# Patient Record
Sex: MALE | Race: WHITE | NOT HISPANIC OR LATINO
[De-identification: names, ages, dates, MRNs, and addresses within clinical notes are randomized per-mention and may not be internally consistent; named-entity substitution may affect disease eponyms.]

---

## 2017-04-02 ENCOUNTER — TRANSCRIPTION ENCOUNTER (OUTPATIENT)
Age: 72
End: 2017-04-02

## 2017-10-09 ENCOUNTER — NON-APPOINTMENT (OUTPATIENT)
Age: 72
End: 2017-10-09

## 2017-10-09 ENCOUNTER — APPOINTMENT (OUTPATIENT)
Dept: CARDIOLOGY | Facility: CLINIC | Age: 72
End: 2017-10-09
Payer: MEDICARE

## 2017-10-09 VITALS
BODY MASS INDEX: 33.79 KG/M2 | DIASTOLIC BLOOD PRESSURE: 80 MMHG | HEART RATE: 80 BPM | SYSTOLIC BLOOD PRESSURE: 178 MMHG | HEIGHT: 70 IN | OXYGEN SATURATION: 95 % | WEIGHT: 236 LBS

## 2017-10-09 PROCEDURE — 99214 OFFICE O/P EST MOD 30 MIN: CPT

## 2017-11-08 ENCOUNTER — APPOINTMENT (OUTPATIENT)
Dept: CARDIOLOGY | Facility: CLINIC | Age: 72
End: 2017-11-08
Payer: MEDICARE

## 2017-11-08 PROCEDURE — 93306 TTE W/DOPPLER COMPLETE: CPT

## 2017-11-14 ENCOUNTER — APPOINTMENT (OUTPATIENT)
Dept: CARDIOLOGY | Facility: CLINIC | Age: 72
End: 2017-11-14
Payer: MEDICARE

## 2017-11-14 DIAGNOSIS — R94.39 ABNORMAL RESULT OF OTHER CARDIOVASCULAR FUNCTION STUDY: ICD-10-CM

## 2017-11-14 PROCEDURE — 93015 CV STRESS TEST SUPVJ I&R: CPT

## 2017-11-15 PROBLEM — R94.39 ABNORMAL STRESS ECG: Status: ACTIVE | Noted: 2017-11-15

## 2017-12-04 ENCOUNTER — APPOINTMENT (OUTPATIENT)
Dept: CARDIOLOGY | Facility: CLINIC | Age: 72
End: 2017-12-04
Payer: MEDICARE

## 2017-12-04 PROCEDURE — A9500: CPT

## 2017-12-04 PROCEDURE — 78452 HT MUSCLE IMAGE SPECT MULT: CPT

## 2017-12-04 PROCEDURE — 93015 CV STRESS TEST SUPVJ I&R: CPT

## 2018-07-28 ENCOUNTER — APPOINTMENT (OUTPATIENT)
Dept: ORTHOPEDIC SURGERY | Facility: CLINIC | Age: 73
End: 2018-07-28
Payer: MEDICARE

## 2018-07-28 VITALS
HEIGHT: 70 IN | WEIGHT: 230 LBS | HEART RATE: 80 BPM | BODY MASS INDEX: 32.93 KG/M2 | DIASTOLIC BLOOD PRESSURE: 72 MMHG | SYSTOLIC BLOOD PRESSURE: 166 MMHG

## 2018-07-28 DIAGNOSIS — S83.241A OTHER TEAR OF MEDIAL MENISCUS, CURRENT INJURY, RIGHT KNEE, INITIAL ENCOUNTER: ICD-10-CM

## 2018-07-28 PROCEDURE — 99203 OFFICE O/P NEW LOW 30 MIN: CPT

## 2018-07-28 PROCEDURE — 73562 X-RAY EXAM OF KNEE 3: CPT | Mod: RT

## 2019-11-12 ENCOUNTER — INPATIENT (INPATIENT)
Facility: HOSPITAL | Age: 74
LOS: 1 days | Discharge: HOME | DRG: 309 | End: 2019-11-14
Attending: INTERNAL MEDICINE | Admitting: INTERNAL MEDICINE
Payer: MEDICARE

## 2019-11-12 VITALS
HEIGHT: 70 IN | HEART RATE: 111 BPM | SYSTOLIC BLOOD PRESSURE: 149 MMHG | DIASTOLIC BLOOD PRESSURE: 78 MMHG | RESPIRATION RATE: 20 BRPM | TEMPERATURE: 98 F | WEIGHT: 229.94 LBS | OXYGEN SATURATION: 94 %

## 2019-11-12 DIAGNOSIS — I10 ESSENTIAL (PRIMARY) HYPERTENSION: ICD-10-CM

## 2019-11-12 DIAGNOSIS — E78.2 MIXED HYPERLIPIDEMIA: ICD-10-CM

## 2019-11-12 DIAGNOSIS — R06.00 DYSPNEA, UNSPECIFIED: ICD-10-CM

## 2019-11-12 DIAGNOSIS — I48.91 UNSPECIFIED ATRIAL FIBRILLATION: ICD-10-CM

## 2019-11-12 DIAGNOSIS — E03.9 HYPOTHYROIDISM, UNSPECIFIED: ICD-10-CM

## 2019-11-12 LAB
ALBUMIN SERPL ELPH-MCNC: 3.7 G/DL — SIGNIFICANT CHANGE UP (ref 3.3–5)
ALP SERPL-CCNC: 83 U/L — SIGNIFICANT CHANGE UP (ref 40–120)
ALT FLD-CCNC: 40 U/L — SIGNIFICANT CHANGE UP (ref 12–78)
ANION GAP SERPL CALC-SCNC: 8 MMOL/L — SIGNIFICANT CHANGE UP (ref 5–17)
APTT BLD: 36 SEC — SIGNIFICANT CHANGE UP (ref 28.5–37)
AST SERPL-CCNC: 40 U/L — HIGH (ref 15–37)
BASOPHILS # BLD AUTO: 0.06 K/UL — SIGNIFICANT CHANGE UP (ref 0–0.2)
BASOPHILS NFR BLD AUTO: 0.5 % — SIGNIFICANT CHANGE UP (ref 0–2)
BILIRUB SERPL-MCNC: 0.4 MG/DL — SIGNIFICANT CHANGE UP (ref 0.2–1.2)
BUN SERPL-MCNC: 32 MG/DL — HIGH (ref 7–23)
CALCIUM SERPL-MCNC: 9 MG/DL — SIGNIFICANT CHANGE UP (ref 8.5–10.1)
CHLORIDE SERPL-SCNC: 110 MMOL/L — HIGH (ref 96–108)
CK MB BLD-MCNC: 2.6 % — SIGNIFICANT CHANGE UP (ref 0–3.5)
CK MB CFR SERPL CALC: 2.5 NG/ML — SIGNIFICANT CHANGE UP (ref 0–3.6)
CK SERPL-CCNC: 98 U/L — SIGNIFICANT CHANGE UP (ref 26–308)
CO2 SERPL-SCNC: 23 MMOL/L — SIGNIFICANT CHANGE UP (ref 22–31)
CREAT SERPL-MCNC: 1.5 MG/DL — HIGH (ref 0.5–1.3)
EOSINOPHIL # BLD AUTO: 0.34 K/UL — SIGNIFICANT CHANGE UP (ref 0–0.5)
EOSINOPHIL NFR BLD AUTO: 3.1 % — SIGNIFICANT CHANGE UP (ref 0–6)
GLUCOSE SERPL-MCNC: 125 MG/DL — HIGH (ref 70–99)
HCT VFR BLD CALC: 44.8 % — SIGNIFICANT CHANGE UP (ref 39–50)
HGB BLD-MCNC: 15.1 G/DL — SIGNIFICANT CHANGE UP (ref 13–17)
IMM GRANULOCYTES NFR BLD AUTO: 0.8 % — SIGNIFICANT CHANGE UP (ref 0–1.5)
INR BLD: 1.99 RATIO — HIGH (ref 0.88–1.16)
LACTATE SERPL-SCNC: 1.4 MMOL/L — SIGNIFICANT CHANGE UP (ref 0.7–2)
LYMPHOCYTES # BLD AUTO: 1.75 K/UL — SIGNIFICANT CHANGE UP (ref 1–3.3)
LYMPHOCYTES # BLD AUTO: 16 % — SIGNIFICANT CHANGE UP (ref 13–44)
MCHC RBC-ENTMCNC: 28.5 PG — SIGNIFICANT CHANGE UP (ref 27–34)
MCHC RBC-ENTMCNC: 33.7 GM/DL — SIGNIFICANT CHANGE UP (ref 32–36)
MCV RBC AUTO: 84.5 FL — SIGNIFICANT CHANGE UP (ref 80–100)
MONOCYTES # BLD AUTO: 1.41 K/UL — HIGH (ref 0–0.9)
MONOCYTES NFR BLD AUTO: 12.9 % — SIGNIFICANT CHANGE UP (ref 2–14)
NEUTROPHILS # BLD AUTO: 7.26 K/UL — SIGNIFICANT CHANGE UP (ref 1.8–7.4)
NEUTROPHILS NFR BLD AUTO: 66.7 % — SIGNIFICANT CHANGE UP (ref 43–77)
NRBC # BLD: 0 /100 WBCS — SIGNIFICANT CHANGE UP (ref 0–0)
PLATELET # BLD AUTO: 218 K/UL — SIGNIFICANT CHANGE UP (ref 150–400)
POTASSIUM SERPL-MCNC: 3.9 MMOL/L — SIGNIFICANT CHANGE UP (ref 3.5–5.3)
POTASSIUM SERPL-SCNC: 3.9 MMOL/L — SIGNIFICANT CHANGE UP (ref 3.5–5.3)
PROT SERPL-MCNC: 7.9 G/DL — SIGNIFICANT CHANGE UP (ref 6–8.3)
PROTHROM AB SERPL-ACNC: 23 SEC — HIGH (ref 10–12.9)
RBC # BLD: 5.3 M/UL — SIGNIFICANT CHANGE UP (ref 4.2–5.8)
RBC # FLD: 14.5 % — SIGNIFICANT CHANGE UP (ref 10.3–14.5)
SODIUM SERPL-SCNC: 141 MMOL/L — SIGNIFICANT CHANGE UP (ref 135–145)
TROPONIN I SERPL-MCNC: <.015 NG/ML — SIGNIFICANT CHANGE UP (ref 0.01–0.04)
WBC # BLD: 10.91 K/UL — HIGH (ref 3.8–10.5)
WBC # FLD AUTO: 10.91 K/UL — HIGH (ref 3.8–10.5)

## 2019-11-12 PROCEDURE — 93010 ELECTROCARDIOGRAM REPORT: CPT

## 2019-11-12 PROCEDURE — 93306 TTE W/DOPPLER COMPLETE: CPT | Mod: 26

## 2019-11-12 PROCEDURE — 71250 CT THORAX DX C-: CPT | Mod: 26

## 2019-11-12 PROCEDURE — 99285 EMERGENCY DEPT VISIT HI MDM: CPT

## 2019-11-12 PROCEDURE — 99223 1ST HOSP IP/OBS HIGH 75: CPT

## 2019-11-12 PROCEDURE — 71046 X-RAY EXAM CHEST 2 VIEWS: CPT | Mod: 26

## 2019-11-12 RX ORDER — DILTIAZEM HCL 120 MG
10 CAPSULE, EXT RELEASE 24 HR ORAL ONCE
Refills: 0 | Status: COMPLETED | OUTPATIENT
Start: 2019-11-12 | End: 2019-11-12

## 2019-11-12 RX ORDER — METOPROLOL TARTRATE 50 MG
12.5 TABLET ORAL
Refills: 0 | Status: DISCONTINUED | OUTPATIENT
Start: 2019-11-12 | End: 2019-11-14

## 2019-11-12 RX ORDER — LEVOTHYROXINE SODIUM 125 MCG
137 TABLET ORAL DAILY
Refills: 0 | Status: DISCONTINUED | OUTPATIENT
Start: 2019-11-12 | End: 2019-11-14

## 2019-11-12 RX ORDER — HYDROCHLOROTHIAZIDE 25 MG
12.5 TABLET ORAL DAILY
Refills: 0 | Status: DISCONTINUED | OUTPATIENT
Start: 2019-11-12 | End: 2019-11-14

## 2019-11-12 RX ORDER — DILTIAZEM HCL 120 MG
1 CAPSULE, EXT RELEASE 24 HR ORAL
Qty: 0 | Refills: 0 | DISCHARGE

## 2019-11-12 RX ORDER — DILTIAZEM HCL 120 MG
10 CAPSULE, EXT RELEASE 24 HR ORAL ONCE
Refills: 0 | Status: DISCONTINUED | OUTPATIENT
Start: 2019-11-12 | End: 2019-11-12

## 2019-11-12 RX ORDER — IPRATROPIUM/ALBUTEROL SULFATE 18-103MCG
3 AEROSOL WITH ADAPTER (GRAM) INHALATION ONCE
Refills: 0 | Status: COMPLETED | OUTPATIENT
Start: 2019-11-12 | End: 2019-11-12

## 2019-11-12 RX ORDER — IPRATROPIUM/ALBUTEROL SULFATE 18-103MCG
3 AEROSOL WITH ADAPTER (GRAM) INHALATION THREE TIMES A DAY
Refills: 0 | Status: DISCONTINUED | OUTPATIENT
Start: 2019-11-12 | End: 2019-11-14

## 2019-11-12 RX ORDER — DILTIAZEM HCL 120 MG
180 CAPSULE, EXT RELEASE 24 HR ORAL DAILY
Refills: 0 | Status: DISCONTINUED | OUTPATIENT
Start: 2019-11-12 | End: 2019-11-12

## 2019-11-12 RX ORDER — DILTIAZEM HCL 120 MG
90 CAPSULE, EXT RELEASE 24 HR ORAL EVERY 6 HOURS
Refills: 0 | Status: DISCONTINUED | OUTPATIENT
Start: 2019-11-12 | End: 2019-11-14

## 2019-11-12 RX ORDER — LOSARTAN POTASSIUM 100 MG/1
100 TABLET, FILM COATED ORAL DAILY
Refills: 0 | Status: DISCONTINUED | OUTPATIENT
Start: 2019-11-12 | End: 2019-11-14

## 2019-11-12 RX ORDER — WARFARIN SODIUM 2.5 MG/1
7 TABLET ORAL ONCE
Refills: 0 | Status: COMPLETED | OUTPATIENT
Start: 2019-11-12 | End: 2019-11-12

## 2019-11-12 RX ORDER — SODIUM CHLORIDE 9 MG/ML
1000 INJECTION INTRAMUSCULAR; INTRAVENOUS; SUBCUTANEOUS ONCE
Refills: 0 | Status: COMPLETED | OUTPATIENT
Start: 2019-11-12 | End: 2019-11-12

## 2019-11-12 RX ORDER — ATORVASTATIN CALCIUM 80 MG/1
40 TABLET, FILM COATED ORAL AT BEDTIME
Refills: 0 | Status: DISCONTINUED | OUTPATIENT
Start: 2019-11-12 | End: 2019-11-14

## 2019-11-12 RX ADMIN — SODIUM CHLORIDE 1000 MILLILITER(S): 9 INJECTION INTRAMUSCULAR; INTRAVENOUS; SUBCUTANEOUS at 07:07

## 2019-11-12 RX ADMIN — Medication 3 MILLILITER(S): at 20:12

## 2019-11-12 RX ADMIN — WARFARIN SODIUM 7 MILLIGRAM(S): 2.5 TABLET ORAL at 21:06

## 2019-11-12 RX ADMIN — SODIUM CHLORIDE 1000 MILLILITER(S): 9 INJECTION INTRAMUSCULAR; INTRAVENOUS; SUBCUTANEOUS at 05:39

## 2019-11-12 RX ADMIN — Medication 90 MILLIGRAM(S): at 23:25

## 2019-11-12 RX ADMIN — Medication 3 MILLILITER(S): at 05:38

## 2019-11-12 RX ADMIN — ATORVASTATIN CALCIUM 40 MILLIGRAM(S): 80 TABLET, FILM COATED ORAL at 21:02

## 2019-11-12 RX ADMIN — Medication 200 MILLIGRAM(S): at 21:02

## 2019-11-12 RX ADMIN — Medication 10 MILLIGRAM(S): at 09:42

## 2019-11-12 RX ADMIN — Medication 12.5 MILLIGRAM(S): at 18:09

## 2019-11-12 RX ADMIN — Medication 10 MILLIGRAM(S): at 05:44

## 2019-11-12 RX ADMIN — Medication 90 MILLIGRAM(S): at 18:09

## 2019-11-12 NOTE — ED PROVIDER NOTE - PSYCHIATRIC, MLM
[facility]  Neurosurgery  Progress Note    Subjective:     History of Present Illness: 80 y.o.  male with type 2 diabetes, who presents today for follow up evaluation one week prior to 2 level ACDF scheduled for 4/10/2018. Pt reports    Pt presents today wearing a cervical collar and in a wheelchair. He would like to proceed with surgery. Per pt's family, his PCP would like to see him on 4/9, the day before surgery. He notes continued neck pain and BUE numbness/tingling as well as BUE weakness. Pt is only able to walk a few steps unassisted. He has never received neck surgery.     Post-Op Info:  Procedure(s) (LRB):  WASHOUT-CERVICAL-wound washout and csf leak repair with depuy (N/A)  DISKECTOMY AND FUSION-ANTERIOR CERVICAL (ACDF)- REVISON C5-C7   7 Days Post-Op     Interval History: YAMILKA. PEG placed yesterday by IR    Medications:  Continuous Infusions:  Scheduled Meds:   atorvastatin  20 mg Per NG tube Nightly    cefTRIAXone (ROCEPHIN) IVPB  2 g Intravenous Q24H    cinacalcet  30 mg Oral Daily with breakfast    citalopram  10 mg Per NG tube Daily    diltiaZEM  30 mg Per NG tube Q6H    heparin (porcine)  5,000 Units Subcutaneous Q8H    insulin aspart U-100  2 Units Subcutaneous Q4H    insulin detemir U-100  13 Units Subcutaneous BID    levETIRAcetam  500 mg Per NG tube BID    polyethylene glycol  17 g Per NG tube BID    senna-docusate 8.6-50 mg  1 tablet Per NG tube BID    tamsulosin  0.4 mg Per NG tube Daily    valsartan  120 mg Per NG tube Daily    vancomycin (VANCOCIN) IVPB  1,000 mg Intravenous Q12H     PRN Meds:acetaminophen, albuterol-ipratropium 2.5mg-0.5mg/3mL, aluminum-magnesium hydroxide-simethicone, dextrose 50%, glucagon (human recombinant), insulin aspart U-100, lidocaine HCL 2%, ondansetron, potassium chloride 10%, potassium chloride 10%, potassium chloride 10%, potassium, sodium phosphates, potassium, sodium phosphates, potassium, sodium phosphates     Review of  Systems    Objective:     Weight: 99.5 kg (219 lb 5.7 oz)  Body mass index is 29.75 kg/m².  Vital Signs (Most Recent):  Temp: 97 °F (36.1 °C) (05/05/18 1736)  Pulse: 86 (05/05/18 1736)  Resp: 20 (05/05/18 1311)  BP: (!) 158/86 (05/05/18 1736)  SpO2: (!) 93 % (05/05/18 1736) Vital Signs (24h Range):  Temp:  [97 °F (36.1 °C)-98.2 °F (36.8 °C)] 97 °F (36.1 °C)  Pulse:  [75-88] 86  Resp:  [16-20] 20  SpO2:  [93 %-98 %] 93 %  BP: (142-176)/(82-88) 158/86                           NG/OG Tube 04/21/18 2300 Cortrak Right nostril (Active)   Placement Check placement verified by aspirate characteristics 5/3/2018  8:00 PM   Distal Tube Length (cm) 70 5/3/2018  8:00 PM   Tolerance no signs/symptoms of discomfort 5/3/2018  8:00 PM   Securement anchored to nostril center w/ adhesive device 5/3/2018  8:00 PM   Clamp Status/Tolerance unclamped 5/3/2018  8:00 PM   Insertion Site Appearance no redness, warmth, tenderness, skin breakdown, drainage 5/3/2018  8:00 PM   Drainage None 5/3/2018  8:00 PM   Flush/Irrigation flushed w/;water 5/3/2018  8:00 PM   Feeding Method continuous 5/3/2018  8:00 PM   Current Rate (mL/hr) 55 mL/hr 5/3/2018  8:00 PM   Goal Rate (mL/hr) 55 mL/hr 5/3/2018  8:00 PM   Intake (mL) 60 mL 5/3/2018  8:00 PM   Water Bolus (mL) 250 mL 5/3/2018  4:41 PM   Intake (mL) - Breast Milk Tube Feeding 0 4/29/2018 11:05 PM   Rate Formula Tube Feeding (mL/hr) 55 mL/hr 5/2/2018 10:00 PM   Intake (mL) - Formula Tube Feeding 570 5/3/2018  5:00 AM   Residual Amount (ml) 0 ml 5/2/2018 10:00 PM       Male External Urinary Catheter 04/30/18 1600 (Active)   Collection Container Urimeter 5/3/2018  8:00 PM   Securement Method secured to top of thigh w/ adhesive device 5/3/2018  8:00 PM   Skin no redness;no breakdown 5/3/2018  8:00 PM   Tolerance no signs/symptoms of discomfort 5/2/2018 10:00 PM   Output (mL) 700 mL 5/3/2018  4:41 PM       Neurosurgery Physical Exam    General: well developed, well nourished. no acute distress.  Generalized deconditioning   Neurologic: lethargic. Requires persistant stim to participate. Oriented x 2. Thought content appropriate. Follows commands   Head: normocephalic, atraumatic   GCS: Motor: 6/Verbal: 5/Eyes: 4 GCS Total: 15  Cranial nerves: face symmetric, tongue midline, pupils equal, round, reactive to light with accomodation, extraocular muscles intact. CN II-XII grossly intact.   Language: no aphasia  Speech:  dysarthria   Sensory: decrease response to light touch in BUE  Motor Strength: Moves all extremities spontaneously with good tone.      Strength   Deltoids Triceps Biceps Wrist Extension Wrist Flexion Hand    Upper: R 2/5 3/5 3/5 2/5 2/5 3/5     L 3/5 4-/5 4-/5 3/5 3/5 3/5       Iliopsoas Quadriceps Knee  Flexion Tibialis  anterior Gastro- cnemius EHL   Lower: R 3/5 3/5 3/5 4-/5 4-/5 4-/5     L 3/5 3/5 3/5 4/5 4/5 4/5          [place]: present on right   Clonus: absent     wound is c/d/i, no erythema, ttp, or drainage.  wound edges are well approximated. No surrounding edema     Significant Labs:    Recent Labs  Lab 05/04/18  0429 05/05/18  0444   * 150*    136   K 4.4 4.5    102   CO2 30* 27   BUN 17 14   CREATININE 0.6 0.6   CALCIUM 11.0* 11.0*   MG 2.0 1.8       Recent Labs  Lab 05/04/18  0429 05/05/18  0444   WBC 9.54 8.75   HGB 10.7* 11.4*   HCT 34.6* 36.7*    218       Recent Labs  Lab 05/04/18  0429 05/05/18  0444   INR 1.1 1.2     Microbiology Results (last 7 days)     Procedure Component Value Units Date/Time    Culture, Anaerobe [[id number]] Collected:  04/28/18 1417    Order Status:  Completed Specimen:  Wound from Neck Updated:  05/03/18 1244     Anaerobic Culture Culture in progress    Narrative:       Wound fluid    Culture, Anaerobe [[id number]] Collected:  04/28/18 1413    Order Status:  Completed Specimen:  Wound from Neck Updated:  05/03/18 1244     Anaerobic Culture Culture in progress    Narrative:       Wound fluid    Fungus culture [[id number]]  Collected:  04/28/18 1413    Order Status:  Completed Specimen:  Wound from Neck Updated:  05/03/18 1039     Fungus (Mycology) Culture Culture in progress    Narrative:       Wound fluid    Fungus culture [[id number]] Collected:  04/28/18 1417    Order Status:  Completed Specimen:  Wound from Neck Updated:  05/03/18 1039     Fungus (Mycology) Culture Culture in progress    Narrative:       Wound fluid    Aerobic culture [[id number]] Collected:  04/28/18 1417    Order Status:  Completed Specimen:  Wound from Neck Updated:  05/01/18 0852     Aerobic Bacterial Culture No growth    Narrative:       Wound fluid    Aerobic culture [[id number]] Collected:  04/28/18 1413    Order Status:  Completed Specimen:  Wound from Neck Updated:  05/01/18 0852     Aerobic Bacterial Culture No growth    Narrative:       Wound fluid    AFB Culture & Smear [[id number]] Collected:  04/28/18 1417    Order Status:  Completed Specimen:  Wound from Neck Updated:  04/30/18 1313     AFB Culture & Smear Culture in progress     AFB CULTURE STAIN No acid fast bacilli seen.    Narrative:       Wound fluid    AFB Culture & Smear [[id number]] Collected:  04/28/18 1413    Order Status:  Completed Specimen:  Wound from Neck Updated:  04/30/18 1313     AFB Culture & Smear Culture in progress     AFB CULTURE STAIN No acid fast bacilli seen.    Narrative:       Wound fluid    Urine culture [[id number]] Collected:  04/27/18 1218    Order Status:  Completed Specimen:  Urine from Urine, Catheterized Updated:  04/28/18 2020     Urine Culture, Routine No growth    Gram stain [[id number]] Collected:  04/28/18 1417    Order Status:  Completed Specimen:  Wound from Neck Updated:  04/28/18 2009     Gram Stain Result Rare WBC's      No organisms seen    Narrative:       Wound fluid    Gram stain [[id number]] Collected:  04/28/18 1413    Order Status:  Completed Specimen:  Wound from Neck Updated:  04/28/18 2004     Gram Stain Result Rare WBC's      No organisms seen     Narrative:       Wound fluid        All pertinent labs from the last 24 hours have been reviewed.    Significant Diagnostics:  None new     Assessment/Plan:     * Cervical myelopathy with cervical radiculopathy    80 y.o. male s/p C3-5 ACDF for cervical myelopathy on 4/10 with Dr. Hess, and now s/p evacuation/washout of fluid collection with CSF leak repair      -Repeat CT Head stable   -Cont neuro checks q4h   -Appreciate medicine recommendations  -TF restarted today, will adjust insulin per medicine recs  -ID: continue vanc and ceftriaxone x 7 days from surgery (4/28) Vanc trough before 4th dose, 18. Trough Goal 15-20. Surgical cultures NGTD, will stop antibiotics after today  -CV goal normotension. Continue home meds. PRN meds SBP >160.   -Continue PILI/SCDs/SQH  -Continue bowel regimen daily. Brown bomb 5/3 with +BM  -Continue to hold Eliquis. Per , indications unclear.  does not recommend continuing upon discharge.  -Appreciate  assistance with management  -Hypercalcemia: home dose sensipar restarted per HM, CTM   -Continue aggressive PT/OT/ST  -Cervical collar when OOB or with activity  -Scheduled CPT q4 hours   -Aggressive oral care per nursing staff daily  -SNF pending. CM/SW to pursue                       Devante Holman MD  Neurosurgery  [facility]   Alert and oriented to person, place, time/situation. normal mood and affect. no apparent risk to self or others.

## 2019-11-12 NOTE — H&P ADULT - HISTORY OF PRESENT ILLNESS
Child Abuse Assessment (patients less than 13 yrs):  Chief Complaint: shortness of breath.    · Chief Complaint: The patient is a 74y Male complaining of shortness of breath.	  · HPI Objective Statement: 73yo male who presents with cough and sob on and off for 2 weeks. pt, c/o cough and congestion, was seen by pmd and given claritin, and the cough has gotten worse, and now pt feels sob, no fever, chills, no chest pain no other complaints has hx of a fib , and small tia cva , and leg weakness , but plays gulf , and is active , should be on coumadin for a fib 	  · Presenting Symptoms: COUGH, DIFFICULTY BREATHING	  · Negative Findings: no body aches, no chest pain, no chills, no fever, no hemoptysis	  · Timing: gradual onset, worsening	  · Duration: week(s)  2	  · Quality: non-productive cough	  · Context: coughing	  · Recent Exposure To: none known	  · Aggravated Factors: coughing	  · Relieving Factors: none	    PAST MEDICAL/SURGICAL/FAMILY/SOCIAL HISTORY:    Past Medical History:  Atrial fibrillation    Hypertension    Hypothyroid.     Tobacco Usage:  · Tobacco Usage	Former smoker

## 2019-11-12 NOTE — H&P ADULT - NSHPLABSRESULTS_GEN_ALL_CORE
< from: CT Chest No Cont (11.12.19 @ 06:12) >      EXAM:  CT CHEST                            PROCEDURE DATE:  11/12/2019          INTERPRETATION:  CLINICAL INFORMATION: Cough and shortness of breath    COMPARISON: None    PROCEDURE:   CT of the Chest was performed without intravenous contrast.  Sagittal and coronal reformats were performed.      FINDINGS:    CHEST:     LUNGS AND LARGE AIRWAYS: Patent central airways. No pulmonary nodules.  PLEURA: No pleural effusion.  VESSELS: Within normal limits.  HEART: Heart size is normal.No pericardial effusion.  MEDIASTINUM AND MARY: No lymphadenopathy.  CHEST WALL AND LOWER NECK: Within normal limits.  VISUALIZED UPPER ABDOMEN: Partially imaged right renal cyst.   Cholelithiasis.  BONES: Mild degenerative changes of the spine.    IMPRESSION: No pulmonary consolidation            < end of copied text >

## 2019-11-12 NOTE — H&P ADULT - ASSESSMENT
Child Abuse Assessment (patients less than 13 yrs):  Chief Complaint: shortness of breath.    · Chief Complaint: The patient is a 74y Male complaining of shortness of breath.	  · HPI Objective Statement: 75yo male who presents with cough and sob on and off for 2 weeks. pt, c/o cough and congestion, was seen by pmd and given claritin, and the cough has gotten worse, and now pt feels sob, no fever, chills, no chest pain no other complaints has hx of a fib , and small tia cva , and leg weakness , but plays gulf , and is active , should be on coumadin for a fib 	  · Presenting Symptoms: COUGH, DIFFICULTY BREATHING	  · Negative Findings: no body aches, no chest pain, no chills, no fever, no hemoptysis	  · Timing: gradual onset, worsening	  · Duration: week(s)  2	  · Quality: non-productive cough	  · Context: coughing	  · Recent Exposure To: none known	  · Aggravated Factors: coughing	  · Relieving Factors: none	    PAST MEDICAL/SURGICAL/FAMILY/SOCIAL HISTORY:    Past Medical History:  Atrial fibrillation    Hypertension    Hypothyroid.     Tobacco Usage:  · Tobacco Usage	Former smoker	    admit to telemetry, sob , cardiac consult , meds and labs rev, a fib on coumadin ,

## 2019-11-12 NOTE — CONSULT NOTE ADULT - ASSESSMENT
75 YO M PMHX paroxysmal a fib on warfarin diltiazem cardioverted in past, stage 1 diastolic dysfunction (EF 55-60% nov 2017, mild MR, mild LAE), HTN hypothyroisidm, bladder cancer s/p bladder removal over 10 years ago, presents with SOB x 2 weeks on and off, SMALL, orthopnea. Cardiology consulted for rapid a fib in setting of SOB.    rapid a fib in setting of SOB  s/p Cardizem 10 mg IVP x2, fluids 1 L IV bolus, Duoneb, albuterol  PMHX of paroxysmal a fib on warfarin diltiazem cardioverted in past  ekg:  a fib with RVR, 132 bpm, nonspecific ST abnormality  echo nov 2017: stage 1 diastolic dysfunction (EF 55-60%, mild MR, mild LAE),  CT chest: no pulmonary consolidation, no acute process    recommend:  continue home dose diltiazem 180 PO tomorrow   f/u INR, resume warfarin 7.5 home dose as per INR results  decongestants for chest congestion as per primary care team  f/u CXR  continue home dose rosuvastatin 20 mg, lasartan-HCTZ 100 mg   follow up outpatient cardiologist 73 YO M PMHX paroxysmal a fib on warfarin diltiazem cardioverted in past, stage 1 diastolic dysfunction (EF 55-60% nov 2017, mild MR, mild LAE), HTN hypothyroisidm, bladder cancer s/p bladder removal over 10 years ago, presents with SOB x 2 weeks on and off, SMALL, orthopnea. Cardiology consulted for rapid a fib in setting of SOB.    rapid a fib and SOB, likely 2/2 bronchitis /chest congestion   s/p Cardizem 10 mg IVP x2, fluids 1 L IV bolus, Duoneb, albuterol  PMHX of paroxysmal a fib on warfarin diltiazem cardioverted in past  ekg:  a fib with RVR, 132 bpm, nonspecific ST abnormality  echo nov 2017: stage 1 diastolic dysfunction (EF 55-60%, mild MR, mild LAE),  CT chest: no pulmonary consolidation, no acute process    recommend:  admit to tele  echo   start Cardizem drip, Cardizem 90 q 6 PO, Metoprolol 12.5 BID today    f/u TSH r/o hyperthyroidism etiology    continue home dose diltiazem 180 PO tomorrow   f/u INR, resume warfarin 7.5 home dose as per INR results  decongestants for chest congestion as per primary care team  f/u CXR  continue home dose rosuvastatin 20 mg, lasartan-HCTZ 100 mg   follow up outpatient cardiologist 75 YO M PMHX paroxysmal a fib on warfarin diltiazem cardioverted in past, stage 1 diastolic dysfunction (EF 55-60% nov 2017, mild MR, mild LAE), HTN hypothyroisidm, bladder cancer s/p bladder removal over 10 years ago, presents with SOB x 2 weeks on and off, SMALL, orthopnea. Cardiology consulted for rapid a fib in setting of SOB.    rapid a fib and SOB, likely 2/2 bronchitis /chest congestion   s/p Cardizem 10 mg IVP x2, fluids 1 L IV bolus, Duoneb, albuterol  PMHX of paroxysmal a fib on warfarin diltiazem cardioverted in past  ekg:  a fib with RVR, 132 bpm, nonspecific ST abnormality  echo nov 2017: stage 1 diastolic dysfunction (EF 55-60%, mild MR, mild LAE),  CT chest: no pulmonary consolidation, no acute process    recommend:  admit to tele  echo   start Cardizem drip at 5mg/hr.  Can increase as needed for HR control  Start Cardizem 90 q 6 PO, Metoprolol 12.5 BID PO.  Increase metoprolol as needed for HR control  f/u TSH r/o hyperthyroidism etiology    f/u INR, resume warfarin 7.5 home dose as per INR results  decongestants for chest congestion as per primary care team  f/u CXR  continue home dose rosuvastatin 20 mg, lasartan-HCTZ 100 mg   will follow while admitted.

## 2019-11-12 NOTE — ED ADULT NURSE NOTE - CHPI ED NUR SYMPTOMS NEG
no body aches/no edema/no headache/no chest pain/no hemoptysis/no chills/no diaphoresis/no wheezing/no fever

## 2019-11-12 NOTE — ED PROVIDER NOTE - CARE PLAN
Principal Discharge DX:	Atrial fibrillation with rapid ventricular response  Secondary Diagnosis:	Dyspnea

## 2019-11-12 NOTE — ED PROVIDER NOTE - OBJECTIVE STATEMENT
73yo male who presents with cough and sob on and off for 2 weeks. pt, c/o cough and congestion, was seen by pmd and given claritin, and the cough has gotten worse, and now pt feels sob, no fever, chills, no chest pain no other compalints

## 2019-11-12 NOTE — CONSULT NOTE ADULT - SUBJECTIVE AND OBJECTIVE BOX
Patient is a 74y old  Male who presents with a chief complaint of     HPI:  73 YO M PMHX paroxismal a fib ion warfarin diltiazem cardioverted in past, stage 1 diastolic dysfunction (EF 55-60% nov 2017, mild MR, mild LAE, HTN), hypothyroisidm, bladder cancer s/p bladder removal over 10 years ago, presents with SOB x 2 weeks on and off, SMALL, orthopnea. Pt has associated    Pt has had yellow brown mucus producing cough x 2 months. Pt has been congested x 2 weeks, saw his PCP who prescribed claritin, however cough has worsened. Pt deneis fever, chills, CP, racing heart,     PAST MEDICAL & SURGICAL HISTORY:  Hypertension  Hypothyroid  Atrial fibrillation            ECHO  FINDINGS:      MEDICATIONS  (STANDING):    MEDICATIONS  (PRN):      FAMILY HISTORY:    Denies Family history of CAD or early MI      Constitutional: denies fever, chills  HEENT: denies blurry vision, difficulty hearing  Respiratory: denies SOB, SMALL, cough  Cardiovascular: denies CP, palpitations, orthopnea, PND, LE edema  Gastrointestinal: denies nausea, vomiting, abdominal pain  Genitourinary: denies urinary changes  Skin: Denies rashes, itching  Neurologic: denies headache, weakness, dizziness  Hematology/Oncology: denies bleeding, easy bruising  ROS negative except as noted above      SOCIAL HISTORY:    No tobacco, Alcohol or Ddrug use    Vital Signs Last 24 Hrs  T(C): 36.4 (12 Nov 2019 04:44), Max: 36.4 (12 Nov 2019 04:44)  T(F): 97.6 (12 Nov 2019 04:44), Max: 97.6 (12 Nov 2019 04:44)  HR: 93 (12 Nov 2019 07:26) (93 - 111)  BP: 128/82 (12 Nov 2019 07:26) (128/82 - 149/78)  BP(mean): --  RR: 18 (12 Nov 2019 07:26) (18 - 20)  SpO2: 96% (12 Nov 2019 06:39) (94% - 96%)    Physical Exam:  General: Well developed, well nourished, NAD  HEENT: NCAT, PERRLA, EOMI bl, moist mucous membranes   Neck: Supple, nontender, no mass  Neurology: A&Ox3, nonfocal, sensation intact   Respiratory: CTA B/L, No W/R/R  CV: RRR, +S1/S2, no murmurs, rubs or gallops  Abdominal: Soft, NT, ND +BSx4, no palpable masses  Extremities: No C/C/E, + peripheral pulses  MSK: Normal ROM, no joint erythema or warmth, no joint swelling   Heme: No obvious ecchymosis or petechiae   Skin: warm, dry, normal color      ECG:    I&O's Detail      LABS:                        15.1   10.91 )-----------( 218      ( 12 Nov 2019 05:23 )             44.8     11-12    141  |  110<H>  |  32<H>  ----------------------------<  125<H>  3.9   |  23  |  1.50<H>    Ca    9.0      12 Nov 2019 05:23    TPro  7.9  /  Alb  3.7  /  TBili  0.4  /  DBili  x   /  AST  40<H>  /  ALT  40  /  AlkPhos  83  11-12            I&O's Summary    BNP  RADIOLOGY & ADDITIONAL STUDIES: Patient is a 74y old  Male who presents with a chief complaint of     HPI:  73 YO M PMHX paroxysmal a fib on warfarin diltiazem cardioverted in past, stage 1 diastolic dysfunction (EF 55-60% nov 2017, mild MR, mild LAE), HTN hypothyroisidm, bladder cancer s/p bladder removal over 10 years ago, presents with SOB x 2 weeks on and off, SMALL, orthopnea. Pt has associated    Pt has had yellow brown mucus producing cough x 2 months. Pt has had chest congested x 2 weeks, saw his PCP who prescribed claritin, however cough has worsened. Pt denies fever, chills, CP, racing heart, palpitations, headache, lightheadedness, abdominal pain, n/v. Pt states he has been in a fib in the past, however during those times he could feel his heart racing.     PAST MEDICAL & SURGICAL HISTORY:  Hypertension  Hypothyroid  Atrial fibrillation            ECHO stage 1 diastolic dysfunction (EF 55-60% nov 2017, mild MR, mild LAE)  FINDINGS:      MEDICATIONS  (STANDING):    MEDICATIONS  (PRN):      FAMILY HISTORY:  father mother: CHF  father a fib       Constitutional: denies fever, chills  HEENT: denies blurry vision, difficulty hearing  Respiratory: +chest congestion, +SOB, +SMALL, yellow brown mucus producing cough x 2 months   Cardiovascular: +orthopnea, denies CP, palpitations, PND, LE edema  Gastrointestinal: denies nausea, vomiting, abdominal pain  Genitourinary: denies urinary changes  Skin: Denies rashes, itching  Neurologic: denies headache, weakness, dizziness  Hematology/Oncology: denies bleeding, easy bruising  ROS negative except as noted above      SOCIAL HISTORY:  quit 1985, smoked 1 ppd 25 years, drinks red wine 2 glasses with dinner 3x a week   no drug use    Vital Signs Last 24 Hrs  T(C): 36.4 (12 Nov 2019 04:44), Max: 36.4 (12 Nov 2019 04:44)  T(F): 97.6 (12 Nov 2019 04:44), Max: 97.6 (12 Nov 2019 04:44)  HR: 93 (12 Nov 2019 07:26) (93 - 111)  BP: 128/82 (12 Nov 2019 07:26) (128/82 - 149/78)  BP(mean): --  RR: 18 (12 Nov 2019 07:26) (18 - 20)  SpO2: 96% (12 Nov 2019 06:39) (94% - 96%)    Physical Exam:  General: Well developed, well nourished, NAD  HEENT: NCAT, PERRLA, EOMI bl, moist mucous membranes, no maxiallary sinus TTP   Neck: Supple, nontender, no mass  Neurology: A&Ox3, nonfocal, sensation intact   Respiratory: CTA B/L, No W/R/R  CV: irregular rhythm, irregular rate, no rubs or gallops  Abdominal: Soft, NT, ND +BSx4, no palpable masses  Extremities: trace edma b/l, No C/C, + peripheral pulses  MSK: Normal ROM, no joint erythema or warmth, no joint swelling   Heme: No obvious ecchymosis or petechiae   Skin: warm, dry, normal color      ECG: a fib with RVR, 132 bpm, nonspecific ST abnormality    I&O's Detail      LABS:                        15.1   10.91 )-----------( 218      ( 12 Nov 2019 05:23 )             44.8     11-12    141  |  110<H>  |  32<H>  ----------------------------<  125<H>  3.9   |  23  |  1.50<H>    Ca    9.0      12 Nov 2019 05:23    TPro  7.9  /  Alb  3.7  /  TBili  0.4  /  DBili  x   /  AST  40<H>  /  ALT  40  /  AlkPhos  83  11-12            I&O's Summary    BNP  RADIOLOGY & ADDITIONAL STUDIES:    EXAM:  CT CHEST                            PROCEDURE DATE:  11/12/2019          INTERPRETATION:  CLINICAL INFORMATION: Cough and shortness of breath    COMPARISON: None    PROCEDURE:   CT of the Chest was performed without intravenous contrast.  Sagittal and coronal reformats were performed.      FINDINGS:    CHEST:     LUNGS AND LARGE AIRWAYS: Patent central airways. No pulmonary nodules.  PLEURA: No pleural effusion.  VESSELS: Within normal limits.  HEART: Heart size is normal.No pericardial effusion.  MEDIASTINUM AND MARY: No lymphadenopathy.  CHEST WALL AND LOWER NECK: Within normal limits.  VISUALIZED UPPER ABDOMEN: Partially imaged right renal cyst.   Cholelithiasis.  BONES: Mild degenerative changes of the spine.    IMPRESSION: No pulmonary consolidation                      JANELL ESTRADA M.D., ATTENDING RADIOLOGIST  This document has been electronically signed. Nov 12 2019  6:46AM

## 2019-11-13 LAB
HCV AB S/CO SERPL IA: 0.2 S/CO — SIGNIFICANT CHANGE UP (ref 0–0.99)
HCV AB SERPL-IMP: SIGNIFICANT CHANGE UP
INR BLD: 1.83 RATIO — HIGH (ref 0.88–1.16)
PROTHROM AB SERPL-ACNC: 21.3 SEC — HIGH (ref 10–12.9)

## 2019-11-13 PROCEDURE — 99232 SBSQ HOSP IP/OBS MODERATE 35: CPT

## 2019-11-13 RX ORDER — WARFARIN SODIUM 2.5 MG/1
8 TABLET ORAL ONCE
Refills: 0 | Status: COMPLETED | OUTPATIENT
Start: 2019-11-13 | End: 2019-11-13

## 2019-11-13 RX ADMIN — Medication 12.5 MILLIGRAM(S): at 06:01

## 2019-11-13 RX ADMIN — Medication 3 MILLILITER(S): at 14:02

## 2019-11-13 RX ADMIN — Medication 90 MILLIGRAM(S): at 17:44

## 2019-11-13 RX ADMIN — Medication 90 MILLIGRAM(S): at 23:42

## 2019-11-13 RX ADMIN — Medication 137 MICROGRAM(S): at 06:01

## 2019-11-13 RX ADMIN — Medication 3 MILLILITER(S): at 07:59

## 2019-11-13 RX ADMIN — Medication 90 MILLIGRAM(S): at 12:04

## 2019-11-13 RX ADMIN — Medication 90 MILLIGRAM(S): at 06:01

## 2019-11-13 RX ADMIN — WARFARIN SODIUM 8 MILLIGRAM(S): 2.5 TABLET ORAL at 21:22

## 2019-11-13 RX ADMIN — ATORVASTATIN CALCIUM 40 MILLIGRAM(S): 80 TABLET, FILM COATED ORAL at 21:21

## 2019-11-13 RX ADMIN — Medication 12.5 MILLIGRAM(S): at 17:44

## 2019-11-13 RX ADMIN — LOSARTAN POTASSIUM 100 MILLIGRAM(S): 100 TABLET, FILM COATED ORAL at 06:01

## 2019-11-13 RX ADMIN — Medication 3 MILLILITER(S): at 20:28

## 2019-11-13 NOTE — PROGRESS NOTE ADULT - SUBJECTIVE AND OBJECTIVE BOX
[facility] -- Caleb Ochoa, Neteu, Jessica, Jerad Jarquin Savella  Office # [phone]      Follow Up:    Afib with RVR    Subjective/Observations:   No events overnight resting comfortably in bed.  No complaints of chest pain, dyspnea, or palpitations reported. No signs of orthopnea or PND.  + non productive cough     REVIEW OF SYSTEMS: All other review of systems is negative unless indicated above    PAST MEDICAL & SURGICAL HISTORY:  Hypertension  Hypothyroid  Atrial fibrillation      MEDICATIONS  (STANDING):  albuterol/ipratropium for Nebulization 3 milliLiter(s) Nebulizer three times a day  atorvastatin 40 milliGRAM(s) Oral at bedtime  diltiazem    Tablet 90 milliGRAM(s) Oral every 6 hours  hydrochlorothiazide 12.5 milliGRAM(s) Oral daily  levothyroxine 137 MICROGram(s) Oral daily  losartan 100 milliGRAM(s) Oral daily  metoprolol tartrate 12.5 milliGRAM(s) Oral two times a day    MEDICATIONS  (PRN):  guaiFENesin    Syrup 200 milliGRAM(s) Oral every 6 hours PRN Cough      Allergies    No Known Allergies    Intolerances        Vital Signs Last 24 Hrs  T(C): 36.6 (13 Nov 2019 07:47), Max: 36.6 (13 Nov 2019 07:47)  T(F): 97.9 (13 Nov 2019 07:47), Max: 97.9 (13 Nov 2019 07:47)  HR: 83 (13 Nov 2019 08:01) (83 - 111)  BP: 145/85 (13 Nov 2019 07:47) (122/78 - 150/84)  BP(mean): --  RR: 17 (13 Nov 2019 07:47) (17 - 18)  SpO2: 94% (13 Nov 2019 08:01) (94% - 99%)    I&O's Summary    12 Nov 2019 07:01  -  13 Nov 2019 07:00  --------------------------------------------------------  IN: 360 mL / OUT: 0 mL / NET: 360 mL          PHYSICAL EXAM:  TELE: Afib 101  Constitutional: NAD, awake and alert, obese  HEENT: Moist Mucous Membranes, Anicteric  Pulmonary: Non-labored scattered rhonchi   Cardiovascular: IRRegular, S1 and S2 nl, No murmurs, rubs, gallops or clicks  Gastrointestinal: Bowel Sounds present, soft, nontender.   Lymph: No lymphadenopathy. No peripheral edema.  Skin: No visible rashes or ulcers.  Psych:  Mood & affect appropriate    LABS: All Labs Reviewed:                        15.1   10.91 )-----------( 218      ( 12 Nov 2019 05:23 )             44.8     12 Nov 2019 05:23    141    |  110    |  32     ----------------------------<  125    3.9     |  23     |  1.50     Ca    9.0        12 Nov 2019 05:23    TPro  7.9    /  Alb  3.7    /  TBili  0.4    /  DBili  x      /  AST  40     /  ALT  40     /  AlkPhos  83     12 Nov 2019 05:23    PT/INR - ( 13 Nov 2019 06:35 )   PT: 21.3 sec;   INR: 1.83 ratio         PTT - ( 12 Nov 2019 15:38 )  PTT:36.0 sec  CARDIAC MARKERS ( 12 Nov 2019 11:57 )  <.015 ng/mL / x     / 98 U/L / x     / 2.5 ng/mL         ECG:    < from: 12 Lead ECG (11.12.19 @ 05:00) >  Ventricular Rate 132 BPM    Atrial Rate 141 BPM    QRS Duration 110 ms    Q-T Interval 300 ms    QTC Calculation(Bezet) 444 ms    R Axis 25 degrees    T Axis -45 degrees    Diagnosis Line Atrial fibrillation with rapid ventricular response  Nonspecific ST abnormality      Radiology:    < from: Xray Chest 2 Views PA/Lat (11.12.19 @ 05:20) >  PA lateral. Prior 4/2/2017.    Normal heart size. Symmetrically hyperinflated lungs. Biapical pleural   thickening. Chronic accentuated bibasilar bronchovascular markings. No   consolidation or effusion.    Impression: Chronic lung disease. No acute infiltrates.    < end of copied text >    < from: TTE Echo Doppler w/o Cont (11.12.19 @ 14:45) >  Conclusion:   Technically difficult study, patient tachycardic  Global left ventricular systolic dysfunction. The LVEF is approximately   45%.  Grossly normal RV size and systolic function.     Aortic valve is not well-visualized but appears sclerotic without   significant stenosis  Mild to moderate MR  Trace physiologic TR.    Estimated PA systolic pressure of 33 mmHg.  No significant pericardial effusion.    < end of copied text >

## 2019-11-13 NOTE — PROGRESS NOTE ADULT - ASSESSMENT
73 YO M PMHX paroxysmal a fib on warfarin diltiazem cardioverted in past, stage 1 diastolic dysfunction (EF 55-60% nov 2017, mild MR, mild LAE), HTN hypothyroisidm, bladder cancer s/p bladder removal over 10 years ago, presents with SOB x 2 weeks on and off, SMALL, orthopnea. Cardiology consulted for afib with RVR    Afib  Telemetry revealing afib 101 bpm  Thromboembolism ppx on coumadin for goal INR 2-3  Echo results as above, ef 45%, mild to moderate MR, trace TR  Continue with lopressor BID  Continue cardizem 90mg q 6 will change to CD on discharge  Troponin negative    HLD  Continue statin    Chronic diastolic CHF  Does not appear volume overloaded one exam  2decho as delineated above   Strict intake and output   Continue HCTZ PO daily   Eventual ischemic work up given reduced ef , when underlying infection resolves, can follow up with Dr diez in office   Monitor and replete electrolytes. Keep K>4.0 and Mg>2.0.      Pulm Recurrent bronchitis  Consider pulm eval    Becca Cruz FNP-C  Cardiology NP  [facility] [phone]

## 2019-11-13 NOTE — PROGRESS NOTE ADULT - SUBJECTIVE AND OBJECTIVE BOX
PCP  Subjective:   in chair , awake , alert responsive     Objective:   Vital Signs Last 24 Hrs  T(C): 36.5 (11-13-19 @ 11:02), Max: 36.6 (11-13-19 @ 07:47)  T(F): 97.7 (11-13-19 @ 11:02), Max: 97.9 (11-13-19 @ 07:47)  HR: 58 (11-13-19 @ 14:03) (58 - 111)  BP: 140/80 (11-13-19 @ 11:02) (122/78 - 150/84)  BP(mean): --  RR: 17 (11-13-19 @ 11:02) (17 - 18)  SpO2: 95% (11-13-19 @ 14:03) (94% - 99%)  Daily     Daily          Physical Exam:  · Constitutional	Well-developed, well nourished	  · Eyes	EOMI; PERRL; no drainage or redness	  · ENMT	No oral lesions; no gross abnormalities	  · Neck	No bruits; no thyromegaly or nodules	  · Back	No deformity or limitation of movement	  · Respiratory	Breath Sounds equal & clear to percussion & auscultation, no accessory muscle use	  · Cardiovascular	detailed exam	  · Cardiovascular Details	irregular rate and rhythm; positive S1; positive S2	  · Gastrointestinal	Soft, non-tender, no hepatosplenomegaly, normal bowel sounds	  · Genitourinary	Normal genitalia; no lesions; no discharge	  · Rectal	No mass or lesion	  · Extremities	No cyanosis, clubbing or edema	  · Vascular	Equal and normal pulses (carotid, femoral, dorsalis pedis)	  · Neurological	Alert & oriented; no sensory, motor or coordination deficits, normal reflexes	  · Lymph Nodes	No lymphadedenopathy	  · Musculoskeletal	No joint pain, swelling or deformity; no limitation of movement	       Labs and Results:  Labs, Radiology, Cardiology, and Other Results: < from: CT Chest No Cont (11.12.19 @ 06:12) >    EXAM:  CT CHEST                          PROCEDURE DATE:  11/12/2019       INTERPRETATION:  CLINICAL INFORMATION: Cough and shortness of breath   COMPARISON: None   PROCEDURE:   CT of the Chest was performed without intravenous contrast.  Sagittal and coronal reformats were performed.    FINDINGS:   CHEST:    LUNGS AND LARGE AIRWAYS: Patent central airways. No pulmonary nodules.  PLEURA: No pleural effusion.  VESSELS: Within normal limits.  HEART: Heart size is normal.No pericardial effusion.  MEDIASTINUM AND MARY: No lymphadenopathy.  CHEST WALL AND LOWER NECK: Within normal limits.  VISUALIZED UPPER ABDOMEN: Partially imaged right renal cyst.   Cholelithiasis.  BONES: Mild degenerative changes of the spine.   IMPRESSION: No pulmonary consolidation       < end of copied text >	      Allergies: Allergies    No Known Allergies    Intolerances        Home Medications:  DilTIAZem Hydrochloride  mg/24 hours oral capsule, extended release: 1 cap(s) orally 2 times a day  (12 Nov 2019 17:05)  levothyroxine 137 mcg (0.137 mg) oral tablet: 1 tab(s) orally once a day (12 Nov 2019 17:05)  losartan-hydrochlorothiazide 100mg-12.5mg oral tablet: 1 tab(s) orally once a day (12 Nov 2019 17:05)  rosuvastatin 20 mg oral tablet: 1 tab(s) orally once a day (12 Nov 2019 17:05)  warfarin 7.5 mg oral tablet: 1 tab(s) orally once a day (12 Nov 2019 17:05)    Medications:   albuterol/ipratropium for Nebulization 3 milliLiter(s) Nebulizer three times a day  atorvastatin 40 milliGRAM(s) Oral at bedtime  diltiazem    Tablet 90 milliGRAM(s) Oral every 6 hours  guaiFENesin    Syrup 200 milliGRAM(s) Oral every 6 hours PRN  hydrochlorothiazide 12.5 milliGRAM(s) Oral daily  levothyroxine 137 MICROGram(s) Oral daily  losartan 100 milliGRAM(s) Oral daily  metoprolol tartrate 12.5 milliGRAM(s) Oral two times a day  warfarin 8 milliGRAM(s) Oral once      LABS:                        15.1   10.91 )-----------( 218      ( 12 Nov 2019 05:23 )             44.8     11-12    141  |  110<H>  |  32<H>  ----------------------------<  125<H>  3.9   |  23  |  1.50<H>    Ca    9.0      12 Nov 2019 05:23    TPro  7.9  /  Alb  3.7  /  TBili  0.4  /  DBili  x   /  AST  40<H>  /  ALT  40  /  AlkPhos  83  11-12    PT/INR - ( 13 Nov 2019 06:35 )   PT: 21.3 sec;   INR: 1.83 ratio         PTT - ( 12 Nov 2019 15:38 )  PTT:36.0 sec  11-13 @ 06:35  INR 1.83  11-12 @ 15:38  INR 1.99        CAPILLARY BLOOD GLUCOSE    < from: TTE Echo Doppler w/o Cont (11.12.19 @ 14:45) >     EXAM:  ECHO TTE WO CON COMP W DOPPLR         PROCEDURE DATE:  11/12/2019        INTERPRETATION:  INDICATION: Atrial fibrillation    Blood Pressure 128/82    Height 177.8 cm     Weight 104 kg       BSA 2.2   sq m    Dimensions:    LA 4.0       Normal Values: 2.0 - 4.0 cm    Ao 3.4        Normal Values: 2.0 - 3.8 cm  SEPTUM 1.2       Normal Values: 0.6 - 1.2 cm  PWT 1.2       Normal Values: 0.6 - 1.1 cm  LVIDd 5.3         Normal Values: 3.0 - 5.6 cm  LVIDs 4.1         Normal Values: 1.8 - 4.0 cm      OBSERVATIONS:  Technically difficult study, patient tachycardic  Mitral Valve: normal, mild to moderate MR.  Aortic Valve/Aorta: Aortic valve is not well-visualized but appears   sclerotic without significant stenosis  Tricuspid Valve: normal with trace TR.  Pulmonic Valve: Not well-visualized  Left Atrium: normal  Right Atrium: Not well-visualized  Left Ventricle: Global left ventricular systolic dysfunction. The LVEF is   approximately 45%.  Right Ventricle: Grossly normal size and systolic function.  Pericardium/Pleura: normal, no significant pericardial effusion.  Pulmonary/RV Pressure: estimated PA systolic pressure of 33 mmHg    Conclusion:   Technically difficult study, patient tachycardic  Global left ventricular systolic dysfunction. The LVEF is approximately   45%.  Grossly normal RV size and systolic function.     Aortic valve is not well-visualized but appears sclerotic without   significant stenosis  Mild to moderate MR  Trace physiologic TR.    Estimated PA systolic pressure of 33 mmHg.  No significant pericardial effusion.                < end of copied text >        CARDIAC MARKERS ( 12 Nov 2019 11:57 )  <.015 ng/mL / x     / 98 U/L / x     / 2.5 ng/mL      RECENT CULTURES:  Culture Results:   No growth to date. (11-12 @ 09:18)  Culture Results:   No growth to date. (11-12 @ 09:17)        Culture - Blood (collected 11-12-19 @ 09:18)  Source: .Blood Blood-Peripheral  Preliminary Report (11-13-19 @ 10:01):    No growth to date.    Culture - Blood (collected 11-12-19 @ 09:17)  Source: .Blood Blood-Peripheral  Preliminary Report (11-13-19 @ 10:01):    No growth to date.

## 2019-11-13 NOTE — PROGRESS NOTE ADULT - ASSESSMENT
Child Abuse Assessment (patients less than 13 yrs):  Chief Complaint: shortness of breath.    · Chief Complaint: The patient is a 74y Male complaining of shortness of breath.	  · HPI Objective Statement: 73yo male who presents with cough and sob on and off for 2 weeks. pt, c/o cough and congestion, was seen by pmd and given claritin, and the cough has gotten worse, and now pt feels sob, no fever, chills, no chest pain no other complaints has hx of a fib , and small tia cva , and leg weakness , but plays [employer] , and is active , should be on coumadin for a fib 	  · Presenting Symptoms: COUGH, DIFFICULTY BREATHING	  · Negative Findings: no body aches, no chest pain, no chills, no fever, no hemoptysis	  · Timing: gradual onset, worsening	  · Duration: week(s)  2	  · Quality: non-productive cough	  · Context: coughing	  · Recent Exposure To: none known	  · Aggravated Factors: coughing	  · Relieving Factors: none	    PAST MEDICAL/SURGICAL/FAMILY/SOCIAL HISTORY:    Past Medical History:  Atrial fibrillation    Hypertension    Hypothyroid.     Tobacco Usage:  · Tobacco Usage	Former smoker	    admit to telemetry, sob , cardiac consult , meds and labs rev, a fib on coumadin ,   on 11/13/19 patient stable , a flutter 82 bpm , on cardizem , on coumadin, ambulate , sob better with inhalers , thus most probably copd , as he was a former smoker , valvular disease    echo noted

## 2019-11-14 ENCOUNTER — TRANSCRIPTION ENCOUNTER (OUTPATIENT)
Age: 74
End: 2019-11-14

## 2019-11-14 VITALS — OXYGEN SATURATION: 97 %

## 2019-11-14 PROBLEM — I48.91 UNSPECIFIED ATRIAL FIBRILLATION: Chronic | Status: ACTIVE | Noted: 2019-11-12

## 2019-11-14 PROBLEM — E03.9 HYPOTHYROIDISM, UNSPECIFIED: Chronic | Status: ACTIVE | Noted: 2019-11-12

## 2019-11-14 PROBLEM — I10 ESSENTIAL (PRIMARY) HYPERTENSION: Chronic | Status: ACTIVE | Noted: 2019-11-12

## 2019-11-14 LAB
INR BLD: 2.18 RATIO — HIGH (ref 0.88–1.16)
PROTHROM AB SERPL-ACNC: 25.2 SEC — HIGH (ref 10–12.9)

## 2019-11-14 PROCEDURE — 99232 SBSQ HOSP IP/OBS MODERATE 35: CPT

## 2019-11-14 PROCEDURE — 99285 EMERGENCY DEPT VISIT HI MDM: CPT | Mod: 25

## 2019-11-14 PROCEDURE — 82550 ASSAY OF CK (CPK): CPT

## 2019-11-14 PROCEDURE — 87040 BLOOD CULTURE FOR BACTERIA: CPT

## 2019-11-14 PROCEDURE — 85027 COMPLETE CBC AUTOMATED: CPT

## 2019-11-14 PROCEDURE — 36415 COLL VENOUS BLD VENIPUNCTURE: CPT

## 2019-11-14 PROCEDURE — 84484 ASSAY OF TROPONIN QUANT: CPT

## 2019-11-14 PROCEDURE — 80053 COMPREHEN METABOLIC PANEL: CPT

## 2019-11-14 PROCEDURE — 96361 HYDRATE IV INFUSION ADD-ON: CPT

## 2019-11-14 PROCEDURE — 96376 TX/PRO/DX INJ SAME DRUG ADON: CPT

## 2019-11-14 PROCEDURE — 94760 N-INVAS EAR/PLS OXIMETRY 1: CPT

## 2019-11-14 PROCEDURE — 86803 HEPATITIS C AB TEST: CPT

## 2019-11-14 PROCEDURE — 93306 TTE W/DOPPLER COMPLETE: CPT

## 2019-11-14 PROCEDURE — 85610 PROTHROMBIN TIME: CPT

## 2019-11-14 PROCEDURE — 96374 THER/PROPH/DIAG INJ IV PUSH: CPT

## 2019-11-14 PROCEDURE — 84443 ASSAY THYROID STIM HORMONE: CPT

## 2019-11-14 PROCEDURE — 93005 ELECTROCARDIOGRAM TRACING: CPT

## 2019-11-14 PROCEDURE — 94640 AIRWAY INHALATION TREATMENT: CPT

## 2019-11-14 PROCEDURE — 83605 ASSAY OF LACTIC ACID: CPT

## 2019-11-14 PROCEDURE — 71250 CT THORAX DX C-: CPT

## 2019-11-14 PROCEDURE — 71046 X-RAY EXAM CHEST 2 VIEWS: CPT

## 2019-11-14 PROCEDURE — 85730 THROMBOPLASTIN TIME PARTIAL: CPT

## 2019-11-14 PROCEDURE — 82553 CREATINE MB FRACTION: CPT

## 2019-11-14 PROCEDURE — 97161 PT EVAL LOW COMPLEX 20 MIN: CPT

## 2019-11-14 RX ORDER — ATORVASTATIN CALCIUM 80 MG/1
1 TABLET, FILM COATED ORAL
Qty: 0 | Refills: 0 | DISCHARGE
Start: 2019-11-14

## 2019-11-14 RX ORDER — METOPROLOL TARTRATE 50 MG
1 TABLET ORAL
Qty: 30 | Refills: 0
Start: 2019-11-14 | End: 2019-12-13

## 2019-11-14 RX ORDER — ROSUVASTATIN CALCIUM 5 MG/1
1 TABLET ORAL
Qty: 0 | Refills: 0 | DISCHARGE

## 2019-11-14 RX ORDER — AZITHROMYCIN 500 MG/1
500 TABLET, FILM COATED ORAL DAILY
Refills: 0 | Status: DISCONTINUED | OUTPATIENT
Start: 2019-11-14 | End: 2019-11-14

## 2019-11-14 RX ORDER — AZITHROMYCIN 500 MG/1
1 TABLET, FILM COATED ORAL
Qty: 5 | Refills: 0
Start: 2019-11-14 | End: 2019-11-18

## 2019-11-14 RX ORDER — UMECLIDINIUM BROMIDE AND VILANTEROL TRIFENATATE 62.5; 25 UG/1; UG/1
1 POWDER RESPIRATORY (INHALATION)
Qty: 1 | Refills: 0
Start: 2019-11-14 | End: 2019-12-13

## 2019-11-14 RX ADMIN — Medication 90 MILLIGRAM(S): at 05:47

## 2019-11-14 RX ADMIN — AZITHROMYCIN 500 MILLIGRAM(S): 500 TABLET, FILM COATED ORAL at 11:03

## 2019-11-14 RX ADMIN — Medication 90 MILLIGRAM(S): at 11:03

## 2019-11-14 RX ADMIN — Medication 137 MICROGRAM(S): at 05:46

## 2019-11-14 RX ADMIN — LOSARTAN POTASSIUM 100 MILLIGRAM(S): 100 TABLET, FILM COATED ORAL at 05:46

## 2019-11-14 RX ADMIN — Medication 12.5 MILLIGRAM(S): at 05:46

## 2019-11-14 RX ADMIN — Medication 3 MILLILITER(S): at 08:21

## 2019-11-14 NOTE — DISCHARGE NOTE PROVIDER - HOSPITAL COURSE
69 yo m , admittd with rapid a fib , ct chest negative , treated with cardizem , now better , hx of a fib on coumdine , and feels better , possible bronchitis do zithromax x 5 days , dc home f/u cardio and primary care physician,

## 2019-11-14 NOTE — PROGRESS NOTE ADULT - SUBJECTIVE AND OBJECTIVE BOX
[facility] -- Caleb Ochoa, Neetu, Jessica, Jerad Jarquin Savella  Office # [phone]      Follow Up:      Afib   Subjective/Observations:     No events overnight resting comfortably in bed.  No complaints of chest pain, dyspnea, or palpitations reported. No signs of orthopnea or PND.  + non productive cough     REVIEW OF SYSTEMS: All other review of systems is negative unless indicated above    PAST MEDICAL & SURGICAL HISTORY:  Hypertension  Hypothyroid  Atrial fibrillation      MEDICATIONS  (STANDING):  albuterol/ipratropium for Nebulization 3 milliLiter(s) Nebulizer three times a day  atorvastatin 40 milliGRAM(s) Oral at bedtime  azithromycin   Tablet 500 milliGRAM(s) Oral daily  diltiazem    Tablet 90 milliGRAM(s) Oral every 6 hours  hydrochlorothiazide 12.5 milliGRAM(s) Oral daily  levothyroxine 137 MICROGram(s) Oral daily  losartan 100 milliGRAM(s) Oral daily  metoprolol tartrate 12.5 milliGRAM(s) Oral two times a day    MEDICATIONS  (PRN):  guaiFENesin    Syrup 200 milliGRAM(s) Oral every 6 hours PRN Cough      Allergies    No Known Allergies    Intolerances        Vital Signs Last 24 Hrs  T(C): 36.3 (14 Nov 2019 08:04), Max: 36.5 (13 Nov 2019 11:02)  T(F): 97.4 (14 Nov 2019 08:04), Max: 97.7 (13 Nov 2019 11:02)  HR: 68 (14 Nov 2019 08:21) (58 - 98)  BP: 118/56 (14 Nov 2019 08:04) (113/71 - 144/84)  BP(mean): --  RR: 17 (14 Nov 2019 08:04) (17 - 18)  SpO2: 97% (14 Nov 2019 08:21) (94% - 97%)    I&O's Summary    13 Nov 2019 07:01  -  14 Nov 2019 07:00  --------------------------------------------------------  IN: 120 mL / OUT: 350 mL / NET: -230 mL    14 Nov 2019 07:01  -  14 Nov 2019 09:44  --------------------------------------------------------  IN: 240 mL / OUT: 0 mL / NET: 240 mL          PHYSICAL EXAM:  TELE: afib 88 bpm   Constitutional: NAD, awake and alert, well-developed  HEENT: Moist Mucous Membranes, Anicteric  Pulmonary: Non-labored, breath sounds are clear bilaterally, No wheezing, crackles or rhonchi  Cardiovascular: IRRegular, S1 and S2 nl, No murmurs, rubs, gallops or clicks  Gastrointestinal: Bowel Sounds present, soft, nontender.   Lymph: No lymphadenopathy. No peripheral edema.  Skin: No visible rashes or ulcers.  Psych:  Mood & affect appropriate    LABS: All Labs Reviewed:                        15.1   10.91 )-----------( 218      ( 12 Nov 2019 05:23 )             44.8     12 Nov 2019 05:23    141    |  110    |  32     ----------------------------<  125    3.9     |  23     |  1.50 < from: 12 Lead ECG (11.12.19 @ 05:00) >    Ventricular Rate 132 BPM    Atrial Rate 141 BPM    QRS Duration 110 ms    Q-T Interval 300 ms    QTC Calculation(Bezet) 444 ms    R Axis 25 degrees    T Axis -45 degrees    Diagnosis Line Atrial fibrillation with rapid ventricular response  Nonspecific ST abnormality    < end of copied text >      Ca    9.0        12 Nov 2019 05:23    TPro  7.9    /  Alb  3.7    /  TBili  0.4    /  DBili  x      /  AST  40     /  ALT  40     /  AlkPhos  83     12 Nov 2019 05:23    PT/INR - ( 14 Nov 2019 07:34 )   PT: 25.2 sec;   INR: 2.18 ratio         PTT - ( 12 Nov 2019 15:38 )  PTT:36.0 sec  CARDIAC MARKERS ( 12 Nov 2019 11:57 )  <.015 ng/mL / x     / 98 U/L / x     / 2.5 ng/mL        Echo:      < from: TTE Echo Doppler w/o Cont (11.12.19 @ 14:45) >    Conclusion:   Technically difficult study, patient tachycardic  Global left ventricular systolic dysfunction. The LVEF is approximately   45%.  Grossly normal RV size and systolic function.     Aortic valve is not well-visualized but appears sclerotic without   significant stenosis  Mild to moderate MR  Trace physiologic TR.    Estimated PA systolic pressure of 33 mmHg.  No significant pericardial effusion.        < end of copied text >    Radiology:      < from: Xray Chest 2 Views PA/Lat (11.12.19 @ 05:20) >  INTERPRETATION:  Short of breath.    PA lateral. Prior 4/2/2017.    Normal heart size. Symmetrically hyperinflated lungs. Biapical pleural   thickening. Chronic accentuated bibasilar bronchovascular markings. No   consolidation or effusion.    Impression: Chronic lung disease. No acute infiltrates.    < end of copied text >          Cardiology

## 2019-11-14 NOTE — DISCHARGE NOTE NURSING/CASE MANAGEMENT/SOCIAL WORK - NSDCPEPTCOWAR_GEN_ALL_CORE
Warfarin/Coumadin - Compliance/Warfarin/Coumadin - Dietary Advice/Warfarin/Coumadin - Follow up monitoring/Warfarin/Coumadin - Potential for adverse drug reactions and interactions

## 2019-11-14 NOTE — DISCHARGE NOTE PROVIDER - NSDCMRMEDTOKEN_GEN_ALL_CORE_FT
atorvastatin 40 mg oral tablet: 1 tab(s) orally once a day (at bedtime)  azithromycin 500 mg oral tablet: 1 tab(s) orally once a day  DilTIAZem Hydrochloride  mg/24 hours oral capsule, extended release: 1 cap(s) orally 2 times a day   guaiFENesin 100 mg/5 mL oral liquid: 10 milliliter(s) orally every 6 hours, As needed, Cough  levothyroxine 137 mcg (0.137 mg) oral tablet: 1 tab(s) orally once a day  losartan-hydrochlorothiazide 100mg-12.5mg oral tablet: 1 tab(s) orally once a day  metoprolol succinate 25 mg oral capsule, extended release: 1 cap(s) orally once a day MDD:1   umeclidinium-vilanterol 62.5 mcg-25 mcg/inh inhalation powder: 1 puff(s) inhaled 2 times a day   warfarin 7.5 mg oral tablet: 1 tab(s) orally once a day

## 2019-11-14 NOTE — DISCHARGE NOTE PROVIDER - CARE PROVIDER_API CALL
Deon Valentine)  Internal Medicine  [address], [address]  [place] [address]  Phone: [phone]  Fax: [phone]  Follow Up Time:     Jeramy De Anda)  Cardiovascular Disease  [address] [place] [address]  Phone: [phone]  Fax: [phone]  Follow Up Time:

## 2019-11-14 NOTE — DISCHARGE NOTE PROVIDER - NSDCCPCAREPLAN_GEN_ALL_CORE_FT
PRINCIPAL DISCHARGE DIAGNOSIS  Diagnosis: Atrial fibrillation with rapid ventricular response  Assessment and Plan of Treatment:       SECONDARY DISCHARGE DIAGNOSES  Diagnosis: Dyspnea  Assessment and Plan of Treatment:

## 2019-11-14 NOTE — PROGRESS NOTE ADULT - ASSESSMENT
73 YO M PMHX paroxysmal a fib on warfarin diltiazem cardioverted in past, stage 1 diastolic dysfunction (EF 55-60% nov 2017, mild MR, mild LAE), HTN hypothyroisidm, bladder cancer s/p bladder removal over 10 years ago, presents with SOB x 2 weeks on and off, SMALL, orthopnea. Cardiology consulted for afib with RVR    Afib  Telemetry revealing afib rate controlled   Thromboembolism ppx on coumadin for goal INR 2-3- today 2.18  Echo results as above, ef 45%, mild to moderate MR, trace TR  Continue with lopressor BID  Can change Cardizem to CD on discharge   Troponin negative    HLD  Continue statin    Chronic diastolic CHF  Does not appear volume overloaded one exam  2decho as delineated above   Strict intake and output   Continue HCTZ PO daily   Eventual ischemic work up given reduced ef , when underlying infection resolves, can follow up with Dr diez in office   Monitor and replete electrolytes. Keep K>4.0 and Mg>2.0.      Pulm bronchitis  Antibiotics nebs as per primary     Monitor and replete electrolytes. Keep K>4.0 and Mg>2.0.      Becca Cruz FNP-C  Cardiology NP  [facility] [phone]

## 2019-11-14 NOTE — DISCHARGE NOTE PROVIDER - CARE PROVIDERS DIRECT ADDRESSES
[email or web address]

## 2019-11-17 LAB
CULTURE RESULTS: SIGNIFICANT CHANGE UP
CULTURE RESULTS: SIGNIFICANT CHANGE UP
SPECIMEN SOURCE: SIGNIFICANT CHANGE UP
SPECIMEN SOURCE: SIGNIFICANT CHANGE UP

## 2019-11-20 ENCOUNTER — NON-APPOINTMENT (OUTPATIENT)
Age: 74
End: 2019-11-20

## 2019-11-20 ENCOUNTER — APPOINTMENT (OUTPATIENT)
Dept: CARDIOLOGY | Facility: CLINIC | Age: 74
End: 2019-11-20
Payer: MEDICARE

## 2019-11-20 VITALS
BODY MASS INDEX: 33.93 KG/M2 | OXYGEN SATURATION: 95 % | WEIGHT: 237 LBS | DIASTOLIC BLOOD PRESSURE: 80 MMHG | HEIGHT: 70 IN | HEART RATE: 80 BPM | SYSTOLIC BLOOD PRESSURE: 130 MMHG

## 2019-11-20 PROCEDURE — 93000 ELECTROCARDIOGRAM COMPLETE: CPT

## 2019-11-20 PROCEDURE — 99215 OFFICE O/P EST HI 40 MIN: CPT

## 2019-11-20 NOTE — DISCUSSION/SUMMARY
[FreeTextEntry1] : The patient is feeling much better. He has no shortness of breath and the bronchitis seems to be slowly resolving. I would give him all the more time. Once the bronchitis has resolved he still in atrial fibrillation I would set him up for cardioversion. In the meantime he should keep his activity levels were feels comfortable. He also needs have a pro time every week in case we need to the cardioversion that we know that he has been therapeutic.\par \par I will see him again sometime the week of December 9. We did go over his hospitalization in detail and I answered his questions.We did discuss rate versus rhythm control of atrial fibrillation

## 2019-11-20 NOTE — HISTORY OF PRESENT ILLNESS
[FreeTextEntry1] : Fletcher Mendosa presented to the office today for a cardiovascular follow up. He was last seen in the office  2017. At that time echocardiogram showed ejection fraction of 50-55% with mild MR. There was borderline LV enlargement with mild left atrial enlargement. There was stage I diastolic dysfunction. He had a stress test performed 12/17 that showed no ischemia at workload of 6 minutes.Ejection fraction 45%.\par \par He is now 74 years old, with a history of hypertension, hypercholesterolemia, hypothyroidism, bladder cancer and paroxysmal atrial fibrillation. His atrial fibrillation was diagnosed several years ago, and he has been on warfarin. He did require cardioversion in the past.C. admitted to the hospital earlier this month in atrial fibrillation with rapid ventricular rate. At that time repeat echocardiogram showed ejection fraction of 45% with mild MR. He was given beta blocker and Cardizem with slowing of the ventricular rate. He is now on diltiazem 180 twice a day and Toprol-XL 25 mg once a day.\par \par She has had bronchitis for about a month and actually went to the ER complaining of shortness of breath. Was not aware he was in atrial fibrillation. It is possible the bronchitis had precipitated the event. ECG shows atrial fibrillation now with a controlled ventricular rate. The patient finished his dose of Z-Juancho and is feeling much better\par \par

## 2019-11-20 NOTE — PHYSICAL EXAM
[Well Groomed] : well groomed [Normal Appearance] : normal appearance [General Appearance - Well Developed] : well developed [General Appearance - In No Acute Distress] : no acute distress [No Deformities] : no deformities [General Appearance - Well Nourished] : well nourished [Normal Oral Mucosa] : normal oral mucosa [Eyelids - No Xanthelasma] : the eyelids demonstrated no xanthelasmas [Normal Conjunctiva] : the conjunctiva exhibited no abnormalities [No Oral Pallor] : no oral pallor [Normal Jugular Venous A Waves Present] : normal jugular venous A waves present [No Oral Cyanosis] : no oral cyanosis [No Jugular Venous Ruiz A Waves] : no jugular venous ruiz A waves [Normal Jugular Venous V Waves Present] : normal jugular venous V waves present [Respiration, Rhythm And Depth] : normal respiratory rhythm and effort [Exaggerated Use Of Accessory Muscles For Inspiration] : no accessory muscle use [Auscultation Breath Sounds / Voice Sounds] : lungs were clear to auscultation bilaterally [Heart Sounds] : normal S1 and S2 [Abdomen Soft] : soft [Heart Rate And Rhythm] : heart rate and rhythm were normal [Murmurs] : no murmurs present [Abdomen Tenderness] : non-tender [Abdomen Mass (___ Cm)] : no abdominal mass palpated [Cyanosis, Localized] : no localized cyanosis [Nail Clubbing] : no clubbing of the fingernails [Abnormal Walk] : normal gait [Gait - Sufficient For Exercise Testing] : the gait was sufficient for exercise testing [Petechial Hemorrhages (___cm)] : no petechial hemorrhages [Skin Color & Pigmentation] : normal skin color and pigmentation [No Venous Stasis] : no venous stasis [Skin Lesions] : no skin lesions [] : no rash [No Skin Ulcers] : no skin ulcer [Oriented To Time, Place, And Person] : oriented to person, place, and time [No Xanthoma] : no  xanthoma was observed [Affect] : the affect was normal [Mood] : the mood was normal [No Anxiety] : not feeling anxious

## 2019-11-20 NOTE — REASON FOR VISIT
[Atrial Fibrillation] : atrial fibrillation [Follow-Up - Clinic] : a clinic follow-up of [Hypertension] : hypertension [Hyperlipidemia] : hyperlipidemia

## 2019-12-10 ENCOUNTER — APPOINTMENT (OUTPATIENT)
Dept: CARDIOLOGY | Facility: CLINIC | Age: 74
End: 2019-12-10
Payer: MEDICARE

## 2019-12-10 ENCOUNTER — NON-APPOINTMENT (OUTPATIENT)
Age: 74
End: 2019-12-10

## 2019-12-10 VITALS
BODY MASS INDEX: 34.07 KG/M2 | HEART RATE: 64 BPM | WEIGHT: 238 LBS | HEIGHT: 70 IN | OXYGEN SATURATION: 93 % | SYSTOLIC BLOOD PRESSURE: 142 MMHG | DIASTOLIC BLOOD PRESSURE: 95 MMHG

## 2019-12-10 VITALS — BODY MASS INDEX: 34.07 KG/M2 | HEART RATE: 65 BPM | HEIGHT: 70 IN | WEIGHT: 238 LBS

## 2019-12-10 LAB
INR PPP: 3.8 RATIO
QUALITY CONTROL: YES

## 2019-12-10 PROCEDURE — 85610 PROTHROMBIN TIME: CPT | Mod: QW

## 2019-12-10 PROCEDURE — 93000 ELECTROCARDIOGRAM COMPLETE: CPT

## 2019-12-10 PROCEDURE — 99214 OFFICE O/P EST MOD 30 MIN: CPT

## 2019-12-10 NOTE — DISCUSSION/SUMMARY
[FreeTextEntry1] : The patient is hemodynamically stable without symptoms. He has rate controlled atrial fibrillation. We discussed rate versus rhythm control I think would be a good idea to give a one chance to go back into sinus rhythm. His INR 11/14 was 2.18, 11/26 was 4.0, and today the INR is 3.8\par \par He'll be scheduled for a cardioversion at [place]. In the meantime he'll stay on his present medication.

## 2019-12-10 NOTE — PHYSICAL EXAM
[Normal Appearance] : normal appearance [General Appearance - Well Developed] : well developed [Well Groomed] : well groomed [General Appearance - Well Nourished] : well nourished [No Deformities] : no deformities [General Appearance - In No Acute Distress] : no acute distress [Normal Conjunctiva] : the conjunctiva exhibited no abnormalities [Eyelids - No Xanthelasma] : the eyelids demonstrated no xanthelasmas [Normal Oral Mucosa] : normal oral mucosa [No Oral Pallor] : no oral pallor [No Oral Cyanosis] : no oral cyanosis [Normal Jugular Venous A Waves Present] : normal jugular venous A waves present [Normal Jugular Venous V Waves Present] : normal jugular venous V waves present [No Jugular Venous Ruiz A Waves] : no jugular venous ruiz A waves [Respiration, Rhythm And Depth] : normal respiratory rhythm and effort [Exaggerated Use Of Accessory Muscles For Inspiration] : no accessory muscle use [Auscultation Breath Sounds / Voice Sounds] : lungs were clear to auscultation bilaterally [Heart Rate And Rhythm] : heart rate and rhythm were normal [Heart Sounds] : normal S1 and S2 [Murmurs] : no murmurs present [Abdomen Soft] : soft [Abdomen Mass (___ Cm)] : no abdominal mass palpated [Abdomen Tenderness] : non-tender [Gait - Sufficient For Exercise Testing] : the gait was sufficient for exercise testing [Nail Clubbing] : no clubbing of the fingernails [Abnormal Walk] : normal gait [Petechial Hemorrhages (___cm)] : no petechial hemorrhages [Cyanosis, Localized] : no localized cyanosis [No Venous Stasis] : no venous stasis [Skin Color & Pigmentation] : normal skin color and pigmentation [] : no rash [Skin Lesions] : no skin lesions [No Skin Ulcers] : no skin ulcer [No Xanthoma] : no  xanthoma was observed [Affect] : the affect was normal [Oriented To Time, Place, And Person] : oriented to person, place, and time [Mood] : the mood was normal [No Anxiety] : not feeling anxious

## 2019-12-10 NOTE — HISTORY OF PRESENT ILLNESS
[FreeTextEntry1] : Fletcher Mendosa presented to the office today for a cardiovascular follow up. He had an echocardiogram 2017 thatshowed ejection fraction of 50-55% with mild MR. There was borderline LV enlargement with mild left atrial enlargement. There was stage I diastolic dysfunction. He had a stress test performed 12/17 that showed no ischemia at workload of 6 minutes.Ejection fraction 45%.\par \par He is now 74 years old, with a history of hypertension, hypercholesterolemia, hypothyroidism, bladder cancer and paroxysmal atrial fibrillation. His atrial fibrillation was diagnosed several years ago, and he has been on warfarin. He did require cardioversion in the past. He was admitted to the hospital 11/17 in atrial fibrillation with rapid ventricular rate. At that time repeat echocardiogram showed ejection fraction of 45% with mild MR. He was given beta blocker and Cardizem with slowing of the ventricular rate. He is now on diltiazem 180 twice a day and Toprol-XL 25 mg once a day.\par \par He has had bronchitis for about a month and actually went to the ER complaining of shortness of breath. Was not aware he was in atrial fibrillation. It is possible the bronchitis had precipitated the event. I have given him a month to see if he would spontaneously go back into sinus rhythm. He is feeling well without any symptoms.\par \par ECG shows atrial fibrillation with a controlled ventricular rate.\par \par

## 2019-12-13 ENCOUNTER — OUTPATIENT (OUTPATIENT)
Dept: OUTPATIENT SERVICES | Facility: HOSPITAL | Age: 74
LOS: 1 days | End: 2019-12-13
Payer: MEDICARE

## 2019-12-13 PROCEDURE — 93005 ELECTROCARDIOGRAM TRACING: CPT

## 2019-12-13 PROCEDURE — 85730 THROMBOPLASTIN TIME PARTIAL: CPT

## 2019-12-13 PROCEDURE — 80053 COMPREHEN METABOLIC PANEL: CPT

## 2019-12-13 PROCEDURE — 85027 COMPLETE CBC AUTOMATED: CPT

## 2019-12-13 PROCEDURE — 85610 PROTHROMBIN TIME: CPT

## 2019-12-13 RX ORDER — WARFARIN SODIUM 2.5 MG/1
1 TABLET ORAL
Qty: 0 | Refills: 0 | DISCHARGE

## 2019-12-16 ENCOUNTER — OUTPATIENT (OUTPATIENT)
Dept: OUTPATIENT SERVICES | Facility: HOSPITAL | Age: 74
LOS: 1 days | End: 2019-12-16
Payer: MEDICARE

## 2019-12-16 DIAGNOSIS — Z98.890 OTHER SPECIFIED POSTPROCEDURAL STATES: Chronic | ICD-10-CM

## 2019-12-16 PROBLEM — C67.9 MALIGNANT NEOPLASM OF BLADDER, UNSPECIFIED: Chronic | Status: ACTIVE | Noted: 2019-12-13

## 2019-12-16 PROBLEM — Z87.891 PERSONAL HISTORY OF NICOTINE DEPENDENCE: Chronic | Status: ACTIVE | Noted: 2019-12-13

## 2019-12-16 PROBLEM — J40 BRONCHITIS, NOT SPECIFIED AS ACUTE OR CHRONIC: Chronic | Status: ACTIVE | Noted: 2019-12-13

## 2019-12-16 PROCEDURE — 85610 PROTHROMBIN TIME: CPT

## 2019-12-16 PROCEDURE — 85730 THROMBOPLASTIN TIME PARTIAL: CPT

## 2019-12-16 PROCEDURE — 93005 ELECTROCARDIOGRAM TRACING: CPT

## 2019-12-17 ENCOUNTER — APPOINTMENT (OUTPATIENT)
Dept: CARDIOLOGY | Facility: CLINIC | Age: 74
End: 2019-12-17
Payer: MEDICARE

## 2019-12-17 VITALS — WEIGHT: 238 LBS | BODY MASS INDEX: 34.07 KG/M2 | HEART RATE: 65 BPM | HEIGHT: 70 IN

## 2019-12-17 LAB
INR PPP: 2 RATIO
QUALITY CONTROL: YES

## 2019-12-17 PROCEDURE — 93793 ANTICOAG MGMT PT WARFARIN: CPT

## 2019-12-17 PROCEDURE — 85610 PROTHROMBIN TIME: CPT | Mod: QW

## 2019-12-18 ENCOUNTER — OUTPATIENT (OUTPATIENT)
Dept: OUTPATIENT SERVICES | Facility: HOSPITAL | Age: 74
LOS: 1 days | End: 2019-12-18
Payer: MEDICARE

## 2019-12-18 VITALS
SYSTOLIC BLOOD PRESSURE: 144 MMHG | RESPIRATION RATE: 16 BRPM | WEIGHT: 235.01 LBS | TEMPERATURE: 98 F | OXYGEN SATURATION: 97 % | HEIGHT: 70 IN | HEART RATE: 84 BPM | DIASTOLIC BLOOD PRESSURE: 77 MMHG

## 2019-12-18 DIAGNOSIS — I48.91 UNSPECIFIED ATRIAL FIBRILLATION: ICD-10-CM

## 2019-12-18 DIAGNOSIS — Z98.890 OTHER SPECIFIED POSTPROCEDURAL STATES: Chronic | ICD-10-CM

## 2019-12-18 LAB
ANION GAP SERPL CALC-SCNC: 16 MMOL/L — SIGNIFICANT CHANGE UP (ref 5–17)
APTT BLD: 41 SEC — HIGH (ref 27.5–36.3)
BUN SERPL-MCNC: 35 MG/DL — HIGH (ref 7–23)
CALCIUM SERPL-MCNC: 9.6 MG/DL — SIGNIFICANT CHANGE UP (ref 8.4–10.5)
CHLORIDE SERPL-SCNC: 104 MMOL/L — SIGNIFICANT CHANGE UP (ref 96–108)
CO2 SERPL-SCNC: 20 MMOL/L — LOW (ref 22–31)
CREAT SERPL-MCNC: 1.4 MG/DL — HIGH (ref 0.5–1.3)
GLUCOSE SERPL-MCNC: 112 MG/DL — HIGH (ref 70–99)
HCT VFR BLD CALC: 45.9 % — SIGNIFICANT CHANGE UP (ref 39–50)
HGB BLD-MCNC: 15.3 G/DL — SIGNIFICANT CHANGE UP (ref 13–17)
INR BLD: 2.8 RATIO — HIGH (ref 0.88–1.16)
MCHC RBC-ENTMCNC: 28.2 PG — SIGNIFICANT CHANGE UP (ref 27–34)
MCHC RBC-ENTMCNC: 33.3 GM/DL — SIGNIFICANT CHANGE UP (ref 32–36)
MCV RBC AUTO: 84.7 FL — SIGNIFICANT CHANGE UP (ref 80–100)
NRBC # BLD: 0 /100 WBCS — SIGNIFICANT CHANGE UP (ref 0–0)
PLATELET # BLD AUTO: 248 K/UL — SIGNIFICANT CHANGE UP (ref 150–400)
POTASSIUM SERPL-MCNC: 4.2 MMOL/L — SIGNIFICANT CHANGE UP (ref 3.5–5.3)
POTASSIUM SERPL-SCNC: 4.2 MMOL/L — SIGNIFICANT CHANGE UP (ref 3.5–5.3)
PROTHROM AB SERPL-ACNC: 33 SEC — HIGH (ref 10–12.9)
RBC # BLD: 5.42 M/UL — SIGNIFICANT CHANGE UP (ref 4.2–5.8)
RBC # FLD: 13.9 % — SIGNIFICANT CHANGE UP (ref 10.3–14.5)
SODIUM SERPL-SCNC: 140 MMOL/L — SIGNIFICANT CHANGE UP (ref 135–145)
WBC # BLD: 8.67 K/UL — SIGNIFICANT CHANGE UP (ref 3.8–10.5)
WBC # FLD AUTO: 8.67 K/UL — SIGNIFICANT CHANGE UP (ref 3.8–10.5)

## 2019-12-18 PROCEDURE — 85610 PROTHROMBIN TIME: CPT

## 2019-12-18 PROCEDURE — 93010 ELECTROCARDIOGRAM REPORT: CPT

## 2019-12-18 PROCEDURE — 80048 BASIC METABOLIC PNL TOTAL CA: CPT

## 2019-12-18 PROCEDURE — 93005 ELECTROCARDIOGRAM TRACING: CPT

## 2019-12-18 PROCEDURE — 85027 COMPLETE CBC AUTOMATED: CPT

## 2019-12-18 PROCEDURE — 92960 CARDIOVERSION ELECTRIC EXT: CPT

## 2019-12-18 PROCEDURE — 85730 THROMBOPLASTIN TIME PARTIAL: CPT

## 2019-12-18 NOTE — H&P CARDIOLOGY - HISTORY OF PRESENT ILLNESS
75 yo obese (BMI 33.7) male with PMHx of HTN, HLD, former smoker, bladder cancer now with urostomy, hypothyroidism, paroxysmal a-fib on Coumadin over 16 years (Hx of DCCV 10 years ago) presents for cardioversion.  Pt reports he had recent ER visit with SOB for a month and noted to be in recurrent Afib and had cardioversion scheduled on Dec 13th but had subtherapeutic INR of 1.86, repeat 1.88 and cardioversion was rescheduled for today.  Today's INR is 2.80 (reports taking 7.5mg last night instead of his usual dose of 5mg as instructed).  Pt denies SOB, palpitation or chest pain.  No prior cardiac monitoring implantable device.

## 2019-12-19 ENCOUNTER — APPOINTMENT (OUTPATIENT)
Dept: CARDIOLOGY | Facility: CLINIC | Age: 74
End: 2019-12-19
Payer: MEDICARE

## 2019-12-19 VITALS
BODY MASS INDEX: 34.07 KG/M2 | WEIGHT: 238 LBS | SYSTOLIC BLOOD PRESSURE: 153 MMHG | HEART RATE: 88 BPM | OXYGEN SATURATION: 95 % | DIASTOLIC BLOOD PRESSURE: 92 MMHG | HEIGHT: 70 IN

## 2019-12-19 DIAGNOSIS — I48.91 UNSPECIFIED ATRIAL FIBRILLATION: ICD-10-CM

## 2019-12-19 PROCEDURE — 99215 OFFICE O/P EST HI 40 MIN: CPT

## 2019-12-19 NOTE — PHYSICAL EXAM
[General Appearance - Well Developed] : well developed [Normal Appearance] : normal appearance [Well Groomed] : well groomed [General Appearance - Well Nourished] : well nourished [Normal Conjunctiva] : the conjunctiva exhibited no abnormalities [No Deformities] : no deformities [General Appearance - In No Acute Distress] : no acute distress [Normal Oral Mucosa] : normal oral mucosa [Eyelids - No Xanthelasma] : the eyelids demonstrated no xanthelasmas [Normal Jugular Venous A Waves Present] : normal jugular venous A waves present [No Oral Pallor] : no oral pallor [No Oral Cyanosis] : no oral cyanosis [Normal Jugular Venous V Waves Present] : normal jugular venous V waves present [No Jugular Venous Ruiz A Waves] : no jugular venous ruiz A waves [Exaggerated Use Of Accessory Muscles For Inspiration] : no accessory muscle use [Respiration, Rhythm And Depth] : normal respiratory rhythm and effort [Heart Rate And Rhythm] : heart rate and rhythm were normal [Auscultation Breath Sounds / Voice Sounds] : lungs were clear to auscultation bilaterally [Heart Sounds] : normal S1 and S2 [Abdomen Soft] : soft [Murmurs] : no murmurs present [Abdomen Tenderness] : non-tender [Abdomen Mass (___ Cm)] : no abdominal mass palpated [Abnormal Walk] : normal gait [Gait - Sufficient For Exercise Testing] : the gait was sufficient for exercise testing [Nail Clubbing] : no clubbing of the fingernails [Cyanosis, Localized] : no localized cyanosis [Petechial Hemorrhages (___cm)] : no petechial hemorrhages [Skin Color & Pigmentation] : normal skin color and pigmentation [] : no rash [No Venous Stasis] : no venous stasis [No Skin Ulcers] : no skin ulcer [Skin Lesions] : no skin lesions [Oriented To Time, Place, And Person] : oriented to person, place, and time [No Xanthoma] : no  xanthoma was observed [Mood] : the mood was normal [Affect] : the affect was normal [No Anxiety] : not feeling anxious

## 2019-12-19 NOTE — HISTORY OF PRESENT ILLNESS
[FreeTextEntry1] : Fletcher Mendosa presented to the office today for a cardiovascular follow up. He had an echocardiogram 2017 that showed ejection fraction of 50-55% with mild MR. There was borderline LV enlargement with mild left atrial enlargement. There was stage I diastolic dysfunction. He had a stress test performed 12/17 that showed no ischemia at workload of 6 mets.Ejection fraction 45%.\par \par He is now 74 years old, with a history of hypertension, hypercholesterolemia, hypothyroidism, bladder cancer and paroxysmal atrial fibrillation. His atrial fibrillation was diagnosed several years ago, and he has been on warfarin. He did require cardioversion in the past. He was admitted to the hospital 11/19 in atrial fibrillation with rapid ventricular rate. At that time repeat echocardiogram showed ejection fraction of 45% with mild MR. He was given beta blocker and Cardizem with slowing of the ventricular rate. He is now on diltiazem 180 twice a day and Toprol-XL 25 mg once a day.\par \par He has had bronchitis for about a month and actually went to the ER complaining of shortness of breath. Was not aware he was in atrial fibrillation. It is possible the bronchitis had precipitated the event. I have given him a month to see if he would spontaneously go back into sinus rhythm. He is feeling well without any symptoms.\par \par Yesterday he was at [place] and underwent electrical cardioversion. This was tried twice and both times his rhythm quickly reverted back to atrial fibrillation and the procedure was aborted. I spoke with Dr. Wu and he felt that the best approach would be to leave the patient in chronic atrial fibrillation with rate control. He is willing to try the electrical cardioversion again on antiarrhythmic, or possibly consider an ablation.\par \par

## 2019-12-19 NOTE — DISCUSSION/SUMMARY
[FreeTextEntry1] : I discussed in detail with the patient and his wife the risks and benefits of rate versus rhythm control of atrial fibrillation. He is totally asymptomatic and his age will probably be best to leave him in chronic atrial fibrillation with rate control and anticoagulation. The patient is thinking about doing an ablation. He is leaving for [place] next week and will be back the beginning of April. Does understand that the sooner he does the ablation the bed of the chance of it holding. In the meantime he'll stay on his present medication and arranged up INRs performed in [place].\par \par If he decides to do the ablation let me know and I will discuss with Dr. Wu. He has any problems or symptoms he will call me. He'll make an appointment to see me when he gets back from [place] in the spring.\par \par This was discussed in detail with the patient and his wife and answered all their questions.

## 2019-12-31 ENCOUNTER — APPOINTMENT (OUTPATIENT)
Dept: CARDIOLOGY | Facility: CLINIC | Age: 74
End: 2019-12-31
Payer: MEDICARE

## 2019-12-31 VITALS — BODY MASS INDEX: 34.07 KG/M2 | WEIGHT: 238 LBS | HEIGHT: 70 IN | HEART RATE: 88 BPM

## 2019-12-31 LAB
INR PPP: 4.4 RATIO
QUALITY CONTROL: YES

## 2019-12-31 PROCEDURE — 93793 ANTICOAG MGMT PT WARFARIN: CPT

## 2019-12-31 PROCEDURE — 85610 PROTHROMBIN TIME: CPT | Mod: QW

## 2020-01-28 LAB
INR PPP: 1.3
INR PPP: 2.5

## 2020-02-05 LAB — INR PPP: 3.5

## 2020-02-12 ENCOUNTER — NON-APPOINTMENT (OUTPATIENT)
Age: 75
End: 2020-02-12

## 2020-02-17 LAB — INR PPP: 1.8

## 2020-02-19 LAB — INR PPP: 2.6

## 2020-03-02 LAB — INR PPP: 1.9

## 2020-03-18 LAB — INR PPP: 2.2

## 2020-04-15 LAB — INR PPP: 3

## 2020-04-21 LAB — INR PPP: 2.1

## 2020-04-29 LAB — INR PPP: 2.7

## 2020-05-01 NOTE — H&P ADULT - NSHPLANGLIMITEDENGLISH_GEN_A_CORE
SLP Contact Note    Attempted to see patient for swallow evaluation. Pt currently on BiPAP and therefore will defer for now. On second attempt, pt still on BiPAP. Given pt with tenuous respiratory status, and noted that patient with a prolonged intubation and recent extubation, would not be appropriate for PO initiation before the weekend, particularly without objective imaging. Therefore, will hold evaluation for now. Recommend stringent oral care and water swabs for comfort when patient is off BiPAP.        Thank you,  LACI Castillo.Ed, [id number] [facility]  Speech-Language Pathologist No

## 2020-05-14 LAB — INR PPP: 2.9

## 2020-06-11 ENCOUNTER — RX RENEWAL (OUTPATIENT)
Age: 75
End: 2020-06-11

## 2020-06-16 LAB — INR PPP: 3.1

## 2020-07-01 DIAGNOSIS — Z79.01 ENCOUNTER FOR THERAPEUTIC DRUG LVL MONITORING: ICD-10-CM

## 2020-07-01 DIAGNOSIS — Z79.01 LONG TERM (CURRENT) USE OF ANTICOAGULANTS: ICD-10-CM

## 2020-07-01 DIAGNOSIS — Z51.81 ENCOUNTER FOR THERAPEUTIC DRUG LVL MONITORING: ICD-10-CM

## 2020-07-01 LAB — INR PPP: 4.6

## 2020-07-15 LAB — INR PPP: 2.4

## 2020-07-30 LAB — INR PPP: 3.1

## 2020-08-17 LAB — INR PPP: 2.6

## 2020-09-01 LAB — INR PPP: 1.9

## 2020-09-16 LAB — INR PPP: 3.1

## 2020-10-02 LAB — INR PPP: 2.6

## 2020-10-19 LAB — INR PPP: 4.1

## 2020-11-02 LAB — INR PPP: 2.9

## 2020-11-16 LAB — INR PPP: 2.7

## 2020-11-30 LAB — INR PPP: 3.4

## 2020-12-16 LAB — INR PPP: 2

## 2020-12-19 NOTE — PHYSICAL EXAM
[General Appearance - Well Developed] : well developed [Normal Appearance] : normal appearance [Well Groomed] : well groomed [General Appearance - Well Nourished] : well nourished [No Deformities] : no deformities [General Appearance - In No Acute Distress] : no acute distress [Normal Conjunctiva] : the conjunctiva exhibited no abnormalities [Eyelids - No Xanthelasma] : the eyelids demonstrated no xanthelasmas [Normal Oral Mucosa] : normal oral mucosa [No Oral Pallor] : no oral pallor [No Oral Cyanosis] : no oral cyanosis [Normal Jugular Venous A Waves Present] : normal jugular venous A waves present [Normal Jugular Venous V Waves Present] : normal jugular venous V waves present [No Jugular Venous Ruiz A Waves] : no jugular venous ruiz A waves [Respiration, Rhythm And Depth] : normal respiratory rhythm and effort [Exaggerated Use Of Accessory Muscles For Inspiration] : no accessory muscle use [Auscultation Breath Sounds / Voice Sounds] : lungs were clear to auscultation bilaterally [Heart Rate And Rhythm] : heart rate and rhythm were normal [Heart Sounds] : normal S1 and S2 [Murmurs] : no murmurs present [Abdomen Soft] : soft [Abdomen Tenderness] : non-tender [Abdomen Mass (___ Cm)] : no abdominal mass palpated [Abnormal Walk] : normal gait [Gait - Sufficient For Exercise Testing] : the gait was sufficient for exercise testing [Nail Clubbing] : no clubbing of the fingernails [Cyanosis, Localized] : no localized cyanosis [Petechial Hemorrhages (___cm)] : no petechial hemorrhages [Skin Color & Pigmentation] : normal skin color and pigmentation [] : no rash [No Venous Stasis] : no venous stasis [Skin Lesions] : no skin lesions [No Skin Ulcers] : no skin ulcer [No Xanthoma] : no  xanthoma was observed [Oriented To Time, Place, And Person] : oriented to person, place, and time [Affect] : the affect was normal [Mood] : the mood was normal [No Anxiety] : not feeling anxious

## 2020-12-21 ENCOUNTER — NON-APPOINTMENT (OUTPATIENT)
Age: 75
End: 2020-12-21

## 2020-12-21 ENCOUNTER — APPOINTMENT (OUTPATIENT)
Dept: CARDIOLOGY | Facility: CLINIC | Age: 75
End: 2020-12-21
Payer: MEDICARE

## 2020-12-21 VITALS
DIASTOLIC BLOOD PRESSURE: 85 MMHG | SYSTOLIC BLOOD PRESSURE: 138 MMHG | OXYGEN SATURATION: 96 % | HEIGHT: 70 IN | WEIGHT: 240 LBS | HEART RATE: 83 BPM | BODY MASS INDEX: 34.36 KG/M2

## 2020-12-21 PROCEDURE — 93000 ELECTROCARDIOGRAM COMPLETE: CPT

## 2020-12-21 PROCEDURE — 99214 OFFICE O/P EST MOD 30 MIN: CPT

## 2020-12-21 NOTE — HISTORY OF PRESENT ILLNESS
[FreeTextEntry1] : Fletcher Mendosa presented to the office today for a cardiovascular follow up. He had an echocardiogram 2017 that showed ejection fraction of 50-55% with mild MR. There was borderline LV enlargement with mild left atrial enlargement. There was stage I diastolic dysfunction. He had a stress test performed 12/17 that showed no ischemia at workload of 6 mets.Ejection fraction 45%.\par \par He is now 75 years old, with a history of hypertension, hypercholesterolemia, hypothyroidism, bladder cancer and paroxysmal atrial fibrillation. His atrial fibrillation was diagnosed several years ago, and he has been on warfarin. He did require cardioversion in the past. He was admitted to the hospital 11/19 in atrial fibrillation with rapid ventricular rate. At that time repeat echocardiogram showed ejection fraction of 45% with mild MR. He was given beta blocker and Cardizem with slowing of the ventricular rate. He is now on diltiazem 180 twice a day and Toprol-XL 25 mg once a day.\par \par He has had bronchitis for about a month and actually went to the ER complaining of shortness of breath. Was not aware he was in atrial fibrillation. It is possible the bronchitis had precipitated the event. I have given him a month to see if he would spontaneously go back into sinus rhythm. He is feeling well without any symptoms.\par \par 12/18/20 he was at [place] and underwent electrical cardioversion. This was tried twice and both times his rhythm quickly reverted back to atrial fibrillation and the procedure was aborted. I spoke with Dr. Wu and he felt that the best approach would be to leave the patient in chronic atrial fibrillation with rate control. The patient has been asymptomatic.\par \par Blood work at [facility] 9/20 demonstrated normal CBC and CMP. No recurrence of bladder cancer\par \par ECG shows atrial fibrillation with controlled ventricular rate. There are no acute changes.\par \par

## 2020-12-21 NOTE — DISCUSSION/SUMMARY
[FreeTextEntry1] : The patient is doing well without any signs or symptoms of active heart disease.  On his medication he has rate-controlled atrial fibrillation. Blood pressure is good.  I would appreciate your sending a copy of his most recent blood work for my review.\par \par He will stay on his present medication. We did go over his medication and I answered his questions.  If all is well I will see him in one year. He knows to call if he has any chest pain,  shortness of breath, palpitation. He will schedule a carotid Doppler.

## 2021-01-15 LAB — INR PPP: 2.9

## 2021-02-02 LAB — INR PPP: 2.1

## 2021-02-17 LAB
INR PPP: 2
PROTHROMBIN TIME AND INR, PLASMA: NORMAL

## 2021-03-02 LAB — INR PPP: 1.6

## 2021-03-15 LAB — INR PPP: 2.9

## 2021-03-30 LAB — INR PPP: 2.4

## 2021-04-15 LAB — INR PPP: 2.5

## 2021-04-30 LAB — INR PPP: 2.1

## 2021-06-07 LAB — INR PPP: 2.2

## 2021-06-15 ENCOUNTER — INPATIENT (INPATIENT)
Facility: HOSPITAL | Age: 76
LOS: 13 days | Discharge: HOME HEALTH | DRG: 224 | End: 2021-06-29
Attending: THORACIC SURGERY (CARDIOTHORACIC VASCULAR SURGERY) | Admitting: STUDENT IN AN ORGANIZED HEALTH CARE EDUCATION/TRAINING PROGRAM
Payer: MEDICARE

## 2021-06-15 ENCOUNTER — EMERGENCY (EMERGENCY)
Facility: HOSPITAL | Age: 76
LOS: 1 days | Discharge: ANOTHER HOSPITAL | End: 2021-06-15
Attending: EMERGENCY MEDICINE | Admitting: EMERGENCY MEDICINE
Payer: MEDICARE

## 2021-06-15 ENCOUNTER — NON-APPOINTMENT (OUTPATIENT)
Age: 76
End: 2021-06-15

## 2021-06-15 VITALS
DIASTOLIC BLOOD PRESSURE: 64 MMHG | HEIGHT: 70 IN | RESPIRATION RATE: 16 BRPM | WEIGHT: 235.01 LBS | HEART RATE: 134 BPM | OXYGEN SATURATION: 94 % | SYSTOLIC BLOOD PRESSURE: 127 MMHG | TEMPERATURE: 99 F

## 2021-06-15 VITALS
RESPIRATION RATE: 24 BRPM | HEART RATE: 112 BPM | OXYGEN SATURATION: 98 % | SYSTOLIC BLOOD PRESSURE: 111 MMHG | TEMPERATURE: 98 F | DIASTOLIC BLOOD PRESSURE: 58 MMHG

## 2021-06-15 VITALS
DIASTOLIC BLOOD PRESSURE: 75 MMHG | SYSTOLIC BLOOD PRESSURE: 117 MMHG | WEIGHT: 240.08 LBS | TEMPERATURE: 99 F | HEART RATE: 88 BPM | RESPIRATION RATE: 18 BRPM | OXYGEN SATURATION: 97 % | HEIGHT: 70 IN

## 2021-06-15 DIAGNOSIS — Z98.890 OTHER SPECIFIED POSTPROCEDURAL STATES: Chronic | ICD-10-CM

## 2021-06-15 LAB
ALBUMIN SERPL ELPH-MCNC: 3.6 G/DL — SIGNIFICANT CHANGE UP (ref 3.3–5)
ALP SERPL-CCNC: 64 U/L — SIGNIFICANT CHANGE UP (ref 40–120)
ALT FLD-CCNC: 32 U/L — SIGNIFICANT CHANGE UP (ref 12–78)
ANION GAP SERPL CALC-SCNC: 12 MMOL/L — SIGNIFICANT CHANGE UP (ref 5–17)
AST SERPL-CCNC: 30 U/L — SIGNIFICANT CHANGE UP (ref 15–37)
BASOPHILS # BLD AUTO: 0.04 K/UL — SIGNIFICANT CHANGE UP (ref 0–0.2)
BASOPHILS NFR BLD AUTO: 0.3 % — SIGNIFICANT CHANGE UP (ref 0–2)
BILIRUB SERPL-MCNC: 0.7 MG/DL — SIGNIFICANT CHANGE UP (ref 0.2–1.2)
BUN SERPL-MCNC: 35 MG/DL — HIGH (ref 7–23)
CALCIUM SERPL-MCNC: 8.8 MG/DL — SIGNIFICANT CHANGE UP (ref 8.5–10.1)
CHLORIDE SERPL-SCNC: 107 MMOL/L — SIGNIFICANT CHANGE UP (ref 96–108)
CK MB CFR SERPL CALC: 1.1 NG/ML — SIGNIFICANT CHANGE UP (ref 0–3.6)
CO2 SERPL-SCNC: 21 MMOL/L — LOW (ref 22–31)
CREAT SERPL-MCNC: 1.8 MG/DL — HIGH (ref 0.5–1.3)
D DIMER BLD IA.RAPID-MCNC: <150 NG/ML DDU — SIGNIFICANT CHANGE UP
EOSINOPHIL # BLD AUTO: 0 K/UL — SIGNIFICANT CHANGE UP (ref 0–0.5)
EOSINOPHIL NFR BLD AUTO: 0 % — SIGNIFICANT CHANGE UP (ref 0–6)
GLUCOSE SERPL-MCNC: 118 MG/DL — HIGH (ref 70–99)
HCT VFR BLD CALC: 45.7 % — SIGNIFICANT CHANGE UP (ref 39–50)
HGB BLD-MCNC: 15.5 G/DL — SIGNIFICANT CHANGE UP (ref 13–17)
IMM GRANULOCYTES NFR BLD AUTO: 0.7 % — SIGNIFICANT CHANGE UP (ref 0–1.5)
INR BLD: 2.83 RATIO — HIGH (ref 0.88–1.16)
INR PPP: 2.2
LYMPHOCYTES # BLD AUTO: 0.8 K/UL — LOW (ref 1–3.3)
LYMPHOCYTES # BLD AUTO: 6.1 % — LOW (ref 13–44)
MCHC RBC-ENTMCNC: 28.4 PG — SIGNIFICANT CHANGE UP (ref 27–34)
MCHC RBC-ENTMCNC: 33.9 GM/DL — SIGNIFICANT CHANGE UP (ref 32–36)
MCV RBC AUTO: 83.9 FL — SIGNIFICANT CHANGE UP (ref 80–100)
MONOCYTES # BLD AUTO: 2.02 K/UL — HIGH (ref 0–0.9)
MONOCYTES NFR BLD AUTO: 15.3 % — HIGH (ref 2–14)
NEUTROPHILS # BLD AUTO: 10.21 K/UL — HIGH (ref 1.8–7.4)
NEUTROPHILS NFR BLD AUTO: 77.6 % — HIGH (ref 43–77)
NRBC # BLD: 0 /100 WBCS — SIGNIFICANT CHANGE UP (ref 0–0)
NT-PROBNP SERPL-SCNC: 3374 PG/ML — HIGH (ref 0–450)
PLATELET # BLD AUTO: 195 K/UL — SIGNIFICANT CHANGE UP (ref 150–400)
POTASSIUM SERPL-MCNC: 3.6 MMOL/L — SIGNIFICANT CHANGE UP (ref 3.5–5.3)
POTASSIUM SERPL-SCNC: 3.6 MMOL/L — SIGNIFICANT CHANGE UP (ref 3.5–5.3)
PROT SERPL-MCNC: 8.1 G/DL — SIGNIFICANT CHANGE UP (ref 6–8.3)
PROTHROM AB SERPL-ACNC: 31.5 SEC — HIGH (ref 10.6–13.6)
RAPID RVP RESULT: SIGNIFICANT CHANGE UP
RBC # BLD: 5.45 M/UL — SIGNIFICANT CHANGE UP (ref 4.2–5.8)
RBC # FLD: 14.6 % — HIGH (ref 10.3–14.5)
SARS-COV-2 RNA SPEC QL NAA+PROBE: SIGNIFICANT CHANGE UP
SODIUM SERPL-SCNC: 140 MMOL/L — SIGNIFICANT CHANGE UP (ref 135–145)
TROPONIN I SERPL-MCNC: 1.15 NG/ML — HIGH (ref 0.01–0.04)
TSH SERPL-MCNC: 2.66 UIU/ML — SIGNIFICANT CHANGE UP (ref 0.36–3.74)
WBC # BLD: 13.16 K/UL — HIGH (ref 3.8–10.5)
WBC # FLD AUTO: 13.16 K/UL — HIGH (ref 3.8–10.5)

## 2021-06-15 PROCEDURE — 93005 ELECTROCARDIOGRAM TRACING: CPT

## 2021-06-15 PROCEDURE — 84443 ASSAY THYROID STIM HORMONE: CPT

## 2021-06-15 PROCEDURE — 85379 FIBRIN DEGRADATION QUANT: CPT

## 2021-06-15 PROCEDURE — 96374 THER/PROPH/DIAG INJ IV PUSH: CPT

## 2021-06-15 PROCEDURE — 0225U NFCT DS DNA&RNA 21 SARSCOV2: CPT

## 2021-06-15 PROCEDURE — 96376 TX/PRO/DX INJ SAME DRUG ADON: CPT

## 2021-06-15 PROCEDURE — 99291 CRITICAL CARE FIRST HOUR: CPT | Mod: GC

## 2021-06-15 PROCEDURE — 96375 TX/PRO/DX INJ NEW DRUG ADDON: CPT

## 2021-06-15 PROCEDURE — 71045 X-RAY EXAM CHEST 1 VIEW: CPT

## 2021-06-15 PROCEDURE — 85025 COMPLETE CBC W/AUTO DIFF WBC: CPT

## 2021-06-15 PROCEDURE — 93010 ELECTROCARDIOGRAM REPORT: CPT | Mod: 76

## 2021-06-15 PROCEDURE — 84484 ASSAY OF TROPONIN QUANT: CPT

## 2021-06-15 PROCEDURE — 36415 COLL VENOUS BLD VENIPUNCTURE: CPT

## 2021-06-15 PROCEDURE — 82553 CREATINE MB FRACTION: CPT

## 2021-06-15 PROCEDURE — 99291 CRITICAL CARE FIRST HOUR: CPT

## 2021-06-15 PROCEDURE — 99291 CRITICAL CARE FIRST HOUR: CPT | Mod: 25

## 2021-06-15 PROCEDURE — 83880 ASSAY OF NATRIURETIC PEPTIDE: CPT

## 2021-06-15 PROCEDURE — 71045 X-RAY EXAM CHEST 1 VIEW: CPT | Mod: 26

## 2021-06-15 PROCEDURE — 80053 COMPREHEN METABOLIC PANEL: CPT

## 2021-06-15 PROCEDURE — 85610 PROTHROMBIN TIME: CPT

## 2021-06-15 RX ORDER — AMIODARONE HYDROCHLORIDE 400 MG/1
150 TABLET ORAL ONCE
Refills: 0 | Status: COMPLETED | OUTPATIENT
Start: 2021-06-15 | End: 2021-06-15

## 2021-06-15 RX ORDER — AMIODARONE HYDROCHLORIDE 400 MG/1
1 TABLET ORAL
Qty: 900 | Refills: 0 | Status: DISCONTINUED | OUTPATIENT
Start: 2021-06-15 | End: 2021-06-16

## 2021-06-15 RX ORDER — SODIUM CHLORIDE 9 MG/ML
500 INJECTION INTRAMUSCULAR; INTRAVENOUS; SUBCUTANEOUS ONCE
Refills: 0 | Status: COMPLETED | OUTPATIENT
Start: 2021-06-15 | End: 2021-06-15

## 2021-06-15 RX ORDER — AMIODARONE HYDROCHLORIDE 400 MG/1
1 TABLET ORAL
Qty: 900 | Refills: 0 | Status: DISCONTINUED | OUTPATIENT
Start: 2021-06-15 | End: 2021-06-19

## 2021-06-15 RX ORDER — METOPROLOL TARTRATE 50 MG
1 TABLET ORAL
Qty: 0 | Refills: 0 | DISCHARGE

## 2021-06-15 RX ORDER — ADENOSINE 3 MG/ML
12 INJECTION INTRAVENOUS ONCE
Refills: 0 | Status: COMPLETED | OUTPATIENT
Start: 2021-06-15 | End: 2021-06-15

## 2021-06-15 RX ORDER — MIDAZOLAM HYDROCHLORIDE 1 MG/ML
5 INJECTION, SOLUTION INTRAMUSCULAR; INTRAVENOUS ONCE
Refills: 0 | Status: DISCONTINUED | OUTPATIENT
Start: 2021-06-15 | End: 2021-06-15

## 2021-06-15 RX ORDER — DILTIAZEM HCL 120 MG
10 CAPSULE, EXT RELEASE 24 HR ORAL ONCE
Refills: 0 | Status: COMPLETED | OUTPATIENT
Start: 2021-06-15 | End: 2021-06-15

## 2021-06-15 RX ORDER — ADENOSINE 3 MG/ML
6 INJECTION INTRAVENOUS ONCE
Refills: 0 | Status: COMPLETED | OUTPATIENT
Start: 2021-06-15 | End: 2021-06-15

## 2021-06-15 RX ORDER — DILTIAZEM HCL 120 MG
5 CAPSULE, EXT RELEASE 24 HR ORAL
Qty: 125 | Refills: 0 | Status: DISCONTINUED | OUTPATIENT
Start: 2021-06-15 | End: 2021-06-19

## 2021-06-15 RX ORDER — ACETAMINOPHEN 500 MG
975 TABLET ORAL ONCE
Refills: 0 | Status: COMPLETED | OUTPATIENT
Start: 2021-06-15 | End: 2021-06-15

## 2021-06-15 RX ADMIN — AMIODARONE HYDROCHLORIDE 33.3 MG/MIN: 400 TABLET ORAL at 20:32

## 2021-06-15 RX ADMIN — Medication 10 MILLIGRAM(S): at 17:04

## 2021-06-15 RX ADMIN — ADENOSINE 12 MILLIGRAM(S): 3 INJECTION INTRAVENOUS at 18:10

## 2021-06-15 RX ADMIN — Medication 5 MG/HR: at 18:50

## 2021-06-15 RX ADMIN — AMIODARONE HYDROCHLORIDE 150 MILLIGRAM(S): 400 TABLET ORAL at 19:41

## 2021-06-15 RX ADMIN — AMIODARONE HYDROCHLORIDE 600 MILLIGRAM(S): 400 TABLET ORAL at 19:31

## 2021-06-15 RX ADMIN — AMIODARONE HYDROCHLORIDE 33.3 MG/MIN: 400 TABLET ORAL at 22:28

## 2021-06-15 RX ADMIN — AMIODARONE HYDROCHLORIDE 618 MILLIGRAM(S): 400 TABLET ORAL at 20:30

## 2021-06-15 RX ADMIN — MIDAZOLAM HYDROCHLORIDE 5 MILLIGRAM(S): 1 INJECTION, SOLUTION INTRAMUSCULAR; INTRAVENOUS at 18:32

## 2021-06-15 RX ADMIN — SODIUM CHLORIDE 500 MILLILITER(S): 9 INJECTION INTRAMUSCULAR; INTRAVENOUS; SUBCUTANEOUS at 18:32

## 2021-06-15 RX ADMIN — ADENOSINE 6 MILLIGRAM(S): 3 INJECTION INTRAVENOUS at 18:00

## 2021-06-15 RX ADMIN — Medication 975 MILLIGRAM(S): at 22:27

## 2021-06-15 NOTE — ED PROVIDER NOTE - PHYSICAL EXAMINATION
Gen: AAOx3, non-toxic  Head: NCAT  HEENT: oral mucosa moist, normal conjunctiva  Lung: CTAB, no respiratory distress, speaking in full sentences  CV: irregular rate and rhythm, no murmurs, rubs or gallops  Abd: soft, NTND, no guarding  MSK: no visible deformities  Neuro: No focal sensory or motor deficits  Skin: Warm, well perfused, no rash  Psych: normal affect.   ~Everardo Dieter PGY3 Gen: AAOx3, non-toxic  Head: NCAT  HEENT: oral mucosa moist, normal conjunctiva  Lung: CTAB, no respiratory distress, speaking in full sentences  CV: irregular rate and rhythm, no murmurs, rubs or gallops  Abd: soft, NTND, no guarding  MSK: no visible deformities  Neuro: No focal sensory or motor deficits  Skin: Warm, well perfused, no rash  Psych: normal affect.   ~Everardo Garcias PGY3  **ATTENDING ADDENDUM (Dr. Stephan Pérez): I have reviewed and substantially contributed to the elements of the PE as documented above. I have directly performed an examination of this patient in conjunction with the other members (EM resident/PA/NP) of the patient care team. I have personally reviewed the patient's vital signs at the time of the patient's initial presentation to the ED and repeatedly throughout the ED course.

## 2021-06-15 NOTE — ED PROVIDER NOTE - CARE PLAN
Principal Discharge DX:	Atrial fibrillation with RVR   Principal Discharge DX:	Atrial fibrillation with RVR  Secondary Diagnosis:	Ventricular tachycardia

## 2021-06-15 NOTE — ED ADULT NURSE NOTE - PMH
Atrial fibrillation    Bladder cancer    Bronchitis    Former smoker    Hypertension    Hypothyroid

## 2021-06-15 NOTE — ED PROVIDER NOTE - RESPIRATORY, MLM
Breath sounds clear and equal bilaterally. **ATTENDING ADDENDUM (Dr. Stephan Pérez): NO wheezing, rales, rhonchi, crackles, stridor, drooling, retractions, nasal flaring, or tripoding.

## 2021-06-15 NOTE — ED PROVIDER NOTE - CHIEF COMPLAINT
The patient is a 76y Male complaining of  The patient is a 76-year-old man presenting s/p transfer with concern for tachycardia of uncertain etiology requiring synchronized cardioversion following deterioration after medication administration (diltiazem). Currently on amiodarone continuous infusion. For Cardiology Team evaluation. Noted with fever Here upon arrival to ED.

## 2021-06-15 NOTE — ED PROVIDER NOTE - AGGRAVATING FACTORS
**ATTENDING ADDENDUM (Dr. Stephan Pérez): NO movement-associated, pleuritic, reproducible, positional, or exertional component to the symptoms.

## 2021-06-15 NOTE — ED PROVIDER NOTE - CARE PLAN
Principal Discharge DX:	Tachycardia   Principal Discharge DX:	Tachycardia  Goal:	**ATTENDING ADDENDUM (Dr. Stephan Pérez): Goals of care include resolution of emergent/urgent symptoms and concerns, and restoration to baseline level of homeostasis.  Secondary Diagnosis:	Atrial fibrillation, rapid  Secondary Diagnosis:	Fever

## 2021-06-15 NOTE — ED PROVIDER NOTE - NS ED ROS FT
Gen: No fever, No chills  Eyes: No vision changes   ENT: + congestion, sore throat  Resp: + cough. No SOB  Cardiovascular: No chest pain. + palpitations  GI: No abdominal pain, nausea/vomiting   :  No change in urine output or frequency; no dysuria  MS: No joint. No muscle pain  Skin: No rashes  Neuro: No headache; No numbness or weakness  Remainder negative, except as per the HPI Gen: No fever, No chills  Eyes: No vision changes   ENT: + congestion, sore throat  Resp: + cough. No SOB  Cardiovascular: No chest pain. + palpitations  GI: No abdominal pain, nausea/vomiting   :  No change in urine output or frequency; no dysuria  MS: No joint. No muscle pain  Skin: No rashes  Neuro: No headache; No numbness or weakness  Remainder negative, except as per the HPI  **ATTENDING ADDENDUM (Dr. Stephan Pérez): During my interview with the patient, I have personally obtained and/or have directly verified the elements in the past medical/surgical history and other histories as noted earlier in the EMR, in conjunction with the other members (EM resident/PA/NP) of the patient care team. I have also personally obtained and/or have directly verified/reviewed the review of systems as documented below, in conjunction with the other members (EM resident/PA/NP) of the patient care team. Of note, and in contrast to the above, the patient demonstrated a fever upon arrival to the ED s/p transfer from [facility].

## 2021-06-15 NOTE — ED ADULT NURSE NOTE - INTERVENTIONS DEFINITIONS
Center to call system/Call bell, personal items and telephone within reach/Instruct patient to call for assistance/Physically safe environment: no spills, clutter or unnecessary equipment/Stretcher in lowest position, wheels locked, appropriate side rails in place

## 2021-06-15 NOTE — ED PROVIDER NOTE - PLAN OF CARE
**ATTENDING ADDENDUM (Dr. Stephan Pérez): Goals of care include resolution of emergent/urgent symptoms and concerns, and restoration to baseline level of homeostasis.

## 2021-06-15 NOTE — ED PROVIDER NOTE - MUSCULOSKELETAL, MLM
Spine appears normal, range of motion is not limited, no muscle or joint tenderness **ATTENDING ADDENDUM (Dr. Stephan Pérez): Symmetrically equal strength, 5/5, in the bilateral upper and lower extremities. NO cords, soft-tissue swelling or calf tenderness in the bilateral lower extremities.

## 2021-06-15 NOTE — ED PROVIDER NOTE - OBJECTIVE STATEMENT
76y male with PMH of afib, HTN, bladder CA presenting as a transfer for vtach. Patient for the past 2 days has been having productive cough, palpitations, feeling unwell. Went to outside hospital, had multiple episodes of unstable vtach, had synchronized cardioversion, diltiazem and now on amio drip. Patient reports no symptoms at this time. 76y male with PMH of afib, HTN, bladder CA presenting as a transfer for vtach. Patient for the past 2 days has been having productive cough, palpitations, feeling unwell. Went to outside hospital, had multiple episodes of unstable vtach, had synchronized cardioversion, diltiazem and now on amio drip. Patient reports no symptoms at this time.  **ATTENDING ADDENDUM (Dr. Stephan Pérez): I attest that I have directly and personally interviewed and examined this patient and elicited a comparable history of present illness and review of systems as documented, along with my EM resident. I attest that I have made significant contributions to the documentation where necessary and as noted in the EMR.

## 2021-06-15 NOTE — ED PROVIDER NOTE - EKG ADDITIONAL INFORMATION FREE TEXT
**ATTENDING ADDENDUM (Dr. Stephan Pérez): see notation in EMR. Also, ECGs from prior ED visit ([facility]) reviewed.

## 2021-06-15 NOTE — ED PROVIDER NOTE - CHPI ED SYMPTOMS POS
**ATTENDING ADDENDUM (Dr. Stephan Pérez): tachycardia (atrial fibrillation with rapid ventricular response) requiring medication therapy and synchronized cardioversion (upon deterioration to VT following diltiazem administration). With fever upon arrival. POSITIVE recent history of chest congestion and cough with malaise./COUGH/PALPITATIONS

## 2021-06-15 NOTE — ED PROVIDER NOTE - PROGRESS NOTE DETAILS
**ATTENDING ADDENDUM (Dr. Stephan Pérez): patient serially evaluated throughout ED course by ED team. NO acute deterioration up to this time in the ED. Notified by RN about patient's vital signs (temperature). Will consider sepsis workup (blood and urine cultures, lactate) in context of other symptoms (possible urinary tract infection or pyelonephritis, given patient's history of urostomy). Prior diagnostics from referring facility reviewed; mild leukocytosis noted. NO fevers there. CXR without consolidation (?small effusion). COVID-19 testing NEGATIVE. Will continue to observe and monitor closely. Anticipatory guidance provided. **ATTENDING ADDENDUM (Dr. Stephan Pérez): Agree with goals/plan of ED care as described in EMR, including diagnostics, therapeutics and consultation as clinically warranted. Will continue to observe and monitor closely. Anticipatory guidance provided by ED team at time of initial presentation.

## 2021-06-15 NOTE — ED PROVIDER NOTE - GASTROINTESTINAL, MLM
Abdomen soft, non-tender **ATTENDING ADDENDUM (Dr. Stephan Pérez): POSITIVE urostomy (chronic). NO guarding, rebound, or rigidity. NO pulsatile or non-pulsatile masses. NO hernias. NO obvious hepatosplenomegaly.

## 2021-06-15 NOTE — ED ADULT NURSE NOTE - OBJECTIVE STATEMENT
No
Patient is a 75yo Male presenting to [facility] sent by [facility] office for rapid afib. Patient complaining of shortness of breath patient denies chest pain nausea vomiting diarrhea Patient has history of Afib and takes medication to control. Patient states that he thinks he has bronchitis and was going to the MD for  diagnosis

## 2021-06-15 NOTE — ED PROVIDER NOTE - NEUROLOGICAL, MLM
Alert and oriented, no focal deficits, no motor or sensory deficits. **ATTENDING ADDENDUM (Dr. Stephan Pérez): NO focal or generalized weakness. NO numbness, tingling, weakness, or paresthesias.

## 2021-06-15 NOTE — ED PROVIDER NOTE - EYES, MLM
Clear bilaterally, pupils equal, round and reactive to light. **ATTENDING ADDENDUM (Dr. Stephan Pérez): Extraocular muscle movements intact. Clear corneas bilaterally, pupils equal and round. NO nystagmus. NO photophobia.

## 2021-06-15 NOTE — ED PROVIDER NOTE - EMS NOTE SUMMARY FREE TEXT FOR MDM OBTAINED AND REVIEWED OLD RECORDS QUESTION
**ATTENDING ADDENDUM (Dr. Stephan Pérez): EMS team member(s) present during patient's ED visit; corroborated CC, HPI and review of systems as provided by patient. Patient with episode of VT in ED. NO events enroute during transportation.

## 2021-06-15 NOTE — ED ADULT NURSE REASSESSMENT NOTE - NS ED NURSE REASSESS COMMENT FT1
Patient rapid afib progressed to SVT patient crash cart pads were applied and patient  was given 6 mg adenosine rapidly no effect given 12 mg adenosine rapidly no effect.  patient was move to room 19 1832 given 5mg of versed and placed on NRB in preparation for synchronized cardiovert 1834 synchronized cardiovert at 120j 1835 patient rythmn change to rapid afib Cardizem drip started

## 2021-06-15 NOTE — CONSULT NOTE ADULT - ASSESSMENT
76 years old, with a history of hypertension, hypercholesterolemia, hypothyroidism, bladder cancer and permanent atrial fibrillation on Coumadin s/p unsuccessful cardioversion in the past, HFrEF 45% w mild MR presenting w aFIB w RVR.    WCT ddx includes Afib w aberrancy  vs VT  No strips available for review   s/p synchronized cardioversion, remains in Afib w appropriate rates and BP  Asymptomatic   Would cont Diltiazem 180 mg BID, Cont Toprol 25 mg QD   s/p 150 mg Amiodarone. Would start PO Amiodarone 400 mg TID for not   Recc repeat ECHO   INR therapeutic cont Warfarin dosing   Cont Lipitor 40 mg QD  Send TSH level, Cont Synthroid   Telemetry monitoring   Monitor electrolytes     Cont home dose Losartan -HCTZ     Outpatient Cardiologist Dr. Lopez to assume care 6/16/2021     Heraclio Fu MD  Cardiology Fellow  [phone]    Please check [email or web address]: [email or web address] for cardiology service schedule and contact information.  76 years old, with a history of hypertension, hypercholesterolemia, hypothyroidism, bladder cancer and permanent atrial fibrillation on Coumadin s/p unsuccessful cardioversion in the past, HFrEF 45% w mild MR presenting w aFIB w RVR.    WCT ddx includes Afib w aberrancy  vs VT  No strips available for review   s/p synchronized cardioversion, remains in Afib w appropriate rates and BP  Asymptomatic   Would cont Diltiazem 180 mg BID, Cont Toprol 25 mg QD   s/p 150 mg Amiodarone. Would start PO Amiodarone 400 mg TID for now   Recc repeat ECHO   INR therapeutic cont Warfarin dosing   Cont Lipitor 40 mg QD  Send TSH level, Cont Synthroid   Telemetry monitoring   Monitor electrolytes     Cont home dose Losartan -HCTZ     Outpatient Cardiologist Dr. Lopez to assume care 6/16/2021     Heraclio Fu MD  Cardiology Fellow  [phone]    Please check [email or web address]: [email or web address] for cardiology service schedule and contact information.

## 2021-06-15 NOTE — ED ADULT NURSE NOTE - OBJECTIVE STATEMENT
77 y/o Male presenting to the ED by EMS, A&Ox3, tx from [place]. Pt went to [place] for SOB and cough with brown sputum x 5 days, pt found to be in rapid a-fib and then went into stable v-tach. Pt was cardioverted and started on amiodarone drip. Pt arrived on amiodarone drip at a rate of 1mg/min. Pt denies any complaints upon arrival. Denies CP, SOB, N/V/D, headache, dizziness at this time. Pt hx of a-fib on warfarin. EKG completed upon arrival showing a fib and cardiac monitor in place showing a-fib. Pt appears well and in no current distress. Safety and comfort measures provided, bed locked and in lowest position, side rails up for safety. Call bell within reach. Awaiting MD orders for further RN interventions. 77 y/o Male presenting to the ED by EMS, A&Ox3, tx from [place]. Pt went to [place] for SOB and cough with brown sputum x 5 days, pt found to be in rapid a-fib and then went into stable v-tach. Pt was cardioverted and started on amiodarone drip. Pt arrived on amiodarone drip at a rate of 1mg/min. Pt denies any complaints upon arrival. Denies CP, SOB, N/V/D, headache, dizziness at this time. Pt hx of a-fib on warfarin. Pt has colostomy bag in place, site appears clean and dry. EKG completed upon arrival showing a fib and cardiac monitor in place showing a-fib. Pt appears well and in no current distress. Safety and comfort measures provided, bed locked and in lowest position, side rails up for safety. Call bell within reach. Awaiting MD orders for further RN interventions.

## 2021-06-15 NOTE — ED PROVIDER NOTE - PRIOR HOSPITAL/ED VISITS SUMMARY FREE TEXT FOR MDM OBTAINED AND REVIEWED OLD RECORDS QUESTION
**ATTENDING ADDENDUM (Dr. Stephan Pérez): reviewed portions of [facility] EMR to determine ED course there.

## 2021-06-15 NOTE — ED PROVIDER NOTE - OBJECTIVE STATEMENT
75 y/o M with known afib on coumadin, diltiazem sent in from PCP, Dr Vlaentine, office for rapid afib.  pt c/o productive cough x 5 days associated with palpitations.  pt denies fevers, chills, cp.

## 2021-06-15 NOTE — ED PROVIDER NOTE - ATTENDING CONTRIBUTION TO CARE
**ATTENDING ADDENDUM (Dr. Stephan Pérez): I attest that I have directly examined this patient and reviewed and formulated the diagnostic and therapeutic management plan in collaboration with the EM resident. Please see MDM note and remainder of EMR for findings from CC, HPI, ROS, and PE. (Dieter/Steve)

## 2021-06-15 NOTE — ED PROVIDER NOTE - CLINICAL SUMMARY MEDICAL DECISION MAKING FREE TEXT BOX
76y male with afib, multiple runs of vtach s/p sync cardioversion and amio drip, stable. Febrile here in ED. RVP negative, but still concerning for either viral uri, or pneumonia triggering arryhtmia, acs. Will continue amio, consult cards, admit. 76y male with afib, multiple runs of vtach s/p sync cardioversion and amio drip, stable. Febrile here in ED. RVP negative, but still concerning for either viral uri, or pneumonia triggering arryhtmia, acs. Will continue amio, consult cards, admit.  **ATTENDING MEDICAL DECISION MAKING/SYNTHESIS (Dr. Stephan Pérez): I have reviewed the Chief Concern(s), the HPI, the ROS, and have directly performed and confirmed the findings on the Physical Examination. I have reviewed the medical decision making with all providers, as applicable. The PROBLEM REPRESENTATION at this time is: 76-year-old man with history of bladder cancer s/p urostomy, atrial fibrillation, hypothyroidism, and Hypertension now presenting s/p transfer from [facility] with concern for ventricular tachycardia of uncertain etiology requiring synchronized cardioversion following deterioration after medication administration for atrial fibrillation with rapid ventricular response (diltiazem prior to ventricular tachycardia; currently on amiodarone continuous infusion following synchronized cardioversion). Transferred for Cardiology Team evaluation. Noted with fever upon arrival to ED. The MOST LIKELY DIAGNOSIS, and the LIST OF DIFFERENTIAL DIAGNOSES, includes (but is not limited to) the following: acute coronary syndrome e.g. NSTEMI, unstable angina, or equivalent, other conduction disease e.g. channelopathy or equivalent, pulmonary embolism/deep venous thrombosis, pneumothorax, tamponade, Boerhaave's esophagus, acute surgical cause, serious bacterial infection or sepsis/severe sepsis e.g. pneumonia, empyema, urinary tract infection, pyelonephritis, COVID-19, other viral syndrome, bacteremia, endocarditis, myocarditis, endocarditis, or equivalent, perforation, peritonitis, dehydration, electrolyte-metabolic-endocrine derangements (including thyroid disease), vascular cause e.g. AAA, dissection or equivalent, gastritis, gastroenteritis, ADR/SE. The likelihood of each of these diagnoses has been appropriately considered in the context of this patient's presentation and my evaluation. PLAN: as described in EMR, including diagnostics, therapeutics and consultation as clinically warranted. I will continue to reevaluate the patient, including the results of all testing, and monitor response to therapy throughout the patient's course in the ED.

## 2021-06-15 NOTE — ED ADULT NURSE REASSESSMENT NOTE - NS ED NURSE REASSESS COMMENT FT1
Received report from Sara SOOD. Patient resting comfortably in stretcher. A&Ox4. Patient in no acute distress. Patient has amiodarone infusion set at Denies chest pain, SOB, headache, N/V/D, abdominal pain, fever/chills. Patient pending repeat blood work. Plan of care discussed. Safety and comfort measures maintained.

## 2021-06-15 NOTE — ED PROVIDER NOTE - CRITICAL CARE ATTENDING CONTRIBUTION TO CARE
pt found to be in unstable vtach HR 200s.  Adenosine 6mg, then 12mg with no response administered.  Cardiology, Dr. Lopez consulted.  Recommended synchronized cardioversion.  versed 5mg administered and pt was cardioverted at 120J with improvement of symptoms.  -150s afib.  Cardizem drip started.  Pt awake and alert.  ICU called 18:45 pt found to be in unstable vtach HR 200s.  Adenosine 6mg, then 12mg with no response administered.  Cardiology, Dr. Lopez consulted.  Recommended synchronized cardioversion.  versed 5mg administered and pt was cardioverted at 120J with improvement of symptoms.  -150s afib.  Cardizem drip started.  Pt awake and alert.  ICU called 18:45    20:20:  pt back in vtach 200s after amioderone bolus.  Amiodarone 150mg slow pus with improvement of symptoms and rate.  amio drip started.  ICU aware.  will arrange for possible transfer to [place].  Dr Lopez aware.

## 2021-06-15 NOTE — ED PROVIDER NOTE - SKIN, MLM
Skin normal color for race, warm, dry and intact. No evidence of rash. **ATTENDING ADDENDUM (Dr. Stephan Pérez): NO rashes, lesions, ulcers, vesicles, erythema, streaking, lymphangitic spread, crepitus, cellulitis, petechiae, purpurae, track marks or ecchymoses.

## 2021-06-15 NOTE — CONSULT NOTE ADULT - SUBJECTIVE AND OBJECTIVE BOX
Patient seen and evaluated at bedside    Chief Complaint: aFIB w RVR     HPI:   76 years old, with a history of hypertension, hypercholesterolemia, hypothyroidism, bladder cancer and permanent atrial fibrillation on Coumadin s/p unsuccessful cardioversion in the past, HFrEF 45% w mild MR presenting from PCP w aFIB w RVR. Pt was evaluated in [facility] ED where he was found to have rapid ventricular response c/b  bpm requiring synchronized cardioversion (failed Adenosine) with improvement of -150. Pt given 150 mg Amiodarone bolus after additional event of WCT. Pt transferred to [facility] for further evaluation. Pt found to have rectal temp 102F, WBC 13.    He is now on diltiazem 180 twice a day and Toprol-XL 25 mg once a day.      PMHx:   Atrial fibrillation  Hypothyroid  Hypertension  Bladder cancer  Bronchitis  Former smoker    PSHx:   History of bladder surgery    Allergies:  No Known Allergies      Home Meds:    Current Medications:   aMIOdarone Infusion 1 mG/Min IV Continuous <Continuous>      FAMILY HISTORY:  Family history of heart attack (Father, Mother)        Social History:  Smoking History:  Alcohol Use:  Drug Use:    Review of Systems:  REVIEW OF SYSTEMS:  CONSTITUTIONAL: No weakness, fevers or chills  EYES/ENT: No visual changes;  No dysphagia  NECK: No pain or stiffness  RESPIRATORY: No cough, wheezing, hemoptysis; No shortness of breath  CARDIOVASCULAR: No chest pain or palpitations; No lower extremity edema  GASTROINTESTINAL: No abdominal or epigastric pain. No nausea, vomiting, or hematemesis; No diarrhea or constipation. No melena or hematochezia.  BACK: No back pain  GENITOURINARY: No dysuria, frequency or hematuria  NEUROLOGICAL: No numbness or weakness  SKIN: No itching, burning, rashes, or lesions   All other review of systems is negative unless indicated above.    Physical Exam:  T(F): 102.3 (06-15), Max: 102.3 (06-15)  HR: 107 (06-15) (88 - 211)  BP: 108/75 (06-15) (100/84 - 166/74)  RR: 16 (06-15)  SpO2: 97% (06-15)  GENERAL: No acute distress, well-developed  HEAD:  Atraumatic, Normocephalic  ENT: EOMI, PERRLA, conjunctiva and sclera clear, Neck supple, No JVD, moist mucosa  CHEST/LUNG: Clear to auscultation bilaterally; No wheeze, equal breath sounds bilaterally   BACK: No spinal tenderness  HEART: Regular rate and rhythm; No murmurs, rubs, or gallops  ABDOMEN: Soft, Nontender, Nondistended; Bowel sounds present  EXTREMITIES:  No clubbing, cyanosis, or edema  PSYCH: Nl behavior, nl affect  NEUROLOGY: AAOx3, non-focal, cranial nerves intact  SKIN: Normal color, No rashes or lesions  LINES:    Cardiovascular Diagnostic Testing:    ECG: Personally reviewed:    Echo: Personally reviewed:    Stress Testing:    Cath:    Imaging:    CXR: Personally reviewed    Labs: Personally reviewed                        15.5   13.16 )-----------( 195      ( 15 Truong 2021 17:13 )             45.7     06-15    140  |  107  |  35<H>  ----------------------------<  118<H>  3.6   |  21<L>  |  1.80<H>    Ca    8.8      15 Truong 2021 17:13    TPro  8.1  /  Alb  3.6  /  TBili  0.7  /  DBili  x   /  AST  30  /  ALT  32  /  AlkPhos  64  06-15    PT/INR - ( 15 Truong 2021 17:13 )   PT: 31.5 sec;   INR: 2.83 ratio           CARDIAC MARKERS ( 15 Truong 2021 17:13 )  1.150 ng/mL / x     / x     / x     / 1.1 ng/mL      Serum Pro-Brain Natriuretic Peptide: 3374 pg/mL (06-15 @ 17:13)        Thyroid Stimulating Hormone, Serum: 2.66 uIU/mL (06-15 @ 17:13)   Patient seen and evaluated at bedside    Chief Complaint: aFIB w RVR     HPI:   76 years old, with a history of hypertension, hypercholesterolemia, hypothyroidism, bladder cancer and permanent atrial fibrillation on Coumadin s/p unsuccessful cardioversion in the past, HFrEF 45% w mild MR presenting from PCP w aFIB w RVR. Pt endorses recent SOB, congestion and nasal discharge for past week, thus prompting presentation to PCP where pt found to be Afib w RVR. Pt was evaluated in [facility] ED where he was found to have rapid ventricular response c/b  bpm requiring synchronized cardioversion (failed Adenosine) with improvement of -150. Pt given 150 mg Amiodarone bolus after additional event of WCT. Pt transferred to [facility] for further evaluation. Pt found to have rectal temp 102F, WBC 13.    Home meds diltiazem 180 twice a day and Toprol-XL 25 mg once a day.      PMHx:   Atrial fibrillation  Hypothyroid  Hypertension  Bladder cancer  Bronchitis  Former smoker    PSHx:   History of bladder surgery    Allergies:  No Known Allergies      Home Meds:  Lipitor 40 mg   Warfarin   Diltiazem 180 mg BID   Toprol 25 MG qd   Losartan - HCTZ 100-12.5 mg  Synthroid 137 mcg      Current Medications:   aMIOdarone Infusion 1 mG/Min IV Continuous <Continuous>      FAMILY HISTORY:  Family history of heart attack (Father, Mother)        Social History:  Smoking History:  Alcohol Use:  Drug Use:    Review of Systems:  REVIEW OF SYSTEMS:  CONSTITUTIONAL: No weakness, fevers or chills  EYES/ENT: No visual changes;  No dysphagia  NECK: No pain or stiffness  RESPIRATORY: No cough, wheezing, hemoptysis; + shortness of breath  CARDIOVASCULAR: + palpitations; No lower extremity edema  GASTROINTESTINAL: No abdominal or epigastric pain. No nausea, vomiting, or hematemesis   BACK: No back pain  GENITOURINARY: No dysuria, frequency or hematuria  NEUROLOGICAL: No numbness or weakness  SKIN: No itching, burning, rashes, or lesions   All other review of systems is negative unless indicated above.    Physical Exam:  T(F): 102.3 (06-15), Max: 102.3 (06-15)  HR: 107 (06-15) (88 - 211)  BP: 108/75 (06-15) (100/84 - 166/74)  RR: 16 (06-15)  SpO2: 97% (06-15)  GENERAL: No acute distress, obese  HEAD:  Atraumatic, Normocephalic  ENT: E  Neck supple, No JVD, moist mucosa  CHEST/LUNG: Clear to auscultation bilaterally; No wheeze, equal breath sounds bilaterally   BACK: No spinal tenderness  HEART: irreg irreg rate and rhythm; No murmurs, rubs, or gallops  ABDOMEN: Soft, Nontender, Nondistended   EXTREMITIES:  No clubbing, cyanosis, or edema  PSYCH: Nl behavior, nl affect  NEUROLOGY: AAOx3, non-focal, cranial nerves intact  SKIN: Normal color, No rashes or lesions  LINES:    Cardiovascular Diagnostic Testing:    ECG: Afib 109, septal infarct    Echo:  < from: TTE Echo Doppler w/o Cont (11.12.19 @ 14:45) >  Conclusion:   Technically difficult study, patient tachycardic  Global left ventricular systolic dysfunction. The LVEF is approximately   45%.  Grossly normal RV size and systolic function.     Aortic valve is not well-visualized but appears sclerotic without   significant stenosis  Mild to moderate MR  Trace physiologic TR.    Estimated PA systolic pressure of 33 mmHg.  No significant pericardial effusion.                  MENDEZ TODD   This document has been electronically signed. Nov 13 2019  7:27AM    < end of copied text >      Stress Testing:    Cath:    Imaging:    CXR: Personally reviewed    Labs: Personally reviewed                        15.5   13.16 )-----------( 195      ( 15 Truong 2021 17:13 )             45.7     06-15    140  |  107  |  35<H>  ----------------------------<  118<H>  3.6   |  21<L>  |  1.80<H>    Ca    8.8      15 Truong 2021 17:13    TPro  8.1  /  Alb  3.6  /  TBili  0.7  /  DBili  x   /  AST  30  /  ALT  32  /  AlkPhos  64  06-15    PT/INR - ( 15 Truong 2021 17:13 )   PT: 31.5 sec;   INR: 2.83 ratio           CARDIAC MARKERS ( 15 Truong 2021 17:13 )  1.150 ng/mL / x     / x     / x     / 1.1 ng/mL      Serum Pro-Brain Natriuretic Peptide: 3374 pg/mL (06-15 @ 17:13)        Thyroid Stimulating Hormone, Serum: 2.66 uIU/mL (06-15 @ 17:13)   Patient seen and evaluated at bedside    Chief Complaint: aFIB w RVR     HPI:   76 years old, with a history of hypertension, hypercholesterolemia, hypothyroidism, bladder cancer and permanent atrial fibrillation on Coumadin s/p unsuccessful cardioversion in the past, HFrEF 45% w mild MR presenting from PCP w aFIB w RVR. Pt endorses recent SOB, congestion and nasal discharge for past week, thus prompting presentation to PCP where pt found to be Afib w RVR. Pt was evaluated in [facility] ED where he was found to have rapid ventricular response in AF.  He was treated with dilt gtt.  Although he was initially controlled, he developed the sudden onset of sustained  bpm with hypotensionrequiring synchronized cardioversion (failed Adenosine) with improvement of -150. Pt given 150 mg Amiodarone bolus after additional event of WCT. Drip not initially started.  Developed a recurrent episode of the rapid sustained wct and amio gtt was started.  This self-terminated and he was transferred to [facility] for further evaluation. Pt found to have rectal temp 102F, WBC 13.    Home meds diltiazem 180 twice a day and Toprol-XL 25 mg once a day.      PMHx:   Atrial fibrillation  Hypothyroid  Hypertension  Bladder cancer  Bronchitis  Former smoker    PSHx:   History of bladder surgery    Allergies:  No Known Allergies      Home Meds:  Lipitor 40 mg   Warfarin   Diltiazem 180 mg BID   Toprol 25 MG qd   Losartan - HCTZ 100-12.5 mg  Synthroid 137 mcg      Current Medications:   aMIOdarone Infusion 1 mG/Min IV Continuous <Continuous>      FAMILY HISTORY:  Family history of heart attack (Father, Mother)        Social History:  Smoking History:  Alcohol Use:  Drug Use:    Review of Systems:  REVIEW OF SYSTEMS:  CONSTITUTIONAL: No weakness, fevers or chills  EYES/ENT: No visual changes;  No dysphagia  NECK: No pain or stiffness  RESPIRATORY: No cough, wheezing, hemoptysis; + shortness of breath  CARDIOVASCULAR: + palpitations; No lower extremity edema  GASTROINTESTINAL: No abdominal or epigastric pain. No nausea, vomiting, or hematemesis   BACK: No back pain  GENITOURINARY: No dysuria, frequency or hematuria  NEUROLOGICAL: No numbness or weakness  SKIN: No itching, burning, rashes, or lesions   All other review of systems is negative unless indicated above.    Physical Exam:  T(F): 102.3 (06-15), Max: 102.3 (06-15)  HR: 107 (06-15) (88 - 211)  BP: 108/75 (06-15) (100/84 - 166/74)  RR: 16 (06-15)  SpO2: 97% (06-15)  GENERAL: No acute distress, obese  HEAD:  Atraumatic, Normocephalic  ENT: E  Neck supple, No JVD, moist mucosa  CHEST/LUNG: Clear to auscultation bilaterally; No wheeze, equal breath sounds bilaterally   BACK: No spinal tenderness  HEART: irreg irreg rate and rhythm; No murmurs, rubs, or gallops  ABDOMEN: Soft, Nontender, Nondistended   EXTREMITIES:  No clubbing, cyanosis, or edema  PSYCH: Nl behavior, nl affect  NEUROLOGY: AAOx3, non-focal, cranial nerves intact  SKIN: Normal color, No rashes or lesions  LINES:    Cardiovascular Diagnostic Testing:    ECG: Afib 109, septal infarct    Echo:  < from: TTE Echo Doppler w/o Cont (11.12.19 @ 14:45) >  Conclusion:   Technically difficult study, patient tachycardic  Global left ventricular systolic dysfunction. The LVEF is approximately   45%.  Grossly normal RV size and systolic function.     Aortic valve is not well-visualized but appears sclerotic without   significant stenosis  Mild to moderate MR  Trace physiologic TR.    Estimated PA systolic pressure of 33 mmHg.  No significant pericardial effusion.                  MENDEZ TODD   This document has been electronically signed. Nov 13 2019  7:27AM    < end of copied text >      Stress Testing:    Cath:    Imaging:    CXR: Personally reviewed    Labs: Personally reviewed                        15.5   13.16 )-----------( 195      ( 15 Truong 2021 17:13 )             45.7     06-15    140  |  107  |  35<H>  ----------------------------<  118<H>  3.6   |  21<L>  |  1.80<H>    Ca    8.8      15 Truong 2021 17:13    TPro  8.1  /  Alb  3.6  /  TBili  0.7  /  DBili  x   /  AST  30  /  ALT  32  /  AlkPhos  64  06-15    PT/INR - ( 15 Truong 2021 17:13 )   PT: 31.5 sec;   INR: 2.83 ratio           CARDIAC MARKERS ( 15 Truong 2021 17:13 )  1.150 ng/mL / x     / x     / x     / 1.1 ng/mL      Serum Pro-Brain Natriuretic Peptide: 3374 pg/mL (06-15 @ 17:13)        Thyroid Stimulating Hormone, Serum: 2.66 uIU/mL (06-15 @ 17:13)

## 2021-06-15 NOTE — ED PROVIDER NOTE - SHIFT CHANGE DETAILS
Lipids 04/13/17 WNL   Creatinine and Potassium 04/13/2017 WNL    Medication is being filled for 1 time refill only due to:  Annual Labs due  Lilibeth Paz RN.............................4/23/2018 4:53 PM     **ATTENDING ADDENDUM - HANDOFF (from Dr. Stephan Pérez): (1) Follow up Cardiology Team recommendations (2) serial reevaluation / observation (3) disposition pending, likely admission for diagnostic uncertainty (arrhythmia, fever of uncertain origin) and therapeutic intensity (definitive arrhythmia care as per Cardiology, antibiotics as indicated). Overnight ED team Will continue to observe and monitor closely.

## 2021-06-15 NOTE — ED PROVIDER NOTE - CONTEXT
**ATTENDING ADDENDUM (Dr. Stephan Pérez): DENIES recent travel. NO sick contacts. NO history of trauma. NEGATIVE COVID-19 testing (as seen per results from sending facility)./unknown

## 2021-06-16 DIAGNOSIS — I50.22 CHRONIC SYSTOLIC (CONGESTIVE) HEART FAILURE: ICD-10-CM

## 2021-06-16 DIAGNOSIS — I48.91 UNSPECIFIED ATRIAL FIBRILLATION: ICD-10-CM

## 2021-06-16 DIAGNOSIS — N17.9 ACUTE KIDNEY FAILURE, UNSPECIFIED: ICD-10-CM

## 2021-06-16 DIAGNOSIS — Z29.9 ENCOUNTER FOR PROPHYLACTIC MEASURES, UNSPECIFIED: ICD-10-CM

## 2021-06-16 DIAGNOSIS — J42 UNSPECIFIED CHRONIC BRONCHITIS: ICD-10-CM

## 2021-06-16 DIAGNOSIS — E78.5 HYPERLIPIDEMIA, UNSPECIFIED: ICD-10-CM

## 2021-06-16 DIAGNOSIS — N39.0 URINARY TRACT INFECTION, SITE NOT SPECIFIED: ICD-10-CM

## 2021-06-16 DIAGNOSIS — A41.9 SEPSIS, UNSPECIFIED ORGANISM: ICD-10-CM

## 2021-06-16 DIAGNOSIS — C67.9 MALIGNANT NEOPLASM OF BLADDER, UNSPECIFIED: ICD-10-CM

## 2021-06-16 DIAGNOSIS — I10 ESSENTIAL (PRIMARY) HYPERTENSION: ICD-10-CM

## 2021-06-16 DIAGNOSIS — R00.0 TACHYCARDIA, UNSPECIFIED: ICD-10-CM

## 2021-06-16 LAB
ALBUMIN SERPL ELPH-MCNC: 3.8 G/DL — SIGNIFICANT CHANGE UP (ref 3.3–5)
ALP SERPL-CCNC: 66 U/L — SIGNIFICANT CHANGE UP (ref 40–120)
ALT FLD-CCNC: 54 U/L — HIGH (ref 10–45)
ANION GAP SERPL CALC-SCNC: 17 MMOL/L — SIGNIFICANT CHANGE UP (ref 5–17)
APPEARANCE UR: ABNORMAL
AST SERPL-CCNC: 51 U/L — HIGH (ref 10–40)
BACTERIA # UR AUTO: ABNORMAL
BASE EXCESS BLDV CALC-SCNC: -1.2 MMOL/L — SIGNIFICANT CHANGE UP (ref -2–2)
BASOPHILS # BLD AUTO: 0 K/UL — SIGNIFICANT CHANGE UP (ref 0–0.2)
BASOPHILS NFR BLD AUTO: 0 % — SIGNIFICANT CHANGE UP (ref 0–2)
BILIRUB SERPL-MCNC: 0.5 MG/DL — SIGNIFICANT CHANGE UP (ref 0.2–1.2)
BILIRUB UR-MCNC: NEGATIVE — SIGNIFICANT CHANGE UP
BUN SERPL-MCNC: 37 MG/DL — HIGH (ref 7–23)
CA-I SERPL-SCNC: 1.12 MMOL/L — SIGNIFICANT CHANGE UP (ref 1.12–1.3)
CALCIUM SERPL-MCNC: 8.9 MG/DL — SIGNIFICANT CHANGE UP (ref 8.4–10.5)
CHLORIDE BLDV-SCNC: 107 MMOL/L — SIGNIFICANT CHANGE UP (ref 96–108)
CHLORIDE SERPL-SCNC: 103 MMOL/L — SIGNIFICANT CHANGE UP (ref 96–108)
CO2 BLDV-SCNC: 24 MMOL/L — SIGNIFICANT CHANGE UP (ref 22–30)
CO2 SERPL-SCNC: 20 MMOL/L — LOW (ref 22–31)
COLOR SPEC: YELLOW — SIGNIFICANT CHANGE UP
CREAT SERPL-MCNC: 1.7 MG/DL — HIGH (ref 0.5–1.3)
DIFF PNL FLD: ABNORMAL
EOSINOPHIL # BLD AUTO: 0 K/UL — SIGNIFICANT CHANGE UP (ref 0–0.5)
EOSINOPHIL NFR BLD AUTO: 0 % — SIGNIFICANT CHANGE UP (ref 0–6)
EPI CELLS # UR: 1 /HPF — SIGNIFICANT CHANGE UP
GAS PNL BLDV: 137 MMOL/L — SIGNIFICANT CHANGE UP (ref 135–145)
GAS PNL BLDV: SIGNIFICANT CHANGE UP
GLUCOSE BLDV-MCNC: 130 MG/DL — HIGH (ref 70–99)
GLUCOSE SERPL-MCNC: 119 MG/DL — HIGH (ref 70–99)
GLUCOSE UR QL: NEGATIVE — SIGNIFICANT CHANGE UP
HCO3 BLDV-SCNC: 23 MMOL/L — SIGNIFICANT CHANGE UP (ref 21–29)
HCT VFR BLD CALC: 44.5 % — SIGNIFICANT CHANGE UP (ref 39–50)
HCT VFR BLDA CALC: 44 % — SIGNIFICANT CHANGE UP (ref 39–50)
HGB BLD CALC-MCNC: 14.2 G/DL — SIGNIFICANT CHANGE UP (ref 13–17)
HGB BLD-MCNC: 14.8 G/DL — SIGNIFICANT CHANGE UP (ref 13–17)
HYALINE CASTS # UR AUTO: 1 /LPF — SIGNIFICANT CHANGE UP (ref 0–7)
KETONES UR-MCNC: NEGATIVE — SIGNIFICANT CHANGE UP
LACTATE BLDV-MCNC: 1.2 MMOL/L — SIGNIFICANT CHANGE UP (ref 0.7–2)
LEUKOCYTE ESTERASE UR-ACNC: ABNORMAL
LYMPHOCYTES # BLD AUTO: 0.81 K/UL — LOW (ref 1–3.3)
LYMPHOCYTES # BLD AUTO: 6.9 % — LOW (ref 13–44)
MAGNESIUM SERPL-MCNC: 1.9 MG/DL — SIGNIFICANT CHANGE UP (ref 1.6–2.6)
MANUAL SMEAR VERIFICATION: SIGNIFICANT CHANGE UP
MCHC RBC-ENTMCNC: 28.7 PG — SIGNIFICANT CHANGE UP (ref 27–34)
MCHC RBC-ENTMCNC: 33.3 GM/DL — SIGNIFICANT CHANGE UP (ref 32–36)
MCV RBC AUTO: 86.4 FL — SIGNIFICANT CHANGE UP (ref 80–100)
MONOCYTES # BLD AUTO: 1 K/UL — HIGH (ref 0–0.9)
MONOCYTES NFR BLD AUTO: 8.6 % — SIGNIFICANT CHANGE UP (ref 2–14)
NEUTROPHILS # BLD AUTO: 9.86 K/UL — HIGH (ref 1.8–7.4)
NEUTROPHILS NFR BLD AUTO: 81.9 % — HIGH (ref 43–77)
NEUTS BAND # BLD: 2.6 % — SIGNIFICANT CHANGE UP (ref 0–8)
NITRITE UR-MCNC: NEGATIVE — SIGNIFICANT CHANGE UP
PCO2 BLDV: 39 MMHG — SIGNIFICANT CHANGE UP (ref 35–50)
PH BLDV: 7.39 — SIGNIFICANT CHANGE UP (ref 7.35–7.45)
PH UR: 6 — SIGNIFICANT CHANGE UP (ref 5–8)
PHOSPHATE SERPL-MCNC: 3 MG/DL — SIGNIFICANT CHANGE UP (ref 2.5–4.5)
PLAT MORPH BLD: NORMAL — SIGNIFICANT CHANGE UP
PLATELET # BLD AUTO: 159 K/UL — SIGNIFICANT CHANGE UP (ref 150–400)
PO2 BLDV: 42 MMHG — SIGNIFICANT CHANGE UP (ref 25–45)
POTASSIUM BLDV-SCNC: 3.2 MMOL/L — LOW (ref 3.5–5.3)
POTASSIUM SERPL-MCNC: 3.5 MMOL/L — SIGNIFICANT CHANGE UP (ref 3.5–5.3)
POTASSIUM SERPL-SCNC: 3.5 MMOL/L — SIGNIFICANT CHANGE UP (ref 3.5–5.3)
PROT SERPL-MCNC: 7.1 G/DL — SIGNIFICANT CHANGE UP (ref 6–8.3)
PROT UR-MCNC: 100 — SIGNIFICANT CHANGE UP
RBC # BLD: 5.15 M/UL — SIGNIFICANT CHANGE UP (ref 4.2–5.8)
RBC # FLD: 14.7 % — HIGH (ref 10.3–14.5)
RBC BLD AUTO: SIGNIFICANT CHANGE UP
RBC CASTS # UR COMP ASSIST: 11 /HPF — HIGH (ref 0–4)
SAO2 % BLDV: 75 % — SIGNIFICANT CHANGE UP (ref 67–88)
SODIUM SERPL-SCNC: 140 MMOL/L — SIGNIFICANT CHANGE UP (ref 135–145)
SP GR SPEC: 1.02 — SIGNIFICANT CHANGE UP (ref 1.01–1.02)
T4 AB SER-ACNC: 4.8 UG/DL — SIGNIFICANT CHANGE UP (ref 4.6–12)
TROPONIN T, HIGH SENSITIVITY RESULT: 125 NG/L — HIGH (ref 0–51)
TROPONIN T, HIGH SENSITIVITY RESULT: 158 NG/L — HIGH (ref 0–51)
TSH SERPL-MCNC: 4.86 UIU/ML — HIGH (ref 0.27–4.2)
UROBILINOGEN FLD QL: NEGATIVE — SIGNIFICANT CHANGE UP
WBC # BLD: 11.67 K/UL — HIGH (ref 3.8–10.5)
WBC # FLD AUTO: 11.67 K/UL — HIGH (ref 3.8–10.5)
WBC UR QL: 362 /HPF — HIGH (ref 0–5)

## 2021-06-16 PROCEDURE — 99223 1ST HOSP IP/OBS HIGH 75: CPT

## 2021-06-16 PROCEDURE — 99222 1ST HOSP IP/OBS MODERATE 55: CPT

## 2021-06-16 PROCEDURE — 99223 1ST HOSP IP/OBS HIGH 75: CPT | Mod: GC

## 2021-06-16 PROCEDURE — 71045 X-RAY EXAM CHEST 1 VIEW: CPT | Mod: 26

## 2021-06-16 PROCEDURE — 93306 TTE W/DOPPLER COMPLETE: CPT | Mod: 26

## 2021-06-16 RX ORDER — HYDROCHLOROTHIAZIDE 25 MG
12.5 TABLET ORAL DAILY
Refills: 0 | Status: DISCONTINUED | OUTPATIENT
Start: 2021-06-16 | End: 2021-06-16

## 2021-06-16 RX ORDER — CEFTRIAXONE 500 MG/1
1000 INJECTION, POWDER, FOR SOLUTION INTRAMUSCULAR; INTRAVENOUS ONCE
Refills: 0 | Status: COMPLETED | OUTPATIENT
Start: 2021-06-16 | End: 2021-06-16

## 2021-06-16 RX ORDER — AMIODARONE HYDROCHLORIDE 400 MG/1
TABLET ORAL
Refills: 0 | Status: DISCONTINUED | OUTPATIENT
Start: 2021-06-16 | End: 2021-06-16

## 2021-06-16 RX ORDER — ATORVASTATIN CALCIUM 80 MG/1
40 TABLET, FILM COATED ORAL AT BEDTIME
Refills: 0 | Status: DISCONTINUED | OUTPATIENT
Start: 2021-06-16 | End: 2021-06-23

## 2021-06-16 RX ORDER — AMIODARONE HYDROCHLORIDE 400 MG/1
400 TABLET ORAL EVERY 8 HOURS
Refills: 0 | Status: DISCONTINUED | OUTPATIENT
Start: 2021-06-16 | End: 2021-06-16

## 2021-06-16 RX ORDER — AMIODARONE HYDROCHLORIDE 400 MG/1
400 TABLET ORAL ONCE
Refills: 0 | Status: COMPLETED | OUTPATIENT
Start: 2021-06-16 | End: 2021-06-16

## 2021-06-16 RX ORDER — DILTIAZEM HCL 120 MG
180 CAPSULE, EXT RELEASE 24 HR ORAL
Refills: 0 | Status: DISCONTINUED | OUTPATIENT
Start: 2021-06-16 | End: 2021-06-18

## 2021-06-16 RX ORDER — ALBUTEROL 90 UG/1
2 AEROSOL, METERED ORAL EVERY 6 HOURS
Refills: 0 | Status: DISCONTINUED | OUTPATIENT
Start: 2021-06-16 | End: 2021-06-23

## 2021-06-16 RX ORDER — WARFARIN SODIUM 2.5 MG/1
5 TABLET ORAL ONCE
Refills: 0 | Status: DISCONTINUED | OUTPATIENT
Start: 2021-06-16 | End: 2021-06-16

## 2021-06-16 RX ORDER — CEFTRIAXONE 500 MG/1
1000 INJECTION, POWDER, FOR SOLUTION INTRAMUSCULAR; INTRAVENOUS EVERY 24 HOURS
Refills: 0 | Status: COMPLETED | OUTPATIENT
Start: 2021-06-17 | End: 2021-06-23

## 2021-06-16 RX ORDER — METOPROLOL TARTRATE 50 MG
25 TABLET ORAL DAILY
Refills: 0 | Status: DISCONTINUED | OUTPATIENT
Start: 2021-06-16 | End: 2021-06-16

## 2021-06-16 RX ORDER — LEVOTHYROXINE SODIUM 125 MCG
137 TABLET ORAL DAILY
Refills: 0 | Status: DISCONTINUED | OUTPATIENT
Start: 2021-06-16 | End: 2021-06-23

## 2021-06-16 RX ORDER — LOSARTAN POTASSIUM 100 MG/1
100 TABLET, FILM COATED ORAL DAILY
Refills: 0 | Status: DISCONTINUED | OUTPATIENT
Start: 2021-06-16 | End: 2021-06-16

## 2021-06-16 RX ORDER — METOPROLOL TARTRATE 50 MG
25 TABLET ORAL
Refills: 0 | Status: DISCONTINUED | OUTPATIENT
Start: 2021-06-16 | End: 2021-06-18

## 2021-06-16 RX ADMIN — Medication 180 MILLIGRAM(S): at 09:33

## 2021-06-16 RX ADMIN — Medication 180 MILLIGRAM(S): at 18:16

## 2021-06-16 RX ADMIN — LOSARTAN POTASSIUM 100 MILLIGRAM(S): 100 TABLET, FILM COATED ORAL at 09:33

## 2021-06-16 RX ADMIN — Medication 25 MILLIGRAM(S): at 09:38

## 2021-06-16 RX ADMIN — Medication 12.5 MILLIGRAM(S): at 09:33

## 2021-06-16 RX ADMIN — CEFTRIAXONE 100 MILLIGRAM(S): 500 INJECTION, POWDER, FOR SOLUTION INTRAMUSCULAR; INTRAVENOUS at 06:16

## 2021-06-16 RX ADMIN — Medication 25 MILLIGRAM(S): at 18:16

## 2021-06-16 RX ADMIN — AMIODARONE HYDROCHLORIDE 1 MG/MIN: 400 TABLET ORAL at 05:26

## 2021-06-16 RX ADMIN — ATORVASTATIN CALCIUM 40 MILLIGRAM(S): 80 TABLET, FILM COATED ORAL at 23:34

## 2021-06-16 RX ADMIN — AMIODARONE HYDROCHLORIDE 400 MILLIGRAM(S): 400 TABLET ORAL at 06:16

## 2021-06-16 RX ADMIN — Medication 137 MICROGRAM(S): at 09:33

## 2021-06-16 NOTE — MEDICAL STUDENT ADULT H&P (EDUCATION) - NS MD HP STUD SOCIAL HX FT
- Smoking history - pack/day for 20 years; quit in 1985  - Occasional EtOH  - Sexually active with wife   - Lives with wife of 50 years and adult son

## 2021-06-16 NOTE — MEDICAL STUDENT ADULT H&P (EDUCATION) - NS MD HP STUD RESULTS LAB FT
14.8   11.67 )-----------( 159      ( 16 Jun 2021 08:42 )             44.5     06-16  140  |  103  |  37<H>  ----------------------------<  119<H>  3.5   |  20<L>  |  1.70<H>    Ca    8.9      16 Jun 2021 08:42  Phos  3.0     06-16  Mg     1.9     06-16  TPro  7.1  /  Alb  3.8  /  TBili  0.5  /  DBili  x   /  AST  51<H>  /  ALT  54<H>  /  AlkPhos  66  06-16    Thyroid Stimulating Hormone, Serum: 2.66 uIU/mL (06-15 @ 17:13)    CARDIAC MARKERS ( 15 Truong 2021 17:13 ) 1.150 ng/mL / x     / x     / x     / 1.1 ng/mL  Serum Pro-Brain Natriuretic Peptide: 3374 pg/mL (06-15 @ 17:13) 14.8   11.67 )-----------( 159      ( 16 Jun 2021 08:42 )             44.5       06-16  140  |  103  |  37<H>  ----------------------------<  119<H>  3.5   |  20<L>  |  1.70<H>    Ca    8.9      16 Jun 2021 08:42  Phos  3.0     06-16  Mg     1.9     06-16  TPro  7.1  /  Alb  3.8  /  TBili  0.5  /  DBili  x   /  AST  51<H>  /  ALT  54<H>  /  AlkPhos  66  06-16    Thyroid Stimulating Hormone, Serum: 2.66 uIU/mL (06-15 @ 17:13)    CARDIAC MARKERS ( 15 Truong 2021 17:13 ) 1.150 ng/mL / x     / x     / x     / 1.1 ng/mL  Serum Pro-Brain Natriuretic Peptide: 3374 pg/mL (06-15 @ 17:13)

## 2021-06-16 NOTE — H&P ADULT - NSHPOUTPATIENTPROVIDERS_GEN_ALL_CORE
Dr. Harley (PCP), Dr. Olson (Cards), MSK for Bladder cancer  Pharmacy ([facility])

## 2021-06-16 NOTE — H&P ADULT - NSHPPHYSICALEXAM_GEN_ALL_CORE
Vital Signs Last 24 Hrs  T(C): 37 (16 Jun 2021 06:57), Max: 39.1 (15 Truong 2021 22:23)  T(F): 98.6 (16 Jun 2021 06:57), Max: 102.3 (15 Truong 2021 22:23)  HR: 103 (16 Jun 2021 09:30) (73 - 211)  BP: 116/74 (16 Jun 2021 09:30) (100/84 - 166/74)  BP(mean): --  RR: 20 (16 Jun 2021 06:57) (16 - 24)  SpO2: 96% (16 Jun 2021 06:57) (92% - 99%)    GENERAL: No acute distress, obese  HEAD:  Atraumatic, Normocephalic  ENT: E  Neck supple, No JVD, moist mucosa  CHEST/LUNG: Clear to auscultation bilaterally; No wheeze, equal breath sounds bilaterally   BACK: No spinal tenderness  HEART: irreg irreg rate and rhythm; No murmurs, rubs, or gallops  ABDOMEN: Soft, Nontender, Nondistended   EXTREMITIES:  No clubbing, cyanosis, or edema  PSYCH: Nl behavior, nl affect  NEUROLOGY: AAOx3, non-focal, cranial nerves intact  SKIN: Normal color, No rashes or lesions Vital Signs Last 24 Hrs  T(C): 37 (16 Jun 2021 06:57), Max: 39.1 (15 Truong 2021 22:23)  T(F): 98.6 (16 Jun 2021 06:57), Max: 102.3 (15 Truong 2021 22:23)  HR: 103 (16 Jun 2021 09:30) (73 - 211)  BP: 116/74 (16 Jun 2021 09:30) (100/84 - 166/74)  BP(mean): --  RR: 20 (16 Jun 2021 06:57) (16 - 24)  SpO2: 96% (16 Jun 2021 06:57) (92% - 99%)    GENERAL: No acute distress, obese  HEAD:  Atraumatic, Normocephalic  ENT: E  Neck supple, No JVD, moist mucosa  CHEST/LUNG: L. basilar crackles; No wheeze, equal breath sounds bilaterally   BACK: No spinal tenderness  HEART: irreg irreg rate and rhythm; No murmurs, rubs, or gallops  ABDOMEN: Soft, Nontender, Nondistended   EXTREMITIES:  No clubbing, cyanosis, or edema  PSYCH: Nl behavior, nl affect  NEUROLOGY: AAOx3, non-focal, cranial nerves intact  SKIN: Normal color, No rashes or lesions

## 2021-06-16 NOTE — CONSULT NOTE ADULT - ASSESSMENT
76M with persistent afib on coumadin s/p 2 failed DCCV in the past, bladder cancer s/p urostomy, HTN, HLD, chronic bronchitis, hypothyroidism who presented for SOB and palpitations, and sent into the hospital for afib with RVR. Patient had WCT at [place] ED, and was transferred for further evaluation.     Wide complex tachycardia: Most likely to be VT rather than AF/FL with aberrancy. Patient denies any LOC.   - patient with likely stable VT and then would need LHC for ischemic eval - if so, would likely need to hold coumadin  - continue amiodarone 400 TID  - on Toprol 25 QD at home - would change to lopressor 25 BID with next dose this afternoon if BP/HR tolerate  - continue dilt 180 BID  - losartan/hctz on hold for now, which is reasonable, however would ideally be discharging on losartan for GDMT for HF    Afib on coumadin s/p 2 failed DCCV:  - INR 2.8   - continue rate control as above    Bethany Jones MD  Cardiology Fellow, PGY-4  [phone]  For all other Cardiology service contact information, go to [email or web address] and use "[email or web address]" to login. 76M with persistent afib on coumadin s/p 2 failed DCCV in the past, bladder cancer s/p urostomy, HTN, HLD, chronic bronchitis, hypothyroidism who presented for SOB and palpitations, and sent into the hospital for afib with RVR. Patient had WCT at [place] ED, and was transferred for further evaluation.     Wide complex tachycardia: Most likely to be VT rather than AF/FL with aberrancy. Patient denies any LOC.   - patient with likely stable VT - please obtain echo, likely will need LHC, so please hold coumadin   - please discontinue amiodarone 400 TID  - on Toprol 25 QD at home - would change to lopressor 25 BID with next dose this afternoon if BP/HR tolerate  - continue dilt 180 BID  - losartan/hctz on hold for now, which is reasonable, however would ideally be discharging on losartan for GDMT for HF  - likely plan for EPS / possible VT ablation and secondary prevention ICD after above workup    Afib on coumadin s/p 2 failed DCCV:  - INR 2.8   - continue rate control as above    Bethany Jones MD  Cardiology Fellow, PGY-4  [phone]  For all other Cardiology service contact information, go to [email or web address] and use "[email or web address]" to login.

## 2021-06-16 NOTE — H&P ADULT - PROBLEM SELECTOR PLAN 5
- hx of CHF w/ EF~45%  - c/w toprol, diltiazem, and amiodarone  - hold hctz-losartan for now - hx of CHF w/ EF~45%  - on lopressor and dilt for now  - hold hctz-losartan for now

## 2021-06-16 NOTE — MEDICAL STUDENT ADULT H&P (EDUCATION) - NS MD HP STUD HX OF PRESENT ILLNESS FT
BRIGITTE is a 77 y/o male with a PMH of Afib, bladder CA, hyperlipidemia, HTN, chronic bronchitis, and hypothyroidism presenting with rapid heart rate and shortness of breath after being transferred from an outside hospital due to found Afib with RVR.    The patient was in his normal state of health until about a week ago when he noticed he was coming down with a cough and congestion. The cough is productive of brown sputum. He states that this has happened before and that he has been treated for bronchitis ~2x a year, every year. It usually resolves with inhaler and antibiotics. Throughout the week, the cough did not worsen nor improve. He reports no other symptoms at that time other than feeling more fatigued than usual.     Yesterday, he became more short of breath and felt a rapid heart beat and was sent to the [place] ED when his PCP found him to be in Afib with RVR. The ED confirmed this finding and he was given dilt gtt. His HR was sustained at 200 bpm with hypotension even after initial treatment and failed adenosine and he required synchronized cardioversion with improved HR to 120-150. During this time, he was also found to have a temp of 102F. He reports not feeling any fever prior to this. He was transferred to [facility] for further evaluation.    The patient has a longstanding history of Afib that failed cardioversion 2 years ago and is now being pharmacologically managed with diltiazem and toprol. His other PMH includes HTN and hyperlipidemia, diagnosed 20 years ago, and hypothyroidism, for which he also takes home medications. He reports compliancy with all medications. 10 years ago, he was diagnosed with bladder CA and had a urostomy placed at that time (along with prostate removal). The urostomy is still in place and he typically changes it twice a week. He reports seeing no changes in the urine this week but says that it is normal for it to be cloudy/full of mucus.          RP is a 75 y/o male with a PMH of Afib, bladder CA, hyperlipidemia, HTN, chronic bronchitis, and hypothyroidism presenting with rapid heart rate and worsening shortness of breath after being transferred from an outside hospital due to found Afib with RVR.    The patient was in his normal state of health until about a week ago when he noticed he was coming down with a cough and nasal/chest congestion. The cough is productive of brown sputum. He states that this has happened before and he's been treated for bronchitis ~2x a year, every year. It usually resolves with his home inhaler and/or antibiotics prescribed by his PCP. Throughout the week, the cough did not worsen nor improve. He reports no other symptoms at that time other than feeling more fatigued than usual.     Yesterday, the pt became more short of breath and felt a rapid heart beat. He was sent to the [place] ED when his PCP found him to be in Afib with RVR. The ED confirmed this finding and he was given dilt gtt. His HR was sustained at 200 bpm with hypotension even after initial treatment and failed adenosine and he required synchronized cardioversion with improved HR to 120-150. During this time, he was also found to have a temp of 102F. He reports not feeling any fever prior to this. He was transferred to [facility] for further evaluation. The patient denies any chest pain, abdominal pain, diaphoresis, N/V, leg swelling, headache, changes in vision, or changes in bowel movement. As of today, his only remaining symptoms are a mild cough. He states that both his SOB and feelings of rapid heart beat have resolved.    The patient has a longstanding history of Afib that failed cardioversion 2 years ago and is now being pharmacologically managed with diltiazem and toprol. His other PMH includes HTN and hyperlipidemia, diagnosed 20 years ago, and hypothyroidism, for which he also takes home medications. He reports compliancy with all medications. 10 years ago, he was diagnosed with bladder CA and had a urostomy placed at that time (along with prostate removal). The urostomy is still in place and he typically changes it twice a week. He reports seeing no changes in the urine this week and says that it is normal for it to be cloudy/full of mucus. He is not able to feel any changes in urination (i.e. burning). His family history is significant for MI in both his mother and father in their 70s.     The patient has a 20 year pack history of smoking and quit in 1985. He has not smoked since. He drinks alcohol occasionally but is limited to 1 glass of wine a day at most. He does not take any recreational drugs. He lives with his wife of 50 years and adult son. He is occasionally sexually active with his wife only. He reports exercising 2x a week and tries to stay physically active since retiring 8 years ago. His wife and children are healthy and he recalls no prior sick contacts. He received both doses of the Pzifer COVID-19 vaccination.             RP is a 75 y/o male with a PMH of Afib, bladder CA, hyperlipidemia, HTN, chronic bronchitis, and hypothyroidism presenting with rapid heart rate and worsening shortness of breath after being transferred from an outside hospital due to found Afib with RVR.    The patient was in his normal state of health until about a week ago when he noticed he was coming down with a cough and nasal/chest congestion. The cough is productive of brown sputum. He states that this has happened before and he's been treated for bronchitis ~2x a year, every year. It usually resolves with his home inhaler and/or antibiotics prescribed by his PCP. Throughout the week, the cough did not worsen nor improve. He reports no other symptoms at that time other than feeling more fatigued than usual.     Yesterday, the pt became more short of breath and felt a rapid heart beat. He was sent to the [place] ED when his PCP found him to be in Afib with RVR. The ED confirmed this finding and he was given dilt gtt. His HR was sustained at 200 bpm with hypotension even after initial treatment and failed adenosine and he required synchronized cardioversion with improved HR to 120-150. He reports not feeling any fever prior to this. He was transferred to [facility] for further evaluation where he was found to have a rectal temp of 102F on admission. The patient denies any chest pain, abdominal pain, diaphoresis, N/V, leg swelling, headache, changes in vision, or changes in bowel movement. As of today, his only remaining symptoms are a mild cough. He states that both his SOB and feelings of rapid heart beat have resolved.    The patient has a longstanding history of Afib that failed cardioversion 2 years ago and is now being pharmacologically managed with diltiazem and toprol. His other PMH includes HTN and hyperlipidemia, diagnosed 20 years ago, and hypothyroidism, for which he also takes home medications. He reports compliancy with all medications. 10 years ago, he was diagnosed with bladder CA and had a urostomy placed at that time (along with prostate removal). The urostomy is still in place and he typically changes it twice a week. He reports seeing no changes in the urine this week and says that it is normal for it to be cloudy/full of mucus. He is not able to feel any changes in urination (i.e. burning). His family history is significant for MI in both his mother and father in their 70s.     The patient has a 20 year pack history of smoking and quit in 1985. He has not smoked since. He drinks alcohol occasionally but is limited to 1 glass of wine a day at most. He does not take any recreational drugs. He lives with his wife of 50 years and adult son. He is occasionally sexually active with his wife only. He reports exercising 2x a week and tries to stay physically active since retiring 8 years ago. His wife and children are healthy and he recalls no prior sick contacts. He received both doses of the Pzifer COVID-19 vaccination.             RP is a 75 y/o male with a PMH of Afib, bladder CA, hyperlipidemia, HTN, chronic bronchitis, and hypothyroidism presenting with rapid heart rate and worsening shortness of breath after being transferred from an outside hospital due to found Afib with RVR.    The patient was in his normal state of health until about a week ago when he noticed he was coming down with a cough and nasal/chest congestion. The cough is productive of brown sputum. He states that this has happened before and he's been treated for bronchitis ~2x a year, every year. It usually resolves with his home inhaler and/or antibiotics prescribed by his PCP. Throughout the week, the cough did not worsen nor improve. He reports no other symptoms at that time other than feeling more fatigued than usual.     Yesterday, the pt became more short of breath and felt a rapid heart beat. He was sent to the [place] ED when his PCP found him to be in Afib with RVR. The ED confirmed this finding and he was given dilt gtt. His HR was sustained at 200 bpm with hypotension even after initial treatment and failed adenosine and he required synchronized cardioversion with improved HR to 120-150. He reports not feeling any fever prior to this. He was transferred to [facility] for further evaluation where he was found to have a rectal temp of 102F on admission. The patient denies any chest pain, abdominal pain, diaphoresis, N/V, leg swelling, headache, changes in vision, or changes in bowel movement. As of today, his only remaining symptoms are a mild cough. He states that both his SOB and feelings of rapid heart beat have resolved.    The patient has a longstanding history of Afib that failed cardioversion 2 years ago and is now being pharmacologically managed with diltiazem and toprol. His other PMH includes HTN and hyperlipidemia, diagnosed 20 years ago, and hypothyroidism, for which he also takes home medications. He reports compliancy with all medications. 10 years ago, he was diagnosed with bladder CA and had a urostomy placed at that time (along with prostate removal). The urostomy is still in place and he typically changes it twice a week. He reports seeing no changes in the urine this week and says that it is normal for it to be cloudy/full of mucus. He is not able to feel any changes in urination (i.e. burning). His family history is significant for MI in both his mother and father in their 70s.     The patient has a 20 year pack history of smoking and quit in 1985. He has not smoked since. He drinks alcohol occasionally but is limited to 1 glass of wine a day at most. He does not take any recreational drugs. He lives with his wife of 50 years and adult son. He is occasionally sexually active with his wife only. He reports exercising 2x a week and tries to stay physically active since retiring 8 years ago. His wife and children are healthy and he recalls no prior sick contacts. He received both doses of the Pzifer COVID-19 vaccination.

## 2021-06-16 NOTE — ED ADULT NURSE REASSESSMENT NOTE - NS ED NURSE REASSESS COMMENT FT1
pt indicated for heparin infusion, per Neuro pt on specific dosing order, second RN Christina at bedside for verfication.

## 2021-06-16 NOTE — H&P ADULT - PROBLEM SELECTOR PLAN 10
- DVT PPx: coumadin will be held but currently therapeutic INR  - Diet: DASH/TLC  - Dispo: Will need PT consult eventually - DVT PPx: coumadin will be held in preparation for angiogram but currently therapeutic INR  - Diet: DASH/TLC  - Dispo: Will need PT consult eventually

## 2021-06-16 NOTE — CONSULT NOTE ADULT - ATTENDING COMMENTS
known borderline depressed lvef, with an ef of ~45 by echo 2019 and by nuc stress 2017  nuc in 2017 had some defects inferiorly, laterally, but their significance was unclear, and no further testing was pursued  no cath has ever been done  per af having failed attempts at dccv, has been on bb and ccb for rate control, warfarin for ac  now with rapid af, paroxysmal rapid wct either vt or aberrated af  hypotensive, and cardioverted  electrically improved on amio  febrile  echo, rate control, amio  strips of the event shared with EP Dr. Tirado, who will consult  eventual cardiac cath. hold warfarin inr 2.8  fever workup per med, ?urinary source
Apical sustained MMVT. Longstanding persistent AF. Likely old anteroseptal MI on baseline ECG.  Will need TTE followed by likely ischemic w/u followed by possible VT ablation and ICD implant.

## 2021-06-16 NOTE — H&P ADULT - ATTENDING COMMENTS
Patient is a 76 year old male with PMH atrial fibrillation, bladder CA, HTN, HLD, hypothyroidism and chronic bronchitis who initially presented to an outside hospital for shortness of breath and rapid heart beat. He was found to be in atrial fibrillation with RVR and failed medical treatment requiring cardioversion which improved his heart rate. He was transferred to [facility] for EP and interventional cardiology evaluation. He was evaluated by the cardiology and EP teams and it was determined that the patient more likely had VT than atrial fibrillation. He will need an echocardiogram and likely cardiac catheterization as well. Will follow up further recommendations. He was technically meeting sepsis criteria on admission with tachycardia, fever and leukocytosis with his UA was positive for large leukocyte esterase and 362 WBC so he was started on ceftriaxone, will follow up blood and culture results. Rest of plan as above.    Brian Convissar, DO  Pager [phone]  If no answer [phone]

## 2021-06-16 NOTE — H&P ADULT - PROBLEM SELECTOR PLAN 3
- positive UA w/ WBCs, bacteria, and leucocyte esterase  - hx of bladder cancer w/ urostomy  - pt denies any changes in smell or appearance of urine  - will c/w CTX  - f/u UCx/ BCx

## 2021-06-16 NOTE — H&P ADULT - HISTORY OF PRESENT ILLNESS
RP is a 77 y/o male with a PMH of Afib, bladder CA, hyperlipidemia, HTN, chronic bronchitis, and hypothyroidism presenting with rapid heart rate and worsening shortness of breath after being transferred from an outside hospital due to found Afib with RVR.    The patient was in his normal state of health until about a week ago when he noticed he was coming down with a cough and nasal/chest congestion. The cough is productive of brown sputum. He states that this has happened before and he's been treated for bronchitis ~2x a year, every year. It usually resolves with his home inhaler and/or antibiotics prescribed by his PCP. Throughout the week, the cough did not worsen nor improve. He reports no other symptoms at that time other than feeling more fatigued than usual.     Yesterday, the pt became more short of breath and felt a rapid heart beat. He was sent to the [place] ED when his PCP found him to be in Afib with RVR. The ED confirmed this finding and he was given dilt gtt. His HR was sustained at 200 bpm with hypotension even after initial treatment and failed adenosine and he required synchronized cardioversion with improved HR to 120-150. During this time, he was also found to have a temp of 102F. He reports not feeling any fever prior to this. He was transferred to [facility] for further evaluation. The patient denies any chest pain, abdominal pain, diaphoresis, N/V, leg swelling, headache, changes in vision, or changes in bowel movement. As of today, his only remaining symptoms are a mild cough. He states that both his SOB and feelings of rapid heart beat have resolved.    Of note, the patient has a longstanding history of Afib that failed cardioversion 2 years ago and is now being pharmacologically managed with diltiazem and toprol. His other PMH includes HTN and hyperlipidemia, diagnosed 20 years ago, and hypothyroidism, for which he also takes home medications. He reports compliancy with all medications. 10 years ago, he was diagnosed with bladder CA and had a urostomy placed at that time (along with prostate removal). The urostomy is still in place and he typically changes it twice a week. He reports seeing no changes in the urine this week and says that it is normal for it to be cloudy/full of mucus. He is not able to feel any changes in urination (i.e. burning). His family history is significant for MI in both his mother and father in their 70s.     The patient has a 20 year pack history of smoking and quit in 1985. He has not smoked since. He drinks alcohol occasionally but is limited to 1 glass of wine a day at most. He does not take any recreational drugs. He lives with his wife of 50 years and adult son. He is occasionally sexually active with his wife only. He reports exercising 2x a week and tries to stay physically active since retiring 8 years ago. His wife and children are healthy and he recalls no prior sick contacts. He received both doses of the Pzifer COVID-19 vaccination.     In the ED:   Vitals: T 102.3, , /75, RR 16, SpO2 97% on 2L NC  s/p tylenol 975mg, 1g ceftriaxone, amiodarone 150mg IV, brief amio infusion

## 2021-06-16 NOTE — H&P ADULT - NSHPSOCIALHISTORY_GEN_ALL_CORE
Pt is a 20 pack year former smoker who quit in 1985. Pt endorses social wine drinking. Denies drug use. Lives with wife. Ambulates on his own.

## 2021-06-16 NOTE — H&P ADULT - NSICDXPASTMEDICALHX_GEN_ALL_CORE_FT
PAST MEDICAL HISTORY:  Atrial fibrillation     Bladder cancer     Bronchitis     Former smoker     Hypertension     Hypothyroid

## 2021-06-16 NOTE — MEDICAL STUDENT ADULT H&P (EDUCATION) - NS MD HP STUD PE RESPIRATORY FT
Increased shallow breathing; Clear to auscultation bilaterally, no wheezes, rales, or rhonchi; breath sounds equal bilaterally

## 2021-06-16 NOTE — MEDICAL STUDENT ADULT H&P (EDUCATION) - NS MD HP STUD RESULTS EKG FT
Cardiovascular Diagnostic Testing:    ECG: Afib 109, septal infarct    Echo:  < from: TTE Echo Doppler w/o Cont (11.12.19 @ 14:45) >  Conclusion:   Technically difficult study, patient tachycardic  Global left ventricular systolic dysfunction. The LVEF is approximately   45%.  Grossly normal RV size and systolic function.     Aortic valve is not well-visualized but appears sclerotic without   significant stenosis  Mild to moderate MR  Trace physiologic TR.    Estimated PA systolic pressure of 33 mmHg.  No significant pericardial effusion.

## 2021-06-16 NOTE — CONSULT NOTE ADULT - SUBJECTIVE AND OBJECTIVE BOX
Patient seen and evaluated at bedside    Chief Complaint:    HPI:  BRIGITTE is a 77 y/o male with a PMH of Afib, bladder CA, hyperlipidemia, HTN, chronic bronchitis, and hypothyroidism presenting with rapid heart rate and worsening shortness of breath after being transferred from an outside hospital due to found Afib with RVR.    The patient was in his normal state of health until about a week ago when he noticed he was coming down with a cough and nasal/chest congestion. The cough is productive of brown sputum. He states that this has happened before and he's been treated for bronchitis ~2x a year, every year. It usually resolves with his home inhaler and/or antibiotics prescribed by his PCP. Throughout the week, the cough did not worsen nor improve. He reports no other symptoms at that time other than feeling more fatigued than usual.     Yesterday, the pt became more short of breath and felt a rapid heart beat. He was also feeling SMALL for 2 weeks prior to this. He was sent to the [facility] ED when his PCP found him to be in Afib with RVR. The ED confirmed this finding and he was given dilt gtt. His HR was sustained at 200 bpm with hypotension even after initial treatment and failed adenosine and he required synchronized cardioversion with improved HR to 120-150. The patient then again went into a wide complex tachycardia around 20:20, so was given 150 IV amiodarone and drip. During this time, he was also found to have a temp of 102F. He reports not feeling any fever prior to this. He was transferred to [facility] for further evaluation. The patient denies any chest pain, abdominal pain, diaphoresis, N/V, leg swelling, headache, changes in vision, or changes in bowel movement. As of today, his only remaining symptoms are a mild cough. He states that both his SOB and feelings of rapid heart beat have resolved.    Of note, the patient has a longstanding history of Afib that failed cardioversion 2 years ago and is now being pharmacologically managed with diltiazem and toprol. His other PMH includes HTN and hyperlipidemia, diagnosed 20 years ago, and hypothyroidism, for which he also takes home medications. He reports compliancy with all medications. 10 years ago, he was diagnosed with bladder CA and had a urostomy placed at that time (along with prostate removal). The urostomy is still in place and he typically changes it twice a week. He reports seeing no changes in the urine this week and says that it is normal for it to be cloudy/full of mucus. He is not able to feel any changes in urination (i.e. burning). His family history is significant for MI in both his mother and father in their 70s.     The patient has a 20 year pack history of smoking and quit in 1985. He has not smoked since. He drinks alcohol occasionally but is limited to 1 glass of wine a day at most. He does not take any recreational drugs. He lives with his wife of 50 years and adult son. He is occasionally sexually active with his wife only. He reports exercising 2x a week and tries to stay physically active since retiring 8 years ago. His wife and children are healthy and he recalls no prior sick contacts. He received both doses of the [employer] COVID-19 vaccination.     In the ED:   Vitals: T 102.3, , /75, RR 16, SpO2 97% on 2L NC  s/p tylenol 975mg, 1g ceftriaxone, amiodarone 150mg IV, brief amio infusion  (16 Jun 2021 09:37)    The patient is currently asymptomatic, however is laying in bed. He denies any chest pain, but did have SMALL for several weeks prior to his palpitations. No orthopnea.     Outpatient cardiologist: Dr. Ochoa    PMHx:   Atrial fibrillation    Hypothyroid    Hypertension    Bladder cancer    Bronchitis    Former smoker    PSHx:   History of bladder surgery        Allergies:  No Known Allergies      Home Meds:    Current Medications:   ALBUTerol    90 MICROgram(s) HFA Inhaler 2 Puff(s) Inhalation every 6 hours PRN  aMIOdarone    Tablet 400 milliGRAM(s) Oral every 8 hours  aMIOdarone    Tablet   Oral   atorvastatin 40 milliGRAM(s) Oral at bedtime  diltiazem    milliGRAM(s) Oral <User Schedule>  levothyroxine 137 MICROGram(s) Oral daily  metoprolol succinate ER 25 milliGRAM(s) Oral daily      FAMILY HISTORY:  Family history of heart attack (Father, Mother)    Social History:  Smoking History:  Alcohol Use:  Drug Use:    REVIEW OF SYSTEMS:  CONSTITUTIONAL: No weakness, fevers or chills  EYES/ENT: No visual changes;  No dysphagia  NECK: No pain or stiffness  RESPIRATORY: No cough, wheezing, hemoptysis; No shortness of breath but + SMALL  CARDIOVASCULAR: No chest pain, + intermittent palpitations; No lower extremity edema  GASTROINTESTINAL: No abdominal or epigastric pain. No nausea, vomiting, or hematemesis; No diarrhea or constipation. No melena or hematochezia.  BACK: No back pain  GENITOURINARY: No dysuria, frequency or hematuria  NEUROLOGICAL: No numbness or weakness  SKIN: No itching, burning, rashes, or lesions   All other review of systems is negative unless indicated above.    Physical Exam:  T(F): 99.8 (06-16), Max: 102.3 (06-15)  HR: 104 (06-16) (73 - 211)  BP: 113/74 (06-16) (100/84 - 166/74)  RR: 20 (06-16)  SpO2: 97% (06-16)  GENERAL: No acute distress, well-developed  HEAD:  Atraumatic, Normocephalic  ENT: EOMI, PERRLA, conjunctiva and sclera clear, Neck supple, +JVD 1.5 inches above clavicle, moist mucosa  CHEST/LUNG: Clear to auscultation bilaterally; No wheeze, equal breath sounds bilaterally   BACK: No spinal tenderness  HEART: irregularly irregular rate and rhythm; No murmurs, rubs, or gallops  ABDOMEN: Soft, Nontender, Nondistended; Bowel sounds present  EXTREMITIES:  No clubbing, cyanosis, or edema  PSYCH: Nl behavior, nl affect  NEUROLOGY: AAOx3, non-focal, cranial nerves intact  SKIN: Normal color, No rashes or lesions  LINES:    Cardiovascular Diagnostic Testing:    ECG: Personally reviewed:  6/15 20:20 - afib, HR 99, normal axis, Q waves V1-V2  6/15 10:12 - wide complex tachycardia 198 HR    Echo: Personally reviewed:  < from: TTE Echo Doppler w/o Cont (11.12.19 @ 14:45) >  Technically difficult study, patient tachycardic  Global left ventricular systolic dysfunction. The LVEF is approximately   45%.  Grossly normal RV size and systolic function.     Aortic valve is not well-visualized but appears sclerotic without   significant stenosis  Mild to moderate MR  Trace physiologic TR.    Estimated PA systolic pressure of 33 mmHg.  No significant pericardial effusion.    < end of copied text >      Stress Testing:      Cath:    Imaging:    CXR: Personally reviewed    Labs: Personally reviewed                        14.8   11.67 )-----------( 159      ( 16 Jun 2021 08:42 )             44.5     06-16    140  |  103  |  37<H>  ----------------------------<  119<H>  3.5   |  20<L>  |  1.70<H>    Ca    8.9      16 Jun 2021 08:42  Phos  3.0     06-16  Mg     1.9     06-16    TPro  7.1  /  Alb  3.8  /  TBili  0.5  /  DBili  x   /  AST  51<H>  /  ALT  54<H>  /  AlkPhos  66  06-16    PT/INR - ( 15 Truong 2021 17:13 )   PT: 31.5 sec;   INR: 2.83 ratio      CARDIAC MARKERS ( 16 Jun 2021 08:42 )  125 ng/L / x     / x     / x     / x     / x      CARDIAC MARKERS ( 16 Jun 2021 00:11 )  158 ng/L / x     / x     / x     / x     / x      CARDIAC MARKERS ( 15 Truong 2021 17:13 )  x     / 1.150 ng/mL / x     / x     / x     / 1.1 ng/mL    Serum Pro-Brain Natriuretic Peptide: 3374 pg/mL (06-15 @ 17:13)    Thyroid Stimulating Hormone, Serum: 4.86 uIU/mL (06-16 @ 09:37)  Thyroid Stimulating Hormone, Serum: 2.66 uIU/mL (06-15 @ 17:13)

## 2021-06-16 NOTE — H&P ADULT - PROBLEM SELECTOR PLAN 1
- hx of long-standing afib w/ cardioversion attempt 2 years ago which was unsuccessful, on home diltiazem/toprol/warfarin  - presenting w/ worsening SOB found to be in afib w/ RVR w/ episode of 200 bpm WCT at OSH s/p synchronized cardioversion - hx of long-standing afib w/ cardioversion attempt 2 years ago which was unsuccessful, on home diltiazem/toprol/warfarin  - presenting w/ worsening SOB found to be in afib w/ RVR w/ episode of 200 bpm WCT at OSH s/p synchronized cardioversion  - cardiology is on board  - EP will also see pt  - c/w 180 bid diltiazem, toprol 25mg qd,   - s/p 150mg amio, start amio load followed by PO amio   - will acquire TTE  - possible angiogram this admission, will hold coumadin in preparation - hx of long-standing afib w/ cardioversion attempt 2 years ago which was unsuccessful, on home diltiazem/toprol/warfarin  - presenting w/ worsening SOB found to be in afib w/ RVR w/ episode of 200 bpm WCT at OSH s/p synchronized cardioversion  - cardiology is on board  - EP on board  - c/w 180 bid diltiazem  - toprol 25mg --> switched to lopressor 25mg bid per EP  - s/p 150mg amio, start amio load followed by PO amio  --> d/c amio per EP  - will acquire TTE  - angiogram this admission, will hold coumadin in preparation

## 2021-06-16 NOTE — MEDICAL STUDENT ADULT H&P (EDUCATION) - NS MD HP STUD MED REC FT
Home Meds:  Lipitor 40 mg   Warfarin 5 mg  Diltiazem 180 mg BID   Toprol 25 MG qd   Losartan - HCTZ 100-12.5 mg  Synthroid 137 mcg  Anoro ellipta (umeclidinium 62.5 mg and vilanterol 25 mcg) inhaler as needed      Current Medications:   aMIOdarone Infusion 1 mG/Min IV Continuous <Continuous>  Ceftriaxone

## 2021-06-16 NOTE — MEDICAL STUDENT ADULT H&P (EDUCATION) - NS MD HP STUD ASPLAN PLAN FT
1. AFib w/ RVR  Cardiology recs appreciated; home meds continued with PO of amiodarone     2. Sepsis   Meets SIRS criteria on admission with HR > 90, reported rectal temp of 102F, elevated WBC  Current lactate WNL  Consider giving fluids if BP changes  STAT urine and blood cultures for possible urinary source  Continue empiric tx with ceftriaxone     3. Bronchitis   Home inhaler as needed; no further tx given most recent clear CXR    4. HTN  Hold hydrochlorothiazide and losartan for time being due to 0.3 increase in Cr, possible ARAM; can resume if Cr continues to trend downward or pt scheduled for angio with contrast   All other home meds continued     5. Hyperlipidemia  Continue lipitor 40 mg    6. Hypothyroidism   Continue home synthroid 137 mcg 1. AFib w/ RVR  Cardiology recs appreciated; home meds continued with PO amiodarone     2. Sepsis   Meets SIRS criteria on admission with HR > 90, reported rectal temp of 102F, elevated WBC  Current lactate WNL  Consider giving fluids if BP changes  STAT urine and blood cultures for possible urinary source  Continue empiric tx with ceftriaxone     3. Bronchitis   Home inhaler as needed; no further tx given most recent clear CXR    4. HTN  Hold losartan-HCTZ for time being due to 0.3 increase in Cr, concern for possible ARAM; can resume if Cr continues to trend downward or pt scheduled for angio with contrast   All other home meds continued     5. Hyperlipidemia  Continue lipitor 40 mg    6. Hypothyroidism   Continue home synthroid 137 mcg 1. AFib w/ RVR  Cardiology recs appreciated; home meds continued with PO amiodarone   EP to see patient today; cardiology considering angio for tomorrow, d/c warfarin for time being    2. Sepsis   Meets SIRS criteria on admission with HR > 90, reported rectal temp of 102F, elevated WBC  Current lactate WNL  Consider giving fluids if BP changes  STAT urine and blood cultures for possible urinary source  Continue empiric tx with ceftriaxone     3. Urinary tract infection   Positive UA in setting of urostomy; pending urine culture  Continue ceftriaxone empirically until source determined     4. Bronchitis   Home inhaler as needed; unlikely source of SIRS given most recent clear CXR but will be monitored     5. HTN  Hold losartan-HCTZ for time being due to 0.3 increase in Cr, concern for possible ARAM; can resume if Cr continues to trend downward or pt scheduled for angio with contrast   All other home meds continued     6. Hyperlipidemia  Continue lipitor 40 mg    7. Hypothyroidism   Continue home synthroid 137 mcg

## 2021-06-16 NOTE — MEDICAL STUDENT ADULT H&P (EDUCATION) - NS MD HP STUD PE VITALS FT
15-Truong-2021 21:42  · Temp (F) 98.6  · Temp (C) Temp (C) 37  · Temp site Temp Site oral  · Heart Rate Heart Rate (beats/min) 88  · BP Systolic Systolic 117  · BP Diastolic Diastolic (mm Hg) 75    Per cards consulting:  T(F): 102.3 (06-15), Max: 102.3 (06-15)  HR: 107 (06-15) (88 - 211)  BP: 108/75 (06-15) (100/84 - 166/74)  RR: 16 (06-15)  SpO2: 97% (06-15)    · Respiration Rate (breaths/min) Respiration Rate (breaths/min) 18  SpO2 (%) SpO2 (%) 97  SpO2 (%) SpO2 (%) 97

## 2021-06-16 NOTE — H&P ADULT - NSHPLABSRESULTS_GEN_ALL_CORE
14.8   11.67 )-----------( 159      ( 2021 08:42 )             44.5     -    140  |  103  |  37<H>  ----------------------------<  119<H>  3.5   |  20<L>  |  1.70<H>    Ca    8.9      2021 08:42  Phos  3.0       Mg     1.9         TPro  7.1  /  Alb  3.8  /  TBili  0.5  /  DBili  x   /  AST  51<H>  /  ALT  54<H>  /  AlkPhos  66        Blood Gas Profile - Venous (21 @ 00:11)    pH, Venous: 7.39    pCO2, Venous: 39 mmHg    pO2, Venous: 42 mmHg    HCO3, Venous: 23 mmol/L    Base Excess, Venous: -1.2 mmol/L    Oxygen Saturation, Venous: 75 %    Total CO2, Venous: 24 mmol/L    Trop 158 > 125  CKMB 1.1  BNP 3374    Urinalysis Basic - ( 2021 03:57 )    Color: Yellow / Appearance: Slightly Turbid / S.020 / pH: x  Gluc: x / Ketone: Negative  / Bili: Negative / Urobili: Negative   Blood: x / Protein: 100 / Nitrite: Negative   Leuk Esterase: Large / RBC: 11 /hpf /  /HPF   Sq Epi: x / Non Sq Epi: 1 /hpf / Bacteria: Many    < from: Xray Chest 1 View AP/PA (21 @ 03:34) >    FINDINGS:      The heart is normal in size.  The lungs are clear.  No pleural effusion or pneumothorax.    IMPRESSION:  Clear lungs.    < end of copied text >      < from: TTE Echo Doppler w/o Cont (19 @ 14:45) >    Conclusion:   Technically difficult study, patient tachycardic  Global left ventricular systolic dysfunction. The LVEF is approximately   45%.  Grossly normal RV size and systolic function.     Aortic valve is not well-visualized but appears sclerotic without   significant stenosis  Mild to moderate MR  Trace physiologic TR.    Estimated PA systolic pressure of 33 mmHg.  No significant pericardial effusion.    < end of copied text >

## 2021-06-16 NOTE — H&P ADULT - PROBLEM SELECTOR PLAN 2
- meets SIRS criteria by leucocytosis, tachycardia, fever, and tachypnea w/ concern for a urinary source given + UA  - s/p 1g CTX  - will c/w ctx for treatment  - f/u BCx, UCx  - CXR clear on 6/16

## 2021-06-16 NOTE — H&P ADULT - NSICDXFAMILYHX_GEN_ALL_CORE_FT
FAMILY HISTORY:  Father  Still living? Unknown  Family history of heart attack, Age at diagnosis: Age Unknown    Mother  Still living? No  Family history of heart attack, Age at diagnosis: Age Unknown

## 2021-06-16 NOTE — MEDICAL STUDENT ADULT H&P (EDUCATION) - NS MD HP STUD RESULTS RAD FT
INTERPRETATION:  CLINICAL INFORMATION: Fever    EXAM: Frontal view of the chest.    COMPARISON: Chest radiograph from 6/15/2021.    FINDINGS:      The heart is normal in size.  The lungs are clear.  No pleural effusion or pneumothorax.    IMPRESSION:  Clear lungs.

## 2021-06-16 NOTE — ED ADULT NURSE REASSESSMENT NOTE - NS ED NURSE REASSESS COMMENT FT1
Patient resting comfortable in stretcher. Patient A&Ox4. Currently in atrial fibrillation. Denies chest pain, SOB, headache, N/V/D, abdominal pain, fever/chills currently. Plan of care discussed. Safety and comfort measures maintained.

## 2021-06-16 NOTE — H&P ADULT - PROBLEM SELECTOR PLAN 6
- c/w toprol, diltiazem, and amiodarone  - hold hctz-losartan - c/w dilt  - switch toprol to lopressor  - hold hctz-losartan

## 2021-06-16 NOTE — MEDICAL STUDENT ADULT H&P (EDUCATION) - NS MD HP STUD ASPLAN ASSES FT
76 years old, with a history of hypertension, hypercholesterolemia, hypothyroidism, bladder cancer and permanent atrial fibrillation on Coumadin s/p unsuccessful cardioversion in the past, HFrEF 45% w mild MR presenting w aFIB w RVR. 76 years old male with a PMH of hypertension, hypercholesterolemia, hypothyroidism, bladder cancer, chronic bronchitis, permanent atrial fibrillation and HFrEF 45% w mild MR presenting w aFIB w RVR after failed synchronized cardioversion at OSH, and elevated WBC and temperature with possible infectious urinary source.

## 2021-06-16 NOTE — H&P ADULT - ASSESSMENT
RP is a 75 y/o male with a PMH of Afib, bladder CA, hyperlipidemia, HTN, chronic bronchitis, and hypothyroidism presenting with rapid heart rate and worsening shortness of breath found to be in afib w/ RVR w/ episode of WCT at 200bpm s/p synchronized cardioversion transferred from OSH for further management w/ course c/b sepsis w/ possible urinary source.

## 2021-06-16 NOTE — MEDICAL STUDENT ADULT H&P (EDUCATION) - NS MD HP STUD RESULTS OTHER FT
Urinalysis Basic - ( 2021 03:57 )    Color: Yellow / Appearance: Slightly Turbid / S.020 / pH: x  Gluc: x / Ketone: Negative  / Bili: Negative / Urobili: Negative   Blood: x / Protein: 100 / Nitrite: Negative   Leuk Esterase: Large / RBC: 11 /hpf /  /HPF   Sq Epi: x / Non Sq Epi: 1 /hpf / Bacteria: Many

## 2021-06-16 NOTE — H&P ADULT - NSHPREVIEWOFSYSTEMS_GEN_ALL_CORE
REVIEW OF SYSTEMS:  CONSTITUTIONAL: No weakness, fevers or chills  EYES/ENT: No visual changes;  No vertigo or throat pain   NECK: No pain or stiffness  RESPIRATORY: No cough, wheezing, hemoptysis; No shortness of breath  CARDIOVASCULAR: No chest pain or palpitations  GASTROINTESTINAL: No abdominal or epigastric pain. No nausea, vomiting, or hematemesis; No diarrhea or constipation. No melena or hematochezia.  GENITOURINARY: No dysuria, frequency or hematuria  NEUROLOGICAL: No numbness or weakness  SKIN: No itching, rashes REVIEW OF SYSTEMS:  CONSTITUTIONAL: No weakness, subjective fevers or chills  EYES/ENT: No visual changes;  No vertigo or throat pain   NECK: No pain or stiffness  RESPIRATORY: No cough, wheezing, hemoptysis; +shortness of breath  CARDIOVASCULAR: No chest pain; + palpitations  GASTROINTESTINAL: No abdominal or epigastric pain. No nausea, vomiting, or hematemesis; No diarrhea or constipation. No melena or hematochezia.  GENITOURINARY: No dysuria, frequency or hematuria  NEUROLOGICAL: No numbness or weakness  SKIN: No itching, rashes

## 2021-06-16 NOTE — H&P ADULT - NSICDXPASTSURGICALHX_GEN_ALL_CORE_FT
PAST SURGICAL HISTORY:  History of bladder surgery on urostomy     shoulder swollen/tender diffusely. Markedly decreased ROM/JOINT SWELLING/TENDERNESS/LIMITED ROM

## 2021-06-17 LAB
ALBUMIN SERPL ELPH-MCNC: 3.3 G/DL — SIGNIFICANT CHANGE UP (ref 3.3–5)
ALP SERPL-CCNC: 68 U/L — SIGNIFICANT CHANGE UP (ref 40–120)
ALT FLD-CCNC: 50 U/L — HIGH (ref 10–45)
ANION GAP SERPL CALC-SCNC: 17 MMOL/L — SIGNIFICANT CHANGE UP (ref 5–17)
APTT BLD: 105.8 SEC — HIGH (ref 27.5–35.5)
APTT BLD: 30.2 SEC — SIGNIFICANT CHANGE UP (ref 27.5–35.5)
AST SERPL-CCNC: 47 U/L — HIGH (ref 10–40)
BILIRUB SERPL-MCNC: 0.4 MG/DL — SIGNIFICANT CHANGE UP (ref 0.2–1.2)
BUN SERPL-MCNC: 43 MG/DL — HIGH (ref 7–23)
CALCIUM SERPL-MCNC: 8.6 MG/DL — SIGNIFICANT CHANGE UP (ref 8.4–10.5)
CHLORIDE SERPL-SCNC: 102 MMOL/L — SIGNIFICANT CHANGE UP (ref 96–108)
CO2 SERPL-SCNC: 19 MMOL/L — LOW (ref 22–31)
COVID-19 SPIKE DOMAIN AB INTERP: POSITIVE
COVID-19 SPIKE DOMAIN ANTIBODY RESULT: >250 U/ML — HIGH
CREAT SERPL-MCNC: 1.94 MG/DL — HIGH (ref 0.5–1.3)
GLUCOSE SERPL-MCNC: 107 MG/DL — HIGH (ref 70–99)
HCT VFR BLD CALC: 40 % — SIGNIFICANT CHANGE UP (ref 39–50)
HCT VFR BLD CALC: 41.3 % — SIGNIFICANT CHANGE UP (ref 39–50)
HGB BLD-MCNC: 13.5 G/DL — SIGNIFICANT CHANGE UP (ref 13–17)
HGB BLD-MCNC: 13.6 G/DL — SIGNIFICANT CHANGE UP (ref 13–17)
INR BLD: 1.79 RATIO — HIGH (ref 0.88–1.16)
MAGNESIUM SERPL-MCNC: 1.9 MG/DL — SIGNIFICANT CHANGE UP (ref 1.6–2.6)
MCHC RBC-ENTMCNC: 28.1 PG — SIGNIFICANT CHANGE UP (ref 27–34)
MCHC RBC-ENTMCNC: 28.7 PG — SIGNIFICANT CHANGE UP (ref 27–34)
MCHC RBC-ENTMCNC: 32.7 GM/DL — SIGNIFICANT CHANGE UP (ref 32–36)
MCHC RBC-ENTMCNC: 34 GM/DL — SIGNIFICANT CHANGE UP (ref 32–36)
MCV RBC AUTO: 84.4 FL — SIGNIFICANT CHANGE UP (ref 80–100)
MCV RBC AUTO: 86 FL — SIGNIFICANT CHANGE UP (ref 80–100)
NRBC # BLD: 0 /100 WBCS — SIGNIFICANT CHANGE UP (ref 0–0)
NRBC # BLD: 0 /100 WBCS — SIGNIFICANT CHANGE UP (ref 0–0)
PHOSPHATE SERPL-MCNC: 2.2 MG/DL — LOW (ref 2.5–4.5)
PLATELET # BLD AUTO: 155 K/UL — SIGNIFICANT CHANGE UP (ref 150–400)
PLATELET # BLD AUTO: 160 K/UL — SIGNIFICANT CHANGE UP (ref 150–400)
POTASSIUM SERPL-MCNC: 3.2 MMOL/L — LOW (ref 3.5–5.3)
POTASSIUM SERPL-SCNC: 3.2 MMOL/L — LOW (ref 3.5–5.3)
PROT SERPL-MCNC: 6.9 G/DL — SIGNIFICANT CHANGE UP (ref 6–8.3)
PROTHROM AB SERPL-ACNC: 20.9 SEC — HIGH (ref 10.6–13.6)
RBC # BLD: 4.74 M/UL — SIGNIFICANT CHANGE UP (ref 4.2–5.8)
RBC # BLD: 4.8 M/UL — SIGNIFICANT CHANGE UP (ref 4.2–5.8)
RBC # FLD: 14.4 % — SIGNIFICANT CHANGE UP (ref 10.3–14.5)
RBC # FLD: 14.6 % — HIGH (ref 10.3–14.5)
SARS-COV-2 IGG+IGM SERPL QL IA: >250 U/ML — HIGH
SARS-COV-2 IGG+IGM SERPL QL IA: POSITIVE
SODIUM SERPL-SCNC: 138 MMOL/L — SIGNIFICANT CHANGE UP (ref 135–145)
WBC # BLD: 11.7 K/UL — HIGH (ref 3.8–10.5)
WBC # BLD: 12.5 K/UL — HIGH (ref 3.8–10.5)
WBC # FLD AUTO: 11.7 K/UL — HIGH (ref 3.8–10.5)
WBC # FLD AUTO: 12.5 K/UL — HIGH (ref 3.8–10.5)

## 2021-06-17 PROCEDURE — 99233 SBSQ HOSP IP/OBS HIGH 50: CPT

## 2021-06-17 PROCEDURE — 99232 SBSQ HOSP IP/OBS MODERATE 35: CPT

## 2021-06-17 PROCEDURE — 99233 SBSQ HOSP IP/OBS HIGH 50: CPT | Mod: GC

## 2021-06-17 RX ORDER — MAGNESIUM SULFATE 500 MG/ML
1 VIAL (ML) INJECTION ONCE
Refills: 0 | Status: COMPLETED | OUTPATIENT
Start: 2021-06-17 | End: 2021-06-17

## 2021-06-17 RX ORDER — SODIUM,POTASSIUM PHOSPHATES 278-250MG
2 POWDER IN PACKET (EA) ORAL ONCE
Refills: 0 | Status: COMPLETED | OUTPATIENT
Start: 2021-06-17 | End: 2021-06-17

## 2021-06-17 RX ORDER — HEPARIN SODIUM 5000 [USP'U]/ML
9000 INJECTION INTRAVENOUS; SUBCUTANEOUS EVERY 6 HOURS
Refills: 0 | Status: DISCONTINUED | OUTPATIENT
Start: 2021-06-17 | End: 2021-06-18

## 2021-06-17 RX ORDER — HEPARIN SODIUM 5000 [USP'U]/ML
9000 INJECTION INTRAVENOUS; SUBCUTANEOUS ONCE
Refills: 0 | Status: COMPLETED | OUTPATIENT
Start: 2021-06-17 | End: 2021-06-17

## 2021-06-17 RX ORDER — POTASSIUM CHLORIDE 20 MEQ
40 PACKET (EA) ORAL ONCE
Refills: 0 | Status: COMPLETED | OUTPATIENT
Start: 2021-06-17 | End: 2021-06-17

## 2021-06-17 RX ORDER — HEPARIN SODIUM 5000 [USP'U]/ML
4000 INJECTION INTRAVENOUS; SUBCUTANEOUS EVERY 6 HOURS
Refills: 0 | Status: DISCONTINUED | OUTPATIENT
Start: 2021-06-17 | End: 2021-06-18

## 2021-06-17 RX ORDER — SODIUM CHLORIDE 9 MG/ML
500 INJECTION, SOLUTION INTRAVENOUS
Refills: 0 | Status: DISCONTINUED | OUTPATIENT
Start: 2021-06-17 | End: 2021-06-18

## 2021-06-17 RX ORDER — HEPARIN SODIUM 5000 [USP'U]/ML
INJECTION INTRAVENOUS; SUBCUTANEOUS
Qty: 25000 | Refills: 0 | Status: DISCONTINUED | OUTPATIENT
Start: 2021-06-17 | End: 2021-06-18

## 2021-06-17 RX ADMIN — Medication 40 MILLIEQUIVALENT(S): at 08:52

## 2021-06-17 RX ADMIN — CEFTRIAXONE 100 MILLIGRAM(S): 500 INJECTION, POWDER, FOR SOLUTION INTRAMUSCULAR; INTRAVENOUS at 06:47

## 2021-06-17 RX ADMIN — Medication 100 GRAM(S): at 08:51

## 2021-06-17 RX ADMIN — HEPARIN SODIUM 9000 UNIT(S): 5000 INJECTION INTRAVENOUS; SUBCUTANEOUS at 09:54

## 2021-06-17 RX ADMIN — Medication 137 MICROGRAM(S): at 06:47

## 2021-06-17 RX ADMIN — ATORVASTATIN CALCIUM 40 MILLIGRAM(S): 80 TABLET, FILM COATED ORAL at 21:18

## 2021-06-17 RX ADMIN — Medication 2 PACKET(S): at 08:52

## 2021-06-17 RX ADMIN — HEPARIN SODIUM 1900 UNIT(S)/HR: 5000 INJECTION INTRAVENOUS; SUBCUTANEOUS at 10:34

## 2021-06-17 RX ADMIN — HEPARIN SODIUM 1700 UNIT(S)/HR: 5000 INJECTION INTRAVENOUS; SUBCUTANEOUS at 19:21

## 2021-06-17 RX ADMIN — Medication 180 MILLIGRAM(S): at 17:36

## 2021-06-17 RX ADMIN — SODIUM CHLORIDE 100 MILLILITER(S): 9 INJECTION, SOLUTION INTRAVENOUS at 10:34

## 2021-06-17 RX ADMIN — Medication 25 MILLIGRAM(S): at 06:47

## 2021-06-17 RX ADMIN — Medication 25 MILLIGRAM(S): at 17:36

## 2021-06-17 RX ADMIN — Medication 180 MILLIGRAM(S): at 06:47

## 2021-06-17 NOTE — PROGRESS NOTE ADULT - PROBLEM SELECTOR PLAN 4
- pt with baseline SCr of 1.4-1.5, p/w 1.80 w/ ARAM on CKD stage 3  - will hold losartan and HCTZ for now, but ARAM improving  - likely pre-renal i/s/o decreased PO water intake and infection  - CTM

## 2021-06-17 NOTE — PROGRESS NOTE ADULT - PROBLEM SELECTOR PLAN 10
- DVT PPx: coumadin will be held in preparation for angiogram but currently therapeutic INR  - Diet: DASH/TLC  - Dispo: Will need PT consult eventually - DVT PPx: coumadin will be held in preparation for angiogram but currently therapeutic INR; heparin drip  - Diet: DASH/TLC  - Dispo: Will need PT consult eventually

## 2021-06-17 NOTE — PROGRESS NOTE ADULT - PROBLEM SELECTOR PLAN 2
- meets SIRS criteria by leucocytosis, tachycardia, fever, and tachypnea w/ concern for a urinary source given + UA  - s/p 1g CTX  - will c/w ctx for treatment  - f/u UCx  - BCx ngtd  - CXR clear on 6/16

## 2021-06-17 NOTE — PROGRESS NOTE ADULT - ASSESSMENT
76M with persistent afib on coumadin s/p 2 failed DCCV in the past, bladder cancer s/p urostomy, HTN, HLD, chronic bronchitis, hypothyroidism who presented for SOB and palpitations, and sent into the hospital for afib with RVR. Patient had WCT at [place] ED, and was transferred for further evaluation.     Wide complex tachycardia: Most likely to be VT rather than AF/FL with aberrancy. Patient denies any LOC. s/p DCCV in [place] ED.   - echo with worsening LV dysfunction compared to prior (now severe LV dysfunction as opposed to 45% LVEF prior)  - on lopressor 25 BID and dilt 180 BID - if rate uncontrolled, can give 5 IV lopressor or increase lopressor to 50 BID   - losartan/hctz on hold for now, which is reasonable, however would ideally be discharging on losartan for GDMT for HF  - will plan for tentative LHC with Dr. Kirit Mireles on 6/18 pending improvement in renal function - patient's coumadin for afib is held and is on heparin gtt  - plan for VT ablation and secondary prevention ICD either 6/18 or Monday 6/21    Persistent afib on coumadin s/p 2 failed DCCV: [id number] 3 (HTN, agex2)  - coumadin held for LHC, on heparin gtt  - continue rate control as above    Bethany Jones MD  Cardiology Fellow, PGY-4  [phone]  For all other Cardiology service contact information, go to [email or web address] and use "[email or web address]" to login.

## 2021-06-17 NOTE — PROGRESS NOTE ADULT - ASSESSMENT
RP is a 77 y/o male with a PMH of Afib, bladder CA, hyperlipidemia, HTN, chronic bronchitis, and hypothyroidism presenting with rapid heart rate and worsening shortness of breath found to be in afib w/ RVR w/ episode of WCT at 200bpm s/p synchronized cardioversion transferred from OSH for further management w/ course c/b sepsis w/ possible urinary source.

## 2021-06-17 NOTE — PROGRESS NOTE ADULT - PROBLEM SELECTOR PLAN 1
- hx of long-standing afib w/ cardioversion attempt 2 years ago which was unsuccessful, on home diltiazem/toprol/warfarin  - presenting w/ worsening SOB found to be in afib w/ RVR w/ episode of 200 bpm WCT at OSH s/p synchronized cardioversion  - cardiology is on board  - EP on board  - c/w 180 bid diltiazem  - toprol 25mg --> switched to lopressor 25mg bid per EP  - s/p 150mg amio, start amio load followed by PO amio  --> d/c amio per EP  - will acquire TTE  - angiogram this admission, will hold coumadin in preparation  - per EP, there is concern that the event was VTACH and will need ischemic eval with EPS study w/ possible VT ablation and placement of ICD - hx of long-standing afib w/ cardioversion attempt 2 years ago which was unsuccessful, on home diltiazem/toprol/warfarin  - presenting w/ worsening SOB found to be in afib w/ RVR w/ episode of 200 bpm WCT at OSH s/p synchronized cardioversion  - cardiology is on board  - EP on board  - c/w 180 bid diltiazem  - toprol 25mg --> switched to lopressor 25mg bid per EP  - s/p 150mg amio, start amio load followed by PO amio  --> d/c amio per EP  - will acquire TTE  - angiogram this admission, will hold coumadin in preparation and start heparin drip --> plan for procedure 6/18  - per EP, there is concern that the event was VTACH and will need ischemic eval with EPS study w/ possible VT ablation and placement of ICD - hx of long-standing afib w/ cardioversion attempt 2 years ago which was unsuccessful, on home diltiazem/toprol/warfarin  - presenting w/ worsening SOB found to be in afib w/ RVR w/ episode of 200 bpm WCT at OSH s/p synchronized cardioversion  - cardiology is on board  - EP on board  - c/w 180 bid diltiazem  - toprol 25mg --> switched to lopressor 25mg bid per EP  - s/p 150mg amio, start amio load followed by PO amio  --> d/c amio per EP  - TTE shows reduced EF to 20-25%  - angiogram this admission, will hold coumadin in preparation and start heparin drip --> plan for procedure 6/18  - per EP, there is concern that the event was VTACH and will need ischemic eval with EPS study w/ possible VT ablation and placement of ICD

## 2021-06-17 NOTE — PROGRESS NOTE ADULT - PROBLEM SELECTOR PLAN 5
- hx of CHF w/ EF~45% @2019  - on lopressor and dilt for now  - hold hctz-losartan for now  - TTE 6/17 suggestive of EF of 20-25% w/ concern for ischemic event vs tachycardia mediated cardimyopathy - hx of CHF w/ EF~45% @2019  - on lopressor and dilt for now  - hold hctz-losartan for now  - TTE 6/17 suggestive of EF of 20-25% s/p cardioversion w/ concern for ischemic event vs tachycardia mediated cardimyopathy

## 2021-06-17 NOTE — PROGRESS NOTE ADULT - PROBLEM SELECTOR PLAN 3
- positive UA w/ WBCs, bacteria, and leucocyte esterase  - hx of bladder cancer w/ urostomy  - pt denies any changes in smell or appearance of urine  - will c/w CTX  - f/u UCx  - BCx ngtd

## 2021-06-17 NOTE — PROGRESS NOTE ADULT - ATTENDING COMMENTS
Apical sustained MMVT. Longstanding persistent AF. Likely old anteroseptal MI on baseline ECG.  TTE with new diagnosis severe LV dysfunction. Await ischemic w/u followed by possible VT ablation and ICD implant.

## 2021-06-17 NOTE — PROGRESS NOTE ADULT - SUBJECTIVE AND OBJECTIVE BOX
Patient seen and examined at bedside.    Overnight Events: Patient without any NSVT overnight. He also had no palpitations or issues overnight, and slept well. Tele reviewed, no events, still in afib . Still without any urinary symptoms. No fevers.     Current Meds:  ALBUTerol    90 MICROgram(s) HFA Inhaler 2 Puff(s) Inhalation every 6 hours PRN  atorvastatin 40 milliGRAM(s) Oral at bedtime  cefTRIAXone   IVPB 1000 milliGRAM(s) IV Intermittent every 24 hours  diltiazem    milliGRAM(s) Oral <User Schedule>  heparin   Injectable 9000 Unit(s) IV Push once  heparin   Injectable 9000 Unit(s) IV Push every 6 hours PRN  heparin   Injectable 4000 Unit(s) IV Push every 6 hours PRN  heparin  Infusion.  Unit(s)/Hr IV Continuous <Continuous>  lactated ringers. 500 milliLiter(s) IV Continuous <Continuous>  levothyroxine 137 MICROGram(s) Oral daily  metoprolol tartrate 25 milliGRAM(s) Oral two times a day    Vitals:  T(F): 98.9 (06-17), Max: 99.8 (06-16)  HR: 85 (06-17) (85 - 120)  BP: 103/59 (06-17) (103/59 - 118/65)  RR: 18 (06-17)  SpO2: 92% (06-17)  I&O's Summary    16 Jun 2021 07:01  -  17 Jun 2021 07:00  --------------------------------------------------------  IN: 360 mL / OUT: 900 mL / NET: -540 mL    Physical Exam:  Appearance: No acute distress; well appearing  Eyes:  EOMI, pink conjunctiva  HENT: Normal oral mucosa  Cardiovascular: irregularly irregular, S1, S2, no murmurs, rubs, or gallops; no edema; no JVD  Respiratory: Clear to auscultation bilaterally  Gastrointestinal: soft, non-tender, non-distended with normal bowel sounds  Musculoskeletal: No clubbing; no joint deformity   Neurologic: Non-focal  Lymphatic: No lymphadenopathy  Psychiatry: AAOx3, mood & affect appropriate  Skin: No rashes                          13.5   11.70 )-----------( 160      ( 17 Jun 2021 07:03 )             41.3     06-17    138  |  102  |  43<H>  ----------------------------<  107<H>  3.2<L>   |  19<L>  |  1.94<H>    Ca    8.6      17 Jun 2021 07:02  Phos  2.2     06-17  Mg     1.9     06-17    TPro  6.9  /  Alb  3.3  /  TBili  0.4  /  DBili  x   /  AST  47<H>  /  ALT  50<H>  /  AlkPhos  68  06-17    PT/INR - ( 17 Jun 2021 07:03 )   PT: 20.9 sec;   INR: 1.79 ratio         PTT - ( 17 Jun 2021 07:03 )  PTT:30.2 sec  CARDIAC MARKERS ( 16 Jun 2021 08:42 )  125 ng/L / x     / x     / x     / x     / x      CARDIAC MARKERS ( 16 Jun 2021 00:11 )  158 ng/L / x     / x     / x     / x     / x      CARDIAC MARKERS ( 15 Truong 2021 17:13 )  x     / 1.150 ng/mL / x     / x     / x     / 1.1 ng/mL      Serum Pro-Brain Natriuretic Peptide: 3374 pg/mL (06-15 @ 17:13)    New ECG(s): Personally reviewed    Echo:    Stress Testing:     Cath:    Imaging:    Interpretation of Telemetry: afib , no NSVT or sustained VT overnight.

## 2021-06-17 NOTE — PROGRESS NOTE ADULT - SUBJECTIVE AND OBJECTIVE BOX
Hayden Narayan  [facility]/Medicine/[phone]    MARÍA ELENA BEAR  76y  Male      Patient is a 76y old  Male who presents with a chief complaint of Afib w/ RVR i/s/o fever (2021 11:42)      INTERVAL HPI/OVERNIGHT EVENTS:    REVIEW OF SYSTEMS:  CONSTITUTIONAL: No weakness, subjective fevers or chills  EYES/ENT: No visual changes;  No vertigo or throat pain   NECK: No pain or stiffness  RESPIRATORY: No cough, wheezing, hemoptysis; +shortness of breath  CARDIOVASCULAR: No chest pain; + palpitations  GASTROINTESTINAL: No abdominal or epigastric pain. No nausea, vomiting, or hematemesis; No diarrhea or constipation. No melena or hematochezia.  GENITOURINARY: No dysuria, frequency or hematuria  NEUROLOGICAL: No numbness or weakness  SKIN: No itching, rashes    Vital Signs Last 24 Hrs  T(C): 37.2 (2021 05:05), Max: 37.7 (2021 11:19)  T(F): 98.9 (2021 05:05), Max: 99.8 (2021 11:19)  HR: 85 (2021 05:05) (85 - 120)  BP: 103/59 (2021 05:05) (103/59 - 118/65)  BP(mean): --  RR: 18 (2021 05:05) (18 - 26)  SpO2: 92% (2021 05:05) (92% - 97%)    PHYSICAL EXAM:  GENERAL: No acute distress, obese  HEAD:  Atraumatic, Normocephalic  ENT: E  Neck supple, No JVD, moist mucosa  CHEST/LUNG: L. basilar crackles; No wheeze, equal breath sounds bilaterally   BACK: No spinal tenderness  HEART: irreg irreg rate and rhythm; No murmurs, rubs, or gallops  ABDOMEN: Soft, Nontender, Nondistended   EXTREMITIES:  No clubbing, cyanosis, or edema  PSYCH: Nl behavior, nl affect  NEUROLOGY: AAOx3, non-focal, cranial nerves intact  SKIN: Normal color, No rashes or lesions    Consultant(s) Notes Reviewed:  [x ] YES  [ ] NO  Care Discussed with Consultants/Other Providers [ x] YES  [ ] NO    LABS:                        14.8   11.67 )-----------( 159      ( 2021 08:42 )             44.5     06-16    140  |  103  |  37<H>  ----------------------------<  119<H>  3.5   |  20<L>  |  1.70<H>    Ca    8.9      2021 08:42  Phos  3.0     06-16  Mg     1.9     -16    TPro  7.1  /  Alb  3.8  /  TBili  0.5  /  DBili  x   /  AST  51<H>  /  ALT  54<H>  /  AlkPhos  66  06-16    PT/INR - ( 15 Truong 2021 17:13 )   PT: 31.5 sec;   INR: 2.83 ratio           Urinalysis Basic - ( 2021 03:57 )    Color: Yellow / Appearance: Slightly Turbid / S.020 / pH: x  Gluc: x / Ketone: Negative  / Bili: Negative / Urobili: Negative   Blood: x / Protein: 100 / Nitrite: Negative   Leuk Esterase: Large / RBC: 11 /hpf /  /HPF   Sq Epi: x / Non Sq Epi: 1 /hpf / Bacteria: Many        CAPILLARY BLOOD GLUCOSE          RADIOLOGY & ADDITIONAL TESTS:    Imaging Personally Reviewed:  [ ] YES  [ ] NO    < from: TTE with Doppler (w/Cont) (21 @ 15:21) >  Conclusions:  1. Mitral annular calcification, otherwise normal mitral  valve. Moderate mitral regurgitation.  PISA radius = 0.8  cm. RVOL = 32 ml, EROA = 29 mm2.  2. Eccentric left ventricular hypertrophy (dilated left  ventricle with normal relative wall thickness).  3. Severe global left ventricular systolic dysfunction with  regional variation. Endocardial visualization enhanced with  intravenous injection of Ultrasonic Enhancing Agent  (Definity).  No left ventricular thrombus.  4. Normal right ventricular size and function.  5. Estimated right ventricular systolic pressure equals 33  mm Hg, assuming right atrial pressure equals 8 mm Hg,  consistent with normal pulmonary pressures.  EF 20-25%  < end of copied text >     Hayden Narayan  [facility]/Medicine/[phone]    MARÍA ELENA BEAR  76y  Male      Patient is a 76y old  Male who presents with a chief complaint of Afib w/ RVR i/s/o fever (2021 11:42)      INTERVAL HPI/OVERNIGHT EVENTS: No acute events overnight. 9 beats of WCT on tele.     REVIEW OF SYSTEMS:  CONSTITUTIONAL: No weakness, subjective fevers or chills  EYES/ENT: No visual changes;  No vertigo or throat pain   NECK: No pain or stiffness  RESPIRATORY: No cough, wheezing, hemoptysis; +shortness of breath  CARDIOVASCULAR: No chest pain; no palpitations  GASTROINTESTINAL: No abdominal or epigastric pain. No nausea, vomiting, or hematemesis; No diarrhea or constipation. No melena or hematochezia.  GENITOURINARY: No dysuria, frequency or hematuria  NEUROLOGICAL: No numbness or weakness  SKIN: No itching, rashes    Vital Signs Last 24 Hrs  T(C): 37.2 (2021 05:05), Max: 37.7 (2021 11:19)  T(F): 98.9 (2021 05:05), Max: 99.8 (2021 11:19)  HR: 85 (2021 05:05) (85 - 120)  BP: 103/59 (2021 05:05) (103/59 - 118/65)  BP(mean): --  RR: 18 (2021 05:05) (18 - 26)  SpO2: 92% (2021 05:05) (92% - 97%)    PHYSICAL EXAM:  GENERAL: No acute distress, obese  HEAD:  Atraumatic, Normocephalic  ENT: E  Neck supple, No JVD, moist mucosa  CHEST/LUNG: L. basilar crackles; No wheeze, equal breath sounds bilaterally   BACK: No spinal tenderness  HEART: irreg irreg rate and rhythm; No murmurs, rubs, or gallops  ABDOMEN: Soft, Nontender, Nondistended   EXTREMITIES:  No clubbing, cyanosis, or edema  PSYCH: Nl behavior, nl affect  NEUROLOGY: AAOx3, non-focal, cranial nerves intact  SKIN: Normal color, No rashes or lesions    Consultant(s) Notes Reviewed:  [x ] YES  [ ] NO  Care Discussed with Consultants/Other Providers [ x] YES  [ ] NO    LABS:                        14.8   11.67 )-----------( 159      ( 2021 08:42 )             44.5         140  |  103  |  37<H>  ----------------------------<  119<H>  3.5   |  20<L>  |  1.70<H>    Ca    8.9      2021 08:42  Phos  3.0       Mg     1.9         TPro  7.1  /  Alb  3.8  /  TBili  0.5  /  DBili  x   /  AST  51<H>  /  ALT  54<H>  /  AlkPhos  66      PT/INR - ( 15 Truong 2021 17:13 )   PT: 31.5 sec;   INR: 2.83 ratio           Urinalysis Basic - ( 2021 03:57 )    Color: Yellow / Appearance: Slightly Turbid / S.020 / pH: x  Gluc: x / Ketone: Negative  / Bili: Negative / Urobili: Negative   Blood: x / Protein: 100 / Nitrite: Negative   Leuk Esterase: Large / RBC: 11 /hpf /  /HPF   Sq Epi: x / Non Sq Epi: 1 /hpf / Bacteria: Many        CAPILLARY BLOOD GLUCOSE          RADIOLOGY & ADDITIONAL TESTS:    Imaging Personally Reviewed:  [ ] YES  [ ] NO    < from: TTE with Doppler (w/Cont) (21 @ 15:21) >  Conclusions:  1. Mitral annular calcification, otherwise normal mitral  valve. Moderate mitral regurgitation.  PISA radius = 0.8  cm. RVOL = 32 ml, EROA = 29 mm2.  2. Eccentric left ventricular hypertrophy (dilated left  ventricle with normal relative wall thickness).  3. Severe global left ventricular systolic dysfunction with  regional variation. Endocardial visualization enhanced with  intravenous injection of Ultrasonic Enhancing Agent  (Definity).  No left ventricular thrombus.  4. Normal right ventricular size and function.  5. Estimated right ventricular systolic pressure equals 33  mm Hg, assuming right atrial pressure equals 8 mm Hg,  consistent with normal pulmonary pressures.  EF 20-25%  < end of copied text >

## 2021-06-17 NOTE — PROGRESS NOTE ADULT - SUBJECTIVE AND OBJECTIVE BOX
[facility] - Caleb Ochoa, Neetu, Jessica, Milka, Kimberley Mars  Office Number:  [phone]    Patient resting comfortably in bed in NAD.  Laying flat with no respiratory distress.  No complaints of chest pain, dyspnea, palpitations, PND, or orthopnea.  remains on oxygen via NC    ROS: negative unless otherwise mentioned.    Telemetry:  af    MEDICATIONS  (STANDING):  atorvastatin 40 milliGRAM(s) Oral at bedtime  cefTRIAXone   IVPB 1000 milliGRAM(s) IV Intermittent every 24 hours  diltiazem    milliGRAM(s) Oral <User Schedule>  levothyroxine 137 MICROGram(s) Oral daily  metoprolol tartrate 25 milliGRAM(s) Oral two times a day    MEDICATIONS  (PRN):  ALBUTerol    90 MICROgram(s) HFA Inhaler 2 Puff(s) Inhalation every 6 hours PRN Shortness of Breath and/or Wheezing      Allergies    No Known Allergies    Intolerances        Vital Signs Last 24 Hrs  T(C): 37.2 (17 Jun 2021 05:05), Max: 37.7 (16 Jun 2021 11:19)  T(F): 98.9 (17 Jun 2021 05:05), Max: 99.8 (16 Jun 2021 11:19)  HR: 85 (17 Jun 2021 05:05) (85 - 120)  BP: 103/59 (17 Jun 2021 05:05) (103/59 - 118/65)  BP(mean): --  RR: 18 (17 Jun 2021 05:05) (18 - 26)  SpO2: 92% (17 Jun 2021 05:05) (92% - 97%)    I&O's Summary    16 Jun 2021 07:01  -  17 Jun 2021 07:00  --------------------------------------------------------  IN: 360 mL / OUT: 900 mL / NET: -540 mL        ON EXAM:    General: NAD, awake and alert, oriented x 3  HEENT: Mucous membranes are moist, anicteric  Lungs: Non-labored, breath sounds are coarse bilaterally, No wheezing, rales or rhonchi  Cardiovascular: irregular, S1 and S2, no murmurs, rubs, or gallops  Gastrointestinal: Bowel Sounds present, soft, nontender.   Lymph: No peripheral edema. No lymphadenopathy.  Skin: No rashes or ulcers  Psych:  Mood & affect appropriate    LABS: All Labs Reviewed:                        13.5   11.70 )-----------( 160      ( 17 Jun 2021 07:03 )             41.3                         14.8   11.67 )-----------( 159      ( 16 Jun 2021 08:42 )             44.5                         15.5   13.16 )-----------( 195      ( 15 Truong 2021 17:13 )             45.7     17 Jun 2021 07:02    138    |  102    |  43     ----------------------------<  107    3.2     |  19     |  1.94   16 Jun 2021 08:42    140    |  103    |  37     ----------------------------<  119    3.5     |  20     |  1.70   15 Truong 2021 17:13    140    |  107    |  35     ----------------------------<  118    3.6     |  21     |  1.80     Ca    8.6        17 Jun 2021 07:02  Ca    8.9        16 Jun 2021 08:42  Ca    8.8        15 Truong 2021 17:13  Phos  2.2       17 Jun 2021 07:02  Phos  3.0       16 Jun 2021 08:42  Mg     1.9       17 Jun 2021 07:02  Mg     1.9       16 Jun 2021 08:42    TPro  6.9    /  Alb  3.3    /  TBili  0.4    /  DBili  x      /  AST  47     /  ALT  50     /  AlkPhos  68     17 Jun 2021 07:02  TPro  7.1    /  Alb  3.8    /  TBili  0.5    /  DBili  x      /  AST  51     /  ALT  54     /  AlkPhos  66     16 Jun 2021 08:42  TPro  8.1    /  Alb  3.6    /  TBili  0.7    /  DBili  x      /  AST  30     /  ALT  32     /  AlkPhos  64     15 Truong 2021 17:13    PT/INR - ( 17 Jun 2021 07:03 )   PT: 20.9 sec;   INR: 1.79 ratio         PTT - ( 17 Jun 2021 07:03 )  PTT:30.2 sec  CARDIAC MARKERS ( 15 Truong 2021 17:13 )  1.150 ng/mL / x     / x     / x     / 1.1 ng/mL      Blood Culture: Organism --  Gram Stain Blood -- Gram Stain --  Specimen Source .Blood Blood-Peripheral  Culture-Blood --      06-15 @ 17:13  Pro Bnp [id number]    06-16 @ 09:37  TSH: 4.86  06-15 @ 17:13  TSH: 2.66

## 2021-06-17 NOTE — PROGRESS NOTE ADULT - ASSESSMENT
Fletcher is a 76 year old male with a history of hypertension, hypercholesterolemia, hypothyroidism, bladder cancer and permanent atrial fibrillation on Coumadin s/p unsuccessful cardioversion in the past, HFrEF 45% w mild MR who initially presents with AF with RVR.    - Had rapid AF, then paroxysmal rapid wct either vt or aberrated af. He was hypotensive and cardioverted.  - currently in generally rate controlled af  - cont with metoprolol at current dose  - cont with diltiazem  - off amiodarone  - hold coumadin  - start heparin gtt for ac in preparation for cath  - ep evaluation appreciated, for eventual eps/icd after ischemic evaluation    - no sign of significant volume overload on exam  - hold off on diuretics in setting of gloria on ckd  - known borderline depressed lvef, with an ef of ~45 by echo 2019 and by nuc stress 2017  - ef now worse (20-25%, with mod MR), though s/p cardioversion  - will plan for cardiac cath now that INR is <2. Renal function is trending in wrong direction, and likely some gloria on ckd in the setting of hypotension.  - hopefully creatinine will stabilize tomorrow off of diuretics/losartan and will schedule cath with Dr. Mireles for 6/18  - patient is aware of plan and agrees    - fever on admission  - cont abx for uti    - replete electrolytes to k>4, Mg>2  - will follow with you

## 2021-06-17 NOTE — PROGRESS NOTE ADULT - ATTENDING COMMENTS
Patient is a 76 year old male with PMH atrial fibrillation, bladder CA, HTN, HLD, hypothyroidism and chronic bronchitis who initially presented to an outside hospital for shortness of breath and rapid heart beat. He was found to be in atrial fibrillation with RVR and failed medical treatment requiring cardioversion which improved his heart rate. He was transferred to [facility] for EP and interventional cardiology evaluation. He was evaluated by the cardiology and EP teams and it was determined that the patient more likely had VT than atrial fibrillation. His echocardiogram has a new EF of 20-25%. He is having an ischemic workup with a cardiac catheretization tomorrow. He will have a VT ablation and secondary prevention ICD either after the cardiac catheterization or on Monday 6/21. Continue ceftriaxone for UTI and follow up urine culture results. Rest of plan as above.    Brian Convissar, DO  Pager [phone]  If no answer [phone]

## 2021-06-18 DIAGNOSIS — E03.9 HYPOTHYROIDISM, UNSPECIFIED: ICD-10-CM

## 2021-06-18 DIAGNOSIS — I48.91 UNSPECIFIED ATRIAL FIBRILLATION: ICD-10-CM

## 2021-06-18 DIAGNOSIS — I25.10 ATHEROSCLEROTIC HEART DISEASE OF NATIVE CORONARY ARTERY WITHOUT ANGINA PECTORIS: ICD-10-CM

## 2021-06-18 LAB
ALBUMIN SERPL ELPH-MCNC: 3.2 G/DL — LOW (ref 3.3–5)
ALP SERPL-CCNC: 75 U/L — SIGNIFICANT CHANGE UP (ref 40–120)
ALT FLD-CCNC: 47 U/L — HIGH (ref 10–45)
ANION GAP SERPL CALC-SCNC: 14 MMOL/L — SIGNIFICANT CHANGE UP (ref 5–17)
APTT BLD: 59.6 SEC — HIGH (ref 27.5–35.5)
APTT BLD: 76 SEC — HIGH (ref 27.5–35.5)
AST SERPL-CCNC: 33 U/L — SIGNIFICANT CHANGE UP (ref 10–40)
BILIRUB SERPL-MCNC: 0.4 MG/DL — SIGNIFICANT CHANGE UP (ref 0.2–1.2)
BLD GP AB SCN SERPL QL: NEGATIVE — SIGNIFICANT CHANGE UP
BUN SERPL-MCNC: 42 MG/DL — HIGH (ref 7–23)
CALCIUM SERPL-MCNC: 8.7 MG/DL — SIGNIFICANT CHANGE UP (ref 8.4–10.5)
CHLORIDE SERPL-SCNC: 104 MMOL/L — SIGNIFICANT CHANGE UP (ref 96–108)
CO2 SERPL-SCNC: 21 MMOL/L — LOW (ref 22–31)
CREAT SERPL-MCNC: 1.46 MG/DL — HIGH (ref 0.5–1.3)
GLUCOSE SERPL-MCNC: 115 MG/DL — HIGH (ref 70–99)
HCT VFR BLD CALC: 40.1 % — SIGNIFICANT CHANGE UP (ref 39–50)
HGB BLD-MCNC: 13.6 G/DL — SIGNIFICANT CHANGE UP (ref 13–17)
INR BLD: 1.49 RATIO — HIGH (ref 0.88–1.16)
MAGNESIUM SERPL-MCNC: 2.2 MG/DL — SIGNIFICANT CHANGE UP (ref 1.6–2.6)
MCHC RBC-ENTMCNC: 28.6 PG — SIGNIFICANT CHANGE UP (ref 27–34)
MCHC RBC-ENTMCNC: 33.9 GM/DL — SIGNIFICANT CHANGE UP (ref 32–36)
MCV RBC AUTO: 84.2 FL — SIGNIFICANT CHANGE UP (ref 80–100)
NRBC # BLD: 0 /100 WBCS — SIGNIFICANT CHANGE UP (ref 0–0)
PHOSPHATE SERPL-MCNC: 2.4 MG/DL — LOW (ref 2.5–4.5)
PLATELET # BLD AUTO: 160 K/UL — SIGNIFICANT CHANGE UP (ref 150–400)
POTASSIUM SERPL-MCNC: 3.5 MMOL/L — SIGNIFICANT CHANGE UP (ref 3.5–5.3)
POTASSIUM SERPL-SCNC: 3.5 MMOL/L — SIGNIFICANT CHANGE UP (ref 3.5–5.3)
PROT SERPL-MCNC: 7 G/DL — SIGNIFICANT CHANGE UP (ref 6–8.3)
PROTHROM AB SERPL-ACNC: 17.5 SEC — HIGH (ref 10.6–13.6)
RBC # BLD: 4.76 M/UL — SIGNIFICANT CHANGE UP (ref 4.2–5.8)
RBC # FLD: 14.6 % — HIGH (ref 10.3–14.5)
RH IG SCN BLD-IMP: POSITIVE — SIGNIFICANT CHANGE UP
SODIUM SERPL-SCNC: 139 MMOL/L — SIGNIFICANT CHANGE UP (ref 135–145)
WBC # BLD: 9.18 K/UL — SIGNIFICANT CHANGE UP (ref 3.8–10.5)
WBC # FLD AUTO: 9.18 K/UL — SIGNIFICANT CHANGE UP (ref 3.8–10.5)

## 2021-06-18 PROCEDURE — 93458 L HRT ARTERY/VENTRICLE ANGIO: CPT | Mod: 26

## 2021-06-18 PROCEDURE — 99222 1ST HOSP IP/OBS MODERATE 55: CPT

## 2021-06-18 PROCEDURE — 99233 SBSQ HOSP IP/OBS HIGH 50: CPT | Mod: GC

## 2021-06-18 PROCEDURE — 99152 MOD SED SAME PHYS/QHP 5/>YRS: CPT

## 2021-06-18 PROCEDURE — 99233 SBSQ HOSP IP/OBS HIGH 50: CPT

## 2021-06-18 PROCEDURE — 99232 SBSQ HOSP IP/OBS MODERATE 35: CPT

## 2021-06-18 RX ORDER — METOPROLOL TARTRATE 50 MG
50 TABLET ORAL
Refills: 0 | Status: DISCONTINUED | OUTPATIENT
Start: 2021-06-18 | End: 2021-06-22

## 2021-06-18 RX ORDER — SODIUM,POTASSIUM PHOSPHATES 278-250MG
2 POWDER IN PACKET (EA) ORAL ONCE
Refills: 0 | Status: COMPLETED | OUTPATIENT
Start: 2021-06-18 | End: 2021-06-18

## 2021-06-18 RX ORDER — HEPARIN SODIUM 5000 [USP'U]/ML
1000 INJECTION INTRAVENOUS; SUBCUTANEOUS
Qty: 25000 | Refills: 0 | Status: DISCONTINUED | OUTPATIENT
Start: 2021-06-18 | End: 2021-06-23

## 2021-06-18 RX ORDER — POTASSIUM CHLORIDE 20 MEQ
40 PACKET (EA) ORAL ONCE
Refills: 0 | Status: COMPLETED | OUTPATIENT
Start: 2021-06-18 | End: 2021-06-18

## 2021-06-18 RX ADMIN — CEFTRIAXONE 100 MILLIGRAM(S): 500 INJECTION, POWDER, FOR SOLUTION INTRAMUSCULAR; INTRAVENOUS at 05:33

## 2021-06-18 RX ADMIN — Medication 137 MICROGRAM(S): at 05:20

## 2021-06-18 RX ADMIN — HEPARIN SODIUM 1700 UNIT(S)/HR: 5000 INJECTION INTRAVENOUS; SUBCUTANEOUS at 09:44

## 2021-06-18 RX ADMIN — Medication 40 MILLIEQUIVALENT(S): at 09:45

## 2021-06-18 RX ADMIN — Medication 25 MILLIGRAM(S): at 05:20

## 2021-06-18 RX ADMIN — Medication 2 PACKET(S): at 09:55

## 2021-06-18 RX ADMIN — Medication 180 MILLIGRAM(S): at 05:20

## 2021-06-18 RX ADMIN — HEPARIN SODIUM 1700 UNIT(S)/HR: 5000 INJECTION INTRAVENOUS; SUBCUTANEOUS at 02:07

## 2021-06-18 RX ADMIN — HEPARIN SODIUM 1000 UNIT(S)/HR: 5000 INJECTION INTRAVENOUS; SUBCUTANEOUS at 20:16

## 2021-06-18 RX ADMIN — ATORVASTATIN CALCIUM 40 MILLIGRAM(S): 80 TABLET, FILM COATED ORAL at 21:13

## 2021-06-18 RX ADMIN — Medication 50 MILLIGRAM(S): at 17:07

## 2021-06-18 NOTE — PROGRESS NOTE ADULT - PROBLEM SELECTOR PLAN 10
- DVT PPx: coumadin will be held in preparation for angiogram but currently therapeutic INR; heparin drip  - Diet: DASH/TLC  - Dispo: Will need PT consult eventually - DVT PPx: coumadin will be held in preparation for angiogram but currently therapeutic INR; heparin drip  - Diet: DASH/TLC  - Dispo: no skilled PT needs

## 2021-06-18 NOTE — PROGRESS NOTE ADULT - SUBJECTIVE AND OBJECTIVE BOX
[facility] - Caleb Ochoa, Neetu, Jessica, Milka, Kimberley Mars  Office Number:  [phone]    Patient resting comfortably in bed in NAD.  Laying flat with no respiratory distress.  No complaints of chest pain, dyspnea, palpitations, PND, or orthopnea.    F/U for:  HTN    Telemetry: Rate controlled AF     MEDICATIONS  (STANDING):  atorvastatin 40 milliGRAM(s) Oral at bedtime  cefTRIAXone   IVPB 1000 milliGRAM(s) IV Intermittent every 24 hours  diltiazem    milliGRAM(s) Oral <User Schedule>  heparin  Infusion.  Unit(s)/Hr (19 mL/Hr) IV Continuous <Continuous>  lactated ringers. 500 milliLiter(s) (100 mL/Hr) IV Continuous <Continuous>  levothyroxine 137 MICROGram(s) Oral daily  metoprolol tartrate 25 milliGRAM(s) Oral two times a day    MEDICATIONS  (PRN):  ALBUTerol    90 MICROgram(s) HFA Inhaler 2 Puff(s) Inhalation every 6 hours PRN Shortness of Breath and/or Wheezing  heparin   Injectable 9000 Unit(s) IV Push every 6 hours PRN For aPTT less than 40  heparin   Injectable 4000 Unit(s) IV Push every 6 hours PRN For aPTT between 40 - 57      Allergies    No Known Allergies          Vital Signs Last 24 Hrs  T(C): 36.6 (18 Jun 2021 04:55), Max: 37.6 (17 Jun 2021 21:02)  T(F): 97.8 (18 Jun 2021 04:55), Max: 99.6 (17 Jun 2021 21:02)  HR: 101 (18 Jun 2021 04:55) (52 - 112)  BP: 122/74 (18 Jun 2021 04:55) (122/74 - 169/79)  BP(mean): --  RR: 18 (18 Jun 2021 04:55) (18 - 18)  SpO2: 94% (18 Jun 2021 04:55) (94% - 99%)    I&O's Summary    17 Jun 2021 07:01  -  18 Jun 2021 07:00  --------------------------------------------------------  IN: 910 mL / OUT: 1500 mL / NET: -590 mL        ON EXAM:    General: NAD, awake and alert, oriented x 3  HEENT: Mucous membranes are moist, anicteric  Lungs: Non-labored, breath sounds are coarse bilaterally, No wheezing, rales or rhonchi  Cardiovascular: irregular, S1 and S2, no murmurs, rubs, or gallops  Gastrointestinal: Bowel Sounds present, soft, nontender.   Lymph: No peripheral edema. No lymphadenopathy.  Skin: No rashes or ulcers  Psych:  Mood & affect appropriate    LABS: All Labs Reviewed:                        13.6   12.50 )-----------( 155      ( 17 Jun 2021 17:08 )             40.0                         13.5   11.70 )-----------( 160      ( 17 Jun 2021 07:03 )             41.3                         14.8   11.67 )-----------( 159      ( 16 Jun 2021 08:42 )             44.5     17 Jun 2021 07:02    138    |  102    |  43     ----------------------------<  107    3.2     |  19     |  1.94   16 Jun 2021 08:42    140    |  103    |  37     ----------------------------<  119    3.5     |  20     |  1.70   15 Truong 2021 17:13    140    |  107    |  35     ----------------------------<  118    3.6     |  21     |  1.80     Ca    8.6        17 Jun 2021 07:02  Ca    8.9        16 Jun 2021 08:42  Ca    8.8        15 Truong 2021 17:13  Phos  2.2       17 Jun 2021 07:02  Phos  3.0       16 Jun 2021 08:42  Mg     1.9       17 Jun 2021 07:02  Mg     1.9       16 Jun 2021 08:42    TPro  6.9    /  Alb  3.3    /  TBili  0.4    /  DBili  x      /  AST  47     /  ALT  50     /  AlkPhos  68     17 Jun 2021 07:02  TPro  7.1    /  Alb  3.8    /  TBili  0.5    /  DBili  x      /  AST  51     /  ALT  54     /  AlkPhos  66     16 Jun 2021 08:42  TPro  8.1    /  Alb  3.6    /  TBili  0.7    /  DBili  x      /  AST  30     /  ALT  32     /  AlkPhos  64     15 Truong 2021 17:13    PT/INR - ( 17 Jun 2021 07:03 )   PT: 20.9 sec;   INR: 1.79 ratio         PTT - ( 18 Jun 2021 01:20 )  PTT:76.0 sec      Blood Culture: Organism --  Gram Stain Blood -- Gram Stain --  Specimen Source .Urine Clean Catch (Midstream)  Culture-Blood --    Organism --  Gram Stain Blood -- Gram Stain --  Specimen Source .Blood Blood-Peripheral  Culture-Blood --      06-15 @ 17:13  Pro Bnp [id number]    06-16 @ 09:37  TSH: 4.86  06-15 @ 17:13  TSH: 2.66

## 2021-06-18 NOTE — CONSULT NOTE ADULT - PROBLEM SELECTOR RECOMMENDATION 9
Cardiology following  Continue pre-op cardiac surgery workup  Check PFTs and pre-op lab workup  Continue asa, beta-blocker, statin  Hold Coumadin, continue Heparin drip  Dr. Harding to review cath films and determine OR date

## 2021-06-18 NOTE — PROGRESS NOTE ADULT - ASSESSMENT
76M with persistent afib on coumadin s/p 2 failed DCCV in the past, bladder cancer s/p urostomy, HTN, HLD, chronic bronchitis, hypothyroidism who presented for SOB and palpitations, and sent into the hospital for afib with RVR. Patient had WCT at [facility], and was transferred for further evaluation.     Wide complex tachycardia: Most likely to be VT rather than AF/FL with aberrancy. Patient denies any LOC. s/p DCCV in [facility].   - echo with worsening LV dysfunction compared to prior (now severe LV dysfunction as opposed to 45% LVEF prior)  - on lopressor 25 BID and dilt 180 BID - if rate uncontrolled, can give 5 IV lopressor or increase lopressor to 50 BID   - losartan/hctz on hold for now, which is reasonable, however would ideally be discharging on losartan for GDMT for HF  - LHC with Dr. Kirit Mireles today - patient's coumadin for afib is held and is on heparin gtt  - plan for VT ablation and secondary prevention ICD (pending LH)    Persistent afib on coumadin s/p 2 failed DCCV: [id number] 3 (HTN, agex2)  - coumadin held for LHC, on heparin gtt  - continue rate control as above    Bethany Jones MD  Cardiology Fellow, PGY-4  [phone]  For all other Cardiology service contact information, go to [email or web address] and use "[email or web address]" to login. 76M with persistent afib on coumadin s/p 2 failed DCCV in the past, bladder cancer s/p urostomy, HTN, HLD, chronic bronchitis, hypothyroidism who presented for SOB and palpitations, and sent into the hospital for afib with RVR. Patient had WCT at [place] [facility], and was transferred for further evaluation.     Wide complex tachycardia: Most likely to be VT rather than AF/FL with aberrancy. Patient denies any LOC. s/p DCCV in [place] [facility].   - echo with worsening LV dysfunction compared to prior (now severe LV dysfunction as opposed to 45% LVEF prior)  - on lopressor 25 BID and dilt 180 BID - please discontinue dilt 180 BID and uptitrate lopressor (at least 50 BID, however may need even higher doses) as patient has severe LV dysfunction  - losartan/hctz on hold for now, which is reasonable, however would ideally be discharging on losartan for GDMT for HF  - LHC with Dr. Kirit Mireles today - patient's coumadin for afib is held and is on heparin gtt  - plan for VT ablation +/- secondary prevention ICD on Monday 6/21    Persistent afib on coumadin s/p 2 failed DCCV: [id number] 3 (HTN, agex2)  - coumadin held for LHC, on heparin gtt  - continue rate control as above    Bethany Jones MD  Cardiology Fellow, PGY-4  [phone]  For all other Cardiology service contact information, go to [email or web address] and use "[email or web address]" to login.

## 2021-06-18 NOTE — PROGRESS NOTE ADULT - ASSESSMENT
Fletcher is a 76 year old male with a history of hypertension, hypercholesterolemia, hypothyroidism, bladder cancer and permanent atrial fibrillation on Coumadin s/p unsuccessful cardioversion in the past, HFrEF 45% w mild MR who initially presents with AF with RVR.    - Had rapid AF, then paroxysmal rapid wct either vt or aberrated af. He was hypotensive and cardioverted.  - currently in generally rate controlled af  - cont with metoprolol at current dose  - cont with diltiazem  - off amiodarone  - hold coumadin  - on heparin gtt for ac in preparation for cath  - ep evaluation appreciated, for eventual eps/icd after ischemic evaluation    - no sign of significant volume overload on exam  - hold off on diuretics in setting of gloria on ckd  - known borderline depressed lvef, with an ef of ~45 by echo 2019 and by nuc stress 2017  - ef now worse (20-25%, with mod MR), though s/p cardioversion  - will plan for cardiac cath now that INR is <2. Renal function is trending in wrong direction, and likely some gloria on ckd in the setting of hypotension.  - hopefully creatinine will stabilize today off of diuretics/losartan and will schedule cath with Dr. Mierles.  Will discuss with him  - patient is aware of plan and agrees    - fever on admission  - cont abx for uti    - replete electrolytes to k>4, Mg>2  - will follow with you

## 2021-06-18 NOTE — CONSULT NOTE ADULT - SUBJECTIVE AND OBJECTIVE BOX
History of Present Illness:  HPI:  BRIGITTE is a 75 y/o male with a PMH of Afib, bladder CA, hyperlipidemia, HTN, chronic bronchitis, and hypothyroidism presenting with rapid heart rate and worsening shortness of breath after being transferred from an outside hospital due to found Afib with RVR.    The patient was in his normal state of health until about a week ago when he noticed he was coming down with a cough and nasal/chest congestion. The cough is productive of brown sputum. He states that this has happened before and he's been treated for bronchitis ~2x a year, every year. It usually resolves with his home inhaler and/or antibiotics prescribed by his PCP. Throughout the week, the cough did not worsen nor improve. He reports no other symptoms at that time other than feeling more fatigued than usual.     Yesterday, the pt became more short of breath and felt a rapid heart beat. He was sent to the [place] ED when his PCP found him to be in Afib with RVR. The ED confirmed this finding and he was given dilt gtt. His HR was sustained at 200 bpm with hypotension even after initial treatment and failed adenosine and he required synchronized cardioversion with improved HR to 120-150. During this time, he was also found to have a temp of 102F. He reports not feeling any fever prior to this. He was transferred to [facility] for further evaluation. The patient denies any chest pain, abdominal pain, diaphoresis, N/V, leg swelling, headache, changes in vision, or changes in bowel movement. As of today, his only remaining symptoms are a mild cough. He states that both his SOB and feelings of rapid heart beat have resolved.    Of note, the patient has a longstanding history of Afib that failed cardioversion 2 years ago and is now being pharmacologically managed with diltiazem and toprol. His other PMH includes HTN and hyperlipidemia, diagnosed 20 years ago, and hypothyroidism, for which he also takes home medications. He reports compliancy with all medications. 10 years ago, he was diagnosed with bladder CA and had a urostomy placed at that time (along with prostate removal). The urostomy is still in place and he typically changes it twice a week. He reports seeing no changes in the urine this week and says that it is normal for it to be cloudy/full of mucus. He is not able to feel any changes in urination (i.e. burning). His family history is significant for MI in both his mother and father in their 70s.     The patient has a 20 year pack history of smoking and quit in 1985. He has not smoked since. He drinks alcohol occasionally but is limited to 1 glass of wine a day at most. He does not take any recreational drugs. He lives with his wife of 50 years and adult son. He is occasionally sexually active with his wife only. He reports exercising 2x a week and tries to stay physically active since retiring 8 years ago. His wife and children are healthy and he recalls no prior sick contacts. He received both doses of the Pzifer COVID-19 vaccination.     In the ED:   Vitals: T 102.3, , /75, RR 16, SpO2 97% on 2L NC  s/p tylenol 975mg, 1g ceftriaxone, amiodarone 150mg IV, brief amio infusion  (16 Jun 2021 09:37)       Past Medical History  Atrial fibrillation    Hypothyroid    Hypertension    Bladder cancer    Bronchitis    Former smoker        Past Surgical History  History of bladder surgery  on urostomy        MEDICATIONS  (STANDING):  atorvastatin 40 milliGRAM(s) Oral at bedtime  cefTRIAXone   IVPB 1000 milliGRAM(s) IV Intermittent every 24 hours  heparin  Infusion. 1000 Unit(s)/Hr (10 mL/Hr) IV Continuous <Continuous>  levothyroxine 137 MICROGram(s) Oral daily  metoprolol tartrate 50 milliGRAM(s) Oral two times a day    MEDICATIONS  (PRN):  ALBUTerol    90 MICROgram(s) HFA Inhaler 2 Puff(s) Inhalation every 6 hours PRN Shortness of Breath and/or Wheezing      Vital Signs Last 24 Hrs  T(C): 36.8 (06-18-21 @ 14:40), Max: 37.6 (06-17-21 @ 21:02)  T(F): 98.2 (06-18-21 @ 14:40), Max: 99.6 (06-17-21 @ 21:02)  HR: 96 (06-18-21 @ 17:50) (92 - 109)  BP: 121/76 (06-18-21 @ 17:50) (112/73 - 140/64)  RR: 17 (06-18-21 @ 17:50) (16 - 20)  SpO2: 98% (06-18-21 @ 17:50) (93% - 99%)           Daily     Daily   Admit Wt: Drug Dosing Weight  Height (cm): 177.8 (15 Truong 2021 21:42)  Weight (kg): 108.9 (15 Truong 2021 21:42)  BMI (kg/m2): 34.4 (15 Truong 2021 21:42)  BSA (m2): 2.26 (15 Truong 2021 21:42)    Allergies: No Known Allergies      SOCIAL HISTORY:  Smoker: [ ] Yes  [X] No                 Pt denies  ETOH use: [ ] Yes  [X] No             Pt denies  Ilicit Drug use:  [ ] Yes  [X] No     Pt denies    FAMILY HISTORY:  Family history of heart attack (Father, Mother)        Review of Systems  GENERAL:  no weakness, fatigue, fevers or chills  NEURO: no dizziness, numbness, tingling or weakness  SKIN: no itching, burning, rashes, or lesions   HEENT: no visual changes;  no headache, no vertigo, no recent colds  RESPIRATORY: no shortness of breath, no cough, sputum, wheezing  CARDIOVASCULAR:  no chest pain,  or palpitations  GI: no abd pain. no N/V/D.  PERIPHERAL VASCULAR: no swelling, no tenderness, no erythema    PHYSICAL EXAM  General: Well nourished, well developed, NAD.                                              Neuro: Normal exam oriented to person/place & time with no focal motor or sensory  deficits.                    Eyes: Normal exam of conjunctiva & lids, pupils equally reactive.   ENT: Normal exam of nasal/oral mucosa with absence of cyanosis.   Neck: Normal exam of jugular veins, trachea & thyroid.   Chest: Normal lung exam with good air movement absence of wheezes, rales, or rhonchi.                                                                         CV:  Auscultation: normal S1S2, RRR   Carotids: No Bruits[X]  Abdominal Aorta: normal [X] nonpalpable[X]                                                                         GI: Normal exam of abdomen with no noted masses or tenderness. +BSx4Q                                                                                            Extremities: Normal no evidence of cyanosis or deformity, Edema: none  Lower Extremity Pulses: Right[+2DP] Left[+2DP] Varicosities[none]  SKIN : Normal exam to inspection & palpation.                                                             LABS:                        13.6   9.18  )-----------( 160      ( 18 Jun 2021 07:52 )             40.1     139  |  104  |  42<H>  ----------------------------<  115<H>  3.5   |  21<L>  |  1.46<H>    Ca    8.7      18 Jun 2021 07:52  Phos  2.4     06-18  Mg     2.2     06-18    AST  33  /  ALT  47<H>  /  AlkPhos  75  06-18    PT/INR/PTT - ( 18 Jun 2021 07:52 )   PT: 17.5 sec;   INR: 1.49 ratio     PTT:59.6 sec      Cardiac cath 6/18: (prelim) LHC via RRA with dLM 40%; mLAD 60%; oCX 90%; oRCA 99% with collaterals from left.    < from: TTE with Doppler (w/Cont) (06.16.21 @ 15:21) >  Observations:  Mitral Valve: Mitral annular calcification, otherwise  normal mitral valve. Moderate mitral regurgitation.  PISA  radius = 0.8 cm. RVOL = 32 ml, EROA = 29 mm2.  Aortic Valve/Aorta: Aortic valve not well visualized;  appears calcified. Peak transaortic valve gradient equals 6  mm Hg, aortic valve velocity time integral equals 17 cm,  estimated aortic valve area equals 2.7 sqcm. Minimal aortic  regurgitation.  Peak left ventricular outflow tract  gradient equals 2 mm Hg, LVOT velocity time integral equals  11 cm.  Aortic Root: 3.7 cm.  LVOT diameter: 2.3 cm.  Left Atrium: Severely dilated left atrium.  LA volume index  = 54 cc/m2.  Left Ventricle: Severe global left ventricular systolic  dysfunction with regional variation. EF 20 - 25%. Endocardial  visualization enhanced with intravenous injection of  Ultrasonic Enhancing Agent (Definity).  No left ventricular  thrombus. Eccentric left ventricular hypertrophy (dilated  left ventricle with normal relative wall thickness). At  least Mild diastolic dysfunction (Stage I).  Right Heart: Normal right atrium. Normal right ventricular  size and function. Normal tricuspid valve. Mild tricuspid  regurgitation. Pulmonic valve not well visualized, probably  normal. Minimal pulmonic regurgitation.  Pericardium/Pleura: Normal pericardium with no pericardial  effusion.  Hemodynamic: Estimated right atrial pressure is 8 mm Hg.  Estimated right ventricular systolic pressure equals 33 mm  Hg, assuming right atrial pressure equals 8 mm Hg,  consistent with normal pulmonary pressures.         History of Present Illness:  This is a 77 y/o male with a PMH of Afib, bladder CA, hyperlipidemia, HTN, chronic bronchitis, and hypothyroidism presenting with rapid heart rate and worsening shortness of breath after being transferred from an outside hospital due to found Afib with RVR.    The patient was in his normal state of health until about a week ago when he noticed he was coming down with a cough and nasal/chest congestion. The cough is productive of brown sputum. He states that this has happened before and he's been treated for bronchitis ~2x a year, every year. It usually resolves with his home inhaler and/or antibiotics prescribed by his PCP. Throughout the week, the cough did not worsen nor improve. He reports no other symptoms at that time other than feeling more fatigued than usual.     Yesterday, the pt became more short of breath and felt a rapid heart beat. He was sent to the [place] ED when his PCP found him to be in Afib with RVR. The ED confirmed this finding and he was given dilt gtt. His HR was sustained at 200 bpm with hypotension even after initial treatment and failed adenosine and he required synchronized cardioversion with improved HR to 120-150. During this time, he was also found to have a temp of 102F. He reports not feeling any fever prior to this. He was transferred to [facility] for further evaluation. The patient denies any chest pain, abdominal pain, diaphoresis, N/V, leg swelling, headache, changes in vision, or changes in bowel movement. As of today, his only remaining symptoms are a mild cough. He states that both his SOB and feelings of rapid heart beat have resolved.    Of note, the patient has a longstanding history of Afib that failed cardioversion 2 years ago and is now being pharmacologically managed with diltiazem and toprol. His other PMH includes HTN and hyperlipidemia, diagnosed 20 years ago, and hypothyroidism, for which he also takes home medications. He reports compliancy with all medications. 10 years ago, he was diagnosed with bladder CA and had a urostomy placed at that time (along with prostate removal). The urostomy is still in place and he typically changes it twice a week. He reports seeing no changes in the urine this week and says that it is normal for it to be cloudy/full of mucus. He is not able to feel any changes in urination (i.e. burning). His family history is significant for MI in both his mother and father in their 70s.     The patient has a 20 year pack history of smoking and quit in 1985. He has not smoked since. He drinks alcohol occasionally but is limited to 1 glass of wine a day at most. He does not take any recreational drugs. He lives with his wife of 50 years and adult son. He is occasionally sexually active with his wife only. He reports exercising 2x a week and tries to stay physically active since retiring 8 years ago. His wife and children are healthy and he recalls no prior sick contacts. He received both doses of the Pzifer COVID-19 vaccination.     In the ED:   Vitals: T 102.3, , /75, RR 16, SpO2 97% on 2L NC  s/p tylenol 975mg, 1g ceftriaxone, amiodarone 150mg IV, brief amio infusion  (16 Jun 2021 09:37)       Past Medical History  Atrial fibrillation    Hypothyroid    Hypertension    Bladder cancer    Bronchitis    Former smoker        Past Surgical History  History of bladder surgery  on urostomy        MEDICATIONS  (STANDING):  atorvastatin 40 milliGRAM(s) Oral at bedtime  cefTRIAXone   IVPB 1000 milliGRAM(s) IV Intermittent every 24 hours  heparin  Infusion. 1000 Unit(s)/Hr (10 mL/Hr) IV Continuous <Continuous>  levothyroxine 137 MICROGram(s) Oral daily  metoprolol tartrate 50 milliGRAM(s) Oral two times a day    MEDICATIONS  (PRN):  ALBUTerol    90 MICROgram(s) HFA Inhaler 2 Puff(s) Inhalation every 6 hours PRN Shortness of Breath and/or Wheezing      Vital Signs Last 24 Hrs  T(C): 36.8 (06-18-21 @ 14:40), Max: 37.6 (06-17-21 @ 21:02)  T(F): 98.2 (06-18-21 @ 14:40), Max: 99.6 (06-17-21 @ 21:02)  HR: 96 (06-18-21 @ 17:50) (92 - 109)  BP: 121/76 (06-18-21 @ 17:50) (112/73 - 140/64)  RR: 17 (06-18-21 @ 17:50) (16 - 20)  SpO2: 98% (06-18-21 @ 17:50) (93% - 99%)             Height (cm): 177.8    Weight (kg): 108.9    BMI (kg/m2): 34.4    (15 Truong 2021 21:42)      Allergies: No Known Allergies      SOCIAL HISTORY:  , lives with spouse, retired   Smoker: [X] Yes  [ ] No                 Former smoker, quit 1985, 1PPD x 30 years  ETOH use: [ ] Yes  [X] No              Social ETOH  Ilicit Drug use:  [ ] Yes  [X] No       Pt denies    FAMILY HISTORY:  Family history of heart attack (Father, Mother)        Review of Systems  GENERAL:  no weakness, fatigue, fevers or chills  NEURO: no dizziness, numbness, tingling or weakness  SKIN: no itching, burning, rashes, or lesions   HEENT: no visual changes;  no headache, no vertigo, no recent colds  RESPIRATORY: no shortness of breath, no cough, sputum, wheezing  CARDIOVASCULAR:  no chest pain,  or palpitations  GI: no abd pain. no N/V/D.  PERIPHERAL VASCULAR: no swelling, no tenderness, no erythema    PHYSICAL EXAM  General: Well nourished, well developed, NAD.                                              Neuro: Normal exam oriented to person/place & time with no focal motor or sensory  deficits.                    Eyes: Normal exam of conjunctiva & lids, pupils equally reactive.   ENT: Normal exam of nasal/oral mucosa with absence of cyanosis.   Neck: Normal exam of jugular veins, trachea & thyroid.   Chest: Normal lung exam with good air movement absence of wheezes, rales, or rhonchi.                                                                         CV:  Auscultation: normal S1S2, RRR   Carotids: No Bruits[X]  Abdominal Aorta: normal [X] nonpalpable[X]                                                                         GI: Normal exam of abdomen with no noted masses or tenderness. +BSx4Q                                                                                            Extremities: Normal no evidence of cyanosis or deformity, Edema: none  Lower Extremity Pulses: Right[+2DP] Left[+2DP] Varicosities[none]  SKIN : Normal exam to inspection & palpation. Right radial incision with DSD CDI, no bleeding, no hematoma.                                                            LABS:                        13.6   9.18  )-----------( 160      ( 18 Jun 2021 07:52 )             40.1     139  |  104  |  42<H>  ----------------------------<  115<H>  3.5   |  21<L>  |  1.46<H>    Ca    8.7      18 Jun 2021 07:52  Phos  2.4     06-18  Mg     2.2     06-18    AST  33  /  ALT  47<H>  /  AlkPhos  75  06-18    PT/INR/PTT - ( 18 Jun 2021 07:52 )   PT: 17.5 sec;   INR: 1.49 ratio     PTT:59.6 sec      Cardiac cath 6/18: (prelim) LHC via RRA with dLM 40%; mLAD 60%; oCX 90%; oRCA 99% with collaterals from left.    < from: TTE with Doppler (w/Cont) (06.16.21 @ 15:21) >  Observations:  Mitral Valve: Mitral annular calcification, otherwise  normal mitral valve. Moderate mitral regurgitation.  PISA  radius = 0.8 cm. RVOL = 32 ml, EROA = 29 mm2.  Aortic Valve/Aorta: Aortic valve not well visualized;  appears calcified. Peak transaortic valve gradient equals 6  mm Hg, aortic valve velocity time integral equals 17 cm,  estimated aortic valve area equals 2.7 sqcm. Minimal aortic  regurgitation.  Peak left ventricular outflow tract  gradient equals 2 mm Hg, LVOT velocity time integral equals  11 cm.  Aortic Root: 3.7 cm.  LVOT diameter: 2.3 cm.  Left Atrium: Severely dilated left atrium.  LA volume index  = 54 cc/m2.  Left Ventricle: Severe global left ventricular systolic  dysfunction with regional variation. EF 20 - 25%. Endocardial  visualization enhanced with intravenous injection of  Ultrasonic Enhancing Agent (Definity).  No left ventricular  thrombus. Eccentric left ventricular hypertrophy (dilated  left ventricle with normal relative wall thickness). At  least Mild diastolic dysfunction (Stage I).  Right Heart: Normal right atrium. Normal right ventricular  size and function. Normal tricuspid valve. Mild tricuspid  regurgitation. Pulmonic valve not well visualized, probably  normal. Minimal pulmonic regurgitation.  Pericardium/Pleura: Normal pericardium with no pericardial  effusion.  Hemodynamic: Estimated right atrial pressure is 8 mm Hg.  Estimated right ventricular systolic pressure equals 33 mm  Hg, assuming right atrial pressure equals 8 mm Hg,  consistent with normal pulmonary pressures.

## 2021-06-18 NOTE — PROGRESS NOTE ADULT - SUBJECTIVE AND OBJECTIVE BOX
Hayden Narayan  [facility]/Medicine/[id number]/[phone]    MARÍA ELENA BEAR  76y  Male      Patient is a 76y old  Male who presents with a chief complaint of Afib w/ RVR i/s/o fever (18 Jun 2021 07:13)      INTERVAL HPI/OVERNIGHT EVENTS: No acute events overnight.     REVIEW OF SYSTEMS:  CONSTITUTIONAL: No weakness, subjective fevers or chills  EYES/ENT: No visual changes;  No vertigo or throat pain   NECK: No pain or stiffness  RESPIRATORY: No cough, wheezing, hemoptysis; +shortness of breath  CARDIOVASCULAR: No chest pain; no palpitations  GASTROINTESTINAL: No abdominal or epigastric pain. No nausea, vomiting, or hematemesis; No diarrhea or constipation. No melena or hematochezia.  GENITOURINARY: No dysuria, frequency or hematuria  NEUROLOGICAL: No numbness or weakness  SKIN: No itching, rashes    Vital Signs Last 24 Hrs  T(C): 36.6 (18 Jun 2021 04:55), Max: 37.6 (17 Jun 2021 21:02)  T(F): 97.8 (18 Jun 2021 04:55), Max: 99.6 (17 Jun 2021 21:02)  HR: 101 (18 Jun 2021 04:55) (52 - 112)  BP: 122/74 (18 Jun 2021 04:55) (122/74 - 169/79)  BP(mean): --  RR: 18 (18 Jun 2021 04:55) (18 - 18)  SpO2: 94% (18 Jun 2021 04:55) (94% - 99%)    PHYSICAL EXAM:  GENERAL: No acute distress, obese  HEAD:  Atraumatic, Normocephalic  ENT: E  Neck supple, No JVD, moist mucosa  CHEST/LUNG: L. basilar crackles; No wheeze, equal breath sounds bilaterally   BACK: No spinal tenderness  HEART: irreg irreg rate and rhythm; No murmurs, rubs, or gallops  ABDOMEN: Soft, Nontender, Nondistended   EXTREMITIES:  No clubbing, cyanosis, or edema  PSYCH: Nl behavior, nl affect  NEUROLOGY: AAOx3, non-focal, cranial nerves intact  SKIN: Normal color, No rashes or lesions    Consultant(s) Notes Reviewed:  [x ] YES  [ ] NO  Care Discussed with Consultants/Other Providers [ x] YES  [ ] NO    LABS:                        13.6   12.50 )-----------( 155      ( 17 Jun 2021 17:08 )             40.0     06-17    138  |  102  |  43<H>  ----------------------------<  107<H>  3.2<L>   |  19<L>  |  1.94<H>    Ca    8.6      17 Jun 2021 07:02  Phos  2.2     06-17  Mg     1.9     06-17    TPro  6.9  /  Alb  3.3  /  TBili  0.4  /  DBili  x   /  AST  47<H>  /  ALT  50<H>  /  AlkPhos  68  06-17    PT/INR - ( 17 Jun 2021 07:03 )   PT: 20.9 sec;   INR: 1.79 ratio         PTT - ( 18 Jun 2021 01:20 )  PTT:76.0 sec      CAPILLARY BLOOD GLUCOSE          RADIOLOGY & ADDITIONAL TESTS:    Imaging Personally Reviewed:  [ ] YES  [ ] NO   Hayden Narayan  [facility]/Medicine/[id number]/[phone]    MARÍA ELENA BEAR  76y  Male      Patient is a 76y old  Male who presents with a chief complaint of Afib w/ RVR i/s/o fever (18 Jun 2021 07:13)      INTERVAL HPI/OVERNIGHT EVENTS: No acute events overnight.     REVIEW OF SYSTEMS:  CONSTITUTIONAL: No weakness, subjective fevers or chills  EYES/ENT: No visual changes;  No vertigo or throat pain   NECK: No pain or stiffness  RESPIRATORY: No cough, wheezing, hemoptysis; no shortness of breath  CARDIOVASCULAR: No chest pain; no palpitations  GASTROINTESTINAL: No abdominal or epigastric pain. No nausea, vomiting, or hematemesis; No diarrhea or constipation. No melena or hematochezia.  GENITOURINARY: No dysuria, frequency or hematuria  NEUROLOGICAL: No numbness or weakness  SKIN: No itching, rashes    Vital Signs Last 24 Hrs  T(C): 36.6 (18 Jun 2021 04:55), Max: 37.6 (17 Jun 2021 21:02)  T(F): 97.8 (18 Jun 2021 04:55), Max: 99.6 (17 Jun 2021 21:02)  HR: 101 (18 Jun 2021 04:55) (52 - 112)  BP: 122/74 (18 Jun 2021 04:55) (122/74 - 169/79)  BP(mean): --  RR: 18 (18 Jun 2021 04:55) (18 - 18)  SpO2: 94% (18 Jun 2021 04:55) (94% - 99%)    PHYSICAL EXAM:  GENERAL: No acute distress, obese  HEAD:  Atraumatic, Normocephalic  ENT: E  Neck supple, No JVD, moist mucosa  CHEST/LUNG: L. basilar crackles; No wheeze, equal breath sounds bilaterally   BACK: No spinal tenderness  HEART: irreg irreg rate and rhythm; No murmurs, rubs, or gallops  ABDOMEN: Soft, Nontender, Nondistended   EXTREMITIES:  No clubbing, cyanosis, or edema  PSYCH: Nl behavior, nl affect  NEUROLOGY: AAOx3, non-focal, cranial nerves intact  SKIN: Normal color, No rashes or lesions    Consultant(s) Notes Reviewed:  [x ] YES  [ ] NO  Care Discussed with Consultants/Other Providers [ x] YES  [ ] NO    LABS:                        13.6   12.50 )-----------( 155      ( 17 Jun 2021 17:08 )             40.0     06-17    138  |  102  |  43<H>  ----------------------------<  107<H>  3.2<L>   |  19<L>  |  1.94<H>    Ca    8.6      17 Jun 2021 07:02  Phos  2.2     06-17  Mg     1.9     06-17    TPro  6.9  /  Alb  3.3  /  TBili  0.4  /  DBili  x   /  AST  47<H>  /  ALT  50<H>  /  AlkPhos  68  06-17    PT/INR - ( 17 Jun 2021 07:03 )   PT: 20.9 sec;   INR: 1.79 ratio         PTT - ( 18 Jun 2021 01:20 )  PTT:76.0 sec      CAPILLARY BLOOD GLUCOSE          RADIOLOGY & ADDITIONAL TESTS:    Imaging Personally Reviewed:  [ ] YES  [ ] NO

## 2021-06-18 NOTE — PROGRESS NOTE ADULT - PROBLEM SELECTOR PLAN 5
- hx of CHF w/ EF~45% @2019  - on lopressor and dilt for now  - hold hctz-losartan for now  - TTE 6/17 suggestive of EF of 20-25% s/p cardioversion w/ concern for ischemic event vs tachycardia mediated cardimyopathy

## 2021-06-18 NOTE — PROGRESS NOTE ADULT - ATTENDING COMMENTS
Patient is a 76 year old male with PMH atrial fibrillation, bladder CA, HTN, HLD, hypothyroidism and chronic bronchitis who initially presented to an outside hospital for shortness of breath and rapid heart beat. He was found to be in wide complex tachycardia and failed medical treatment requiring cardioversion which improved his heart rate. He was transferred to [facility] for EP and interventional cardiology evaluation. His echocardiogram has a new EF of 20-25%. He is having an ischemic workup with a cardiac catheretization today, will follow up results. Depending on the results of his catheterization, he may have a VT ablation and secondary prevention ICD as well. Continue ceftriaxone for UTI and follow up urine culture results. Rest of plan as above.    Brian Convissar, DO  Pager [phone]  If no answer [phone]

## 2021-06-18 NOTE — CONSULT NOTE ADULT - PROBLEM SELECTOR RECOMMENDATION 2
EP following  Coumadin on hold  Continue AC on heparin drip  Continue metoprolol 50 BID  - plan for VT ablation +/- secondary prevention ICD on Monday 6/21

## 2021-06-18 NOTE — PROGRESS NOTE ADULT - SUBJECTIVE AND OBJECTIVE BOX
Patient seen and examined at bedside.    Overnight Events: Patient feels well, no CP, SOB, palpitations. Tele reviewed, afib rate controlled, no VT.     Current Meds:  ALBUTerol    90 MICROgram(s) HFA Inhaler 2 Puff(s) Inhalation every 6 hours PRN  atorvastatin 40 milliGRAM(s) Oral at bedtime  cefTRIAXone   IVPB 1000 milliGRAM(s) IV Intermittent every 24 hours  diltiazem    milliGRAM(s) Oral <User Schedule>  heparin   Injectable 9000 Unit(s) IV Push every 6 hours PRN  heparin   Injectable 4000 Unit(s) IV Push every 6 hours PRN  heparin  Infusion.  Unit(s)/Hr IV Continuous <Continuous>  levothyroxine 137 MICROGram(s) Oral daily  metoprolol tartrate 25 milliGRAM(s) Oral two times a day    Vitals:  T(F): 97.8 (06-18), Max: 99.6 (06-17)  HR: 101 (06-18) (52 - 112)  BP: 122/74 (06-18) (122/74 - 169/79)  RR: 18 (06-18)  SpO2: 94% (06-18)  I&O's Summary    17 Jun 2021 07:01  -  18 Jun 2021 07:00  --------------------------------------------------------  IN: 910 mL / OUT: 1500 mL / NET: -590 mL    18 Jun 2021 07:01  -  18 Jun 2021 11:10  --------------------------------------------------------  IN: 300 mL / OUT: 600 mL / NET: -300 mL    Physical Exam:  Appearance: No acute distress; well appearing  Eyes:  EOMI, pink conjunctiva  HENT: Normal oral mucosa  Cardiovascular: irregularly irregular, S1, S2, no murmurs, rubs, or gallops; no edema; no JVD  Respiratory: Clear to auscultation bilaterally  Gastrointestinal: soft, non-tender, non-distended with normal bowel sounds  Musculoskeletal: No clubbing; no joint deformity   Neurologic: Non-focal  Lymphatic: No lymphadenopathy  Psychiatry: AAOx3, mood & affect appropriate  Skin: No rashes                          13.6   9.18  )-----------( 160      ( 18 Jun 2021 07:52 )             40.1     06-18    139  |  104  |  42<H>  ----------------------------<  115<H>  3.5   |  21<L>  |  1.46<H>    Ca    8.7      18 Jun 2021 07:52  Phos  2.4     06-18  Mg     2.2     06-18    TPro  7.0  /  Alb  3.2<L>  /  TBili  0.4  /  DBili  x   /  AST  33  /  ALT  47<H>  /  AlkPhos  75  06-18    PT/INR - ( 18 Jun 2021 07:52 )   PT: 17.5 sec;   INR: 1.49 ratio         PTT - ( 18 Jun 2021 07:52 )  PTT:59.6 sec  CARDIAC MARKERS ( 16 Jun 2021 08:42 )  125 ng/L / x     / x     / x     / x     / x      CARDIAC MARKERS ( 16 Jun 2021 00:11 )  158 ng/L / x     / x     / x     / x     / x      CARDIAC MARKERS ( 15 Truong 2021 17:13 )  x     / 1.150 ng/mL / x     / x     / x     / 1.1 ng/mL      Serum Pro-Brain Natriuretic Peptide: 3374 pg/mL (06-15 @ 17:13)    New ECG(s): Personally reviewed    Echo:    Stress Testing:     Cath: PENDING    Imaging:    Interpretation of Telemetry: afib 80s-90s, intermittent PVCs, no VT or NSVT

## 2021-06-18 NOTE — PROGRESS NOTE ADULT - PROBLEM SELECTOR PLAN 1
- hx of long-standing afib w/ cardioversion attempt 2 years ago which was unsuccessful, on home diltiazem/toprol/warfarin  - presenting w/ worsening SOB found to be in afib w/ RVR w/ episode of 200 bpm WCT at OSH s/p synchronized cardioversion  - cardiology is on board  - EP on board  - c/w 180 bid diltiazem  - toprol 25mg --> switched to lopressor 25mg bid per EP  - s/p 150mg amio, start amio load followed by PO amio  --> d/c amio per EP  - TTE shows reduced EF to 20-25%  - angiogram this admission, will hold coumadin in preparation and start heparin drip --> plan for procedure 6/18  - per EP, there is concern that the event was VTACH and will need ischemic eval with EPS study w/ possible VT ablation and placement of ICD

## 2021-06-18 NOTE — PROGRESS NOTE ADULT - PROBLEM SELECTOR PLAN 4
- pt with baseline SCr of 1.4-1.5, p/w 1.80 w/ ARAM on CKD stage 3  - will hold losartan and HCTZ for now, but ARAM improving  - likely pre-renal i/s/o decreased PO water intake and infection  - CTM - pt with baseline SCr of 1.4-1.5, p/w 1.80 w/ ARAM on CKD stage 3  - will hold losartan and HCTZ for now, but ARAM improving  - likely pre-renal i/s/o decreased PO water intake and infection  - s/p 100cc of IVF for 5hrs  - SCr improved to baseline  - CTM

## 2021-06-19 LAB
A1C WITH ESTIMATED AVERAGE GLUCOSE RESULT: 5.8 % — HIGH (ref 4–5.6)
ANION GAP SERPL CALC-SCNC: 17 MMOL/L — SIGNIFICANT CHANGE UP (ref 5–17)
APTT BLD: 33.5 SEC — SIGNIFICANT CHANGE UP (ref 27.5–35.5)
APTT BLD: 42.9 SEC — HIGH (ref 27.5–35.5)
APTT BLD: 55 SEC — HIGH (ref 27.5–35.5)
BLD GP AB SCN SERPL QL: NEGATIVE — SIGNIFICANT CHANGE UP
BUN SERPL-MCNC: 37 MG/DL — HIGH (ref 7–23)
CALCIUM SERPL-MCNC: 9.4 MG/DL — SIGNIFICANT CHANGE UP (ref 8.4–10.5)
CHLORIDE SERPL-SCNC: 104 MMOL/L — SIGNIFICANT CHANGE UP (ref 96–108)
CO2 SERPL-SCNC: 21 MMOL/L — LOW (ref 22–31)
CREAT SERPL-MCNC: 1.37 MG/DL — HIGH (ref 0.5–1.3)
ESTIMATED AVERAGE GLUCOSE: 120 MG/DL — HIGH (ref 68–114)
FIBRINOGEN PPP-MCNC: 1170 MG/DL — HIGH (ref 290–520)
GLUCOSE SERPL-MCNC: 122 MG/DL — HIGH (ref 70–99)
HCT VFR BLD CALC: 38.2 % — LOW (ref 39–50)
HCT VFR BLD CALC: 41.1 % — SIGNIFICANT CHANGE UP (ref 39–50)
HGB BLD-MCNC: 12.8 G/DL — LOW (ref 13–17)
HGB BLD-MCNC: 13.9 G/DL — SIGNIFICANT CHANGE UP (ref 13–17)
INR BLD: 1.28 RATIO — HIGH (ref 0.88–1.16)
MAGNESIUM SERPL-MCNC: 2.2 MG/DL — SIGNIFICANT CHANGE UP (ref 1.6–2.6)
MCHC RBC-ENTMCNC: 28.6 PG — SIGNIFICANT CHANGE UP (ref 27–34)
MCHC RBC-ENTMCNC: 28.6 PG — SIGNIFICANT CHANGE UP (ref 27–34)
MCHC RBC-ENTMCNC: 33.5 GM/DL — SIGNIFICANT CHANGE UP (ref 32–36)
MCHC RBC-ENTMCNC: 33.8 GM/DL — SIGNIFICANT CHANGE UP (ref 32–36)
MCV RBC AUTO: 84.6 FL — SIGNIFICANT CHANGE UP (ref 80–100)
MCV RBC AUTO: 85.3 FL — SIGNIFICANT CHANGE UP (ref 80–100)
MRSA PCR RESULT.: SIGNIFICANT CHANGE UP
NRBC # BLD: 0 /100 WBCS — SIGNIFICANT CHANGE UP (ref 0–0)
NRBC # BLD: 0 /100 WBCS — SIGNIFICANT CHANGE UP (ref 0–0)
NT-PROBNP SERPL-SCNC: 5125 PG/ML — HIGH (ref 0–300)
PA ADP PRP-ACNC: 223 PRU — SIGNIFICANT CHANGE UP (ref 194–417)
PHOSPHATE SERPL-MCNC: 2.4 MG/DL — LOW (ref 2.5–4.5)
PLATELET # BLD AUTO: 185 K/UL — SIGNIFICANT CHANGE UP (ref 150–400)
PLATELET # BLD AUTO: 225 K/UL — SIGNIFICANT CHANGE UP (ref 150–400)
POTASSIUM SERPL-MCNC: 3.7 MMOL/L — SIGNIFICANT CHANGE UP (ref 3.5–5.3)
POTASSIUM SERPL-SCNC: 3.7 MMOL/L — SIGNIFICANT CHANGE UP (ref 3.5–5.3)
PROTHROM AB SERPL-ACNC: 15.2 SEC — HIGH (ref 10.6–13.6)
RBC # BLD: 4.48 M/UL — SIGNIFICANT CHANGE UP (ref 4.2–5.8)
RBC # BLD: 4.86 M/UL — SIGNIFICANT CHANGE UP (ref 4.2–5.8)
RBC # FLD: 14.6 % — HIGH (ref 10.3–14.5)
RBC # FLD: 14.6 % — HIGH (ref 10.3–14.5)
RH IG SCN BLD-IMP: POSITIVE — SIGNIFICANT CHANGE UP
S AUREUS DNA NOSE QL NAA+PROBE: SIGNIFICANT CHANGE UP
SODIUM SERPL-SCNC: 142 MMOL/L — SIGNIFICANT CHANGE UP (ref 135–145)
T3 SERPL-MCNC: 44 NG/DL — LOW (ref 80–200)
WBC # BLD: 8.17 K/UL — SIGNIFICANT CHANGE UP (ref 3.8–10.5)
WBC # BLD: 8.61 K/UL — SIGNIFICANT CHANGE UP (ref 3.8–10.5)
WBC # FLD AUTO: 8.17 K/UL — SIGNIFICANT CHANGE UP (ref 3.8–10.5)
WBC # FLD AUTO: 8.61 K/UL — SIGNIFICANT CHANGE UP (ref 3.8–10.5)

## 2021-06-19 PROCEDURE — 99233 SBSQ HOSP IP/OBS HIGH 50: CPT

## 2021-06-19 RX ORDER — POTASSIUM PHOSPHATE, MONOBASIC POTASSIUM PHOSPHATE, DIBASIC 236; 224 MG/ML; MG/ML
15 INJECTION, SOLUTION INTRAVENOUS ONCE
Refills: 0 | Status: COMPLETED | OUTPATIENT
Start: 2021-06-19 | End: 2021-06-19

## 2021-06-19 RX ADMIN — Medication 137 MICROGRAM(S): at 05:24

## 2021-06-19 RX ADMIN — Medication 50 MILLIGRAM(S): at 05:24

## 2021-06-19 RX ADMIN — HEPARIN SODIUM 1300 UNIT(S)/HR: 5000 INJECTION INTRAVENOUS; SUBCUTANEOUS at 03:04

## 2021-06-19 RX ADMIN — ATORVASTATIN CALCIUM 40 MILLIGRAM(S): 80 TABLET, FILM COATED ORAL at 21:02

## 2021-06-19 RX ADMIN — Medication 50 MILLIGRAM(S): at 17:44

## 2021-06-19 RX ADMIN — HEPARIN SODIUM 1500 UNIT(S)/HR: 5000 INJECTION INTRAVENOUS; SUBCUTANEOUS at 11:14

## 2021-06-19 RX ADMIN — HEPARIN SODIUM 1500 UNIT(S)/HR: 5000 INJECTION INTRAVENOUS; SUBCUTANEOUS at 18:50

## 2021-06-19 RX ADMIN — CEFTRIAXONE 100 MILLIGRAM(S): 500 INJECTION, POWDER, FOR SOLUTION INTRAMUSCULAR; INTRAVENOUS at 05:49

## 2021-06-19 RX ADMIN — POTASSIUM PHOSPHATE, MONOBASIC POTASSIUM PHOSPHATE, DIBASIC 62.5 MILLIMOLE(S): 236; 224 INJECTION, SOLUTION INTRAVENOUS at 15:49

## 2021-06-19 NOTE — PROGRESS NOTE ADULT - PROBLEM SELECTOR PLAN 4
- pt with baseline SCr of 1.4-1.5, p/w 1.80 w/ ARAM on CKD stage 3  - will hold losartan and HCTZ for now, but ARAM improving  - likely pre-renal i/s/o decreased PO water intake and infection  - s/p 100cc of IVF for 5hrs  - SCr improved to baseline  - CTM

## 2021-06-19 NOTE — PROGRESS NOTE ADULT - PROBLEM SELECTOR PLAN 6
- c/w dilt  - switch toprol to lopressor  - hold hctz-losartan - c/w dilt  - switched toprol to lopressor, EP following   - hold hctz-losartan

## 2021-06-19 NOTE — PROGRESS NOTE ADULT - PROBLEM SELECTOR PLAN 10
- DVT PPx: coumadin will be held in preparation for angiogram but currently therapeutic INR; heparin drip  - Diet: DASH/TLC  - Dispo: no skilled PT needs

## 2021-06-19 NOTE — PROGRESS NOTE ADULT - SUBJECTIVE AND OBJECTIVE BOX
24H hour events: no VT overnight, AF with PVC's on telemetry     MEDICATIONS:  heparin  Infusion. 1000 Unit(s)/Hr IV Continuous <Continuous>  metoprolol tartrate 50 milliGRAM(s) Oral two times a day    cefTRIAXone   IVPB 1000 milliGRAM(s) IV Intermittent every 24 hours    ALBUTerol    90 MICROgram(s) HFA Inhaler 2 Puff(s) Inhalation every 6 hours PRN    atorvastatin 40 milliGRAM(s) Oral at bedtime  levothyroxine 137 MICROGram(s) Oral daily    potassium phosphate IVPB 15 milliMole(s) IV Intermittent once      REVIEW OF SYSTEMS:  See HPI, otherwise ROS negative.    PHYSICAL EXAM:  T(C): 36.8 (06-19-21 @ 04:23), Max: 36.8 (06-18-21 @ 14:40)  HR: 80 (06-19-21 @ 04:23) (80 - 106)  BP: 128/76 (06-19-21 @ 04:23) (112/73 - 140/64)  RR: 18 (06-19-21 @ 04:23) (16 - 20)  SpO2: 96% (06-19-21 @ 04:23) (93% - 99%)  Wt(kg): --  I&O's Summary    18 Jun 2021 07:01  -  19 Jun 2021 07:00  --------------------------------------------------------  IN: 1145 mL / OUT: 1300 mL / NET: -155 mL    19 Jun 2021 07:01  -  19 Jun 2021 12:13  --------------------------------------------------------  IN: 52 mL / OUT: 0 mL / NET: 52 mL    Appearance: Alert. NAD	  Cardiovascular: +S1S2 RRR no m/g/r  Respiratory: CTA B/L	  Psychiatry: A & O x 3, Mood & affect appropriate  Gastrointestinal:  Soft, NT/ND. +BS	  Neurologic: Non-focal  Extremities: No edema B/L LE   Vascular: DP pulses palpable 2+ bilaterally      LABS:	 	    CBC Full  -  ( 19 Jun 2021 09:32 )  WBC Count : 8.17 K/uL  Hemoglobin : 13.9 g/dL  Hematocrit : 41.1 %  Platelet Count - Automated : 225 K/uL  Mean Cell Volume : 84.6 fl  Mean Cell Hemoglobin : 28.6 pg  Mean Cell Hemoglobin Concentration : 33.8 gm/dL  Auto Neutrophil # : x  Auto Lymphocyte # : x  Auto Monocyte # : x  Auto Eosinophil # : x  Auto Basophil # : x  Auto Neutrophil % : x  Auto Lymphocyte % : x  Auto Monocyte % : x  Auto Eosinophil % : x  Auto Basophil % : x    06-19    142  |  104  |  37<H>  ----------------------------<  122<H>  3.7   |  21<L>  |  1.37<H>  06-18    139  |  104  |  42<H>  ----------------------------<  115<H>  3.5   |  21<L>  |  1.46<H>    Ca    9.4      19 Jun 2021 09:32  Ca    8.7      18 Jun 2021 07:52  Phos  2.4     06-19  Phos  2.4     06-18  Mg     2.2     06-19  Mg     2.2     06-18    TPro  7.0  /  Alb  3.2<L>  /  TBili  0.4  /  DBili  x   /  AST  33  /  ALT  47<H>  /  AlkPhos  75  06-18  proBNP: Serum Pro-Brain Natriuretic Peptide: 5125 pg/mL (06-19 @ 09:32)  Serum Pro-Brain Natriuretic Peptide: 3374 pg/mL (06-15 @ 17:13)    TELEMETRY: AF 's with bigeminy and trigeminy     ECHO:   < from: TTE with Doppler (w/Cont) (06.16.21 @ 15:21) >  Observations:  Mitral Valve: Mitral annular calcification, otherwise  normal mitral valve. Moderate mitral regurgitation.  PISA  radius = 0.8 cm. RVOL = 32 ml, EROA = 29 mm2.  Aortic Valve/Aorta: Aortic valve not well visualized;  appears calcified. Peak transaortic valve gradient equals 6  mm Hg, aortic valve velocity time integral equals 17 cm,  estimated aortic valve area equals 2.7 sqcm. Minimal aortic  regurgitation.  Peak left ventricular outflow tract  gradient equals 2 mm Hg, LVOT velocity time integral equals  11 cm.  Aortic Root: 3.7 cm.  LVOT diameter: 2.3 cm.  Left Atrium: Severely dilated left atrium.  LA volume index  = 54 cc/m2.  Left Ventricle: Severe global left ventricular systolic  dysfunction with regional variation. Endocardial  visualization enhanced with intravenous injection of  Ultrasonic Enhancing Agent (Definity).  No left ventricular  thrombus. Eccentric left ventricular hypertrophy (dilated  left ventricle with normal relative wall thickness). At  least Mild diastolic dysfunction (Stage I).  Right Heart: Normal right atrium. Normal right ventricular  size and function. Normal tricuspid valve. Mild tricuspid  regurgitation. Pulmonic valve not well visualized, probably  normal. Minimal pulmonic regurgitation.  Pericardium/Pleura: Normal pericardium with no pericardial  effusion.  Hemodynamic: Estimated right atrial pressure is 8 mm Hg.  Estimated right ventricular systolic pressure equals 33 mm  Hg, assuming right atrial pressure equals 8 mm Hg,  consistent with normal pulmonary pressures.  ------------------------------------------------------------------------  Conclusions:  1. Mitral annular calcification, otherwise normal mitral  valve. Moderate mitral regurgitation.  PISA radius = 0.8  cm. RVOL = 32 ml, EROA = 29 mm2.  2. Eccentric left ventricular hypertrophy (dilated left  ventricle with normal relative wall thickness).  3. Severe global left ventricular systolic dysfunction with  regional variation. Endocardial visualization enhanced with  intravenous injection of Ultrasonic Enhancing Agent  (Definity).  No left ventricular thrombus.  4. Normal right ventricular size and function.  5. Estimated right ventricular systolic pressure equals 33  mm Hg, assuming right atrial pressure equals 8 mm Hg,  consistent with normal pulmonary pressures.  *** No previous Echo exam.  ------------------------------------------------    < end of copied text >      RADIOLOGY:  < from: Xray Chest 1 View AP/PA (06.16.21 @ 03:34) >  IMPRESSION:  Clear lungs.    < end of copied text >

## 2021-06-19 NOTE — PROGRESS NOTE ADULT - SUBJECTIVE AND OBJECTIVE BOX
Patient is a 76y old  Male who presents with a chief complaint of Afib w/ RVR i/s/o fever (18 Jun 2021 18:27)      SUBJECTIVE / OVERNIGHT EVENTS:    MEDICATIONS  (STANDING):  atorvastatin 40 milliGRAM(s) Oral at bedtime  cefTRIAXone   IVPB 1000 milliGRAM(s) IV Intermittent every 24 hours  heparin  Infusion. 1000 Unit(s)/Hr (10 mL/Hr) IV Continuous <Continuous>  levothyroxine 137 MICROGram(s) Oral daily  metoprolol tartrate 50 milliGRAM(s) Oral two times a day    MEDICATIONS  (PRN):  ALBUTerol    90 MICROgram(s) HFA Inhaler 2 Puff(s) Inhalation every 6 hours PRN Shortness of Breath and/or Wheezing      Vital Signs Last 24 Hrs  T(C): 36.8 (19 Jun 2021 04:23), Max: 36.8 (18 Jun 2021 14:40)  T(F): 98.2 (19 Jun 2021 04:23), Max: 98.2 (18 Jun 2021 14:40)  HR: 80 (19 Jun 2021 04:23) (80 - 109)  BP: 128/76 (19 Jun 2021 04:23) (112/73 - 140/64)  BP(mean): 82 (18 Jun 2021 17:10) (75 - 99)  RR: 18 (19 Jun 2021 04:23) (16 - 20)  SpO2: 96% (19 Jun 2021 04:23) (93% - 99%)  CAPILLARY BLOOD GLUCOSE        I&O's Summary    17 Jun 2021 07:01  -  18 Jun 2021 07:00  --------------------------------------------------------  IN: 910 mL / OUT: 1500 mL / NET: -590 mL    18 Jun 2021 07:01  -  19 Jun 2021 05:18  --------------------------------------------------------  IN: 540 mL / OUT: 600 mL / NET: -60 mL        PHYSICAL EXAM:  GENERAL: NAD, well-developed  HEAD:  Atraumatic, Normocephalic  EYES: EOMI, PERRLA, conjunctiva and sclera clear  NECK: Supple, No JVD  CHEST/LUNG: Clear to auscultation bilaterally; No wheeze  HEART: Regular rate and rhythm; No murmurs, rubs, or gallops  ABDOMEN: Soft, Nontender, Nondistended; Bowel sounds present  EXTREMITIES:  2+ Peripheral Pulses, No clubbing, cyanosis, or edema  PSYCH: AAOx3  NEUROLOGY: non-focal  SKIN: No rashes or lesions    LABS:                        12.8   8.61  )-----------( 185      ( 19 Jun 2021 02:35 )             38.2     06-18    139  |  104  |  42<H>  ----------------------------<  115<H>  3.5   |  21<L>  |  1.46<H>    Ca    8.7      18 Jun 2021 07:52  Phos  2.4     06-18  Mg     2.2     06-18    TPro  7.0  /  Alb  3.2<L>  /  TBili  0.4  /  DBili  x   /  AST  33  /  ALT  47<H>  /  AlkPhos  75  06-18    PT/INR - ( 18 Jun 2021 07:52 )   PT: 17.5 sec;   INR: 1.49 ratio         PTT - ( 19 Jun 2021 02:35 )  PTT:33.5 sec          RADIOLOGY & ADDITIONAL TESTS:    Imaging Personally Reviewed:    Consultant(s) Notes Reviewed:      Care Discussed with Consultants/Other Providers:   Patient is a 76y old  Male who presents with a chief complaint of Afib w/ RVR i/s/o fever (18 Jun 2021 18:27)      SUBJECTIVE / OVERNIGHT EVENTS: Patient had no acute overnight events. Patient was seen at beside this am.     MEDICATIONS  (STANDING):  atorvastatin 40 milliGRAM(s) Oral at bedtime  cefTRIAXone   IVPB 1000 milliGRAM(s) IV Intermittent every 24 hours  heparin  Infusion. 1000 Unit(s)/Hr (10 mL/Hr) IV Continuous <Continuous>  levothyroxine 137 MICROGram(s) Oral daily  metoprolol tartrate 50 milliGRAM(s) Oral two times a day    MEDICATIONS  (PRN):  ALBUTerol    90 MICROgram(s) HFA Inhaler 2 Puff(s) Inhalation every 6 hours PRN Shortness of Breath and/or Wheezing      Vital Signs Last 24 Hrs  T(C): 36.8 (19 Jun 2021 04:23), Max: 36.8 (18 Jun 2021 14:40)  T(F): 98.2 (19 Jun 2021 04:23), Max: 98.2 (18 Jun 2021 14:40)  HR: 80 (19 Jun 2021 04:23) (80 - 109)  BP: 128/76 (19 Jun 2021 04:23) (112/73 - 140/64)  BP(mean): 82 (18 Jun 2021 17:10) (75 - 99)  RR: 18 (19 Jun 2021 04:23) (16 - 20)  SpO2: 96% (19 Jun 2021 04:23) (93% - 99%)  CAPILLARY BLOOD GLUCOSE        I&O's Summary    17 Jun 2021 07:01  -  18 Jun 2021 07:00  --------------------------------------------------------  IN: 910 mL / OUT: 1500 mL / NET: -590 mL    18 Jun 2021 07:01  -  19 Jun 2021 05:18  --------------------------------------------------------  IN: 540 mL / OUT: 600 mL / NET: -60 mL    PHYSICAL EXAM:  GENERAL: NAD, well-developed  HEAD:  Atraumatic, Normocephalic  EYES: EOMI, PERRLA, conjunctiva and sclera clear  NECK: Supple, No JVD  CHEST/LUNG: Clear to auscultation bilaterally; No wheeze  HEART: Regular rate and rhythm; No murmurs, rubs, or gallops  ABDOMEN: Soft, Nontender, Nondistended; Bowel sounds present  EXTREMITIES:  2+ Peripheral Pulses, No clubbing, cyanosis, or edema  PSYCH: AAOx3  NEUROLOGY: non-focal  SKIN: No rashes or lesions    LABS:                        12.8   8.61  )-----------( 185      ( 19 Jun 2021 02:35 )             38.2     06-18    139  |  104  |  42<H>  ----------------------------<  115<H>  3.5   |  21<L>  |  1.46<H>    Ca    8.7      18 Jun 2021 07:52  Phos  2.4     06-18  Mg     2.2     06-18    TPro  7.0  /  Alb  3.2<L>  /  TBili  0.4  /  DBili  x   /  AST  33  /  ALT  47<H>  /  AlkPhos  75  06-18    PT/INR - ( 18 Jun 2021 07:52 )   PT: 17.5 sec;   INR: 1.49 ratio         PTT - ( 19 Jun 2021 02:35 )  PTT:33.5 sec          RADIOLOGY & ADDITIONAL TESTS:    Imaging Personally Reviewed:    Consultant(s) Notes Reviewed:      Care Discussed with Consultants/Other Providers:

## 2021-06-19 NOTE — PROGRESS NOTE ADULT - PROBLEM SELECTOR PLAN 1
- hx of long-standing afib w/ cardioversion attempt 2 years ago which was unsuccessful, on home diltiazem/toprol/warfarin  - presenting w/ worsening SOB found to be in afib w/ RVR w/ episode of 200 bpm WCT at OSH s/p synchronized cardioversion  - cardiology is on board  - EP on board  - c/w 180 bid diltiazem  - toprol 25mg --> switched to lopressor 25mg bid per EP  - s/p 150mg amio, start amio load followed by PO amio  --> d/c amio per EP  - TTE shows reduced EF to 20-25%  - angiogram this admission, will hold coumadin in preparation and start heparin drip --> plan for procedure 6/18  - per EP, there is concern that the event was VTACH and will need ischemic eval with EPS study w/ possible VT ablation and placement of ICD - hx of long-standing afib w/ cardioversion attempt 2 years ago which was unsuccessful, on home diltiazem/toprol/warfarin  - presenting w/ worsening SOB found to be in afib w/ RVR w/ episode of 200 bpm WCT at OSH s/p synchronized cardioversion  - cardiology is on board, EP on board- now pending Vt ablation on monday will additionally require AICD placement  - C/w 180 bid diltiazem  - tToprol 25mg --> switched to lopressor 25mg bid per EP  - s/p 150mg amio, start amio load followed by PO amio  --> d/c amio per EP  - TTE shows reduced EF to 20-25%  - angiogram this admission, will hold coumadin in preparation and start heparin drip -> CABG pending post op eval from ablation on monday 6/21

## 2021-06-19 NOTE — PROGRESS NOTE ADULT - ASSESSMENT
76M with persistent AF on Coumadin s/p 2 failed DCCV in the past, currently managed on Dilt/Toprol, bladder cancer s/p urostomy, HTN, HLD, chronic bronchitis, hypothyroidism who presented for SOB and palpitations, and SMALL x 2 week prior to admission. He presented to his PCP initially who found him to be in AF with RVR. Patient had WCT at [facility] ED s/p DCCV, more likely VT rather than atrial fibrillation/flutter with aberrancy. He underwent LHC on 6/18 revealing dLM 40%; mLAD 60%; oCX 90%; oRCA 99% with collaterals from left. He is seen and examined this morning, denies CP/SOB/palpitations, dizziness or lightheadedness.     1) VT   2) AF with RVR   3) CAD   4) HFrEF     - Lopressor 50mg bid for arrhythmia management/rate control, also for CAD   - Cardiothoracic Surgery w/u for CABG underway    - Heparin gtt for AF, CAD  - Will plan for secondary prevention ICD prior to d/c after CABG   - Can consider VT ablation if patient has further VT after CABG     D/w patient and with Dr. Celestino Dodge PA-C   [phone]

## 2021-06-19 NOTE — PROGRESS NOTE ADULT - PROBLEM SELECTOR PLAN 5
- hx of CHF w/ EF~45% @2019  - on lopressor and dilt for now  - hold hctz-losartan for now  - TTE 6/17 suggestive of EF of 20-25% s/p cardioversion w/ concern for ischemic event vs tachycardia mediated cardimyopathy - hx of CHF w/ EF~45% @2019  - on lopressor and dilt for now  - hold hctz-losartan for now  - TTE 6/17 suggestive of EF of 20-25% s/p cardioversion w/ concern for ischemic event vs tachycardia mediated cardiomyopathy

## 2021-06-19 NOTE — PROGRESS NOTE ADULT - SUBJECTIVE AND OBJECTIVE BOX
[facility] - Caleb Ochoa, Neetu, Jessica, Milka, Kimberley Mars  Office Number:  [phone]    Patient resting comfortably in bed in NAD.  Laying flat with no respiratory distress.  No complaints of chest pain, dyspnea, palpitations, PND, or orthopnea.    ROS: negative unless otherwise mentioned.    Telemetry:  af 100-110    MEDICATIONS  (STANDING):  atorvastatin 40 milliGRAM(s) Oral at bedtime  cefTRIAXone   IVPB 1000 milliGRAM(s) IV Intermittent every 24 hours  heparin  Infusion. 1000 Unit(s)/Hr (10 mL/Hr) IV Continuous <Continuous>  levothyroxine 137 MICROGram(s) Oral daily  metoprolol tartrate 50 milliGRAM(s) Oral two times a day  potassium phosphate IVPB 15 milliMole(s) IV Intermittent once    MEDICATIONS  (PRN):  ALBUTerol    90 MICROgram(s) HFA Inhaler 2 Puff(s) Inhalation every 6 hours PRN Shortness of Breath and/or Wheezing      Allergies    No Known Allergies    Intolerances        Vital Signs Last 24 Hrs  T(C): 36.4 (19 Jun 2021 11:30), Max: 36.8 (18 Jun 2021 14:40)  T(F): 97.5 (19 Jun 2021 11:30), Max: 98.2 (18 Jun 2021 14:40)  HR: 98 (19 Jun 2021 11:30) (80 - 106)  BP: 121/86 (19 Jun 2021 11:30) (112/73 - 140/64)  BP(mean): 82 (18 Jun 2021 17:10) (75 - 99)  RR: 18 (19 Jun 2021 11:30) (16 - 20)  SpO2: 97% (19 Jun 2021 11:30) (93% - 99%)    I&O's Summary    18 Jun 2021 07:01  -  19 Jun 2021 07:00  --------------------------------------------------------  IN: 1145 mL / OUT: 1300 mL / NET: -155 mL    19 Jun 2021 07:01  -  19 Jun 2021 12:38  --------------------------------------------------------  IN: 52 mL / OUT: 0 mL / NET: 52 mL        ON EXAM:    General: NAD, awake and alert, oriented x 3  HEENT: Mucous membranes are moist, anicteric  Lungs: Non-labored, breath sounds are coarse bilaterally, No wheezing, rales or rhonchi  Cardiovascular: irregular, S1 and S2, no murmurs, rubs, or gallops  Gastrointestinal: Bowel Sounds present, soft, nontender.   Lymph: No peripheral edema. No lymphadenopathy.  Skin: No rashes or ulcers  Psych:  Mood & affect appropriate  LABS: All Labs Reviewed:                        13.9   8.17  )-----------( 225      ( 19 Jun 2021 09:32 )             41.1                         12.8   8.61  )-----------( 185      ( 19 Jun 2021 02:35 )             38.2                         13.6   9.18  )-----------( 160      ( 18 Jun 2021 07:52 )             40.1     19 Jun 2021 09:32    142    |  104    |  37     ----------------------------<  122    3.7     |  21     |  1.37   18 Jun 2021 07:52    139    |  104    |  42     ----------------------------<  115    3.5     |  21     |  1.46   17 Jun 2021 07:02    138    |  102    |  43     ----------------------------<  107    3.2     |  19     |  1.94     Ca    9.4        19 Jun 2021 09:32  Ca    8.7        18 Jun 2021 07:52  Ca    8.6        17 Jun 2021 07:02  Phos  2.4       19 Jun 2021 09:32  Phos  2.4       18 Jun 2021 07:52  Phos  2.2       17 Jun 2021 07:02  Mg     2.2       19 Jun 2021 09:32  Mg     2.2       18 Jun 2021 07:52  Mg     1.9       17 Jun 2021 07:02    TPro  7.0    /  Alb  3.2    /  TBili  0.4    /  DBili  x      /  AST  33     /  ALT  47     /  AlkPhos  75     18 Jun 2021 07:52  TPro  6.9    /  Alb  3.3    /  TBili  0.4    /  DBili  x      /  AST  47     /  ALT  50     /  AlkPhos  68     17 Jun 2021 07:02    PT/INR - ( 19 Jun 2021 09:32 )   PT: 15.2 sec;   INR: 1.28 ratio         PTT - ( 19 Jun 2021 09:32 )  PTT:42.9 sec      Blood Culture: Organism --  Gram Stain Blood -- Gram Stain --  Specimen Source .Urine Clean Catch (Midstream)  Culture-Blood --    Organism --  Gram Stain Blood -- Gram Stain --  Specimen Source .Blood Blood-Peripheral  Culture-Blood --      06-19 @ 09:32  Pro Bnp [id number]

## 2021-06-19 NOTE — PROGRESS NOTE ADULT - ASSESSMENT
Fletcher is a 76 year old male with a history of hypertension, hypercholesterolemia, hypothyroidism, bladder cancer and permanent atrial fibrillation on Coumadin s/p unsuccessful cardioversion in the past, HFrEF 45% w mild MR who initially presents with AF with RVR.    - he is now s/p cath (in the setting of lv dysfunction), with triple vessel disease   - CTS evaluation appreciated and will plan for surgery this week.  - preop evaluation in progress    - Had rapid AF, then paroxysmal rapid wct either vt or aberrated af. He was hypotensive and cardioverted.  - currently in generally rate controlled af  - cont with metoprolol at current dose, which can be titrate upward as need to keep HR<110  - hold coumadin  - on heparin gtt for ac  - ep evaluation appreciated, for eventual icd  - will hold off on vt ablation, given the fact that he will be revascularized with surgery next week    - no sign of significant volume overload on exam  - hold off on diuretics in setting of gloria on ckd  - known borderline depressed lvef, with an ef of ~45 by echo 2019 and by nuc stress 2017  - ef now worse (20-25%, with mod MR), though s/p cardioversion    - fever on admission  - cont abx for uti    - replete electrolytes to k>4, Mg>2  - will follow with you

## 2021-06-20 LAB
-  AMIKACIN: SIGNIFICANT CHANGE UP
-  AMOXICILLIN/CLAVULANIC ACID: SIGNIFICANT CHANGE UP
-  AMPICILLIN/SULBACTAM: SIGNIFICANT CHANGE UP
-  AMPICILLIN: SIGNIFICANT CHANGE UP
-  AZTREONAM: SIGNIFICANT CHANGE UP
-  CEFAZOLIN: SIGNIFICANT CHANGE UP
-  CEFEPIME: SIGNIFICANT CHANGE UP
-  CEFOXITIN: SIGNIFICANT CHANGE UP
-  CEFTRIAXONE: SIGNIFICANT CHANGE UP
-  CIPROFLOXACIN: SIGNIFICANT CHANGE UP
-  ERTAPENEM: SIGNIFICANT CHANGE UP
-  GENTAMICIN: SIGNIFICANT CHANGE UP
-  IMIPENEM: SIGNIFICANT CHANGE UP
-  LEVOFLOXACIN: SIGNIFICANT CHANGE UP
-  MEROPENEM: SIGNIFICANT CHANGE UP
-  NITROFURANTOIN: SIGNIFICANT CHANGE UP
-  PIPERACILLIN/TAZOBACTAM: SIGNIFICANT CHANGE UP
-  TIGECYCLINE: SIGNIFICANT CHANGE UP
-  TOBRAMYCIN: SIGNIFICANT CHANGE UP
-  TRIMETHOPRIM/SULFAMETHOXAZOLE: SIGNIFICANT CHANGE UP
ANION GAP SERPL CALC-SCNC: 16 MMOL/L — SIGNIFICANT CHANGE UP (ref 5–17)
APTT BLD: 42.8 SEC — HIGH (ref 27.5–35.5)
APTT BLD: 54.2 SEC — HIGH (ref 27.5–35.5)
APTT BLD: 72 SEC — HIGH (ref 27.5–35.5)
BUN SERPL-MCNC: 36 MG/DL — HIGH (ref 7–23)
CALCIUM SERPL-MCNC: 9.4 MG/DL — SIGNIFICANT CHANGE UP (ref 8.4–10.5)
CHLORIDE SERPL-SCNC: 105 MMOL/L — SIGNIFICANT CHANGE UP (ref 96–108)
CO2 SERPL-SCNC: 19 MMOL/L — LOW (ref 22–31)
CREAT SERPL-MCNC: 1.33 MG/DL — HIGH (ref 0.5–1.3)
GLUCOSE SERPL-MCNC: 122 MG/DL — HIGH (ref 70–99)
HCT VFR BLD CALC: 41.3 % — SIGNIFICANT CHANGE UP (ref 39–50)
HGB BLD-MCNC: 13.5 G/DL — SIGNIFICANT CHANGE UP (ref 13–17)
INR BLD: 1.21 RATIO — HIGH (ref 0.88–1.16)
MAGNESIUM SERPL-MCNC: 2.3 MG/DL — SIGNIFICANT CHANGE UP (ref 1.6–2.6)
MCHC RBC-ENTMCNC: 28.1 PG — SIGNIFICANT CHANGE UP (ref 27–34)
MCHC RBC-ENTMCNC: 32.7 GM/DL — SIGNIFICANT CHANGE UP (ref 32–36)
MCV RBC AUTO: 85.9 FL — SIGNIFICANT CHANGE UP (ref 80–100)
METHOD TYPE: SIGNIFICANT CHANGE UP
NRBC # BLD: 0 /100 WBCS — SIGNIFICANT CHANGE UP (ref 0–0)
PHOSPHATE SERPL-MCNC: 3.4 MG/DL — SIGNIFICANT CHANGE UP (ref 2.5–4.5)
PLATELET # BLD AUTO: 214 K/UL — SIGNIFICANT CHANGE UP (ref 150–400)
POTASSIUM SERPL-MCNC: 3.9 MMOL/L — SIGNIFICANT CHANGE UP (ref 3.5–5.3)
POTASSIUM SERPL-SCNC: 3.9 MMOL/L — SIGNIFICANT CHANGE UP (ref 3.5–5.3)
PROTHROM AB SERPL-ACNC: 14.4 SEC — HIGH (ref 10.6–13.6)
RBC # BLD: 4.81 M/UL — SIGNIFICANT CHANGE UP (ref 4.2–5.8)
RBC # FLD: 14.6 % — HIGH (ref 10.3–14.5)
SODIUM SERPL-SCNC: 140 MMOL/L — SIGNIFICANT CHANGE UP (ref 135–145)
WBC # BLD: 7.99 K/UL — SIGNIFICANT CHANGE UP (ref 3.8–10.5)
WBC # FLD AUTO: 7.99 K/UL — SIGNIFICANT CHANGE UP (ref 3.8–10.5)

## 2021-06-20 PROCEDURE — 99233 SBSQ HOSP IP/OBS HIGH 50: CPT | Mod: GC

## 2021-06-20 PROCEDURE — 99233 SBSQ HOSP IP/OBS HIGH 50: CPT

## 2021-06-20 RX ORDER — FUROSEMIDE 40 MG
20 TABLET ORAL ONCE
Refills: 0 | Status: COMPLETED | OUTPATIENT
Start: 2021-06-20 | End: 2021-06-20

## 2021-06-20 RX ADMIN — Medication 137 MICROGRAM(S): at 06:08

## 2021-06-20 RX ADMIN — Medication 20 MILLIGRAM(S): at 17:24

## 2021-06-20 RX ADMIN — Medication 50 MILLIGRAM(S): at 06:09

## 2021-06-20 RX ADMIN — HEPARIN SODIUM 1700 UNIT(S)/HR: 5000 INJECTION INTRAVENOUS; SUBCUTANEOUS at 17:23

## 2021-06-20 RX ADMIN — CEFTRIAXONE 100 MILLIGRAM(S): 500 INJECTION, POWDER, FOR SOLUTION INTRAMUSCULAR; INTRAVENOUS at 06:09

## 2021-06-20 RX ADMIN — Medication 50 MILLIGRAM(S): at 17:24

## 2021-06-20 RX ADMIN — HEPARIN SODIUM 1700 UNIT(S)/HR: 5000 INJECTION INTRAVENOUS; SUBCUTANEOUS at 08:20

## 2021-06-20 RX ADMIN — ATORVASTATIN CALCIUM 40 MILLIGRAM(S): 80 TABLET, FILM COATED ORAL at 21:25

## 2021-06-20 RX ADMIN — HEPARIN SODIUM 1700 UNIT(S)/HR: 5000 INJECTION INTRAVENOUS; SUBCUTANEOUS at 01:22

## 2021-06-20 NOTE — PROGRESS NOTE ADULT - PROBLEM SELECTOR PLAN 3
- positive UA w/ WBCs, bacteria, and leucocyte esterase  - hx of bladder cancer w/ urostomy  - pt denies any changes in smell or appearance of urine  - will c/w CTX  - f/u UCx: kleb pneunmoniae  - BCx ngtd - positive UA w/ WBCs, bacteria, and leucocyte esterase  - hx of bladder cancer w/ urostomy  - pt denies any changes in smell or appearance of urine  - will c/w CTX  - f/u UCx: kleb pneunmoniae and E. coli  - BCx ngtd

## 2021-06-20 NOTE — PROGRESS NOTE ADULT - PROBLEM SELECTOR PLAN 5
- hx of CHF w/ EF~45% @2019  - on lopressor and dilt for now  - hold hctz-losartan for now  - TTE 6/17 suggestive of EF of 20-25% s/p cardioversion w/ concern for ischemic event vs tachycardia mediated cardiomyopathy  - s/p Diagnostic LHC via RRA with dLM 40%; mLAD 60%; oCX 90%; oRCA 99% with collaterols from left.    - eval for CABG - hx of CHF w/ EF~45% @2019  - on lopressor and dilt for now  - hold hctz-losartan for now  - TTE 6/17 suggestive of EF of 20-25% s/p cardioversion w/ concern for ischemic event vs tachycardia mediated cardiomyopathy  - s/p Diagnostic LHC via RRA with dLM 40%; mLAD 60%; oCX 90%; oRCA 99% with collaterals from left.    - eval for CABG

## 2021-06-20 NOTE — PROGRESS NOTE ADULT - PROBLEM SELECTOR PLAN 1
- hx of long-standing afib w/ cardioversion attempt 2 years ago which was unsuccessful, on home diltiazem/toprol/warfarin  - presenting w/ worsening SOB found to be in afib w/ RVR w/ episode of 200 bpm WCT at OSH s/p synchronized cardioversion  - cardiology is on board, EP on board- now pending Vt ablation on monday will additionally require AICD placement  - C/w 180 bid diltiazem  --> d/c per EP  - Toprol 25mg --> switched to lopressor 25mg bid per EP --> 50mg bid per EP  - s/p 150mg amio, start amio load followed by PO amio  --> d/c amio per EP  - TTE shows reduced EF to 20-25%  - angiogram 6/18: : s/p Diagnostic LHC via RRA with dLM 40%; mLAD 60%; oCX 90%; oRCA 99% with collaterols from left.  On ACS heparin drip.   - CTS  on board, CABG pending post op eval from ablation on monday 6/21  - needs PFTs - hx of long-standing afib w/ cardioversion attempt 2 years ago which was unsuccessful, on home diltiazem/toprol/warfarin  - presenting w/ worsening SOB found to be in afib w/ RVR w/ episode of 200 bpm WCT at OSH s/p synchronized cardioversion  - cardiology is on board, EP on board  - C/w 180 bid diltiazem  --> d/c per EP  - Toprol 25mg --> switched to lopressor 25mg bid per EP --> 50mg bid per EP  - s/p 150mg amio, start amio load followed by PO amio  --> d/c amio per EP  - TTE shows reduced EF to 20-25%  - angiogram 6/18: : s/p Diagnostic LHC via RRA with dLM 40%; mLAD 60%; oCX 90%; oRCA 99% with collaterols from left.  On ACS heparin drip.   - CTS  on board, CABG pending post op eval from ablation on monday 6/21  - needs PFTs  - will be evaluated for ICD and VT ablation after CABG

## 2021-06-20 NOTE — PROGRESS NOTE ADULT - ATTENDING COMMENTS
Patient seen and examined. Plan as discussed w/ Dr. Narayan: appreciate cardiology's evaluation and recommendations, will dose Lasix 20mg IVP x1 today and continue to monitor respi/fluid status; ongoing preoperative w/u for planned CABG per CTS next week; thereafter will place AICD prior to D/C for secondary prevention, and can consider VT ablation if patient has further VT after CABG; continue Hep Gtt titrated to therapeutic PTTs; continue Abx for UTI until 6/23, ARAM improving; continue conservative supportive care for chronic back pain.

## 2021-06-20 NOTE — PROGRESS NOTE ADULT - PROBLEM SELECTOR PLAN 2
- meets SIRS criteria by leucocytosis, tachycardia, fever, and tachypnea w/ concern for a urinary source given + UA  - s/p 1g CTX  - will c/w ctx for treatment  - f/u UCx: kleb pneunmoniae  - BCx ngtd  - CXR clear on 6/16 - meets SIRS criteria by leucocytosis, tachycardia, fever, and tachypnea w/ concern for a urinary source given + UA  - s/p 1g CTX  - will c/w ctx for treatment  - f/u UCx: kleb pneunmoniae and E. coli  - BCx ngtd  - CXR clear on 6/16

## 2021-06-20 NOTE — PROGRESS NOTE ADULT - SUBJECTIVE AND OBJECTIVE BOX
[facility] - Caleb Ochoa, Neetu, Jessica, Milka, Kimberley Mars  Office Number:  [phone]    Patient resting comfortably in bed in NAD.  Laying flat with no respiratory distress.  No complaints of chest pain, dyspnea, palpitations, PND, or orthopnea.  a little dyspnea/cough overnight, and felt that he needed oxygen    ROS: negative unless otherwise mentioned.    Telemetry:  af    MEDICATIONS  (STANDING):  atorvastatin 40 milliGRAM(s) Oral at bedtime  cefTRIAXone   IVPB 1000 milliGRAM(s) IV Intermittent every 24 hours  heparin  Infusion. 1000 Unit(s)/Hr (10 mL/Hr) IV Continuous <Continuous>  levothyroxine 137 MICROGram(s) Oral daily  metoprolol tartrate 50 milliGRAM(s) Oral two times a day    MEDICATIONS  (PRN):  ALBUTerol    90 MICROgram(s) HFA Inhaler 2 Puff(s) Inhalation every 6 hours PRN Shortness of Breath and/or Wheezing      Allergies    No Known Allergies    Intolerances        Vital Signs Last 24 Hrs  T(C): 36.4 (20 Jun 2021 12:05), Max: 36.6 (19 Jun 2021 20:04)  T(F): 97.5 (20 Jun 2021 12:05), Max: 97.9 (19 Jun 2021 20:04)  HR: 95 (20 Jun 2021 12:05) (95 - 108)  BP: 129/87 (20 Jun 2021 12:05) (129/87 - 137/97)  BP(mean): --  RR: 18 (20 Jun 2021 12:05) (18 - 18)  SpO2: 98% (20 Jun 2021 12:05) (96% - 99%)    I&O's Summary    19 Jun 2021 07:01  -  20 Jun 2021 07:00  --------------------------------------------------------  IN: 1383.3 mL / OUT: 850 mL / NET: 533.3 mL    20 Jun 2021 07:01  -  20 Jun 2021 13:08  --------------------------------------------------------  IN: 240 mL / OUT: 450 mL / NET: -210 mL        ON EXAM:    General: NAD, awake and alert, oriented x 3  HEENT: Mucous membranes are moist, anicteric  Lungs: Non-labored, breath sounds are coarse bilaterally, No wheezing, rales or rhonchi  Cardiovascular: irregular, S1 and S2, no murmurs, rubs, or gallops  Gastrointestinal: Bowel Sounds present, soft, nontender.   Lymph: trace peripheral edema. No lymphadenopathy.  Skin: No rashes or ulcers  Psych:  Mood & affect appropriate    LABS: All Labs Reviewed:                        13.5   7.99  )-----------( 214      ( 20 Jun 2021 07:28 )             41.3                         13.9   8.17  )-----------( 225      ( 19 Jun 2021 09:32 )             41.1                         12.8   8.61  )-----------( 185      ( 19 Jun 2021 02:35 )             38.2     20 Jun 2021 07:22    140    |  105    |  36     ----------------------------<  122    3.9     |  19     |  1.33   19 Jun 2021 09:32    142    |  104    |  37     ----------------------------<  122    3.7     |  21     |  1.37   18 Jun 2021 07:52    139    |  104    |  42     ----------------------------<  115    3.5     |  21     |  1.46     Ca    9.4        20 Jun 2021 07:22  Ca    9.4        19 Jun 2021 09:32  Ca    8.7        18 Jun 2021 07:52  Phos  3.4       20 Jun 2021 07:22  Phos  2.4       19 Jun 2021 09:32  Phos  2.4       18 Jun 2021 07:52  Mg     2.3       20 Jun 2021 07:22  Mg     2.2       19 Jun 2021 09:32  Mg     2.2       18 Jun 2021 07:52    TPro  7.0    /  Alb  3.2    /  TBili  0.4    /  DBili  x      /  AST  33     /  ALT  47     /  AlkPhos  75     18 Jun 2021 07:52    PT/INR - ( 20 Jun 2021 07:22 )   PT: 14.4 sec;   INR: 1.21 ratio         PTT - ( 20 Jun 2021 07:22 )  PTT:54.2 sec      Blood Culture: Organism --  Gram Stain Blood -- Gram Stain --  Specimen Source .Urine Clean Catch (Midstream)  Culture-Blood --    Organism --  Gram Stain Blood -- Gram Stain --  Specimen Source .Blood Blood-Peripheral  Culture-Blood --      06-19 @ 09:32  Pro Bnp [id number]

## 2021-06-20 NOTE — PROGRESS NOTE ADULT - SUBJECTIVE AND OBJECTIVE BOX
Hayden Narayan  [facility]/Medicine/[id number]/[phone]    MARÍA ELENA BEAR  76y  Male      Patient is a 76y old  Male who presents with a chief complaint of Afib w/ RVR i/s/o fever (19 Jun 2021 12:38)      INTERVAL HPI/OVERNIGHT EVENTS:    REVIEW OF SYSTEMS:  CONSTITUTIONAL: No weakness, fevers or chills  EYES/ENT: No visual changes;  No vertigo or throat pain   NECK: No pain or stiffness  RESPIRATORY: No cough, wheezing, hemoptysis; No shortness of breath  CARDIOVASCULAR: No chest pain or palpitations  GASTROINTESTINAL: No abdominal or epigastric pain. No nausea, vomiting, or hematemesis; No diarrhea or constipation. No melena or hematochezia.  GENITOURINARY: No dysuria, frequency or hematuria  NEUROLOGICAL: No numbness or weakness  SKIN: No itching, rashes      Vital Signs Last 24 Hrs  T(C): 36.4 (20 Jun 2021 04:51), Max: 36.6 (19 Jun 2021 20:04)  T(F): 97.5 (20 Jun 2021 04:51), Max: 97.9 (19 Jun 2021 20:04)  HR: 101 (20 Jun 2021 04:51) (98 - 108)  BP: 137/97 (20 Jun 2021 04:51) (121/86 - 137/97)  BP(mean): --  RR: 18 (20 Jun 2021 04:51) (18 - 18)  SpO2: 99% (20 Jun 2021 04:51) (96% - 99%)    PHYSICAL EXAM:  GENERAL: NAD, well-developed  HEAD:  Atraumatic, Normocephalic  EYES: EOMI, PERRLA, conjunctiva and sclera clear  NECK: Supple, No JVD  CHEST/LUNG: Clear to auscultation bilaterally; No wheeze  HEART: Regular rate and rhythm; No murmurs, rubs, or gallops  ABDOMEN: Soft, Nontender, Nondistended; Bowel sounds present  EXTREMITIES:  2+ Peripheral Pulses, No clubbing, cyanosis, or edema  PSYCH: AAOx3  NEUROLOGY: non-focal  SKIN: No rashes or lesions    Consultant(s) Notes Reviewed:  [x ] YES  [ ] NO  Care Discussed with Consultants/Other Providers [ x] YES  [ ] NO    LABS:                        13.9   8.17  )-----------( 225      ( 19 Jun 2021 09:32 )             41.1     06-19    142  |  104  |  37<H>  ----------------------------<  122<H>  3.7   |  21<L>  |  1.37<H>    Ca    9.4      19 Jun 2021 09:32  Phos  2.4     06-19  Mg     2.2     06-19    TPro  7.0  /  Alb  3.2<L>  /  TBili  0.4  /  DBili  x   /  AST  33  /  ALT  47<H>  /  AlkPhos  75  06-18    PT/INR - ( 19 Jun 2021 09:32 )   PT: 15.2 sec;   INR: 1.28 ratio         PTT - ( 20 Jun 2021 00:52 )  PTT:42.8 sec      CAPILLARY BLOOD GLUCOSE          RADIOLOGY & ADDITIONAL TESTS:    Imaging Personally Reviewed:  [ ] YES  [ ] NO   Hayden Narayan  [facility]/Medicine/[phone]    MARÍA ELENA BEAR  76y  Male      Patient is a 76y old  Male who presents with a chief complaint of Afib w/ RVR i/s/o fever (19 Jun 2021 12:38)      INTERVAL HPI/OVERNIGHT EVENTS: No acute events overnight.     REVIEW OF SYSTEMS:  CONSTITUTIONAL: No weakness, fevers or chills  EYES/ENT: No visual changes;  No vertigo or throat pain   NECK: No pain or stiffness  RESPIRATORY: No cough, wheezing, hemoptysis; +shortness of breath  CARDIOVASCULAR: No chest pain or palpitations  GASTROINTESTINAL: No abdominal or epigastric pain. No nausea, vomiting, or hematemesis; No diarrhea or constipation. No melena or hematochezia.  GENITOURINARY: No dysuria, frequency or hematuria  NEUROLOGICAL: No numbness or weakness  SKIN: No itching, rashes      Vital Signs Last 24 Hrs  T(C): 36.4 (20 Jun 2021 04:51), Max: 36.6 (19 Jun 2021 20:04)  T(F): 97.5 (20 Jun 2021 04:51), Max: 97.9 (19 Jun 2021 20:04)  HR: 101 (20 Jun 2021 04:51) (98 - 108)  BP: 137/97 (20 Jun 2021 04:51) (121/86 - 137/97)  BP(mean): --  RR: 18 (20 Jun 2021 04:51) (18 - 18)  SpO2: 99% (20 Jun 2021 04:51) (96% - 99%)    PHYSICAL EXAM:  GENERAL: NAD, well-developed  HEAD:  Atraumatic, Normocephalic  EYES: EOMI, PERRLA, conjunctiva and sclera clear  NECK: Supple, No JVD  CHEST/LUNG: Clear to auscultation bilaterally; No wheeze  HEART: Regular rate and rhythm; No murmurs, rubs, or gallops  ABDOMEN: Soft, Nontender, Nondistended; Bowel sounds present  EXTREMITIES:  2+ Peripheral Pulses, No clubbing, cyanosis, or edema  PSYCH: AAOx3  NEUROLOGY: non-focal  SKIN: No rashes or lesions    Consultant(s) Notes Reviewed:  [x ] YES  [ ] NO  Care Discussed with Consultants/Other Providers [ x] YES  [ ] NO    LABS:                        13.9   8.17  )-----------( 225      ( 19 Jun 2021 09:32 )             41.1     06-19    142  |  104  |  37<H>  ----------------------------<  122<H>  3.7   |  21<L>  |  1.37<H>    Ca    9.4      19 Jun 2021 09:32  Phos  2.4     06-19  Mg     2.2     06-19    TPro  7.0  /  Alb  3.2<L>  /  TBili  0.4  /  DBili  x   /  AST  33  /  ALT  47<H>  /  AlkPhos  75  06-18    PT/INR - ( 19 Jun 2021 09:32 )   PT: 15.2 sec;   INR: 1.28 ratio         PTT - ( 20 Jun 2021 00:52 )  PTT:42.8 sec      CAPILLARY BLOOD GLUCOSE          RADIOLOGY & ADDITIONAL TESTS:    Imaging Personally Reviewed:  [ ] YES  [ ] NO

## 2021-06-20 NOTE — PROGRESS NOTE ADULT - ASSESSMENT
Fletcher is a 76 year old male with a history of hypertension, hypercholesterolemia, hypothyroidism, bladder cancer and permanent atrial fibrillation on Coumadin s/p unsuccessful cardioversion in the past, HFrEF 45% w mild MR who initially presents with AF with RVR.    - he is now s/p cath (in the setting of lv dysfunction), with triple vessel disease   - CTS evaluation appreciated and will plan for surgery this week (possibly tuesday?)  - preop evaluation in progress    - Had rapid AF, then paroxysmal rapid wct either vt or aberrated af. He was hypotensive and cardioverted.  - currently in generally rate controlled af  - cont with metoprolol at current dose, which can be titrate upward (50 tid) as need to keep HR<110  - hold coumadin  - on heparin gtt for ac  - ep evaluation appreciated, for eventual icd after surgery  - will hold off on vt ablation, given the fact that he will be revascularized with surgery next week    - mild dyspnea last night  - had held off on diuretics in setting of gloria on ckd  - bnp slightly higher, and can give him Lasix 20 iv x 1 today  - known borderline depressed lvef, with an ef of ~45 by echo 2019 and by nuc stress 2017  - ef now worse (20-25%, with mod MR), though s/p cardioversion    - fever on admission  - cont abx for uti    - replete electrolytes to k>4, Mg>2  - will follow with you

## 2021-06-21 LAB
-  AMIKACIN: SIGNIFICANT CHANGE UP
-  AMOXICILLIN/CLAVULANIC ACID: SIGNIFICANT CHANGE UP
-  AMPICILLIN/SULBACTAM: SIGNIFICANT CHANGE UP
-  AMPICILLIN: SIGNIFICANT CHANGE UP
-  AZTREONAM: SIGNIFICANT CHANGE UP
-  CEFAZOLIN: SIGNIFICANT CHANGE UP
-  CEFEPIME: SIGNIFICANT CHANGE UP
-  CEFOXITIN: SIGNIFICANT CHANGE UP
-  CEFTRIAXONE: SIGNIFICANT CHANGE UP
-  CIPROFLOXACIN: SIGNIFICANT CHANGE UP
-  ERTAPENEM: SIGNIFICANT CHANGE UP
-  GENTAMICIN: SIGNIFICANT CHANGE UP
-  IMIPENEM: SIGNIFICANT CHANGE UP
-  LEVOFLOXACIN: SIGNIFICANT CHANGE UP
-  MEROPENEM: SIGNIFICANT CHANGE UP
-  NITROFURANTOIN: SIGNIFICANT CHANGE UP
-  PIPERACILLIN/TAZOBACTAM: SIGNIFICANT CHANGE UP
-  TIGECYCLINE: SIGNIFICANT CHANGE UP
-  TOBRAMYCIN: SIGNIFICANT CHANGE UP
-  TRIMETHOPRIM/SULFAMETHOXAZOLE: SIGNIFICANT CHANGE UP
ANION GAP SERPL CALC-SCNC: 15 MMOL/L — SIGNIFICANT CHANGE UP (ref 5–17)
APTT BLD: 55.3 SEC — HIGH (ref 27.5–35.5)
BASOPHILS # BLD AUTO: 0.08 K/UL — SIGNIFICANT CHANGE UP (ref 0–0.2)
BASOPHILS NFR BLD AUTO: 0.8 % — SIGNIFICANT CHANGE UP (ref 0–2)
BUN SERPL-MCNC: 36 MG/DL — HIGH (ref 7–23)
CALCIUM SERPL-MCNC: 9.5 MG/DL — SIGNIFICANT CHANGE UP (ref 8.4–10.5)
CHLORIDE SERPL-SCNC: 103 MMOL/L — SIGNIFICANT CHANGE UP (ref 96–108)
CO2 SERPL-SCNC: 21 MMOL/L — LOW (ref 22–31)
CREAT SERPL-MCNC: 1.29 MG/DL — SIGNIFICANT CHANGE UP (ref 0.5–1.3)
CULTURE RESULTS: SIGNIFICANT CHANGE UP
EOSINOPHIL # BLD AUTO: 0.37 K/UL — SIGNIFICANT CHANGE UP (ref 0–0.5)
EOSINOPHIL NFR BLD AUTO: 3.8 % — SIGNIFICANT CHANGE UP (ref 0–6)
GLUCOSE SERPL-MCNC: 107 MG/DL — HIGH (ref 70–99)
HCT VFR BLD CALC: 40.5 % — SIGNIFICANT CHANGE UP (ref 39–50)
HGB BLD-MCNC: 13.4 G/DL — SIGNIFICANT CHANGE UP (ref 13–17)
IMM GRANULOCYTES NFR BLD AUTO: 3.8 % — HIGH (ref 0–1.5)
LYMPHOCYTES # BLD AUTO: 1.82 K/UL — SIGNIFICANT CHANGE UP (ref 1–3.3)
LYMPHOCYTES # BLD AUTO: 18.6 % — SIGNIFICANT CHANGE UP (ref 13–44)
MAGNESIUM SERPL-MCNC: 2 MG/DL — SIGNIFICANT CHANGE UP (ref 1.6–2.6)
MCHC RBC-ENTMCNC: 28.9 PG — SIGNIFICANT CHANGE UP (ref 27–34)
MCHC RBC-ENTMCNC: 33.1 GM/DL — SIGNIFICANT CHANGE UP (ref 32–36)
MCV RBC AUTO: 87.3 FL — SIGNIFICANT CHANGE UP (ref 80–100)
METHOD TYPE: SIGNIFICANT CHANGE UP
MONOCYTES # BLD AUTO: 1.32 K/UL — HIGH (ref 0–0.9)
MONOCYTES NFR BLD AUTO: 13.5 % — SIGNIFICANT CHANGE UP (ref 2–14)
NEUTROPHILS # BLD AUTO: 5.85 K/UL — SIGNIFICANT CHANGE UP (ref 1.8–7.4)
NEUTROPHILS NFR BLD AUTO: 59.5 % — SIGNIFICANT CHANGE UP (ref 43–77)
NRBC # BLD: 0 /100 WBCS — SIGNIFICANT CHANGE UP (ref 0–0)
ORGANISM # SPEC MICROSCOPIC CNT: SIGNIFICANT CHANGE UP
PHOSPHATE SERPL-MCNC: 3 MG/DL — SIGNIFICANT CHANGE UP (ref 2.5–4.5)
PLATELET # BLD AUTO: 255 K/UL — SIGNIFICANT CHANGE UP (ref 150–400)
POTASSIUM SERPL-MCNC: 3.7 MMOL/L — SIGNIFICANT CHANGE UP (ref 3.5–5.3)
POTASSIUM SERPL-SCNC: 3.7 MMOL/L — SIGNIFICANT CHANGE UP (ref 3.5–5.3)
RBC # BLD: 4.64 M/UL — SIGNIFICANT CHANGE UP (ref 4.2–5.8)
RBC # FLD: 14.6 % — HIGH (ref 10.3–14.5)
SODIUM SERPL-SCNC: 139 MMOL/L — SIGNIFICANT CHANGE UP (ref 135–145)
SPECIMEN SOURCE: SIGNIFICANT CHANGE UP
WBC # BLD: 9.81 K/UL — SIGNIFICANT CHANGE UP (ref 3.8–10.5)
WBC # FLD AUTO: 9.81 K/UL — SIGNIFICANT CHANGE UP (ref 3.8–10.5)

## 2021-06-21 PROCEDURE — 94010 BREATHING CAPACITY TEST: CPT | Mod: 26

## 2021-06-21 PROCEDURE — 99233 SBSQ HOSP IP/OBS HIGH 50: CPT

## 2021-06-21 PROCEDURE — 99233 SBSQ HOSP IP/OBS HIGH 50: CPT | Mod: GC

## 2021-06-21 RX ADMIN — Medication 137 MICROGRAM(S): at 05:32

## 2021-06-21 RX ADMIN — ATORVASTATIN CALCIUM 40 MILLIGRAM(S): 80 TABLET, FILM COATED ORAL at 20:40

## 2021-06-21 RX ADMIN — CEFTRIAXONE 100 MILLIGRAM(S): 500 INJECTION, POWDER, FOR SOLUTION INTRAMUSCULAR; INTRAVENOUS at 05:50

## 2021-06-21 RX ADMIN — Medication 50 MILLIGRAM(S): at 17:05

## 2021-06-21 RX ADMIN — Medication 50 MILLIGRAM(S): at 05:32

## 2021-06-21 RX ADMIN — HEPARIN SODIUM 1700 UNIT(S)/HR: 5000 INJECTION INTRAVENOUS; SUBCUTANEOUS at 08:05

## 2021-06-21 NOTE — PROGRESS NOTE ADULT - PROBLEM SELECTOR PLAN 1
- hx of long-standing afib w/ cardioversion attempt 2 years ago which was unsuccessful, on home diltiazem/toprol/warfarin  - presenting w/ worsening SOB found to be in afib w/ RVR w/ episode of 200 bpm WCT at OSH s/p synchronized cardioversion  - cardiology is on board, EP on board  - C/w 180 bid diltiazem  --> d/c per EP  - Toprol 25mg --> switched to lopressor 25mg bid per EP --> 50mg bid per EP  - s/p 150mg amio, start amio load followed by PO amio  --> d/c amio per EP  - TTE shows reduced EF to 20-25%  - angiogram 6/18: : s/p Diagnostic LHC via RRA with dLM 40%; mLAD 60%; oCX 90%; oRCA 99% with collaterols from left.  On ACS heparin drip.   - CTS  on board, CABG pending post op eval from ablation on monday 6/21  - PFTs ordered   - will be evaluated for ICD and VT ablation after CABG - hx of long-standing afib w/ cardioversion attempt 2 years ago which was unsuccessful, on home diltiazem/toprol/warfarin  - presenting w/ worsening SOB found to be in afib w/ RVR w/ episode of 200 bpm WCT at OSH s/p synchronized cardioversion  - cardiology is on board, EP on board  - C/w 180 bid diltiazem  --> d/c per EP  - Toprol 25mg --> switched to lopressor 25mg bid per EP --> 50mg bid per EP  - s/p 150mg amio, start amio load followed by PO amio  --> d/c amio per EP  - TTE shows reduced EF to 20-25%  - angiogram 6/18: : s/p Diagnostic LHC via RRA with dLM 40%; mLAD 60%; oCX 90%; oRCA 99% with collaterols from left.  On ACS heparin drip.   - CTS  on board, CABG pending post op eval from ablation on monday 6/21  - bedside spirometry completed   - will be evaluated for ICD and VT ablation after CABG - hx of long-standing afib w/ cardioversion attempt 2 years ago which was unsuccessful, on home diltiazem/toprol/warfarin  - presenting w/ worsening SOB found to be in afib w/ RVR w/ episode of 200 bpm WCT at OSH s/p synchronized cardioversion  - cardiology is on board, EP on board  - C/w 180 bid diltiazem  --> d/c per EP  - Toprol 25mg --> switched to lopressor 25mg bid per EP --> 50mg bid per EP  - s/p 150mg amio, start amio load followed by PO amio  --> d/c amio per EP  - TTE shows reduced EF to 20-25%  - angiogram 6/18: : s/p Diagnostic LHC via RRA with dLM 40%; mLAD 60%; oCX 90%; oRCA 99% with collaterols from left.  On ACS heparin drip.   - CTS  on board, CABG pending   - bedside spirometry completed   - will be evaluated for ICD and VT ablation after CABG

## 2021-06-21 NOTE — PROGRESS NOTE ADULT - PROBLEM SELECTOR PLAN 5
- hx of CHF w/ EF~45% @2019  - on lopressor and dilt for now  - hold hctz-losartan for now  - TTE 6/17 suggestive of EF of 20-25% s/p cardioversion w/ concern for ischemic event vs tachycardia mediated cardiomyopathy  - s/p Diagnostic LHC via RRA with dLM 40%; mLAD 60%; oCX 90%; oRCA 99% with collaterals from left.    - eval for CABG

## 2021-06-21 NOTE — PROGRESS NOTE ADULT - PROBLEM SELECTOR PLAN 2
- meets SIRS criteria by leucocytosis, tachycardia, fever, and tachypnea w/ concern for a urinary source given + UA  - s/p 1g CTX  - will c/w ctx for treatment  - f/u UCx: kleb pneunmoniae and E. coli  - BCx ngtd  - CXR clear on 6/16

## 2021-06-21 NOTE — PROGRESS NOTE ADULT - ASSESSMENT
76M with persistent afib on coumadin s/p 2 failed DCCV in the past, bladder cancer s/p urostomy, HTN, HLD, chronic bronchitis, hypothyroidism who presented for SOB and palpitations, and sent into the hospital for afib with RVR. Patient had WCT at [place] ED, and was transferred for further evaluation.     VT: Patient denies any LOC. s/p DCCV in [place] ED.   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, now undergoing CTS eval and possible CABG Wednesday  - on lopressor 50 BID   - losartan/hctz on hold for now, which is reasonable, however would ideally be discharging on losartan for GDMT for HF  - will plan on secondary prevention ICD after CABG  - if recurrent VT after CABG, then will consider VT ablation  - continue on telemetry    Persistent afib on coumadin s/p 2 failed DCCV: [id number] 3 (HTN, agex2)  - on heparin gtt  - continue rate control as above - can consider uptitrating BB for HR goal < 110    Bethany Jones MD  Cardiology Fellow, PGY-4  [phone]  For all other Cardiology service contact information, go to [email or web address] and use "[email or web address]" to login. 76M with persistent afib on coumadin s/p 2 failed DCCV in the past, bladder cancer s/p urostomy, HTN, HLD, chronic bronchitis, hypothyroidism who presented for SOB and palpitations, and sent into the hospital for afib with RVR. Patient had WCT at [place] ED, and was transferred for further evaluation.     VT: Patient denies any LOC. s/p DCCV in [place] ED.   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 Please re-consult after patient's CABG for ICD implantation or if patient has recurrent VT after CABG.     Bethany Jones MD  Cardiology Fellow, PGY-4  [phone]  For all other Cardiology service contact information, go to [email or web address] and use "[email or web address]" to login.

## 2021-06-21 NOTE — PROGRESS NOTE ADULT - SUBJECTIVE AND OBJECTIVE BOX
Hayden Narayan  [facility]/Medicine/[phone]    MARÍA ELENA BEAR  76y  Male      Patient is a 76y old  Male who presents with a chief complaint of Afib w/ RVR i/s/o fever (20 Jun 2021 13:08)      INTERVAL HPI/OVERNIGHT EVENTS:    REVIEW OF SYSTEMS:  CONSTITUTIONAL: No weakness, fevers or chills  EYES/ENT: No visual changes;  No vertigo or throat pain   NECK: No pain or stiffness  RESPIRATORY: No cough, wheezing, hemoptysis; +shortness of breath  CARDIOVASCULAR: No chest pain or palpitations  GASTROINTESTINAL: No abdominal or epigastric pain. No nausea, vomiting, or hematemesis; No diarrhea or constipation. No melena or hematochezia.  GENITOURINARY: No dysuria, frequency or hematuria  NEUROLOGICAL: No numbness or weakness  SKIN: No itching, rashes    Vital Signs Last 24 Hrs  T(C): 36.6 (21 Jun 2021 05:11), Max: 36.6 (21 Jun 2021 05:11)  T(F): 97.9 (21 Jun 2021 05:11), Max: 97.9 (21 Jun 2021 05:11)  HR: 93 (21 Jun 2021 05:11) (93 - 102)  BP: 133/82 (21 Jun 2021 05:11) (125/81 - 133/82)  BP(mean): --  RR: 18 (21 Jun 2021 05:11) (18 - 18)  SpO2: 99% (21 Jun 2021 05:11) (93% - 99%)    PHYSICAL EXAM:  GENERAL: NAD, well-developed  HEAD:  Atraumatic, Normocephalic  EYES: EOMI, PERRLA, conjunctiva and sclera clear  NECK: Supple, No JVD  CHEST/LUNG: Clear to auscultation bilaterally; No wheeze  HEART: Regular rate and rhythm; No murmurs, rubs, or gallops  ABDOMEN: Soft, Nontender, Nondistended; Bowel sounds present  EXTREMITIES:  2+ Peripheral Pulses, No clubbing, cyanosis, or edema  PSYCH: AAOx3  NEUROLOGY: non-focal  SKIN: No rashes or lesions    Consultant(s) Notes Reviewed:  [x ] YES  [ ] NO  Care Discussed with Consultants/Other Providers [ x] YES  [ ] NO    LABS:                        13.4   9.81  )-----------( 255      ( 21 Jun 2021 06:52 )             40.5     06-20    140  |  105  |  36<H>  ----------------------------<  122<H>  3.9   |  19<L>  |  1.33<H>    Ca    9.4      20 Jun 2021 07:22  Phos  3.4     06-20  Mg     2.3     06-20      PT/INR - ( 20 Jun 2021 07:22 )   PT: 14.4 sec;   INR: 1.21 ratio         PTT - ( 20 Jun 2021 14:43 )  PTT:72.0 sec      CAPILLARY BLOOD GLUCOSE          RADIOLOGY & ADDITIONAL TESTS:    Imaging Personally Reviewed:  [ ] YES  [ ] NO   Hayden Narayan  [facility]/Medicine/[id number]/[phone]    MARÍA ELENA BEAR  76y  Male      Patient is a 76y old  Male who presents with a chief complaint of Afib w/ RVR i/s/o fever (20 Jun 2021 13:08)      INTERVAL HPI/OVERNIGHT EVENTS: No acute events overnight.     REVIEW OF SYSTEMS:  CONSTITUTIONAL: No weakness, fevers or chills  EYES/ENT: No visual changes;  No vertigo or throat pain   NECK: No pain or stiffness  RESPIRATORY: No cough, wheezing, hemoptysis; +shortness of breath  CARDIOVASCULAR: No chest pain or palpitations  GASTROINTESTINAL: No abdominal or epigastric pain. No nausea, vomiting, or hematemesis; No diarrhea or constipation. No melena or hematochezia.  GENITOURINARY: No dysuria, frequency or hematuria  NEUROLOGICAL: No numbness or weakness  SKIN: No itching, rashes    Vital Signs Last 24 Hrs  T(C): 36.6 (21 Jun 2021 05:11), Max: 36.6 (21 Jun 2021 05:11)  T(F): 97.9 (21 Jun 2021 05:11), Max: 97.9 (21 Jun 2021 05:11)  HR: 93 (21 Jun 2021 05:11) (93 - 102)  BP: 133/82 (21 Jun 2021 05:11) (125/81 - 133/82)  BP(mean): --  RR: 18 (21 Jun 2021 05:11) (18 - 18)  SpO2: 99% (21 Jun 2021 05:11) (93% - 99%)    PHYSICAL EXAM:  GENERAL: NAD, well-developed  HEAD:  Atraumatic, Normocephalic  EYES: EOMI, PERRLA, conjunctiva and sclera clear  NECK: Supple, No JVD  CHEST/LUNG: Clear to auscultation bilaterally; No wheeze  HEART: Regular rate and rhythm; No murmurs, rubs, or gallops  ABDOMEN: Soft, Nontender, Nondistended; Bowel sounds present  EXTREMITIES:  2+ Peripheral Pulses, No clubbing, cyanosis, or edema  PSYCH: AAOx3  NEUROLOGY: non-focal  SKIN: No rashes or lesions    Consultant(s) Notes Reviewed:  [x ] YES  [ ] NO  Care Discussed with Consultants/Other Providers [ x] YES  [ ] NO    LABS:                        13.4   9.81  )-----------( 255      ( 21 Jun 2021 06:52 )             40.5     06-20    140  |  105  |  36<H>  ----------------------------<  122<H>  3.9   |  19<L>  |  1.33<H>    Ca    9.4      20 Jun 2021 07:22  Phos  3.4     06-20  Mg     2.3     06-20      PT/INR - ( 20 Jun 2021 07:22 )   PT: 14.4 sec;   INR: 1.21 ratio         PTT - ( 20 Jun 2021 14:43 )  PTT:72.0 sec      CAPILLARY BLOOD GLUCOSE          RADIOLOGY & ADDITIONAL TESTS:    Imaging Personally Reviewed:  [ ] YES  [ ] NO

## 2021-06-21 NOTE — PROGRESS NOTE ADULT - PROBLEM SELECTOR PLAN 3
- positive UA w/ WBCs, bacteria, and leucocyte esterase  - hx of bladder cancer w/ urostomy  - pt denies any changes in smell or appearance of urine  - will c/w CTX 6/23 last day   - f/u UCx: kleb pneunmoniae and E. coli  - BCx ngtd

## 2021-06-21 NOTE — PROGRESS NOTE ADULT - SUBJECTIVE AND OBJECTIVE BOX
Patient seen and examined at bedside.    Overnight Events: Patient without any ectopy overnight, only afib 90s-100s. No chest pain or SOB. Patient was told he was to have CABG on Wednesday.     Current Meds:  ALBUTerol    90 MICROgram(s) HFA Inhaler 2 Puff(s) Inhalation every 6 hours PRN  atorvastatin 40 milliGRAM(s) Oral at bedtime  cefTRIAXone   IVPB 1000 milliGRAM(s) IV Intermittent every 24 hours  heparin  Infusion. 1000 Unit(s)/Hr IV Continuous <Continuous>  levothyroxine 137 MICROGram(s) Oral daily  metoprolol tartrate 50 milliGRAM(s) Oral two times a day    Vitals:  T(F): 97.9 (06-21), Max: 97.9 (06-21)  HR: 93 (06-21) (93 - 102)  BP: 133/82 (06-21) (125/81 - 133/82)  RR: 18 (06-21)  SpO2: 99% (06-21)  I&O's Summary    20 Jun 2021 07:01  -  21 Jun 2021 07:00  --------------------------------------------------------  IN: 1178 mL / OUT: 2200 mL / NET: -1022 mL    Physical Exam:  Appearance: No acute distress; well appearing  Eyes:  EOMI, pink conjunctiva  HENT: Normal oral mucosa  Cardiovascular: irregularly irregular rate and rhythm, S1, S2, no murmurs, rubs, or gallops; no edema; no JVD  Respiratory: Clear to auscultation bilaterally  Gastrointestinal: soft, non-tender, non-distended with normal bowel sounds  Musculoskeletal: No clubbing; no joint deformity   Neurologic: Non-focal  Lymphatic: No lymphadenopathy  Psychiatry: AAOx3, mood & affect appropriate  Skin: No rashes                          13.4   9.81  )-----------( 255      ( 21 Jun 2021 06:52 )             40.5     06-21    139  |  103  |  36<H>  ----------------------------<  107<H>  3.7   |  21<L>  |  1.29    Ca    9.5      21 Jun 2021 06:52  Phos  3.0     06-21  Mg     2.0     06-21      PT/INR - ( 20 Jun 2021 07:22 )   PT: 14.4 sec;   INR: 1.21 ratio         PTT - ( 21 Jun 2021 06:52 )  PTT:55.3 sec  CARDIAC MARKERS ( 16 Jun 2021 08:42 )  125 ng/L / x     / x     / x     / x     / x      CARDIAC MARKERS ( 16 Jun 2021 00:11 )  158 ng/L / x     / x     / x     / x     / x      CARDIAC MARKERS ( 15 Truong 2021 17:13 )  x     / 1.150 ng/mL / x     / x     / x     / 1.1 ng/mL    Serum Pro-Brain Natriuretic Peptide: 5125 pg/mL (06-19 @ 09:32)  Serum Pro-Brain Natriuretic Peptide: 3374 pg/mL (06-15 @ 17:13)    New ECG(s): Personally reviewed    Echo:    Stress Testing:     Cath:  < from: Cardiac Cath Lab - Adult (06.18.21 @ 14:16) >  CORONARY VESSELS: The coronary circulation is co-dominant.  LM:   --  Distal left main: There was a 40 % stenosis.  LAD:   --  Distal LAD: There was a 70 % stenosis.  CX:   --  Ostial circumflex: There was a 75 % stenosis.  --  L AV groove: There was a 100 % stenosis.  RCA:   --  Ostial RCA: There was a 90 % stenosis.  --  Mid RCA: There was a 100 % stenosis.  COMPLICATIONS: There were no complications.  DIAGNOSTIC IMPRESSIONS: mRCA  and mLCx . severe oRCA and oLcx  disease. Mild-mod dLM and severe dLAD disease.  DIAGNOSTIC RECOMMENDATIONS: CTS consultation  Prepared and signed by  Nelly Mcqueen M.D.  Signed 06/19/2021 19:43:36    < end of copied text >      Imaging:    Interpretation of Telemetry: afib 90s-100s

## 2021-06-21 NOTE — PROGRESS NOTE ADULT - ASSESSMENT
RP is a 75 y/o male with a PMH of Afib, bladder CA, hyperlipidemia, HTN, chronic bronchitis, and hypothyroidism presenting with rapid heart rate and worsening shortness of breath found to be in afib w/ RVR w/ episode of WCT at 200bpm s/p synchronized cardioversion transferred from OSH for further management w/ course c/b sepsis w/ possible urinary source growing E. coli and klebsiella.

## 2021-06-21 NOTE — PROGRESS NOTE ADULT - SUBJECTIVE AND OBJECTIVE BOX
[facility] - Caleb Ochoa, Neetu, Jessica, Milka, Kimberley Mars  Office Number:  [phone]    Patient resting comfortably in bed in NAD.  Laying flat with no respiratory distress.  No complaints of chest pain, dyspnea, palpitations, PND, or orthopnea.  still with mild congestion  s/p lasix yesterday and increase in urination with urostomy    ROS: negative unless otherwise mentioned.    Telemetry:  af     MEDICATIONS  (STANDING):  atorvastatin 40 milliGRAM(s) Oral at bedtime  cefTRIAXone   IVPB 1000 milliGRAM(s) IV Intermittent every 24 hours  heparin  Infusion. 1000 Unit(s)/Hr (10 mL/Hr) IV Continuous <Continuous>  levothyroxine 137 MICROGram(s) Oral daily  metoprolol tartrate 50 milliGRAM(s) Oral two times a day    MEDICATIONS  (PRN):  ALBUTerol    90 MICROgram(s) HFA Inhaler 2 Puff(s) Inhalation every 6 hours PRN Shortness of Breath and/or Wheezing      Allergies    No Known Allergies    Intolerances        Vital Signs Last 24 Hrs  T(C): 36.6 (21 Jun 2021 05:11), Max: 36.6 (21 Jun 2021 05:11)  T(F): 97.9 (21 Jun 2021 05:11), Max: 97.9 (21 Jun 2021 05:11)  HR: 93 (21 Jun 2021 05:11) (93 - 102)  BP: 133/82 (21 Jun 2021 05:11) (125/81 - 133/82)  BP(mean): --  RR: 18 (21 Jun 2021 05:11) (18 - 18)  SpO2: 99% (21 Jun 2021 05:11) (93% - 99%)    I&O's Summary    20 Jun 2021 07:01  -  21 Jun 2021 07:00  --------------------------------------------------------  IN: 1178 mL / OUT: 2200 mL / NET: -1022 mL        ON EXAM:    General: NAD, awake and alert, oriented x 3  HEENT: Mucous membranes are moist, anicteric  Lungs: Non-labored, breath sounds are coarse bilaterally, No wheezing, rales or rhonchi  Cardiovascular: irregular, S1 and S2, no murmurs, rubs, or gallops  Gastrointestinal: Bowel Sounds present, soft, nontender.   Lymph: trace peripheral edema. No lymphadenopathy.  Skin: No rashes or ulcers  Psych:  Mood & affect appropriate      LABS: All Labs Reviewed:                        13.4   9.81  )-----------( 255      ( 21 Jun 2021 06:52 )             40.5                         13.5   7.99  )-----------( 214      ( 20 Jun 2021 07:28 )             41.3                         13.9   8.17  )-----------( 225      ( 19 Jun 2021 09:32 )             41.1     21 Jun 2021 06:52    139    |  103    |  36     ----------------------------<  107    3.7     |  21     |  1.29   20 Jun 2021 07:22    140    |  105    |  36     ----------------------------<  122    3.9     |  19     |  1.33   19 Jun 2021 09:32    142    |  104    |  37     ----------------------------<  122    3.7     |  21     |  1.37     Ca    9.5        21 Jun 2021 06:52  Ca    9.4        20 Jun 2021 07:22  Ca    9.4        19 Jun 2021 09:32  Phos  3.0       21 Jun 2021 06:52  Phos  3.4       20 Jun 2021 07:22  Phos  2.4       19 Jun 2021 09:32  Mg     2.0       21 Jun 2021 06:52  Mg     2.3       20 Jun 2021 07:22  Mg     2.2       19 Jun 2021 09:32      PT/INR - ( 20 Jun 2021 07:22 )   PT: 14.4 sec;   INR: 1.21 ratio         PTT - ( 21 Jun 2021 06:52 )  PTT:55.3 sec      Blood Culture: Organism Klebsiella pneumoniae  Gram Stain Blood -- Gram Stain --  Specimen Source .Urine Clean Catch (Midstream)  Culture-Blood --      06-19 @ 09:32  Pro Bnp [id number]

## 2021-06-21 NOTE — PROGRESS NOTE ADULT - ASSESSMENT
Fletcher is a 76 year old male with a history of hypertension, hypercholesterolemia, hypothyroidism, bladder cancer and permanent atrial fibrillation on Coumadin s/p unsuccessful cardioversion in the past, HFrEF 45% w mild MR who initially presents with AF with RVR.    - he is now s/p cath (in the setting of lv dysfunction), with triple vessel disease   - CTS evaluation appreciated and will plan for surgery this week (possibly tuesday?)  - preop evaluation in progress    - Had rapid AF, then paroxysmal rapid wct either vt or aberrated af. He was hypotensive and cardioverted.  - currently in generally rate controlled af, though up to 120s  - can increase metoprolol to 50 tid  - hold coumadin  - on heparin gtt for ac  - ep evaluation appreciated, for eventual icd after surgery  - will hold off on vt ablation, given the fact that he will be revascularized with surgery this week    - mild dyspnea, now improved  - had held off on diuretics in setting of gloria on ckd  - gave him lasix 20 iv yesterday and is -1 L negative  - can hold off on additional diuretics today. His oxygen requirement is minimal.  - known borderline depressed lvef, with an ef of ~45 by echo 2019 and by nuc stress 2017  - ef now worse (20-25%, with mod MR), though s/p cardioversion    - fever on admission  - cont abx for uti    - replete electrolytes to k>4, Mg>2  - will follow with you

## 2021-06-21 NOTE — PROGRESS NOTE ADULT - ATTENDING COMMENTS
Patient is a 76 year old male with PMH atrial fibrillation, bladder CA, HTN, HLD, hypothyroidism and chronic bronchitis who initially presented to an outside hospital for shortness of breath and rapid heart beat. He was found to be in a wide complex tachycardia and failed medical treatment requiring cardioversion which improved his heart rate. He was transferred to [facility] for EP and interventional cardiology evaluation. His echocardiogram has a new EF of 20-25%. He had cardiac catheretization on 6/18 which showed , will follow up results. Depending on the results of his catheterization, he may have a VT ablation and secondary prevention ICD as well. Continue ceftriaxone for UTI and follow up urine culture results. Rest of plan as above.    Brian Convissar, DO  Pager [phone]  If no answer [phone] Patient is a 76 year old male with PMH atrial fibrillation, bladder CA, HTN, HLD, hypothyroidism and chronic bronchitis who initially presented to an outside hospital for shortness of breath and rapid heart beat. He was found to be in a wide complex tachycardia and failed medical treatment requiring cardioversion which improved his heart rate. He was transferred to [facility] for EP and interventional cardiology evaluation. His echocardiogram has a new EF of 20-25%. He had cardiac catheretization on 6/18 which showed the following: dLM 40%; mLAD 60%; oCX 90%; oRCA 99%. He is now scheduled to go to the OR with CT surgery for a CABG on Wednesday. He may have a secondary prevention ICD placed after his CABG as well by EP. Continue ceftriaxone for UTI. Rest of plan as above.    Brian Convissar, DO  Pager [phone]  If no answer [phone]

## 2021-06-22 DIAGNOSIS — I48.19 OTHER PERSISTENT ATRIAL FIBRILLATION: ICD-10-CM

## 2021-06-22 LAB
ANION GAP SERPL CALC-SCNC: 16 MMOL/L — SIGNIFICANT CHANGE UP (ref 5–17)
APTT BLD: 50.1 SEC — HIGH (ref 27.5–35.5)
APTT BLD: 60.5 SEC — HIGH (ref 27.5–35.5)
APTT BLD: 76.9 SEC — HIGH (ref 27.5–35.5)
BLD GP AB SCN SERPL QL: NEGATIVE — SIGNIFICANT CHANGE UP
BUN SERPL-MCNC: 31 MG/DL — HIGH (ref 7–23)
CALCIUM SERPL-MCNC: 9.5 MG/DL — SIGNIFICANT CHANGE UP (ref 8.4–10.5)
CHLORIDE SERPL-SCNC: 103 MMOL/L — SIGNIFICANT CHANGE UP (ref 96–108)
CO2 SERPL-SCNC: 20 MMOL/L — LOW (ref 22–31)
CREAT SERPL-MCNC: 1.2 MG/DL — SIGNIFICANT CHANGE UP (ref 0.5–1.3)
GLUCOSE SERPL-MCNC: 102 MG/DL — HIGH (ref 70–99)
HCT VFR BLD CALC: 41 % — SIGNIFICANT CHANGE UP (ref 39–50)
HGB BLD-MCNC: 13.4 G/DL — SIGNIFICANT CHANGE UP (ref 13–17)
MAGNESIUM SERPL-MCNC: 2 MG/DL — SIGNIFICANT CHANGE UP (ref 1.6–2.6)
MCHC RBC-ENTMCNC: 28.2 PG — SIGNIFICANT CHANGE UP (ref 27–34)
MCHC RBC-ENTMCNC: 32.7 GM/DL — SIGNIFICANT CHANGE UP (ref 32–36)
MCV RBC AUTO: 86.3 FL — SIGNIFICANT CHANGE UP (ref 80–100)
NRBC # BLD: 0 /100 WBCS — SIGNIFICANT CHANGE UP (ref 0–0)
ORGANISM # SPEC MICROSCOPIC CNT: SIGNIFICANT CHANGE UP
ORGANISM # SPEC MICROSCOPIC CNT: SIGNIFICANT CHANGE UP
PHOSPHATE SERPL-MCNC: 2.6 MG/DL — SIGNIFICANT CHANGE UP (ref 2.5–4.5)
PLATELET # BLD AUTO: 288 K/UL — SIGNIFICANT CHANGE UP (ref 150–400)
POTASSIUM SERPL-MCNC: 4.1 MMOL/L — SIGNIFICANT CHANGE UP (ref 3.5–5.3)
POTASSIUM SERPL-SCNC: 4.1 MMOL/L — SIGNIFICANT CHANGE UP (ref 3.5–5.3)
RBC # BLD: 4.75 M/UL — SIGNIFICANT CHANGE UP (ref 4.2–5.8)
RBC # FLD: 14.5 % — SIGNIFICANT CHANGE UP (ref 10.3–14.5)
RH IG SCN BLD-IMP: POSITIVE — SIGNIFICANT CHANGE UP
SARS-COV-2 RNA SPEC QL NAA+PROBE: SIGNIFICANT CHANGE UP
SODIUM SERPL-SCNC: 139 MMOL/L — SIGNIFICANT CHANGE UP (ref 135–145)
WBC # BLD: 10.94 K/UL — HIGH (ref 3.8–10.5)
WBC # FLD AUTO: 10.94 K/UL — HIGH (ref 3.8–10.5)

## 2021-06-22 PROCEDURE — 99233 SBSQ HOSP IP/OBS HIGH 50: CPT | Mod: GC

## 2021-06-22 PROCEDURE — 99233 SBSQ HOSP IP/OBS HIGH 50: CPT

## 2021-06-22 RX ORDER — CHLORHEXIDINE GLUCONATE 213 G/1000ML
1 SOLUTION TOPICAL ONCE
Refills: 0 | Status: COMPLETED | OUTPATIENT
Start: 2021-06-22 | End: 2021-06-22

## 2021-06-22 RX ORDER — SORBITOL SOLUTION 70 %
30 SOLUTION, ORAL MISCELLANEOUS ONCE
Refills: 0 | Status: COMPLETED | OUTPATIENT
Start: 2021-06-22 | End: 2021-06-22

## 2021-06-22 RX ORDER — SENNA PLUS 8.6 MG/1
2 TABLET ORAL AT BEDTIME
Refills: 0 | Status: DISCONTINUED | OUTPATIENT
Start: 2021-06-22 | End: 2021-06-23

## 2021-06-22 RX ORDER — CHLORHEXIDINE GLUCONATE 213 G/1000ML
10 SOLUTION TOPICAL ONCE
Refills: 0 | Status: COMPLETED | OUTPATIENT
Start: 2021-06-22 | End: 2021-06-23

## 2021-06-22 RX ORDER — CEFUROXIME AXETIL 250 MG
1500 TABLET ORAL ONCE
Refills: 0 | Status: DISCONTINUED | OUTPATIENT
Start: 2021-06-22 | End: 2021-06-23

## 2021-06-22 RX ORDER — CHLORHEXIDINE GLUCONATE 213 G/1000ML
1 SOLUTION TOPICAL ONCE
Refills: 0 | Status: COMPLETED | OUTPATIENT
Start: 2021-06-23 | End: 2021-06-23

## 2021-06-22 RX ORDER — POLYETHYLENE GLYCOL 3350 17 G/17G
17 POWDER, FOR SOLUTION ORAL ONCE
Refills: 0 | Status: COMPLETED | OUTPATIENT
Start: 2021-06-22 | End: 2021-06-22

## 2021-06-22 RX ORDER — METOPROLOL TARTRATE 50 MG
50 TABLET ORAL EVERY 8 HOURS
Refills: 0 | Status: DISCONTINUED | OUTPATIENT
Start: 2021-06-22 | End: 2021-06-23

## 2021-06-22 RX ADMIN — ATORVASTATIN CALCIUM 40 MILLIGRAM(S): 80 TABLET, FILM COATED ORAL at 21:11

## 2021-06-22 RX ADMIN — HEPARIN SODIUM 1900 UNIT(S)/HR: 5000 INJECTION INTRAVENOUS; SUBCUTANEOUS at 08:19

## 2021-06-22 RX ADMIN — Medication 137 MICROGRAM(S): at 05:54

## 2021-06-22 RX ADMIN — POLYETHYLENE GLYCOL 3350 17 GRAM(S): 17 POWDER, FOR SOLUTION ORAL at 09:37

## 2021-06-22 RX ADMIN — CHLORHEXIDINE GLUCONATE 1 APPLICATION(S): 213 SOLUTION TOPICAL at 23:20

## 2021-06-22 RX ADMIN — Medication 50 MILLIGRAM(S): at 21:11

## 2021-06-22 RX ADMIN — Medication 30 MILLILITER(S): at 16:56

## 2021-06-22 RX ADMIN — HEPARIN SODIUM 1800 UNIT(S)/HR: 5000 INJECTION INTRAVENOUS; SUBCUTANEOUS at 21:10

## 2021-06-22 RX ADMIN — CEFTRIAXONE 100 MILLIGRAM(S): 500 INJECTION, POWDER, FOR SOLUTION INTRAMUSCULAR; INTRAVENOUS at 05:55

## 2021-06-22 RX ADMIN — CHLORHEXIDINE GLUCONATE 1 APPLICATION(S): 213 SOLUTION TOPICAL at 23:21

## 2021-06-22 RX ADMIN — HEPARIN SODIUM 1900 UNIT(S)/HR: 5000 INJECTION INTRAVENOUS; SUBCUTANEOUS at 14:47

## 2021-06-22 RX ADMIN — Medication 50 MILLIGRAM(S): at 13:01

## 2021-06-22 RX ADMIN — Medication 50 MILLIGRAM(S): at 05:54

## 2021-06-22 NOTE — PROGRESS NOTE ADULT - ATTENDING COMMENTS
Patient is a 76 year old male with PMH atrial fibrillation, bladder CA, HTN, HLD, hypothyroidism and chronic bronchitis who initially presented to an outside hospital for shortness of breath and rapid heart beat. He was found to be in a wide complex tachycardia and failed medical treatment requiring cardioversion which improved his heart rate. He was transferred to [facility] for EP and interventional cardiology evaluation. His echocardiogram has a new EF of 20-25%. He had cardiac catheretization on 6/18 which showed the following: dLM 40%; mLAD 60%; oCX 90%; oRCA 99%. He is now scheduled to go to the OR with CT surgery for a CABG on 6/23. He may have a secondary prevention ICD placed after his CABG as well by EP. Rest of plan as above.    Brian Convissar, DO  Pager [phone]  If no answer [phone]

## 2021-06-22 NOTE — PROGRESS NOTE ADULT - ASSESSMENT
RP is a 77 y/o male with a PMH of Afib, bladder CA, hyperlipidemia, HTN, chronic bronchitis, and hypothyroidism presenting with rapid heart rate and worsening shortness of breath found to be in afib w/ RVR w/ episode of WCT at 200bpm s/p synchronized cardioversion transferred from OSH for further management w/ course c/b sepsis w/ possible urinary source growing E. coli and klebsiella.

## 2021-06-22 NOTE — DIETITIAN INITIAL EVALUATION ADULT. - PERTINENT MEDS FT
MEDICATIONS  (STANDING):  atorvastatin 40 milliGRAM(s) Oral at bedtime  cefTRIAXone   IVPB 1000 milliGRAM(s) IV Intermittent every 24 hours  heparin  Infusion. 1000 Unit(s)/Hr (10 mL/Hr) IV Continuous <Continuous>  levothyroxine 137 MICROGram(s) Oral daily  metoprolol tartrate 50 milliGRAM(s) Oral two times a day    MEDICATIONS  (PRN):  ALBUTerol    90 MICROgram(s) HFA Inhaler 2 Puff(s) Inhalation every 6 hours PRN Shortness of Breath and/or Wheezing

## 2021-06-22 NOTE — DIETITIAN INITIAL EVALUATION ADULT. - PROBLEM SELECTOR PLAN 1
- hx of long-standing afib w/ cardioversion attempt 2 years ago which was unsuccessful, on home diltiazem/toprol/warfarin  - presenting w/ worsening SOB found to be in afib w/ RVR w/ episode of 200 bpm WCT at OSH s/p synchronized cardioversion  - cardiology is on board  - EP on board  - c/w 180 bid diltiazem  - toprol 25mg --> switched to lopressor 25mg bid per EP  - s/p 150mg amio, start amio load followed by PO amio  --> d/c amio per EP  - will acquire TTE  - angiogram this admission, will hold coumadin in preparation

## 2021-06-22 NOTE — PROGRESS NOTE ADULT - ASSESSMENT
76M with persistent afib on coumadin s/p 2 failed DCCV in the past, bladder cancer s/p urostomy, HTN, HLD, chronic bronchitis, hypothyroidism who presented to his PCP with c/o SOB and palpitations and was sent to the [place] ED when found to be in Afib with RVR. Patient had WCT at [place] ED, and was transferred to [facility] for further evaluation.  TTE: LVEF 20-25% and moderate MR. EP consulted - plan for VT ablation +/- secondary prevention ICD on Monday 6/21. 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 Plan for OR tomorrow, still needs PFTs, COVID swab, type and screen x1.

## 2021-06-22 NOTE — PROGRESS NOTE ADULT - SUBJECTIVE AND OBJECTIVE BOX
[facility] - Caleb Ochoa, Neetu, Jessica, Milka, Kimberley Mars  Office Number:  [phone]    Patient resting comfortably in bed in NAD.  Laying flat with no respiratory distress.  No complaints of chest pain, dyspnea, palpitations, PND, or orthopnea.    F/U for:  CAD    Telemetry:  AF, 100-120    MEDICATIONS  (STANDING):  atorvastatin 40 milliGRAM(s) Oral at bedtime  cefTRIAXone   IVPB 1000 milliGRAM(s) IV Intermittent every 24 hours  heparin  Infusion. 1000 Unit(s)/Hr (10 mL/Hr) IV Continuous <Continuous>  levothyroxine 137 MICROGram(s) Oral daily  metoprolol tartrate 50 milliGRAM(s) Oral two times a day  polyethylene glycol 3350 17 Gram(s) Oral once  senna 2 Tablet(s) Oral at bedtime    MEDICATIONS  (PRN):  ALBUTerol    90 MICROgram(s) HFA Inhaler 2 Puff(s) Inhalation every 6 hours PRN Shortness of Breath and/or Wheezing      Allergies    No Known Allergies    Intolerances        Vital Signs Last 24 Hrs  T(C): 36.9 (22 Jun 2021 04:20), Max: 37.2 (21 Jun 2021 20:06)  T(F): 98.5 (22 Jun 2021 04:20), Max: 98.9 (21 Jun 2021 20:06)  HR: 108 (22 Jun 2021 04:20) (94 - 113)  BP: 138/85 (22 Jun 2021 04:20) (105/64 - 145/-)  BP(mean): --  RR: 18 (22 Jun 2021 04:20) (18 - 18)  SpO2: 99% (22 Jun 2021 04:20) (96% - 99%)    I&O's Summary    21 Jun 2021 07:01  -  22 Jun 2021 07:00  --------------------------------------------------------  IN: 770 mL / OUT: 1595 mL / NET: -825 mL        ON EXAM:    General: NAD, awake and alert, oriented x 3  HEENT: Mucous membranes are moist, anicteric  Lungs: Non-labored, breath sounds are coarse bilaterally, No wheezing, rales or rhonchi  Cardiovascular: irregular, S1 and S2, no murmurs, rubs, or gallops  Gastrointestinal: Bowel Sounds present, soft, nontender.   Lymph: trace peripheral edema. No lymphadenopathy.  Skin: No rashes or ulcers  Psych:  Mood & affect appropriate      LABS: All Labs Reviewed:                        13.4   10.94 )-----------( 288      ( 22 Jun 2021 07:06 )             41.0                         13.4   9.81  )-----------( 255      ( 21 Jun 2021 06:52 )             40.5                         13.5   7.99  )-----------( 214      ( 20 Jun 2021 07:28 )             41.3     22 Jun 2021 07:06    139    |  103    |  31     ----------------------------<  102    4.1     |  20     |  1.20   21 Jun 2021 06:52    139    |  103    |  36     ----------------------------<  107    3.7     |  21     |  1.29   20 Jun 2021 07:22    140    |  105    |  36     ----------------------------<  122    3.9     |  19     |  1.33     Ca    9.5        22 Jun 2021 07:06  Ca    9.5        21 Jun 2021 06:52  Ca    9.4        20 Jun 2021 07:22  Phos  2.6       22 Jun 2021 07:06  Phos  3.0       21 Jun 2021 06:52  Phos  3.4       20 Jun 2021 07:22  Mg     2.0       22 Jun 2021 07:06  Mg     2.0       21 Jun 2021 06:52  Mg     2.3       20 Jun 2021 07:22      PTT - ( 22 Jun 2021 07:07 )  PTT:50.1 sec      Blood Culture:   06-19 @ 09:32  Pro Bnp [id number]

## 2021-06-22 NOTE — PROGRESS NOTE ADULT - PROBLEM SELECTOR PLAN 4
- pt with baseline SCr of 1.4-1.5, p/w 1.80 w/ ARAM on CKD stage 3  - will hold losartan and HCTZ for now, but ARMA improving  - likely pre-renal i/s/o decreased PO water intake and infection  - s/p 100cc of IVF for 5hrs  - SCr improved to baseline  - CTM

## 2021-06-22 NOTE — DIETITIAN INITIAL EVALUATION ADULT. - PROBLEM SELECTOR PLAN 10
- DVT PPx: coumadin will be held in preparation for angiogram but currently therapeutic INR  - Diet: DASH/TLC  - Dispo: Will need PT consult eventually

## 2021-06-22 NOTE — PROGRESS NOTE ADULT - PROBLEM SELECTOR PLAN 1
- hx of long-standing afib w/ cardioversion attempt 2 years ago which was unsuccessful, on home diltiazem/toprol/warfarin  - presenting w/ worsening SOB found to be in afib w/ RVR w/ episode of 200 bpm WCT at OSH s/p synchronized cardioversion  - cardiology is on board, EP on board  - C/w 180 bid diltiazem  --> d/c per EP  - Toprol 25mg --> switched to lopressor 25mg bid per EP --> 50mg bid per EP  - s/p 150mg amio, start amio load followed by PO amio  --> d/c amio per EP  - TTE shows reduced EF to 20-25%  - angiogram 6/18: : s/p Diagnostic LHC via RRA with dLM 40%; mLAD 60%; oCX 90%; oRCA 99% with collaterols from left.  On ACS heparin drip.   - CTS  on board, CABG pending   - bedside spirometry completed   - will be evaluated for ICD and VT ablation after CABG - hx of long-standing afib w/ cardioversion attempt 2 years ago which was unsuccessful, on home diltiazem/toprol/warfarin  - presenting w/ worsening SOB found to be in afib w/ RVR w/ episode of 200 bpm WCT at OSH s/p synchronized cardioversion  - cardiology is on board, EP on board  - C/w 180 bid diltiazem  --> d/c per EP  - Toprol 25mg --> switched to lopressor 25mg bid per EP --> 50mg bid per EP --> 50mg tid  - s/p 150mg amio, start amio load followed by PO amio  --> d/c amio per EP  - TTE shows reduced EF to 20-25%  - angiogram 6/18: : s/p Diagnostic LHC via RRA with dLM 40%; mLAD 60%; oCX 90%; oRCA 99% with collaterols from left.  On ACS heparin drip.   - CTS  on board, CABG pending, likely tomorrow  - bedside spirometry completed   - will be evaluated for ICD and VT ablation after CABG

## 2021-06-22 NOTE — PROGRESS NOTE ADULT - SUBJECTIVE AND OBJECTIVE BOX
Planned Date of Surgery:       6/23/21                                                                                                           Surgeon: Dr. Harding     Procedure: CABG     HPI:  76M with persistent afib on coumadin s/p 2 failed DCCV in the past, bladder cancer s/p urostomy, HTN, HLD, chronic bronchitis, hypothyroidism who presented to his PCP with c/o SOB and palpitations and was sent to the [place] ED when found to be in Afib with RVR. Patient had WCT at [facility] ED, and was transferred to [facility] for further evaluation.  TTE: LVEF 20-25% and moderate MR. EP consulted - plan for VT ablation +/- secondary prevention ICD on Monday 6/21. 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 Patient has been stable awaiting PST and is now planned for OR tomorrow, offers no acute complaints.     PAST MEDICAL & SURGICAL HISTORY:  Atrial fibrillation    Hypothyroid    Hypertension    Bladder cancer    Bronchitis    Former smoker    History of bladder surgery  on urostomy        No Known Allergies      Physical Exam  T(C): 36.9 (06-22-21 @ 04:20), Max: 37.2 (06-21-21 @ 20:06)  HR: 108 (06-22-21 @ 04:20) (94 - 113)  BP: 138/85 (06-22-21 @ 04:20) (105/64 - 145/-)  RR: 18 (06-22-21 @ 04:20) (18 - 18)  SpO2: 99% (06-22-21 @ 04:20) (96% - 99%)    Constitutional:sitting in chair, nad  Neuro: aaox3, no neuro deficits noted  CV:s1s2 rrr no murmurs appreciated   Pulmonary: cta b/l  GI: +BS, soft, nt/nd  Extremeties: wwp, no edema, no calf tenderness, palpable peripheral pulses b/l     MEDICATIONS  (STANDING):  atorvastatin 40 milliGRAM(s) Oral at bedtime  cefTRIAXone   IVPB 1000 milliGRAM(s) IV Intermittent every 24 hours  chlorhexidine 0.12% Liquid 10 milliLiter(s) Swish and Spit once  chlorhexidine 4% Liquid 1 Application(s) Topical once  chlorhexidine 4% Liquid 1 Application(s) Topical once  heparin  Infusion. 1000 Unit(s)/Hr (10 mL/Hr) IV Continuous <Continuous>  levothyroxine 137 MICROGram(s) Oral daily  metoprolol tartrate 50 milliGRAM(s) Oral every 8 hours  senna 2 Tablet(s) Oral at bedtime    MEDICATIONS  (PRN):  ALBUTerol    90 MICROgram(s) HFA Inhaler 2 Puff(s) Inhalation every 6 hours PRN Shortness of Breath and/or Wheezing      On Beta Blocker? YES    Labs:                        13.4   10.94 )-----------( 288      ( 22 Jun 2021 07:06 )             41.0     06-22    139  |  103  |  31<H>  ----------------------------<  102<H>  4.1   |  20<L>  |  1.20    Ca    9.5      22 Jun 2021 07:06  Phos  2.6     06-22  Mg     2.0     06-22      PTT - ( 22 Jun 2021 07:07 )  PTT:50.1 sec    ABO Interpretation: A (06-19-21 @ 09:50)      Hgb A1C: 5.8    EKG:  < from: 12 Lead ECG (06.16.21 @ 08:48) >  Ventricular Rate 108 BPM    Atrial Rate 55 BPM    QRS Duration 120 ms    Q-T Interval 454 ms    QTC Calculation(Bazett) 608 ms    R Axis 44 degrees    T Axis -44 degrees    Diagnosis Line ATRIAL FIBRILLATION WITH RAPID VENTRICULAR RESPONSE WITH PREMATURE VENTRICULAR OR ABERRANTLY CONDUCTED COMPLEXES  SEPTAL INFARCT (CITED ON OR BEFORE 15-ARSENIO-2021)  ABNORMAL ECG  WHEN COMPARED WITH ECG OF 15-ARSENIO-2021 21:56, (UNCONFIRMED)  SIGNIFICANT CHANGES HAVE OCCURRED  Confirmed by MYRON PATEL ([id number]) on 6/16/2021 9:13:49 PM    < end of copied text >      CXR:  `< from: Xray Chest 1 View AP/PA (06.16.21 @ 03:34) >  FINDINGS:      The heart is normal in size.  The lungs are clear.  No pleural effusion or pneumothorax.    IMPRESSION:  Clear lungs.    < end of copied text >      CT Scans:    Cath Report: Reviewed by Dr. Harding    Echo:  `< from: TTE with Doppler (w/Cont) (06.16.21 @ 15:21) >  Conclusions:  1. Mitral annular calcification, otherwise normal mitral  valve. Moderate mitral regurgitation.  PISA radius = 0.8  cm. RVOL = 32 ml, EROA = 29 mm2.  2. Eccentric left ventricular hypertrophy (dilated left  ventricle with normal relative wall thickness).  3. Severe global left ventricular systolic dysfunction with  regional variation. Endocardial visualization enhanced with  intravenous injection of Ultrasonic Enhancing Agent  (Definity).  No left ventricular thrombus.  4. Normal right ventricular size and function.  5. Estimated right ventricular systolic pressure equals 33  mm Hg, assuming right atrial pressure equals 8 mm Hg,  consistent with normal pulmonary pressures.  *** No previous Echo exam.  ------------------------------------    < end of copied text >      PFT's: PENDING    Carotid Duplex: n./a    Consult in Chart?  YES   Consent in Chart? YES   Pre-op Orders Placed? YES   Blood Products Ordered? YES   NPO ordered? YES

## 2021-06-22 NOTE — DIETITIAN INITIAL EVALUATION ADULT. - OTHER INFO
Intake : >75% as per pt  Denies nausea/vomit :  Denies difficulty chewing /swallow :  Denies diarrhea  Last BM : 3 days ago-agrees to prunes-RD provided via preferences in [facility] for lunch everyday  NKFA  Ht: 5'10.5"  Ht taken from pt  Dosing ht: 177.8cm  Dosing wt: 108.9kg  BMI: 33.2  BMI calculated using wt from flow sheet  BMI calculated using ht from pt  Education Provided : pt was educated on the importance of supplements to increase calorie and protein intake in light of RD's nutritional findings-[employer],-Pre-Diabetes Wellness Brochure  pressure injury: none

## 2021-06-22 NOTE — PROGRESS NOTE ADULT - SUBJECTIVE AND OBJECTIVE BOX
Hayden Narayan  [facility]/Medicine/[phone]    MARÍA ELENA BEAR  76y  Male      Patient is a 76y old  Male who presents with a chief complaint of Afib w/ RVR i/s/o fever (21 Jun 2021 10:25)      INTERVAL HPI/OVERNIGHT EVENTS:    REVIEW OF SYSTEMS:  CONSTITUTIONAL: No weakness, fevers or chills  EYES/ENT: No visual changes;  No vertigo or throat pain   NECK: No pain or stiffness  RESPIRATORY: No cough, wheezing, hemoptysis; +shortness of breath  CARDIOVASCULAR: No chest pain or palpitations  GASTROINTESTINAL: No abdominal or epigastric pain. No nausea, vomiting, or hematemesis; No diarrhea or constipation. No melena or hematochezia.  GENITOURINARY: No dysuria, frequency or hematuria  NEUROLOGICAL: No numbness or weakness  SKIN: No itching, rashes    Vital Signs Last 24 Hrs  T(C): 36.9 (22 Jun 2021 04:20), Max: 37.2 (21 Jun 2021 20:06)  T(F): 98.5 (22 Jun 2021 04:20), Max: 98.9 (21 Jun 2021 20:06)  HR: 108 (22 Jun 2021 04:20) (94 - 113)  BP: 138/85 (22 Jun 2021 04:20) (105/64 - 145/-)  BP(mean): --  RR: 18 (22 Jun 2021 04:20) (18 - 18)  SpO2: 99% (22 Jun 2021 04:20) (96% - 99%)    PHYSICAL EXAM:  GENERAL: NAD, well-developed  HEAD:  Atraumatic, Normocephalic  EYES: EOMI, PERRLA, conjunctiva and sclera clear  NECK: Supple, No JVD  CHEST/LUNG: Clear to auscultation bilaterally; No wheeze  HEART: Regular rate and rhythm; No murmurs, rubs, or gallops  ABDOMEN: Soft, Nontender, Nondistended; Bowel sounds present  EXTREMITIES:  2+ Peripheral Pulses, No clubbing, cyanosis, or edema  PSYCH: AAOx3  NEUROLOGY: non-focal  SKIN: No rashes or lesions    Consultant(s) Notes Reviewed:  [x ] YES  [ ] NO  Care Discussed with Consultants/Other Providers [ x] YES  [ ] NO    LABS:                        13.4   10.94 )-----------( 288      ( 22 Jun 2021 07:06 )             41.0     06-22    139  |  103  |  31<H>  ----------------------------<  102<H>  4.1   |  20<L>  |  1.20    Ca    9.5      22 Jun 2021 07:06  Phos  2.6     06-22  Mg     2.0     06-22      PTT - ( 22 Jun 2021 07:07 )  PTT:50.1 sec      CAPILLARY BLOOD GLUCOSE          RADIOLOGY & ADDITIONAL TESTS:    Imaging Personally Reviewed:  [ ] YES  [ ] NO   Hayden Narayan  [facility]/Medicine/[phone]    MARÍA ELENA BEAR  76y  Male      Patient is a 76y old  Male who presents with a chief complaint of Afib w/ RVR i/s/o fever (21 Jun 2021 10:25)      INTERVAL HPI/OVERNIGHT EVENTS: No acute events overnight.     REVIEW OF SYSTEMS:  CONSTITUTIONAL: No weakness, fevers or chills  EYES/ENT: No visual changes;  No vertigo or throat pain   NECK: No pain or stiffness  RESPIRATORY: No cough, wheezing, hemoptysis; +shortness of breath  CARDIOVASCULAR: No chest pain or palpitations  GASTROINTESTINAL: No abdominal or epigastric pain. No nausea, vomiting, or hematemesis; No diarrhea or constipation. No melena or hematochezia.  GENITOURINARY: No dysuria, frequency or hematuria  NEUROLOGICAL: No numbness or weakness  SKIN: No itching, rashes    Vital Signs Last 24 Hrs  T(C): 36.9 (22 Jun 2021 04:20), Max: 37.2 (21 Jun 2021 20:06)  T(F): 98.5 (22 Jun 2021 04:20), Max: 98.9 (21 Jun 2021 20:06)  HR: 108 (22 Jun 2021 04:20) (94 - 113)  BP: 138/85 (22 Jun 2021 04:20) (105/64 - 145/-)  BP(mean): --  RR: 18 (22 Jun 2021 04:20) (18 - 18)  SpO2: 99% (22 Jun 2021 04:20) (96% - 99%)    PHYSICAL EXAM:  GENERAL: NAD, well-developed  HEAD:  Atraumatic, Normocephalic  EYES: EOMI, PERRLA, conjunctiva and sclera clear  NECK: Supple, No JVD  CHEST/LUNG: Clear to auscultation bilaterally; No wheeze  HEART: Regular rate and rhythm; No murmurs, rubs, or gallops  ABDOMEN: Soft, Nontender, Nondistended; Bowel sounds present  EXTREMITIES:  2+ Peripheral Pulses, No clubbing, cyanosis, or edema  PSYCH: AAOx3  NEUROLOGY: non-focal  SKIN: No rashes or lesions    Consultant(s) Notes Reviewed:  [x ] YES  [ ] NO  Care Discussed with Consultants/Other Providers [ x] YES  [ ] NO    LABS:                        13.4   10.94 )-----------( 288      ( 22 Jun 2021 07:06 )             41.0     06-22    139  |  103  |  31<H>  ----------------------------<  102<H>  4.1   |  20<L>  |  1.20    Ca    9.5      22 Jun 2021 07:06  Phos  2.6     06-22  Mg     2.0     06-22      PTT - ( 22 Jun 2021 07:07 )  PTT:50.1 sec      CAPILLARY BLOOD GLUCOSE          RADIOLOGY & ADDITIONAL TESTS:    Imaging Personally Reviewed:  [ ] YES  [ ] NO

## 2021-06-23 ENCOUNTER — TRANSCRIPTION ENCOUNTER (OUTPATIENT)
Age: 76
End: 2021-06-23

## 2021-06-23 ENCOUNTER — APPOINTMENT (OUTPATIENT)
Dept: CARDIOTHORACIC SURGERY | Facility: HOSPITAL | Age: 76
End: 2021-06-23

## 2021-06-23 LAB
ALBUMIN SERPL ELPH-MCNC: 3 G/DL — LOW (ref 3.3–5)
ALBUMIN SERPL ELPH-MCNC: 3.5 G/DL — SIGNIFICANT CHANGE UP (ref 3.3–5)
ALP SERPL-CCNC: 128 U/L — HIGH (ref 40–120)
ALP SERPL-CCNC: 84 U/L — SIGNIFICANT CHANGE UP (ref 40–120)
ALT FLD-CCNC: 49 U/L — HIGH (ref 10–45)
ALT FLD-CCNC: 80 U/L — HIGH (ref 10–45)
ANION GAP SERPL CALC-SCNC: 15 MMOL/L — SIGNIFICANT CHANGE UP (ref 5–17)
ANION GAP SERPL CALC-SCNC: 18 MMOL/L — HIGH (ref 5–17)
APTT BLD: 27.1 SEC — LOW (ref 27.5–35.5)
APTT BLD: 69.7 SEC — HIGH (ref 27.5–35.5)
APTT BLD: 79.5 SEC — HIGH (ref 27.5–35.5)
AST SERPL-CCNC: 43 U/L — HIGH (ref 10–40)
AST SERPL-CCNC: 55 U/L — HIGH (ref 10–40)
BASE EXCESS BLDV CALC-SCNC: -1 MMOL/L — SIGNIFICANT CHANGE UP (ref -2–2)
BASE EXCESS BLDV CALC-SCNC: -4.3 MMOL/L — LOW (ref -2–2)
BILIRUB SERPL-MCNC: 0.3 MG/DL — SIGNIFICANT CHANGE UP (ref 0.2–1.2)
BILIRUB SERPL-MCNC: 0.6 MG/DL — SIGNIFICANT CHANGE UP (ref 0.2–1.2)
BUN SERPL-MCNC: 24 MG/DL — HIGH (ref 7–23)
BUN SERPL-MCNC: 25 MG/DL — HIGH (ref 7–23)
CA-I SERPL-SCNC: 0.97 MMOL/L — LOW (ref 1.12–1.3)
CA-I SERPL-SCNC: 0.98 MMOL/L — LOW (ref 1.12–1.3)
CALCIUM SERPL-MCNC: 8.5 MG/DL — SIGNIFICANT CHANGE UP (ref 8.4–10.5)
CALCIUM SERPL-MCNC: 9.4 MG/DL — SIGNIFICANT CHANGE UP (ref 8.4–10.5)
CHLORIDE BLDV-SCNC: 111 MMOL/L — HIGH (ref 96–108)
CHLORIDE BLDV-SCNC: SIGNIFICANT CHANGE UP MMOL/L (ref 96–108)
CHLORIDE SERPL-SCNC: 104 MMOL/L — SIGNIFICANT CHANGE UP (ref 96–108)
CHLORIDE SERPL-SCNC: 105 MMOL/L — SIGNIFICANT CHANGE UP (ref 96–108)
CO2 BLDV-SCNC: 22 MMOL/L — SIGNIFICANT CHANGE UP (ref 22–30)
CO2 BLDV-SCNC: 27 MMOL/L — SIGNIFICANT CHANGE UP (ref 22–30)
CO2 SERPL-SCNC: 19 MMOL/L — LOW (ref 22–31)
CO2 SERPL-SCNC: 22 MMOL/L — SIGNIFICANT CHANGE UP (ref 22–31)
CREAT SERPL-MCNC: 1.06 MG/DL — SIGNIFICANT CHANGE UP (ref 0.5–1.3)
CREAT SERPL-MCNC: 1.23 MG/DL — SIGNIFICANT CHANGE UP (ref 0.5–1.3)
FIBRINOGEN PPP-MCNC: 533 MG/DL — HIGH (ref 290–520)
GAS PNL BLDA: SIGNIFICANT CHANGE UP
GAS PNL BLDV: 136 MMOL/L — SIGNIFICANT CHANGE UP (ref 135–145)
GAS PNL BLDV: 138 MMOL/L — SIGNIFICANT CHANGE UP (ref 135–145)
GAS PNL BLDV: SIGNIFICANT CHANGE UP
GLUCOSE BLDC GLUCOMTR-MCNC: 133 MG/DL — HIGH (ref 70–99)
GLUCOSE BLDC GLUCOMTR-MCNC: 142 MG/DL — HIGH (ref 70–99)
GLUCOSE BLDC GLUCOMTR-MCNC: 148 MG/DL — HIGH (ref 70–99)
GLUCOSE BLDV-MCNC: 126 MG/DL — HIGH (ref 70–99)
GLUCOSE BLDV-MCNC: 149 MG/DL — HIGH (ref 70–99)
GLUCOSE SERPL-MCNC: 119 MG/DL — HIGH (ref 70–99)
GLUCOSE SERPL-MCNC: 167 MG/DL — HIGH (ref 70–99)
HCO3 BLDV-SCNC: 21 MMOL/L — SIGNIFICANT CHANGE UP (ref 21–29)
HCO3 BLDV-SCNC: 25 MMOL/L — SIGNIFICANT CHANGE UP (ref 21–29)
HCT VFR BLD CALC: 38 % — LOW (ref 39–50)
HCT VFR BLD CALC: 42.4 % — SIGNIFICANT CHANGE UP (ref 39–50)
HCT VFR BLDA CALC: 32 % — LOW (ref 39–50)
HCT VFR BLDA CALC: 34 % — LOW (ref 39–50)
HGB BLD CALC-MCNC: 10.4 G/DL — LOW (ref 13–17)
HGB BLD CALC-MCNC: 11.2 G/DL — LOW (ref 13–17)
HGB BLD-MCNC: 12.3 G/DL — LOW (ref 13–17)
HGB BLD-MCNC: 13.6 G/DL — SIGNIFICANT CHANGE UP (ref 13–17)
INR BLD: 1.21 RATIO — HIGH (ref 0.88–1.16)
INR BLD: 1.38 RATIO — HIGH (ref 0.88–1.16)
LACTATE BLDV-MCNC: 0.8 MMOL/L — SIGNIFICANT CHANGE UP (ref 0.7–2)
LACTATE BLDV-MCNC: 2.5 MMOL/L — HIGH (ref 0.7–2)
MAGNESIUM SERPL-MCNC: 2 MG/DL — SIGNIFICANT CHANGE UP (ref 1.6–2.6)
MAGNESIUM SERPL-MCNC: 2 MG/DL — SIGNIFICANT CHANGE UP (ref 1.6–2.6)
MCHC RBC-ENTMCNC: 27.8 PG — SIGNIFICANT CHANGE UP (ref 27–34)
MCHC RBC-ENTMCNC: 28.1 PG — SIGNIFICANT CHANGE UP (ref 27–34)
MCHC RBC-ENTMCNC: 32.1 GM/DL — SIGNIFICANT CHANGE UP (ref 32–36)
MCHC RBC-ENTMCNC: 32.4 GM/DL — SIGNIFICANT CHANGE UP (ref 32–36)
MCV RBC AUTO: 86.7 FL — SIGNIFICANT CHANGE UP (ref 80–100)
MCV RBC AUTO: 87 FL — SIGNIFICANT CHANGE UP (ref 80–100)
NRBC # BLD: 0 /100 WBCS — SIGNIFICANT CHANGE UP (ref 0–0)
NRBC # BLD: 0 /100 WBCS — SIGNIFICANT CHANGE UP (ref 0–0)
OTHER CELLS CSF MANUAL: 13 ML/DL — LOW (ref 18–22)
OTHER CELLS CSF MANUAL: 8 ML/DL — LOW (ref 18–22)
PCO2 BLDV: 42 MMHG — SIGNIFICANT CHANGE UP (ref 35–50)
PCO2 BLDV: 51 MMHG — HIGH (ref 35–50)
PH BLDV: 7.31 — LOW (ref 7.35–7.45)
PH BLDV: 7.32 — LOW (ref 7.35–7.45)
PHOSPHATE SERPL-MCNC: 3 MG/DL — SIGNIFICANT CHANGE UP (ref 2.5–4.5)
PHOSPHATE SERPL-MCNC: 3.7 MG/DL — SIGNIFICANT CHANGE UP (ref 2.5–4.5)
PLATELET # BLD AUTO: 231 K/UL — SIGNIFICANT CHANGE UP (ref 150–400)
PLATELET # BLD AUTO: 284 K/UL — SIGNIFICANT CHANGE UP (ref 150–400)
PO2 BLDV: 32 MMHG — SIGNIFICANT CHANGE UP (ref 25–45)
PO2 BLDV: 62 MMHG — HIGH (ref 25–45)
POTASSIUM BLDV-SCNC: 3.9 MMOL/L — SIGNIFICANT CHANGE UP (ref 3.5–5.3)
POTASSIUM BLDV-SCNC: 4.3 MMOL/L — SIGNIFICANT CHANGE UP (ref 3.5–5.3)
POTASSIUM SERPL-MCNC: 4.6 MMOL/L — SIGNIFICANT CHANGE UP (ref 3.5–5.3)
POTASSIUM SERPL-MCNC: 4.6 MMOL/L — SIGNIFICANT CHANGE UP (ref 3.5–5.3)
POTASSIUM SERPL-SCNC: 4.6 MMOL/L — SIGNIFICANT CHANGE UP (ref 3.5–5.3)
POTASSIUM SERPL-SCNC: 4.6 MMOL/L — SIGNIFICANT CHANGE UP (ref 3.5–5.3)
PROT SERPL-MCNC: 5.5 G/DL — LOW (ref 6–8.3)
PROT SERPL-MCNC: 7.4 G/DL — SIGNIFICANT CHANGE UP (ref 6–8.3)
PROTHROM AB SERPL-ACNC: 14.4 SEC — HIGH (ref 10.6–13.6)
PROTHROM AB SERPL-ACNC: 16.3 SEC — HIGH (ref 10.6–13.6)
RBC # BLD: 4.37 M/UL — SIGNIFICANT CHANGE UP (ref 4.2–5.8)
RBC # BLD: 4.89 M/UL — SIGNIFICANT CHANGE UP (ref 4.2–5.8)
RBC # FLD: 14.5 % — SIGNIFICANT CHANGE UP (ref 10.3–14.5)
RBC # FLD: 14.6 % — HIGH (ref 10.3–14.5)
SAO2 % BLDV: 53 % — LOW (ref 67–88)
SAO2 % BLDV: 90 % — HIGH (ref 67–88)
SODIUM SERPL-SCNC: 141 MMOL/L — SIGNIFICANT CHANGE UP (ref 135–145)
SODIUM SERPL-SCNC: 142 MMOL/L — SIGNIFICANT CHANGE UP (ref 135–145)
WBC # BLD: 12.56 K/UL — HIGH (ref 3.8–10.5)
WBC # BLD: 23.3 K/UL — HIGH (ref 3.8–10.5)
WBC # FLD AUTO: 12.56 K/UL — HIGH (ref 3.8–10.5)
WBC # FLD AUTO: 23.3 K/UL — HIGH (ref 3.8–10.5)

## 2021-06-23 PROCEDURE — 33508 ENDOSCOPIC VEIN HARVEST: CPT | Mod: 59

## 2021-06-23 PROCEDURE — 99233 SBSQ HOSP IP/OBS HIGH 50: CPT | Mod: GC

## 2021-06-23 PROCEDURE — 99291 CRITICAL CARE FIRST HOUR: CPT

## 2021-06-23 PROCEDURE — 71045 X-RAY EXAM CHEST 1 VIEW: CPT | Mod: 26

## 2021-06-23 PROCEDURE — 99232 SBSQ HOSP IP/OBS MODERATE 35: CPT

## 2021-06-23 PROCEDURE — 33533 CABG ARTERIAL SINGLE: CPT

## 2021-06-23 PROCEDURE — 33517 CABG ARTERY-VEIN SINGLE: CPT

## 2021-06-23 RX ORDER — POTASSIUM CHLORIDE 20 MEQ
10 PACKET (EA) ORAL
Refills: 0 | Status: DISCONTINUED | OUTPATIENT
Start: 2021-06-23 | End: 2021-06-25

## 2021-06-23 RX ORDER — CALCIUM GLUCONATE 100 MG/ML
1 VIAL (ML) INTRAVENOUS ONCE
Refills: 0 | Status: COMPLETED | OUTPATIENT
Start: 2021-06-23 | End: 2021-06-24

## 2021-06-23 RX ORDER — METOCLOPRAMIDE HCL 10 MG
10 TABLET ORAL EVERY 8 HOURS
Refills: 0 | Status: DISCONTINUED | OUTPATIENT
Start: 2021-06-23 | End: 2021-06-23

## 2021-06-23 RX ORDER — FAMOTIDINE 10 MG/ML
20 INJECTION INTRAVENOUS EVERY 12 HOURS
Refills: 0 | Status: DISCONTINUED | OUTPATIENT
Start: 2021-06-23 | End: 2021-06-25

## 2021-06-23 RX ORDER — POTASSIUM CHLORIDE 20 MEQ
10 PACKET (EA) ORAL
Refills: 0 | Status: COMPLETED | OUTPATIENT
Start: 2021-06-23 | End: 2021-06-23

## 2021-06-23 RX ORDER — FENTANYL CITRATE 50 UG/ML
50 INJECTION INTRAVENOUS ONCE
Refills: 0 | Status: DISCONTINUED | OUTPATIENT
Start: 2021-06-23 | End: 2021-06-23

## 2021-06-23 RX ORDER — NOREPINEPHRINE BITARTRATE/D5W 8 MG/250ML
0.02 PLASTIC BAG, INJECTION (ML) INTRAVENOUS
Qty: 8 | Refills: 0 | Status: DISCONTINUED | OUTPATIENT
Start: 2021-06-23 | End: 2021-06-24

## 2021-06-23 RX ORDER — DEXTROSE 50 % IN WATER 50 %
50 SYRINGE (ML) INTRAVENOUS
Refills: 0 | Status: DISCONTINUED | OUTPATIENT
Start: 2021-06-23 | End: 2021-06-26

## 2021-06-23 RX ORDER — ALBUMIN HUMAN 25 %
250 VIAL (ML) INTRAVENOUS ONCE
Refills: 0 | Status: COMPLETED | OUTPATIENT
Start: 2021-06-23 | End: 2021-06-23

## 2021-06-23 RX ORDER — VASOPRESSIN 20 [USP'U]/ML
0.05 INJECTION INTRAVENOUS
Qty: 50 | Refills: 0 | Status: DISCONTINUED | OUTPATIENT
Start: 2021-06-23 | End: 2021-06-24

## 2021-06-23 RX ORDER — SODIUM CHLORIDE 9 MG/ML
500 INJECTION, SOLUTION INTRAVENOUS ONCE
Refills: 0 | Status: COMPLETED | OUTPATIENT
Start: 2021-06-23 | End: 2021-06-23

## 2021-06-23 RX ORDER — DOBUTAMINE HCL 250MG/20ML
2 VIAL (ML) INTRAVENOUS
Qty: 500 | Refills: 0 | Status: DISCONTINUED | OUTPATIENT
Start: 2021-06-23 | End: 2021-06-24

## 2021-06-23 RX ORDER — CEFUROXIME AXETIL 250 MG
1500 TABLET ORAL EVERY 8 HOURS
Refills: 0 | Status: DISCONTINUED | OUTPATIENT
Start: 2021-06-23 | End: 2021-06-24

## 2021-06-23 RX ORDER — MEPERIDINE HYDROCHLORIDE 50 MG/ML
25 INJECTION INTRAMUSCULAR; INTRAVENOUS; SUBCUTANEOUS ONCE
Refills: 0 | Status: DISCONTINUED | OUTPATIENT
Start: 2021-06-23 | End: 2021-06-23

## 2021-06-23 RX ORDER — ASPIRIN/CALCIUM CARB/MAGNESIUM 324 MG
300 TABLET ORAL ONCE
Refills: 0 | Status: DISCONTINUED | OUTPATIENT
Start: 2021-06-23 | End: 2021-06-24

## 2021-06-23 RX ORDER — SODIUM CHLORIDE 9 MG/ML
10 INJECTION INTRAMUSCULAR; INTRAVENOUS; SUBCUTANEOUS
Refills: 0 | Status: DISCONTINUED | OUTPATIENT
Start: 2021-06-23 | End: 2021-06-29

## 2021-06-23 RX ORDER — ACETAMINOPHEN 500 MG
650 TABLET ORAL EVERY 6 HOURS
Refills: 0 | Status: DISCONTINUED | OUTPATIENT
Start: 2021-06-23 | End: 2021-06-29

## 2021-06-23 RX ORDER — ASPIRIN/CALCIUM CARB/MAGNESIUM 324 MG
81 TABLET ORAL DAILY
Refills: 0 | Status: DISCONTINUED | OUTPATIENT
Start: 2021-06-23 | End: 2021-06-29

## 2021-06-23 RX ORDER — DEXTROSE 50 % IN WATER 50 %
25 SYRINGE (ML) INTRAVENOUS
Refills: 0 | Status: DISCONTINUED | OUTPATIENT
Start: 2021-06-23 | End: 2021-06-24

## 2021-06-23 RX ORDER — INSULIN HUMAN 100 [IU]/ML
3 INJECTION, SOLUTION SUBCUTANEOUS
Qty: 100 | Refills: 0 | Status: DISCONTINUED | OUTPATIENT
Start: 2021-06-23 | End: 2021-06-25

## 2021-06-23 RX ORDER — LEVOTHYROXINE SODIUM 125 MCG
75 TABLET ORAL AT BEDTIME
Refills: 0 | Status: DISCONTINUED | OUTPATIENT
Start: 2021-06-23 | End: 2021-06-25

## 2021-06-23 RX ORDER — CHLORHEXIDINE GLUCONATE 213 G/1000ML
5 SOLUTION TOPICAL
Refills: 0 | Status: DISCONTINUED | OUTPATIENT
Start: 2021-06-23 | End: 2021-06-24

## 2021-06-23 RX ORDER — SODIUM CHLORIDE 9 MG/ML
1000 INJECTION INTRAMUSCULAR; INTRAVENOUS; SUBCUTANEOUS
Refills: 0 | Status: DISCONTINUED | OUTPATIENT
Start: 2021-06-23 | End: 2021-06-25

## 2021-06-23 RX ORDER — DEXMEDETOMIDINE HYDROCHLORIDE IN 0.9% SODIUM CHLORIDE 4 UG/ML
0.3 INJECTION INTRAVENOUS
Qty: 200 | Refills: 0 | Status: DISCONTINUED | OUTPATIENT
Start: 2021-06-23 | End: 2021-06-24

## 2021-06-23 RX ORDER — CHLORHEXIDINE GLUCONATE 213 G/1000ML
15 SOLUTION TOPICAL EVERY 12 HOURS
Refills: 0 | Status: DISCONTINUED | OUTPATIENT
Start: 2021-06-23 | End: 2021-06-23

## 2021-06-23 RX ORDER — POLYETHYLENE GLYCOL 3350 17 G/17G
17 POWDER, FOR SOLUTION ORAL DAILY
Refills: 0 | Status: DISCONTINUED | OUTPATIENT
Start: 2021-06-23 | End: 2021-06-29

## 2021-06-23 RX ORDER — CHLORHEXIDINE GLUCONATE 213 G/1000ML
1 SOLUTION TOPICAL
Refills: 0 | Status: DISCONTINUED | OUTPATIENT
Start: 2021-06-23 | End: 2021-06-26

## 2021-06-23 RX ORDER — OXYCODONE HYDROCHLORIDE 5 MG/1
5 TABLET ORAL EVERY 6 HOURS
Refills: 0 | Status: DISCONTINUED | OUTPATIENT
Start: 2021-06-23 | End: 2021-06-29

## 2021-06-23 RX ORDER — METOCLOPRAMIDE HCL 10 MG
10 TABLET ORAL EVERY 8 HOURS
Refills: 0 | Status: COMPLETED | OUTPATIENT
Start: 2021-06-23 | End: 2021-06-25

## 2021-06-23 RX ADMIN — Medication 10 MILLIGRAM(S): at 22:15

## 2021-06-23 RX ADMIN — Medication 9.8 MICROGRAM(S)/KG/MIN: at 22:21

## 2021-06-23 RX ADMIN — CHLORHEXIDINE GLUCONATE 1 APPLICATION(S): 213 SOLUTION TOPICAL at 09:45

## 2021-06-23 RX ADMIN — HEPARIN SODIUM 1700 UNIT(S)/HR: 5000 INJECTION INTRAVENOUS; SUBCUTANEOUS at 10:53

## 2021-06-23 RX ADMIN — DEXMEDETOMIDINE HYDROCHLORIDE IN 0.9% SODIUM CHLORIDE 8.17 MICROGRAM(S)/KG/HR: 4 INJECTION INTRAVENOUS at 22:22

## 2021-06-23 RX ADMIN — Medication 125 MILLILITER(S): at 21:30

## 2021-06-23 RX ADMIN — VASOPRESSIN 3 UNIT(S)/MIN: 20 INJECTION INTRAVENOUS at 22:22

## 2021-06-23 RX ADMIN — HEPARIN SODIUM 1800 UNIT(S)/HR: 5000 INJECTION INTRAVENOUS; SUBCUTANEOUS at 03:06

## 2021-06-23 RX ADMIN — Medication 75 MICROGRAM(S): at 22:16

## 2021-06-23 RX ADMIN — FENTANYL CITRATE 50 MICROGRAM(S): 50 INJECTION INTRAVENOUS at 20:30

## 2021-06-23 RX ADMIN — INSULIN HUMAN 3 UNIT(S)/HR: 100 INJECTION, SOLUTION SUBCUTANEOUS at 22:21

## 2021-06-23 RX ADMIN — Medication 4.08 MICROGRAM(S)/KG/MIN: at 22:22

## 2021-06-23 RX ADMIN — CEFTRIAXONE 100 MILLIGRAM(S): 500 INJECTION, POWDER, FOR SOLUTION INTRAMUSCULAR; INTRAVENOUS at 05:24

## 2021-06-23 RX ADMIN — CHLORHEXIDINE GLUCONATE 10 MILLILITER(S): 213 SOLUTION TOPICAL at 11:15

## 2021-06-23 RX ADMIN — FENTANYL CITRATE 50 MICROGRAM(S): 50 INJECTION INTRAVENOUS at 20:45

## 2021-06-23 RX ADMIN — Medication 125 MILLILITER(S): at 23:00

## 2021-06-23 RX ADMIN — SODIUM CHLORIDE 1000 MILLILITER(S): 9 INJECTION, SOLUTION INTRAVENOUS at 22:30

## 2021-06-23 RX ADMIN — Medication 50 MILLIGRAM(S): at 05:24

## 2021-06-23 RX ADMIN — Medication 100 MILLIGRAM(S): at 22:53

## 2021-06-23 RX ADMIN — Medication 100 MILLIEQUIVALENT(S): at 21:40

## 2021-06-23 RX ADMIN — Medication 100 MILLIEQUIVALENT(S): at 21:00

## 2021-06-23 RX ADMIN — Medication 137 MICROGRAM(S): at 05:25

## 2021-06-23 NOTE — PROGRESS NOTE ADULT - PROBLEM SELECTOR PROBLEM 8
Chronic bronchitis

## 2021-06-23 NOTE — PROGRESS NOTE ADULT - PROBLEM SELECTOR PLAN 2
- meets SIRS criteria by leucocytosis, tachycardia, fever, and tachypnea w/ concern for a urinary source given + UA  - s/p 1g CTX  - will c/w ctx for treatment --> completed  - f/u UCx: kleb pneunmoniae and E. coli  - BCx ngtd  - CXR clear on 6/16

## 2021-06-23 NOTE — DISCHARGE NOTE PROVIDER - NSDCFUSCHEDAPPT_GEN_ALL_CORE_FT
MARÍA ELENA BEAR ; 07/09/2021 ; [facility] Cardio Electro [facility] [address] MARÍA ELENA Bustillo ; 07/09/2021 ; N[facility] CT Surg [address]

## 2021-06-23 NOTE — DISCHARGE NOTE PROVIDER - NSDCFUADDAPPT_GEN_ALL_CORE_FT
Follow up appt with DR. Quentin Harding on Friday, July 9 @ 2pm  follow up appt with Dr. Ochoa this week- PT/INR on Thursday, July 1 & Monday, July 5 then week- Dr. Ochoa will follow your Coumadin dosing

## 2021-06-23 NOTE — PROGRESS NOTE ADULT - PROBLEM SELECTOR PLAN 9
- hx of bladder cancer w/ urostomy  - follows at [facility]

## 2021-06-23 NOTE — PROGRESS NOTE ADULT - ASSESSMENT
Fletcher is a 76 year old male with a history of hypertension, hypercholesterolemia, hypothyroidism, bladder cancer and permanent atrial fibrillation on Coumadin s/p unsuccessful cardioversion in the past, HFrEF 45% w mild MR who initially presents with AF with RVR.    - he is now s/p cath (in the setting of lv dysfunction), with triple vessel disease   - CTS evaluation appreciated and will plan for surgery today    - Had rapid AF, then paroxysmal rapid wct either vt or aberrated af. He was hypotensive and cardioverted.  - currently in generally rate controlled af, though up to 130s  - now on increased dose of metoprolol 50 tid  - hold coumadin  - on heparin gtt for ac  - ep evaluation appreciated, for eventual icd after surgery  - will hold off on vt ablation, given the fact that he will be revascularized with surgery today    - mild dyspnea, now improved  - had held off on diuretics in setting of gloria on ckd  - can hold off on additional diuretics today. His oxygen requirement is minimal.  - known borderline depressed lvef, with an ef of ~45 by echo 2019 and by nuc stress 2017  - ef now worse (20-25%, with mod MR), though s/p cardioversion    - fever on admission, now resolved  - cont abx for uti    - replete electrolytes to k>4, Mg>2  - will follow with you

## 2021-06-23 NOTE — PROGRESS NOTE ADULT - PROBLEM SELECTOR PLAN 1
- hx of long-standing afib w/ cardioversion attempt 2 years ago which was unsuccessful, on home diltiazem/toprol/warfarin  - presenting w/ worsening SOB found to be in afib w/ RVR w/ episode of 200 bpm WCT at OSH s/p synchronized cardioversion  - cardiology is on board, EP on board  - C/w 180 bid diltiazem  --> d/c per EP  - Toprol 25mg --> switched to lopressor 25mg bid per EP --> 50mg bid per EP --> 50mg tid  - s/p 150mg amio, start amio load followed by PO amio  --> d/c amio per EP  - TTE shows reduced EF to 20-25%  - angiogram 6/18: : s/p Diagnostic LHC via RRA with dLM 40%; mLAD 60%; oCX 90%; oRCA 99% with collaterols from left.  On ACS heparin drip.   - CTS  on board, CABG pending, to be completed today afternoon  - bedside spirometry completed   - will be evaluated for ICD and VT ablation after CABG - hx of long-standing afib w/ cardioversion attempt 2 years ago which was unsuccessful, on home diltiazem/toprol/warfarin  - presenting w/ worsening SOB found to be in afib w/ RVR w/ episode of 200 bpm WCT at OSH s/p synchronized cardioversion --> EP believes episode to be VTACH  - cardiology is on board, EP on board  - C/w 180 bid diltiazem  --> d/c per EP  - Toprol 25mg --> switched to lopressor 25mg bid per EP --> 50mg bid per EP --> 50mg tid  - s/p 150mg amio, start amio load followed by PO amio  --> d/c amio per EP  - TTE shows reduced EF to 20-25%  - angiogram 6/18: : s/p Diagnostic LHC via RRA with dLM 40%; mLAD 60%; oCX 90%; oRCA 99% with collaterols from left.  On ACS heparin drip.   - CTS  on board, CABG pending, to be completed today afternoon  - bedside spirometry completed   - will be evaluated for ICD and VT ablation after CABG

## 2021-06-23 NOTE — PROGRESS NOTE ADULT - SUBJECTIVE AND OBJECTIVE BOX
[facility] - Caleb Ochoa, Neetu, Jessica, Milka, Kimberley Mars  Office Number:  [phone]    Patient resting comfortably in bed in NAD.  Laying flat with no respiratory distress.  No complaints of chest pain, dyspnea, palpitations, PND, or orthopnea.  reports some congestion, and cough, though breathing ok    ROS: negative unless otherwise mentioned.    Telemetry:  af , with 12 beats of wct    MEDICATIONS  (STANDING):  atorvastatin 40 milliGRAM(s) Oral at bedtime  cefuroxime  IVPB 1500 milliGRAM(s) IV Intermittent once  chlorhexidine 0.12% Liquid 10 milliLiter(s) Swish and Spit once  chlorhexidine 4% Liquid 1 Application(s) Topical once  heparin  Infusion. 1000 Unit(s)/Hr (10 mL/Hr) IV Continuous <Continuous>  levothyroxine 137 MICROGram(s) Oral daily  metoprolol tartrate 50 milliGRAM(s) Oral every 8 hours  senna 2 Tablet(s) Oral at bedtime    MEDICATIONS  (PRN):  ALBUTerol    90 MICROgram(s) HFA Inhaler 2 Puff(s) Inhalation every 6 hours PRN Shortness of Breath and/or Wheezing      Allergies    No Known Allergies    Intolerances        Vital Signs Last 24 Hrs  T(C): 36.8 (23 Jun 2021 04:57), Max: 36.8 (22 Jun 2021 12:00)  T(F): 98.2 (23 Jun 2021 04:57), Max: 98.3 (22 Jun 2021 12:00)  HR: 113 (23 Jun 2021 04:57) (90 - 113)  BP: 121/80 (23 Jun 2021 04:57) (121/80 - 137/82)  BP(mean): --  RR: 21 (23 Jun 2021 04:57) (18 - 21)  SpO2: 97% (23 Jun 2021 04:57) (96% - 97%)    I&O's Summary    22 Jun 2021 07:01  -  23 Jun 2021 07:00  --------------------------------------------------------  IN: 1042 mL / OUT: 875 mL / NET: 167 mL        ON EXAM:    General: NAD, awake and alert, oriented x 3  HEENT: Mucous membranes are moist, anicteric  Lungs: Non-labored, breath sounds are coarse bilaterally, No wheezing, rales or rhonchi  Cardiovascular: irregular, S1 and S2, no murmurs, rubs, or gallops  Gastrointestinal: Bowel Sounds present, soft, nontender.   Lymph: trace peripheral edema. No lymphadenopathy.  Skin: No rashes or ulcers  Psych:  Mood & affect appropriate    LABS: All Labs Reviewed:                        13.6   12.56 )-----------( 284      ( 23 Jun 2021 03:05 )             42.4                         13.4   10.94 )-----------( 288      ( 22 Jun 2021 07:06 )             41.0                         13.4   9.81  )-----------( 255      ( 21 Jun 2021 06:52 )             40.5     23 Jun 2021 03:05    141    |  104    |  25     ----------------------------<  119    4.6     |  22     |  1.23   22 Jun 2021 07:06    139    |  103    |  31     ----------------------------<  102    4.1     |  20     |  1.20   21 Jun 2021 06:52    139    |  103    |  36     ----------------------------<  107    3.7     |  21     |  1.29     Ca    9.4        23 Jun 2021 03:05  Ca    9.5        22 Jun 2021 07:06  Ca    9.5        21 Jun 2021 06:52  Phos  3.0       23 Jun 2021 03:05  Phos  2.6       22 Jun 2021 07:06  Phos  3.0       21 Jun 2021 06:52  Mg     2.0       23 Jun 2021 03:05  Mg     2.0       22 Jun 2021 07:06  Mg     2.0       21 Jun 2021 06:52    TPro  7.4    /  Alb  3.5    /  TBili  0.3    /  DBili  x      /  AST  55     /  ALT  80     /  AlkPhos  128    23 Jun 2021 03:05    PT/INR - ( 23 Jun 2021 03:05 )   PT: 14.4 sec;   INR: 1.21 ratio         PTT - ( 23 Jun 2021 03:05 )  PTT:69.7 sec      Blood Culture:

## 2021-06-23 NOTE — PROGRESS NOTE ADULT - ATTENDING COMMENTS
Patient is a 76 year old male with PMH atrial fibrillation, bladder CA, HTN, HLD, hypothyroidism and chronic bronchitis who initially presented to an outside hospital for shortness of breath and rapid heart beat. He was found to be in a wide complex tachycardia and failed medical treatment requiring cardioversion which improved his heart rate. He was transferred to [facility] for EP and interventional cardiology evaluation. His echocardiogram has a new EF of 20-25%. He had cardiac catheretization on 6/18 which showed the following: dLM 40%; mLAD 60%; oCX 90%; oRCA 99%. He is now scheduled to go to the OR with CT surgery for a CABG today and will be transferred to CT ICU afterwards. He may have a secondary prevention ICD placed after his CABG as well by EP. Rest of plan as above.    Brian Convissar, DO  Pager [phone]  If no answer [phone]

## 2021-06-23 NOTE — DISCHARGE NOTE PROVIDER - NSDCPNSUBOBJ_GEN_ALL_CORE
PHYSICAL EXAM    Subjective: "I feel ok."   Neurology: alert and oriented x 3, nonfocal, no gross deficits  CV : tele: afib    Sternal Wound :  CDI TRACEY- sternum stable   Lungs: clear. RR easy, unlabored   Abdomen: soft, nontender, nondistended, positive bowel sounds, + bowel movement   Neg N/V/D; +obese abdomen    :  + urostomy draining clear, yellow urine   Extremities:   CABALLERO; +1 LE edema bilaterally, neg calf tenderness.   PPP bilaterally; rt SVG site cdi with AB     LCW ICD site cdi

## 2021-06-23 NOTE — DISCHARGE NOTE PROVIDER - CARE PROVIDER_API CALL
Quentin Harding)  Surgery; Thoracic and Cardiac Surgery  [address] [place] [address]  Phone: [phone]  Fax: [phone]  Follow Up Time:

## 2021-06-23 NOTE — PROGRESS NOTE ADULT - SUBJECTIVE AND OBJECTIVE BOX
Hayden Narayan  [facility]/Medicine/[phone]    MARÍA ELENA BEAR  76y  Male      Patient is a 76y old  Male who presents with a chief complaint of Afib w/ RVR i/s/o fever (22 Jun 2021 10:16)      INTERVAL HPI/OVERNIGHT EVENTS:    ROS:   CONSTITUTIONAL: No weakness, fevers or chills  EYES/ENT: No visual changes;  No vertigo or throat pain   NECK: No pain or stiffness  RESPIRATORY: No cough, wheezing, hemoptysis; +shortness of breath  CARDIOVASCULAR: No chest pain or palpitations  GASTROINTESTINAL: No abdominal or epigastric pain. No nausea, vomiting, or hematemesis; No diarrhea or constipation. No melena or hematochezia.  GENITOURINARY: No dysuria, frequency or hematuria  NEUROLOGICAL: No numbness or weakness  SKIN: No itching, rashes    Vital Signs Last 24 Hrs  T(C): 36.8 (23 Jun 2021 04:57), Max: 36.8 (22 Jun 2021 12:00)  T(F): 98.2 (23 Jun 2021 04:57), Max: 98.3 (22 Jun 2021 12:00)  HR: 113 (23 Jun 2021 04:57) (90 - 113)  BP: 121/80 (23 Jun 2021 04:57) (121/80 - 137/82)  BP(mean): --  RR: 21 (23 Jun 2021 04:57) (18 - 21)  SpO2: 97% (23 Jun 2021 04:57) (96% - 97%)    PHYSICAL EXAM:  GENERAL: NAD, well-developed  HEAD:  Atraumatic, Normocephalic  EYES: EOMI, PERRLA, conjunctiva and sclera clear  NECK: Supple, No JVD  CHEST/LUNG: Clear to auscultation bilaterally; No wheeze  HEART: Regular rate and rhythm; No murmurs, rubs, or gallops  ABDOMEN: Soft, Nontender, Nondistended; Bowel sounds present  EXTREMITIES:  2+ Peripheral Pulses, No clubbing, cyanosis, or edema  PSYCH: AAOx3  NEUROLOGY: non-focal  SKIN: No rashes or lesions    Consultant(s) Notes Reviewed:  [x ] YES  [ ] NO  Care Discussed with Consultants/Other Providers [ x] YES  [ ] NO    LABS:                        13.6   12.56 )-----------( 284      ( 23 Jun 2021 03:05 )             42.4     06-23    141  |  104  |  25<H>  ----------------------------<  119<H>  4.6   |  22  |  1.23    Ca    9.4      23 Jun 2021 03:05  Phos  3.0     06-23  Mg     2.0     06-23    TPro  7.4  /  Alb  3.5  /  TBili  0.3  /  DBili  x   /  AST  55<H>  /  ALT  80<H>  /  AlkPhos  128<H>  06-23    PT/INR - ( 23 Jun 2021 03:05 )   PT: 14.4 sec;   INR: 1.21 ratio         PTT - ( 23 Jun 2021 03:05 )  PTT:69.7 sec      CAPILLARY BLOOD GLUCOSE          RADIOLOGY & ADDITIONAL TESTS:    Imaging Personally Reviewed:  [ ] YES  [ ] NO   Hayden Narayan  [facility]/Medicine/[phone]    MARÍA ELENA BEAR  76y  Male      Patient is a 76y old  Male who presents with a chief complaint of Afib w/ RVR i/s/o fever (22 Jun 2021 10:16)      INTERVAL HPI/OVERNIGHT EVENTS: No acute events overnight.     ROS:   CONSTITUTIONAL: No weakness, fevers or chills  EYES/ENT: No visual changes;  No vertigo or throat pain   NECK: No pain or stiffness  RESPIRATORY: No cough, wheezing, hemoptysis; +shortness of breath  CARDIOVASCULAR: No chest pain or palpitations  GASTROINTESTINAL: No abdominal or epigastric pain. No nausea, vomiting, or hematemesis; No diarrhea or constipation. No melena or hematochezia.  GENITOURINARY: No dysuria, frequency or hematuria  NEUROLOGICAL: No numbness or weakness  SKIN: No itching, rashes    Vital Signs Last 24 Hrs  T(C): 36.8 (23 Jun 2021 04:57), Max: 36.8 (22 Jun 2021 12:00)  T(F): 98.2 (23 Jun 2021 04:57), Max: 98.3 (22 Jun 2021 12:00)  HR: 113 (23 Jun 2021 04:57) (90 - 113)  BP: 121/80 (23 Jun 2021 04:57) (121/80 - 137/82)  BP(mean): --  RR: 21 (23 Jun 2021 04:57) (18 - 21)  SpO2: 97% (23 Jun 2021 04:57) (96% - 97%)    PHYSICAL EXAM:  GENERAL: NAD, well-developed  HEAD:  Atraumatic, Normocephalic  EYES: EOMI, PERRLA, conjunctiva and sclera clear  NECK: Supple, No JVD  CHEST/LUNG: Clear to auscultation bilaterally; No wheeze  HEART: Regular rate and rhythm; No murmurs, rubs, or gallops  ABDOMEN: Soft, Nontender, Nondistended; Bowel sounds present  EXTREMITIES:  2+ Peripheral Pulses, No clubbing, cyanosis, or edema  PSYCH: AAOx3  NEUROLOGY: non-focal  SKIN: No rashes or lesions    Consultant(s) Notes Reviewed:  [x ] YES  [ ] NO  Care Discussed with Consultants/Other Providers [ x] YES  [ ] NO    LABS:                        13.6   12.56 )-----------( 284      ( 23 Jun 2021 03:05 )             42.4     06-23    141  |  104  |  25<H>  ----------------------------<  119<H>  4.6   |  22  |  1.23    Ca    9.4      23 Jun 2021 03:05  Phos  3.0     06-23  Mg     2.0     06-23    TPro  7.4  /  Alb  3.5  /  TBili  0.3  /  DBili  x   /  AST  55<H>  /  ALT  80<H>  /  AlkPhos  128<H>  06-23    PT/INR - ( 23 Jun 2021 03:05 )   PT: 14.4 sec;   INR: 1.21 ratio         PTT - ( 23 Jun 2021 03:05 )  PTT:69.7 sec      CAPILLARY BLOOD GLUCOSE          RADIOLOGY & ADDITIONAL TESTS:    Imaging Personally Reviewed:  [ ] YES  [ ] NO

## 2021-06-23 NOTE — BRIEF OPERATIVE NOTE - OPERATION/FINDINGS
CAD, porcelain aorta, CABGx2 (SVG-OM w/ LIMA free graft branching from proximal SVG to LAD)  EF 20%, no products

## 2021-06-23 NOTE — DISCHARGE NOTE PROVIDER - HOSPITAL COURSE
RP is a 75 y/o male with a PMH of Afib, bladder CA, hyperlipidemia, HTN, chronic bronchitis, and hypothyroidism presenting with rapid heart rate and worsening shortness of breath after being transferred from an outside hospital due to found Afib with RVR.    The patient was in his normal state of health until about a week ago when he noticed he was coming down with a cough and nasal/chest congestion. The cough is productive of brown sputum. He states that this has happened before and he's been treated for bronchitis ~2x a year, every year. It usually resolves with his home inhaler and/or antibiotics prescribed by his PCP. Throughout the week, the cough did not worsen nor improve. He reports no other symptoms at that time other than feeling more fatigued than usual.     Yesterday, the pt became more short of breath and felt a rapid heart beat. He was sent to the [place] ED when his PCP found him to be in Afib with RVR. The ED confirmed this finding and he was given dilt gtt. His HR was sustained at 200 bpm with hypotension even after initial treatment and failed adenosine and he required synchronized cardioversion with improved HR to 120-150. During this time, he was also found to have a temp of 102F. He reports not feeling any fever prior to this. He was transferred to [facility] for further evaluation. The patient denies any chest pain, abdominal pain, diaphoresis, N/V, leg swelling, headache, changes in vision, or changes in bowel movement. As of today, his only remaining symptoms are a mild cough. He states that both his SOB and feelings of rapid heart beat have resolved.    Of note, the patient has a longstanding history of Afib that failed cardioversion 2 years ago and is now being pharmacologically managed with diltiazem and toprol. His other PMH includes HTN and hyperlipidemia, diagnosed 20 years ago, and hypothyroidism, for which he also takes home medications. He reports compliancy with all medications. 10 years ago, he was diagnosed with bladder CA and had a urostomy placed at that time (along with prostate removal). The urostomy is still in place and he typically changes it twice a week. He reports seeing no changes in the urine this week and says that it is normal for it to be cloudy/full of mucus. He is not able to feel any changes in urination (i.e. burning). His family history is significant for MI in both his mother and father in their 70s.     The patient has a 20 year pack history of smoking and quit in 1985. He has not smoked since. He drinks alcohol occasionally but is limited to 1 glass of wine a day at most. He does not take any recreational drugs. He lives with his wife of 50 years and adult son. He is occasionally sexually active with his wife only. He reports exercising 2x a week and tries to stay physically active since retiring 8 years ago. His wife and children are healthy and he recalls no prior sick contacts. He received both doses of the [employer] COVID-19 vaccination.     In the ED:   Vitals: T 102.3, , /75, RR 16, SpO2 97% on 2L NC  s/p tylenol 975mg, 1g ceftriaxone, amiodarone 150mg IV, brief amio infusion       Hospital Course: After admission to the hospital, cardiology and electrophysiology teams were consulted and a TTE was completed. 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 While inpatient, pt maintained afib w/ rates around 110s max. Diltiazem was d/fawn and metoprolol was uptitrated. The TTE showed EF 20-25% w/ gloval LV dysfunction. This was likely in the setting of recent cardioversion vs tachycardia mediated cardiomyopathy vs ischemic event. LHC was performed on 6/18 Diagnostic LHC via RRA with dLM 40%; mLAD 60%; oCX 90%; oRCA 99% with collaterals from left.  CTS was consulted for CABG and CABG is to be performed on 6/23.     Pt is to be evaluated for VTACH ablation and ICD after CABG.    76M with persistent afib on coumadin s/p 2 failed DCCV in the past, bladder cancer s/p urostomy, HTN, HLD, chronic bronchitis, hypothyroidism who presented to his PCP with c/o SOB and palpitations and was sent to the [place] ED when found to be in Afib with RVR. Patient had WCT at [place] ED, and was transferred to [facility] for further evaluation.  TTE: LVEF 20-25% and moderate MR. 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  s/p 6/23/21 C2L   POD #2  extubated  PRBC/ FFP and plt given postop bleeding- resolved   inotropic and pressor support - weaned off; insulin gttp for glycemic control   6/24 dobutamine d/c   6/25 tx sdu - chronic afib- anticoagulation with coumadin; ct d/c in CTU   6/26 VSS; afib 100-130- increase lopressor 50 mg po q12 for HR control; anticoagulation with coumadin INR 1.2- coumadin 5 mg this evening; tx floor today as per Dr. mcnulty; neg v. ectopy noted postop; ?re-consult EP postop- d/w Dr. Mcnulty   6/27 VSS INR 1.31 Coumadin 7.5mg ordered as pt has received 4 doses of 5mg & his INR has not increased.  EP recalled for consult as pt will need an ICD post-CABG.   6/28 VSS; AICD as per EP today; npo/ type and screen sent; lovenox on hold; INR 1.4- coumadin 5 mg this evening; + hypervolemia- initiate lasix 40 qd and ACE wrap LE;   d/c pw as per Dr. Mcnulty   6/29 VSS INR up to 1.54 - cleared for d/c home today

## 2021-06-23 NOTE — PROGRESS NOTE ADULT - SUBJECTIVE AND OBJECTIVE BOX
Patient seen and examined at the bedside.    Remained critically ill on continuous ICU monitoring.    OBJECTIVE:  Vital Signs Last 24 Hrs  T(C): 36.5 (2021 15:50), Max: 36.8 (2021 20:28)  T(F): 97.7 (2021 12:04), Max: 98.2 (2021 20:28)  HR: 104 (2021 20:10) (77 - 113)  BP: 132/58 (2021 15:50) (121/80 - 132/58)  BP(mean): --  RR: 18 (2021 15:50) (18 - 21)  SpO2: 94% (2021 15:50) (94% - 97%)    REVIEW OF SYSTEMS:  Constitutional:     [] negative [] fevers [] chills []fatigue            HEENT:               [] negative [] difficulty hearing []vertigo []epistaxis     CV:                     [] negative [] chest pain [] incisional chest pain [] edema                 Resp:                  [] negative [] wheezing [] SOB [] cough [] hemoptysis    GI:                      [] negative [] nausea [] vomiting []  diarrhea[] constipation [] melena         :                     [] negative [ ] hematuria [ ] dysuria[ ] urgency [] incontinence         Musculoskeletal:  [] negative [ ] back pain [ ] myalgias [ ] arthralgias [ ] fracture  Skin:                   [] negative [ ] rash [ ] itch  Neurological:      [] negative []seizures []syncope []confusion    [] All other systems negative  [x] Unable to assess ROS due to sedation and intubation    Physical Exam:  General: Sedated and intubated   Neurology: A&Ox3, nonfocal, CABALLERO x 4  Eyes: EOMI, Gross vision intact  ENT/Neck: Neck supple, + ENT trachea midline, No JVD, Gross hearing intact  Respiratory: No wheezing, rhonchi. Rales noted bilaterally.  CV: RRR, S1S2, no murmurs, rubs or gallops        [x] Sternal dressing, [x] Mediastinal CT, [x] Pleural CT, [] DOROTHY drain        [x] Sinus rhythm, [] Afib, [] Temporary pacing, [] PPM  Abdominal: Soft, NT, ND +BS,   Extremities: 1-2+ pedal edema noted, + peripheral pulses  Skin: No Rashes, Hematoma, Ecchymosis                         LINES:  [x] Arterial Line   [x] Central Line  [ ] PA catheter  [ ] IABP  [ ] ECMO  [ ] LVAD  [x] Ventilator  [ ] pacemaker [ ] Impella      RADIOLOGY:    Patient name: MARÍA ELENA BEAR  YOB: 1945   Age: 76 (M)   MR#: [id number]  Study Date: 2021  Location: O/PSonographer: Tsering Duncan M.D.  Study quality: Technically good  Referring Physician: [employer],  M.D  Blood Pressure: 150/87 mmHg  Height: 178 cm  Weight: 109 kg  BSA: 2.3 m2  Heart Rate: 123 mmHg  ------------------------------------------------------------------------  PROCEDURE: Intra operative transeophageal echocardiogram  with 2D, M mode and complete  Doppler examination. The  patient was approached in the operative suite after  induction of full anesthesia and transesophageal probe had  been positioned in the esophagus posterior to the heart.  INDICATION: Atherosclerotic heart disease of native  coronary artery with unstable angina pectoris (I25.110)  ------------------------------------------------------------------------  Dimensions:    Normal Values:  LA:            2.0 - 4.0 cm  Ao:            2.0 - 3.8 cm  SEPTUM:        0.6 - 1.2 cm  PWT:           0.6 - 1.1 cm  LVIDd:         3.0 - 5.6 cm  LVIDs:         1.8 - 4.0 cm  EF (Visual Estimate): 25-30 %  ------------------------------------------------------------------------  Pre-Bypass Observations:  Mitral Valve: Normal mitral valve. Moderate mitral  regurgitation. No significant mitral stenosis.  Aortic Valve/Aorta: Normal trileaflet aortic valve with  decreased opening Mild-moderate aortic stenosis.  CLINTON 1.35  by continuity, 1.7 by planimetry Minimal aortic  regurgitation.  Normal aortic root.  Left Atrium: Normal left atrium.  Left Ventricle: Severe global left ventricular systolic  dysfunction.  Right Heart: Normal right atrium. Mildly decreased RV  function Normal tricuspid valve. Mild tricuspid  regurgitation. Normal pulmonic valve. Minimal pulmonic  regurgitation.  Pericardium/Pleura: Normal pericardium with trace  pericardial effusion.  Hemodynamic: Estimated right ventricular systolic pressure  equals 35 mm Hg, assuming right atrial pressure equals 17  mm Hg,consistent with borderline pulmonary hypertension.  Color Doppler demonstrates no evidence of a patent foramen  ovale.  ------------------------------------------------------------------------  Post-Bypass Observations:    Exam remains unchanged.  ------------------------------------------------------------------------  Conclusions:  1. Moderate mitral regurgitation.  2. Mild-moderate aortic stenosis.  CLINTON 1.35 by continuity,  1.7 by planimetry Minimal aortic regurgitation.  3. Severe global leftventricular systolic dysfunction.  4. Mildly decreased RV function  5. Normal tricuspid valve. Mild tricuspid regurgitation.  Confirmed on  2021 - 18:41:13 by Tsering Duncan M.D.  ------------------------------------------------------------------------    Assessment:  RP is a 77 y/o male with a PMH of Afib, bladder CA, hyperlipidemia, HTN, chronic bronchitis, and hypothyroidism.    CAD S/P CABG w/ CASH POD# 0  Afib  HTN  HLD  Hypothyroidism   Leukocytosis     Plan:   ***Neuro***  [] Nonfocal  [x] Sedated  [] Motor Strength  [] Speech    ***Cardiovascular***  CAD S/P CABG w/ CASH POD# 0  Afib  HTN  HLD  Hematocrit 30%, lactate 1.1  ( 2 V)  back up pacing / monitor for Rhythm abnormalities    Hemodynamic lability, instability. Requires IVCD [x]  []Epi  []Primacor  [x]Levo  [] Vaso  [] Cardene  [] Nitroprusside  Anticoagulation: [] Heparin  [] Argatroban [] Lovenox  [] Coumadin // [] HIT positive [] KATTY positive  Antiplatelet therapy: [x] ASA  [] Brilinta  [] Plavix   Chest tubes: [] actively bleeding [x] non-bleeding    CHF- acute [ ]   chronic [ ]    systolic [x]   diastolic [ ]          - Echo- EF -     20%    [] ECMO: ____ rpm, ____ flow.   [] LVAD: _____rpm, _____flow.              - Monitor hemolysis labs including LDH, haptoglobin levels.    ***Pulmonary***  Ventilator Management:  [x]AC-rest  [] CPAP-PS Wean  []Trach Collar  []Extubate  [] T-Piece  []peep>5     On full vent support, requiring close monitoring of respiratory rate, breathing pattern, pulse oximetry monitoring, and intermittent blood gas analysis.   Mode: AC/ CMV (Assist Control/ Continuous Mandatory Ventilation)  RR (machine): 12  TV (machine): 600  FiO2: 100  PEEP: 5  ITime: 1  MAP: 9  PIP: 19            Will plan to wean & extubate once pt is hemodynamically stable.     ***GI***  NPO after recent procedure, advance diet as tolerated.  Stress ulcer prophylaxis: [] Protonix  [x] Pepcid    ***Renal***  Continue to monitor I/Os, BUN/Creatinine.   Replete lytes PRN. Keep K> 4 and Mg >2.    I&O's Summary    2021 07:01  -  2021 07:00  --------------------------------------------------------  IN: 1042 mL / OUT: 875 mL / NET: 167 mL    2021 07:01  -  2021 20:23  --------------------------------------------------------  IN: 122 mL / OUT: 500 mL / NET: -378 mL      ***ID***  Afebrile but with Leukocytosis WBC: 12.56  Continue Cefuroxime for perioperative antibiotic coverage.      ***Endocrine***  [x] Hyperglycemia  [x] DM2 [] DM1 [] Prediabetes : HbA1c 5.8%             - [x] Insulin gtt  [] ISS  [] NPH  [] Lantus             - Need tight glycemic control to prevent wound infection.    [x] Acquired Hypothyroidism: Dc'ed Synthroid.            Patient requires continuous monitoring with bedside rhythm monitoring, pulse oximetry monitoring, and continuous central venous and arterial pressure monitoring; and intermittent blood gas analysis. Care plan discussed with the ICU care team.   Patient remained critical, at risk for life threatening decompensation.    I have spent 30 minutes providing critical care management to this patient.    By signing my name below, I, Lola Lanza, attest that this documentation has been prepared under the direction and in the presence of Ang Sebastian MD   Electronically signed: Heydi Stephens, 21 @ 20:23    I, Ang Sebastian, personally performed the services described in this documentation. all medical record entries made by the scribe were at my direction and in my presence. I have reviewed the chart and agree that the record reflects my personal performance and is accurate and complete  Electronically signed: Ang Sebastian MD  Patient seen and examined at the bedside.    Remained critically ill on continuous ICU monitoring.    OBJECTIVE:  Vital Signs Last 24 Hrs  T(C): 36.5 (2021 15:50), Max: 36.8 (2021 20:28)  T(F): 97.7 (2021 12:04), Max: 98.2 (2021 20:28)  HR: 104 (2021 20:10) (77 - 113)  BP: 132/58 (2021 15:50) (121/80 - 132/58)  BP(mean): --  RR: 18 (2021 15:50) (18 - 21)  SpO2: 94% (2021 15:50) (94% - 97%)    REVIEW OF SYSTEMS:  Constitutional:     [] negative [] fevers [] chills []fatigue            HEENT:               [] negative [] difficulty hearing []vertigo []epistaxis     CV:                     [] negative [] chest pain [] incisional chest pain [] edema                 Resp:                  [] negative [] wheezing [] SOB [] cough [] hemoptysis    GI:                      [] negative [] nausea [] vomiting []  diarrhea[] constipation [] melena         :                     [] negative [ ] hematuria [ ] dysuria[ ] urgency [] incontinence         Musculoskeletal:  [] negative [ ] back pain [ ] myalgias [ ] arthralgias [ ] fracture  Skin:                   [] negative [ ] rash [ ] itch  Neurological:      [] negative []seizures []syncope []confusion    [] All other systems negative  [x] Unable to assess ROS due to sedation and intubation    Physical Exam:  General: Sedated and intubated   Neurology: A&Ox3, nonfocal, CABALLERO x 4  Eyes: EOMI, Gross vision intact  ENT/Neck: Neck supple, + ENT trachea midline, No JVD, Gross hearing intact  Respiratory: No wheezing, rhonchi. Rales noted bilaterally.  CV: RRR, S1S2, no murmurs, rubs or gallops        [x] Sternal dressing, [x] Mediastinal CT, [x] Pleural CT, [] DOROTHY drain        [x] Sinus rhythm, [] Afib, [] Temporary pacing, [] PPM  Abdominal: Soft, NT, ND +BS,   Extremities: 1-2+ pedal edema noted, + peripheral pulses  Skin: No Rashes, Hematoma, Ecchymosis                         LINES:  [x] Arterial Line   [x] Central Line  [ ] PA catheter  [ ] IABP  [ ] ECMO  [ ] LVAD  [x] Ventilator  [ ] pacemaker [ ] Impella      RADIOLOGY:    Patient name: MARÍA ELENA BEAR  YOB: 1945   Age: 76 (M)   MR#: [id number]  Study Date: 2021  Location: O/PSonographer: Tsering Duncan M.D.  Study quality: Technically good  Referring Physician: [employer],  M.D  Blood Pressure: 150/87 mmHg  Height: 178 cm  Weight: 109 kg  BSA: 2.3 m2  Heart Rate: 123 mmHg  ------------------------------------------------------------------------  PROCEDURE: Intra operative transeophageal echocardiogram  with 2D, M mode and complete  Doppler examination. The  patient was approached in the operative suite after  induction of full anesthesia and transesophageal probe had  been positioned in the esophagus posterior to the heart.  INDICATION: Atherosclerotic heart disease of native  coronary artery with unstable angina pectoris (I25.110)  ------------------------------------------------------------------------  Dimensions:    Normal Values:  LA:            2.0 - 4.0 cm  Ao:            2.0 - 3.8 cm  SEPTUM:        0.6 - 1.2 cm  PWT:           0.6 - 1.1 cm  LVIDd:         3.0 - 5.6 cm  LVIDs:         1.8 - 4.0 cm  EF (Visual Estimate): 25-30 %  ------------------------------------------------------------------------  Pre-Bypass Observations:  Mitral Valve: Normal mitral valve. Moderate mitral  regurgitation. No significant mitral stenosis.  Aortic Valve/Aorta: Normal trileaflet aortic valve with  decreased opening Mild-moderate aortic stenosis.  CLINTON 1.35  by continuity, 1.7 by planimetry Minimal aortic  regurgitation.  Normal aortic root.  Left Atrium: Normal left atrium.  Left Ventricle: Severe global left ventricular systolic  dysfunction.  Right Heart: Normal right atrium. Mildly decreased RV  function Normal tricuspid valve. Mild tricuspid  regurgitation. Normal pulmonic valve. Minimal pulmonic  regurgitation.  Pericardium/Pleura: Normal pericardium with trace  pericardial effusion.  Hemodynamic: Estimated right ventricular systolic pressure  equals 35 mm Hg, assuming right atrial pressure equals 17  mm Hg,consistent with borderline pulmonary hypertension.  Color Doppler demonstrates no evidence of a patent foramen  ovale.  ------------------------------------------------------------------------  Post-Bypass Observations:    Exam remains unchanged.  ------------------------------------------------------------------------  Conclusions:  1. Moderate mitral regurgitation.  2. Mild-moderate aortic stenosis.  CLINTON 1.35 by continuity,  1.7 by planimetry Minimal aortic regurgitation.  3. Severe global leftventricular systolic dysfunction.  4. Mildly decreased RV function  5. Normal tricuspid valve. Mild tricuspid regurgitation.  Confirmed on  2021 - 18:41:13 by Tsering Duncan M.D.  ------------------------------------------------------------------------    Assessment:  RP is a 75 y/o male with a PMH of Afib, bladder CA, hyperlipidemia, HTN, chronic bronchitis, and hypothyroidism.    CAD S/P CABG w/ CASH POD# 0  Afib  HTN  HLD  Hypothyroidism   Leukocytosis   Obesity     Plan:   ***Neuro***  [] Nonfocal  [x] Sedated  [] Motor Strength  [] Speech    ***Cardiovascular***  CAD S/P CABG w/ CASH POD# 0  Afib  HTN  HLD  Hematocrit 30%, lactate 1.1  ( 2 V)  back up pacing / monitor for Rhythm abnormalities    Hemodynamic lability, instability. Requires IVCD [x]  []Epi  []Primacor  [x]Levo  [] Vaso  [] Cardene  [] Nitroprusside  Anticoagulation: [] Heparin  [] Argatroban [] Lovenox  [] Coumadin // [] HIT positive [] KATTY positive  Antiplatelet therapy: [x] ASA  [] Brilinta  [] Plavix   Chest tubes: [] actively bleeding [x] non-bleeding    CHF- acute [ ]   chronic [ ]    systolic [x]   diastolic [ ]          - Echo- EF -     20%    [] ECMO: ____ rpm, ____ flow.   [] LVAD: _____rpm, _____flow.              - Monitor hemolysis labs including LDH, haptoglobin levels.    ***Pulmonary***  Ventilator Management:  [x]AC-rest  [] CPAP-PS Wean  []Trach Collar  []Extubate  [] T-Piece  []peep>5     On full vent support, requiring close monitoring of respiratory rate, breathing pattern, pulse oximetry monitoring, and intermittent blood gas analysis.   Mode: AC/ CMV (Assist Control/ Continuous Mandatory Ventilation)  RR (machine): 12  TV (machine): 600  FiO2: 100  PEEP: 5  ITime: 1  MAP: 9  PIP: 19            Will plan to wean & extubate once pt is hemodynamically stable.     ***GI***  NPO after recent procedure, advance diet as tolerated.  Stress ulcer prophylaxis: [] Protonix  [x] Pepcid    ***Renal***  Continue to monitor I/Os, BUN/Creatinine.   Replete lytes PRN. Keep K> 4 and Mg >2.    I&O's Summary    2021 07:01  -  2021 07:00  --------------------------------------------------------  IN: 1042 mL / OUT: 875 mL / NET: 167 mL    2021 07:01  -  2021 20:23  --------------------------------------------------------  IN: 122 mL / OUT: 500 mL / NET: -378 mL      ***ID***  Afebrile but with Leukocytosis WBC: 12.56  Continue Cefuroxime for perioperative antibiotic coverage.      ***Endocrine***  [x] Hyperglycemia  [x] DM2 [] DM1 [] Prediabetes : HbA1c 5.8%             - [x] Insulin gtt  [] ISS  [] NPH  [] Lantus             - Need tight glycemic control to prevent wound infection.    [x] Acquired Hypothyroidism: Dc'ed Synthroid.            Patient requires continuous monitoring with bedside rhythm monitoring, pulse oximetry monitoring, and continuous central venous and arterial pressure monitoring; and intermittent blood gas analysis. Care plan discussed with the ICU care team.   Patient remained critical, at risk for life threatening decompensation.    I have spent 30 minutes providing critical care management to this patient.    By signing my name below, I, Lola Lanza, attest that this documentation has been prepared under the direction and in the presence of Ang Sebastian MD   Electronically signed: Heydi Stephens, 21 @ 20:23    I, Ang Sebastian, personally performed the services described in this documentation. all medical record entries made by the scribe were at my direction and in my presence. I have reviewed the chart and agree that the record reflects my personal performance and is accurate and complete  Electronically signed: Ang Sebastian MD  Patient seen and examined at the bedside.    Remained critically ill on continuous ICU monitoring.    OBJECTIVE:  Vital Signs Last 24 Hrs  T(C): 36.5 (2021 15:50), Max: 36.8 (2021 20:28)  T(F): 97.7 (2021 12:04), Max: 98.2 (2021 20:28)  HR: 104 (2021 20:10) (77 - 113)  BP: 132/58 (2021 15:50) (121/80 - 132/58)  BP(mean): --  RR: 18 (2021 15:50) (18 - 21)  SpO2: 94% (2021 15:50) (94% - 97%)    REVIEW OF SYSTEMS:  Constitutional:     [] negative [] fevers [] chills []fatigue            HEENT:               [] negative [] difficulty hearing []vertigo []epistaxis     CV:                     [] negative [] chest pain [] incisional chest pain [] edema                 Resp:                  [] negative [] wheezing [] SOB [] cough [] hemoptysis    GI:                      [] negative [] nausea [] vomiting []  diarrhea[] constipation [] melena         :                     [] negative [ ] hematuria [ ] dysuria[ ] urgency [] incontinence         Musculoskeletal:  [] negative [ ] back pain [ ] myalgias [ ] arthralgias [ ] fracture  Skin:                   [] negative [ ] rash [ ] itch  Neurological:      [] negative []seizures []syncope []confusion    [] All other systems negative  [x] Unable to assess ROS due to sedation and intubation    Physical Exam:  General: Sedated and intubated multiple lines gtt & tubes    Neurology: Post op sedated   Eyes: Pupil reactive   ENT/Neck: Neck supple, + ENT trachea midline, No JVD  Respiratory: No wheezing, rhonchi. Rales noted bilaterally.  CV: A-FIB         [x] Sternal dressing, [x] Mediastinal CT, [x] Pleural CT, [] DOROTHY drain        [] Sinus rhythm, [X] Afib, [] Temporary pacing, [] PPM  Abdominal: Soft, NT, ND +BS,   Extremities: 1-2+ pedal edema noted, + peripheral pulses  Skin: No Rashes, Hematoma, Ecchymosis                         LINES:  [x] Arterial Line   [x] Central Line  [ ] PA catheter  [ ] IABP  [ ] ECMO  [ ] LVAD  [x] Ventilator  [X ] pacemaker [ ] Impella      RADIOLOGY:    Patient name: MARÍA ELENA BEAR  YOB: 1945   Age: 76 (M)   MR#: [id number]  Study Date: 2021  Location: O/PSonographer: Tsering Duncan M.D.  Study quality: Technically good  Referring Physician: [employer],  M.D  Blood Pressure: 150/87 mmHg  Height: 178 cm  Weight: 109 kg  BSA: 2.3 m2  Heart Rate: 123 mmHg  ------------------------------------------------------------------------  PROCEDURE: Intra operative transeophageal echocardiogram  with 2D, M mode and complete  Doppler examination. The  patient was approached in the operative suite after  induction of full anesthesia and transesophageal probe had  been positioned in the esophagus posterior to the heart.  INDICATION: Atherosclerotic heart disease of native  coronary artery with unstable angina pectoris (I25.110)  ------------------------------------------------------------------------  Dimensions:    Normal Values:  LA:            2.0 - 4.0 cm  Ao:            2.0 - 3.8 cm  SEPTUM:        0.6 - 1.2 cm  PWT:           0.6 - 1.1 cm  LVIDd:         3.0 - 5.6 cm  LVIDs:         1.8 - 4.0 cm  EF (Visual Estimate): 25-30 %  ------------------------------------------------------------------------  Pre-Bypass Observations:  Mitral Valve: Normal mitral valve. Moderate mitral  regurgitation. No significant mitral stenosis.  Aortic Valve/Aorta: Normal trileaflet aortic valve with  decreased opening Mild-moderate aortic stenosis.  CLINTON 1.35  by continuity, 1.7 by planimetry Minimal aortic  regurgitation.  Normal aortic root.  Left Atrium: Normal left atrium.  Left Ventricle: Severe global left ventricular systolic  dysfunction.  Right Heart: Normal right atrium. Mildly decreased RV  function Normal tricuspid valve. Mild tricuspid  regurgitation. Normal pulmonic valve. Minimal pulmonic  regurgitation.  Pericardium/Pleura: Normal pericardium with trace  pericardial effusion.  Hemodynamic: Estimated right ventricular systolic pressure  equals 35 mm Hg, assuming right atrial pressure equals 17  mm Hg,consistent with borderline pulmonary hypertension.  Color Doppler demonstrates no evidence of a patent foramen  ovale.  ------------------------------------------------------------------------  Post-Bypass Observations:    Exam remains unchanged.  ------------------------------------------------------------------------  Conclusions:  1. Moderate mitral regurgitation.  2. Mild-moderate aortic stenosis.  CLINTON 1.35 by continuity,  1.7 by planimetry Minimal aortic regurgitation.  3. Severe global leftventricular systolic dysfunction.  4. Mildly decreased RV function  5. Normal tricuspid valve. Mild tricuspid regurgitation.  Confirmed on  2021 - 18:41:13 by Tsering Duncan M.D.  ------------------------------------------------------------------------    Assessment:  BRIGITTE is a 77 y/o male with a PMH of Afib, bladder CA, hyperlipidemia, HTN, chronic bronchitis, and hypothyroidism.    Multivessel CAD / Severe LV Systolic failure / Moderate MR / A-fib   S/P CABG x2  POD# 0  Shock / cardiogenic / requiring Inotrope & pressors   Hypothyroidism / Hyperglycemia  Post op Hypoxia requiring Peep/ ventilator    adjustment  Pre op Klebsiella / E Coli UTI  Blader CA / Urostomy     Plan:   ***Neuro***  [] Nonfocal  [x] Sedated  [] Motor Strength  [] Speech    ***Cardiovascular***    Invasive Hemodynamic monitoring/ trend perfusion indices   A-fib w RVR/ Back up pacing wires   CVP17 MAP 65   Consider Amio to control HR      3mcg/kg/min, Vaso 4  Consider PAC placement   Hemodynamic lability, instability. Requires IVCD [x]    [x]Levo  [X] Vaso   Antiplatelet therapy: [x] ASA    Chest tubes: [] actively bleeding [x] non-bleeding    CHF- acute [ ]   chronic [ ]    systolic [x]   diastolic [ ]          - Echo- EF -     20%  Statins  Replete K / maintain K >4, Mg >2        ***Pulmonary***  Pao2 158 on 100% FIO2   Titrate FIO2 / Peep   Ventilator Management:  [x]AC-rest  [] CPAP-PS Wean  []Trach Collar  []Extubate  [] T-Piece  []peep>5     On full vent support, requiring close monitoring of respiratory rate, breathing pattern, pulse oximetry monitoring, and intermittent blood gas analysis.   Mode: AC/ CMV (Assist Control/ Continuous Mandatory Ventilation)  RR (machine): 12  TV (machine): 600  FiO2: 100  PEEP: 5  ITime: 1  MAP: 9  PIP: 19            Will plan to wean & extubate once pt is hemodynamically stable & improves oxygenation     ***GI***  NPO after recent procedure, advance diet as tolerated.  Stress ulcer prophylaxis: [] Protonix  [x] Pepcid    ***Renal***  Pre op UTI treated   Maintain ileostomy bag over abdomen   Continue to monitor I/Os, BUN/Creatinine.   Replete lytes PRN. Keep K> 4 and Mg >2.    I&O's Summary    2021 07:01  -  2021 07:00  --------------------------------------------------------  IN: 1042 mL / OUT: 875 mL / NET: 167 mL    2021 07:01  -  2021 20:23  --------------------------------------------------------  IN: 122 mL / OUT: 500 mL / NET: -378 mL      ***ID***  Afebrile but with Leukocytosis WBC: 12.56  Continue Cefuroxime for perioperative antibiotic coverage.      ***Endocrine***  [x] Hyperglycemia  [x] DM2 [] DM1 [] Prediabetes : HbA1c 5.8%             - [x] Insulin gtt  [] ISS  [] NPH  [] Lantus             - Need tight glycemic control to prevent wound infection.    [x] Acquired Hypothyroidism:  Synthroid.            Patient requires continuous monitoring with bedside rhythm monitoring, pulse oximetry monitoring, and continuous central venous and arterial pressure monitoring; and intermittent blood gas analysis. Care plan discussed with the ICU care team.   Patient remained critical, at risk for life threatening decompensation.    I have spent 30 minutes providing critical care management to this patient.    By signing my name below, I, Lola Lanza, attest that this documentation has been prepared under the direction and in the presence of Ang Sebastian MD   Electronically signed: Heydi Stephens, 21 @ 20:23    I, Ang Sebastian, personally performed the services described in this documentation. all medical record entries made by the scribe were at my direction and in my presence. I have reviewed the chart and agree that the record reflects my personal performance and is accurate and complete  Electronically signed: Ang Sebastian MD

## 2021-06-23 NOTE — PROGRESS NOTE ADULT - PROBLEM SELECTOR PLAN 8
- hx of chronic bronchitis i/s/o smoking hx  - CXR clear  - ctm

## 2021-06-23 NOTE — DISCHARGE NOTE PROVIDER - NSDCMRMEDTOKEN_GEN_ALL_CORE_FT
Anoro Ellipta 62.5 mcg-25 mcg/inh inhalation powder: 1 puff(s) inhaled once a day  DilTIAZem Hydrochloride  mg/24 hours oral capsule, extended release: 1 cap(s) orally 2 times a day   levothyroxine 137 mcg (0.137 mg) oral tablet: 1 tab(s) orally once a day  losartan-hydrochlorothiazide 100mg-12.5mg oral tablet: 1 tab(s) orally once a day  rosuvastatin 20 mg oral tablet: 1 tab(s) orally once a day  warfarin 5 mg oral tablet: 1 tab(s) orally once a day   acetaminophen 325 mg oral tablet: 2 tab(s) orally every 6 hours, As needed, Mild Pain (1 - 3)  Anoro Ellipta 62.5 mcg-25 mcg/inh inhalation powder: 1 puff(s) inhaled once a day  aspirin 81 mg oral delayed release tablet: 1 tab(s) orally once a day  digoxin 250 mcg (0.25 mg) oral tablet: 1 tab(s) orally once a day  furosemide 40 mg oral tablet: 1 tab(s) orally once a day  hydrALAZINE 10 mg oral tablet: 1 tab(s) orally every 8 hours  levothyroxine 137 mcg (0.137 mg) oral tablet: 1 tab(s) orally once a day  metoprolol tartrate 50 mg oral tablet: 1 tab(s) orally 2 times a day  oxyCODONE 5 mg oral tablet: 1 tab(s) orally every 6 hours, As needed, Moderate Pain (4 - 6) MDD:4  pantoprazole 40 mg oral delayed release tablet: 1 tab(s) orally once a day (before a meal)  polyethylene glycol 3350 oral powder for reconstitution: 17 gram(s) orally once a day  rosuvastatin 20 mg oral tablet: 1 tab(s) orally once a day  spironolactone 25 mg oral tablet: 1 tab(s) orally once a day  warfarin 5 mg oral tablet: 1 tab(s) orally once a day (at bedtime)    acetaminophen 325 mg oral tablet: 2 tab(s) orally every 6 hours, As needed, Mild Pain (1 - 3)  Anoro Ellipta 62.5 mcg-25 mcg/inh inhalation powder: 1 puff(s) inhaled once a day  aspirin 81 mg oral delayed release tablet: 1 tab(s) orally once a day  furosemide 40 mg oral tablet: 1 tab(s) orally once a day  hydrALAZINE 10 mg oral tablet: 1 tab(s) orally every 8 hours  levothyroxine 137 mcg (0.137 mg) oral tablet: 1 tab(s) orally once a day  metoprolol tartrate 50 mg oral tablet: 1 tab(s) orally 2 times a day  oxyCODONE 5 mg oral tablet: 1 tab(s) orally every 6 hours, As needed, Moderate Pain (4 - 6) MDD:4  pantoprazole 40 mg oral delayed release tablet: 1 tab(s) orally once a day (before a meal)  polyethylene glycol 3350 oral powder for reconstitution: 17 gram(s) orally once a day  rosuvastatin 20 mg oral tablet: 1 tab(s) orally once a day  spironolactone 25 mg oral tablet: 1 tab(s) orally once a day  warfarin 5 mg oral tablet: 1 tab(s) orally once a day (at bedtime)

## 2021-06-23 NOTE — PROGRESS NOTE ADULT - PROBLEM SELECTOR PLAN 5
- hx of CHF w/ EF~45% @2019  - on lopressor now, dilt had been d/fawn  - hold hctz-losartan for now  - TTE 6/17 suggestive of EF of 20-25% s/p cardioversion w/ concern for ischemic event vs tachycardia mediated cardiomyopathy  - s/p Diagnostic LHC via RRA with dLM 40%; mLAD 60%; oCX 90%; oRCA 99% with collaterals from left.    - eval for CABG

## 2021-06-23 NOTE — AIRWAY REMOVAL NOTE  ADULT & PEDS - ARTIFICAL AIRWAY REMOVAL COMMENTS
Written order for extubation verified. The patient was identified by full name and birth date compared to the identification band. Present during the procedure was RN Seneca

## 2021-06-23 NOTE — DISCHARGE NOTE PROVIDER - NSDCCPCAREPLAN_GEN_ALL_CORE_FT
PRINCIPAL DISCHARGE DIAGNOSIS  Diagnosis: S/P CABG x 2  Assessment and Plan of Treatment: Refer to your Cardiac Surgery Do's & Dont's Fact Sheet  shower daily- wash your incision with mild soap and water  weigh yourself daily - & record-notify your MD if you gain > 2.5 pounds overnight  take medications as prescribed  PT/INR on Thursday 7/1 & Monday 7/5 then weekly. results to Dr. Ochoa, cardiologist who manages your Coumadin levels.  ambulate 4-5 times a day

## 2021-06-23 NOTE — PROGRESS NOTE ADULT - PROBLEM SELECTOR PROBLEM 9
Bladder cancer

## 2021-06-23 NOTE — PRE-OP CHECKLIST - SELECT TESTS ORDERED
BMP/CBC/CMP/PT/PTT/INR/Hepatic Function/Type and Screen/Urinalysis/EKG/CXR/COVID-19 BMP/CBC/CMP/PT/PTT/INR/Hepatic Function/Type and Cross/Type and Screen/Urinalysis/EKG/CXR/COVID-19

## 2021-06-24 LAB
ALBUMIN SERPL ELPH-MCNC: 3.3 G/DL — SIGNIFICANT CHANGE UP (ref 3.3–5)
ALP SERPL-CCNC: 79 U/L — SIGNIFICANT CHANGE UP (ref 40–120)
ALT FLD-CCNC: 48 U/L — HIGH (ref 10–45)
ANION GAP SERPL CALC-SCNC: 12 MMOL/L — SIGNIFICANT CHANGE UP (ref 5–17)
AST SERPL-CCNC: 44 U/L — HIGH (ref 10–40)
BASE EXCESS BLDV CALC-SCNC: -0.4 MMOL/L — SIGNIFICANT CHANGE UP (ref -2–2)
BASE EXCESS BLDV CALC-SCNC: -0.4 MMOL/L — SIGNIFICANT CHANGE UP (ref -2–2)
BASE EXCESS BLDV CALC-SCNC: 0.5 MMOL/L — SIGNIFICANT CHANGE UP (ref -2–2)
BASE EXCESS BLDV CALC-SCNC: 0.9 MMOL/L — SIGNIFICANT CHANGE UP (ref -2–2)
BASOPHILS # BLD AUTO: 0.04 K/UL — SIGNIFICANT CHANGE UP (ref 0–0.2)
BASOPHILS NFR BLD AUTO: 0.2 % — SIGNIFICANT CHANGE UP (ref 0–2)
BILIRUB SERPL-MCNC: 0.8 MG/DL — SIGNIFICANT CHANGE UP (ref 0.2–1.2)
BUN SERPL-MCNC: 25 MG/DL — HIGH (ref 7–23)
CA-I SERPL-SCNC: 1.15 MMOL/L — SIGNIFICANT CHANGE UP (ref 1.12–1.3)
CA-I SERPL-SCNC: 1.17 MMOL/L — SIGNIFICANT CHANGE UP (ref 1.12–1.3)
CALCIUM SERPL-MCNC: 8.8 MG/DL — SIGNIFICANT CHANGE UP (ref 8.4–10.5)
CHLORIDE BLDV-SCNC: 108 MMOL/L — SIGNIFICANT CHANGE UP (ref 96–108)
CHLORIDE BLDV-SCNC: 109 MMOL/L — HIGH (ref 96–108)
CHLORIDE SERPL-SCNC: 106 MMOL/L — SIGNIFICANT CHANGE UP (ref 96–108)
CO2 BLDV-SCNC: 25 MMOL/L — SIGNIFICANT CHANGE UP (ref 22–30)
CO2 BLDV-SCNC: 26 MMOL/L — SIGNIFICANT CHANGE UP (ref 22–30)
CO2 BLDV-SCNC: 26 MMOL/L — SIGNIFICANT CHANGE UP (ref 22–30)
CO2 BLDV-SCNC: 27 MMOL/L — SIGNIFICANT CHANGE UP (ref 22–30)
CO2 SERPL-SCNC: 19 MMOL/L — LOW (ref 22–31)
CREAT SERPL-MCNC: 1.15 MG/DL — SIGNIFICANT CHANGE UP (ref 0.5–1.3)
EOSINOPHIL # BLD AUTO: 0.01 K/UL — SIGNIFICANT CHANGE UP (ref 0–0.5)
EOSINOPHIL NFR BLD AUTO: 0.1 % — SIGNIFICANT CHANGE UP (ref 0–6)
GAS PNL BLDA: SIGNIFICANT CHANGE UP
GAS PNL BLDV: 135 MMOL/L — SIGNIFICANT CHANGE UP (ref 135–145)
GAS PNL BLDV: 138 MMOL/L — SIGNIFICANT CHANGE UP (ref 135–145)
GAS PNL BLDV: SIGNIFICANT CHANGE UP
GLUCOSE BLDC GLUCOMTR-MCNC: 108 MG/DL — HIGH (ref 70–99)
GLUCOSE BLDC GLUCOMTR-MCNC: 116 MG/DL — HIGH (ref 70–99)
GLUCOSE BLDC GLUCOMTR-MCNC: 118 MG/DL — HIGH (ref 70–99)
GLUCOSE BLDC GLUCOMTR-MCNC: 119 MG/DL — HIGH (ref 70–99)
GLUCOSE BLDC GLUCOMTR-MCNC: 123 MG/DL — HIGH (ref 70–99)
GLUCOSE BLDC GLUCOMTR-MCNC: 124 MG/DL — HIGH (ref 70–99)
GLUCOSE BLDC GLUCOMTR-MCNC: 125 MG/DL — HIGH (ref 70–99)
GLUCOSE BLDC GLUCOMTR-MCNC: 127 MG/DL — HIGH (ref 70–99)
GLUCOSE BLDC GLUCOMTR-MCNC: 127 MG/DL — HIGH (ref 70–99)
GLUCOSE BLDC GLUCOMTR-MCNC: 132 MG/DL — HIGH (ref 70–99)
GLUCOSE BLDC GLUCOMTR-MCNC: 132 MG/DL — HIGH (ref 70–99)
GLUCOSE BLDC GLUCOMTR-MCNC: 134 MG/DL — HIGH (ref 70–99)
GLUCOSE BLDC GLUCOMTR-MCNC: 137 MG/DL — HIGH (ref 70–99)
GLUCOSE BLDC GLUCOMTR-MCNC: 155 MG/DL — HIGH (ref 70–99)
GLUCOSE BLDC GLUCOMTR-MCNC: 159 MG/DL — HIGH (ref 70–99)
GLUCOSE BLDC GLUCOMTR-MCNC: 162 MG/DL — HIGH (ref 70–99)
GLUCOSE BLDC GLUCOMTR-MCNC: 177 MG/DL — HIGH (ref 70–99)
GLUCOSE BLDC GLUCOMTR-MCNC: 231 MG/DL — HIGH (ref 70–99)
GLUCOSE BLDC GLUCOMTR-MCNC: 61 MG/DL — LOW (ref 70–99)
GLUCOSE BLDV-MCNC: 118 MG/DL — HIGH (ref 70–99)
GLUCOSE BLDV-MCNC: 189 MG/DL — HIGH (ref 70–99)
GLUCOSE SERPL-MCNC: 145 MG/DL — HIGH (ref 70–99)
HCO3 BLDV-SCNC: 24 MMOL/L — SIGNIFICANT CHANGE UP (ref 21–29)
HCO3 BLDV-SCNC: 25 MMOL/L — SIGNIFICANT CHANGE UP (ref 21–29)
HCO3 BLDV-SCNC: 25 MMOL/L — SIGNIFICANT CHANGE UP (ref 21–29)
HCO3 BLDV-SCNC: 26 MMOL/L — SIGNIFICANT CHANGE UP (ref 21–29)
HCT VFR BLD CALC: 35.3 % — LOW (ref 39–50)
HCT VFR BLDA CALC: 34 % — LOW (ref 39–50)
HCT VFR BLDA CALC: 34 % — LOW (ref 39–50)
HGB BLD CALC-MCNC: 11 G/DL — LOW (ref 13–17)
HGB BLD CALC-MCNC: 11.2 G/DL — LOW (ref 13–17)
HGB BLD-MCNC: 11.7 G/DL — LOW (ref 13–17)
HOROWITZ INDEX BLDV+IHG-RTO: 44 — SIGNIFICANT CHANGE UP
HOROWITZ INDEX BLDV+IHG-RTO: 50 — SIGNIFICANT CHANGE UP
IMM GRANULOCYTES NFR BLD AUTO: 5 % — HIGH (ref 0–1.5)
INR BLD: 1.3 RATIO — HIGH (ref 0.88–1.16)
LACTATE BLDV-MCNC: 1 MMOL/L — SIGNIFICANT CHANGE UP (ref 0.7–2)
LACTATE BLDV-MCNC: 1.2 MMOL/L — SIGNIFICANT CHANGE UP (ref 0.7–2)
LYMPHOCYTES # BLD AUTO: 0.65 K/UL — LOW (ref 1–3.3)
LYMPHOCYTES # BLD AUTO: 4 % — LOW (ref 13–44)
MAGNESIUM SERPL-MCNC: 2 MG/DL — SIGNIFICANT CHANGE UP (ref 1.6–2.6)
MCHC RBC-ENTMCNC: 29 PG — SIGNIFICANT CHANGE UP (ref 27–34)
MCHC RBC-ENTMCNC: 33.1 GM/DL — SIGNIFICANT CHANGE UP (ref 32–36)
MCV RBC AUTO: 87.4 FL — SIGNIFICANT CHANGE UP (ref 80–100)
MONOCYTES # BLD AUTO: 0.74 K/UL — SIGNIFICANT CHANGE UP (ref 0–0.9)
MONOCYTES NFR BLD AUTO: 4.5 % — SIGNIFICANT CHANGE UP (ref 2–14)
NEUTROPHILS # BLD AUTO: 14.13 K/UL — HIGH (ref 1.8–7.4)
NEUTROPHILS NFR BLD AUTO: 86.2 % — HIGH (ref 43–77)
NRBC # BLD: 0 /100 WBCS — SIGNIFICANT CHANGE UP (ref 0–0)
OTHER CELLS CSF MANUAL: 9 ML/DL — LOW (ref 18–22)
OTHER CELLS CSF MANUAL: 9 ML/DL — LOW (ref 18–22)
PCO2 BLDV: 41 MMHG — SIGNIFICANT CHANGE UP (ref 35–50)
PCO2 BLDV: 42 MMHG — SIGNIFICANT CHANGE UP (ref 35–50)
PCO2 BLDV: 44 MMHG — SIGNIFICANT CHANGE UP (ref 35–50)
PCO2 BLDV: 45 MMHG — SIGNIFICANT CHANGE UP (ref 35–50)
PH BLDV: 7.37 — SIGNIFICANT CHANGE UP (ref 7.35–7.45)
PH BLDV: 7.38 — SIGNIFICANT CHANGE UP (ref 7.35–7.45)
PH BLDV: 7.38 — SIGNIFICANT CHANGE UP (ref 7.35–7.45)
PH BLDV: 7.4 — SIGNIFICANT CHANGE UP (ref 7.35–7.45)
PHOSPHATE SERPL-MCNC: 3.5 MG/DL — SIGNIFICANT CHANGE UP (ref 2.5–4.5)
PLATELET # BLD AUTO: 213 K/UL — SIGNIFICANT CHANGE UP (ref 150–400)
PO2 BLDV: 29 MMHG — SIGNIFICANT CHANGE UP (ref 25–45)
PO2 BLDV: 32 MMHG — SIGNIFICANT CHANGE UP (ref 25–45)
PO2 BLDV: 33 MMHG — SIGNIFICANT CHANGE UP (ref 25–45)
PO2 BLDV: 34 MMHG — SIGNIFICANT CHANGE UP (ref 25–45)
POTASSIUM BLDV-SCNC: 4.4 MMOL/L — SIGNIFICANT CHANGE UP (ref 3.5–5.3)
POTASSIUM BLDV-SCNC: 4.4 MMOL/L — SIGNIFICANT CHANGE UP (ref 3.5–5.3)
POTASSIUM SERPL-MCNC: 4.3 MMOL/L — SIGNIFICANT CHANGE UP (ref 3.5–5.3)
POTASSIUM SERPL-SCNC: 4.3 MMOL/L — SIGNIFICANT CHANGE UP (ref 3.5–5.3)
PROT SERPL-MCNC: 5.9 G/DL — LOW (ref 6–8.3)
PROTHROM AB SERPL-ACNC: 15.4 SEC — HIGH (ref 10.6–13.6)
RBC # BLD: 4.04 M/UL — LOW (ref 4.2–5.8)
RBC # FLD: 14.5 % — SIGNIFICANT CHANGE UP (ref 10.3–14.5)
SAO2 % BLDV: 46 % — LOW (ref 67–88)
SAO2 % BLDV: 56 % — LOW (ref 67–88)
SAO2 % BLDV: 57 % — LOW (ref 67–88)
SAO2 % BLDV: 58 % — LOW (ref 67–88)
SODIUM SERPL-SCNC: 137 MMOL/L — SIGNIFICANT CHANGE UP (ref 135–145)
WBC # BLD: 16.39 K/UL — HIGH (ref 3.8–10.5)
WBC # FLD AUTO: 16.39 K/UL — HIGH (ref 3.8–10.5)

## 2021-06-24 PROCEDURE — 71045 X-RAY EXAM CHEST 1 VIEW: CPT | Mod: 26

## 2021-06-24 PROCEDURE — 99291 CRITICAL CARE FIRST HOUR: CPT

## 2021-06-24 PROCEDURE — 93010 ELECTROCARDIOGRAM REPORT: CPT

## 2021-06-24 RX ORDER — ASPIRIN/CALCIUM CARB/MAGNESIUM 324 MG
81 TABLET ORAL ONCE
Refills: 0 | Status: COMPLETED | OUTPATIENT
Start: 2021-06-24 | End: 2021-06-24

## 2021-06-24 RX ORDER — ALBUMIN HUMAN 25 %
250 VIAL (ML) INTRAVENOUS ONCE
Refills: 0 | Status: COMPLETED | OUTPATIENT
Start: 2021-06-24 | End: 2021-06-24

## 2021-06-24 RX ORDER — POTASSIUM CHLORIDE 20 MEQ
10 PACKET (EA) ORAL
Refills: 0 | Status: COMPLETED | OUTPATIENT
Start: 2021-06-24 | End: 2021-06-24

## 2021-06-24 RX ORDER — METOPROLOL TARTRATE 50 MG
25 TABLET ORAL EVERY 8 HOURS
Refills: 0 | Status: DISCONTINUED | OUTPATIENT
Start: 2021-06-24 | End: 2021-06-26

## 2021-06-24 RX ORDER — ACETAMINOPHEN 500 MG
1000 TABLET ORAL ONCE
Refills: 0 | Status: COMPLETED | OUTPATIENT
Start: 2021-06-24 | End: 2021-06-24

## 2021-06-24 RX ORDER — SPIRONOLACTONE 25 MG/1
25 TABLET, FILM COATED ORAL DAILY
Refills: 0 | Status: DISCONTINUED | OUTPATIENT
Start: 2021-06-24 | End: 2021-06-29

## 2021-06-24 RX ORDER — AMIODARONE HYDROCHLORIDE 400 MG/1
150 TABLET ORAL ONCE
Refills: 0 | Status: COMPLETED | OUTPATIENT
Start: 2021-06-24 | End: 2021-06-24

## 2021-06-24 RX ORDER — MAGNESIUM SULFATE 500 MG/ML
2 VIAL (ML) INJECTION ONCE
Refills: 0 | Status: COMPLETED | OUTPATIENT
Start: 2021-06-24 | End: 2021-06-24

## 2021-06-24 RX ORDER — FUROSEMIDE 40 MG
20 TABLET ORAL ONCE
Refills: 0 | Status: COMPLETED | OUTPATIENT
Start: 2021-06-24 | End: 2021-06-24

## 2021-06-24 RX ORDER — INSULIN LISPRO 100/ML
VIAL (ML) SUBCUTANEOUS AT BEDTIME
Refills: 0 | Status: DISCONTINUED | OUTPATIENT
Start: 2021-06-24 | End: 2021-06-26

## 2021-06-24 RX ORDER — METOPROLOL TARTRATE 50 MG
25 TABLET ORAL
Refills: 0 | Status: DISCONTINUED | OUTPATIENT
Start: 2021-06-24 | End: 2021-06-24

## 2021-06-24 RX ORDER — DIGOXIN 250 MCG
250 TABLET ORAL DAILY
Refills: 0 | Status: DISCONTINUED | OUTPATIENT
Start: 2021-06-25 | End: 2021-06-29

## 2021-06-24 RX ORDER — SODIUM CHLORIDE 9 MG/ML
250 INJECTION, SOLUTION INTRAVENOUS ONCE
Refills: 0 | Status: COMPLETED | OUTPATIENT
Start: 2021-06-24 | End: 2021-06-24

## 2021-06-24 RX ORDER — METOPROLOL TARTRATE 50 MG
5 TABLET ORAL ONCE
Refills: 0 | Status: COMPLETED | OUTPATIENT
Start: 2021-06-24 | End: 2021-06-24

## 2021-06-24 RX ORDER — DIGOXIN 250 MCG
250 TABLET ORAL ONCE
Refills: 0 | Status: COMPLETED | OUTPATIENT
Start: 2021-06-24 | End: 2021-06-24

## 2021-06-24 RX ORDER — HYDRALAZINE HCL 50 MG
10 TABLET ORAL EVERY 8 HOURS
Refills: 0 | Status: DISCONTINUED | OUTPATIENT
Start: 2021-06-24 | End: 2021-06-29

## 2021-06-24 RX ORDER — CEFUROXIME AXETIL 250 MG
1500 TABLET ORAL EVERY 8 HOURS
Refills: 0 | Status: COMPLETED | OUTPATIENT
Start: 2021-06-24 | End: 2021-06-25

## 2021-06-24 RX ORDER — ENOXAPARIN SODIUM 100 MG/ML
30 INJECTION SUBCUTANEOUS EVERY 12 HOURS
Refills: 0 | Status: DISCONTINUED | OUTPATIENT
Start: 2021-06-24 | End: 2021-06-28

## 2021-06-24 RX ORDER — HYDROMORPHONE HYDROCHLORIDE 2 MG/ML
0.5 INJECTION INTRAMUSCULAR; INTRAVENOUS; SUBCUTANEOUS ONCE
Refills: 0 | Status: DISCONTINUED | OUTPATIENT
Start: 2021-06-24 | End: 2021-06-24

## 2021-06-24 RX ORDER — MILRINONE LACTATE 1 MG/ML
0.1 INJECTION, SOLUTION INTRAVENOUS
Qty: 20 | Refills: 0 | Status: DISCONTINUED | OUTPATIENT
Start: 2021-06-24 | End: 2021-06-24

## 2021-06-24 RX ORDER — WARFARIN SODIUM 2.5 MG/1
5 TABLET ORAL ONCE
Refills: 0 | Status: COMPLETED | OUTPATIENT
Start: 2021-06-24 | End: 2021-06-24

## 2021-06-24 RX ORDER — DIGOXIN 250 MCG
500 TABLET ORAL ONCE
Refills: 0 | Status: COMPLETED | OUTPATIENT
Start: 2021-06-24 | End: 2021-06-24

## 2021-06-24 RX ORDER — ATORVASTATIN CALCIUM 80 MG/1
40 TABLET, FILM COATED ORAL AT BEDTIME
Refills: 0 | Status: DISCONTINUED | OUTPATIENT
Start: 2021-06-24 | End: 2021-06-29

## 2021-06-24 RX ORDER — INSULIN LISPRO 100/ML
VIAL (ML) SUBCUTANEOUS
Refills: 0 | Status: DISCONTINUED | OUTPATIENT
Start: 2021-06-24 | End: 2021-06-26

## 2021-06-24 RX ADMIN — FAMOTIDINE 20 MILLIGRAM(S): 10 INJECTION INTRAVENOUS at 05:02

## 2021-06-24 RX ADMIN — MILRINONE LACTATE 6.53 MICROGRAM(S)/KG/MIN: 1 INJECTION, SOLUTION INTRAVENOUS at 06:49

## 2021-06-24 RX ADMIN — Medication 75 MICROGRAM(S): at 21:25

## 2021-06-24 RX ADMIN — Medication 25 MILLIGRAM(S): at 09:39

## 2021-06-24 RX ADMIN — Medication 10 MILLIGRAM(S): at 12:31

## 2021-06-24 RX ADMIN — Medication 10 MILLIGRAM(S): at 13:09

## 2021-06-24 RX ADMIN — HYDROMORPHONE HYDROCHLORIDE 0.5 MILLIGRAM(S): 2 INJECTION INTRAMUSCULAR; INTRAVENOUS; SUBCUTANEOUS at 15:30

## 2021-06-24 RX ADMIN — Medication 125 MILLILITER(S): at 06:00

## 2021-06-24 RX ADMIN — Medication 8.17 MICROGRAM(S)/KG/MIN: at 02:30

## 2021-06-24 RX ADMIN — Medication 5 MILLIGRAM(S): at 17:47

## 2021-06-24 RX ADMIN — FAMOTIDINE 20 MILLIGRAM(S): 10 INJECTION INTRAVENOUS at 17:47

## 2021-06-24 RX ADMIN — OXYCODONE HYDROCHLORIDE 5 MILLIGRAM(S): 5 TABLET ORAL at 21:33

## 2021-06-24 RX ADMIN — OXYCODONE HYDROCHLORIDE 5 MILLIGRAM(S): 5 TABLET ORAL at 22:03

## 2021-06-24 RX ADMIN — Medication 10 MILLIGRAM(S): at 21:25

## 2021-06-24 RX ADMIN — INSULIN HUMAN 3 UNIT(S)/HR: 100 INJECTION, SOLUTION SUBCUTANEOUS at 11:05

## 2021-06-24 RX ADMIN — CHLORHEXIDINE GLUCONATE 1 APPLICATION(S): 213 SOLUTION TOPICAL at 05:02

## 2021-06-24 RX ADMIN — Medication 500 MICROGRAM(S): at 06:01

## 2021-06-24 RX ADMIN — Medication 50 GRAM(S): at 17:47

## 2021-06-24 RX ADMIN — Medication 100 MILLIEQUIVALENT(S): at 06:01

## 2021-06-24 RX ADMIN — Medication 50 GRAM(S): at 09:39

## 2021-06-24 RX ADMIN — Medication 100 MILLIGRAM(S): at 06:44

## 2021-06-24 RX ADMIN — Medication 250 MICROGRAM(S): at 13:08

## 2021-06-24 RX ADMIN — Medication 100 MILLIGRAM(S): at 21:24

## 2021-06-24 RX ADMIN — HYDROMORPHONE HYDROCHLORIDE 0.5 MILLIGRAM(S): 2 INJECTION INTRAMUSCULAR; INTRAVENOUS; SUBCUTANEOUS at 03:41

## 2021-06-24 RX ADMIN — Medication 20 MILLIGRAM(S): at 11:05

## 2021-06-24 RX ADMIN — Medication 25 MILLIGRAM(S): at 21:24

## 2021-06-24 RX ADMIN — HYDROMORPHONE HYDROCHLORIDE 0.5 MILLIGRAM(S): 2 INJECTION INTRAMUSCULAR; INTRAVENOUS; SUBCUTANEOUS at 15:15

## 2021-06-24 RX ADMIN — ATORVASTATIN CALCIUM 40 MILLIGRAM(S): 80 TABLET, FILM COATED ORAL at 21:24

## 2021-06-24 RX ADMIN — ENOXAPARIN SODIUM 30 MILLIGRAM(S): 100 INJECTION SUBCUTANEOUS at 17:49

## 2021-06-24 RX ADMIN — HYDROMORPHONE HYDROCHLORIDE 0.5 MILLIGRAM(S): 2 INJECTION INTRAMUSCULAR; INTRAVENOUS; SUBCUTANEOUS at 04:00

## 2021-06-24 RX ADMIN — Medication 100 MILLIGRAM(S): at 13:08

## 2021-06-24 RX ADMIN — SPIRONOLACTONE 25 MILLIGRAM(S): 25 TABLET, FILM COATED ORAL at 12:31

## 2021-06-24 RX ADMIN — Medication 10 MILLIGRAM(S): at 05:03

## 2021-06-24 RX ADMIN — Medication 81 MILLIGRAM(S): at 11:05

## 2021-06-24 RX ADMIN — Medication 100 MILLIEQUIVALENT(S): at 00:25

## 2021-06-24 RX ADMIN — Medication 1000 MILLIGRAM(S): at 06:35

## 2021-06-24 RX ADMIN — AMIODARONE HYDROCHLORIDE 600 MILLIGRAM(S): 400 TABLET ORAL at 05:44

## 2021-06-24 RX ADMIN — Medication 81 MILLIGRAM(S): at 02:13

## 2021-06-24 RX ADMIN — WARFARIN SODIUM 5 MILLIGRAM(S): 2.5 TABLET ORAL at 21:24

## 2021-06-24 RX ADMIN — Medication 400 MILLIGRAM(S): at 06:05

## 2021-06-24 RX ADMIN — SODIUM CHLORIDE 1000 MILLILITER(S): 9 INJECTION, SOLUTION INTRAVENOUS at 01:00

## 2021-06-24 NOTE — PHYSICAL THERAPY INITIAL EVALUATION ADULT - CRITERIA FOR SKILLED THERAPEUTIC INTERVENTIONS
impairments found/functional limitations in following categories/risk reduction/prevention/rehab potential/therapy frequency/predicted duration of therapy intervention/anticipated equipment needs at discharge/anticipated discharge recommendation
impairments found

## 2021-06-24 NOTE — PHYSICAL THERAPY INITIAL EVALUATION ADULT - GENERAL OBSERVATIONS, REHAB EVAL
Pt received in bedside chair +NC, +sorto, +A-line, +chest tube.
Pt encountered supine in bed, AO x 3, + PIV, + Tele

## 2021-06-24 NOTE — PHYSICAL THERAPY INITIAL EVALUATION ADULT - IMPAIRED TRANSFERS: SIT/STAND, REHAB EVAL
impaired balance/decreased strength decreased endurance/impaired balance/decreased ROM/decreased strength

## 2021-06-24 NOTE — PROGRESS NOTE ADULT - ASSESSMENT
76 year old male with a history of hypertension, hypercholesterolemia, hypothyroidism, bladder cancer and permanent atrial fibrillation on Coumadin s/p unsuccessful cardioversion in the past, HFrEF 45% w mild MR who initially presents with AF with RVR.    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  -now s/p 2v cabg, porcelain aorta    -af now with accelerated vr, changed from  to milrinone  -had been on metoprolol 50 tid now off with labile bp  -when able would resume metoprolol  -wean milrinone as rich  -on high flow nasal o2, wean as rich     - coumadin on hold  - had been on heparin gtt for ac, would resume when able  -hgb down 2g, cont to trend    - no ventricular arrhythmias thus far post cabg  -planning for eventual icd        - known borderline depressed lvef in the past, with an ef of ~45 by echo 2019 and by nuc stress 2017  - ef found to be worse on this admission (20-25%, with mod MR), though s/p cardioversion, hopefully will rebound after revasc. intra-op echo without change in lvef     - replete electrolytes to k>4, Mg>2  - will follow with you    Upon my evaluation, this patient is at high risk for imminent or life threatening deterioration due to resp failure, hypotension,  and other active medical issues which require my direct attention, intervention, and personal management.  I have personally spent >35 discontinuous minutes  of critical care time exclusive of time spent on separate billing procedures. This includes review of laboratory data, radiology results, discussion with primary team\patient, and monitoring for potential decompensation Interventions were performed as documented above.

## 2021-06-24 NOTE — PROGRESS NOTE ADULT - SUBJECTIVE AND OBJECTIVE BOX
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 remains on milrinone.  denies cp. remains on high flow nasal o2    MEDICATIONS  (STANDING):  aspirin enteric coated 81 milliGRAM(s) Oral daily  atorvastatin 40 milliGRAM(s) Oral at bedtime  cefuroxime  IVPB 1500 milliGRAM(s) IV Intermittent every 8 hours  chlorhexidine 4% Liquid 1 Application(s) Topical <User Schedule>  dextrose 50% Injectable 50 milliLiter(s) IV Push every 15 minutes  DOBUTamine Infusion 2 MICROgram(s)/kG/Min (6.53 mL/Hr) IV Continuous <Continuous>  famotidine Injectable 20 milliGRAM(s) IV Push every 12 hours  insulin regular Infusion 3 Unit(s)/Hr (3 mL/Hr) IV Continuous <Continuous>  levothyroxine Injectable 75 MICROGram(s) IV Push at bedtime  metoclopramide Injectable 10 milliGRAM(s) IV Push every 8 hours  milrinone Infusion 0.2 MICROgram(s)/kG/Min (6.53 mL/Hr) IV Continuous <Continuous>  polyethylene glycol 3350 17 Gram(s) Oral daily  potassium chloride  10 mEq/50 mL IVPB 10 milliEquivalent(s) IV Intermittent every 1 hour  potassium chloride  10 mEq/50 mL IVPB 10 milliEquivalent(s) IV Intermittent every 1 hour  potassium chloride  10 mEq/50 mL IVPB 10 milliEquivalent(s) IV Intermittent every 1 hour  sodium chloride 0.9%. 1000 milliLiter(s) (10 mL/Hr) IV Continuous <Continuous>  vasopressin Infusion 0.05 Unit(s)/Min (3 mL/Hr) IV Continuous <Continuous>    MEDICATIONS  (PRN):  acetaminophen   Tablet .. 650 milliGRAM(s) Oral every 6 hours PRN Mild Pain (1 - 3)  oxyCODONE    IR 5 milliGRAM(s) Oral every 6 hours PRN Moderate Pain (4 - 6)  sodium chloride 0.9% lock flush 10 milliLiter(s) IV Push every 1 hour PRN Pre/post blood products, medications, blood draw, and to maintain line patency      REVIEW OF SYSTEMS:  eye, ent, GI, , allergic, dermatologic, musculoskeletal and neurologic are negative except as described above    Vital Signs Last 24 Hrs  T(C): 36.1 (24 Jun 2021 08:00), Max: 36.5 (23 Jun 2021 12:04)  T(F): 97 (24 Jun 2021 08:00), Max: 97.7 (23 Jun 2021 12:04)  HR: 106 (24 Jun 2021 08:00) (77 - 127)  BP: 132/58 (23 Jun 2021 15:50) (132/58 - 132/58)  BP(mean): --  RR: 22 (24 Jun 2021 08:00) (16 - 26)  SpO2: 95% (24 Jun 2021 08:00) (92% - 100%)    I&O's Summary    23 Jun 2021 07:01  -  24 Jun 2021 07:00  --------------------------------------------------------  IN: 2537.6 mL / OUT: 1218 mL / NET: 1319.6 mL    24 Jun 2021 07:01  -  24 Jun 2021 08:23  --------------------------------------------------------  IN: 20 mL / OUT: 55 mL / NET: -35 mL        Telemetry past 24h: af 100s-120s, gen mildly accelerated    PHYSICAL EXAM:    Constitutional: well-nourished, well-developed, NAD   HEENT:  high flow nasal, sclerae anicteric, conjunctivae clear, no oral cyanosis. right ij tlc  Pulmonary: Non-labored, breath sounds are clear bilaterally, No wheezing, rales or rhonchi  Cardiovascular: irregular, S1 and S2.  No murmur.  No rubs, gallops or clicks  Gastrointestinal: Bowel Sounds present, soft, nontender.   Lymph: No peripheral edema.   Neurological: Alert, no focal deficits  Skin: No rashes.  Psych:  Mood & affect appropriate    LABS: All Labs Reviewed:                        11.7   16.39 )-----------( 213      ( 24 Jun 2021 00:50 )             35.3                         12.3   23.30 )-----------( 231      ( 23 Jun 2021 20:20 )             38.0                         13.6   12.56 )-----------( 284      ( 23 Jun 2021 03:05 )             42.4     24 Jun 2021 00:50    137    |  106    |  25     ----------------------------<  145    4.3     |  19     |  1.15   23 Jun 2021 20:20    142    |  105    |  24     ----------------------------<  167    4.6     |  19     |  1.06   23 Jun 2021 03:05    141    |  104    |  25     ----------------------------<  119    4.6     |  22     |  1.23     Ca    8.8        24 Jun 2021 00:50  Ca    8.5        23 Jun 2021 20:20  Ca    9.4        23 Jun 2021 03:05  Phos  3.5       24 Jun 2021 00:50  Phos  3.7       23 Jun 2021 20:20  Phos  3.0       23 Jun 2021 03:05  Mg     2.0       24 Jun 2021 00:50  Mg     2.0       23 Jun 2021 20:20  Mg     2.0       23 Jun 2021 03:05    TPro  5.9    /  Alb  3.3    /  TBili  0.8    /  DBili  x      /  AST  44     /  ALT  48     /  AlkPhos  79     24 Jun 2021 00:50  TPro  5.5    /  Alb  3.0    /  TBili  0.6    /  DBili  x      /  AST  43     /  ALT  49     /  AlkPhos  84     23 Jun 2021 20:20  TPro  7.4    /  Alb  3.5    /  TBili  0.3    /  DBili  x      /  AST  55     /  ALT  80     /  AlkPhos  128    23 Jun 2021 03:05    PT/INR - ( 23 Jun 2021 20:20 )   PT: 16.3 sec;   INR: 1.38 ratio         PTT - ( 23 Jun 2021 20:20 )  PTT:27.1 sec      Blood Culture:         RADIOLOGY:    EKG:    Echo:

## 2021-06-24 NOTE — PHYSICAL THERAPY INITIAL EVALUATION ADULT - IMPAIRMENTS FOUND, PT EVAL
aerobic capacity/endurance/gait, locomotion, and balance
aerobic capacity/endurance/arousal, attention, and cognition/gait, locomotion, and balance/muscle strength

## 2021-06-24 NOTE — PHYSICAL THERAPY INITIAL EVALUATION ADULT - PERTINENT HX OF CURRENT PROBLEM, REHAB EVAL
RP is a 77 y/o male with a PMH of Afib, bladder CA, hyperlipidemia, HTN, chronic bronchitis, and hypothyroidism presenting with rapid heart rate and worsening shortness of breath found to be in afib w/ RVR w/ episode of WCT at 200bpm s/p synchronized cardioversion transferred from OSH for further management w/ course c/b sepsis w/ possible urinary source.
Pt is a 75 y/o male with a PMH below presenting with rapid heart rate and worsening shortness of breath found to be in afib w/ RVR w/ episode of WCT at 200bpm s/p synchronized cardioversion transferred from OSH for further management w/ course c/b sepsis w/ possible urinary source growing E. coli and klebsiella. Pt now s/p CABGx2 on 6/23.

## 2021-06-24 NOTE — PHYSICAL THERAPY INITIAL EVALUATION ADULT - ACTIVE RANGE OF MOTION EXAMINATION, REHAB EVAL
Left UE Active ROM was WFL (within functional limits)/Right UE Active ROM was WFL (within functional limits)/Left LE Active ROM was WFL (within functional limits)/Right LE Active ROM was WFL (within functional limits) b/l [place] flex assessed 0-90 degrees/Left UE Active ROM was WFL (within functional limits)/Right UE Active ROM was WFL (within functional limits)/Left LE Active ROM was WFL (within functional limits)/Right LE Active ROM was WFL (within functional limits)

## 2021-06-24 NOTE — PROGRESS NOTE ADULT - SUBJECTIVE AND OBJECTIVE BOX
CRITICAL CARE ATTENDING - CTICU    MEDICATIONS  (STANDING):  acetaminophen  IVPB .. 1000 milliGRAM(s) IV Intermittent once  albumin human  5% IVPB 250 milliLiter(s) IV Intermittent once  aspirin enteric coated 81 milliGRAM(s) Oral daily  atorvastatin 40 milliGRAM(s) Oral at bedtime  cefuroxime  IVPB 1500 milliGRAM(s) IV Intermittent every 8 hours  chlorhexidine 4% Liquid 1 Application(s) Topical <User Schedule>  dextrose 50% Injectable 50 milliLiter(s) IV Push every 15 minutes  digoxin  Injectable 500 MICROGram(s) IV Push once  DOBUTamine Infusion 2 MICROgram(s)/kG/Min (6.53 mL/Hr) IV Continuous <Continuous>  famotidine Injectable 20 milliGRAM(s) IV Push every 12 hours  insulin regular Infusion 3 Unit(s)/Hr (3 mL/Hr) IV Continuous <Continuous>  levothyroxine Injectable 75 MICROGram(s) IV Push at bedtime  metoclopramide Injectable 10 milliGRAM(s) IV Push every 8 hours  polyethylene glycol 3350 17 Gram(s) Oral daily  potassium chloride  10 mEq/50 mL IVPB 10 milliEquivalent(s) IV Intermittent every 1 hour  potassium chloride  10 mEq/50 mL IVPB 10 milliEquivalent(s) IV Intermittent every 1 hour  potassium chloride  10 mEq/50 mL IVPB 10 milliEquivalent(s) IV Intermittent every 1 hour  potassium chloride  10 mEq/50 mL IVPB 10 milliEquivalent(s) IV Intermittent every 1 hour  sodium chloride 0.9%. 1000 milliLiter(s) (10 mL/Hr) IV Continuous <Continuous>  vasopressin Infusion 0.05 Unit(s)/Min (3 mL/Hr) IV Continuous <Continuous>                                    11.7   16.39 )-----------( 213      ( 2021 00:50 )             35.3       06-24    137  |  106  |  25<H>  ----------------------------<  145<H>  4.3   |  19<L>  |  1.15    Ca    8.8      2021 00:50  Phos  3.5     06-24  Mg     2.0     06-24    TPro  5.9<L>  /  Alb  3.3  /  TBili  0.8  /  DBili  x   /  AST  44<H>  /  ALT  48<H>  /  AlkPhos  79        PT/INR - ( 2021 20:20 )   PT: 16.3 sec;   INR: 1.38 ratio         PTT - ( 2021 20:20 )  PTT:27.1 sec      Daily Height in cm: 177.8 (2021 15:50)    Daily Weight in k (2021 00:00)       @ 07:  -   @ 07:00  --------------------------------------------------------  IN: 1042 mL / OUT: 875 mL / NET: 167 mL     @ 07:01  -   @ 05:58  --------------------------------------------------------  IN: 2317.6 mL / OUT: 1135 mL / NET: 1182.6 mL        Critically Ill patient  : [ ] preoperative ,   [x ] post operative    Requires :  [ x] Arterial Line   [x ] Central Line  [ ] PA catheter  [ ] IABP  [ ] ECMO  [ ] LVAD  [ ] Ventilator  [x ] pacemaker [ ] Impella.                      [x ] ABG's     [ x] Pulse Oxymetry Monitoring  Bedside evaluation , monitoring , treatment of hemodynamics , fluids , IVP/ IVCD meds.        Diagnosis:     POD 1 - C2L    Chest tube drainage     Extubated overnight     Requires chest PT, pulmonary toilet, suctioning to maintain SaO2,  patent airway and treat atelectasis.     CHF- acute [ x]   chronic [x ]    systolic [ x]   diatolic [ ]          - Echo- EF -  25-30%    [x ] mildly decreased RV dysfunction          - Cxr-cardiomegally, edema          - Clinical-  [ x]inotropes   [ x]pressors   [ ]diuresis   [ ]IABP   [ ]ECMO   [ ]LVAD   [ ]Respiratory Failure.     Hemodynamic lability,  instability. Requires IVCD [x ] vasopressors [ x] inotropes  [ ] vasodilator  [ ]IVSS fluid  to maintain MAP, perfusion, C.I.     Temporary pacemaker (TPM) interrogation and setting.     Prerenal azotemia     IVCD insulin         By signing my name below, I, Marely Nixon, attest that this documentation has been prepared under the direction and in the presence of Adryan Romero MD.   Electronically Signed: Heydi Palmer -24-21 @ 05:58      Discussed with CT surgeon, Physician Assistant - Nurse Practitioner- Critical care medicine team.   Discussed at  AM / PM rounds.   Chart, labs , films reviewed.    Cumulative Critical Care Time Given Today:  CRITICAL CARE ATTENDING - CTICU    MEDICATIONS  (STANDING):  acetaminophen  IVPB .. 1000 milliGRAM(s) IV Intermittent once  albumin human  5% IVPB 250 milliLiter(s) IV Intermittent once  aspirin enteric coated 81 milliGRAM(s) Oral daily  atorvastatin 40 milliGRAM(s) Oral at bedtime  cefuroxime  IVPB 1500 milliGRAM(s) IV Intermittent every 8 hours  chlorhexidine 4% Liquid 1 Application(s) Topical <User Schedule>  dextrose 50% Injectable 50 milliLiter(s) IV Push every 15 minutes  digoxin  Injectable 500 MICROGram(s) IV Push once  DOBUTamine Infusion 2 MICROgram(s)/kG/Min (6.53 mL/Hr) IV Continuous <Continuous>  famotidine Injectable 20 milliGRAM(s) IV Push every 12 hours  insulin regular Infusion 3 Unit(s)/Hr (3 mL/Hr) IV Continuous <Continuous>  levothyroxine Injectable 75 MICROGram(s) IV Push at bedtime  metoclopramide Injectable 10 milliGRAM(s) IV Push every 8 hours  polyethylene glycol 3350 17 Gram(s) Oral daily  potassium chloride  10 mEq/50 mL IVPB 10 milliEquivalent(s) IV Intermittent every 1 hour  potassium chloride  10 mEq/50 mL IVPB 10 milliEquivalent(s) IV Intermittent every 1 hour  potassium chloride  10 mEq/50 mL IVPB 10 milliEquivalent(s) IV Intermittent every 1 hour  potassium chloride  10 mEq/50 mL IVPB 10 milliEquivalent(s) IV Intermittent every 1 hour  sodium chloride 0.9%. 1000 milliLiter(s) (10 mL/Hr) IV Continuous <Continuous>  vasopressin Infusion 0.05 Unit(s)/Min (3 mL/Hr) IV Continuous <Continuous>                                    11.7   16.39 )-----------( 213      ( 2021 00:50 )             35.3       06-24    137  |  106  |  25<H>  ----------------------------<  145<H>  4.3   |  19<L>  |  1.15    Ca    8.8      2021 00:50  Phos  3.5     06-24  Mg     2.0     06-24    TPro  5.9<L>  /  Alb  3.3  /  TBili  0.8  /  DBili  x   /  AST  44<H>  /  ALT  48<H>  /  AlkPhos  79        PT/INR - ( 2021 20:20 )   PT: 16.3 sec;   INR: 1.38 ratio         PTT - ( 2021 20:20 )  PTT:27.1 sec      Daily Height in cm: 177.8 (2021 15:50)    Daily Weight in k (2021 00:00)       @ 07:  -   @ 07:00  --------------------------------------------------------  IN: 1042 mL / OUT: 875 mL / NET: 167 mL     @ 07:01  -   @ 05:58  --------------------------------------------------------  IN: 2317.6 mL / OUT: 1135 mL / NET: 1182.6 mL        Critically Ill patient  : [ ] preoperative ,   [x ] post operative    Requires :  [ x] Arterial Line   [x ] Central Line  [ ] PA catheter  [ ] IABP  [ ] ECMO  [ ] LVAD  [ x] Ventilator  [x ] pacemaker [ ] Impella.                      [x ] ABG's     [ x] Pulse Oxymetry Monitoring  Bedside evaluation , monitoring , treatment of hemodynamics , fluids , IVP/ IVCD meds.        Diagnosis:     POD 1 - C2L    Hypotension    Ventilator Management:  [x ]AC-rest    [x ]CPAP-PS Wean    [ ]Trach Collar     [x ]Extubate    [ ] T-Piece  [ ]peep>5     Chest tube drainage     Extubated overnight     Requires chest PT, pulmonary toilet, suctioning to maintain SaO2,  patent airway and treat atelectasis.     CHF- acute [ x]   chronic [x ]    systolic [ x]   diatolic [ ]          - Echo- EF -  25-30%    [x ] mildly decreased RV dysfunction          - Cxr-cardiomegally, edema          - Clinical-  [ x]inotropes   [ x]pressors   [x ]diuresis   [ ]IABP   [ ]ECMO   [ ]LVAD   [ ]Respiratory Failure.     Hemodynamic lability,  instability. Requires IVCD [x ] vasopressors [ x] inotropes  [ ] vasodilator  [ x]IVSS fluid  to maintain MAP, perfusion, C.I.     Temporary pacemaker (TPM) interrogation and setting.     Prerenal azotemia     IVCD insulin     Metabolic Acidosis        By signing my name below, IMarely, attest that this documentation has been prepared under the direction and in the presence of Adryan Romero MD.   Electronically Signed: Heydi Palmer 21 @ 05:58    IAdryan, personally performed the services described in this documentation. All medical record entries made by the scribe were at my direction and in my presence. I have reviewed the chart and agree that the record reflects my personal performance and is accurate and complete.   Adryan Romero MD.       Discussed with CT surgeon, Physician Assistant - Nurse Practitioner- Critical care medicine team.   Discussed at  AM / PM rounds.   Chart, labs , films reviewed.    Cumulative Critical Care Time Given Today:  30 min

## 2021-06-24 NOTE — PHYSICAL THERAPY INITIAL EVALUATION ADULT - TRANSFER TRAINING, PT EVAL
GOAL: Pt will perform sit to/from stand transfers independently without AD as needed within 2 weeks.

## 2021-06-24 NOTE — PHYSICAL THERAPY INITIAL EVALUATION ADULT - ADDITIONAL COMMENTS
Pt lives in a private house 4 ESTUARDO (+) HR
Pt lives with wife in a private house 4 ESTUARDO (+) HR. Pt was independent with all ADLs/mobility. No use of AD.

## 2021-06-24 NOTE — PHYSICAL THERAPY INITIAL EVALUATION ADULT - PRECAUTIONS/LIMITATIONS, REHAB EVAL
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
sternal precautions

## 2021-06-24 NOTE — PHYSICAL THERAPY INITIAL EVALUATION ADULT - DIAGNOSIS, PT EVAL
Decreased strength, functional mobilty and endurance
Pt p/w impaired strength, balance, endurance, and ROM resulting in mobility deficits.

## 2021-06-25 DIAGNOSIS — Z95.1 PRESENCE OF AORTOCORONARY BYPASS GRAFT: ICD-10-CM

## 2021-06-25 DIAGNOSIS — I47.2 VENTRICULAR TACHYCARDIA: ICD-10-CM

## 2021-06-25 DIAGNOSIS — E03.9 HYPOTHYROIDISM, UNSPECIFIED: ICD-10-CM

## 2021-06-25 LAB
ALBUMIN SERPL ELPH-MCNC: 3.5 G/DL — SIGNIFICANT CHANGE UP (ref 3.3–5)
ALP SERPL-CCNC: 72 U/L — SIGNIFICANT CHANGE UP (ref 40–120)
ALT FLD-CCNC: 38 U/L — SIGNIFICANT CHANGE UP (ref 10–45)
ANION GAP SERPL CALC-SCNC: 12 MMOL/L — SIGNIFICANT CHANGE UP (ref 5–17)
AST SERPL-CCNC: 24 U/L — SIGNIFICANT CHANGE UP (ref 10–40)
BILIRUB SERPL-MCNC: 0.3 MG/DL — SIGNIFICANT CHANGE UP (ref 0.2–1.2)
BUN SERPL-MCNC: 34 MG/DL — HIGH (ref 7–23)
CALCIUM SERPL-MCNC: 8.7 MG/DL — SIGNIFICANT CHANGE UP (ref 8.4–10.5)
CHLORIDE SERPL-SCNC: 105 MMOL/L — SIGNIFICANT CHANGE UP (ref 96–108)
CO2 SERPL-SCNC: 20 MMOL/L — LOW (ref 22–31)
CREAT SERPL-MCNC: 1.53 MG/DL — HIGH (ref 0.5–1.3)
GAS PNL BLDA: SIGNIFICANT CHANGE UP
GLUCOSE BLDC GLUCOMTR-MCNC: 105 MG/DL — HIGH (ref 70–99)
GLUCOSE BLDC GLUCOMTR-MCNC: 111 MG/DL — HIGH (ref 70–99)
GLUCOSE BLDC GLUCOMTR-MCNC: 114 MG/DL — HIGH (ref 70–99)
GLUCOSE BLDC GLUCOMTR-MCNC: 114 MG/DL — HIGH (ref 70–99)
GLUCOSE BLDC GLUCOMTR-MCNC: 116 MG/DL — HIGH (ref 70–99)
GLUCOSE BLDC GLUCOMTR-MCNC: 117 MG/DL — HIGH (ref 70–99)
GLUCOSE BLDC GLUCOMTR-MCNC: 118 MG/DL — HIGH (ref 70–99)
GLUCOSE BLDC GLUCOMTR-MCNC: 118 MG/DL — HIGH (ref 70–99)
GLUCOSE BLDC GLUCOMTR-MCNC: 130 MG/DL — HIGH (ref 70–99)
GLUCOSE BLDC GLUCOMTR-MCNC: 141 MG/DL — HIGH (ref 70–99)
GLUCOSE BLDC GLUCOMTR-MCNC: 155 MG/DL — HIGH (ref 70–99)
GLUCOSE BLDC GLUCOMTR-MCNC: 92 MG/DL — SIGNIFICANT CHANGE UP (ref 70–99)
GLUCOSE SERPL-MCNC: 102 MG/DL — HIGH (ref 70–99)
HCT VFR BLD CALC: 33.9 % — LOW (ref 39–50)
HGB BLD-MCNC: 11 G/DL — LOW (ref 13–17)
INR BLD: 1.24 RATIO — HIGH (ref 0.88–1.16)
MAGNESIUM SERPL-MCNC: 2.7 MG/DL — HIGH (ref 1.6–2.6)
MCHC RBC-ENTMCNC: 28.8 PG — SIGNIFICANT CHANGE UP (ref 27–34)
MCHC RBC-ENTMCNC: 32.4 GM/DL — SIGNIFICANT CHANGE UP (ref 32–36)
MCV RBC AUTO: 88.7 FL — SIGNIFICANT CHANGE UP (ref 80–100)
NRBC # BLD: 0 /100 WBCS — SIGNIFICANT CHANGE UP (ref 0–0)
PHOSPHATE SERPL-MCNC: 3.3 MG/DL — SIGNIFICANT CHANGE UP (ref 2.5–4.5)
PLATELET # BLD AUTO: 236 K/UL — SIGNIFICANT CHANGE UP (ref 150–400)
POTASSIUM SERPL-MCNC: 4.5 MMOL/L — SIGNIFICANT CHANGE UP (ref 3.5–5.3)
POTASSIUM SERPL-SCNC: 4.5 MMOL/L — SIGNIFICANT CHANGE UP (ref 3.5–5.3)
PROT SERPL-MCNC: 6.2 G/DL — SIGNIFICANT CHANGE UP (ref 6–8.3)
PROTHROM AB SERPL-ACNC: 14.7 SEC — HIGH (ref 10.6–13.6)
RBC # BLD: 3.82 M/UL — LOW (ref 4.2–5.8)
RBC # FLD: 14.8 % — HIGH (ref 10.3–14.5)
SODIUM SERPL-SCNC: 137 MMOL/L — SIGNIFICANT CHANGE UP (ref 135–145)
WBC # BLD: 19.57 K/UL — HIGH (ref 3.8–10.5)
WBC # FLD AUTO: 19.57 K/UL — HIGH (ref 3.8–10.5)

## 2021-06-25 PROCEDURE — 71045 X-RAY EXAM CHEST 1 VIEW: CPT | Mod: 26

## 2021-06-25 PROCEDURE — 71045 X-RAY EXAM CHEST 1 VIEW: CPT | Mod: 26,77

## 2021-06-25 PROCEDURE — 99233 SBSQ HOSP IP/OBS HIGH 50: CPT

## 2021-06-25 PROCEDURE — 99291 CRITICAL CARE FIRST HOUR: CPT

## 2021-06-25 PROCEDURE — 93010 ELECTROCARDIOGRAM REPORT: CPT

## 2021-06-25 RX ORDER — PANTOPRAZOLE SODIUM 20 MG/1
40 TABLET, DELAYED RELEASE ORAL
Refills: 0 | Status: DISCONTINUED | OUTPATIENT
Start: 2021-06-25 | End: 2021-06-29

## 2021-06-25 RX ORDER — METOPROLOL TARTRATE 50 MG
2.5 TABLET ORAL ONCE
Refills: 0 | Status: COMPLETED | OUTPATIENT
Start: 2021-06-25 | End: 2021-06-25

## 2021-06-25 RX ORDER — LEVOTHYROXINE SODIUM 125 MCG
137 TABLET ORAL DAILY
Refills: 0 | Status: DISCONTINUED | OUTPATIENT
Start: 2021-06-25 | End: 2021-06-29

## 2021-06-25 RX ORDER — WARFARIN SODIUM 2.5 MG/1
5 TABLET ORAL ONCE
Refills: 0 | Status: COMPLETED | OUTPATIENT
Start: 2021-06-25 | End: 2021-06-25

## 2021-06-25 RX ADMIN — Medication 81 MILLIGRAM(S): at 11:26

## 2021-06-25 RX ADMIN — Medication 25 MILLIGRAM(S): at 05:33

## 2021-06-25 RX ADMIN — ENOXAPARIN SODIUM 30 MILLIGRAM(S): 100 INJECTION SUBCUTANEOUS at 05:33

## 2021-06-25 RX ADMIN — Medication 25 MILLIGRAM(S): at 13:46

## 2021-06-25 RX ADMIN — ENOXAPARIN SODIUM 30 MILLIGRAM(S): 100 INJECTION SUBCUTANEOUS at 17:42

## 2021-06-25 RX ADMIN — Medication 250 MICROGRAM(S): at 05:33

## 2021-06-25 RX ADMIN — CHLORHEXIDINE GLUCONATE 1 APPLICATION(S): 213 SOLUTION TOPICAL at 05:27

## 2021-06-25 RX ADMIN — Medication 100 MILLIGRAM(S): at 05:32

## 2021-06-25 RX ADMIN — Medication 10 MILLIGRAM(S): at 05:33

## 2021-06-25 RX ADMIN — Medication 10 MILLIGRAM(S): at 13:46

## 2021-06-25 RX ADMIN — WARFARIN SODIUM 5 MILLIGRAM(S): 2.5 TABLET ORAL at 20:51

## 2021-06-25 RX ADMIN — SPIRONOLACTONE 25 MILLIGRAM(S): 25 TABLET, FILM COATED ORAL at 05:32

## 2021-06-25 RX ADMIN — OXYCODONE HYDROCHLORIDE 5 MILLIGRAM(S): 5 TABLET ORAL at 09:15

## 2021-06-25 RX ADMIN — Medication 25 MILLIGRAM(S): at 20:51

## 2021-06-25 RX ADMIN — OXYCODONE HYDROCHLORIDE 5 MILLIGRAM(S): 5 TABLET ORAL at 23:45

## 2021-06-25 RX ADMIN — ATORVASTATIN CALCIUM 40 MILLIGRAM(S): 80 TABLET, FILM COATED ORAL at 20:51

## 2021-06-25 RX ADMIN — Medication 10 MILLIGRAM(S): at 20:51

## 2021-06-25 RX ADMIN — Medication 2.5 MILLIGRAM(S): at 14:09

## 2021-06-25 RX ADMIN — OXYCODONE HYDROCHLORIDE 5 MILLIGRAM(S): 5 TABLET ORAL at 09:45

## 2021-06-25 RX ADMIN — FAMOTIDINE 20 MILLIGRAM(S): 10 INJECTION INTRAVENOUS at 05:32

## 2021-06-25 RX ADMIN — OXYCODONE HYDROCHLORIDE 5 MILLIGRAM(S): 5 TABLET ORAL at 23:06

## 2021-06-25 NOTE — PROGRESS NOTE ADULT - PROBLEM SELECTOR PLAN 2
continue postop care  continue asa and statin  continue lopressor 25 mg po q8 and digoxin .25   +pw -VVI 50 MA 10   pain management  pulm toilet  increase activity as tolerated   Discharge planning- home when stable

## 2021-06-25 NOTE — PROGRESS NOTE ADULT - ASSESSMENT
76M with persistent afib on coumadin s/p 2 failed DCCV in the past, bladder cancer s/p urostomy, HTN, HLD, chronic bronchitis, hypothyroidism who presented to his PCP with c/o SOB and palpitations and was sent to the [place] ED when found to be in Afib with RVR. Patient had WCT at [place] ED, and was transferred to [facility] for further evaluation.  TTE: LVEF 20-25% and moderate MR. EP consulted - plan for VT ablation +/- secondary prevention ICD on Monday 6/21. 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  s/p 6/23/21 C3L   POD #2  unremarkable postop course  6/25 tx sdu - chronic afib- anticoagulation with coumadin  ct d/c   76M with persistent afib on coumadin s/p 2 failed DCCV in the past, bladder cancer s/p urostomy, HTN, HLD, chronic bronchitis, hypothyroidism who presented to his PCP with c/o SOB and palpitations and was sent to the [place] ED when found to be in Afib with RVR. Patient had WCT at [place] ED, and was transferred to [facility] for further evaluation.  TTE: LVEF 20-25% and moderate MR. EP consulted - plan for VT ablation +/- secondary prevention ICD on Monday 6/21. 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  s/p 6/23/21 C2L   POD #2  extubated  PRBC/ FFP and plt given postop bleeding- resolved   inotropic and pressor support - weaned off; insulin gttp for glycemic control   6/24 dobutamine d/c   6/25 tx sdu - chronic afib- anticoagulation with coumadin; ct d/c in CTU   ct d/c  discharge planning- pt eval - home when stable    76M with persistent afib on coumadin s/p 2 failed DCCV in the past, bladder cancer s/p urostomy, HTN, HLD, chronic bronchitis, hypothyroidism who presented to his PCP with c/o SOB and palpitations and was sent to the [place] ED when found to be in Afib with RVR. Patient had WCT at [place] ED, and was transferred to [facility] for further evaluation.  TTE: LVEF 20-25% and moderate MR. 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  s/p 6/23/21 C2L   POD #2  extubated  PRBC/ FFP and plt given postop bleeding- resolved   inotropic and pressor support - weaned off; insulin gttp for glycemic control   6/24 dobutamine d/c   6/25 tx sdu - chronic afib- anticoagulation with coumadin; ct d/c in CTU   discharge planning- pt eval - home when stable

## 2021-06-25 NOTE — PROGRESS NOTE ADULT - SUBJECTIVE AND OBJECTIVE BOX
[facility] - Caleb Ochoa, Neetu, Jessica, Milka, Kimberley Mars  Office Number:  [phone]    Patient resting comfortably in chair in NAD.  Reports no new symptoms.  Pressors and inotropes are off.  AF is generally rate controlled.  Remains with chest tubes    F/U for:  AF, CAD    Telemetry:  Rate controlled AF    MEDICATIONS  (STANDING):  aspirin enteric coated 81 milliGRAM(s) Oral daily  atorvastatin 40 milliGRAM(s) Oral at bedtime  chlorhexidine 4% Liquid 1 Application(s) Topical <User Schedule>  dextrose 50% Injectable 50 milliLiter(s) IV Push every 15 minutes  digoxin     Tablet 250 MICROGram(s) Oral daily  enoxaparin Injectable 30 milliGRAM(s) SubCutaneous every 12 hours  famotidine Injectable 20 milliGRAM(s) IV Push every 12 hours  hydrALAZINE 10 milliGRAM(s) Oral every 8 hours  insulin lispro (ADMELOG) corrective regimen sliding scale   SubCutaneous three times a day before meals  insulin lispro (ADMELOG) corrective regimen sliding scale   SubCutaneous at bedtime  insulin regular Infusion 3 Unit(s)/Hr (3 mL/Hr) IV Continuous <Continuous>  levothyroxine Injectable 75 MICROGram(s) IV Push at bedtime  metoclopramide Injectable 10 milliGRAM(s) IV Push every 8 hours  metoprolol tartrate 25 milliGRAM(s) Oral every 8 hours  polyethylene glycol 3350 17 Gram(s) Oral daily  potassium chloride  10 mEq/50 mL IVPB 10 milliEquivalent(s) IV Intermittent every 1 hour  potassium chloride  10 mEq/50 mL IVPB 10 milliEquivalent(s) IV Intermittent every 1 hour  potassium chloride  10 mEq/50 mL IVPB 10 milliEquivalent(s) IV Intermittent every 1 hour  sodium chloride 0.9%. 1000 milliLiter(s) (10 mL/Hr) IV Continuous <Continuous>  spironolactone 25 milliGRAM(s) Oral daily    MEDICATIONS  (PRN):  acetaminophen   Tablet .. 650 milliGRAM(s) Oral every 6 hours PRN Mild Pain (1 - 3)  oxyCODONE    IR 5 milliGRAM(s) Oral every 6 hours PRN Moderate Pain (4 - 6)  sodium chloride 0.9% lock flush 10 milliLiter(s) IV Push every 1 hour PRN Pre/post blood products, medications, blood draw, and to maintain line patency      Allergies    No Known Allergies             Vital Signs Last 24 Hrs  T(C): 36.2 (25 Jun 2021 04:00), Max: 36.7 (24 Jun 2021 16:00)  T(F): 97.2 (25 Jun 2021 04:00), Max: 98 (24 Jun 2021 16:00)  HR: 103 (25 Jun 2021 06:00) (84 - 113)  BP: --  BP(mean): --  RR: 20 (25 Jun 2021 06:00) (18 - 29)  SpO2: 100% (25 Jun 2021 06:00) (93% - 100%)    I&O's Summary    24 Jun 2021 07:01  -  25 Jun 2021 07:00  --------------------------------------------------------  IN: 474.8 mL / OUT: 2005 mL / NET: -1530.2 mL        ON EXAM:    Constitutional: well-nourished, well-developed, NAD   HEENT:  high flow nasal, sclerae anicteric, conjunctivae clear, no oral cyanosis. right ij tlc  Pulmonary: Non-labored, breath sounds are clear bilaterally, No wheezing, rales or rhonchi  Cardiovascular: irregular, S1 and S2.  No murmur.  No rubs, gallops or clicks  Gastrointestinal: Bowel Sounds present, soft, nontender.   Lymph: No peripheral edema.   Neurological: Alert, no focal deficits  Skin: No rashes.  Psych:  Mood & affect appropriate      LABS: All Labs Reviewed:                        11.0   19.57 )-----------( 236      ( 25 Jun 2021 00:40 )             33.9                         11.7   16.39 )-----------( 213      ( 24 Jun 2021 00:50 )             35.3                         12.3   23.30 )-----------( 231      ( 23 Jun 2021 20:20 )             38.0     25 Jun 2021 00:40    137    |  105    |  34     ----------------------------<  102    4.5     |  20     |  1.53   24 Jun 2021 00:50    137    |  106    |  25     ----------------------------<  145    4.3     |  19     |  1.15   23 Jun 2021 20:20    142    |  105    |  24     ----------------------------<  167    4.6     |  19     |  1.06     Ca    8.7        25 Jun 2021 00:40  Ca    8.8        24 Jun 2021 00:50  Ca    8.5        23 Jun 2021 20:20  Phos  3.3       25 Jun 2021 00:40  Phos  3.5       24 Jun 2021 00:50  Phos  3.7       23 Jun 2021 20:20  Mg     2.7       25 Jun 2021 00:40  Mg     2.0       24 Jun 2021 00:50  Mg     2.0       23 Jun 2021 20:20    TPro  6.2    /  Alb  3.5    /  TBili  0.3    /  DBili  x      /  AST  24     /  ALT  38     /  AlkPhos  72     25 Jun 2021 00:40  TPro  5.9    /  Alb  3.3    /  TBili  0.8    /  DBili  x      /  AST  44     /  ALT  48     /  AlkPhos  79     24 Jun 2021 00:50  TPro  5.5    /  Alb  3.0    /  TBili  0.6    /  DBili  x      /  AST  43     /  ALT  49     /  AlkPhos  84     23 Jun 2021 20:20    PT/INR - ( 24 Jun 2021 11:12 )   PT: 15.4 sec;   INR: 1.30 ratio         PTT - ( 23 Jun 2021 20:20 )  PTT:27.1 sec      Blood Culture:

## 2021-06-25 NOTE — PROGRESS NOTE ADULT - PROBLEM SELECTOR PLAN 4
EP following  +WCT preop at [facility]: s/p DCCV in [place] ED.   will plan on secondary prevention ICD after CABG  if recurrent VT after CABG, then will consider VT ablation

## 2021-06-25 NOTE — PROGRESS NOTE ADULT - PROBLEM SELECTOR PLAN 3
continue dig .25 qd   continue lopressor 25 mg po tid  anticoagulation with coumadin  daily pt/inr  monitor and supplement electrolytes

## 2021-06-25 NOTE — PROGRESS NOTE ADULT - SUBJECTIVE AND OBJECTIVE BOX
VITAL SIGNS    Telemetry:    Vital Signs Last 24 Hrs  T(C): 36.8 (21 @ 10:19), Max: 36.8 (21 @ 10:19)  T(F): 98.2 (21 @ 10:19), Max: 98.2 (21 @ 10:19)  HR: 100 (21 @ 10:19) (80 - 110)  BP: 124/60 (21 @ 10:19) (124/60 - 158/74)  RR: 21 (21 @ 10:19) (18 - 30)  SpO2: 98% (21 @ 09:55) (93% - 100%)             @ 07:01  -   @ 07:00  --------------------------------------------------------  IN: 484.8 mL / OUT: 2065 mL / NET: -1580.2 mL     @ 07:01  -   @ 11:20  --------------------------------------------------------  IN: 280 mL / OUT: 180 mL / NET: 100 mL       Daily     Daily Weight in k.1 (2021 00:00)  Admit Wt: Drug Dosing Weight  Height (cm): 177.8 (2021 15:50)  Weight (kg): 108.9 (2021 15:50)  BMI (kg/m2): 34.4 (2021 15:50)  BSA (m2): 2.26 (2021 15:50)    Bilirubin Total, Serum: 0.3 mg/dL ( @ 00:40)    CAPILLARY BLOOD GLUCOSE  123 (2021 19:00)  108 (2021 18:00)  122 (2021 17:00)  127 (2021 16:00)  137 (2021 15:00)  154 (2021 14:00)  156 (2021 13:00)  124 (2021 12:00)      POCT Blood Glucose.: 130 mg/dL (2021 10:24)  POCT Blood Glucose.: 117 mg/dL (2021 09:19)  POCT Blood Glucose.: 118 mg/dL (2021 07:33)  POCT Blood Glucose.: 116 mg/dL (2021 06:13)  POCT Blood Glucose.: 114 mg/dL (2021 05:07)  POCT Blood Glucose.: 118 mg/dL (2021 04:18)  POCT Blood Glucose.: 114 mg/dL (2021 03:00)  POCT Blood Glucose.: 105 mg/dL (2021 01:57)  POCT Blood Glucose.: 92 mg/dL (2021 01:03)  POCT Blood Glucose.: 116 mg/dL (2021 23:11)  POCT Blood Glucose.: 134 mg/dL (2021 22:21)  POCT Blood Glucose.: 155 mg/dL (2021 21:11)  POCT Blood Glucose.: 177 mg/dL (2021 20:27)  POCT Blood Glucose.: 123 mg/dL (2021 18:50)  POCT Blood Glucose.: 108 mg/dL (2021 18:00)  POCT Blood Glucose.: 61 mg/dL (2021 17:59)  POCT Blood Glucose.: 127 mg/dL (2021 16:08)  POCT Blood Glucose.: 137 mg/dL (2021 15:05)  POCT Blood Glucose.: 159 mg/dL (2021 13:13)  POCT Blood Glucose.: 124 mg/dL (2021 12:11)          acetaminophen   Tablet .. 650 milliGRAM(s) Oral every 6 hours PRN  aspirin enteric coated 81 milliGRAM(s) Oral daily  atorvastatin 40 milliGRAM(s) Oral at bedtime  chlorhexidine 4% Liquid 1 Application(s) Topical <User Schedule>  dextrose 50% Injectable 50 milliLiter(s) IV Push every 15 minutes  digoxin     Tablet 250 MICROGram(s) Oral daily  enoxaparin Injectable 30 milliGRAM(s) SubCutaneous every 12 hours  famotidine Injectable 20 milliGRAM(s) IV Push every 12 hours  hydrALAZINE 10 milliGRAM(s) Oral every 8 hours  insulin lispro (ADMELOG) corrective regimen sliding scale   SubCutaneous three times a day before meals  insulin lispro (ADMELOG) corrective regimen sliding scale   SubCutaneous at bedtime  levothyroxine Injectable 75 MICROGram(s) IV Push at bedtime  metoclopramide Injectable 10 milliGRAM(s) IV Push every 8 hours  metoprolol tartrate 25 milliGRAM(s) Oral every 8 hours  oxyCODONE    IR 5 milliGRAM(s) Oral every 6 hours PRN  polyethylene glycol 3350 17 Gram(s) Oral daily  potassium chloride  10 mEq/50 mL IVPB 10 milliEquivalent(s) IV Intermittent every 1 hour  potassium chloride  10 mEq/50 mL IVPB 10 milliEquivalent(s) IV Intermittent every 1 hour  potassium chloride  10 mEq/50 mL IVPB 10 milliEquivalent(s) IV Intermittent every 1 hour  sodium chloride 0.9% lock flush 10 milliLiter(s) IV Push every 1 hour PRN  sodium chloride 0.9%. 1000 milliLiter(s) IV Continuous <Continuous>  spironolactone 25 milliGRAM(s) Oral daily      PHYSICAL EXAM    Subjective: "Hi.   Neurology: alert and oriented x 3, nonfocal, no gross deficits  CV : tele:  RSR  Sternal Wound :  CDI with dressing , Stable  Lungs: clear. RR easy, unlabored   Abdomen: soft, nontender, nondistended, positive bowel sounds, bowel movement   Neg N/V/D   :  pt voiding without difficulty   Extremities:   CABALLERO; edema, neg calf tenderness.   PPP bilaterally      PW:  Chest tubes:                 VITAL SIGNS    Telemetry:  afib 90's   Vital Signs Last 24 Hrs  T(C): 36.8 (21 @ 10:19), Max: 36.8 (21 @ 10:19)  T(F): 98.2 (21 @ 10:19), Max: 98.2 (21 @ 10:19)  HR: 100 (21 @ 10:19) (80 - 110)  BP: 124/60 (21 @ 10:19) (124/60 - 158/74)  RR: 21 (21 @ 10:19) (18 - 30)  SpO2: 98% (21 @ 09:55) (93% - 100%)             @ 07:01  -   @ 07:00  --------------------------------------------------------  IN: 484.8 mL / OUT: 2065 mL / NET: -1580.2 mL     @ 07:01  -   @ 11:20  --------------------------------------------------------  IN: 280 mL / OUT: 180 mL / NET: 100 mL       Daily     Daily Weight in k.1 (2021 00:00)  Admit Wt: Drug Dosing Weight  Height (cm): 177.8 (2021 15:50)  Weight (kg): 108.9 (2021 15:50)  BMI (kg/m2): 34.4 (2021 15:50)  BSA (m2): 2.26 (2021 15:50)    Bilirubin Total, Serum: 0.3 mg/dL ( @ 00:40)    CAPILLARY BLOOD GLUCOSE  123 (2021 19:00)  108 (2021 18:00)  122 (2021 17:00)  127 (2021 16:00)  137 (2021 15:00)  154 (2021 14:00)  156 (2021 13:00)  124 (2021 12:00)      POCT Blood Glucose.: 130 mg/dL (2021 10:24)  POCT Blood Glucose.: 117 mg/dL (2021 09:19)  POCT Blood Glucose.: 118 mg/dL (2021 07:33)  POCT Blood Glucose.: 116 mg/dL (2021 06:13)  POCT Blood Glucose.: 114 mg/dL (2021 05:07)  POCT Blood Glucose.: 118 mg/dL (2021 04:18)  POCT Blood Glucose.: 114 mg/dL (2021 03:00)  POCT Blood Glucose.: 105 mg/dL (2021 01:57)  POCT Blood Glucose.: 92 mg/dL (2021 01:03)  POCT Blood Glucose.: 116 mg/dL (2021 23:11)  POCT Blood Glucose.: 134 mg/dL (2021 22:21)  POCT Blood Glucose.: 155 mg/dL (2021 21:11)  POCT Blood Glucose.: 177 mg/dL (2021 20:27)  POCT Blood Glucose.: 123 mg/dL (2021 18:50)  POCT Blood Glucose.: 108 mg/dL (2021 18:00)  POCT Blood Glucose.: 61 mg/dL (2021 17:59)  POCT Blood Glucose.: 127 mg/dL (2021 16:08)  POCT Blood Glucose.: 137 mg/dL (2021 15:05)  POCT Blood Glucose.: 159 mg/dL (2021 13:13)  POCT Blood Glucose.: 124 mg/dL (2021 12:11)          acetaminophen   Tablet .. 650 milliGRAM(s) Oral every 6 hours PRN  aspirin enteric coated 81 milliGRAM(s) Oral daily  atorvastatin 40 milliGRAM(s) Oral at bedtime  chlorhexidine 4% Liquid 1 Application(s) Topical <User Schedule>  dextrose 50% Injectable 50 milliLiter(s) IV Push every 15 minutes  digoxin     Tablet 250 MICROGram(s) Oral daily  enoxaparin Injectable 30 milliGRAM(s) SubCutaneous every 12 hours  famotidine Injectable 20 milliGRAM(s) IV Push every 12 hours  hydrALAZINE 10 milliGRAM(s) Oral every 8 hours  insulin lispro (ADMELOG) corrective regimen sliding scale   SubCutaneous three times a day before meals  insulin lispro (ADMELOG) corrective regimen sliding scale   SubCutaneous at bedtime  levothyroxine Injectable 75 MICROGram(s) IV Push at bedtime  metoclopramide Injectable 10 milliGRAM(s) IV Push every 8 hours  metoprolol tartrate 25 milliGRAM(s) Oral every 8 hours  oxyCODONE    IR 5 milliGRAM(s) Oral every 6 hours PRN  polyethylene glycol 3350 17 Gram(s) Oral daily  potassium chloride  10 mEq/50 mL IVPB 10 milliEquivalent(s) IV Intermittent every 1 hour  potassium chloride  10 mEq/50 mL IVPB 10 milliEquivalent(s) IV Intermittent every 1 hour  potassium chloride  10 mEq/50 mL IVPB 10 milliEquivalent(s) IV Intermittent every 1 hour  sodium chloride 0.9% lock flush 10 milliLiter(s) IV Push every 1 hour PRN  sodium chloride 0.9%. 1000 milliLiter(s) IV Continuous <Continuous>  spironolactone 25 milliGRAM(s) Oral daily      PHYSICAL EXAM    Subjective: "I feel ok."   Neurology: alert and oriented x 3, nonfocal, no gross deficits  CV : tele:  afib 90's   Sternal Wound :  CDI with dressing , Stable; +pw- VVI 50 MA 10   Lungs: clear. RR easy, unlabored   Abdomen: soft, nontender, nondistended, positive bowel sounds, neg bowel movement   Neg N/V/D; obese abdomen   :  + urostomy tube   Extremities:   CABALLERO; neg LE edema, neg calf tenderness.   PPP bilaterally; + rt leg with AB       PW: +VVI 50 MA 10  Chest tubes: none

## 2021-06-25 NOTE — PROGRESS NOTE ADULT - SUBJECTIVE AND OBJECTIVE BOX
CRITICAL CARE ATTENDING - CTICU    MEDICATIONS  (STANDING):  aspirin enteric coated 81 milliGRAM(s) Oral daily  atorvastatin 40 milliGRAM(s) Oral at bedtime  chlorhexidine 4% Liquid 1 Application(s) Topical <User Schedule>  dextrose 50% Injectable 50 milliLiter(s) IV Push every 15 minutes  digoxin     Tablet 250 MICROGram(s) Oral daily  enoxaparin Injectable 30 milliGRAM(s) SubCutaneous every 12 hours  famotidine Injectable 20 milliGRAM(s) IV Push every 12 hours  hydrALAZINE 10 milliGRAM(s) Oral every 8 hours  insulin lispro (ADMELOG) corrective regimen sliding scale   SubCutaneous three times a day before meals  insulin lispro (ADMELOG) corrective regimen sliding scale   SubCutaneous at bedtime  insulin regular Infusion 3 Unit(s)/Hr (3 mL/Hr) IV Continuous <Continuous>  levothyroxine Injectable 75 MICROGram(s) IV Push at bedtime  metoclopramide Injectable 10 milliGRAM(s) IV Push every 8 hours  metoprolol tartrate 25 milliGRAM(s) Oral every 8 hours  polyethylene glycol 3350 17 Gram(s) Oral daily  potassium chloride  10 mEq/50 mL IVPB 10 milliEquivalent(s) IV Intermittent every 1 hour  potassium chloride  10 mEq/50 mL IVPB 10 milliEquivalent(s) IV Intermittent every 1 hour  potassium chloride  10 mEq/50 mL IVPB 10 milliEquivalent(s) IV Intermittent every 1 hour  sodium chloride 0.9%. 1000 milliLiter(s) (10 mL/Hr) IV Continuous <Continuous>  spironolactone 25 milliGRAM(s) Oral daily                                    11.0   19.57 )-----------( 236      ( 2021 00:40 )             33.9       06-25    137  |  105  |  34<H>  ----------------------------<  102<H>  4.5   |  20<L>  |  1.53<H>    Ca    8.7      2021 00:40  Phos  3.3     06-  Mg     2.7         TPro  6.2  /  Alb  3.5  /  TBili  0.3  /  DBili  x   /  AST  24  /  ALT  38  /  AlkPhos  72        PT/INR - ( 2021 11:12 )   PT: 15.4 sec;   INR: 1.30 ratio         PTT - ( 2021 20:20 )  PTT:27.1 sec        Daily     Daily Weight in k.1 (2021 00:00)       @ 07:  -   @ 07:00  --------------------------------------------------------  IN: 2537.6 mL / OUT: 1218 mL / NET: 1319.6 mL     @ 07:01  -   @ 05:56  --------------------------------------------------------  IN: 464.8 mL / OUT: 1855 mL / NET: -1390.2 mL          Critically Ill patient  : [ ] preoperative ,   [x ] post operative    Requires :  [ x] Arterial Line   [x ] Central Line  [ ] PA catheter  [ ] IABP  [ ] ECMO  [ ] LVAD  [ ] Ventilator  [x ] pacemaker [ ] Impella.                      [x ] ABG's     [ x] Pulse Oxymetry Monitoring  Bedside evaluation , monitoring , treatment of hemodynamics , fluids , IVP/ IVCD meds.        Diagnosis:     POD 2 - C2L    Chest tube drainage     Requires chest PT, pulmonary toilet, suctioning to maintain SaO2,  patent airway and treat atelectasis.     CHF- acute [ x]   chronic [x ]    systolic [ x]   diatolic [ ]          - Echo- EF -  25-30%    [x ] mildly decreased RV dysfunction          - Cxr-cardiomegally, edema          - Clinical-  [  ]inotropes   [ ]pressors   [ ]diuresis   [ ]IABP   [ ]ECMO   [ ]LVAD   [ ]Respiratory Failure.     Hemodynamic lability,  instability. Requires IVCD [ ] vasopressors [ ] inotropes  [ ] vasodilator  [ x]IVSS fluid  to maintain MAP, perfusion, C.I.     Temporary pacemaker (TPM) interrogation and setting.     Renal Failure - Acute Kidney Injury     IVCD insulin     Fluid overload     Metabolic Acidosis    Requires bedside physical therapy, mobilization and total half-way care              By signing my name below, I, Marely Nixon, attest that this documentation has been prepared under the direction and in the presence of Adryan Romero MD.   Electronically Signed: Heydi Palmer 21 @ 05:56      Discussed with CT surgeon, Physician Assistant - Nurse Practitioner- Critical care medicine team.   Discussed at  AM / PM rounds.   Chart, labs , films reviewed.    Cumulative Critical Care Time Given Today:  CRITICAL CARE ATTENDING - CTICU    MEDICATIONS  (STANDING):  aspirin enteric coated 81 milliGRAM(s) Oral daily  atorvastatin 40 milliGRAM(s) Oral at bedtime  chlorhexidine 4% Liquid 1 Application(s) Topical <User Schedule>  dextrose 50% Injectable 50 milliLiter(s) IV Push every 15 minutes  digoxin     Tablet 250 MICROGram(s) Oral daily  enoxaparin Injectable 30 milliGRAM(s) SubCutaneous every 12 hours  famotidine Injectable 20 milliGRAM(s) IV Push every 12 hours  hydrALAZINE 10 milliGRAM(s) Oral every 8 hours  insulin lispro (ADMELOG) corrective regimen sliding scale   SubCutaneous three times a day before meals  insulin lispro (ADMELOG) corrective regimen sliding scale   SubCutaneous at bedtime  insulin regular Infusion 3 Unit(s)/Hr (3 mL/Hr) IV Continuous <Continuous>  levothyroxine Injectable 75 MICROGram(s) IV Push at bedtime  metoclopramide Injectable 10 milliGRAM(s) IV Push every 8 hours  metoprolol tartrate 25 milliGRAM(s) Oral every 8 hours  polyethylene glycol 3350 17 Gram(s) Oral daily  potassium chloride  10 mEq/50 mL IVPB 10 milliEquivalent(s) IV Intermittent every 1 hour  potassium chloride  10 mEq/50 mL IVPB 10 milliEquivalent(s) IV Intermittent every 1 hour  potassium chloride  10 mEq/50 mL IVPB 10 milliEquivalent(s) IV Intermittent every 1 hour  sodium chloride 0.9%. 1000 milliLiter(s) (10 mL/Hr) IV Continuous <Continuous>  spironolactone 25 milliGRAM(s) Oral daily                                    11.0   19.57 )-----------( 236      ( 2021 00:40 )             33.9       06-25    137  |  105  |  34<H>  ----------------------------<  102<H>  4.5   |  20<L>  |  1.53<H>    Ca    8.7      2021 00:40  Phos  3.3     06-  Mg     2.7         TPro  6.2  /  Alb  3.5  /  TBili  0.3  /  DBili  x   /  AST  24  /  ALT  38  /  AlkPhos  72        PT/INR - ( 2021 11:12 )   PT: 15.4 sec;   INR: 1.30 ratio         PTT - ( 2021 20:20 )  PTT:27.1 sec        Daily     Daily Weight in k.1 (2021 00:00)       @ 07:  -   @ 07:00  --------------------------------------------------------  IN: 2537.6 mL / OUT: 1218 mL / NET: 1319.6 mL     @ 07:01  -   @ 05:56  --------------------------------------------------------  IN: 464.8 mL / OUT: 1855 mL / NET: -1390.2 mL          Critically Ill patient  : [ ] preoperative ,   [x ] post operative    Requires :  [ x] Arterial Line   [x ] Central Line  [ ] PA catheter  [ ] IABP  [ ] ECMO  [ ] LVAD  [ ] Ventilator  [x ] pacemaker [ ] Impella.                      [x ] ABG's     [ x] Pulse Oxymetry Monitoring  Bedside evaluation , monitoring , treatment of hemodynamics , fluids , IVP/ IVCD meds.        Diagnosis:     POD 2 - C2L    A Fib.    Chest tube drainage     Requires chest PT, pulmonary toilet, suctioning to maintain SaO2,  patent airway and treat atelectasis.     CHF- acute [ x]   chronic [x ]    systolic [ x]   diatolic [ ]          - Echo- EF -  25-30%    [x ] mildly decreased RV dysfunction          - Cxr-cardiomegally, edema          - Clinical-  [  ]inotropes   [ ]pressors   [ x]diuresis   [ ]IABP   [ ]ECMO   [ ]LVAD   [ ]Respiratory Failure.     Hypotension resolved    Hemodynamic lability,  instability. Requires IVCD [ ] vasopressors [ ] inotropes  [ ] vasodilator  [ x]IVSS fluid  to maintain MAP, perfusion, C.I.     Temporary pacemaker (TPM) interrogation and setting.     Renal Failure - Acute Kidney Injury     IVCD insulin     Fluid overload     Metabolic Acidosis    Requires bedside physical therapy, mobilization and total jail care    Bladder CA - Urostomy               By signing my name below, I, Marely Nixon, attest that this documentation has been prepared under the direction and in the presence of Adryan Romero MD.   Electronically Signed: Heydi Palmer 21 @ 05:56    I, Adryan Romero, personally performed the services described in this documentation. All medical record entries made by the scribe were at my direction and in my presence. I have reviewed the chart and agree that the record reflects my personal performance and is accurate and complete.   Adryan Romero MD.       Discussed with CT surgeon, Physician Assistant - Nurse Practitioner- Critical care medicine team.   Discussed at  AM / PM rounds.   Chart, labs , films reviewed.    Cumulative Critical Care Time Given Today: 20 min

## 2021-06-25 NOTE — PROGRESS NOTE ADULT - ASSESSMENT
76 year old male with a history of hypertension, hypercholesterolemia, hypothyroidism, bladder cancer and permanent atrial fibrillation on Coumadin s/p unsuccessful cardioversion in the past, HFrEF 45% w mild MR who initially presents with AF with RVR.    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 Not requiring high flow.       - coumadin resumed.  Dose for goal INR 2-3  - Trend H/H    - no ventricular arrhythmias thus far post cabg  -planning for eventual icd        - known borderline depressed lvef in the past, with an ef of ~45 by echo 2019 and by nuc stress 2017  - ef found to be worse on this admission (20-25%, with mod MR), though s/p cardioversion, hopefully will rebound after revasc. intra-op echo without change in lvef     - replete electrolytes to k>4, Mg>2  - will follow with you    Upon my evaluation, this patient is at high risk for imminent or life threatening deterioration due to resp failure, hypotension,  and other active medical issues which require my direct attention, intervention, and personal management.  I have personally spent >35 discontinuous minutes  of critical care time exclusive of time spent on separate billing procedures. This includes review of laboratory data, radiology results, discussion with primary team\patient, and monitoring for potential decompensation Interventions were performed as documented above.

## 2021-06-26 LAB
ANION GAP SERPL CALC-SCNC: 12 MMOL/L — SIGNIFICANT CHANGE UP (ref 5–17)
BUN SERPL-MCNC: 38 MG/DL — HIGH (ref 7–23)
CALCIUM SERPL-MCNC: 8.8 MG/DL — SIGNIFICANT CHANGE UP (ref 8.4–10.5)
CHLORIDE SERPL-SCNC: 104 MMOL/L — SIGNIFICANT CHANGE UP (ref 96–108)
CO2 SERPL-SCNC: 21 MMOL/L — LOW (ref 22–31)
CREAT SERPL-MCNC: 1.52 MG/DL — HIGH (ref 0.5–1.3)
GLUCOSE BLDC GLUCOMTR-MCNC: 109 MG/DL — HIGH (ref 70–99)
GLUCOSE BLDC GLUCOMTR-MCNC: 116 MG/DL — HIGH (ref 70–99)
GLUCOSE SERPL-MCNC: 113 MG/DL — HIGH (ref 70–99)
HCT VFR BLD CALC: 32.7 % — LOW (ref 39–50)
HGB BLD-MCNC: 10.5 G/DL — LOW (ref 13–17)
INR BLD: 1.25 RATIO — HIGH (ref 0.88–1.16)
MCHC RBC-ENTMCNC: 28.6 PG — SIGNIFICANT CHANGE UP (ref 27–34)
MCHC RBC-ENTMCNC: 32.1 GM/DL — SIGNIFICANT CHANGE UP (ref 32–36)
MCV RBC AUTO: 89.1 FL — SIGNIFICANT CHANGE UP (ref 80–100)
NRBC # BLD: 0 /100 WBCS — SIGNIFICANT CHANGE UP (ref 0–0)
PLATELET # BLD AUTO: 222 K/UL — SIGNIFICANT CHANGE UP (ref 150–400)
POTASSIUM SERPL-MCNC: 4.8 MMOL/L — SIGNIFICANT CHANGE UP (ref 3.5–5.3)
POTASSIUM SERPL-SCNC: 4.8 MMOL/L — SIGNIFICANT CHANGE UP (ref 3.5–5.3)
PROTHROM AB SERPL-ACNC: 14.8 SEC — HIGH (ref 10.6–13.6)
RBC # BLD: 3.67 M/UL — LOW (ref 4.2–5.8)
RBC # FLD: 15 % — HIGH (ref 10.3–14.5)
SODIUM SERPL-SCNC: 137 MMOL/L — SIGNIFICANT CHANGE UP (ref 135–145)
WBC # BLD: 15.43 K/UL — HIGH (ref 3.8–10.5)
WBC # FLD AUTO: 15.43 K/UL — HIGH (ref 3.8–10.5)

## 2021-06-26 PROCEDURE — 71045 X-RAY EXAM CHEST 1 VIEW: CPT | Mod: 26

## 2021-06-26 PROCEDURE — 99232 SBSQ HOSP IP/OBS MODERATE 35: CPT

## 2021-06-26 RX ORDER — METOPROLOL TARTRATE 50 MG
25 TABLET ORAL ONCE
Refills: 0 | Status: COMPLETED | OUTPATIENT
Start: 2021-06-26 | End: 2021-06-26

## 2021-06-26 RX ORDER — IPRATROPIUM/ALBUTEROL SULFATE 18-103MCG
3 AEROSOL WITH ADAPTER (GRAM) INHALATION ONCE
Refills: 0 | Status: COMPLETED | OUTPATIENT
Start: 2021-06-26 | End: 2021-06-26

## 2021-06-26 RX ORDER — WARFARIN SODIUM 2.5 MG/1
5 TABLET ORAL ONCE
Refills: 0 | Status: COMPLETED | OUTPATIENT
Start: 2021-06-26 | End: 2021-06-26

## 2021-06-26 RX ORDER — METOPROLOL TARTRATE 50 MG
5 TABLET ORAL ONCE
Refills: 0 | Status: COMPLETED | OUTPATIENT
Start: 2021-06-26 | End: 2021-06-26

## 2021-06-26 RX ORDER — UMECLIDINIUM BROMIDE AND VILANTEROL TRIFENATATE 62.5; 25 UG/1; UG/1
1 POWDER RESPIRATORY (INHALATION) DAILY
Refills: 0 | Status: DISCONTINUED | OUTPATIENT
Start: 2021-06-26 | End: 2021-06-29

## 2021-06-26 RX ORDER — METOPROLOL TARTRATE 50 MG
50 TABLET ORAL
Refills: 0 | Status: DISCONTINUED | OUTPATIENT
Start: 2021-06-26 | End: 2021-06-29

## 2021-06-26 RX ADMIN — OXYCODONE HYDROCHLORIDE 5 MILLIGRAM(S): 5 TABLET ORAL at 06:30

## 2021-06-26 RX ADMIN — OXYCODONE HYDROCHLORIDE 5 MILLIGRAM(S): 5 TABLET ORAL at 19:55

## 2021-06-26 RX ADMIN — Medication 137 MICROGRAM(S): at 05:18

## 2021-06-26 RX ADMIN — UMECLIDINIUM BROMIDE AND VILANTEROL TRIFENATATE 1 PUFF(S): 62.5; 25 POWDER RESPIRATORY (INHALATION) at 16:46

## 2021-06-26 RX ADMIN — OXYCODONE HYDROCHLORIDE 5 MILLIGRAM(S): 5 TABLET ORAL at 20:25

## 2021-06-26 RX ADMIN — WARFARIN SODIUM 5 MILLIGRAM(S): 2.5 TABLET ORAL at 21:52

## 2021-06-26 RX ADMIN — SPIRONOLACTONE 25 MILLIGRAM(S): 25 TABLET, FILM COATED ORAL at 05:17

## 2021-06-26 RX ADMIN — Medication 600 MILLIGRAM(S): at 23:04

## 2021-06-26 RX ADMIN — Medication 10 MILLIGRAM(S): at 21:51

## 2021-06-26 RX ADMIN — Medication 250 MICROGRAM(S): at 05:17

## 2021-06-26 RX ADMIN — PANTOPRAZOLE SODIUM 40 MILLIGRAM(S): 20 TABLET, DELAYED RELEASE ORAL at 05:17

## 2021-06-26 RX ADMIN — Medication 10 MILLIGRAM(S): at 05:17

## 2021-06-26 RX ADMIN — ENOXAPARIN SODIUM 30 MILLIGRAM(S): 100 INJECTION SUBCUTANEOUS at 17:22

## 2021-06-26 RX ADMIN — OXYCODONE HYDROCHLORIDE 5 MILLIGRAM(S): 5 TABLET ORAL at 05:27

## 2021-06-26 RX ADMIN — Medication 3 MILLILITER(S): at 22:19

## 2021-06-26 RX ADMIN — Medication 5 MILLIGRAM(S): at 14:36

## 2021-06-26 RX ADMIN — OXYCODONE HYDROCHLORIDE 5 MILLIGRAM(S): 5 TABLET ORAL at 12:35

## 2021-06-26 RX ADMIN — ENOXAPARIN SODIUM 30 MILLIGRAM(S): 100 INJECTION SUBCUTANEOUS at 05:18

## 2021-06-26 RX ADMIN — Medication 25 MILLIGRAM(S): at 07:47

## 2021-06-26 RX ADMIN — POLYETHYLENE GLYCOL 3350 17 GRAM(S): 17 POWDER, FOR SOLUTION ORAL at 12:34

## 2021-06-26 RX ADMIN — ATORVASTATIN CALCIUM 40 MILLIGRAM(S): 80 TABLET, FILM COATED ORAL at 21:51

## 2021-06-26 RX ADMIN — Medication 81 MILLIGRAM(S): at 12:34

## 2021-06-26 RX ADMIN — Medication 10 MILLIGRAM(S): at 12:35

## 2021-06-26 RX ADMIN — OXYCODONE HYDROCHLORIDE 5 MILLIGRAM(S): 5 TABLET ORAL at 13:05

## 2021-06-26 RX ADMIN — Medication 50 MILLIGRAM(S): at 17:23

## 2021-06-26 RX ADMIN — Medication 25 MILLIGRAM(S): at 05:17

## 2021-06-26 NOTE — PROGRESS NOTE ADULT - SUBJECTIVE AND OBJECTIVE BOX
[facility]    Caleb Ochoa, Neetu, Jessica, Milka, Jerad, Kimberley      [phone]    CHIEF COMPLAINT: Patient is a 76y old  Male who presents with a chief complaint of Afib w/ RVR i/s/o fever (25 Jun 2021 11:20)      Follow Up: cabg, vt    Interim history: The patient reports no new symptoms.  Denies chest discomfort and shortness of breath.  No abdominal pain.  No new neurologic symptoms.      MEDICATIONS  (STANDING):  aspirin enteric coated 81 milliGRAM(s) Oral daily  atorvastatin 40 milliGRAM(s) Oral at bedtime  chlorhexidine 4% Liquid 1 Application(s) Topical <User Schedule>  dextrose 50% Injectable 50 milliLiter(s) IV Push every 15 minutes  digoxin     Tablet 250 MICROGram(s) Oral daily  enoxaparin Injectable 30 milliGRAM(s) SubCutaneous every 12 hours  hydrALAZINE 10 milliGRAM(s) Oral every 8 hours  insulin lispro (ADMELOG) corrective regimen sliding scale   SubCutaneous three times a day before meals  insulin lispro (ADMELOG) corrective regimen sliding scale   SubCutaneous at bedtime  levothyroxine 137 MICROGram(s) Oral daily  metoprolol tartrate 50 milliGRAM(s) Oral two times a day  metoprolol tartrate 25 milliGRAM(s) Oral once  pantoprazole    Tablet 40 milliGRAM(s) Oral before breakfast  polyethylene glycol 3350 17 Gram(s) Oral daily  spironolactone 25 milliGRAM(s) Oral daily    MEDICATIONS  (PRN):  acetaminophen   Tablet .. 650 milliGRAM(s) Oral every 6 hours PRN Mild Pain (1 - 3)  oxyCODONE    IR 5 milliGRAM(s) Oral every 6 hours PRN Moderate Pain (4 - 6)  sodium chloride 0.9% lock flush 10 milliLiter(s) IV Push every 1 hour PRN Pre/post blood products, medications, blood draw, and to maintain line patency      REVIEW OF SYSTEMS:  eye, ent, GI, , allergic, dermatologic, musculoskeletal and neurologic are negative except as described above    Vital Signs Last 24 Hrs  T(C): 36.7 (26 Jun 2021 05:15), Max: 36.8 (25 Jun 2021 10:19)  T(F): 98 (26 Jun 2021 05:15), Max: 98.3 (25 Jun 2021 18:00)  HR: 116 (26 Jun 2021 05:15) (88 - 116)  BP: 147/83 (26 Jun 2021 05:15) (124/60 - 158/74)  BP(mean): 110 (26 Jun 2021 05:15) (81 - 110)  RR: 18 (26 Jun 2021 05:15) (18 - 148)  SpO2: 97% (26 Jun 2021 05:15) (95% - 100%)    I&O's Summary    25 Jun 2021 07:01  -  26 Jun 2021 07:00  --------------------------------------------------------  IN: 640 mL / OUT: 2165 mL / NET: -1525 mL        Telemetry past 24h: af rates overall     PHYSICAL EXAM:    Constitutional: obese, NAD   HEENT:  MMM, sclerae anicteric, conjunctivae clear, no oral cyanosis.  Pulmonary: Non-labored, breath sounds are clear bilaterally, No wheezing, rales or rhonchi  Cardiovascular: Regular, S1 and S2.  No murmur.  No rubs, gallops or clicks  Gastrointestinal: Bowel Sounds present, soft, nontender.   Lymph: No peripheral edema.   Neurological: Alert, no focal deficits  Skin: No rashes.  Psych:  Mood & affect appropriate    LABS: All Labs Reviewed:                        10.5   15.43 )-----------( 222      ( 26 Jun 2021 05:38 )             32.7                         11.0   19.57 )-----------( 236      ( 25 Jun 2021 00:40 )             33.9                         11.7   16.39 )-----------( 213      ( 24 Jun 2021 00:50 )             35.3     26 Jun 2021 05:38    137    |  104    |  38     ----------------------------<  113    4.8     |  21     |  1.52   25 Jun 2021 00:40    137    |  105    |  34     ----------------------------<  102    4.5     |  20     |  1.53   24 Jun 2021 00:50    137    |  106    |  25     ----------------------------<  145    4.3     |  19     |  1.15     Ca    8.8        26 Jun 2021 05:38  Ca    8.7        25 Jun 2021 00:40  Ca    8.8        24 Jun 2021 00:50  Phos  3.3       25 Jun 2021 00:40  Phos  3.5       24 Jun 2021 00:50  Phos  3.7       23 Jun 2021 20:20  Mg     2.7       25 Jun 2021 00:40  Mg     2.0       24 Jun 2021 00:50  Mg     2.0       23 Jun 2021 20:20    TPro  6.2    /  Alb  3.5    /  TBili  0.3    /  DBili  x      /  AST  24     /  ALT  38     /  AlkPhos  72     25 Jun 2021 00:40  TPro  5.9    /  Alb  3.3    /  TBili  0.8    /  DBili  x      /  AST  44     /  ALT  48     /  AlkPhos  79     24 Jun 2021 00:50  TPro  5.5    /  Alb  3.0    /  TBili  0.6    /  DBili  x      /  AST  43     /  ALT  49     /  AlkPhos  84     23 Jun 2021 20:20    PT/INR - ( 26 Jun 2021 05:38 )   PT: 14.8 sec;   INR: 1.25 ratio               Blood Culture:         RADIOLOGY:    EKG:    Echo:

## 2021-06-26 NOTE — PROGRESS NOTE ADULT - PROBLEM SELECTOR PLAN 2
continue postop care  continue asa and statin  continue lopressor 25 mg po q8 and digoxin .25   +pw -VVI 50 MA 10   pain management  pulm toilet  increase activity as tolerated   Discharge planning- home when stable continue postop care  continue asa and statin  increase lopressor 50 mg po q12 and digoxin .25   +pw -VVI 50 MA 10   tx floor today as per DR. mcnulty   pain management  pulm toilet  increase activity as tolerated   Discharge planning- home when stable/ INR therapeutic

## 2021-06-26 NOTE — PROGRESS NOTE ADULT - PROBLEM SELECTOR PLAN 3
continue dig .25 qd   continue lopressor 25 mg po tid  anticoagulation with coumadin  daily pt/inr  monitor and supplement electrolytes continue dig .25 qd   increase lopressor 50 mg po q12   anticoagulation with coumadin  daily pt/inr  monitor and supplement electrolytes

## 2021-06-26 NOTE — PROGRESS NOTE ADULT - ASSESSMENT
6 year old male with a history of hypertension, hypercholesterolemia, hypothyroidism, bladder cancer and permanent atrial fibrillation on Coumadin s/p unsuccessful cardioversion in the past, HFrEF 45% w mild MR who initially presents with AF with RVR.    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 Not requiring high flow.       - coumadin resumed.  Dose for goal INR 2-3  - Trend H/H, slowly downtrending    - no ventricular arrhythmias thus far post cabg  -planning for eventual icd        - known borderline depressed lvef in the past, with an ef of ~45 by echo 2019 and by nuc stress 2017  - ef found to be worse on this admission (20-25%, with mod MR), though s/p cardioversion, hopefully will rebound after revasc. intra-op echo without change in lvef     - replete electrolytes to k>4, Mg>2  - will follow with you

## 2021-06-26 NOTE — PROGRESS NOTE ADULT - PROBLEM SELECTOR PLAN 4
EP following  +WCT preop at [facility]: s/p DCCV in [place] ED.   will plan on secondary prevention ICD after CABG  if recurrent VT after CABG, then will consider VT ablation EP following  +WCT preop at [facility]: s/p DCCV in [place] ED.   will plan on secondary prevention ICD after CABG  if recurrent VT after CABG, then will consider VT ablation  no V. ectopy noted at this time postop 6/26

## 2021-06-26 NOTE — PROGRESS NOTE ADULT - ASSESSMENT
76M with persistent afib on coumadin s/p 2 failed DCCV in the past, bladder cancer s/p urostomy, HTN, HLD, chronic bronchitis, hypothyroidism who presented to his PCP with c/o SOB and palpitations and was sent to the [place] ED when found to be in Afib with RVR. Patient had WCT at [place] ED, and was transferred to [facility] for further evaluation.  TTE: LVEF 20-25% and moderate MR. 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  s/p 6/23/21 C2L   POD #2  extubated  PRBC/ FFP and plt given postop bleeding- resolved   inotropic and pressor support - weaned off; insulin gttp for glycemic control   6/24 dobutamine d/c   6/25 tx sdu - chronic afib- anticoagulation with coumadin; ct d/c in CTU   discharge planning- pt eval - home when stable    76M with persistent afib on coumadin s/p 2 failed DCCV in the past, bladder cancer s/p urostomy, HTN, HLD, chronic bronchitis, hypothyroidism who presented to his PCP with c/o SOB and palpitations and was sent to the [place] ED when found to be in Afib with RVR. Patient had WCT at [place] ED, and was transferred to [facility] for further evaluation.  TTE: LVEF 20-25% and moderate MR. 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  s/p 6/23/21 C2L   POD #2  extubated  PRBC/ FFP and plt given postop bleeding- resolved   inotropic and pressor support - weaned off; insulin gttp for glycemic control   6/24 dobutamine d/c   6/25 tx sdu - chronic afib- anticoagulation with coumadin; ct d/c in CTU   6/26 VSS; afib 100-130- increase lopressor 50 mg po q12 for HR control; anticoagulation with coumadin INR 1.2- coumadin 5 mg this evening; tx floor today as per Dr. mcnulty; neg v. ectopy noted postop; ?re-consult EP postop- d/w Dr. Mcnulty   discharge planning- pt eval - home when stable/ INR therapeutic

## 2021-06-26 NOTE — PROGRESS NOTE ADULT - SUBJECTIVE AND OBJECTIVE BOX
VITAL SIGNS    Telemetry:    Vital Signs Last 24 Hrs  T(C): 36.4 (21 @ 07:42), Max: 36.8 (21 @ 18:00)  T(F): 97.6 (21 @ 07:42), Max: 98.3 (21 @ 18:00)  HR: 93 (21 @ 07:42) (93 - 116)  BP: 139/60 (21 @ 07:42) (125/59 - 147/83)  RR: 22 (21 @ 10:00) (18 - 148)  SpO2: 97% (21 @ 10:00) (93% - 97%)             @ 07:01  -   @ 07:00  --------------------------------------------------------  IN: 640 mL / OUT: 2165 mL / NET: -1525 mL     @ 07:01  -   @ 10:37  --------------------------------------------------------  IN: 240 mL / OUT: 0 mL / NET: 240 mL       Daily     Daily Weight in k.1 (2021 10:31)  Admit Wt: Drug Dosing Weight  Height (cm): 177.8 (2021 15:50)  Weight (kg): 108.9 (2021 15:50)  BMI (kg/m2): 34.4 (2021 15:50)  BSA (m2): 2.26 (2021 15:50)      CAPILLARY BLOOD GLUCOSE      POCT Blood Glucose.: 109 mg/dL (2021 07:45)  POCT Blood Glucose.: 155 mg/dL (2021 21:11)  POCT Blood Glucose.: 111 mg/dL (2021 16:27)  POCT Blood Glucose.: 141 mg/dL (2021 13:55)          acetaminophen   Tablet .. 650 milliGRAM(s) Oral every 6 hours PRN  aspirin enteric coated 81 milliGRAM(s) Oral daily  atorvastatin 40 milliGRAM(s) Oral at bedtime  chlorhexidine 4% Liquid 1 Application(s) Topical <User Schedule>  dextrose 50% Injectable 50 milliLiter(s) IV Push every 15 minutes  digoxin     Tablet 250 MICROGram(s) Oral daily  enoxaparin Injectable 30 milliGRAM(s) SubCutaneous every 12 hours  hydrALAZINE 10 milliGRAM(s) Oral every 8 hours  insulin lispro (ADMELOG) corrective regimen sliding scale   SubCutaneous three times a day before meals  insulin lispro (ADMELOG) corrective regimen sliding scale   SubCutaneous at bedtime  levothyroxine 137 MICROGram(s) Oral daily  metoprolol tartrate 50 milliGRAM(s) Oral two times a day  oxyCODONE    IR 5 milliGRAM(s) Oral every 6 hours PRN  pantoprazole    Tablet 40 milliGRAM(s) Oral before breakfast  polyethylene glycol 3350 17 Gram(s) Oral daily  sodium chloride 0.9% lock flush 10 milliLiter(s) IV Push every 1 hour PRN  spironolactone 25 milliGRAM(s) Oral daily      PHYSICAL EXAM    Subjective: "Hi.   Neurology: alert and oriented x 3, nonfocal, no gross deficits  CV : tele:  RSR  Sternal Wound :  CDI with dressing , Stable  Lungs: clear. RR easy, unlabored   Abdomen: soft, nontender, nondistended, positive bowel sounds, bowel movement   Neg N/V/D   :  pt voiding without difficulty   Extremities:   CABALLERO; edema, neg calf tenderness.   PPP bilaterally      PW:  Chest tubes:                 VITAL SIGNS    Telemetry:  afib 100-130   Vital Signs Last 24 Hrs  T(C): 36.4 (21 @ 07:42), Max: 36.8 (21 @ 18:00)  T(F): 97.6 (21 @ 07:42), Max: 98.3 (21 @ 18:00)  HR: 93 (21 @ 07:42) (93 - 116)  BP: 139/60 (21 @ 07:42) (125/59 - 147/83)  RR: 22 (21 @ 10:00) (18 - 148)  SpO2: 97% (21 @ 10:00) (93% - 97%)             @ 07:01  -   @ 07:00  --------------------------------------------------------  IN: 640 mL / OUT: 2165 mL / NET: -1525 mL     @ 07:01  -   @ 10:37  --------------------------------------------------------  IN: 240 mL / OUT: 0 mL / NET: 240 mL       Daily     Daily Weight in k.1 (2021 10:31)  Admit Wt: Drug Dosing Weight  Height (cm): 177.8 (2021 15:50)  Weight (kg): 108.9 (2021 15:50)  BMI (kg/m2): 34.4 (2021 15:50)  BSA (m2): 2.26 (2021 15:50)      CAPILLARY BLOOD GLUCOSE      POCT Blood Glucose.: 109 mg/dL (2021 07:45)  POCT Blood Glucose.: 155 mg/dL (2021 21:11)  POCT Blood Glucose.: 111 mg/dL (2021 16:27)  POCT Blood Glucose.: 141 mg/dL (2021 13:55)          acetaminophen   Tablet .. 650 milliGRAM(s) Oral every 6 hours PRN  aspirin enteric coated 81 milliGRAM(s) Oral daily  atorvastatin 40 milliGRAM(s) Oral at bedtime  chlorhexidine 4% Liquid 1 Application(s) Topical <User Schedule>  dextrose 50% Injectable 50 milliLiter(s) IV Push every 15 minutes  digoxin     Tablet 250 MICROGram(s) Oral daily  enoxaparin Injectable 30 milliGRAM(s) SubCutaneous every 12 hours  hydrALAZINE 10 milliGRAM(s) Oral every 8 hours  insulin lispro (ADMELOG) corrective regimen sliding scale   SubCutaneous three times a day before meals  insulin lispro (ADMELOG) corrective regimen sliding scale   SubCutaneous at bedtime  levothyroxine 137 MICROGram(s) Oral daily  metoprolol tartrate 50 milliGRAM(s) Oral two times a day  oxyCODONE    IR 5 milliGRAM(s) Oral every 6 hours PRN  pantoprazole    Tablet 40 milliGRAM(s) Oral before breakfast  polyethylene glycol 3350 17 Gram(s) Oral daily  sodium chloride 0.9% lock flush 10 milliLiter(s) IV Push every 1 hour PRN  spironolactone 25 milliGRAM(s) Oral daily      PHYSICAL EXAM    Subjective: "When will I go home?"  Neurology: alert and oriented x 3, nonfocal, no gross deficits  CV : tele:   afib 100-130   Sternal Wound :  CDI with dressing , Stable; +pw VVI 50/ ma 10   Lungs: clear. RR easy, unlabored   Abdomen: soft, nontender, nondistended, positive bowel sounds, + bowel movement;  Neg N/V/D   :  + urostomy drain - clear, yellow urine   Extremities:   CABALLERO; Neg LE edema, neg calf tenderness.   PPP bilaterally;  rt SVG Site cdi w/ dressing     PW: +pw VVI 50/ ma 10  Chest tubes: none

## 2021-06-27 LAB
ANION GAP SERPL CALC-SCNC: 14 MMOL/L — SIGNIFICANT CHANGE UP (ref 5–17)
BUN SERPL-MCNC: 42 MG/DL — HIGH (ref 7–23)
CALCIUM SERPL-MCNC: 8.8 MG/DL — SIGNIFICANT CHANGE UP (ref 8.4–10.5)
CHLORIDE SERPL-SCNC: 105 MMOL/L — SIGNIFICANT CHANGE UP (ref 96–108)
CO2 SERPL-SCNC: 20 MMOL/L — LOW (ref 22–31)
CREAT SERPL-MCNC: 1.47 MG/DL — HIGH (ref 0.5–1.3)
GLUCOSE SERPL-MCNC: 113 MG/DL — HIGH (ref 70–99)
HCT VFR BLD CALC: 31.1 % — LOW (ref 39–50)
HGB BLD-MCNC: 10.1 G/DL — LOW (ref 13–17)
INR BLD: 1.31 RATIO — HIGH (ref 0.88–1.16)
MCHC RBC-ENTMCNC: 29.1 PG — SIGNIFICANT CHANGE UP (ref 27–34)
MCHC RBC-ENTMCNC: 32.5 GM/DL — SIGNIFICANT CHANGE UP (ref 32–36)
MCV RBC AUTO: 89.6 FL — SIGNIFICANT CHANGE UP (ref 80–100)
NRBC # BLD: 0 /100 WBCS — SIGNIFICANT CHANGE UP (ref 0–0)
PLATELET # BLD AUTO: 215 K/UL — SIGNIFICANT CHANGE UP (ref 150–400)
POTASSIUM SERPL-MCNC: 4.3 MMOL/L — SIGNIFICANT CHANGE UP (ref 3.5–5.3)
POTASSIUM SERPL-SCNC: 4.3 MMOL/L — SIGNIFICANT CHANGE UP (ref 3.5–5.3)
PROTHROM AB SERPL-ACNC: 15.5 SEC — HIGH (ref 10.6–13.6)
RBC # BLD: 3.47 M/UL — LOW (ref 4.2–5.8)
RBC # FLD: 15 % — HIGH (ref 10.3–14.5)
SODIUM SERPL-SCNC: 139 MMOL/L — SIGNIFICANT CHANGE UP (ref 135–145)
WBC # BLD: 15.53 K/UL — HIGH (ref 3.8–10.5)
WBC # FLD AUTO: 15.53 K/UL — HIGH (ref 3.8–10.5)

## 2021-06-27 PROCEDURE — 99232 SBSQ HOSP IP/OBS MODERATE 35: CPT

## 2021-06-27 RX ORDER — WARFARIN SODIUM 2.5 MG/1
7.5 TABLET ORAL ONCE
Refills: 0 | Status: DISCONTINUED | OUTPATIENT
Start: 2021-06-27 | End: 2021-06-27

## 2021-06-27 RX ORDER — WARFARIN SODIUM 2.5 MG/1
5 TABLET ORAL ONCE
Refills: 0 | Status: DISCONTINUED | OUTPATIENT
Start: 2021-06-27 | End: 2021-06-27

## 2021-06-27 RX ORDER — WARFARIN SODIUM 2.5 MG/1
5 TABLET ORAL ONCE
Refills: 0 | Status: COMPLETED | OUTPATIENT
Start: 2021-06-27 | End: 2021-06-27

## 2021-06-27 RX ADMIN — UMECLIDINIUM BROMIDE AND VILANTEROL TRIFENATATE 1 PUFF(S): 62.5; 25 POWDER RESPIRATORY (INHALATION) at 13:45

## 2021-06-27 RX ADMIN — Medication 10 MILLIGRAM(S): at 21:56

## 2021-06-27 RX ADMIN — WARFARIN SODIUM 5 MILLIGRAM(S): 2.5 TABLET ORAL at 21:56

## 2021-06-27 RX ADMIN — SPIRONOLACTONE 25 MILLIGRAM(S): 25 TABLET, FILM COATED ORAL at 06:32

## 2021-06-27 RX ADMIN — PANTOPRAZOLE SODIUM 40 MILLIGRAM(S): 20 TABLET, DELAYED RELEASE ORAL at 06:33

## 2021-06-27 RX ADMIN — Medication 50 MILLIGRAM(S): at 06:32

## 2021-06-27 RX ADMIN — Medication 10 MILLIGRAM(S): at 06:32

## 2021-06-27 RX ADMIN — OXYCODONE HYDROCHLORIDE 5 MILLIGRAM(S): 5 TABLET ORAL at 10:54

## 2021-06-27 RX ADMIN — Medication 10 MILLIGRAM(S): at 13:57

## 2021-06-27 RX ADMIN — Medication 50 MILLIGRAM(S): at 17:34

## 2021-06-27 RX ADMIN — OXYCODONE HYDROCHLORIDE 5 MILLIGRAM(S): 5 TABLET ORAL at 11:17

## 2021-06-27 RX ADMIN — Medication 137 MICROGRAM(S): at 06:33

## 2021-06-27 RX ADMIN — ENOXAPARIN SODIUM 30 MILLIGRAM(S): 100 INJECTION SUBCUTANEOUS at 17:34

## 2021-06-27 RX ADMIN — OXYCODONE HYDROCHLORIDE 5 MILLIGRAM(S): 5 TABLET ORAL at 21:56

## 2021-06-27 RX ADMIN — OXYCODONE HYDROCHLORIDE 5 MILLIGRAM(S): 5 TABLET ORAL at 04:02

## 2021-06-27 RX ADMIN — Medication 600 MILLIGRAM(S): at 22:36

## 2021-06-27 RX ADMIN — ATORVASTATIN CALCIUM 40 MILLIGRAM(S): 80 TABLET, FILM COATED ORAL at 21:55

## 2021-06-27 RX ADMIN — OXYCODONE HYDROCHLORIDE 5 MILLIGRAM(S): 5 TABLET ORAL at 03:32

## 2021-06-27 RX ADMIN — ENOXAPARIN SODIUM 30 MILLIGRAM(S): 100 INJECTION SUBCUTANEOUS at 06:34

## 2021-06-27 RX ADMIN — Medication 81 MILLIGRAM(S): at 12:15

## 2021-06-27 RX ADMIN — Medication 250 MICROGRAM(S): at 06:33

## 2021-06-27 NOTE — PROGRESS NOTE ADULT - ASSESSMENT
76M with persistent afib on coumadin s/p 2 failed DCCV in the past, bladder cancer s/p urostomy, HTN, HLD, chronic bronchitis, hypothyroidism who presented w aFIB w RVR, found to have multivessel CAD now s/p CABGx2 (SVG-OM w/ LIMA free graft branching from proximal SVG to LAD) 6/23/21. Pt w likely ischemic VT s/p DCCV in [facility] ED. EP consulted for ICD placement for secondary prevention.     VT: Patient denies any LOC. s/p DCCV in [place] ED.   - Worsened EF 25-30%, previously 45%. Multivessel CAD s/p CABG   - on lopressor 50 BID   - No ectopy on telemetry s/p CABG  - will plan on secondary prevention ICD, Keep NPO after MN      Persistent afib on coumadin s/p 2 failed DCCV: [id number] 3 (HTN, agex2)  - Coumadin   - Cont Lopressor and Digoxin     Heraclio Fu MD  Cardiology Fellow  [phone]    Please check [email or web address] for cardiology service schedule and contact information.

## 2021-06-27 NOTE — PROGRESS NOTE ADULT - ASSESSMENT
76 year old male with a history of hypertension, hypercholesterolemia, hypothyroidism, bladder cancer and permanent atrial fibrillation on Coumadin s/p unsuccessful cardioversion in the past, HFrEF 45% w mild MR who initially presents with AF with RVR.    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 Not requiring high flow.       - coumadin resumed.  Dose for goal INR 2-3  - Trend H/H, slowly downtrending    - no ventricular arrhythmias thus far post cabg  -planning for eventual icd        - known borderline depressed lvef in the past, with an ef of ~45 by echo 2019 and by nuc stress 2017  - ef found to be worse on this admission (20-25%, with mod MR), though s/p cardioversion, hopefully will rebound after revasc. intra-op echo without change in lvef     - replete electrolytes to k>4, Mg>2  - will follow with you

## 2021-06-27 NOTE — PROGRESS NOTE ADULT - SUBJECTIVE AND OBJECTIVE BOX
[facility]    Caleb Ochoa, Neetu, Jessica, Milka, Jerad, Kimberley      [phone]    CHIEF COMPLAINT: Patient is a 76y old  Male who presents with a chief complaint of Afib w/ RVR i/s/o fever (26 Jun 2021 10:36)      Follow Up: cabg, af, vt    Interim history: The patient reports no new symptoms.  Denies chest discomfort.  Feels need to cough up phlegm, getting nebs.  No abdominal pain.  No new neurologic symptoms.      MEDICATIONS  (STANDING):  aspirin enteric coated 81 milliGRAM(s) Oral daily  atorvastatin 40 milliGRAM(s) Oral at bedtime  digoxin     Tablet 250 MICROGram(s) Oral daily  enoxaparin Injectable 30 milliGRAM(s) SubCutaneous every 12 hours  hydrALAZINE 10 milliGRAM(s) Oral every 8 hours  levothyroxine 137 MICROGram(s) Oral daily  metoprolol tartrate 50 milliGRAM(s) Oral two times a day  pantoprazole    Tablet 40 milliGRAM(s) Oral before breakfast  polyethylene glycol 3350 17 Gram(s) Oral daily  spironolactone 25 milliGRAM(s) Oral daily  umeclidinium 62.5 MICROgram(s)/vilanterol 25 MICROgram(s) Inhaler 1 Puff(s) Inhalation daily  warfarin 5 milliGRAM(s) Oral once    MEDICATIONS  (PRN):  acetaminophen   Tablet .. 650 milliGRAM(s) Oral every 6 hours PRN Mild Pain (1 - 3)  oxyCODONE    IR 5 milliGRAM(s) Oral every 6 hours PRN Moderate Pain (4 - 6)  sodium chloride 0.9% lock flush 10 milliLiter(s) IV Push every 1 hour PRN Pre/post blood products, medications, blood draw, and to maintain line patency      REVIEW OF SYSTEMS:  eye, ent, GI, , allergic, dermatologic, musculoskeletal and neurologic are negative except as described above    Vital Signs Last 24 Hrs  T(C): 36.4 (27 Jun 2021 04:43), Max: 36.8 (26 Jun 2021 15:11)  T(F): 97.5 (27 Jun 2021 04:43), Max: 98.3 (26 Jun 2021 15:11)  HR: 101 (27 Jun 2021 04:43) (93 - 106)  BP: 132/74 (27 Jun 2021 04:43) (104/66 - 149/76)  BP(mean): 96 (27 Jun 2021 04:43) (81 - 104)  RR: 19 (27 Jun 2021 04:43) (18 - 22)  SpO2: 97% (27 Jun 2021 04:43) (93% - 98%)    I&O's Summary    26 Jun 2021 07:01  -  27 Jun 2021 07:00  --------------------------------------------------------  IN: 480 mL / OUT: 975 mL / NET: -495 mL        Telemetry past 24h: af 80s-100s    PHYSICAL EXAM:    Constitutional: well-nourished, well-developed, NAD   HEENT:  MMM, sclerae anicteric, conjunctivae clear, no oral cyanosis.  Pulmonary: Non-labored, breath sounds are decr left base  cardiovascular: irregular, S1 and S2.  No murmur.  No rubs, gallops or clicks  Gastrointestinal: Bowel Sounds present, soft, nontender.   Lymph: mild peripheral edema.   Neurological: Alert, no focal deficits  Skin: No rashes.  Psych:  Mood & affect appropriate    LABS: All Labs Reviewed:                        10.1   15.53 )-----------( 215      ( 27 Jun 2021 05:08 )             31.1                         10.5   15.43 )-----------( 222      ( 26 Jun 2021 05:38 )             32.7                         11.0   19.57 )-----------( 236      ( 25 Jun 2021 00:40 )             33.9     27 Jun 2021 05:08    139    |  105    |  42     ----------------------------<  113    4.3     |  20     |  1.47   26 Jun 2021 05:38    137    |  104    |  38     ----------------------------<  113    4.8     |  21     |  1.52   25 Jun 2021 00:40    137    |  105    |  34     ----------------------------<  102    4.5     |  20     |  1.53     Ca    8.8        27 Jun 2021 05:08  Ca    8.8        26 Jun 2021 05:38  Ca    8.7        25 Jun 2021 00:40  Phos  3.3       25 Jun 2021 00:40  Mg     2.7       25 Jun 2021 00:40    TPro  6.2    /  Alb  3.5    /  TBili  0.3    /  DBili  x      /  AST  24     /  ALT  38     /  AlkPhos  72     25 Jun 2021 00:40    PT/INR - ( 27 Jun 2021 05:08 )   PT: 15.5 sec;   INR: 1.31 ratio               Blood Culture:         RADIOLOGY:    EKG:    Echo:

## 2021-06-27 NOTE — PROGRESS NOTE ADULT - PROBLEM SELECTOR PLAN 4
EP following  +WCT preop at [facility]: s/p DCCV in [place] ED.   will plan on secondary prevention ICD after CABG  if recurrent VT after CABG, then will consider VT ablation  no V. ectopy noted at this time postop 6/26

## 2021-06-27 NOTE — PROGRESS NOTE ADULT - PROBLEM SELECTOR PLAN 2
continue postop care  continue asa and statin  increase lopressor 50 mg po q12 and digoxin .25   +pw -VVI 50 MA 10   tx floor today as per DR. mcnulty   pain management  pulm toilet  increase activity as tolerated   Discharge planning- home when stable/ INR therapeutic

## 2021-06-27 NOTE — PROGRESS NOTE ADULT - SUBJECTIVE AND OBJECTIVE BOX
VITAL SIGNS-Telemetry:  AF/ 99 ( )  Vital Signs Last 24 Hrs  T(C): 36.4 (21 @ 04:43), Max: 36.8 (21 @ 15:11)  T(F): 97.5 (21 @ 04:43), Max: 98.3 (21 @ 15:11)  HR: 101 (21 @ 04:43) (99 - 106)  BP: 132/74 (21 @ 04:43) (104/66 - 149/76)  RR: 19 (21 @ 04:43) (18 - 22)  SpO2: 97% (21 @ 04:43) (94% - 98%)          @ 07:01  -   @ 07:00  --------------------------------------------------------  IN: 480 mL / OUT: 975 mL / NET: -495 mL     @ 07:01  -   @ 10:38  --------------------------------------------------------  IN: 240 mL / OUT: 0 mL / NET: 240 mL    Daily     Daily Weight in k.7 (2021 07:07)    CAPILLARY BLOOD GLUCOSE  POCT Blood Glucose.: 116 mg/dL (2021 11:31)    Pacing Wires        [  ]   Settings:                                  Isolated  [x ]    PHYSICAL EXAM:  Neurology: alert and oriented x 3, nonfocal, no gross deficits  CV : Irr Irr  Sternal Wound :  CDI , Stable  Lungs: CTA  Abdomen: soft, nontender, nondistended, positive bowel sounds, last bowel movement   +bm      Extremities:     + pitting edema b/l ace wraps in place, no calf tenderness    acetaminophen   Tablet .. 650 milliGRAM(s) Oral every 6 hours PRN  aspirin enteric coated 81 milliGRAM(s) Oral daily  atorvastatin 40 milliGRAM(s) Oral at bedtime  digoxin     Tablet 250 MICROGram(s) Oral daily  enoxaparin Injectable 30 milliGRAM(s) SubCutaneous every 12 hours  hydrALAZINE 10 milliGRAM(s) Oral every 8 hours  levothyroxine 137 MICROGram(s) Oral daily  metoprolol tartrate 50 milliGRAM(s) Oral two times a day  oxyCODONE    IR 5 milliGRAM(s) Oral every 6 hours PRN  pantoprazole    Tablet 40 milliGRAM(s) Oral before breakfast  polyethylene glycol 3350 17 Gram(s) Oral daily  sodium chloride 0.9% lock flush 10 milliLiter(s) IV Push every 1 hour PRN  spironolactone 25 milliGRAM(s) Oral daily  umeclidinium 62.5 MICROgram(s)/vilanterol 25 MICROgram(s) Inhaler 1 Puff(s) Inhalation daily  warfarin 5 milliGRAM(s) Oral once    Physical Therapy Rec:   Home  [ x ]   Home w/ PT  [  ]  Rehab  [  ]  Discussed with Cardiothoracic Team at AM rounds.

## 2021-06-27 NOTE — PROGRESS NOTE ADULT - PROBLEM SELECTOR PLAN 3
continue dig .25 qd   increase lopressor 50 mg po q12   anticoagulation with coumadin  daily pt/inr  monitor and supplement electrolytes

## 2021-06-27 NOTE — PROGRESS NOTE ADULT - SUBJECTIVE AND OBJECTIVE BOX
PATIENT:  MARÍA ELENA BEAR  [id number]    CHIEF COMPLAINT:  Patient is a 76y old  Male who presents with a chief complaint of Afib w/ RVR i/s/o fever (2021 10:38)      INTERVAL HISTORY: Pt s/p CABGx2 (SVG-OM w/ LIMA free graft branching from proximal SVG to LAD) 21, now out the CTICU doing well with no complaints. On telemetry Afib  without ectopy. EP reconsulted for ICD placement.     REVIEW OF SYSTEMS:         General: No fevers, No chills, No malaise        HEENT: No headaches, No changes in vision, No dysphagia        CVS: No chest pain, No palpitations        Pulm: No SOB, No wheezing        GI: No abdominal pain, No nausea, No vomiting, No changes in bowel         : No dysuria         Ext: No edema, No claudications,    MEDICATIONS  (STANDING):  aspirin enteric coated 81 milliGRAM(s) Oral daily  atorvastatin 40 milliGRAM(s) Oral at bedtime  digoxin     Tablet 250 MICROGram(s) Oral daily  enoxaparin Injectable 30 milliGRAM(s) SubCutaneous every 12 hours  hydrALAZINE 10 milliGRAM(s) Oral every 8 hours  levothyroxine 137 MICROGram(s) Oral daily  metoprolol tartrate 50 milliGRAM(s) Oral two times a day  pantoprazole    Tablet 40 milliGRAM(s) Oral before breakfast  polyethylene glycol 3350 17 Gram(s) Oral daily  spironolactone 25 milliGRAM(s) Oral daily  umeclidinium 62.5 MICROgram(s)/vilanterol 25 MICROgram(s) Inhaler 1 Puff(s) Inhalation daily  warfarin 5 milliGRAM(s) Oral once    MEDICATIONS  (PRN):  acetaminophen   Tablet .. 650 milliGRAM(s) Oral every 6 hours PRN Mild Pain (1 - 3)  oxyCODONE    IR 5 milliGRAM(s) Oral every 6 hours PRN Moderate Pain (4 - 6)  sodium chloride 0.9% lock flush 10 milliLiter(s) IV Push every 1 hour PRN Pre/post blood products, medications, blood draw, and to maintain line patency      OBJECTIVE:  ICU Vital Signs Last 24 Hrs  T(C): 36.8 (2021 19:01), Max: 36.8 (2021 19:01)  T(F): 98.3 (2021 19:01), Max: 98.3 (2021 19:01)  HR: 102 (2021 19:01) (88 - 102)  BP: 136/71 (2021 19:01) (107/58 - 136/71)  BP(mean): 84 (2021 12:46) (84 - 96)  RR: 18 (2021 19:01) (18 - 22)  SpO2: 96% (2021 19:01) (94% - 97%)      I&O's Summary    2021 07:01  -  2021 07:00  --------------------------------------------------------  IN: 480 mL / OUT: 975 mL / NET: -495 mL    2021 07:01  -  2021 19:55  --------------------------------------------------------  IN: 630 mL / OUT: 775 mL / NET: -145 mL      Daily     Daily Weight in k.7 (2021 07:07)    PHYSICAL EXAM:       General: NAD        HEENT: neck supple, no JVD        CVS: RRR, nl S1, S2, no murmurs, gallops, or regurg. Sternotomy +       Pulm: Lungs CTA b/l, no crackles        GI: soft, nontender, nondistended        : No CVA tenderness       Ext: + Peripheral pulses, no edema, no cyanosis, no clubbing       Neuro: AOx3, no focal deficits       TELEMETRY: Afib 90 -100      IMAGING:  < from: TTE with Doppler (w/Cont) (21 @ 15:21) >  Dimensions:    Normal Values:  LA:     4.1    2.0 - 4.0 cm  Ao:     3.7    2.0 - 3.8 cm  SEPTUM: 1.2    0.6 - 1.2 cm  PWT:    1.0    0.6 - 1.1 cm  LVIDd:  5.5    3.0 - 5.6 cm  LVIDs:  4.4    1.8 - 4.0 cm  Derived variables:  LVMI: 114 g/m2  RWT: 0.38  Fractional short: 19 %  EF (Visual Estimate): 20-25 %  Doppler Peak Velocity (m/sec): AoV=1.2  ------------------------------------------------------------------------  Observations:  Mitral Valve: Mitral annular calcification, otherwise  normal mitral valve. Moderate mitral regurgitation.  PISA  radius = 0.8 cm. RVOL = 32 ml, EROA = 29 mm2.  Aortic Valve/Aorta: Aortic valve not well visualized;  appears calcified. Peak transaortic valve gradient equals 6  mm Hg, aortic valve velocity time integral equals 17 cm,  estimated aortic valve area equals 2.7 sqcm. Minimal aortic  regurgitation.  Peak left ventricular outflow tract  gradient equals 2 mm Hg, LVOT velocity time integral equals  11 cm.  Aortic Root: 3.7 cm.  LVOT diameter: 2.3 cm.  Left Atrium: Severely dilated left atrium.  LA volume index  = 54 cc/m2.  Left Ventricle: Severe global left ventricular systolic  dysfunction with regional variation. Endocardial  visualization enhanced with intravenous injection of  Ultrasonic Enhancing Agent (Definity).  No left ventricular  thrombus. Eccentric left ventricular hypertrophy (dilated  left ventricle with normal relative wall thickness). At  least Mild diastolic dysfunction (Stage I).  Right Heart: Normal right atrium. Normal right ventricular  size and function. Normal tricuspid valve. Mild tricuspid  regurgitation. Pulmonic valve not well visualized, probably  normal. Minimal pulmonic regurgitation.  Pericardium/Pleura: Normal pericardium with no pericardial  effusion.  Hemodynamic: Estimated right atrial pressure is 8 mm Hg.  Estimated right ventricular systolic pressure equals 33 mm  Hg, assuming right atrial pressure equals 8 mm Hg,  consistent with normal pulmonary pressures.  ------------------------------------------------------------------------  Conclusions:  1. Mitral annular calcification, otherwise normal mitral  valve. Moderate mitral regurgitation.  PISA radius = 0.8  cm. RVOL = 32 ml, EROA = 29 mm2.  2. Eccentric left ventricular hypertrophy (dilated left  ventricle with normal relative wall thickness).  3. Severe global left ventricular systolic dysfunction with  regional variation. Endocardial visualization enhanced with  intravenous injection of Ultrasonic Enhancing Agent  (Definity).  No left ventricular thrombus.  4. Normal right ventricular size and function.  5. Estimated right ventricular systolic pressure equals 33  mm Hg, assuming right atrial pressure equals 8 mm Hg,  consistent with normal pulmonary pressures.  *** No previous Echo exam.  ------------------------------------------------------------------------  Confirmed on  2021 - 22:13:53 by Angelo De Leon M.D.    < end of copied text >      LABS:                          10.1   15.53 )-----------( 215      ( 2021 05:08 )             31.1     06-    139  |  105  |  42<H>  ----------------------------<  113<H>  4.3   |  20<L>  |  1.47<H>    Ca    8.8      2021 05:08        PT/INR - ( 2021 05:08 )   PT: 15.5 sec;   INR: 1.31 ratio

## 2021-06-27 NOTE — PROGRESS NOTE ADULT - ASSESSMENT
76M with persistent afib on coumadin s/p 2 failed DCCV in the past, bladder cancer s/p urostomy, HTN, HLD, chronic bronchitis, hypothyroidism who presented to his PCP with c/o SOB and palpitations and was sent to the [place] ED when found to be in Afib with RVR. Patient had WCT at [place] ED, and was transferred to [facility] for further evaluation.  TTE: LVEF 20-25% and moderate MR. 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  s/p 6/23/21 C2L   POD #2  extubated  PRBC/ FFP and plt given postop bleeding- resolved   inotropic and pressor support - weaned off; insulin gttp for glycemic control   6/24 dobutamine d/c   6/25 tx sdu - chronic afib- anticoagulation with coumadin; ct d/c in CTU   6/26 VSS; afib 100-130- increase lopressor 50 mg po q12 for HR control; anticoagulation with coumadin INR 1.2- coumadin 5 mg this evening; tx floor today as per Dr. mcnulty; neg v. ectopy noted postop; ?re-consult EP postop- d/w Dr. Mcnulty   6/27 VSS INR 1.31 Coumadin 5mg ordered.  discharge planning- pt eval - home when stable/ INR therapeutic    76M with persistent afib on coumadin s/p 2 failed DCCV in the past, bladder cancer s/p urostomy, HTN, HLD, chronic bronchitis, hypothyroidism who presented to his PCP with c/o SOB and palpitations and was sent to the [place] ED when found to be in Afib with RVR. Patient had WCT at [place] ED, and was transferred to [facility] for further evaluation.  TTE: LVEF 20-25% and moderate MR. 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  s/p 6/23/21 C2L   POD #2  extubated  PRBC/ FFP and plt given postop bleeding- resolved   inotropic and pressor support - weaned off; insulin gttp for glycemic control   6/24 dobutamine d/c   6/25 tx sdu - chronic afib- anticoagulation with coumadin; ct d/c in CTU   6/26 VSS; afib 100-130- increase lopressor 50 mg po q12 for HR control; anticoagulation with coumadin INR 1.2- coumadin 5 mg this evening; tx floor today as per Dr. mcnulty; neg v. ectopy noted postop; ?re-consult EP postop- d/w Dr. Mcnulty   6/27 VSS INR 1.31 Coumadin 7.5mg ordered as pt has received 4 doses of 5mg & his INR has not increased.  EP recalled for consult as pt will need an ICD post-CABG.

## 2021-06-28 LAB
ALBUMIN SERPL ELPH-MCNC: 2.9 G/DL — LOW (ref 3.3–5)
ALP SERPL-CCNC: 70 U/L — SIGNIFICANT CHANGE UP (ref 40–120)
ALT FLD-CCNC: 26 U/L — SIGNIFICANT CHANGE UP (ref 10–45)
ANION GAP SERPL CALC-SCNC: 13 MMOL/L — SIGNIFICANT CHANGE UP (ref 5–17)
APTT BLD: 27.5 SEC — SIGNIFICANT CHANGE UP (ref 27.5–35.5)
AST SERPL-CCNC: 19 U/L — SIGNIFICANT CHANGE UP (ref 10–40)
BILIRUB SERPL-MCNC: 0.6 MG/DL — SIGNIFICANT CHANGE UP (ref 0.2–1.2)
BLD GP AB SCN SERPL QL: NEGATIVE — SIGNIFICANT CHANGE UP
BUN SERPL-MCNC: 44 MG/DL — HIGH (ref 7–23)
CALCIUM SERPL-MCNC: 8.8 MG/DL — SIGNIFICANT CHANGE UP (ref 8.4–10.5)
CHLORIDE SERPL-SCNC: 106 MMOL/L — SIGNIFICANT CHANGE UP (ref 96–108)
CO2 SERPL-SCNC: 21 MMOL/L — LOW (ref 22–31)
CREAT SERPL-MCNC: 1.44 MG/DL — HIGH (ref 0.5–1.3)
GLUCOSE SERPL-MCNC: 115 MG/DL — HIGH (ref 70–99)
HCT VFR BLD CALC: 30.8 % — LOW (ref 39–50)
HGB BLD-MCNC: 9.7 G/DL — LOW (ref 13–17)
INR BLD: 1.4 RATIO — HIGH (ref 0.88–1.16)
MCHC RBC-ENTMCNC: 28.2 PG — SIGNIFICANT CHANGE UP (ref 27–34)
MCHC RBC-ENTMCNC: 31.5 GM/DL — LOW (ref 32–36)
MCV RBC AUTO: 89.5 FL — SIGNIFICANT CHANGE UP (ref 80–100)
NRBC # BLD: 0 /100 WBCS — SIGNIFICANT CHANGE UP (ref 0–0)
PLATELET # BLD AUTO: 259 K/UL — SIGNIFICANT CHANGE UP (ref 150–400)
POTASSIUM SERPL-MCNC: 4.4 MMOL/L — SIGNIFICANT CHANGE UP (ref 3.5–5.3)
POTASSIUM SERPL-SCNC: 4.4 MMOL/L — SIGNIFICANT CHANGE UP (ref 3.5–5.3)
PROT SERPL-MCNC: 6 G/DL — SIGNIFICANT CHANGE UP (ref 6–8.3)
PROTHROM AB SERPL-ACNC: 16.5 SEC — HIGH (ref 10.6–13.6)
RBC # BLD: 3.44 M/UL — LOW (ref 4.2–5.8)
RBC # FLD: 15 % — HIGH (ref 10.3–14.5)
RH IG SCN BLD-IMP: POSITIVE — SIGNIFICANT CHANGE UP
SODIUM SERPL-SCNC: 140 MMOL/L — SIGNIFICANT CHANGE UP (ref 135–145)
WBC # BLD: 14.44 K/UL — HIGH (ref 3.8–10.5)
WBC # FLD AUTO: 14.44 K/UL — HIGH (ref 3.8–10.5)

## 2021-06-28 PROCEDURE — 93010 ELECTROCARDIOGRAM REPORT: CPT

## 2021-06-28 PROCEDURE — 71045 X-RAY EXAM CHEST 1 VIEW: CPT | Mod: 26

## 2021-06-28 PROCEDURE — 99232 SBSQ HOSP IP/OBS MODERATE 35: CPT

## 2021-06-28 PROCEDURE — 33249 INSJ/RPLCMT DEFIB W/LEAD(S): CPT

## 2021-06-28 RX ORDER — WARFARIN SODIUM 2.5 MG/1
5 TABLET ORAL ONCE
Refills: 0 | Status: COMPLETED | OUTPATIENT
Start: 2021-06-28 | End: 2021-06-28

## 2021-06-28 RX ORDER — FUROSEMIDE 40 MG
40 TABLET ORAL DAILY
Refills: 0 | Status: DISCONTINUED | OUTPATIENT
Start: 2021-06-28 | End: 2021-06-29

## 2021-06-28 RX ORDER — DILTIAZEM HCL 120 MG
5 CAPSULE, EXT RELEASE 24 HR ORAL
Qty: 125 | Refills: 0 | Status: DISCONTINUED | OUTPATIENT
Start: 2021-06-28 | End: 2021-06-28

## 2021-06-28 RX ADMIN — Medication 50 MILLIGRAM(S): at 05:25

## 2021-06-28 RX ADMIN — OXYCODONE HYDROCHLORIDE 5 MILLIGRAM(S): 5 TABLET ORAL at 18:09

## 2021-06-28 RX ADMIN — Medication 50 MILLIGRAM(S): at 18:06

## 2021-06-28 RX ADMIN — Medication 10 MILLIGRAM(S): at 21:32

## 2021-06-28 RX ADMIN — Medication 137 MICROGRAM(S): at 05:26

## 2021-06-28 RX ADMIN — Medication 40 MILLIGRAM(S): at 09:24

## 2021-06-28 RX ADMIN — Medication 10 MILLIGRAM(S): at 14:10

## 2021-06-28 RX ADMIN — SPIRONOLACTONE 25 MILLIGRAM(S): 25 TABLET, FILM COATED ORAL at 05:26

## 2021-06-28 RX ADMIN — UMECLIDINIUM BROMIDE AND VILANTEROL TRIFENATATE 1 PUFF(S): 62.5; 25 POWDER RESPIRATORY (INHALATION) at 14:15

## 2021-06-28 RX ADMIN — ATORVASTATIN CALCIUM 40 MILLIGRAM(S): 80 TABLET, FILM COATED ORAL at 21:32

## 2021-06-28 RX ADMIN — PANTOPRAZOLE SODIUM 40 MILLIGRAM(S): 20 TABLET, DELAYED RELEASE ORAL at 05:25

## 2021-06-28 RX ADMIN — Medication 250 MICROGRAM(S): at 05:26

## 2021-06-28 RX ADMIN — WARFARIN SODIUM 5 MILLIGRAM(S): 2.5 TABLET ORAL at 21:33

## 2021-06-28 RX ADMIN — POLYETHYLENE GLYCOL 3350 17 GRAM(S): 17 POWDER, FOR SOLUTION ORAL at 14:10

## 2021-06-28 RX ADMIN — Medication 10 MILLIGRAM(S): at 05:25

## 2021-06-28 RX ADMIN — Medication 81 MILLIGRAM(S): at 14:10

## 2021-06-28 RX ADMIN — OXYCODONE HYDROCHLORIDE 5 MILLIGRAM(S): 5 TABLET ORAL at 18:39

## 2021-06-28 RX ADMIN — ENOXAPARIN SODIUM 30 MILLIGRAM(S): 100 INJECTION SUBCUTANEOUS at 05:26

## 2021-06-28 NOTE — PROGRESS NOTE ADULT - PROBLEM SELECTOR PLAN 2
continue postop care  continue asa and statin  increase lopressor 50 mg po q12 and digoxin .25   +pw -VVI 50 MA 10   tx floor today as per DR. mcnulty   pain management  pulm toilet  increase activity as tolerated   Discharge planning- home when stable/ INR therapeutic continue postop care  continue asa and statin  continue lopressor 50 mg po q12 and digoxin .25   d/c pw today INR 1.4   diuresis for hypervolemia   pain management  pulm toilet  increase activity as tolerated   Discharge planning- home when stable/ INR therapeutic continue postop care  continue asa and statin  continue lopressor 50 mg po q12 and digoxin .25   d/c pw today INR 1.4   diuresis for hypervolemia   pain management  pulm toilet  increase activity as tolerated   Discharge planning- home in am tue if stable overnight

## 2021-06-28 NOTE — CHART NOTE - NSCHARTNOTEFT_GEN_A_CORE
Nutrition Follow Up Note  Patient seen for: length of stay follow up. Chart reviewed and events noted.     Pt is a "76M with persistent afib on coumadin s/p 2 failed DCCV in the past, bladder cancer s/p urostomy, HTN, HLD, chronic bronchitis, hypothyroidism who presented to his PCP with c/o SOB and palpitations and was sent to the [place] ED when found to be in Afib with RVR. Patient had WCT at [place] ED, and was transferred to [facility] for further evaluation.  TTE: LVEF 20-25% and moderate MR. EP consulted - plan for VT ablation +/- secondary prevention postop if necessary;  Cardiac cath  demonstrating multivessel CAD and CT surgery consulted for CABG evaluation. s/p 21 C2L   POD #2  extubated"    Source: [x] Patient       [x] Medical Record        [] RN        [x] Family at bedside       [] Other:    -If unable to interview patient: [] Trach/Vent/BiPAP  [] Disoriented/confused/inappropriate to interview    Diet Order:   Diet, NPO:   NPO for Procedure/Test     NPO Start Date: 2021,   NPO Start Time: 20:00  Except Medications (21)  Diet, DASH/TLC:   Sodium & Cholesterol Restricted (21)    - Is current order appropriate/adequate? [x] Yes  []  No:    - PO intake :   [x] >75%  Adequate    [] 50-75%  Fair       [] <50%  Poor    - Nutrition-related concerns:      -No nutrition related concerns verbalized at this time. Pt eating well.       -Pt on coumadin - aware of interaction with Vitamin K.          GI: No known recent N/V, constipation, or diarrhea. Last BM .   Bowel Regimen? [x] Yes   [] No    Weights:   Daily Weight in k.7 (-), 107.1 ()  Dosing wt 95.7 kG  Wt fluctuations noted and likely 2/2 intraoperative fluid shifts. Pt made aware RD to remain available.      Nutritionally Pertinent MEDICATIONS  (STANDING):  atorvastatin  digoxin     Tablet  furosemide    Tablet  hydrALAZINE  levothyroxine  metoprolol tartrate  pantoprazole    Tablet  polyethylene glycol 3350  spironolactone    Pertinent Labs:  @ 05:06: Na 140, BUN 44<H>, Cr 1.44<H>, <H>, K+ 4.4, Alk Phos 70, ALT/SGPT 26, AST/SGOT 19    A1C with Estimated Average Glucose Result: 5.8 % (21 @ 14:55)    Skin per nursing documentation: No pressure injuries noted. Midline sternal incision noted.   Edema per nursing documentation: +3 Adele. foot, ankle, and leg     Estimated Needs:   [] no change since previous assessment  [x] Recalculated   Needs based on IBW+10% 84.3 kG  Estimated Energy Needs: (25-30 kcals/kG) 6576-3215 kcals  Estimated Protein Needs: (1.2-1.4 gm/kG) 101-118 gm  Fluid needs deferred to provider.     Previous Nutrition Diagnosis: Overweight/Obesity  Nutrition Diagnosis is: [x] ongoing  [] resolved [] not applicable     Previous Nutrition Diagnosis: Altered Nutrition Related Lab Values  Nutrition Diagnosis is: [x] ongoing  [] resolved [] not applicable     Nutrition Care Plan:  [x] In Progress  [] Achieved  [] Not applicable    New Nutrition Diagnosis: Increased protein-energy needs related to increased physiological demand for nutrients as evidenced by Midline sternal incision     Nutrition Interventions:     Education Provided   [x] Yes:  [] No: RD reviewed nutrition therapy for s/p CABG with midsternal incision. Emphasized importance of adequate lean protein intake and optimal blood glucose levels for wound healing. Advised pt to limit concentrated sweets, portion control, and importance of fiber intake. Pt made aware RD to remain available.     Recommendations:      1) Medical team to resume diet as medically feasible. Continue DASH diet. Fluid needs deferred to provider. RD remains available for diet changes as needed/able.   -Consider addition of consistent carbohydrate if blood glucose elevated   2) Reinforce nutrition education as able.     Monitoring and Evaluation:   Continue to monitor nutritional intake, tolerance to diet prescription, weights, labs, skin integrity    RD remains available upon request and will follow up per protocol  Bethany Barahona, MS, RD, CDN Pager #[phone]

## 2021-06-28 NOTE — PROGRESS NOTE ADULT - ASSESSMENT
76M with persistent afib on coumadin s/p 2 failed DCCV in the past, bladder cancer s/p urostomy, HTN, HLD, chronic bronchitis, hypothyroidism who presented to his PCP with c/o SOB and palpitations and was sent to the [place] ED when found to be in Afib with RVR. Patient had WCT at [place] ED, and was transferred to [facility] for further evaluation.  TTE: LVEF 20-25% and moderate MR. 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  s/p 6/23/21 C2L   POD #2  extubated  PRBC/ FFP and plt given postop bleeding- resolved   inotropic and pressor support - weaned off; insulin gttp for glycemic control   6/24 dobutamine d/c   6/25 tx sdu - chronic afib- anticoagulation with coumadin; ct d/c in CTU   6/26 VSS; afib 100-130- increase lopressor 50 mg po q12 for HR control; anticoagulation with coumadin INR 1.2- coumadin 5 mg this evening; tx floor today as per Dr. mcnulty; neg v. ectopy noted postop; ?re-consult EP postop- d/w Dr. Mcnulty   6/27 VSS INR 1.31 Coumadin 7.5mg ordered as pt has received 4 doses of 5mg & his INR has not increased.  EP recalled for consult as pt will need an ICD post-CABG.    76M with persistent afib on coumadin s/p 2 failed DCCV in the past, bladder cancer s/p urostomy, HTN, HLD, chronic bronchitis, hypothyroidism who presented to his PCP with c/o SOB and palpitations and was sent to the [place] ED when found to be in Afib with RVR. Patient had WCT at [place] ED, and was transferred to [facility] for further evaluation.  TTE: LVEF 20-25% and moderate MR. 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  s/p 6/23/21 C2L   POD #2  extubated  PRBC/ FFP and plt given postop bleeding- resolved   inotropic and pressor support - weaned off; insulin gttp for glycemic control   6/24 dobutamine d/c   6/25 tx sdu - chronic afib- anticoagulation with coumadin; ct d/c in CTU   6/26 VSS; afib 100-130- increase lopressor 50 mg po q12 for HR control; anticoagulation with coumadin INR 1.2- coumadin 5 mg this evening; tx floor today as per Dr. mcnulty; neg v. ectopy noted postop; ?re-consult EP postop- d/w Dr. Mcnulty   6/27 VSS INR 1.31 Coumadin 7.5mg ordered as pt has received 4 doses of 5mg & his INR has not increased.  EP recalled for consult as pt will need an ICD post-CABG.   6/28 VSS; AICD as per EP today; npo/ type and screen sent; lovenox on hold; INR 1.4- coumadin 5 mg this evening; + hypervolemia- initiate lasix 40 qd and ACE wrap LE;   d/c pw as per Dr. Mcnulty   Discharge planning- home when stable    76M with persistent afib on coumadin s/p 2 failed DCCV in the past, bladder cancer s/p urostomy, HTN, HLD, chronic bronchitis, hypothyroidism who presented to his PCP with c/o SOB and palpitations and was sent to the [place] ED when found to be in Afib with RVR. Patient had WCT at [place] ED, and was transferred to [facility] for further evaluation.  TTE: LVEF 20-25% and moderate MR. 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  s/p 6/23/21 C2L   POD #2  extubated  PRBC/ FFP and plt given postop bleeding- resolved   inotropic and pressor support - weaned off; insulin gttp for glycemic control   6/24 dobutamine d/c   6/25 tx sdu - chronic afib- anticoagulation with coumadin; ct d/c in CTU   6/26 VSS; afib 100-130- increase lopressor 50 mg po q12 for HR control; anticoagulation with coumadin INR 1.2- coumadin 5 mg this evening; tx floor today as per Dr. mcnulty; neg v. ectopy noted postop; ?re-consult EP postop- d/w Dr. Mcnulty   6/27 VSS INR 1.31 Coumadin 7.5mg ordered as pt has received 4 doses of 5mg & his INR has not increased.  EP recalled for consult as pt will need an ICD post-CABG.   6/28 VSS; AICD as per EP today; npo/ type and screen sent; lovenox on hold; INR 1.4- coumadin 5 mg this evening; + hypervolemia- initiate lasix 40 qd and ACE wrap LE;   d/c pw as per Dr. Mcnulty   Discharge planning- home in am tue if stable overnight

## 2021-06-28 NOTE — PROGRESS NOTE ADULT - ASSESSMENT
76M with persistent afib on coumadin s/p 2 failed DCCV in the past, bladder cancer s/p urostomy, HTN, HLD, chronic bronchitis, hypothyroidism who presented w aFIB w RVR, found to have multivessel CAD now s/p CABGx2 (SVG-OM w/ LIMA free graft branching from proximal SVG to LAD) 6/23/21. Pt w likely ischemic VT s/p DCCV in [facility] ED. EP consulted for ICD placement for secondary prevention.     VT: Patient denies any LOC. s/p DCCV in [place] ED.   - Worsened EF 25-30%, previously 45%. Multivessel CAD s/p CABG   - on lopressor 50 BID   - No more VT on telemetry s/p CABG  - will plan on secondary prevention ICD today, Keep NPO after MN, obtain type and screen. OK for coumadin, however would hold any heparin products including lovenox after ICD placement    Persistent afib on coumadin s/p 2 failed DCCV: [id number] (HTN, agex2)  - continue coumadin, INR goal 2-3  - Cont Lopressor 50 BID  - currently on digoxin - consider discontinuing digoxin and uptitrating lopressor to 75 BID as digoxin was started this admission  - K > 4, Mg > 2    Bethany Jones MD  Cardiology Fellow, PGY-4  [phone]  For all other Cardiology service contact information, go to [email or web address] and use "[email or web address]" to login.

## 2021-06-28 NOTE — PROGRESS NOTE ADULT - PROBLEM SELECTOR PLAN 4
EP following  +WCT preop at [facility]: s/p DCCV in [place] ED.   will plan on secondary prevention ICD after CABG  if recurrent VT after CABG, then will consider VT ablation  no V. ectopy noted at this time postop 6/26 EP following  +WCT preop at [facility]: s/p DCCV in [place] ED.   will plan on secondary prevention ICD after CABG  npo for AICD today as per EP  type and screen sent and lovenox on hold

## 2021-06-28 NOTE — PROGRESS NOTE ADULT - PROBLEM SELECTOR PROBLEM 4
ARAM (acute kidney injury)
V tach
ARAM (acute kidney injury)
ARAM (acute kidney injury)
V tach
ARAM (acute kidney injury)
V tach
ARAM (acute kidney injury)
ARAM (acute kidney injury)
V tach
ARAM (acute kidney injury)

## 2021-06-28 NOTE — PROGRESS NOTE ADULT - SUBJECTIVE AND OBJECTIVE BOX
Patient seen and examined at bedside.    Overnight Events: Patient denies any chest pain. Some SOB with deep breaths. Has not eaten since yesterday afternoon, didn't eat this morning for possible ICD.     Current Meds:  acetaminophen   Tablet .. 650 milliGRAM(s) Oral every 6 hours PRN  aspirin enteric coated 81 milliGRAM(s) Oral daily  atorvastatin 40 milliGRAM(s) Oral at bedtime  digoxin     Tablet 250 MICROGram(s) Oral daily  enoxaparin Injectable 30 milliGRAM(s) SubCutaneous every 12 hours  furosemide    Tablet 40 milliGRAM(s) Oral daily  hydrALAZINE 10 milliGRAM(s) Oral every 8 hours  levothyroxine 137 MICROGram(s) Oral daily  metoprolol tartrate 50 milliGRAM(s) Oral two times a day  oxyCODONE    IR 5 milliGRAM(s) Oral every 6 hours PRN  pantoprazole    Tablet 40 milliGRAM(s) Oral before breakfast  polyethylene glycol 3350 17 Gram(s) Oral daily  sodium chloride 0.9% lock flush 10 milliLiter(s) IV Push every 1 hour PRN  spironolactone 25 milliGRAM(s) Oral daily  umeclidinium 62.5 MICROgram(s)/vilanterol 25 MICROgram(s) Inhaler 1 Puff(s) Inhalation daily  warfarin 5 milliGRAM(s) Oral once      Vitals:  T(F): 97.5 (06-28), Max: 98.3 (06-27)  HR: 105 (06-28) (88 - 105)  BP: 116/57 (06-28) (107/58 - 138/83)  RR: 18 (06-28)  SpO2: 97% (06-28)  I&O's Summary    27 Jun 2021 07:01 - 28 Jun 2021 07:00  --------------------------------------------------------  IN: 630 mL / OUT: 1175 mL / NET: -545 mL    28 Jun 2021 07:01 - 28 Jun 2021 10:21  --------------------------------------------------------  IN: 0 mL / OUT: 400 mL / NET: -400 mL        Physical Exam:  Appearance: No acute distress; well appearing  Eyes:  EOMI, pink conjunctiva  HENT: Normal oral mucosa  Cardiovascular: irregularly irregular, S1, S2, no murmurs, rubs, or gallops; Sternal wound dressing in place  Respiratory: Clear to auscultation bilaterally  Gastrointestinal: soft, non-tender, non-distended with normal bowel sounds  Musculoskeletal: No clubbing; no joint deformity   Neurologic: Non-focal  Lymphatic: No lymphadenopathy  Psychiatry: AAOx3, mood & affect appropriate  Skin: No rashes                          9.7    14.44 )-----------( 259      ( 28 Jun 2021 05:06 )             30.8     06-28    140  |  106  |  44<H>  ----------------------------<  115<H>  4.4   |  21<L>  |  1.44<H>    Ca    8.8      28 Jun 2021 05:06    TPro  6.0  /  Alb  2.9<L>  /  TBili  0.6  /  DBili  x   /  AST  19  /  ALT  26  /  AlkPhos  70  06-28    PT/INR - ( 28 Jun 2021 05:06 )   PT: 16.5 sec;   INR: 1.40 ratio         PTT - ( 28 Jun 2021 05:06 )  PTT:27.5 sec    New ECG(s): Personally reviewed    Echo:    Stress Testing:     Cath:    Imaging:    Interpretation of Telemetry: afib, intermittently V paced

## 2021-06-28 NOTE — PROGRESS NOTE ADULT - SUBJECTIVE AND OBJECTIVE BOX
VITAL SIGNS    Telemetry:    Vital Signs Last 24 Hrs  T(C): 36.4 (06-28-21 @ 04:38), Max: 36.8 (06-27-21 @ 19:01)  T(F): 97.5 (06-28-21 @ 04:38), Max: 98.3 (06-27-21 @ 19:01)  HR: 105 (06-28-21 @ 04:38) (88 - 105)  BP: 116/57 (06-28-21 @ 04:38) (107/58 - 138/83)  RR: 18 (06-28-21 @ 04:38) (18 - 20)  SpO2: 97% (06-28-21 @ 04:38) (95% - 97%)            06-27 @ 07:01  -  06-28 @ 07:00  --------------------------------------------------------  IN: 630 mL / OUT: 1175 mL / NET: -545 mL    06-28 @ 07:01  -  06-28 @ 09:54  --------------------------------------------------------  IN: 0 mL / OUT: 400 mL / NET: -400 mL       Daily     Daily   Admit Wt: Drug Dosing Weight  Height (cm): 177.8 (23 Jun 2021 15:50)  Weight (kg): 108.9 (23 Jun 2021 15:50)  BMI (kg/m2): 34.4 (23 Jun 2021 15:50)  BSA (m2): 2.26 (23 Jun 2021 15:50)    Bilirubin Total, Serum: 0.6 mg/dL (06-28 @ 05:06)    CAPILLARY BLOOD GLUCOSE              acetaminophen   Tablet .. 650 milliGRAM(s) Oral every 6 hours PRN  aspirin enteric coated 81 milliGRAM(s) Oral daily  atorvastatin 40 milliGRAM(s) Oral at bedtime  digoxin     Tablet 250 MICROGram(s) Oral daily  enoxaparin Injectable 30 milliGRAM(s) SubCutaneous every 12 hours  furosemide    Tablet 40 milliGRAM(s) Oral daily  hydrALAZINE 10 milliGRAM(s) Oral every 8 hours  levothyroxine 137 MICROGram(s) Oral daily  metoprolol tartrate 50 milliGRAM(s) Oral two times a day  oxyCODONE    IR 5 milliGRAM(s) Oral every 6 hours PRN  pantoprazole    Tablet 40 milliGRAM(s) Oral before breakfast  polyethylene glycol 3350 17 Gram(s) Oral daily  sodium chloride 0.9% lock flush 10 milliLiter(s) IV Push every 1 hour PRN  spironolactone 25 milliGRAM(s) Oral daily  umeclidinium 62.5 MICROgram(s)/vilanterol 25 MICROgram(s) Inhaler 1 Puff(s) Inhalation daily      PHYSICAL EXAM    Subjective: "Hi.   Neurology: alert and oriented x 3, nonfocal, no gross deficits  CV : tele:  RSR  Sternal Wound :  CDI with dressing , Stable  Lungs: clear. RR easy, unlabored   Abdomen: soft, nontender, nondistended, positive bowel sounds, bowel movement   Neg N/V/D   :  pt voiding without difficulty   Extremities:   CABALLERO; edema, neg calf tenderness.   PPP bilaterally      PW:  Chest tubes:                 VITAL SIGNS    Telemetry:  afib   Vital Signs Last 24 Hrs  T(C): 36.4 (06-28-21 @ 04:38), Max: 36.8 (06-27-21 @ 19:01)  T(F): 97.5 (06-28-21 @ 04:38), Max: 98.3 (06-27-21 @ 19:01)  HR: 105 (06-28-21 @ 04:38) (88 - 105)  BP: 116/57 (06-28-21 @ 04:38) (107/58 - 138/83)  RR: 18 (06-28-21 @ 04:38) (18 - 20)  SpO2: 97% (06-28-21 @ 04:38) (95% - 97%)            06-27 @ 07:01  -  06-28 @ 07:00  --------------------------------------------------------  IN: 630 mL / OUT: 1175 mL / NET: -545 mL    06-28 @ 07:01  -  06-28 @ 09:54  --------------------------------------------------------  IN: 0 mL / OUT: 400 mL / NET: -400 mL       Daily   Admit Wt: Drug Dosing Weight  Height (cm): 177.8 (23 Jun 2021 15:50)  Weight (kg): 108.9 (23 Jun 2021 15:50)  BMI (kg/m2): 34.4 (23 Jun 2021 15:50)  BSA (m2): 2.26 (23 Jun 2021 15:50)    Bilirubin Total, Serum: 0.6 mg/dL (06-28 @ 05:06)        acetaminophen   Tablet .. 650 milliGRAM(s) Oral every 6 hours PRN  aspirin enteric coated 81 milliGRAM(s) Oral daily  atorvastatin 40 milliGRAM(s) Oral at bedtime  digoxin     Tablet 250 MICROGram(s) Oral daily  enoxaparin Injectable 30 milliGRAM(s) SubCutaneous every 12 hours  furosemide    Tablet 40 milliGRAM(s) Oral daily  hydrALAZINE 10 milliGRAM(s) Oral every 8 hours  levothyroxine 137 MICROGram(s) Oral daily  metoprolol tartrate 50 milliGRAM(s) Oral two times a day  oxyCODONE    IR 5 milliGRAM(s) Oral every 6 hours PRN  pantoprazole    Tablet 40 milliGRAM(s) Oral before breakfast  polyethylene glycol 3350 17 Gram(s) Oral daily  sodium chloride 0.9% lock flush 10 milliLiter(s) IV Push every 1 hour PRN  spironolactone 25 milliGRAM(s) Oral daily  umeclidinium 62.5 MICROgram(s)/vilanterol 25 MICROgram(s) Inhaler 1 Puff(s) Inhalation daily      PHYSICAL EXAM    Subjective: "I feel ok."   Neurology: alert and oriented x 3, nonfocal, no gross deficits  CV : tele: afib    Sternal Wound :  CDI TRACEY- sternum stable   Lungs: clear. RR easy, unlabored   Abdomen: soft, nontender, nondistended, positive bowel sounds, + bowel movement   Neg N/V/D; +obese abdomen    :  + urostomy draining clear, yellow urine   Extremities:   CABALLERO; +1 LE edema bilaterally, neg calf tenderness.   PPP bilaterally; rt SVG site cdi with AB       PW: + VVI 50 MA 10   Chest tubes: none

## 2021-06-28 NOTE — PROGRESS NOTE ADULT - PROBLEM SELECTOR PLAN 3
continue dig .25 qd   increase lopressor 50 mg po q12   anticoagulation with coumadin  daily pt/inr  monitor and supplement electrolytes continue dig .25 qd   continue  lopressor 50 mg po q12   anticoagulation with coumadin  daily pt/inr  monitor and supplement electrolytes

## 2021-06-28 NOTE — PROGRESS NOTE ADULT - ASSESSMENT
76 year old male with a history of hypertension, hypercholesterolemia, hypothyroidism, bladder cancer and permanent atrial fibrillation on Coumadin s/p unsuccessful cardioversion in the past, HFrEF 45% w mild MR who initially presents with AF with RVR.    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       - coumadin resumed.  Dose for goal INR 2-3  - Trend H/H, slowly downtrending    - no ventricular arrhythmias thus far post cabg  - plan for ICD today for secondary prevention  - fu ep     - known borderline depressed lvef in the past, with an ef of ~45 by echo 2019 and by nuc stress 2017  - ef found to be worse on this admission (20-25%, with mod MR), though s/p cardioversion, hopefully will rebound after revasc. intra-op echo without change in lvef  - cont hydralzaine  - cont aldactone.   - may need to start lasix po to maintain euvolemia.   - eventual entresto     - replete electrolytes to k>4, Mg>2  - will follow with you

## 2021-06-29 ENCOUNTER — TRANSCRIPTION ENCOUNTER (OUTPATIENT)
Age: 76
End: 2021-06-29

## 2021-06-29 VITALS — WEIGHT: 232.81 LBS

## 2021-06-29 LAB
ANION GAP SERPL CALC-SCNC: 16 MMOL/L — SIGNIFICANT CHANGE UP (ref 5–17)
BUN SERPL-MCNC: 39 MG/DL — HIGH (ref 7–23)
CALCIUM SERPL-MCNC: 8.8 MG/DL — SIGNIFICANT CHANGE UP (ref 8.4–10.5)
CHLORIDE SERPL-SCNC: 105 MMOL/L — SIGNIFICANT CHANGE UP (ref 96–108)
CO2 SERPL-SCNC: 20 MMOL/L — LOW (ref 22–31)
CREAT SERPL-MCNC: 1.26 MG/DL — SIGNIFICANT CHANGE UP (ref 0.5–1.3)
GLUCOSE SERPL-MCNC: 104 MG/DL — HIGH (ref 70–99)
HCT VFR BLD CALC: 31.5 % — LOW (ref 39–50)
HGB BLD-MCNC: 10.1 G/DL — LOW (ref 13–17)
INR BLD: 1.54 RATIO — HIGH (ref 0.88–1.16)
MCHC RBC-ENTMCNC: 28.1 PG — SIGNIFICANT CHANGE UP (ref 27–34)
MCHC RBC-ENTMCNC: 32.1 GM/DL — SIGNIFICANT CHANGE UP (ref 32–36)
MCV RBC AUTO: 87.5 FL — SIGNIFICANT CHANGE UP (ref 80–100)
NRBC # BLD: 0 /100 WBCS — SIGNIFICANT CHANGE UP (ref 0–0)
PLATELET # BLD AUTO: 315 K/UL — SIGNIFICANT CHANGE UP (ref 150–400)
POTASSIUM SERPL-MCNC: 4.2 MMOL/L — SIGNIFICANT CHANGE UP (ref 3.5–5.3)
POTASSIUM SERPL-SCNC: 4.2 MMOL/L — SIGNIFICANT CHANGE UP (ref 3.5–5.3)
PROTHROM AB SERPL-ACNC: 18.1 SEC — HIGH (ref 10.6–13.6)
RBC # BLD: 3.6 M/UL — LOW (ref 4.2–5.8)
RBC # FLD: 14.8 % — HIGH (ref 10.3–14.5)
SODIUM SERPL-SCNC: 141 MMOL/L — SIGNIFICANT CHANGE UP (ref 135–145)
WBC # BLD: 12.88 K/UL — HIGH (ref 3.8–10.5)
WBC # FLD AUTO: 12.88 K/UL — HIGH (ref 3.8–10.5)

## 2021-06-29 PROCEDURE — 80048 BASIC METABOLIC PNL TOTAL CA: CPT

## 2021-06-29 PROCEDURE — C1887: CPT

## 2021-06-29 PROCEDURE — 83735 ASSAY OF MAGNESIUM: CPT

## 2021-06-29 PROCEDURE — 84132 ASSAY OF SERUM POTASSIUM: CPT

## 2021-06-29 PROCEDURE — 84295 ASSAY OF SERUM SODIUM: CPT

## 2021-06-29 PROCEDURE — 82962 GLUCOSE BLOOD TEST: CPT

## 2021-06-29 PROCEDURE — 96374 THER/PROPH/DIAG INJ IV PUSH: CPT

## 2021-06-29 PROCEDURE — 99024 POSTOP FOLLOW-UP VISIT: CPT

## 2021-06-29 PROCEDURE — 83036 HEMOGLOBIN GLYCOSYLATED A1C: CPT

## 2021-06-29 PROCEDURE — 99232 SBSQ HOSP IP/OBS MODERATE 35: CPT

## 2021-06-29 PROCEDURE — C1777: CPT

## 2021-06-29 PROCEDURE — 87086 URINE CULTURE/COLONY COUNT: CPT

## 2021-06-29 PROCEDURE — 93010 ELECTROCARDIOGRAM REPORT: CPT

## 2021-06-29 PROCEDURE — 84484 ASSAY OF TROPONIN QUANT: CPT

## 2021-06-29 PROCEDURE — 99152 MOD SED SAME PHYS/QHP 5/>YRS: CPT

## 2021-06-29 PROCEDURE — 85014 HEMATOCRIT: CPT

## 2021-06-29 PROCEDURE — 97116 GAIT TRAINING THERAPY: CPT

## 2021-06-29 PROCEDURE — 85027 COMPLETE CBC AUTOMATED: CPT

## 2021-06-29 PROCEDURE — 94640 AIRWAY INHALATION TREATMENT: CPT

## 2021-06-29 PROCEDURE — 97530 THERAPEUTIC ACTIVITIES: CPT

## 2021-06-29 PROCEDURE — 82435 ASSAY OF BLOOD CHLORIDE: CPT

## 2021-06-29 PROCEDURE — 85384 FIBRINOGEN ACTIVITY: CPT

## 2021-06-29 PROCEDURE — 85025 COMPLETE CBC W/AUTO DIFF WBC: CPT

## 2021-06-29 PROCEDURE — 82330 ASSAY OF CALCIUM: CPT

## 2021-06-29 PROCEDURE — C1889: CPT

## 2021-06-29 PROCEDURE — 97161 PT EVAL LOW COMPLEX 20 MIN: CPT

## 2021-06-29 PROCEDURE — 86769 SARS-COV-2 COVID-19 ANTIBODY: CPT

## 2021-06-29 PROCEDURE — C1729: CPT

## 2021-06-29 PROCEDURE — 87640 STAPH A DNA AMP PROBE: CPT

## 2021-06-29 PROCEDURE — 85576 BLOOD PLATELET AGGREGATION: CPT

## 2021-06-29 PROCEDURE — 86923 COMPATIBILITY TEST ELECTRIC: CPT

## 2021-06-29 PROCEDURE — 87077 CULTURE AEROBIC IDENTIFY: CPT

## 2021-06-29 PROCEDURE — 83605 ASSAY OF LACTIC ACID: CPT

## 2021-06-29 PROCEDURE — 84480 ASSAY TRIIODOTHYRONINE (T3): CPT

## 2021-06-29 PROCEDURE — C1721: CPT

## 2021-06-29 PROCEDURE — C8929: CPT

## 2021-06-29 PROCEDURE — 94010 BREATHING CAPACITY TEST: CPT

## 2021-06-29 PROCEDURE — 85610 PROTHROMBIN TIME: CPT

## 2021-06-29 PROCEDURE — 85018 HEMOGLOBIN: CPT

## 2021-06-29 PROCEDURE — 71046 X-RAY EXAM CHEST 2 VIEWS: CPT | Mod: 26

## 2021-06-29 PROCEDURE — 99291 CRITICAL CARE FIRST HOUR: CPT | Mod: 25

## 2021-06-29 PROCEDURE — 93005 ELECTROCARDIOGRAM TRACING: CPT

## 2021-06-29 PROCEDURE — 84100 ASSAY OF PHOSPHORUS: CPT

## 2021-06-29 PROCEDURE — 71045 X-RAY EXAM CHEST 1 VIEW: CPT

## 2021-06-29 PROCEDURE — 87641 MR-STAPH DNA AMP PROBE: CPT

## 2021-06-29 PROCEDURE — C1894: CPT

## 2021-06-29 PROCEDURE — 85730 THROMBOPLASTIN TIME PARTIAL: CPT

## 2021-06-29 PROCEDURE — 86891 AUTOLOGOUS BLOOD OP SALVAGE: CPT

## 2021-06-29 PROCEDURE — 97164 PT RE-EVAL EST PLAN CARE: CPT

## 2021-06-29 PROCEDURE — 87186 SC STD MICRODIL/AGAR DIL: CPT

## 2021-06-29 PROCEDURE — 93458 L HRT ARTERY/VENTRICLE ANGIO: CPT

## 2021-06-29 PROCEDURE — 71046 X-RAY EXAM CHEST 2 VIEWS: CPT

## 2021-06-29 PROCEDURE — 83880 ASSAY OF NATRIURETIC PEPTIDE: CPT

## 2021-06-29 PROCEDURE — 81001 URINALYSIS AUTO W/SCOPE: CPT

## 2021-06-29 PROCEDURE — U0005: CPT

## 2021-06-29 PROCEDURE — 86900 BLOOD TYPING SEROLOGIC ABO: CPT

## 2021-06-29 PROCEDURE — 82947 ASSAY GLUCOSE BLOOD QUANT: CPT

## 2021-06-29 PROCEDURE — 82565 ASSAY OF CREATININE: CPT

## 2021-06-29 PROCEDURE — 33249 INSJ/RPLCMT DEFIB W/LEAD(S): CPT

## 2021-06-29 PROCEDURE — 84436 ASSAY OF TOTAL THYROXINE: CPT

## 2021-06-29 PROCEDURE — P9045: CPT

## 2021-06-29 PROCEDURE — U0003: CPT

## 2021-06-29 PROCEDURE — C1769: CPT

## 2021-06-29 PROCEDURE — C1898: CPT

## 2021-06-29 PROCEDURE — 87040 BLOOD CULTURE FOR BACTERIA: CPT

## 2021-06-29 PROCEDURE — 97110 THERAPEUTIC EXERCISES: CPT

## 2021-06-29 PROCEDURE — 86850 RBC ANTIBODY SCREEN: CPT

## 2021-06-29 PROCEDURE — P9047: CPT

## 2021-06-29 PROCEDURE — C1751: CPT

## 2021-06-29 PROCEDURE — 82803 BLOOD GASES ANY COMBINATION: CPT

## 2021-06-29 PROCEDURE — 86901 BLOOD TYPING SEROLOGIC RH(D): CPT

## 2021-06-29 PROCEDURE — 80053 COMPREHEN METABOLIC PANEL: CPT

## 2021-06-29 PROCEDURE — 84443 ASSAY THYROID STIM HORMONE: CPT

## 2021-06-29 PROCEDURE — 94002 VENT MGMT INPAT INIT DAY: CPT

## 2021-06-29 RX ORDER — SPIRONOLACTONE 25 MG/1
1 TABLET, FILM COATED ORAL
Qty: 14 | Refills: 0
Start: 2021-06-29 | End: 2021-07-12

## 2021-06-29 RX ORDER — WARFARIN SODIUM 2.5 MG/1
1 TABLET ORAL
Qty: 30 | Refills: 0
Start: 2021-06-29 | End: 2021-07-28

## 2021-06-29 RX ORDER — POLYETHYLENE GLYCOL 3350 17 G/17G
17 POWDER, FOR SOLUTION ORAL
Qty: 510 | Refills: 0
Start: 2021-06-29 | End: 2021-07-28

## 2021-06-29 RX ORDER — FUROSEMIDE 40 MG
1 TABLET ORAL
Qty: 14 | Refills: 0
Start: 2021-06-29 | End: 2021-07-12

## 2021-06-29 RX ORDER — OXYCODONE HYDROCHLORIDE 5 MG/1
1 TABLET ORAL
Qty: 20 | Refills: 0
Start: 2021-06-29 | End: 2021-07-03

## 2021-06-29 RX ORDER — METOPROLOL TARTRATE 50 MG
1 TABLET ORAL
Qty: 60 | Refills: 0
Start: 2021-06-29 | End: 2021-07-28

## 2021-06-29 RX ORDER — ACETAMINOPHEN 500 MG
2 TABLET ORAL
Qty: 0 | Refills: 0 | DISCHARGE
Start: 2021-06-29

## 2021-06-29 RX ORDER — ASPIRIN/CALCIUM CARB/MAGNESIUM 324 MG
1 TABLET ORAL
Qty: 30 | Refills: 0
Start: 2021-06-29 | End: 2021-07-28

## 2021-06-29 RX ORDER — PANTOPRAZOLE SODIUM 20 MG/1
1 TABLET, DELAYED RELEASE ORAL
Qty: 30 | Refills: 0
Start: 2021-06-29 | End: 2021-07-28

## 2021-06-29 RX ORDER — LEVOTHYROXINE SODIUM 125 MCG
1 TABLET ORAL
Qty: 0 | Refills: 0 | DISCHARGE

## 2021-06-29 RX ORDER — ROSUVASTATIN CALCIUM 5 MG/1
1 TABLET ORAL
Qty: 0 | Refills: 0 | DISCHARGE

## 2021-06-29 RX ORDER — DILTIAZEM HCL 120 MG
1 CAPSULE, EXT RELEASE 24 HR ORAL
Qty: 0 | Refills: 0 | DISCHARGE

## 2021-06-29 RX ORDER — DIGOXIN 250 MCG
1 TABLET ORAL
Qty: 30 | Refills: 0
Start: 2021-06-29 | End: 2021-07-28

## 2021-06-29 RX ORDER — LEVOTHYROXINE SODIUM 125 MCG
1 TABLET ORAL
Qty: 0 | Refills: 0 | DISCHARGE
Start: 2021-06-29

## 2021-06-29 RX ORDER — HYDRALAZINE HCL 50 MG
1 TABLET ORAL
Qty: 90 | Refills: 0
Start: 2021-06-29 | End: 2021-07-28

## 2021-06-29 RX ORDER — LOSARTAN/HYDROCHLOROTHIAZIDE 100MG-25MG
1 TABLET ORAL
Qty: 0 | Refills: 0 | DISCHARGE

## 2021-06-29 RX ORDER — ROSUVASTATIN CALCIUM 5 MG/1
1 TABLET ORAL
Qty: 30 | Refills: 0
Start: 2021-06-29 | End: 2021-07-28

## 2021-06-29 RX ADMIN — Medication 10 MILLIGRAM(S): at 06:01

## 2021-06-29 RX ADMIN — Medication 40 MILLIGRAM(S): at 06:22

## 2021-06-29 RX ADMIN — SPIRONOLACTONE 25 MILLIGRAM(S): 25 TABLET, FILM COATED ORAL at 06:02

## 2021-06-29 RX ADMIN — Medication 81 MILLIGRAM(S): at 11:15

## 2021-06-29 RX ADMIN — OXYCODONE HYDROCHLORIDE 5 MILLIGRAM(S): 5 TABLET ORAL at 06:40

## 2021-06-29 RX ADMIN — Medication 250 MICROGRAM(S): at 06:01

## 2021-06-29 RX ADMIN — Medication 137 MICROGRAM(S): at 06:01

## 2021-06-29 RX ADMIN — Medication 50 MILLIGRAM(S): at 06:01

## 2021-06-29 RX ADMIN — PANTOPRAZOLE SODIUM 40 MILLIGRAM(S): 20 TABLET, DELAYED RELEASE ORAL at 06:01

## 2021-06-29 RX ADMIN — OXYCODONE HYDROCHLORIDE 5 MILLIGRAM(S): 5 TABLET ORAL at 06:07

## 2021-06-29 NOTE — PROGRESS NOTE ADULT - NSICDXPILOT_GEN_ALL_CORE
[place]

## 2021-06-29 NOTE — PROGRESS NOTE ADULT - SUBJECTIVE AND OBJECTIVE BOX
24H hour events: No over night events.  Denies c/o CP, palpitations or SOB. Patient is s/p ambulating in halls with walker and Physical Therapy.     MEDICATIONS:  aspirin enteric coated 81 milliGRAM(s) Oral daily  furosemide    Tablet 40 milliGRAM(s) Oral daily  hydrALAZINE 10 milliGRAM(s) Oral every 8 hours  metoprolol tartrate 50 milliGRAM(s) Oral two times a day  spironolactone 25 milliGRAM(s) Oral daily    umeclidinium 62.5 MICROgram(s)/vilanterol 25 MICROgram(s) Inhaler 1 Puff(s) Inhalation daily    acetaminophen   Tablet .. 650 milliGRAM(s) Oral every 6 hours PRN  oxyCODONE    IR 5 milliGRAM(s) Oral every 6 hours PRN    pantoprazole    Tablet 40 milliGRAM(s) Oral before breakfast  polyethylene glycol 3350 17 Gram(s) Oral daily    atorvastatin 40 milliGRAM(s) Oral at bedtime  levothyroxine 137 MICROGram(s) Oral daily    sodium chloride 0.9% lock flush 10 milliLiter(s) IV Push every 1 hour PRN      REVIEW OF SYSTEMS:  Complete 10point ROS negative.    PHYSICAL EXAM:  T(C): 36.8 (06-29-21 @ 04:51), Max: 37 (06-28-21 @ 19:53)  HR: 80 (06-29-21 @ 04:51) (79 - 99)  BP: 121/65 (06-29-21 @ 04:51) (93/58 - 138/65)  RR: 18 (06-29-21 @ 04:51) (12 - 21)  SpO2: 96% (06-29-21 @ 04:51) (94% - 100%)    28 Jun 2021 07:01  -  29 Jun 2021 07:00  --------------------------------------------------------  IN: 480 mL / OUT: 1450 mL / NET: -970 mL    29 Jun 2021 07:01  -  29 Jun 2021 12:01  --------------------------------------------------------  IN: 600 mL / OUT: 450 mL / NET: 150 mL    Appearance: Normal	  Cardiovascular: Normal S1 S2, No JVD, No murmurs, No edema  Respiratory: Lungs clear to auscultation	  Psychiatry: A & O x 3, Mood & affect appropriate  Gastrointestinal:  Soft, Non-tender, + BS	  Skin: left infraclavicular chest wall site with dry gauze dressing, clean and intact.  Mid sternal incision with Dermabond, clean and intact. 	  Extremities: Normal range of motion, No clubbing, cyanosis or edema  Vascular: Peripheral pulses palpable 2+ bilaterally      LABS:	 	    CBC Full  -  ( 29 Jun 2021 06:24 )  WBC Count : 12.88 K/uL  Hemoglobin : 10.1 g/dL  Hematocrit : 31.5 %  Platelet Count - Automated : 315 K/uL  Mean Cell Volume : 87.5 fl  Mean Cell Hemoglobin : 28.1 pg  Mean Cell Hemoglobin Concentration : 32.1 gm/dL  Auto Neutrophil # : x  Auto Lymphocyte # : x  Auto Monocyte # : x  Auto Eosinophil # : x  Auto Basophil # : x  Auto Neutrophil % : x  Auto Lymphocyte % : x  Auto Monocyte % : x  Auto Eosinophil % : x  Auto Basophil % : x    06-29    141  |  105  |  39<H>  ----------------------------<  104<H>  4.2   |  20<L>  |  1.26  06-28    140  |  106  |  44<H>  ----------------------------<  115<H>  4.4   |  21<L>  |  1.44<H>    Ca    8.8      29 Jun 2021 06:24  Ca    8.8      28 Jun 2021 05:06    TPro  6.0  /  Alb  2.9<L>  /  TBili  0.6  /  DBili  x   /  AST  19  /  ALT  26  /  AlkPhos  70  06-28        TELEMETRY: 	  AFib, V Paced 60-70, HR increased to 110bpm with ambulation     CXRAY:     EXAM:  XR CHEST PA LAT 2V                            PROCEDURE DATE:  06/29/2021            INTERPRETATION:  CLINICAL INFORMATION: Evaluate line placement.    TECHNIQUE: PA and lateral radiographs of the chest.    COMPARISON: Chest x-ray dated 6/28/2021.    FINDINGS:    There has been interval placement of a cardiac AICD with leads in the expected location.  Left lower lobe platelike atelectasis. Small left pleural effusion. Lungs are otherwise clear. No pleural effusion or pneumothorax.  Cardiomegaly. Status post median sternotomy.  Degenerative changes of the spine.    IMPRESSION:    Interval placement of a cardiac AICD without associated pneumothorax.

## 2021-06-29 NOTE — DISCHARGE NOTE NURSING/CASE MANAGEMENT/SOCIAL WORK - PATIENT PORTAL LINK FT
You can access the FollowMyHealth Patient Portal offered by [facility] by registering at the following website: [email or web address]. By joining [employer]’s FollowMyHealth portal, you will also be able to view your health information using other applications (apps) compatible with our system.

## 2021-06-29 NOTE — DISCHARGE NOTE NURSING/CASE MANAGEMENT/SOCIAL WORK - NSDCPNINST_GEN_ALL_CORE
PROPER INCISIONAL CARE TEACHING CONDUCTED WITH SUCCESSFUL RETURN DEMONSTRATION BY PATIENT. PATIENT INFORMED OF THE SIGNS TO REPORT TO MD. PATIENT INFORMED OF THE DANGERS INHERENT IN SKIPPING MEDICATION DOSES AND DOUBLE ON ANY MEDICATION.

## 2021-06-29 NOTE — PROGRESS NOTE ADULT - REASON FOR ADMISSION
Afib w/ RVR i/s/o fever
CABG, VT, afib
Afib w/ RVR i/s/o fever
Afib w/ RVR
Afib w/ RVR i/s/o fever

## 2021-06-29 NOTE — PROGRESS NOTE ADULT - SUBJECTIVE AND OBJECTIVE BOX
[facility] - Caleb Ochoa, Neetu, Jessica, Milka, Kimberley Mars  Office Number:  [phone]    Patient resting comfortably in bed in NAD.  Laying flat with no respiratory distress.  No complaints of chest pain, dyspnea, palpitations, PND, or orthopnea.  feeling well  s/p icd yesterday    ROS: negative unless otherwise mentioned.    Telemetry:  af/    MEDICATIONS  (STANDING):  aspirin enteric coated 81 milliGRAM(s) Oral daily  atorvastatin 40 milliGRAM(s) Oral at bedtime  digoxin     Tablet 250 MICROGram(s) Oral daily  furosemide    Tablet 40 milliGRAM(s) Oral daily  hydrALAZINE 10 milliGRAM(s) Oral every 8 hours  levothyroxine 137 MICROGram(s) Oral daily  metoprolol tartrate 50 milliGRAM(s) Oral two times a day  pantoprazole    Tablet 40 milliGRAM(s) Oral before breakfast  polyethylene glycol 3350 17 Gram(s) Oral daily  spironolactone 25 milliGRAM(s) Oral daily  umeclidinium 62.5 MICROgram(s)/vilanterol 25 MICROgram(s) Inhaler 1 Puff(s) Inhalation daily    MEDICATIONS  (PRN):  acetaminophen   Tablet .. 650 milliGRAM(s) Oral every 6 hours PRN Mild Pain (1 - 3)  oxyCODONE    IR 5 milliGRAM(s) Oral every 6 hours PRN Moderate Pain (4 - 6)  sodium chloride 0.9% lock flush 10 milliLiter(s) IV Push every 1 hour PRN Pre/post blood products, medications, blood draw, and to maintain line patency      Allergies    No Known Allergies    Intolerances        Vital Signs Last 24 Hrs  T(C): 36.8 (29 Jun 2021 04:51), Max: 37 (28 Jun 2021 19:53)  T(F): 98.2 (29 Jun 2021 04:51), Max: 98.6 (28 Jun 2021 19:53)  HR: 80 (29 Jun 2021 04:51) (79 - 99)  BP: 121/65 (29 Jun 2021 04:51) (93/58 - 138/65)  BP(mean): 84 (29 Jun 2021 04:51) (67 - 88)  RR: 18 (29 Jun 2021 04:51) (12 - 21)  SpO2: 96% (29 Jun 2021 04:51) (94% - 100%)    I&O's Summary    28 Jun 2021 07:01  -  29 Jun 2021 07:00  --------------------------------------------------------  IN: 480 mL / OUT: 1450 mL / NET: -970 mL    29 Jun 2021 07:01  -  29 Jun 2021 08:48  --------------------------------------------------------  IN: 360 mL / OUT: 100 mL / NET: 260 mL        ON EXAM:  Constitutional: NAD, awake   HEENT: Moist Mucous Membranes, Anicteric  Pulmonary: Decreased breath sounds b/l. No rales, crackles or wheeze appreciated.   Cardiovascular: IRRR, S1 and S2, No murmurs, rubs, gallops or clicks  Gastrointestinal: Bowel Sounds present, soft, nontender.   Lymph: No peripheral edema. No lymphadenopathy.  Skin: no visible rashes or ulcers.+ sternotomy dressing c/d/i  Psych:  Mood & affect appropriate for situation  LABS: All Labs Reviewed:                        10.1 12.88 )-----------( 315      ( 29 Jun 2021 06:24 )             31.5                         9.7    14.44 )-----------( 259      ( 28 Jun 2021 05:06 )             30.8                         10.1   15.53 )-----------( 215      ( 27 Jun 2021 05:08 )             31.1     29 Jun 2021 06:24    141    |  105    |  39     ----------------------------<  104    4.2     |  20     |  1.26   28 Jun 2021 05:06    140    |  106    |  44     ----------------------------<  115    4.4     |  21     |  1.44   27 Jun 2021 05:08    139    |  105    |  42     ----------------------------<  113    4.3     |  20     |  1.47     Ca    8.8        29 Jun 2021 06:24  Ca    8.8        28 Jun 2021 05:06  Ca    8.8        27 Jun 2021 05:08    TPro  6.0    /  Alb  2.9    /  TBili  0.6    /  DBili  x      /  AST  19     /  ALT  26     /  AlkPhos  70     28 Jun 2021 05:06    PT/INR - ( 29 Jun 2021 06:24 )   PT: 18.1 sec;   INR: 1.54 ratio         PTT - ( 28 Jun 2021 05:06 )  PTT:27.5 sec      Blood Culture:

## 2021-06-29 NOTE — PROGRESS NOTE ADULT - PROVIDER SPECIALTY LIST ADULT
Cardiology
Critical Care
Electrophysiology
CT Surgery
CT Surgery
Cardiology
Critical Care
Electrophysiology
Electrophysiology
Internal Medicine
Cardiology
Critical Care
CT Surgery
Cardiology
Electrophysiology
Cardiology
Cardiology
Internal Medicine
Internal Medicine
CT Surgery
CT Surgery
Internal Medicine

## 2021-06-29 NOTE — PROGRESS NOTE ADULT - ASSESSMENT
76 year old male with a history of hypertension, hypercholesterolemia, hypothyroidism, bladder cancer and permanent atrial fibrillation on Coumadin s/p unsuccessful cardioversion in the past, HFrEF 45% w mild MR who initially presents with AF with RVR.    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  Dose for goal INR 2-3  - Trend H/H, slowly downtrending    - no ventricular arrhythmias thus far post cabg  - s/p icd yesterday     - known borderline depressed lvef in the past, with an ef of ~45 by echo 2019 and by nuc stress 2017  - ef found to be worse on this admission (20-25%, with mod MR), though s/p cardioversion, hopefully will rebound after revasc. intra-op echo without change in lvef  - cont hydralzaine  - cont aldactone.   - cont po lasix  - eventual entresto     - replete electrolytes to k>4, Mg>2  - will follow with you. dc planning per today.

## 2021-06-29 NOTE — PROGRESS NOTE ADULT - ASSESSMENT
76 y.o. Male with persistent afib on coumadin s/p 2 failed DCCV in the past, bladder cancer s/p urostomy, HTN, HLD, chronic bronchitis, hypothyroidism who presented w aFIB w RVR, found to have multivessel CAD now s/p CABGx2 (SVG-OM w/ LIMA free graft branching from proximal SVG to LAD) 6/23/21. Pt w likely ischemic VT s/p DCCV in [facility] ED. EP consulted for ICD placement for secondary prevention.     1.  VT: s/p DCCV in [place] ED.   - Worsened EF 25-30%, previously 45%. Multivessel CAD s/p CABG   - s/p secondary prevention [employer] ICD 6/28/21. S/P interrogation by MDT Rep with normal device function   - ICD booklet, ID Card and informational sheet provided to patient. All questions answered   - Patient was advised not to drive or operate machinery s/p this episode of VT, until he is cleared by Electrophysiologist to resume driving. Patient verbalized understanding.   - Maintain left chest wall dressing and remove on Thursday AM, patient made aware  - on lopressor 50 BID   - No further VT on telemetry s/p CABG  - Continue on Coumadin per PT/INR       2.  Persistent afib: [id number] (HTN, agex2)  - continue coumadin, INR goal 2-3  - Cont Lopressor 50 BID  - currently on digoxin - recommend discontinuing digoxin and uptitrating beta blocker as needed   - Maintain K > 4, Mg > 2    L. Galeotafiore [facility]  [phone]

## 2021-06-30 ENCOUNTER — NON-APPOINTMENT (OUTPATIENT)
Age: 76
End: 2021-06-30

## 2021-06-30 LAB — INR PPP: 1.5

## 2021-07-01 ENCOUNTER — APPOINTMENT (OUTPATIENT)
Dept: CARE COORDINATION | Facility: HOME HEALTH | Age: 76
End: 2021-07-01
Payer: MEDICARE

## 2021-07-01 VITALS
HEIGHT: 70 IN | DIASTOLIC BLOOD PRESSURE: 68 MMHG | SYSTOLIC BLOOD PRESSURE: 116 MMHG | BODY MASS INDEX: 33.36 KG/M2 | WEIGHT: 233 LBS

## 2021-07-01 DIAGNOSIS — Z98.890 OTHER SPECIFIED POSTPROCEDURAL STATES: ICD-10-CM

## 2021-07-01 PROCEDURE — 99024 POSTOP FOLLOW-UP VISIT: CPT

## 2021-07-01 RX ORDER — ATORVASTATIN CALCIUM 40 MG/1
40 TABLET, FILM COATED ORAL DAILY
Qty: 90 | Refills: 3 | Status: DISCONTINUED | COMMUNITY
End: 2021-07-01

## 2021-07-01 RX ORDER — TOPIRAMATE 25 MG/1
25 TABLET, FILM COATED ORAL
Refills: 0 | Status: DISCONTINUED | COMMUNITY
End: 2021-07-01

## 2021-07-01 RX ORDER — METOPROLOL SUCCINATE 25 MG/1
25 TABLET, EXTENDED RELEASE ORAL DAILY
Qty: 90 | Refills: 3 | Status: DISCONTINUED | COMMUNITY
End: 2021-07-01

## 2021-07-01 RX ORDER — LOSARTAN POTASSIUM AND HYDROCHLOROTHIAZIDE 12.5; 1 MG/1; MG/1
100-12.5 TABLET ORAL
Refills: 0 | Status: DISCONTINUED | COMMUNITY
End: 2021-07-01

## 2021-07-01 NOTE — PHYSICAL EXAM
[Sclera] : the sclera and conjunctiva were normal [Neck Appearance] : the appearance of the neck was normal [Respiration, Rhythm And Depth] : normal respiratory rhythm and effort [Exaggerated Use Of Accessory Muscles For Inspiration] : no accessory muscle use [Chest Visual Inspection Thoracic Asymmetry] : no chest asymmetry [FreeTextEntry2] : B/L LE with pedal edema to ankle, B/L calves soft, NT as per pt [Abnormal Walk] : normal gait [Skin Color & Pigmentation] : normal skin color and pigmentation [] : no rash [FreeTextEntry1] : SVG harvest site without erythema, warmth or drainage [Oriented To Time, Place, And Person] : oriented to person, place, and time [Impaired Insight] : insight and judgment were intact [Affect] : the affect was normal

## 2021-07-01 NOTE — REASON FOR VISIT
[Follow-Up: _____] : a [unfilled] follow-up visit [Spouse] : spouse [FreeTextEntry1] : FOLLOW YOUR HEART- Transitional Care Management Program- [facility]\par \par

## 2021-07-01 NOTE — ASSESSMENT
[FreeTextEntry1] : Pt recovering well at home s/p OHS. Reviewed all medications and dosages with pt and pt spouse understanding. Pt has all medications in home and is taking as prescribed. Pain controlled with current medication regimen. No new symptoms, issues or concerns to report at this time.\par Pt INR 1.5 as per in home device yesterday. Pt to check again tomorrow and will contact Dr. Castillo for dosing as necessary. Pt advised to use Dulcolax for BM relief as he has used this in past with good result. Also suggested prune juice or MIralax daily for regularity. Pt to call NP if no BM achieved in 48 hrs. \par

## 2021-07-01 NOTE — REVIEW OF SYSTEMS
[Heart Rate Is Slow] : the heart rate was not slow [Heart Rate Is Fast] : the heart rate was not fast [Chest Pain] : no chest pain [Palpitations] : no palpitations [Leg Claudication] : no intermittent leg claudication [Lower Ext Edema] : lower extremity edema [Negative] : Psychiatric [FreeTextEntry7] : last BM 3 days ago (at baseline pt has BM every 3-4 days) no bloating, no distension, +flatus as per pt [FreeTextEntry8] : pt with clear yellow urine to urostomy

## 2021-07-01 NOTE — HISTORY OF PRESENT ILLNESS
[Home] : at home, [unfilled] , at the time of the visit. [Other Location: e.g. Home (Enter Location, City,State)___] : at [unfilled] [Spouse] : spouse [Verbal consent obtained from patient] : the patient, [unfilled] [FreeTextEntry4] : Chivo galdamez [FreeTextEntry1] : 76M with persistent afib on coumadin s/p 2 failed DCCV in the past, bladder\par cancer s/p urostomy, HTN, HLD, chronic bronchitis, hypothyroidism who presented\par to his PCP with c/o SOB and palpitations and was sent to the [facility] ED when\par found to be in Afib with RVR. Patient had WCT at [facility] ED, and was\par transferred to [facility] for further evaluation. TTE: LVEF 20-25% and moderate MR.\par EP consulted - plan for VT ablation +/- secondary prevention postop if\par necessary; Cardiac cath 6/18 demonstrating multivessel CAD and CT surgery\par consulted for CABG evaluation.Pt s/p 6/23/21 [id number] Dr. Harding, Post op course included chronic afib- anticoagulation with coumadin and AICD placement. Pt discharged home once hemodynamically stable with supportive spouse, home care services and [facility] team. Initial [facility] visit completed via Telehealth. \par CC "I'm doing pretty well"\par  [A fib / A flutter] : A fib / A flutter

## 2021-07-04 PROBLEM — Z09 POSTOPERATIVE FOLLOW-UP: Status: ACTIVE | Noted: 2021-07-04

## 2021-07-04 PROBLEM — Z95.1 S/P CORONARY ARTERY BYPASS GRAFT X 2: Status: ACTIVE | Noted: 2021-07-01

## 2021-07-04 RX ORDER — UMECLIDINIUM BROMIDE AND VILANTEROL TRIFENATATE 62.5; 25 UG/1; UG/1
62.5-25 POWDER RESPIRATORY (INHALATION) DAILY
Qty: 1 | Refills: 3 | Status: ACTIVE | COMMUNITY

## 2021-07-04 RX ORDER — ACETAMINOPHEN 325 MG/1
325 TABLET ORAL EVERY 6 HOURS
Qty: 240 | Refills: 0 | Status: ACTIVE | COMMUNITY

## 2021-07-04 RX ORDER — ACETAMINOPHEN 500 MG/1
500 TABLET ORAL
Refills: 0 | Status: COMPLETED | COMMUNITY
End: 2021-07-04

## 2021-07-04 RX ORDER — SENNOSIDES 8.6 MG TABLETS 8.6 MG/1
8.6 TABLET ORAL
Qty: 30 | Refills: 0 | Status: ACTIVE | COMMUNITY

## 2021-07-04 RX ORDER — ASPIRIN ENTERIC COATED TABLETS 81 MG 81 MG/1
81 TABLET, DELAYED RELEASE ORAL
Qty: 30 | Refills: 0 | Status: ACTIVE | COMMUNITY

## 2021-07-08 LAB — INR PPP: 2.9

## 2021-07-09 ENCOUNTER — NON-APPOINTMENT (OUTPATIENT)
Age: 76
End: 2021-07-09

## 2021-07-09 ENCOUNTER — APPOINTMENT (OUTPATIENT)
Dept: CARDIOTHORACIC SURGERY | Facility: CLINIC | Age: 76
End: 2021-07-09
Payer: MEDICARE

## 2021-07-09 ENCOUNTER — APPOINTMENT (OUTPATIENT)
Dept: ELECTROPHYSIOLOGY | Facility: CLINIC | Age: 76
End: 2021-07-09
Payer: MEDICARE

## 2021-07-09 VITALS
WEIGHT: 228 LBS | HEIGHT: 70 IN | BODY MASS INDEX: 32.64 KG/M2 | DIASTOLIC BLOOD PRESSURE: 80 MMHG | SYSTOLIC BLOOD PRESSURE: 146 MMHG | OXYGEN SATURATION: 96 % | HEART RATE: 94 BPM

## 2021-07-09 VITALS
DIASTOLIC BLOOD PRESSURE: 70 MMHG | TEMPERATURE: 97.8 F | HEART RATE: 94 BPM | HEIGHT: 70 IN | RESPIRATION RATE: 16 BRPM | WEIGHT: 228 LBS | SYSTOLIC BLOOD PRESSURE: 117 MMHG | BODY MASS INDEX: 32.64 KG/M2 | OXYGEN SATURATION: 98 %

## 2021-07-09 DIAGNOSIS — Z09 ENCOUNTER FOR FOLLOW-UP EXAMINATION AFTER COMPLETED TREATMENT FOR CONDITIONS OTHER THAN MALIGNANT NEOPLASM: ICD-10-CM

## 2021-07-09 DIAGNOSIS — Z95.1 PRESENCE OF AORTOCORONARY BYPASS GRAFT: ICD-10-CM

## 2021-07-09 PROCEDURE — 99024 POSTOP FOLLOW-UP VISIT: CPT

## 2021-07-09 PROCEDURE — 93283 PRGRMG EVAL IMPLANTABLE DFB: CPT

## 2021-07-19 LAB — INR PPP: 2.1

## 2021-07-27 ENCOUNTER — APPOINTMENT (OUTPATIENT)
Dept: CARDIOLOGY | Facility: CLINIC | Age: 76
End: 2021-07-27
Payer: MEDICARE

## 2021-07-27 VITALS
HEART RATE: 133 BPM | DIASTOLIC BLOOD PRESSURE: 70 MMHG | OXYGEN SATURATION: 95 % | HEIGHT: 70 IN | BODY MASS INDEX: 31.92 KG/M2 | WEIGHT: 223 LBS | SYSTOLIC BLOOD PRESSURE: 120 MMHG

## 2021-07-27 PROCEDURE — 99215 OFFICE O/P EST HI 40 MIN: CPT

## 2021-07-27 NOTE — REVIEW OF SYSTEMS
[Feeling Fatigued] : feeling fatigued [Weight Loss (___ Lbs)] : [unfilled] ~Ulb weight loss [Dyspnea on exertion] : dyspnea during exertion [Lower Ext Edema] : lower extremity edema [Cough] : cough [Joint Pain] : joint pain [Lower Back Pain] : lower back pain [Upper Back Pain] : upper back pain [Numbness (Hypoesthesia)] : numbness [Anxiety] : anxiety [Negative] : Genitourinary [Rash] : no rash [Dizziness] : no dizziness [Depression] : no depression [Easy Bleeding] : no tendency for easy bleeding [Easy Bruising] : no tendency for easy bruising

## 2021-07-27 NOTE — HISTORY OF PRESENT ILLNESS
[FreeTextEntry1] : Fletcher Mendosa presented to the office today for a cardiovascular follow up. He had an echocardiogram 2017 that showed ejection fraction of 50-55% with mild MR. There was borderline LV enlargement with mild left atrial enlargement. There was stage I diastolic dysfunction. He had a stress test performed 12/17 that showed  possible minimal lateral wall ischemia at workload of 6 mets.Ejection fraction 45%.\par \par He is now 75 years old, with a history of hypertension, hypercholesterolemia, hypothyroidism, bladder cancer and paroxysmal atrial fibrillation. His atrial fibrillation was diagnosed several years ago, and he has been on warfarin. He did require cardioversion in the past. He was admitted to the hospital 11/19 in atrial fibrillation with rapid ventricular rate. At that time repeat echocardiogram showed ejection fraction of 45% with mild MR. He was given beta blocker and Cardizem with slowing of the ventricular rate. He is now on diltiazem 180 twice a day and Toprol-XL 25 mg once a day.\par \par He has had bronchitis for about a month and actually went to the ER complaining of shortness of breath. Was not aware he was in atrial fibrillation. It is possible the bronchitis had precipitated the event. I have given him a month to see if he would spontaneously go back into sinus rhythm. He is feeling well without any symptoms.\par \par 12/18/20 he was at [place] and underwent electrical cardioversion. This was tried twice and both times his rhythm quickly reverted back to atrial fibrillation and the procedure was aborted. I spoke with Dr. Wu and he felt that the best approach would be to leave the patient in chronic atrial fibrillation with rate control. The patient has been asymptomatic.\par \par Patient was doing well.  He presented to your office with a 5-day history of cough and palpitation.  ECG showed atrial fibrillation with rapid ventricular rate and he was sent to the emergency room.  The patient had paroxysmal ventricular tachycardia and was transferred to [facility].  There is minimal elevation in troponin with normal CK.  ECG showed no acute ischemia.  He underwent cardiac catheterization which showed a 40% left main lesion, 70% distal LAD, 75% circumflex, 90% proximal RCA, and 1% mid RCA.  Echocardiogram showed ejection fraction of 20 to 25% with mild TR.  No significant pulmonary hypertension.  6/23/2021 he underwent a LIMA graft to the LAD and vein graft to obtuse marginal.  Had placement of a Medtronic ICD.\par \par Still limited by shortness of breath.  He has no chest pain or palpitations or lightheadedness.\par \par \par

## 2021-07-27 NOTE — DISCUSSION/SUMMARY
[FreeTextEntry1] : The patient is doing better.  His heart rate is fairly well controlled at rest.  Blood pressure is borderline low.  There is no evidence for significant volume overload.\par \par We went over the results of his recent hospitalization including his catheterization, echo, bypass surgery, and ICD.  Answered his questions along with his wife.\par \par He will go for general blood work.  Assuming the kidney function is improved I would probably start him on an angiotensin receptor blocker in place of the hydralazine.  Also may consider putting him on digoxin which would be good for his LV dysfunction and to control his heart rate.  I will check with the electrophysiologist.\par \par If all is well I will see him in 1 month.

## 2021-07-29 ENCOUNTER — TRANSCRIPTION ENCOUNTER (OUTPATIENT)
Age: 76
End: 2021-07-29

## 2021-08-02 LAB — INR PPP: 3.9

## 2021-08-17 LAB — INR PPP: 2

## 2021-08-24 ENCOUNTER — APPOINTMENT (OUTPATIENT)
Dept: CARDIOLOGY | Facility: CLINIC | Age: 76
End: 2021-08-24
Payer: MEDICARE

## 2021-08-24 ENCOUNTER — RX RENEWAL (OUTPATIENT)
Age: 76
End: 2021-08-24

## 2021-08-24 VITALS
HEIGHT: 70 IN | OXYGEN SATURATION: 96 % | WEIGHT: 215 LBS | SYSTOLIC BLOOD PRESSURE: 136 MMHG | DIASTOLIC BLOOD PRESSURE: 82 MMHG | HEART RATE: 132 BPM | BODY MASS INDEX: 30.78 KG/M2

## 2021-08-24 DIAGNOSIS — I25.10 ATHEROSCLEROTIC HEART DISEASE OF NATIVE CORONARY ARTERY W/OUT ANGINA PECTORIS: ICD-10-CM

## 2021-08-24 PROCEDURE — 99214 OFFICE O/P EST MOD 30 MIN: CPT

## 2021-08-24 NOTE — PHYSICAL EXAM
[General Appearance - Well Developed] : well developed [Normal Appearance] : normal appearance [Well Groomed] : well groomed [General Appearance - Well Nourished] : well nourished [No Deformities] : no deformities [General Appearance - In No Acute Distress] : no acute distress [Normal Conjunctiva] : the conjunctiva exhibited no abnormalities [Eyelids - No Xanthelasma] : the eyelids demonstrated no xanthelasmas [Normal Oral Mucosa] : normal oral mucosa [No Oral Pallor] : no oral pallor [No Oral Cyanosis] : no oral cyanosis [Normal Jugular Venous A Waves Present] : normal jugular venous A waves present [Normal Jugular Venous V Waves Present] : normal jugular venous V waves present [No Jugular Venous Ruiz A Waves] : no jugular venous ruiz A waves [Respiration, Rhythm And Depth] : normal respiratory rhythm and effort [Exaggerated Use Of Accessory Muscles For Inspiration] : no accessory muscle use [Heart Rate And Rhythm] : heart rate and rhythm were normal [Auscultation Breath Sounds / Voice Sounds] : lungs were clear to auscultation bilaterally [Heart Sounds] : normal S1 and S2 [Murmurs] : no murmurs present [Abdomen Soft] : soft [Abdomen Tenderness] : non-tender [Abdomen Mass (___ Cm)] : no abdominal mass palpated [Gait - Sufficient For Exercise Testing] : the gait was sufficient for exercise testing [Abnormal Walk] : normal gait [Nail Clubbing] : no clubbing of the fingernails [Cyanosis, Localized] : no localized cyanosis [Petechial Hemorrhages (___cm)] : no petechial hemorrhages [Skin Color & Pigmentation] : normal skin color and pigmentation [] : no rash [Skin Lesions] : no skin lesions [No Venous Stasis] : no venous stasis [No Skin Ulcers] : no skin ulcer [No Xanthoma] : no  xanthoma was observed [Affect] : the affect was normal [Oriented To Time, Place, And Person] : oriented to person, place, and time [Mood] : the mood was normal [No Anxiety] : not feeling anxious

## 2021-08-24 NOTE — HISTORY OF PRESENT ILLNESS
[FreeTextEntry1] : Fletcher Mendosa presented to the office today for a cardiovascular follow up. He had an echocardiogram 2017 that showed ejection fraction of 50-55% with mild MR. There was borderline LV enlargement with mild left atrial enlargement. There was stage I diastolic dysfunction. He had a stress test performed 12/17 that showed  possible minimal lateral wall ischemia at workload of 6 mets.Ejection fraction 45%.\par \par He is now 75 years old, with a history of hypertension, hypercholesterolemia, hypothyroidism, bladder cancer and paroxysmal atrial fibrillation. His atrial fibrillation was diagnosed several years ago, and he has been on warfarin. He did require cardioversion in the past. He was admitted to the hospital 11/19 in atrial fibrillation with rapid ventricular rate. At that time repeat echocardiogram showed ejection fraction of 45% with mild MR. He was given beta blocker and Cardizem with slowing of the ventricular rate. He is now on diltiazem 180 twice a day and Toprol-XL 25 mg once a day.\par \par He has had bronchitis for about a month and actually went to the ER complaining of shortness of breath. Was not aware he was in atrial fibrillation. It is possible the bronchitis had precipitated the event. I have given him a month to see if he would spontaneously go back into sinus rhythm. He is feeling well without any symptoms.\par \par 12/18/20 he was at [place] and underwent electrical cardioversion. This was tried twice and both times his rhythm quickly reverted back to atrial fibrillation and the procedure was aborted. I spoke with Dr. Wu and he felt that the best approach would be to leave the patient in chronic atrial fibrillation with rate control. The patient has been asymptomatic.\par \par Patient was doing well.  He presented to your office with a 5-day history of cough and palpitation.  ECG showed atrial fibrillation with rapid ventricular rate and he was sent to the emergency room.  The patient had paroxysmal ventricular tachycardia and was transferred to [place].  There is minimal elevation in troponin with normal CK.  ECG showed no acute ischemia.  He underwent cardiac catheterization which showed a 40% left main lesion, 70% distal LAD, 75% circumflex, 90% proximal RCA, and 1% mid RCA.  Echocardiogram showed ejection fraction of 20 to 25% with mild TR.  No significant pulmonary hypertension.  6/23/2021 he underwent a LIMA graft to the LAD and vein graft to obtuse marginal.  Had placement of a Medtronic ICD.\par \par Is feeling better.  He is less short of breath and able to do some activity.  No chest pain or lightheadedness.  Heart rate is still fast.  He is now on furosemide 40 mg as needed for leg edema.\par \par \par

## 2021-08-24 NOTE — DISCUSSION/SUMMARY
[FreeTextEntry1] : Blood work when I saw him last visit on 7/27 at [facility].  We never got the results.  He will schedule an echocardiogram to check on his ejection fraction.  I am going to start him on digoxin 0.125 mg once a day pending results of the blood work.  I also would like to start him on an angiotensin receptor blocker if his kidney function has improved.\par \par He is losing weight on purpose which is a good thing.  Is lost over 25 pounds.\par \par If all is well I will see him again in 1 month.  We did go over his medications, and his lifestyle, and I answered all of his questions.

## 2021-08-25 ENCOUNTER — RX RENEWAL (OUTPATIENT)
Age: 76
End: 2021-08-25

## 2021-08-27 ENCOUNTER — APPOINTMENT (OUTPATIENT)
Dept: CARDIOLOGY | Facility: CLINIC | Age: 76
End: 2021-08-27
Payer: MEDICARE

## 2021-08-27 PROCEDURE — 93306 TTE W/DOPPLER COMPLETE: CPT

## 2021-08-31 ENCOUNTER — RX RENEWAL (OUTPATIENT)
Age: 76
End: 2021-08-31

## 2021-08-31 LAB — INR PPP: 2.5

## 2021-09-17 LAB — INR PPP: 2

## 2021-09-28 ENCOUNTER — APPOINTMENT (OUTPATIENT)
Dept: CARDIOLOGY | Facility: CLINIC | Age: 76
End: 2021-09-28
Payer: MEDICARE

## 2021-09-28 ENCOUNTER — LABORATORY RESULT (OUTPATIENT)
Age: 76
End: 2021-09-28

## 2021-09-28 VITALS
OXYGEN SATURATION: 95 % | DIASTOLIC BLOOD PRESSURE: 80 MMHG | HEART RATE: 94 BPM | WEIGHT: 218 LBS | BODY MASS INDEX: 31.21 KG/M2 | HEIGHT: 70 IN | SYSTOLIC BLOOD PRESSURE: 174 MMHG

## 2021-09-28 PROCEDURE — 99214 OFFICE O/P EST MOD 30 MIN: CPT

## 2021-09-28 NOTE — DISCUSSION/SUMMARY
[FreeTextEntry1] : Clinically the patient is doing well although his ejection fraction is still low.  Tolerating his medicine well so far.  Go for blood work today.  Pending the results I may either increase his valsartan or try him on Entresto.\par \par We did go over his medications, his blood work, and his echocardiogram.  I answered all of his questions.  If all is well I will see him in 1 month.

## 2021-09-28 NOTE — HISTORY OF PRESENT ILLNESS
[FreeTextEntry1] : Fletcher Mendosa presented to the office today for a cardiovascular follow up. He had an echocardiogram 2017 that showed ejection fraction of 50-55% with mild MR. There was borderline LV enlargement with mild left atrial enlargement. There was stage I diastolic dysfunction. He had a stress test performed 12/17 that showed  possible minimal lateral wall ischemia at workload of 6 mets.Ejection fraction 45%.\par \par He is now 75 years old, with a history of hypertension, hypercholesterolemia, hypothyroidism, bladder cancer and paroxysmal atrial fibrillation. His atrial fibrillation was diagnosed several years ago, and he has been on warfarin. He did require cardioversion in the past. He was admitted to the hospital 11/19 in atrial fibrillation with rapid ventricular rate. At that time repeat echocardiogram showed ejection fraction of 45% with mild MR. He was given beta blocker and Cardizem with slowing of the ventricular rate. He is now on diltiazem 180 twice a day and Toprol-XL 25 mg once a day.\par \par He has had bronchitis for about a month and actually went to the ER complaining of shortness of breath. Was not aware he was in atrial fibrillation. It is possible the bronchitis had precipitated the event. I have given him a month to see if he would spontaneously go back into sinus rhythm. He is feeling well without any symptoms.\par \par 12/18/20 he was at [place] and underwent electrical cardioversion. This was tried twice and both times his rhythm quickly reverted back to atrial fibrillation and the procedure was aborted. I spoke with Dr. Wu and he felt that the best approach would be to leave the patient in chronic atrial fibrillation with rate control. The patient has been asymptomatic.\par \par Patient was doing well.  He presented to your office with a 5-day history of cough and palpitation.  ECG showed atrial fibrillation with rapid ventricular rate and he was sent to the emergency room.  The patient had paroxysmal ventricular tachycardia and was transferred to [place].  There was minimal elevation in troponin with normal CK.  ECG showed no acute ischemia.  He underwent cardiac catheterization which showed a 40% left main lesion, 70% distal LAD, 75% circumflex, 90% proximal RCA, and 1% mid RCA.  Echocardiogram showed ejection fraction of 20 to 25% with mild TR.  No significant pulmonary hypertension.  6/23/2021 he underwent a LIMA graft to the LAD and vein graft to obtuse marginal.  Had placement of a Medtronic ICD.\par \par He has been feeling better.  He is less short of breath and able to do some activity.  No chest pain or lightheadedness.  Heart rate is still fast.  He is now on furosemide 40 mg as needed for leg edema.  He takes this medication about once every 2 weeks.\par \par The digoxin his heart rate is slow down.  His blood work was stable and he is now on valsartan 40 mg once a day.  He is back to fairly normal activities and did play golf.\par \par Echo 8/21 demonstrated an EF 25% with mod MR.\par \par \par

## 2021-09-28 NOTE — PHYSICAL EXAM
[General Appearance - Well Developed] : well developed [Normal Appearance] : normal appearance [Well Groomed] : well groomed [No Deformities] : no deformities [General Appearance - Well Nourished] : well nourished [General Appearance - In No Acute Distress] : no acute distress [Normal Conjunctiva] : the conjunctiva exhibited no abnormalities [Eyelids - No Xanthelasma] : the eyelids demonstrated no xanthelasmas [Normal Oral Mucosa] : normal oral mucosa [No Oral Cyanosis] : no oral cyanosis [No Oral Pallor] : no oral pallor [Normal Jugular Venous A Waves Present] : normal jugular venous A waves present [Normal Jugular Venous V Waves Present] : normal jugular venous V waves present [No Jugular Venous Ruiz A Waves] : no jugular venous ruiz A waves [Respiration, Rhythm And Depth] : normal respiratory rhythm and effort [Exaggerated Use Of Accessory Muscles For Inspiration] : no accessory muscle use [Auscultation Breath Sounds / Voice Sounds] : lungs were clear to auscultation bilaterally [Heart Rate And Rhythm] : heart rate and rhythm were normal [Heart Sounds] : normal S1 and S2 [Murmurs] : no murmurs present [Abdomen Soft] : soft [Abdomen Tenderness] : non-tender [Abdomen Mass (___ Cm)] : no abdominal mass palpated [Gait - Sufficient For Exercise Testing] : the gait was sufficient for exercise testing [Abnormal Walk] : normal gait [Nail Clubbing] : no clubbing of the fingernails [Cyanosis, Localized] : no localized cyanosis [Petechial Hemorrhages (___cm)] : no petechial hemorrhages [Skin Color & Pigmentation] : normal skin color and pigmentation [] : no rash [No Venous Stasis] : no venous stasis [No Skin Ulcers] : no skin ulcer [Skin Lesions] : no skin lesions [No Xanthoma] : no  xanthoma was observed [Oriented To Time, Place, And Person] : oriented to person, place, and time [Affect] : the affect was normal [No Anxiety] : not feeling anxious [Mood] : the mood was normal

## 2021-09-29 LAB
ALBUMIN SERPL ELPH-MCNC: 4.2 G/DL
ALP BLD-CCNC: 105 U/L
ALT SERPL-CCNC: 9 U/L
ANION GAP SERPL CALC-SCNC: 16 MMOL/L
AST SERPL-CCNC: 16 U/L
BASOPHILS # BLD AUTO: 0.09 K/UL
BASOPHILS NFR BLD AUTO: 0.7 %
BILIRUB SERPL-MCNC: 0.3 MG/DL
BUN SERPL-MCNC: 29 MG/DL
CALCIUM SERPL-MCNC: 9.9 MG/DL
CHLORIDE SERPL-SCNC: 104 MMOL/L
CHOLEST SERPL-MCNC: 168 MG/DL
CO2 SERPL-SCNC: 20 MMOL/L
CREAT SERPL-MCNC: 1.38 MG/DL
EOSINOPHIL # BLD AUTO: 0.22 K/UL
EOSINOPHIL NFR BLD AUTO: 1.7 %
GLUCOSE SERPL-MCNC: 86 MG/DL
HCT VFR BLD CALC: 48 %
HDLC SERPL-MCNC: 37 MG/DL
HGB BLD-MCNC: 15 G/DL
IMM GRANULOCYTES NFR BLD AUTO: 1.6 %
LDLC SERPL CALC-MCNC: 68 MG/DL
LYMPHOCYTES # BLD AUTO: 2.33 K/UL
LYMPHOCYTES NFR BLD AUTO: 17.5 %
MAGNESIUM SERPL-MCNC: 1.8 MG/DL
MAN DIFF?: NORMAL
MCHC RBC-ENTMCNC: 26 PG
MCHC RBC-ENTMCNC: 31.3 GM/DL
MCV RBC AUTO: 83 FL
MONOCYTES # BLD AUTO: 2.09 K/UL
MONOCYTES NFR BLD AUTO: 15.7 %
NEUTROPHILS # BLD AUTO: 8.35 K/UL
NEUTROPHILS NFR BLD AUTO: 62.8 %
NONHDLC SERPL-MCNC: 130 MG/DL
NT-PROBNP SERPL-MCNC: 2775 PG/ML
PLATELET # BLD AUTO: 304 K/UL
POTASSIUM SERPL-SCNC: 5.5 MMOL/L
PROT SERPL-MCNC: 7.3 G/DL
RBC # BLD: 5.78 M/UL
RBC # FLD: 16.4 %
SODIUM SERPL-SCNC: 140 MMOL/L
TRIGL SERPL-MCNC: 313 MG/DL
WBC # FLD AUTO: 13.29 K/UL

## 2021-10-06 LAB — INR PPP: 2.1

## 2021-10-18 ENCOUNTER — NON-APPOINTMENT (OUTPATIENT)
Age: 76
End: 2021-10-18

## 2021-10-18 ENCOUNTER — APPOINTMENT (OUTPATIENT)
Dept: ELECTROPHYSIOLOGY | Facility: CLINIC | Age: 76
End: 2021-10-18
Payer: MEDICARE

## 2021-10-18 VITALS
SYSTOLIC BLOOD PRESSURE: 120 MMHG | DIASTOLIC BLOOD PRESSURE: 78 MMHG | WEIGHT: 215 LBS | BODY MASS INDEX: 30.78 KG/M2 | HEART RATE: 96 BPM | HEIGHT: 70 IN | OXYGEN SATURATION: 95 %

## 2021-10-18 LAB — INR PPP: 3.5

## 2021-10-18 PROCEDURE — 93283 PRGRMG EVAL IMPLANTABLE DFB: CPT

## 2021-10-18 PROCEDURE — 93000 ELECTROCARDIOGRAM COMPLETE: CPT | Mod: 59

## 2021-10-26 ENCOUNTER — APPOINTMENT (OUTPATIENT)
Dept: CARDIOLOGY | Facility: CLINIC | Age: 76
End: 2021-10-26
Payer: MEDICARE

## 2021-10-26 VITALS
BODY MASS INDEX: 31.78 KG/M2 | WEIGHT: 222 LBS | SYSTOLIC BLOOD PRESSURE: 178 MMHG | OXYGEN SATURATION: 94 % | HEART RATE: 88 BPM | HEIGHT: 70 IN | DIASTOLIC BLOOD PRESSURE: 85 MMHG

## 2021-10-26 PROCEDURE — 99214 OFFICE O/P EST MOD 30 MIN: CPT

## 2021-10-26 NOTE — DISCUSSION/SUMMARY
[FreeTextEntry1] : The patient is doing well.  Heart rate and blood pressure are well controlled and volume status is good.  With the elevation in his potassium we cannot go higher on the valsartan.  I am going to try him on Entresto twice a day.  See whether he can afford this medication.  He then would stop the valsartan.  If he has any problems he would call me.  If all is well I would see him in 1 month.\par \par His triglycerides are 313 needs to watch his diet including normal hydration sugar more closely and try to lose weight.

## 2021-10-26 NOTE — HISTORY OF PRESENT ILLNESS
[FreeTextEntry1] : Fletcher Mendosa presented to the office today for a cardiovascular follow up. He had an echocardiogram 2017 that showed ejection fraction of 50-55% with mild MR. There was borderline LV enlargement with mild left atrial enlargement. There was stage I diastolic dysfunction. He had a stress test performed 12/17 that showed  possible minimal lateral wall ischemia at workload of 6 mets.Ejection fraction 45%.\par \par He is now 75 years old, with a history of hypertension, hypercholesterolemia, hypothyroidism, bladder cancer and paroxysmal atrial fibrillation. His atrial fibrillation was diagnosed several years ago, and he has been on warfarin. He did require cardioversion in the past. He was admitted to the hospital 11/19 in atrial fibrillation with rapid ventricular rate. At that time repeat echocardiogram showed ejection fraction of 45% with mild MR. He was given beta blocker and Cardizem with slowing of the ventricular rate. He is now on diltiazem 180 twice a day and Toprol-XL 25 mg once a day.\par \par He has had bronchitis for about a month and actually went to the ER complaining of shortness of breath. Was not aware he was in atrial fibrillation. It is possible the bronchitis had precipitated the event. I have given him a month to see if he would spontaneously go back into sinus rhythm. He is feeling well without any symptoms.\par \par 12/18/20 he was at [place] and underwent electrical cardioversion. This was tried twice and both times his rhythm quickly reverted back to atrial fibrillation and the procedure was aborted. I spoke with Dr. Wu and he felt that the best approach would be to leave the patient in chronic atrial fibrillation with rate control. The patient has been asymptomatic.\par \par Patient was doing well.  He presented to your office with a 5-day history of cough and palpitation.  ECG showed atrial fibrillation with rapid ventricular rate and he was sent to the emergency room.  The patient had paroxysmal ventricular tachycardia and was transferred to [place].  There was minimal elevation in troponin with normal CK.  ECG showed no acute ischemia.  He underwent cardiac catheterization which showed a 40% left main lesion, 70% distal LAD, 75% circumflex, 90% proximal RCA, and 1% mid RCA.  Echocardiogram showed ejection fraction of 20 to 25% with mild TR.  No significant pulmonary hypertension.  6/23/2021 he underwent a LIMA graft to the LAD and vein graft to obtuse marginal.  Had placement of a Medtronic ICD.\par \par He has been feeling better.  He is less short of breath and able to do some activity.  No chest pain or lightheadedness.  Heart rate is still fast.  He is now on furosemide 40 mg as needed for leg edema.  He takes this medication about once every 2 weeks.\par \par The digoxin his heart rate has slowed down.  His blood work was stable and he is now on valsartan 40 mg once a day.  He is back to fairly normal activities and did play golf.\par \par Work 9/21 demonstrated a cholesterol of 168, triglycerides 313, HDL 37, and LDL 68.  Creatinine 1.38 with a BUN of 29.  proBNP 2775.\par \par Echo 8/21 demonstrated an EF 25% with mod MR.\par \par CG 10/21 demonstrated A. fib with a ventricular of 105 bpm.  The patient still notes dyspnea on exertion.\par \par \par

## 2021-10-27 ENCOUNTER — TRANSCRIPTION ENCOUNTER (OUTPATIENT)
Age: 76
End: 2021-10-27

## 2021-11-01 LAB — INR PPP: 2

## 2021-11-15 LAB — INR PPP: 2.4

## 2021-11-23 ENCOUNTER — APPOINTMENT (OUTPATIENT)
Dept: CARDIOLOGY | Facility: CLINIC | Age: 76
End: 2021-11-23
Payer: MEDICARE

## 2021-11-23 VITALS
OXYGEN SATURATION: 98 % | HEART RATE: 89 BPM | DIASTOLIC BLOOD PRESSURE: 82 MMHG | SYSTOLIC BLOOD PRESSURE: 134 MMHG | HEIGHT: 70 IN | BODY MASS INDEX: 31.78 KG/M2 | WEIGHT: 222 LBS

## 2021-11-23 DIAGNOSIS — I47.2 VENTRICULAR TACHYCARDIA: ICD-10-CM

## 2021-11-23 PROCEDURE — 99214 OFFICE O/P EST MOD 30 MIN: CPT

## 2021-11-23 NOTE — DISCUSSION/SUMMARY
[FreeTextEntry1] : The patient is doing well.  He has no chest pain, shortness of breath, palpitation.  Blood pressure is excellent.  There is no evidence of volume overload.\par \par We discussed the Entresto.  It is expensive but he right now he can afford it.  He will stay on his present medication.  Go for blood work to check on his electrolytes and kidney function.  Assuming that his blood work is stable I would see him in 2 months.  He knows to call the office if any problems arise.

## 2021-11-24 LAB
ANION GAP SERPL CALC-SCNC: 15 MMOL/L
BUN SERPL-MCNC: 39 MG/DL
CALCIUM SERPL-MCNC: 9.4 MG/DL
CHLORIDE SERPL-SCNC: 108 MMOL/L
CHOLEST SERPL-MCNC: 186 MG/DL
CO2 SERPL-SCNC: 17 MMOL/L
CREAT SERPL-MCNC: 1.48 MG/DL
GLUCOSE SERPL-MCNC: 93 MG/DL
HDLC SERPL-MCNC: 40 MG/DL
LDLC SERPL CALC-MCNC: 89 MG/DL
LDLC SERPL DIRECT ASSAY-MCNC: 103 MG/DL
NONHDLC SERPL-MCNC: 146 MG/DL
NT-PROBNP SERPL-MCNC: 1659 PG/ML
POTASSIUM SERPL-SCNC: 4.9 MMOL/L
SODIUM SERPL-SCNC: 140 MMOL/L
TRIGL SERPL-MCNC: 283 MG/DL

## 2021-12-01 LAB — INR PPP: 2.3

## 2021-12-03 ENCOUNTER — RX RENEWAL (OUTPATIENT)
Age: 76
End: 2021-12-03

## 2021-12-16 LAB — INR PPP: 2.2

## 2022-01-03 LAB — INR PPP: 1.9

## 2022-01-18 ENCOUNTER — APPOINTMENT (OUTPATIENT)
Dept: ELECTROPHYSIOLOGY | Facility: CLINIC | Age: 77
End: 2022-01-18

## 2022-01-18 LAB — INR PPP: 2.2

## 2022-01-20 ENCOUNTER — APPOINTMENT (OUTPATIENT)
Dept: CARDIOLOGY | Facility: CLINIC | Age: 77
End: 2022-01-20
Payer: MEDICARE

## 2022-01-20 VITALS
HEIGHT: 70 IN | WEIGHT: 224 LBS | SYSTOLIC BLOOD PRESSURE: 151 MMHG | OXYGEN SATURATION: 97 % | HEART RATE: 80 BPM | BODY MASS INDEX: 32.07 KG/M2 | DIASTOLIC BLOOD PRESSURE: 94 MMHG

## 2022-01-20 PROCEDURE — 99214 OFFICE O/P EST MOD 30 MIN: CPT

## 2022-01-20 NOTE — DISCUSSION/SUMMARY
[FreeTextEntry1] : Clinically the patient is doing well.  There is no evidence of volume overload.  He has no shortness of breath, chest pain or palpitation.  His cholesterol still a little high.  His triglycerides are equally high, and he wants to give a chance to see what he can do with lifestyle.  He is going to start exercising, and start watching his diet more carefully.  Would repeat these blood tests in several months.\par \par He will stay on his present medication if he does have any symptoms he would call me.  If all is well I will see him in 3 months.  We did go over his medication, his blood work, his ICD, and I answered all of his questions

## 2022-01-20 NOTE — HISTORY OF PRESENT ILLNESS
[FreeTextEntry1] : Fletcher Mendosa presented to the office today for a cardiovascular follow up. He had an echocardiogram 2017 that showed ejection fraction of 50-55% with mild MR. There was borderline LV enlargement with mild left atrial enlargement. There was stage I diastolic dysfunction. He had a stress test performed 12/17 that showed  possible minimal lateral wall ischemia at workload of 6 mets.Ejection fraction 45%.\par \par He is now 76 years old, with a history of hypertension, hypercholesterolemia, hypothyroidism, bladder cancer and paroxysmal atrial fibrillation. His atrial fibrillation was diagnosed several years ago, and he has been on warfarin. He did require cardioversion in the past. He was admitted to the hospital 11/19 in atrial fibrillation with rapid ventricular rate. At that time repeat echocardiogram showed ejection fraction of 45% with mild MR. He was given beta blocker and Cardizem with slowing of the ventricular rate. He is now on diltiazem 180 twice a day and Toprol-XL 25 mg once a day.\par \par He has had bronchitis for about a month and actually went to the ER complaining of shortness of breath. Was not aware he was in atrial fibrillation. It is possible the bronchitis had precipitated the event. I have given him a month to see if he would spontaneously go back into sinus rhythm. He is feeling well without any symptoms.\par \par 12/18/20 he was at [place] and underwent electrical cardioversion. This was tried twice and both times his rhythm quickly reverted back to atrial fibrillation and the procedure was aborted. I spoke with Dr. Wu and he felt that the best approach would be to leave the patient in chronic atrial fibrillation with rate control. The patient has been asymptomatic.\par \par Patient was doing well.  He presented to your office with a 5-day history of cough and palpitation.  ECG showed atrial fibrillation with rapid ventricular rate and he was sent to the emergency room.  The patient had paroxysmal ventricular tachycardia and was transferred to [place].  There was minimal elevation in troponin with normal CK.  ECG showed no acute ischemia.  He underwent cardiac catheterization which showed a 40% left main lesion, 70% distal LAD, 75% circumflex, 90% proximal RCA, and 1% mid RCA.  Echocardiogram showed ejection fraction of 20 to 25% with mild TR.  No significant pulmonary hypertension.  6/23/2021 he underwent a LIMA graft to the LAD and vein graft to obtuse marginal.  Had placement of a Medtronic ICD.\par \par He has been feeling better.  He is less short of breath and able to do some activity.  No chest pain or lightheadedness.  Heart rate is still fast.  He is now on furosemide 40 mg as needed for leg edema.  He takes this medication about once every 2 weeks.\par \par The digoxin his heart rate has slowed down.  His blood work was stable and he is now on valsartan 40 mg once a day.  He is back to fairly normal activities and did play golf.\par \par Blood Work 11/21 demonstrated a cholesterol of 186, triglycerides 283, HDL 40, and direct .  Creatinine 1.48 with a BUN of 39.  .  proBNP 1659.  Potassium 4.9.\par \par Echo 8/21 demonstrated an EF 25% with mod MR.\par \par He is now on Entresto and feeling well.  He is having no symptoms of shortness of breath.  .  Did have an evaluation at [facility] and apparently his blood work was stable.\par \par \par

## 2022-02-02 LAB — INR PPP: 2

## 2022-02-16 LAB — INR PPP: 2

## 2022-03-02 LAB — INR PPP: 1.9

## 2022-03-16 LAB — INR PPP: 2

## 2022-03-28 ENCOUNTER — NON-APPOINTMENT (OUTPATIENT)
Age: 77
End: 2022-03-28

## 2022-03-28 ENCOUNTER — APPOINTMENT (OUTPATIENT)
Dept: ELECTROPHYSIOLOGY | Facility: CLINIC | Age: 77
End: 2022-03-28
Payer: MEDICARE

## 2022-03-28 PROCEDURE — 93295 DEV INTERROG REMOTE 1/2/MLT: CPT

## 2022-03-28 PROCEDURE — 93296 REM INTERROG EVL PM/IDS: CPT

## 2022-03-30 LAB — INR PPP: 2

## 2022-04-13 ENCOUNTER — APPOINTMENT (OUTPATIENT)
Dept: CARDIOLOGY | Facility: CLINIC | Age: 77
End: 2022-04-13
Payer: MEDICARE

## 2022-04-13 VITALS
SYSTOLIC BLOOD PRESSURE: 158 MMHG | HEIGHT: 70 IN | OXYGEN SATURATION: 97 % | WEIGHT: 230 LBS | HEART RATE: 80 BPM | BODY MASS INDEX: 32.93 KG/M2 | DIASTOLIC BLOOD PRESSURE: 84 MMHG

## 2022-04-13 PROCEDURE — 99214 OFFICE O/P EST MOD 30 MIN: CPT

## 2022-04-13 NOTE — HISTORY OF PRESENT ILLNESS
[FreeTextEntry1] : Fletcher Mendosa presented to the office today for a cardiovascular follow up. He had an echocardiogram 2017 that showed ejection fraction of 50-55% with mild MR. There was borderline LV enlargement with mild left atrial enlargement. There was stage I diastolic dysfunction. He had a stress test performed 12/17 that showed  possible minimal lateral wall ischemia at workload of 6 mets.Ejection fraction 45%.\par \par He is now 76 years old, with a history of hypertension, hypercholesterolemia, hypothyroidism, bladder cancer and paroxysmal atrial fibrillation. His atrial fibrillation was diagnosed several years ago, and he has been on warfarin. He did require cardioversion in the past. He was admitted to the hospital 11/19 in atrial fibrillation with rapid ventricular rate. At that time repeat echocardiogram showed ejection fraction of 45% with mild MR. He was given beta blocker and Cardizem with slowing of the ventricular rate. He is now on diltiazem 180 twice a day and Toprol-XL 25 mg once a day.\par \par He has had bronchitis for about a month and actually went to the ER complaining of shortness of breath. Was not aware he was in atrial fibrillation. It is possible the bronchitis had precipitated the event. I have given him a month to see if he would spontaneously go back into sinus rhythm. He is feeling well without any symptoms.\par \par 12/18/20 he was at [place] and underwent electrical cardioversion. This was tried twice and both times his rhythm quickly reverted back to atrial fibrillation and the procedure was aborted. I spoke with Dr. Wu and he felt that the best approach would be to leave the patient in chronic atrial fibrillation with rate control. The patient has been asymptomatic.\par \par Patient was doing well.  He presented to your office with a 5-day history of cough and palpitation.  ECG showed atrial fibrillation with rapid ventricular rate and he was sent to the emergency room.  The patient had paroxysmal ventricular tachycardia and was transferred to [place].  There was minimal elevation in troponin with normal CK.  ECG showed no acute ischemia.  He underwent cardiac catheterization which showed a 40% left main lesion, 70% distal LAD, 75% circumflex, 90% proximal RCA, and 1% mid RCA.  Echocardiogram showed ejection fraction of 20 to 25% with mild TR.  No significant pulmonary hypertension.  6/23/2021 he underwent a LIMA graft to the LAD and vein graft to obtuse marginal.  Had placement of a Medtronic ICD.\par \par He has been feeling better.  He is less short of breath and able to do some activity.  No chest pain or lightheadedness.  Heart rate is still fast.  He is now on furosemide 40 mg as needed for leg edema.  He takes this medication about once every 2 weeks.\par \par The digoxin his heart rate has slowed down.  His blood work was stable and he is now on valsartan 40 mg once a day.  He is back to fairly normal activities and did play golf.\par \par Blood Work 11/21 demonstrated a cholesterol of 186, triglycerides 283, HDL 40, and direct .  Creatinine 1.48 with a BUN of 39.  .  proBNP 1659.  Potassium 4.9.\par \par Echo 8/21 demonstrated an EF 25% with mod MR.\par \par He is now on Entresto and feeling well.  He is having no symptoms of shortness of breath.  .  Did have an evaluation at [facility] and apparently his blood work was stable.\par \par Patient been in [place] for the past month.  He has been playing golf and been physically active and has no symptoms.  Unfortunately he has not been on a good diet and is gained some weight.\par \par \par

## 2022-04-13 NOTE — DISCUSSION/SUMMARY
[FreeTextEntry1] : Clinically the patient is doing well.  He has no signs or symptoms of congestive heart failure.  No angina.  He will stay on his present medications.  We did speak about Farxiga but he is paying a lot of money for the Entresto and wants to wait.\par \par He will stay on his present medication.  He will watch his diet more carefully and try to lose weight.  He will have blood work in June and then make a follow-up appointment.  In the meantime if he has any symptoms or problems he would call me.

## 2022-04-13 NOTE — PHYSICAL EXAM
[General Appearance - Well Developed] : well developed [Normal Appearance] : normal appearance [Well Groomed] : well groomed [General Appearance - Well Nourished] : well nourished [No Deformities] : no deformities [General Appearance - In No Acute Distress] : no acute distress [Normal Conjunctiva] : the conjunctiva exhibited no abnormalities [Eyelids - No Xanthelasma] : the eyelids demonstrated no xanthelasmas [Normal Oral Mucosa] : normal oral mucosa [No Oral Cyanosis] : no oral cyanosis [No Oral Pallor] : no oral pallor [Normal Jugular Venous A Waves Present] : normal jugular venous A waves present [Normal Jugular Venous V Waves Present] : normal jugular venous V waves present [No Jugular Venous Ruiz A Waves] : no jugular venous ruiz A waves [Respiration, Rhythm And Depth] : normal respiratory rhythm and effort [Exaggerated Use Of Accessory Muscles For Inspiration] : no accessory muscle use [Auscultation Breath Sounds / Voice Sounds] : lungs were clear to auscultation bilaterally [Heart Rate And Rhythm] : heart rate and rhythm were normal [Heart Sounds] : normal S1 and S2 [Murmurs] : no murmurs present [Abdomen Soft] : soft [Abdomen Tenderness] : non-tender [Abdomen Mass (___ Cm)] : no abdominal mass palpated [Abnormal Walk] : normal gait [Gait - Sufficient For Exercise Testing] : the gait was sufficient for exercise testing [Nail Clubbing] : no clubbing of the fingernails [Cyanosis, Localized] : no localized cyanosis [Petechial Hemorrhages (___cm)] : no petechial hemorrhages [Skin Color & Pigmentation] : normal skin color and pigmentation [] : no rash [No Venous Stasis] : no venous stasis [Skin Lesions] : no skin lesions [No Skin Ulcers] : no skin ulcer [No Xanthoma] : no  xanthoma was observed [Affect] : the affect was normal [Oriented To Time, Place, And Person] : oriented to person, place, and time [Mood] : the mood was normal [No Anxiety] : not feeling anxious

## 2022-04-18 LAB — INR PPP: 1.9

## 2022-05-16 ENCOUNTER — NON-APPOINTMENT (OUTPATIENT)
Age: 77
End: 2022-05-16

## 2022-05-17 LAB — INR PPP: 2.8

## 2022-06-01 ENCOUNTER — NON-APPOINTMENT (OUTPATIENT)
Age: 77
End: 2022-06-01

## 2022-06-13 ENCOUNTER — LABORATORY RESULT (OUTPATIENT)
Age: 77
End: 2022-06-13

## 2022-06-13 LAB
ALBUMIN SERPL ELPH-MCNC: 4.6 G/DL
ALP BLD-CCNC: 89 U/L
ALT SERPL-CCNC: 11 U/L
ANION GAP SERPL CALC-SCNC: 16 MMOL/L
AST SERPL-CCNC: 17 U/L
BILIRUB SERPL-MCNC: 0.3 MG/DL
BUN SERPL-MCNC: 34 MG/DL
CALCIUM SERPL-MCNC: 9.4 MG/DL
CHLORIDE SERPL-SCNC: 105 MMOL/L
CHOLEST SERPL-MCNC: 265 MG/DL
CO2 SERPL-SCNC: 18 MMOL/L
CREAT SERPL-MCNC: 1.73 MG/DL
EGFR: 40 ML/MIN/1.73M2
GLUCOSE SERPL-MCNC: 94 MG/DL
HDLC SERPL-MCNC: 43 MG/DL
LDLC SERPL CALC-MCNC: 159 MG/DL
LDLC SERPL DIRECT ASSAY-MCNC: 176 MG/DL
NONHDLC SERPL-MCNC: 222 MG/DL
NT-PROBNP SERPL-MCNC: 1103 PG/ML
POTASSIUM SERPL-SCNC: 5.2 MMOL/L
PROT SERPL-MCNC: 7.5 G/DL
SODIUM SERPL-SCNC: 139 MMOL/L
TRIGL SERPL-MCNC: 317 MG/DL

## 2022-06-15 LAB
BASOPHILS # BLD AUTO: 0.19 K/UL
BASOPHILS NFR BLD AUTO: 1.7 %
EOSINOPHIL # BLD AUTO: 0.4 K/UL
EOSINOPHIL NFR BLD AUTO: 3.5 %
HCT VFR BLD CALC: 51.2 %
HGB BLD-MCNC: 16.8 G/DL
INR PPP: 2.8
LYMPHOCYTES # BLD AUTO: 3.04 K/UL
LYMPHOCYTES NFR BLD AUTO: 26.7 %
MAN DIFF?: NORMAL
MCHC RBC-ENTMCNC: 29.5 PG
MCHC RBC-ENTMCNC: 32.8 GM/DL
MCV RBC AUTO: 89.8 FL
MONOCYTES # BLD AUTO: 1.57 K/UL
MONOCYTES NFR BLD AUTO: 13.8 %
NEUTROPHILS # BLD AUTO: 6.18 K/UL
NEUTROPHILS NFR BLD AUTO: 54.3 %
PLATELET # BLD AUTO: 199 K/UL
RBC # BLD: 5.7 M/UL
RBC # FLD: 15.3 %
WBC # FLD AUTO: 11.39 K/UL

## 2022-06-17 ENCOUNTER — RX RENEWAL (OUTPATIENT)
Age: 77
End: 2022-06-17

## 2022-06-22 ENCOUNTER — APPOINTMENT (OUTPATIENT)
Dept: CARDIOLOGY | Facility: CLINIC | Age: 77
End: 2022-06-22
Payer: MEDICARE

## 2022-06-22 VITALS
WEIGHT: 229 LBS | DIASTOLIC BLOOD PRESSURE: 82 MMHG | BODY MASS INDEX: 32.78 KG/M2 | OXYGEN SATURATION: 96 % | HEIGHT: 70 IN | HEART RATE: 74 BPM | SYSTOLIC BLOOD PRESSURE: 130 MMHG

## 2022-06-22 DIAGNOSIS — I25.5 ISCHEMIC CARDIOMYOPATHY: ICD-10-CM

## 2022-06-22 PROCEDURE — 99214 OFFICE O/P EST MOD 30 MIN: CPT

## 2022-06-22 NOTE — DISCUSSION/SUMMARY
[FreeTextEntry1] : Clinically the patient is doing well.  He has no symptoms of chest pain, shortness of breath, palpitation.  He felt a slight spark in his defibrillator that sounded like static electricity.  There was no definite shock.  I tried to interrogate the defibrillator but we do not have the right programming.  He will check with Dr. Donohue.\par \par I am concerned about his cholesterol.  He will repeat his blood work in August.  He will do a fasting lipid panel as well as repeat creatinine, and a digoxin level.  He also will do an echocardiogram and will follow-up with us in 3 months. If he has any problems he would call us.  He will try to keep himself well-hydrated.

## 2022-06-22 NOTE — HISTORY OF PRESENT ILLNESS
[FreeTextEntry1] : Fletcher Mendosa presented to the office today for a cardiovascular follow up. He had an echocardiogram 2017 that showed ejection fraction of 50-55% with mild MR. There was borderline LV enlargement with mild left atrial enlargement. There was stage I diastolic dysfunction. He had a stress test performed 12/17 that showed  possible minimal lateral wall ischemia at workload of 6 mets.Ejection fraction 45%.\par \par He is now 77 years old, with a history of hypertension, hypercholesterolemia, hypothyroidism, bladder cancer and paroxysmal atrial fibrillation. His atrial fibrillation was diagnosed several years ago, and he has been on warfarin. He did require cardioversion in the past. He was admitted to the hospital 11/19 in atrial fibrillation with rapid ventricular rate. At that time repeat echocardiogram showed ejection fraction of 45% with mild MR. He was given beta blocker and Cardizem with slowing of the ventricular rate. He is now on diltiazem 180 twice a day and Toprol-XL 25 mg once a day.\par \par He has had bronchitis for about a month and actually went to the ER complaining of shortness of breath. Was not aware he was in atrial fibrillation. It is possible the bronchitis had precipitated the event. I have given him a month to see if he would spontaneously go back into sinus rhythm. He is feeling well without any symptoms.\par \par 12/18/20 he was at [place] and underwent electrical cardioversion. This was tried twice and both times his rhythm quickly reverted back to atrial fibrillation and the procedure was aborted. I spoke with Dr. Wu and he felt that the best approach would be to leave the patient in chronic atrial fibrillation with rate control. The patient has been asymptomatic.\par \par Patient was doing well.  He presented to your office with a 5-day history of cough and palpitation.  ECG showed atrial fibrillation with rapid ventricular rate and he was sent to the emergency room.  The patient had paroxysmal ventricular tachycardia and was transferred to [place].  There was minimal elevation in troponin with normal CK.  ECG showed no acute ischemia.  He underwent cardiac catheterization which showed a 40% left main lesion, 70% distal LAD, 75% circumflex, 90% proximal RCA, and 1% mid RCA.  Echocardiogram showed ejection fraction of 20 to 25% with mild TR.  No significant pulmonary hypertension.  6/23/2021 he underwent a LIMA graft to the LAD and vein graft to obtuse marginal.  Had placement of a Medtronic ICD.\par \par He has been feeling better.  He is less short of breath and able to do some activity.  No chest pain or lightheadedness.  Heart rate is still fast.  He is now on furosemide 40 mg as needed for leg edema.  He takes this medication about once every 2 weeks.\par \par The digoxin his heart rate has slowed down.  His blood work was stable and he is now on valsartan 40 mg once a day.  He is back to fairly normal activities and did play golf.\par \par Blood Work 4/22 demonstrated a cholesterol of 266, triglycerides 317, HDL 43, and direct .  Creatinine 1.73.  The LDL cholesterol up by almost 80 points.  Claims to be taking the Crestor.  Thecreatinine went up a little bit, from 1.48...\par \par Echo 8/21 demonstrated an EF 25% with mod MR.\par \par He is now on Entresto and feeling well.  He is having no symptoms of shortness of breath.  .  Did have an evaluation at [facility] and apparently his blood work was stable.\par \par Patient been in [place] for the past month.  He has been playing golf and been physically active and has no symptoms.  Unfortunately he has not been on a good diet and is gained some weight.\par \par \par

## 2022-06-28 ENCOUNTER — NON-APPOINTMENT (OUTPATIENT)
Age: 77
End: 2022-06-28

## 2022-06-28 ENCOUNTER — APPOINTMENT (OUTPATIENT)
Dept: ELECTROPHYSIOLOGY | Facility: CLINIC | Age: 77
End: 2022-06-28

## 2022-06-28 PROCEDURE — 93296 REM INTERROG EVL PM/IDS: CPT

## 2022-06-28 PROCEDURE — 93295 DEV INTERROG REMOTE 1/2/MLT: CPT

## 2022-07-06 LAB — INR PPP: 2.9

## 2022-07-18 ENCOUNTER — NON-APPOINTMENT (OUTPATIENT)
Age: 77
End: 2022-07-18

## 2022-07-20 ENCOUNTER — RX RENEWAL (OUTPATIENT)
Age: 77
End: 2022-07-20

## 2022-08-01 ENCOUNTER — RX RENEWAL (OUTPATIENT)
Age: 77
End: 2022-08-01

## 2022-08-03 LAB — INR PPP: 2

## 2022-08-12 ENCOUNTER — RX RENEWAL (OUTPATIENT)
Age: 77
End: 2022-08-12

## 2022-08-18 LAB — INR PPP: 1.9

## 2022-09-03 LAB — INR PPP: 2.3

## 2022-09-12 ENCOUNTER — MED ADMIN CHARGE (OUTPATIENT)
Age: 77
End: 2022-09-12

## 2022-09-12 ENCOUNTER — APPOINTMENT (OUTPATIENT)
Dept: CARDIOLOGY | Facility: CLINIC | Age: 77
End: 2022-09-12

## 2022-09-12 PROCEDURE — 93306 TTE W/DOPPLER COMPLETE: CPT

## 2022-09-12 RX ADMIN — PERFLUTREN MG/ML: 6.52 INJECTION, SUSPENSION INTRAVENOUS at 00:00

## 2022-09-13 RX ORDER — PERFLUTREN 6.52 MG/ML
6.52 INJECTION, SUSPENSION INTRAVENOUS
Qty: 2 | Refills: 0 | Status: COMPLETED | OUTPATIENT
Start: 2022-09-12

## 2022-09-19 LAB — INR PPP: 3

## 2022-09-20 ENCOUNTER — NON-APPOINTMENT (OUTPATIENT)
Age: 77
End: 2022-09-20

## 2022-09-20 ENCOUNTER — APPOINTMENT (OUTPATIENT)
Dept: CARDIOLOGY | Facility: CLINIC | Age: 77
End: 2022-09-20

## 2022-09-20 ENCOUNTER — RX RENEWAL (OUTPATIENT)
Age: 77
End: 2022-09-20

## 2022-09-20 VITALS — SYSTOLIC BLOOD PRESSURE: 162 MMHG | DIASTOLIC BLOOD PRESSURE: 88 MMHG

## 2022-09-20 VITALS
SYSTOLIC BLOOD PRESSURE: 187 MMHG | HEART RATE: 91 BPM | BODY MASS INDEX: 33.79 KG/M2 | DIASTOLIC BLOOD PRESSURE: 104 MMHG | HEIGHT: 70 IN | OXYGEN SATURATION: 96 % | WEIGHT: 236 LBS

## 2022-09-20 PROCEDURE — 99214 OFFICE O/P EST MOD 30 MIN: CPT

## 2022-09-20 PROCEDURE — 93000 ELECTROCARDIOGRAM COMPLETE: CPT

## 2022-09-20 NOTE — PHYSICAL EXAM

## 2022-09-20 NOTE — DISCUSSION/SUMMARY
[FreeTextEntry1] : Clinically the patient is doing well.  He has no symptoms of chest pain, shortness of breath, palpitation. His echocardiogram did show an improvement in LV function, to an EF of 40-45%.\par \par His BP is elevated, of unclear reason. He is going to start checking it religiously at home 2x/day for next 1-2 weeks and will contact me.\par \par Recent BW did demonstrate a mild BNP elevation, and he is going to try lasix daily to see if it helps with his mild dyspnea, along with spironolactone. His creatinine was 1.73. He will remain on Entresto, Toprol, Digoxin, Warfarin and ASA.\par \par I am concerned about his cholesterol, as his LDL is >150. I am increasing his Crestor to 40, and we will repeat BW in 2-3 months. Once his BP has been well controlled, he may warrant a repeat pharm stress\par We will speak in 2 weeks to see how he is doing. If no issues, I will see him again in 3 months. [EKG obtained to assist in diagnosis and management of assessed problem(s)] : EKG obtained to assist in diagnosis and management of assessed problem(s)

## 2022-09-20 NOTE — HISTORY OF PRESENT ILLNESS
[FreeTextEntry1] : Fletcher Mendosa presented to the office today for a cardiovascular follow up. \par He last saw Dr. Ochoa in 6/22.\par \par He is now 77 years old, with a history of hypertension, hypercholesterolemia, hypothyroidism, bladder cancer and paroxysmal atrial fibrillation. His atrial fibrillation was diagnosed several years ago, and he has been on warfarin. He did require cardioversion in the past. He was admitted to the hospital 11/19 in atrial fibrillation with rapid ventricular rate. At that time repeat echocardiogram showed ejection fraction of 45% with mild MR. He was given beta blocker and Cardizem with slowing of the ventricular rate. \par \par 12/18/20 he was at [place] and underwent electrical cardioversion. This was tried twice and both times his rhythm quickly reverted back to atrial fibrillation and the procedure was aborted. I spoke with Dr. Wu and he felt that the best approach would be to leave the patient in chronic atrial fibrillation with rate control. \par \par In 6/21, he presented with palpitations. The patient had paroxysmal ventricular tachycardia and was transferred to [place].  There was minimal elevation in troponin with normal CK.  ECG showed no acute ischemia.  He underwent cardiac catheterization which showed a 40% left main lesion, 70% distal LAD, 75% circumflex, 90% proximal RCA, and 1% mid RCA.  Echocardiogram showed ejection fraction of 20 to 25% with mild TR.  No significant pulmonary hypertension.  6/23/2021 he underwent a LIMA graft to the LAD and vein graft to obtuse marginal.  Had placement of a Medtronic ICD.\par \par Since last visit, He has been feeling better.  He is less short of breath and able to do some activity.  No chest pain or lightheadedness.  He is now spironolactone and off lasix.\par Repeat echo with improvement in LV function to 40-45% and mild MR.\par His LDL was 159 in 6/22.\par \par \par

## 2022-09-27 ENCOUNTER — APPOINTMENT (OUTPATIENT)
Dept: ELECTROPHYSIOLOGY | Facility: CLINIC | Age: 77
End: 2022-09-27

## 2022-09-27 ENCOUNTER — NON-APPOINTMENT (OUTPATIENT)
Age: 77
End: 2022-09-27

## 2022-09-27 PROCEDURE — 93295 DEV INTERROG REMOTE 1/2/MLT: CPT

## 2022-09-27 PROCEDURE — 93296 REM INTERROG EVL PM/IDS: CPT

## 2022-10-03 LAB — INR PPP: 2.1

## 2022-10-03 NOTE — PHYSICAL THERAPY INITIAL EVALUATION ADULT - PATIENT PROFILE REVIEW, REHAB EVAL
Patient Education     Pautas de prevención para mujeres de entre 50 y 64 años de edad  Las pruebas de detección y las vacunas son importantes para el manejo de waldrop enrico. La consejería sobre waldrop enrico también es esencial. A continuación, verá pautas en relación con esos temas para mujeres de entre 50 y 64 años de edad. Hable con waldrop proveedor de atención médica para asegurarse de que está al día con todo lo que necesita.  Prueba/Análisis Quiénes Frecuencia   Diabetes tipo 2 o prediabetes Todos los adultos empezando a los 45 años, y los adultos que no tengan síntomas a cualquier edad, que tengan sobrepeso o que madalyn obesos y que tengan 1 o más riesgos de diabetes Al menos cada ind[place] años   Uso indebido del alcohol Todas las mujeres de nestor gabriel de edad En los exámenes de rutina   Presión arterial Todas las mujeres de nestor gabriel de edad Cada dos años si waldrop presión arterial es inferior a 120/80 mm Hg, regi vez al año si waldrop presión arterial sistólica es de entre 120 y 139 mm Hg o waldrop presión arterial diastólica es de entre 80 y 89 mm Hg   Cáncer de seno Todas las mujeres de nestor gabriel de edad deben saber cómo se adali y se sienten normalmente edi senos. Así podrán notar cualquier cambio de inmediato e informárselo a waldrop proveedor de atención médica Debería hacerse regi mamografía al año hasta los 54 años. A partir de los 55 años, las mujeres deberían hacerse mamografías cada dos años o tener la opción de seguir haciéndose regi al año. Todas las mujeres deberían conocer los posibles beneficios y riesgos de hacerse mamografías exploratorias. Las mujeres deberían seguir haciéndose mamografías exploratorias mientras tengan buena enrico y waldrop expectativa de moon sea de 10 años o más1   Cáncer del andrew uterino Todas las mujeres de nestor gabriel de edad, excepto las mujeres que hayan tenido regi histerectomía total [place] cada [place] años o un [place] junto con regi prueba del virus del papiloma humano (VPH) cada isha años 
  Clamidia Las mujeres con mayor riesgo de infección En los exámenes de rutina   Cáncer colorrectal Todas las mujeres de nestor gabriel de edad Regi sigmoidoscopia flexible cada isha años o regi colonoscopia cada renetta años, o un enema con doble contraste de bario cada isha años, un análisis anual de iris oculta en las heces o un test inmunoquímico fecal, o regi prueba de ADN en heces con la frecuencia que waldrop proveedor de atención médica indique. Hable con waldrop proveedor de atención médica para saber cuáles son las mejores pruebas y análisis en waldrop alexandra   Depresión Todas las mujeres de nestor gabriel de edad En los exámenes de rutina   Gonorrea Las mujeres sexualmente activas con mayor riesgo de infección En los exámenes de rutina   Hepatitis C Todas mujeres con mayor riesgo; regi vez para las nacidas entre 1945 y 1965 En los exámenes de rutina   [place] nivel de colesterol o triglicéridos Todas las mujeres de nestor gabriel de edad que tengan riesgo de tener regi enfermedad de las arterias coronarias Al menos cada isha años   VIH Todas las mujeres En los exámenes de rutina   Cáncer de pulmón Adultas entre los 55 y 80 que hayan fumado Detección anual para las que tienen antecedentes de fumar 30 paquetes por año o que dejaron de fumar en los últimos 15 años   Obesidad Todas las mujeres de nestor gabriel de edad En los exámenes de rutina   Osteoporosis Mujeres postmenopáusicas Consulte a waldrop proveedor de atención médica   Sífilis Las mujeres con mayor riesgo de infección; hable con waldrop proveedor de atención médica En los exámenes de rutina   Tuberculosis Las mujeres con mayor riesgo de infección; hable con waldrop proveedor de atención médica Consulte a waldrop proveedor de atención médica   [place] Todas las mujeres de nestor gabriel de edad Pregunte a waldrop proveedor de atención médica   Vacuna Quiénes Frecuencia   Varicela Todas las mujeres de nestor gabriel de edad que no tienen registro de saul tenido esta infección o haberse aplicado esta vacuna Dos dosis. 
La segunda dosis debería aplicársela al menos cuatro semanas después de la primera dosis   Hepatitis A Las mujeres con mayor riesgo de infección; hable con waldrop proveedor de atención médica Dos dosis que se aplican al menos con seis meses de distancia   Hepatitis B Las mujeres con mayor riesgo de infección; hable con waldrop proveedor de atención médica Jhonny dosis a lo nicci de seis meses; la segunda dosis debería aplicarse un mes después de la primera dosis; la tercera dosis debería aplicarse al menos dos meses después de la segunda dosis y, al menos, cuatro meses después de la primera dosis   Haemophilus influenzae Tipo B (HIB) Las mujeres con mayor riesgo de infección; hable con waldrop proveedor de atención médica Regi a jhonny dosis   Gripe (flu) Todas las mujeres de nestor gabriel de edad Regi vez al año   Sarampión, paperas y rubéola (“MMR” por edi siglas en [facility]) Las mujeres de nestor gabriel de edad hasta los 60 años que no tienen registro de saul tenido estas infecciones o haberse aplicado estas vacunas Regi dosis   Antimeningocócica Las mujeres con mayor riesgo de infección; hable con waldrop proveedor de atención médica Regi dosis o más   Antineumocócica conjugada (PCV13) y de polisacáridos (PPSV23) Las mujeres con mayor riesgo de infección; hable con waldrop proveedor de atención médica PCV13: regi dosis entre los 19 y 65 años de edad (protege contra 13 tipos de bacteria neumocócica)  PPSV23: regi a dos dosis hasta los 64 años de edad, o regi dosis a partir de los 65 años (protege contra 23 tipos de bacteria neumocócica)   Refuerzo de tétanos/difteria/tos ferina (“Td/Tdap”, por edi siglas en [facility]) Todas las mujeres de nestor gabriel de edad Td cada renetta años o regi única dosis de Tdap en lugar de un refuerzo de Td después de los 18 años. Luego, Td cada renetta años   Contra el herpes zóster Todas las mujeres de 60 años o más Regi dosis   Consejería Quiénes Frecuencia   Pruebas sobre mutación de los genes BRCA para vickie el riesgo de tener cáncer 
de ovario y cáncer de seno Las mujeres con mayor riesgo de tener mutación de los genes Cuando se conoce waldrop riesgo   Cáncer de seno y prevención química del cáncer Las mujeres con alto riesgo de cáncer de seno Cuando se conoce waldrop riesgo   Dieta y actividad física Las mujeres con sobrepeso u obesidad Cuando se diagnostica y, luego, en los exámenes de rutina   Prevención de infecciones de trasmisión sexual Las mujeres con mayor riesgo de infección; hable con waldrop proveedor de atención médica En los exámenes de rutina   Uso diario de aspirina Las mujeres de 55 años o más en nestor gabriel de edad que tengan riesgo de tener problemas cardiovasculares, tales chai un ataque cerebral Cuando se conoce waldrop riesgo   Uso del tabaco y los efectos que puede tener sobre la enrico Todas las mujeres de nestor gabriel de edad Todos los exámenes   1 [employer] contra el Cáncer  Date Last Reviewed: 1/26/2016  © 5050-6678 [employer] [address] [place] [address], [place] [address]. All rights reserved. This information is not intended as a substitute for professional medical care. Always follow your healthcare professional's instructions.           
yes
yes

## 2022-10-18 ENCOUNTER — APPOINTMENT (OUTPATIENT)
Dept: ELECTROPHYSIOLOGY | Facility: CLINIC | Age: 77
End: 2022-10-18

## 2022-10-18 VITALS
HEIGHT: 70 IN | HEART RATE: 83 BPM | OXYGEN SATURATION: 96 % | BODY MASS INDEX: 32.21 KG/M2 | WEIGHT: 225 LBS | DIASTOLIC BLOOD PRESSURE: 83 MMHG | SYSTOLIC BLOOD PRESSURE: 149 MMHG

## 2022-10-18 PROCEDURE — 93283 PRGRMG EVAL IMPLANTABLE DFB: CPT

## 2022-10-18 PROCEDURE — 93000 ELECTROCARDIOGRAM COMPLETE: CPT | Mod: 59

## 2022-10-19 LAB — INR PPP: 2.4

## 2022-10-24 ENCOUNTER — NON-APPOINTMENT (OUTPATIENT)
Age: 77
End: 2022-10-24

## 2022-10-26 ENCOUNTER — RX RENEWAL (OUTPATIENT)
Age: 77
End: 2022-10-26

## 2022-11-07 LAB — INR PPP: 2.3

## 2022-11-16 ENCOUNTER — RX RENEWAL (OUTPATIENT)
Age: 77
End: 2022-11-16

## 2022-11-25 ENCOUNTER — NON-APPOINTMENT (OUTPATIENT)
Age: 77
End: 2022-11-25

## 2022-11-28 LAB — INR PPP: 3.1

## 2023-01-03 LAB — INR PPP: 2

## 2023-01-17 ENCOUNTER — APPOINTMENT (OUTPATIENT)
Dept: ELECTROPHYSIOLOGY | Facility: CLINIC | Age: 78
End: 2023-01-17
Payer: MEDICARE

## 2023-01-17 ENCOUNTER — NON-APPOINTMENT (OUTPATIENT)
Age: 78
End: 2023-01-17

## 2023-01-17 PROCEDURE — 93296 REM INTERROG EVL PM/IDS: CPT

## 2023-01-17 PROCEDURE — 93295 DEV INTERROG REMOTE 1/2/MLT: CPT

## 2023-01-18 LAB — INR PPP: 2.6

## 2023-02-03 ENCOUNTER — NON-APPOINTMENT (OUTPATIENT)
Age: 78
End: 2023-02-03

## 2023-02-07 LAB — INR PPP: 2.6

## 2023-02-16 LAB — INR PPP: 4.5

## 2023-03-08 LAB — INR PPP: 2

## 2023-03-21 LAB — INR PPP: 2.5

## 2023-03-28 ENCOUNTER — RX RENEWAL (OUTPATIENT)
Age: 78
End: 2023-03-28

## 2023-04-03 ENCOUNTER — APPOINTMENT (OUTPATIENT)
Dept: CARDIOLOGY | Facility: CLINIC | Age: 78
End: 2023-04-03
Payer: MEDICARE

## 2023-04-03 ENCOUNTER — NON-APPOINTMENT (OUTPATIENT)
Age: 78
End: 2023-04-03

## 2023-04-03 VITALS
HEART RATE: 95 BPM | DIASTOLIC BLOOD PRESSURE: 80 MMHG | HEIGHT: 70 IN | WEIGHT: 238 LBS | OXYGEN SATURATION: 95 % | SYSTOLIC BLOOD PRESSURE: 175 MMHG | BODY MASS INDEX: 34.07 KG/M2

## 2023-04-03 PROCEDURE — 93000 ELECTROCARDIOGRAM COMPLETE: CPT

## 2023-04-03 PROCEDURE — 99214 OFFICE O/P EST MOD 30 MIN: CPT

## 2023-04-03 RX ORDER — LATANOPROST/PF 0.005 %
0.01 DROPS OPHTHALMIC (EYE)
Qty: 8 | Refills: 0 | Status: ACTIVE | COMMUNITY
Start: 2023-01-10

## 2023-04-03 NOTE — DISCUSSION/SUMMARY
[FreeTextEntry1] : Fletcher is doing well, though does have some shortness of breath on exertion. His echocardiogram did show an improvement in LV function, to an EF of 40-45%, in 9/22. He is in atrial fibrillation with borderline elevated rate.\par \par He does not appear significantly volume overloaded, though I am going to send a full set of BW. He will remain on his current diuretic dosing. He will stay on Entresto, Toprol (which he will increase to 75 bid), Digoxin, Warfarin and ASA. \par \par I am concerned about his cholesterol, as his LDL is >150. He is now on a increased dose of Crestor, so we will see how his LDL responded. Once his congestion improves, we will re discuss his need for a pharm stress test. \par \par We will speak after the BW. If no issues, I will see him again in 3 months. [EKG obtained to assist in diagnosis and management of assessed problem(s)] : EKG obtained to assist in diagnosis and management of assessed problem(s)

## 2023-04-03 NOTE — HISTORY OF PRESENT ILLNESS
[FreeTextEntry1] : Fletcher Mendosa presented to the office today for a cardiovascular follow up. \par He has been under the care of Dr. Ochoa. I last saw him in 9/2020.\par \par He is now 77 years old, with a history of hypertension, hypercholesterolemia, hypothyroidism, bladder cancer and paroxysmal atrial fibrillation. His atrial fibrillation was diagnosed several years ago, and he has been on warfarin. He did require cardioversion in the past. He was admitted to the hospital 11/19 in atrial fibrillation with rapid ventricular rate. At that time repeat echocardiogram showed ejection fraction of 45% with mild MR. He was given beta blocker and Cardizem with slowing of the ventricular rate. \par \par 12/18/20 he was at [place] and underwent electrical cardioversion. This was tried twice and both times his rhythm quickly reverted back to atrial fibrillation and the procedure was aborted. I spoke with Dr. Wu and he felt that the best approach would be to leave the patient in chronic atrial fibrillation with rate control. \par \par In 6/21, he presented with palpitations. The patient had paroxysmal ventricular tachycardia and was transferred to [place].  There was minimal elevation in troponin with normal CK.  ECG showed no acute ischemia.  He underwent cardiac catheterization which showed a 40% left main lesion, 70% distal LAD, 75% circumflex, 90% proximal RCA, and 1% mid RCA.  Echocardiogram showed ejection fraction of 20 to 25% with mild TR.  No significant pulmonary hypertension.  6/23/2021 he underwent a LIMA graft to the LAD and vein graft to obtuse marginal.  Had placement of a Medtronic ICD.\par \par Since last visit, He has been feeling better.  He is less short of breath and able to do some activity.  No chest pain or lightheadedness.  He is now on spironolactone and lasix.\par Repeat echo with improvement in LV function to 40-45% and mild MR.\par His LDL was 159 in 6/22.\par \par \par

## 2023-04-03 NOTE — PHYSICAL EXAM

## 2023-04-06 ENCOUNTER — LABORATORY RESULT (OUTPATIENT)
Age: 78
End: 2023-04-06

## 2023-04-07 LAB
ALBUMIN SERPL ELPH-MCNC: 4.2 G/DL
ALP BLD-CCNC: 81 U/L
ALT SERPL-CCNC: 16 U/L
AMORPHOUS URATE CRYSTALS: PRESENT
ANION GAP SERPL CALC-SCNC: 16 MMOL/L
APPEARANCE: ABNORMAL
AST SERPL-CCNC: 18 U/L
BACTERIA: ABNORMAL /HPF
BASOPHILS # BLD AUTO: 0.13 K/UL
BASOPHILS NFR BLD AUTO: 0.9 %
BILIRUB SERPL-MCNC: 0.4 MG/DL
BILIRUBIN URINE: NEGATIVE
BLOOD URINE: ABNORMAL
BUN SERPL-MCNC: 48 MG/DL
CALCIUM SERPL-MCNC: 9.8 MG/DL
CAST: 33 /LPF
CHLORIDE SERPL-SCNC: 103 MMOL/L
CO2 SERPL-SCNC: 22 MMOL/L
COLOR: YELLOW
CREAT SERPL-MCNC: 2.03 MG/DL
DIGOXIN SERPL-MCNC: 0.6 NG/ML
EGFR: 33 ML/MIN/1.73M2
EOSINOPHIL # BLD AUTO: 0.51 K/UL
EOSINOPHIL NFR BLD AUTO: 3.6 %
EPITHELIAL CELLS: 2 /HPF
ESTIMATED AVERAGE GLUCOSE: 166 MG/DL
FERRITIN SERPL-MCNC: 203 NG/ML
GLUCOSE QUALITATIVE U: NEGATIVE MG/DL
GLUCOSE SERPL-MCNC: 156 MG/DL
HBA1C MFR BLD HPLC: 7.4 %
HCT VFR BLD CALC: 52.4 %
HGB BLD-MCNC: 17 G/DL
IRON SATN MFR SERPL: 24 %
IRON SERPL-MCNC: 73 UG/DL
KETONES URINE: NEGATIVE MG/DL
LEUKOCYTE ESTERASE URINE: ABNORMAL
LPL SERPL-CCNC: 78 U/L
LYMPHOCYTES # BLD AUTO: 3.18 K/UL
LYMPHOCYTES NFR BLD AUTO: 22.3 %
MAN DIFF?: NORMAL
MCHC RBC-ENTMCNC: 29 PG
MCHC RBC-ENTMCNC: 32.4 GM/DL
MCV RBC AUTO: 89.3 FL
MICROSCOPIC-UA: NORMAL
MONOCYTES # BLD AUTO: 1.53 K/UL
MONOCYTES NFR BLD AUTO: 10.7 %
MUCUS: PRESENT
NEUTROPHILS # BLD AUTO: 8.8 K/UL
NEUTROPHILS NFR BLD AUTO: 61.6 %
NITRITE URINE: POSITIVE
NT-PROBNP SERPL-MCNC: 1104 PG/ML
PH URINE: 5.5
PLATELET # BLD AUTO: 242 K/UL
POTASSIUM SERPL-SCNC: 4.5 MMOL/L
PROT SERPL-MCNC: 7.4 G/DL
PROTEIN URINE: 100 MG/DL
RBC # BLD: 5.87 M/UL
RBC # FLD: 14.6 %
RED BLOOD CELLS URINE: 5 /HPF
SODIUM SERPL-SCNC: 141 MMOL/L
SPECIFIC GRAVITY URINE: 1.02
TIBC SERPL-MCNC: 304 UG/DL
TSH SERPL-ACNC: 6.4 UIU/ML
UIBC SERPL-MCNC: 231 UG/DL
UROBILINOGEN URINE: 0.2 MG/DL
WBC # FLD AUTO: 14.28 K/UL
WHITE BLOOD CELLS URINE: 173 /HPF

## 2023-04-17 LAB — INR PPP: 2.4

## 2023-04-18 ENCOUNTER — APPOINTMENT (OUTPATIENT)
Dept: ELECTROPHYSIOLOGY | Facility: CLINIC | Age: 78
End: 2023-04-18
Payer: MEDICARE

## 2023-04-18 ENCOUNTER — NON-APPOINTMENT (OUTPATIENT)
Age: 78
End: 2023-04-18

## 2023-04-18 PROCEDURE — 93296 REM INTERROG EVL PM/IDS: CPT

## 2023-04-18 PROCEDURE — 93295 DEV INTERROG REMOTE 1/2/MLT: CPT

## 2023-05-09 LAB — INR PPP: 2.6

## 2023-05-22 LAB — INR PPP: 2.9

## 2023-05-24 RX ORDER — SACUBITRIL AND VALSARTAN 24; 26 MG/1; MG/1
24-26 TABLET, FILM COATED ORAL
Qty: 180 | Refills: 0 | Status: ACTIVE | COMMUNITY
Start: 2022-06-17 | End: 1900-01-01

## 2023-06-02 ENCOUNTER — NON-APPOINTMENT (OUTPATIENT)
Age: 78
End: 2023-06-02

## 2023-06-05 ENCOUNTER — APPOINTMENT (OUTPATIENT)
Dept: CARDIOLOGY | Facility: CLINIC | Age: 78
End: 2023-06-05
Payer: MEDICARE

## 2023-06-05 PROCEDURE — 78452 HT MUSCLE IMAGE SPECT MULT: CPT

## 2023-06-05 PROCEDURE — 93015 CV STRESS TEST SUPVJ I&R: CPT

## 2023-06-05 PROCEDURE — A9500: CPT

## 2023-06-09 LAB
ALBUMIN SERPL ELPH-MCNC: 4.1 G/DL
ALP BLD-CCNC: 75 U/L
ALT SERPL-CCNC: 17 U/L
ANION GAP SERPL CALC-SCNC: 18 MMOL/L
AST SERPL-CCNC: 20 U/L
BILIRUB SERPL-MCNC: 0.4 MG/DL
BUN SERPL-MCNC: 36 MG/DL
CALCIUM SERPL-MCNC: 9.3 MG/DL
CHLORIDE SERPL-SCNC: 103 MMOL/L
CO2 SERPL-SCNC: 19 MMOL/L
CREAT SERPL-MCNC: 1.61 MG/DL
EGFR: 44 ML/MIN/1.73M2
GLUCOSE SERPL-MCNC: 191 MG/DL
NT-PROBNP SERPL-MCNC: 1231 PG/ML
POTASSIUM SERPL-SCNC: 5.6 MMOL/L
PROT SERPL-MCNC: 6.9 G/DL
SODIUM SERPL-SCNC: 140 MMOL/L

## 2023-06-12 ENCOUNTER — APPOINTMENT (OUTPATIENT)
Dept: NEPHROLOGY | Facility: CLINIC | Age: 78
End: 2023-06-12
Payer: MEDICARE

## 2023-06-12 DIAGNOSIS — N18.32 CHRONIC KIDNEY DISEASE, STAGE 3B: ICD-10-CM

## 2023-06-12 DIAGNOSIS — Z82.49 FAMILY HISTORY OF ISCHEMIC HEART DISEASE AND OTHER DISEASES OF THE CIRCULATORY SYSTEM: ICD-10-CM

## 2023-06-12 DIAGNOSIS — E87.5 HYPERKALEMIA: ICD-10-CM

## 2023-06-12 PROCEDURE — 99204 OFFICE O/P NEW MOD 45 MIN: CPT | Mod: 95

## 2023-06-12 RX ORDER — PANTOPRAZOLE 40 MG/1
40 TABLET, DELAYED RELEASE ORAL DAILY
Qty: 30 | Refills: 0 | Status: DISCONTINUED | COMMUNITY
End: 2023-06-12

## 2023-06-12 RX ORDER — LORATADINE 10 MG
17 TABLET,DISINTEGRATING ORAL DAILY
Qty: 1 | Refills: 1 | Status: DISCONTINUED | COMMUNITY
End: 2023-06-12

## 2023-06-12 RX ORDER — OXYCODONE 5 MG/1
5 TABLET ORAL EVERY 6 HOURS
Qty: 20 | Refills: 0 | Status: DISCONTINUED | COMMUNITY
End: 2023-06-12

## 2023-06-12 RX ORDER — ROSUVASTATIN CALCIUM 20 MG/1
20 TABLET, FILM COATED ORAL
Qty: 90 | Refills: 3 | Status: DISCONTINUED | COMMUNITY
Start: 2022-10-26 | End: 2023-06-12

## 2023-06-12 RX ORDER — AZITHROMYCIN 250 MG/1
250 TABLET, FILM COATED ORAL
Qty: 6 | Refills: 0 | Status: DISCONTINUED | COMMUNITY
Start: 2023-01-13 | End: 2023-06-12

## 2023-06-12 RX ORDER — PREDNISONE 20 MG/1
20 TABLET ORAL
Qty: 10 | Refills: 0 | Status: DISCONTINUED | COMMUNITY
Start: 2023-01-13 | End: 2023-06-12

## 2023-06-12 RX ORDER — CEFUROXIME AXETIL 500 MG/1
500 TABLET ORAL
Qty: 14 | Refills: 0 | Status: DISCONTINUED | COMMUNITY
Start: 2023-01-09 | End: 2023-06-12

## 2023-06-12 NOTE — HISTORY OF PRESENT ILLNESS
[Home] : at home, [unfilled] , at the time of the visit. [Medical Office: ([facility])___] : at the medical office located in  [Spouse] : spouse [Verbal consent obtained from patient] : the patient, [unfilled] [FreeTextEntry1] : 78-year-old male with history of hypertension, hyperlipidemia, CAD status post CABG, bladder cancer status post cystectomy with urostomy here to establish renal care on televisit.\par Patient is being followed by Dr. Chu.  He had recent complaints of ongoing shortness of breath with exertion.  After completing the stress test it was advised that he undergo cardiac cath.\par He does have a history of congestive heart failure.  He is maintained on furosemide, Aldactone, and Entresto.\par Renal function has been in the 1.6-2.0 range.  Never saw a nephrologist.\par Patient denies any chronic use of NSAIDs only when as needed playing golf.\par Potassium was elevated more recently to 5.6 on blood test.  Patient does eat bananas and tomatoes as high culprit foods.\par He drinks about 7 to 8 glasses of fluid a day.

## 2023-06-12 NOTE — ASSESSMENT
[FreeTextEntry1] : 78-year-old male with history of hypertension, hyperlipidemia, bladder cancer status post cystectomy and urostomy with CAD and CKD stage IIIb.\par CKD stage IIIb likely in the setting of hypertension and age-related nephrosclerosis.\par Discussed with patient his renal function creatinine baseline is around 1.6-1.7.\par Importance of maintenance of euvolemia was discussed with patient.\par Advised patient to decrease his fluid intake to 5 to 6 glasses instead.  Continue furosemide.  Daily weights.  Low-sodium diet.\par Hyperkalemia: Patient was advised to hold Aldactone for now.  Continue Entresto until day of cath procedure.\par Patient will be repeating his potassium next week with cardiology.\par Discussed with patient and wife about high potassium foods to avoid.  For now decrease bananas and tomatoes.\par Patient was given website [email or web address] to review.\par As long as potassium remains stable no renal contraindication for coronary angiogram. Risk benefit discussed with pt. \par Patient and can obtain a renal sonogram at some point nonurgently.\par Patient follows with [facility] for bladder cancer on an annual basis.\par Follow-up can be done post angiogram.\par All questions were answered.\par Follow-up likely in 4 months.

## 2023-06-12 NOTE — CONSULT LETTER
[Dear  ___] : Dear  [unfilled], [Consult Letter:] : I had the pleasure of evaluating your patient, [unfilled]. [( Thank you for referring [unfilled] for consultation for _____ )] : Thank you for referring [unfilled] for consultation for [unfilled] [Please see my note below.] : Please see my note below. [Consult Closing:] : Thank you very much for allowing me to participate in the care of this patient.  If you have any questions, please do not hesitate to contact me. [Sincerely,] : Sincerely, [FreeTextEntry3] : Shell Bauman MD\par  [facility]\par Division of Nephrology and Hypertension\par \par

## 2023-06-13 LAB — INR PPP: 2.9

## 2023-06-29 ENCOUNTER — APPOINTMENT (OUTPATIENT)
Dept: CARDIOLOGY | Facility: CLINIC | Age: 78
End: 2023-06-29

## 2023-06-29 LAB
25(OH)D3 SERPL-MCNC: 17 NG/ML
ALBUMIN SERPL ELPH-MCNC: 4.2 G/DL
ANION GAP SERPL CALC-SCNC: 15 MMOL/L
BUN SERPL-MCNC: 36 MG/DL
CALCIUM SERPL-MCNC: 9.5 MG/DL
CALCIUM SERPL-MCNC: 9.5 MG/DL
CHLORIDE SERPL-SCNC: 106 MMOL/L
CO2 SERPL-SCNC: 21 MMOL/L
CREAT SERPL-MCNC: 1.51 MG/DL
DEPRECATED KAPPA LC FREE/LAMBDA SER: 2.15 RATIO
EGFR: 47 ML/MIN/1.73M2
ESTIMATED AVERAGE GLUCOSE: 171 MG/DL
GLUCOSE SERPL-MCNC: 145 MG/DL
HBA1C MFR BLD HPLC: 7.6 %
HBV CORE IGG+IGM SER QL: NONREACTIVE
HBV SURFACE AB SER QL: NONREACTIVE
HBV SURFACE AG SER QL: NONREACTIVE
HCV AB SER QL: NONREACTIVE
HCV S/CO RATIO: 0.13 S/CO
KAPPA LC CSF-MCNC: 3.72 MG/DL
KAPPA LC SERPL-MCNC: 7.98 MG/DL
MAGNESIUM SERPL-MCNC: 1.9 MG/DL
PARATHYROID HORMONE INTACT: 76 PG/ML
PHOSPHATE SERPL-MCNC: 3.2 MG/DL
POTASSIUM SERPL-SCNC: 4.4 MMOL/L
SODIUM SERPL-SCNC: 142 MMOL/L
URATE SERPL-MCNC: 9.8 MG/DL

## 2023-06-29 NOTE — DISCUSSION/SUMMARY
[FreeTextEntry1] : 78 yo M with PMHx of CAD, s/p CABG by Dr. Harding 2 years ago, Stent (LIMA graft to the LAD and vein graft to obtuse marginal), COPD, HTN, HLD, CKD3 (f/b Dr. Shell Bauman) , bladder ca s/p cystectomy and urostomy, pAfib on Coumadin s/p DCCV x2, AICD, former smoker (25p/yr) scheduled for [facility] on 7/6 6:30 for persistent worsening SOB. \par Pt has been f/u with Dr. Jatinder Chu and had abnormal NST \par (Large sized mod defect in inferior wall that is fixed, doesn't normalized with prone imaging suggestive of ischemia. EF 58%)\par \par 1/19 in atrial fibrillation with RVR, TTE 45% with mild MR. He was given beta blocker and Cardizem with slowing of the ventricular rate. \par 12/18/20 pt had another DCCV, his rhythm quickly reverted back to Afib, and the procedure was aborted. \par In 6/21, c/o palpitations with aroxysmal ventricular tachycardia and was transferred to [facility] \par LHC: 40% left main lesion, 70% distal LAD, 75% circumflex, 90% proximal RCA, and 1% mid RCA. \par TTE of 20 to 25% with mild TR. 6/23/2021 he underwent a LIMA graft to the LAD and vein graft to obtuse marginal, and had Medtronic ICD.\par \par Repeat echo with improvement in LV function to 40-45% and mild MR.\par His LDL was 159 in 6/22.\par \par \par medications reconciled\par Hold Coumadin for 3 days (last dose 7/2) \par NPO after MN, \par procedure process discussed, pt is well aware of procedure. \par All question are answered \par

## 2023-06-29 NOTE — REASON FOR VISIT
[Patient] : the patient [Home] : at home, [unfilled] , at the time of the visit. [Verbal consent obtained from patient] : the patient, [unfilled] [FreeTextEntry3] : NIC [FreeTextEntry1] : 78 yo M with PMHx of CAD, s/p CABG by Dr. Harding 2 years ago, COPD, HTN, HLD, CKD3 (f/b Dr. Shell Bauman) , bladder ca s/p cystectomy and urostomy, former smoker (25p/yr) scheduled for [facility] on 7/6 6:30 for persistent worsening SOB. Pt has been f/u with Dr. Jatinder Chu and had abnormal NST. \par \par Large sized mod defect in inferior wall that is fixed, doesn't normalized with prone imaging suggestive of ischemia. EF 58%

## 2023-06-30 LAB — M PROTEIN SPEC IFE-MCNC: NORMAL

## 2023-07-04 LAB — INR PPP: 3.1

## 2023-07-06 ENCOUNTER — OUTPATIENT (OUTPATIENT)
Dept: OUTPATIENT SERVICES | Facility: HOSPITAL | Age: 78
LOS: 1 days | End: 2023-07-06
Payer: MEDICARE

## 2023-07-06 ENCOUNTER — TRANSCRIPTION ENCOUNTER (OUTPATIENT)
Age: 78
End: 2023-07-06

## 2023-07-06 VITALS
HEART RATE: 74 BPM | SYSTOLIC BLOOD PRESSURE: 126 MMHG | DIASTOLIC BLOOD PRESSURE: 68 MMHG | OXYGEN SATURATION: 96 % | RESPIRATION RATE: 15 BRPM

## 2023-07-06 VITALS
DIASTOLIC BLOOD PRESSURE: 75 MMHG | TEMPERATURE: 97 F | WEIGHT: 229.94 LBS | HEIGHT: 70 IN | RESPIRATION RATE: 16 BRPM | OXYGEN SATURATION: 95 % | SYSTOLIC BLOOD PRESSURE: 157 MMHG | HEART RATE: 82 BPM

## 2023-07-06 DIAGNOSIS — Z98.890 OTHER SPECIFIED POSTPROCEDURAL STATES: Chronic | ICD-10-CM

## 2023-07-06 DIAGNOSIS — R94.39 ABNORMAL RESULT OF OTHER CARDIOVASCULAR FUNCTION STUDY: ICD-10-CM

## 2023-07-06 DIAGNOSIS — Z95.1 PRESENCE OF AORTOCORONARY BYPASS GRAFT: Chronic | ICD-10-CM

## 2023-07-06 DIAGNOSIS — Z95.810 PRESENCE OF AUTOMATIC (IMPLANTABLE) CARDIAC DEFIBRILLATOR: Chronic | ICD-10-CM

## 2023-07-06 LAB
ALBUMIN SERPL ELPH-MCNC: 4.1 G/DL — SIGNIFICANT CHANGE UP (ref 3.3–5)
ALP SERPL-CCNC: 77 U/L — SIGNIFICANT CHANGE UP (ref 40–120)
ALT FLD-CCNC: 14 U/L — SIGNIFICANT CHANGE UP (ref 10–45)
ANION GAP SERPL CALC-SCNC: 15 MMOL/L — SIGNIFICANT CHANGE UP (ref 5–17)
APTT BLD: 25.7 SEC — LOW (ref 27.5–35.5)
AST SERPL-CCNC: 20 U/L — SIGNIFICANT CHANGE UP (ref 10–40)
BILIRUB SERPL-MCNC: 0.5 MG/DL — SIGNIFICANT CHANGE UP (ref 0.2–1.2)
BUN SERPL-MCNC: 39 MG/DL — HIGH (ref 7–23)
CALCIUM SERPL-MCNC: 9.4 MG/DL — SIGNIFICANT CHANGE UP (ref 8.4–10.5)
CHLORIDE SERPL-SCNC: 103 MMOL/L — SIGNIFICANT CHANGE UP (ref 96–108)
CO2 SERPL-SCNC: 22 MMOL/L — SIGNIFICANT CHANGE UP (ref 22–31)
CREAT SERPL-MCNC: 1.65 MG/DL — HIGH (ref 0.5–1.3)
EGFR: 42 ML/MIN/1.73M2 — LOW
GLUCOSE SERPL-MCNC: 165 MG/DL — HIGH (ref 70–99)
HCT VFR BLD CALC: 48.9 % — SIGNIFICANT CHANGE UP (ref 39–50)
HGB BLD-MCNC: 15.8 G/DL — SIGNIFICANT CHANGE UP (ref 13–17)
INR BLD: 1.2 RATIO — HIGH (ref 0.88–1.16)
MCHC RBC-ENTMCNC: 28.3 PG — SIGNIFICANT CHANGE UP (ref 27–34)
MCHC RBC-ENTMCNC: 32.3 GM/DL — SIGNIFICANT CHANGE UP (ref 32–36)
MCV RBC AUTO: 87.6 FL — SIGNIFICANT CHANGE UP (ref 80–100)
NRBC # BLD: 0 /100 WBCS — SIGNIFICANT CHANGE UP (ref 0–0)
PLATELET # BLD AUTO: 208 K/UL — SIGNIFICANT CHANGE UP (ref 150–400)
POTASSIUM SERPL-MCNC: 4.3 MMOL/L — SIGNIFICANT CHANGE UP (ref 3.5–5.3)
POTASSIUM SERPL-SCNC: 4.3 MMOL/L — SIGNIFICANT CHANGE UP (ref 3.5–5.3)
PROT SERPL-MCNC: 7.4 G/DL — SIGNIFICANT CHANGE UP (ref 6–8.3)
PROTHROM AB SERPL-ACNC: 14 SEC — HIGH (ref 10.5–13.4)
RBC # BLD: 5.58 M/UL — SIGNIFICANT CHANGE UP (ref 4.2–5.8)
RBC # FLD: 14.5 % — SIGNIFICANT CHANGE UP (ref 10.3–14.5)
SODIUM SERPL-SCNC: 140 MMOL/L — SIGNIFICANT CHANGE UP (ref 135–145)
WBC # BLD: 12.04 K/UL — HIGH (ref 3.8–10.5)
WBC # FLD AUTO: 12.04 K/UL — HIGH (ref 3.8–10.5)

## 2023-07-06 PROCEDURE — 99152 MOD SED SAME PHYS/QHP 5/>YRS: CPT

## 2023-07-06 PROCEDURE — 85730 THROMBOPLASTIN TIME PARTIAL: CPT

## 2023-07-06 PROCEDURE — C1894: CPT

## 2023-07-06 PROCEDURE — C1887: CPT

## 2023-07-06 PROCEDURE — C1769: CPT

## 2023-07-06 PROCEDURE — 85027 COMPLETE CBC AUTOMATED: CPT

## 2023-07-06 PROCEDURE — 85610 PROTHROMBIN TIME: CPT

## 2023-07-06 PROCEDURE — 93005 ELECTROCARDIOGRAM TRACING: CPT

## 2023-07-06 PROCEDURE — 80053 COMPREHEN METABOLIC PANEL: CPT

## 2023-07-06 PROCEDURE — 93455 CORONARY ART/GRFT ANGIO S&I: CPT

## 2023-07-06 PROCEDURE — 93455 CORONARY ART/GRFT ANGIO S&I: CPT | Mod: 26

## 2023-07-06 PROCEDURE — 93010 ELECTROCARDIOGRAM REPORT: CPT

## 2023-07-06 RX ORDER — WARFARIN SODIUM 2.5 MG/1
1 TABLET ORAL
Qty: 30 | Refills: 0
Start: 2023-07-06 | End: 2023-08-04

## 2023-07-06 RX ORDER — SODIUM CHLORIDE 9 MG/ML
1000 INJECTION INTRAMUSCULAR; INTRAVENOUS; SUBCUTANEOUS
Refills: 0 | Status: DISCONTINUED | OUTPATIENT
Start: 2023-07-06 | End: 2023-07-20

## 2023-07-06 RX ORDER — UMECLIDINIUM BROMIDE AND VILANTEROL TRIFENATATE 62.5; 25 UG/1; UG/1
1 POWDER RESPIRATORY (INHALATION)
Qty: 0 | Refills: 0 | DISCHARGE

## 2023-07-06 RX ADMIN — SODIUM CHLORIDE 75 MILLILITER(S): 9 INJECTION INTRAMUSCULAR; INTRAVENOUS; SUBCUTANEOUS at 07:57

## 2023-07-06 NOTE — ASU DISCHARGE PLAN (ADULT/PEDIATRIC) - PATIENT BELONGINGS
CT scan shows diverticulitis  Repeat CT abd/pelvis w/ iv contrast to r/o appendicitis or abscess.  Continue Cipro and Flagyl for now.   If negative would send for outpt pelvic US for cysts.  Benign abd exam w/o rebound or guarding or any TTP.   UTI treatment as below  CT scan shows unchanged diverticulitis so will continue treatment until 9/26 for total of 10 days and advance diet. Patient's belongings returned

## 2023-07-06 NOTE — ASU DISCHARGE PLAN (ADULT/PEDIATRIC) - CARE PROVIDER_API CALL
Efraín Chu  Cardiovascular Disease  [address], [place] [id number]  Phone: [phone]  Fax: [phone]  Follow Up Time:

## 2023-07-06 NOTE — ASU DISCHARGE PLAN (ADULT/PEDIATRIC) - NS MD DC FALL RISK RISK
For information on Fall & Injury Prevention, visit: [email or web address] OR  [email or web address] OR  https://www.cdc.gov/steadi/patient.html

## 2023-07-06 NOTE — H&P CARDIOLOGY - HISTORY OF PRESENT ILLNESS
75 y/o male with a PMH of Afib, bladder CA, Hyperlipidemia, HTN, Chronic bronchitis, and Hypothyroidism presenting with rapid heart rate and worsening shortness of breath after being transferred from an outside hospital due to found Afib with RVR.    The patient was in his normal state of health until about a week ago when he noticed he was coming down with a cough and nasal/chest congestion. The cough is productive of brown sputum. He states that this has happened before and he's been treated for bronchitis ~2x a year, every year. It usually resolves with his home inhaler and/or antibiotics prescribed by his PCP. Throughout the week, the cough did not worsen nor improve. He reports no other symptoms at that time other than feeling more fatigued than usual.     Yesterday, the pt became more short of breath and felt a rapid heart beat. He was sent to the [place] ED when his PCP found him to be in Afib with RVR. The ED confirmed this finding and he was given dilt gtt. His HR was sustained at 200 bpm with hypotension even after initial treatment and failed adenosine and he required synchronized cardioversion with improved HR to 120-150. During this time, he was also found to have a temp of 102F. He reports not feeling any fever prior to this. He was transferred to [facility] for further evaluation. The patient denies any chest pain, abdominal pain, diaphoresis, N/V, leg swelling, headache, changes in vision, or changes in bowel movement. As of today, his only remaining symptoms are a mild cough. He states that both his SOB and feelings of rapid heart beat have resolved.    Of note, the patient has a longstanding history of Afib that failed cardioversion 2 years ago and is now being pharmacologically managed with diltiazem and toprol. His other PMH includes HTN and hyperlipidemia, diagnosed 20 years ago, and hypothyroidism, for which he also takes home medications. He reports compliancy with all medications. 10 years ago, he was diagnosed with bladder CA and had a urostomy placed at that time (along with prostate removal). The urostomy is still in place and he typically changes it twice a week. He reports seeing no changes in the urine this week and says that it is normal for it to be cloudy/full of mucus. He is not able to feel any changes in urination (i.e. burning). His family history is significant for MI in both his mother and father in their 70s.     The patient has a 20 year pack history of smoking and quit in 1985. He has not smoked since. He drinks alcohol occasionally but is limited to 1 glass of wine a day at most. He does not take any recreational drugs. He lives with his wife of 50 years and adult son. He is occasionally sexually active with his wife only. He reports exercising 2x a week and tries to stay physically active since retiring 8 years ago. His wife and children are healthy and he recalls no prior sick contacts. He received both doses of the [place] COVID-19 vaccination.     In the ED:   Vitals: T 102.3, , /75, RR 16, SpO2 97% on 2L NC  s/p tylenol 975mg, 1g ceftriaxone, amiodarone 150mg IV, brief amio infusion  78 yo M with PMHx of CAD, s/p CABG by Dr. Harding 2 years ago, Stent (LIMA graft to the LAD and vein graft to obtuse marginal), COPD, HTN, HLD, CKD3 (f/b Dr. Shell Bauman) , bladder ca s/p cystectomy and urostomy, pAfib on Coumadin last dose 7/2/23 s/p DCCV x2, AICD, former smoker (25p/yr) scheduled for [facility] for persistent worsening SOB.   Pt has been f/u with Dr. Jatinder Chu and had abnormal NST   (Large sized mod defect in inferior wall that is fixed, doesn't normalized with prone imaging suggestive of ischemia. EF 58% 6/5/23)    1/19 in atrial fibrillation with RVR, TTE 45% with mild MR. He was given beta blocker and Cardizem with slowing of the ventricular rate.   12/18/20 pt had another DCCV, his rhythm quickly reverted back to Afib, and the procedure was aborted.   In 6/21, c/o palpitations with aroxysmal ventricular tachycardia and was transferred to [facility]: 40% left main lesion, 70% distal LAD, 75% circumflex, 90% proximal RCA, and 1% mid RCA.   TTE of 20 to 25% with mild TR. 6/23/2021 he underwent a LIMA graft to the LAD and vein graft to obtuse marginal, and had Medtronic ICD.  Repeat echo with improvement in LV function to 40-45% and mild MR 9/12/22         76 yo M with PMHx of CAD, s/p CABG by Dr. Harding 2 years ago, Stent (LIMA graft to the LAD and vein graft to obtuse marginal), COPD, HTN, HLD, CKD3 (f/b Dr. Shell Bauman, baseline Cr 1.5-2.0) , Bladder ca s/p cystectomy and urostomy, pAfib on Coumadin last dose 7/2/23 s/p DCCV x2, AICD, former smoker (25p/yr) scheduled for [facility] for persistent worsening SOB.   Pt has been f/u with Dr. Jatinder Chu and had abnormal NST   (Large sized mod defect in inferior wall that is fixed, doesn't normalized with prone imaging suggestive of ischemia. EF 58% 6/5/23)    1/19 in atrial fibrillation with RVR, TTE 45% with mild MR. He was given beta blocker and Cardizem with slowing of the ventricular rate.   12/18/20 pt had another DCCV, his rhythm quickly reverted back to Afib, and the procedure was aborted.   In 6/21, c/o palpitations with aroxysmal ventricular tachycardia and [facility]% left main lesion, 70% distal LAD, 75% circumflex, 90% proximal RCA, and 1% mid RCA.   TTE of 20 to 25% with mild TR. 6/23/2021 he underwent a LIMA graft to the LAD and vein graft to obtuse marginal, and had Medtronic ICD.  Repeat echo with improvement in LV function to 40-45% and mild MR 9/12/22         76 yo M with PMHx of CAD, s/p CABG by Dr. Harding 2 years ago, Stent (LIMA graft to the LAD and vein graft to obtuse marginal), COPD, HTN, HLD, CKD3 (f/b Dr. Shell Bauman, baseline Cr 1.5-2.0) , Bladder ca s/p cystectomy and urostomy, pAfib on Coumadin last dose 7/2/23 s/p DCCV x2, AICD, CHF (Systolic) former smoker (25p/yr) scheduled for [facility] for persistent worsening SOB.   Pt has been f/u with Dr. Jatinder Chu and had abnormal NST   (Large sized mod defect in inferior wall that is fixed, doesn't normalized with prone imaging suggestive of ischemia. EF 58% 6/5/23)    1/19 in atrial fibrillation with RVR, TTE 45% with mild MR. He was given beta blocker and Cardizem with slowing of the ventricular rate.   12/18/20 pt had another DCCV, his rhythm quickly reverted back to Afib, and the procedure was aborted.   In 6/21, c/o palpitations with aroxysmal ventricular tachycardia and was transferred to [facility]: 40% left main lesion, 70% distal LAD, 75% circumflex, 90% proximal RCA, and 1% mid RCA.   TTE of 20 to 25% with mild TR. 6/23/2021 he underwent a LIMA graft to the LAD and vein graft to obtuse marginal, and had Medtronic ICD.  Repeat echo with improvement in LV function to 40-45% and mild MR 9/12/22

## 2023-07-06 NOTE — H&P CARDIOLOGY - NSICDXPASTSURGICALHX_GEN_ALL_CORE_FT
PAST SURGICAL HISTORY:  History of bladder surgery on urostomy     PAST SURGICAL HISTORY:  History of bladder surgery on urostomy    S/P CABG x 3      PAST SURGICAL HISTORY:  H/O knee surgery     History of automatic internal cardiac defibrillator (AICD)     History of bladder surgery on urostomy    S/P CABG x 3

## 2023-07-06 NOTE — ASU DISCHARGE PLAN (ADULT/PEDIATRIC) - ASU DISCHARGE DATE/TIME
From: Nurys Wagner  To: Darius Diez  Sent: 1/3/2023  9:47 AM CST  Subject: Nuno Bernard - ENT    This message is being sent by Sharon Wagner on behalf of Nurys Wagner.    Good morning - Nurys 06-Jul-2023 10:50

## 2023-07-10 ENCOUNTER — NON-APPOINTMENT (OUTPATIENT)
Age: 78
End: 2023-07-10

## 2023-07-10 ENCOUNTER — APPOINTMENT (OUTPATIENT)
Dept: CARDIOLOGY | Facility: CLINIC | Age: 78
End: 2023-07-10
Payer: MEDICARE

## 2023-07-10 VITALS
HEIGHT: 70 IN | DIASTOLIC BLOOD PRESSURE: 75 MMHG | HEART RATE: 84 BPM | WEIGHT: 238 LBS | SYSTOLIC BLOOD PRESSURE: 150 MMHG | OXYGEN SATURATION: 95 % | BODY MASS INDEX: 34.07 KG/M2

## 2023-07-10 VITALS — SYSTOLIC BLOOD PRESSURE: 138 MMHG | DIASTOLIC BLOOD PRESSURE: 68 MMHG

## 2023-07-10 PROCEDURE — 93000 ELECTROCARDIOGRAM COMPLETE: CPT

## 2023-07-10 PROCEDURE — 99214 OFFICE O/P EST MOD 30 MIN: CPT

## 2023-07-10 RX ORDER — AMOXICILLIN 500 MG/1
500 TABLET, FILM COATED ORAL
Qty: 28 | Refills: 0 | Status: ACTIVE | COMMUNITY
Start: 2023-05-03

## 2023-07-10 NOTE — HISTORY OF PRESENT ILLNESS
[FreeTextEntry1] : Fletcher Mendosa presented to the office today for a cardiovascular follow up. \par He has been under the care of Dr. Ochoa. I last saw him in 4/23.\par \par He is now 77 years old, with a history of hypertension, hypercholesterolemia, hypothyroidism, bladder cancer and paroxysmal atrial fibrillation. His atrial fibrillation was diagnosed several years ago, and he has been on warfarin. He did require cardioversion in the past. He was admitted to the hospital 11/19 in atrial fibrillation with rapid ventricular rate. At that time repeat echocardiogram showed ejection fraction of 45% with mild MR. He was given beta blocker and Cardizem with slowing of the ventricular rate. \par \par 12/18/20 he was at [place] and underwent electrical cardioversion. This was tried twice and both times his rhythm quickly reverted back to atrial fibrillation and the procedure was aborted. I spoke with Dr. Wu and he felt that the best approach would be to leave the patient in chronic atrial fibrillation with rate control. \par \par In 6/21, he presented with palpitations. The patient had paroxysmal ventricular tachycardia and was transferred to [place].  There was minimal elevation in troponin with normal CK.  ECG showed no acute ischemia.  He underwent cardiac catheterization which showed a 40% left main lesion, 70% distal LAD, 75% circumflex, 90% proximal RCA, and 1% mid RCA.  Echocardiogram showed ejection fraction of 20 to 25% with mild TR.  No significant pulmonary hypertension.  6/23/2021 he underwent a LIMA graft to the LAD and vein graft to obtuse marginal.  Had placement of a Medtronic ICD.\par \par Since last visit, He is short of breath with activity. \par Echo with improvement in LV function to 40-45% and mild MR.\par Pharm stress with inferior infarct/inferior wall hypokinesis\par His K and Creatinine were up and his spironolactone was stopped. His creatinine is now 1.61.\par Because of dyspnea, I sent him for cath which showed patent SVG-OM1 and mid LAD. The LIMA to D1 was atretic. The RCA had severe disease and was small sized. The mid Cx  was noted and supplied a sizeable myocardium. \par \par

## 2023-07-10 NOTE — PHYSICAL EXAM

## 2023-07-10 NOTE — DISCUSSION/SUMMARY
[FreeTextEntry1] : Fletcher is doing well, though continues to have some shortness of breath on exertion. His echocardiogram did show an improvement in LV function, to an EF of 40-45%, in 9/22. He is in atrial fibrillation with an acceptable rate.\par \par He is short of breath, and recent cath showed a patent SVG-OM1 and mid LAD though the LIMA to D1 was atretic. The RCA had severe disease and was small sized. A mid Cx  was noted and supplied a sizeable myocardium. \par \par Given his recurrent symptoms, I agree with proceeding with PCI to his LCx . He will double his lasix to 40, and will remain off spironolactone, in the setting of hyperkalemia.\par His most recent LDL on record was in the 150 range, and he has not had a recent repeat check.  I am sending this, and he will remain on max dose Crestor for now.  He will stay on Entresto, Toprol, digoxin, aspirin, and warfarin.\par \par I will see him 2 weeks after the PCI. [EKG obtained to assist in diagnosis and management of assessed problem(s)] : EKG obtained to assist in diagnosis and management of assessed problem(s)

## 2023-07-14 ENCOUNTER — TRANSCRIPTION ENCOUNTER (OUTPATIENT)
Age: 78
End: 2023-07-14

## 2023-07-14 ENCOUNTER — OUTPATIENT (OUTPATIENT)
Dept: OUTPATIENT SERVICES | Facility: HOSPITAL | Age: 78
LOS: 1 days | End: 2023-07-14
Payer: MEDICARE

## 2023-07-14 VITALS
RESPIRATION RATE: 16 BRPM | HEART RATE: 82 BPM | TEMPERATURE: 98 F | DIASTOLIC BLOOD PRESSURE: 66 MMHG | OXYGEN SATURATION: 97 % | SYSTOLIC BLOOD PRESSURE: 131 MMHG

## 2023-07-14 VITALS — WEIGHT: 231.93 LBS | HEIGHT: 70 IN

## 2023-07-14 DIAGNOSIS — Z98.890 OTHER SPECIFIED POSTPROCEDURAL STATES: Chronic | ICD-10-CM

## 2023-07-14 DIAGNOSIS — Z95.810 PRESENCE OF AUTOMATIC (IMPLANTABLE) CARDIAC DEFIBRILLATOR: Chronic | ICD-10-CM

## 2023-07-14 DIAGNOSIS — I25.10 ATHEROSCLEROTIC HEART DISEASE OF NATIVE CORONARY ARTERY WITHOUT ANGINA PECTORIS: ICD-10-CM

## 2023-07-14 DIAGNOSIS — Z95.1 PRESENCE OF AORTOCORONARY BYPASS GRAFT: Chronic | ICD-10-CM

## 2023-07-14 LAB
ANION GAP SERPL CALC-SCNC: 15 MMOL/L — SIGNIFICANT CHANGE UP (ref 5–17)
APTT BLD: 27.5 SEC — SIGNIFICANT CHANGE UP (ref 27.5–35.5)
BLD GP AB SCN SERPL QL: NEGATIVE — SIGNIFICANT CHANGE UP
BUN SERPL-MCNC: 34 MG/DL — HIGH (ref 7–23)
CALCIUM SERPL-MCNC: 9.9 MG/DL — SIGNIFICANT CHANGE UP (ref 8.4–10.5)
CHLORIDE SERPL-SCNC: 103 MMOL/L — SIGNIFICANT CHANGE UP (ref 96–108)
CO2 SERPL-SCNC: 24 MMOL/L — SIGNIFICANT CHANGE UP (ref 22–31)
CREAT SERPL-MCNC: 1.6 MG/DL — HIGH (ref 0.5–1.3)
EGFR: 44 ML/MIN/1.73M2 — LOW
GLUCOSE SERPL-MCNC: 137 MG/DL — HIGH (ref 70–99)
HCT VFR BLD CALC: 49.6 % — SIGNIFICANT CHANGE UP (ref 39–50)
HGB BLD-MCNC: 16.3 G/DL — SIGNIFICANT CHANGE UP (ref 13–17)
INR BLD: 1.1 RATIO — SIGNIFICANT CHANGE UP (ref 0.88–1.16)
MAGNESIUM SERPL-MCNC: 2.1 MG/DL — SIGNIFICANT CHANGE UP (ref 1.6–2.6)
MCHC RBC-ENTMCNC: 28.8 PG — SIGNIFICANT CHANGE UP (ref 27–34)
MCHC RBC-ENTMCNC: 32.9 GM/DL — SIGNIFICANT CHANGE UP (ref 32–36)
MCV RBC AUTO: 87.6 FL — SIGNIFICANT CHANGE UP (ref 80–100)
NRBC # BLD: 0 /100 WBCS — SIGNIFICANT CHANGE UP (ref 0–0)
PLATELET # BLD AUTO: 208 K/UL — SIGNIFICANT CHANGE UP (ref 150–400)
POTASSIUM SERPL-MCNC: 4.3 MMOL/L — SIGNIFICANT CHANGE UP (ref 3.5–5.3)
POTASSIUM SERPL-SCNC: 4.3 MMOL/L — SIGNIFICANT CHANGE UP (ref 3.5–5.3)
PROTHROM AB SERPL-ACNC: 12.8 SEC — SIGNIFICANT CHANGE UP (ref 10.5–13.4)
RBC # BLD: 5.66 M/UL — SIGNIFICANT CHANGE UP (ref 4.2–5.8)
RBC # FLD: 14.4 % — SIGNIFICANT CHANGE UP (ref 10.3–14.5)
RH IG SCN BLD-IMP: POSITIVE — SIGNIFICANT CHANGE UP
SODIUM SERPL-SCNC: 142 MMOL/L — SIGNIFICANT CHANGE UP (ref 135–145)
WBC # BLD: 11.49 K/UL — HIGH (ref 3.8–10.5)
WBC # FLD AUTO: 11.49 K/UL — HIGH (ref 3.8–10.5)

## 2023-07-14 PROCEDURE — C1725: CPT

## 2023-07-14 PROCEDURE — 85730 THROMBOPLASTIN TIME PARTIAL: CPT

## 2023-07-14 PROCEDURE — C1769: CPT

## 2023-07-14 PROCEDURE — C1874: CPT

## 2023-07-14 PROCEDURE — 86850 RBC ANTIBODY SCREEN: CPT

## 2023-07-14 PROCEDURE — C1894: CPT

## 2023-07-14 PROCEDURE — 92943 PRQ TRLUML REVSC CH OCC ANT: CPT | Mod: LC

## 2023-07-14 PROCEDURE — 93010 ELECTROCARDIOGRAM REPORT: CPT

## 2023-07-14 PROCEDURE — 93005 ELECTROCARDIOGRAM TRACING: CPT

## 2023-07-14 PROCEDURE — 99152 MOD SED SAME PHYS/QHP 5/>YRS: CPT

## 2023-07-14 PROCEDURE — C1887: CPT

## 2023-07-14 PROCEDURE — 80048 BASIC METABOLIC PNL TOTAL CA: CPT

## 2023-07-14 PROCEDURE — 86901 BLOOD TYPING SEROLOGIC RH(D): CPT

## 2023-07-14 PROCEDURE — 36415 COLL VENOUS BLD VENIPUNCTURE: CPT

## 2023-07-14 PROCEDURE — 85610 PROTHROMBIN TIME: CPT

## 2023-07-14 PROCEDURE — C9607: CPT | Mod: LC

## 2023-07-14 PROCEDURE — 86900 BLOOD TYPING SEROLOGIC ABO: CPT

## 2023-07-14 PROCEDURE — 85027 COMPLETE CBC AUTOMATED: CPT

## 2023-07-14 PROCEDURE — 83735 ASSAY OF MAGNESIUM: CPT

## 2023-07-14 RX ORDER — ASPIRIN/CALCIUM CARB/MAGNESIUM 324 MG
81 TABLET ORAL DAILY
Refills: 0 | Status: DISCONTINUED | OUTPATIENT
Start: 2023-07-15 | End: 2023-07-28

## 2023-07-14 RX ORDER — FUROSEMIDE 40 MG
20 TABLET ORAL DAILY
Refills: 0 | Status: DISCONTINUED | OUTPATIENT
Start: 2023-07-15 | End: 2023-07-28

## 2023-07-14 RX ORDER — DIGOXIN 250 MCG
125 TABLET ORAL DAILY
Refills: 0 | Status: DISCONTINUED | OUTPATIENT
Start: 2023-07-14 | End: 2023-07-28

## 2023-07-14 RX ORDER — CLOPIDOGREL BISULFATE 75 MG/1
75 TABLET, FILM COATED ORAL DAILY
Refills: 0 | Status: DISCONTINUED | OUTPATIENT
Start: 2023-07-15 | End: 2023-07-28

## 2023-07-14 RX ORDER — SODIUM CHLORIDE 9 MG/ML
1000 INJECTION INTRAMUSCULAR; INTRAVENOUS; SUBCUTANEOUS
Refills: 0 | Status: DISCONTINUED | OUTPATIENT
Start: 2023-07-14 | End: 2023-07-28

## 2023-07-14 RX ORDER — CLOPIDOGREL BISULFATE 75 MG/1
600 TABLET, FILM COATED ORAL ONCE
Refills: 0 | Status: COMPLETED | OUTPATIENT
Start: 2023-07-14 | End: 2023-07-14

## 2023-07-14 RX ORDER — WARFARIN SODIUM 2.5 MG/1
5 TABLET ORAL ONCE
Refills: 0 | Status: DISCONTINUED | OUTPATIENT
Start: 2023-07-14 | End: 2023-07-28

## 2023-07-14 RX ORDER — METOPROLOL TARTRATE 50 MG
50 TABLET ORAL DAILY
Refills: 0 | Status: DISCONTINUED | OUTPATIENT
Start: 2023-07-14 | End: 2023-07-28

## 2023-07-14 RX ORDER — SODIUM CHLORIDE 9 MG/ML
250 INJECTION INTRAMUSCULAR; INTRAVENOUS; SUBCUTANEOUS ONCE
Refills: 0 | Status: COMPLETED | OUTPATIENT
Start: 2023-07-14 | End: 2023-07-14

## 2023-07-14 RX ORDER — LEVOTHYROXINE SODIUM 125 MCG
137 TABLET ORAL DAILY
Refills: 0 | Status: DISCONTINUED | OUTPATIENT
Start: 2023-07-14 | End: 2023-07-28

## 2023-07-14 RX ORDER — CLOPIDOGREL BISULFATE 75 MG/1
1 TABLET, FILM COATED ORAL
Qty: 90 | Refills: 3
Start: 2023-07-14

## 2023-07-14 RX ORDER — FUROSEMIDE 40 MG
20 TABLET ORAL DAILY
Refills: 0 | Status: DISCONTINUED | OUTPATIENT
Start: 2023-07-14 | End: 2023-07-14

## 2023-07-14 RX ORDER — SACUBITRIL AND VALSARTAN 24; 26 MG/1; MG/1
1 TABLET, FILM COATED ORAL
Refills: 0 | Status: DISCONTINUED | OUTPATIENT
Start: 2023-07-15 | End: 2023-07-28

## 2023-07-14 RX ORDER — SACUBITRIL AND VALSARTAN 24; 26 MG/1; MG/1
1 TABLET, FILM COATED ORAL
Refills: 0 | Status: DISCONTINUED | OUTPATIENT
Start: 2023-07-14 | End: 2023-07-14

## 2023-07-14 RX ADMIN — CLOPIDOGREL BISULFATE 600 MILLIGRAM(S): 75 TABLET, FILM COATED ORAL at 07:45

## 2023-07-14 RX ADMIN — SODIUM CHLORIDE 750 MILLILITER(S): 9 INJECTION INTRAMUSCULAR; INTRAVENOUS; SUBCUTANEOUS at 07:45

## 2023-07-14 RX ADMIN — SODIUM CHLORIDE 75 MILLILITER(S): 9 INJECTION INTRAMUSCULAR; INTRAVENOUS; SUBCUTANEOUS at 07:45

## 2023-07-14 NOTE — H&P CARDIOLOGY - HISTORY OF PRESENT ILLNESS
77 y/o male former smoker (25PYH) with pmh of HTN, HLD, CAD, s/p CABG by Dr. Harding 2 years ago, Stent (LIMA graft to the LAD and vein graft to obtuse marginal), [facility] 7/6/23 with patent SVG-OM1, mLAD, atretic LIMA-D1 graft, RCA severe diffuse disease small sized, mCx  supplying a sizable portion of the myocardium,  COPD, CKD3 (f/b Dr. Shell Bauman, baseline Cr 1.5-2.0) , Bladder ca s/p cystectomy and urostomy, pAfib on Coumadin last dose 7/9/23 s/p DCCV x2, AICD, CHF (Systolic-LVEF 58% by Stress 6/2023) followed by Dr. Jatinder Chu, Cardiologist presents for elective  PCI of native mCx.  He denies cp, sob or palpitations.  Creatinine on 7/6 was 1.65 today's labs pending.

## 2023-07-14 NOTE — H&P CARDIOLOGY - NSICDXPASTSURGICALHX_GEN_ALL_CORE_FT
PAST SURGICAL HISTORY:  H/O knee surgery     History of automatic internal cardiac defibrillator (AICD)     History of bladder surgery on urostomy    S/P CABG x 3

## 2023-07-14 NOTE — ASU DISCHARGE PLAN (ADULT/PEDIATRIC) - ASU DC SPECIAL INSTRUCTIONSFT
Wound Care:   the day AFTER your procedure remove bandage GENTLY, and clean using  mild soap and gentle warm, water stream, pat dry. Leave OPEN to air. YOU MAY SHOWER.  DO NOT apply lotions, creams, ointments, powder, perfumes to your incision site  DO NOT SOAK your site for 1 week ( no baths, no pools, no tubs)  Check  your groin and/or wrist daily. A small amount of bruising and soreness are normal.    ACTIVITY: For 24 hours   - DO NOT DRIVE  - DO NOT make any important decisions or sign legal documents   - DO NOT operate heavy machinery   - you may resume sexual activity in 48 hours, unless otherwise instructed by your cardiologist     If your procedure was done through the WRIST: For the next 3 days  - avoid pushing, pulling, with that affected wrist   - avoid repeated movement of that hand and wrist ( eg: typing, hammering)  - DO NOT LIFT anything more than 5 lbs     If your procedure was done through the GROIN: For the next 5 days  - Limit climbing stairs, DO NOT soak in bathtub or pool  - no strenuous activities, pushing, pulling, straining  - Do not lift anything 10lbs or heavier     MEDICATION:   Take your medications as explained (see discharge paperwork).  If you received a STENT, you will be taking antiplatelet medications to KEEP YOUR STENT OPEN ( eg: Aspirin, Plavix, Brilinta, Effient, etc).  Take as prescribed DO NOT STOP taking them without consulting with your cardiologist first.     Follow heart healthy diet recommended by your doctor. If you smoke STOP SMOKING ( you may call [phone] for center of tobacco control if you need assistance)     CALL your doctor to make appointment in 2 WEEKS     ***CALL YOUR DOCTOR***  if you experience: fever, chills, body aches, or severe pain, swelling, redness, heat or yellow discharge at incision site  If you experience Bleeding or excruciating pain at the procedural site, swelling ( golf ball size) at your procedural site  If you experience CHEST PAIN  If you experience extremity numbness, tingling, temperature change (of your procedural site)   If you are unable to reach your doctor, you may contact:   -Cardiology Office at [facility] at [phone] or   - [facility] [phone]  - [facility] [phone]

## 2023-07-14 NOTE — H&P CARDIOLOGY - NSICDXPASTMEDICALHX_GEN_ALL_CORE_FT
PAST MEDICAL HISTORY:  Atrial fibrillation     Bladder cancer     Bronchitis     CAD (coronary artery disease)     Former smoker     Hypertension     Hypothyroid

## 2023-07-14 NOTE — ASU DISCHARGE PLAN (ADULT/PEDIATRIC) - CARE PROVIDER_API CALL
Efraín Chu  Cardiovascular Disease  [address], [place] [id number]  Phone: [phone]  Fax: [phone]  Follow Up Time:

## 2023-07-14 NOTE — ASU PATIENT PROFILE, ADULT - ABILITY TO HEAR (WITH HEARING AID OR HEARING APPLIANCE IF NORMALLY USED):
h/Mildly to Moderately Impaired: difficulty hearing in some environments or speaker may need to increase volume or speak distinctly

## 2023-07-15 RX ORDER — FUROSEMIDE 40 MG
1 TABLET ORAL
Qty: 0 | Refills: 0 | DISCHARGE
Start: 2023-07-15

## 2023-07-15 RX ORDER — SACUBITRIL AND VALSARTAN 24; 26 MG/1; MG/1
1 TABLET, FILM COATED ORAL
Qty: 0 | Refills: 0 | DISCHARGE
Start: 2023-07-15

## 2023-07-18 ENCOUNTER — NON-APPOINTMENT (OUTPATIENT)
Age: 78
End: 2023-07-18

## 2023-07-18 ENCOUNTER — APPOINTMENT (OUTPATIENT)
Dept: ELECTROPHYSIOLOGY | Facility: CLINIC | Age: 78
End: 2023-07-18
Payer: MEDICARE

## 2023-07-18 PROCEDURE — 93296 REM INTERROG EVL PM/IDS: CPT

## 2023-07-18 PROCEDURE — 93295 DEV INTERROG REMOTE 1/2/MLT: CPT

## 2023-07-19 PROBLEM — I25.10 ATHEROSCLEROTIC HEART DISEASE OF NATIVE CORONARY ARTERY WITHOUT ANGINA PECTORIS: Chronic | Status: ACTIVE | Noted: 2023-07-14

## 2023-07-20 ENCOUNTER — NON-APPOINTMENT (OUTPATIENT)
Age: 78
End: 2023-07-20

## 2023-07-21 LAB — INR PPP: 2.1

## 2023-07-24 ENCOUNTER — APPOINTMENT (OUTPATIENT)
Dept: CARDIOLOGY | Facility: CLINIC | Age: 78
End: 2023-07-24
Payer: MEDICARE

## 2023-07-24 ENCOUNTER — NON-APPOINTMENT (OUTPATIENT)
Age: 78
End: 2023-07-24

## 2023-07-24 VITALS
DIASTOLIC BLOOD PRESSURE: 78 MMHG | OXYGEN SATURATION: 97 % | HEART RATE: 89 BPM | SYSTOLIC BLOOD PRESSURE: 151 MMHG | HEIGHT: 70 IN | WEIGHT: 234 LBS | BODY MASS INDEX: 33.5 KG/M2

## 2023-07-24 VITALS — DIASTOLIC BLOOD PRESSURE: 72 MMHG | SYSTOLIC BLOOD PRESSURE: 118 MMHG

## 2023-07-24 PROCEDURE — 99214 OFFICE O/P EST MOD 30 MIN: CPT

## 2023-07-24 PROCEDURE — 93000 ELECTROCARDIOGRAM COMPLETE: CPT

## 2023-07-24 NOTE — DISCUSSION/SUMMARY
[FreeTextEntry1] : Fletcher is doing well, and is breathing better. Recent cath showed a patent SVG-OM1 and mid LAD though the LIMA to D1 was atretic. The RCA had severe disease and was small sized. A mid Cx  was noted and supplied a sizeable myocardium. \par \par He is now s/p PCI to his LCx. His echocardiogram did show an improvement in LV function, to an EF of 40-45%, in 9/22. He is in atrial fibrillation with an acceptable rate. He will remain on Lasix 40 and off of spironolactone in the setting of hyperkalemia. His creatinine has been stable at 1.6.\par \par His most recent LDL on record was in the 150 range, and he has not had a recent repeat check.  I am sending this, and he will remain on max dose Crestor for now.  He will stay on Entresto, Toprol, digoxin, plavix, and warfarin. He is off ASA.\par \par I will see him 2-3 months. [EKG obtained to assist in diagnosis and management of assessed problem(s)] : EKG obtained to assist in diagnosis and management of assessed problem(s)

## 2023-07-24 NOTE — HISTORY OF PRESENT ILLNESS
[FreeTextEntry1] : Fletcher Mendosa presented to the office today for a cardiovascular follow up. \par He has been under the care of Dr. Ochoa. I last saw him in 7/10/23.\par \par He is now 77 years old, with a history of hypertension, hypercholesterolemia, hypothyroidism, bladder cancer and paroxysmal atrial fibrillation. His atrial fibrillation was diagnosed several years ago, and he has been on warfarin. He did require cardioversion in the past. He was admitted to the hospital 11/19 in atrial fibrillation with rapid ventricular rate. At that time repeat echocardiogram showed ejection fraction of 45% with mild MR. He was given beta blocker and Cardizem with slowing of the ventricular rate. \par \par 12/18/20 he was at [facility] and underwent electrical cardioversion. This was tried twice and both times his rhythm quickly reverted back to atrial fibrillation and the procedure was aborted. I spoke with Dr. Wu and he felt that the best approach would be to leave the patient in chronic atrial fibrillation with rate control. \par \par In 6/21, he presented with palpitations. The patient had paroxysmal ventricular tachycardia and was transferred to [facility].  There was minimal elevation in troponin with normal CK.  ECG showed no acute ischemia.  He underwent cardiac catheterization which showed a 40% left main lesion, 70% distal LAD, 75% circumflex, 90% proximal RCA, and 1% mid RCA.  Echocardiogram showed ejection fraction of 20 to 25% with mild TR.  No significant pulmonary hypertension.  6/23/2021 he underwent a LIMA graft to the LAD and vein graft to obtuse marginal.  Had placement of a [employer] ICD.\par \par I last saw him in 7/10/23. \par This year he has been quite short of breath. Echo with improvement in LV function to 40-45% and mild MR. Pharm stress with inferior infarct/inferior wall hypokinesis. His K and Creatinine were up and his spironolactone was stopped. His creatinine is now 1.61. Because of dyspnea, I sent him for cath which showed patent SVG-OM1 and mid LAD. The LIMA to D1 was atretic. The RCA had severe disease and was small sized. The mid Cx  was noted and supplied a sizeable myocardium. \par \par He is now s/p PCI to the  of his LCx. He thinks he may be feeling mildly better, but he has not pushed it yet. He has no chest pain or palpitations. \par

## 2023-07-24 NOTE — PHYSICAL EXAM

## 2023-08-07 LAB — INR PPP: 2.4

## 2023-08-15 RX ORDER — DIGOXIN 125 UG/1
125 TABLET ORAL
Qty: 90 | Refills: 3 | Status: ACTIVE | COMMUNITY
Start: 2021-08-24 | End: 1900-01-01

## 2023-08-22 ENCOUNTER — INPATIENT (INPATIENT)
Facility: HOSPITAL | Age: 78
LOS: 0 days | Discharge: ANOTHER HOSPITAL | DRG: 389 | End: 2023-08-22
Attending: SURGERY | Admitting: SURGERY
Payer: MEDICARE

## 2023-08-22 ENCOUNTER — INPATIENT (INPATIENT)
Facility: HOSPITAL | Age: 78
LOS: 20 days | Discharge: NURSING, REHAB OR LONG-TERM CARE | DRG: 335 | End: 2023-09-12
Attending: SURGERY | Admitting: SURGERY
Payer: MEDICARE

## 2023-08-22 VITALS
TEMPERATURE: 98 F | DIASTOLIC BLOOD PRESSURE: 55 MMHG | RESPIRATION RATE: 18 BRPM | SYSTOLIC BLOOD PRESSURE: 90 MMHG | OXYGEN SATURATION: 98 % | HEART RATE: 110 BPM

## 2023-08-22 VITALS
RESPIRATION RATE: 20 BRPM | DIASTOLIC BLOOD PRESSURE: 64 MMHG | OXYGEN SATURATION: 96 % | HEIGHT: 70 IN | WEIGHT: 229.06 LBS | SYSTOLIC BLOOD PRESSURE: 99 MMHG | HEART RATE: 113 BPM

## 2023-08-22 VITALS
SYSTOLIC BLOOD PRESSURE: 92 MMHG | HEART RATE: 129 BPM | DIASTOLIC BLOOD PRESSURE: 55 MMHG | TEMPERATURE: 97 F | OXYGEN SATURATION: 74 % | WEIGHT: 229.94 LBS | RESPIRATION RATE: 24 BRPM | HEIGHT: 70 IN

## 2023-08-22 DIAGNOSIS — Z95.810 PRESENCE OF AUTOMATIC (IMPLANTABLE) CARDIAC DEFIBRILLATOR: Chronic | ICD-10-CM

## 2023-08-22 DIAGNOSIS — Z98.890 OTHER SPECIFIED POSTPROCEDURAL STATES: Chronic | ICD-10-CM

## 2023-08-22 DIAGNOSIS — K56.609 UNSPECIFIED INTESTINAL OBSTRUCTION, UNSPECIFIED AS TO PARTIAL VERSUS COMPLETE OBSTRUCTION: ICD-10-CM

## 2023-08-22 DIAGNOSIS — Z95.1 PRESENCE OF AORTOCORONARY BYPASS GRAFT: Chronic | ICD-10-CM

## 2023-08-22 LAB
ALBUMIN SERPL ELPH-MCNC: 3.3 G/DL — SIGNIFICANT CHANGE UP (ref 3.3–5.2)
ALBUMIN SERPL ELPH-MCNC: 3.5 G/DL — SIGNIFICANT CHANGE UP (ref 3.3–5)
ALP SERPL-CCNC: 58 U/L — SIGNIFICANT CHANGE UP (ref 40–120)
ALP SERPL-CCNC: 82 U/L — SIGNIFICANT CHANGE UP (ref 40–120)
ALT FLD-CCNC: 11 U/L — SIGNIFICANT CHANGE UP
ALT FLD-CCNC: 23 U/L — SIGNIFICANT CHANGE UP (ref 12–78)
ANION GAP SERPL CALC-SCNC: 21 MMOL/L — HIGH (ref 5–17)
ANION GAP SERPL CALC-SCNC: 22 MMOL/L — HIGH (ref 5–17)
ANISOCYTOSIS BLD QL: SLIGHT — SIGNIFICANT CHANGE UP
APTT BLD: 33 SEC — SIGNIFICANT CHANGE UP (ref 24.5–35.6)
APTT BLD: 36.6 SEC — HIGH (ref 24.5–35.6)
AST SERPL-CCNC: 19 U/L — SIGNIFICANT CHANGE UP
AST SERPL-CCNC: 25 U/L — SIGNIFICANT CHANGE UP (ref 15–37)
BASE EXCESS BLDA CALC-SCNC: 1.8 MMOL/L — SIGNIFICANT CHANGE UP (ref -2–3)
BASOPHILS # BLD AUTO: 0 K/UL — SIGNIFICANT CHANGE UP (ref 0–0.2)
BASOPHILS # BLD AUTO: 0.08 K/UL — SIGNIFICANT CHANGE UP (ref 0–0.2)
BASOPHILS NFR BLD AUTO: 0 % — SIGNIFICANT CHANGE UP (ref 0–2)
BASOPHILS NFR BLD AUTO: 1 % — SIGNIFICANT CHANGE UP (ref 0–2)
BILIRUB SERPL-MCNC: 0.5 MG/DL — SIGNIFICANT CHANGE UP (ref 0.2–1.2)
BILIRUB SERPL-MCNC: 0.5 MG/DL — SIGNIFICANT CHANGE UP (ref 0.4–2)
BLD GP AB SCN SERPL QL: SIGNIFICANT CHANGE UP
BLOOD GAS COMMENTS ARTERIAL: SIGNIFICANT CHANGE UP
BUN SERPL-MCNC: 101 MG/DL — HIGH (ref 7–23)
BUN SERPL-MCNC: 98.9 MG/DL — HIGH (ref 8–20)
CALCIUM SERPL-MCNC: 7.5 MG/DL — LOW (ref 8.4–10.5)
CALCIUM SERPL-MCNC: 9.5 MG/DL — SIGNIFICANT CHANGE UP (ref 8.5–10.1)
CHLORIDE SERPL-SCNC: 81 MMOL/L — LOW (ref 96–108)
CHLORIDE SERPL-SCNC: 89 MMOL/L — LOW (ref 96–108)
CO2 SERPL-SCNC: 23 MMOL/L — SIGNIFICANT CHANGE UP (ref 22–29)
CO2 SERPL-SCNC: 29 MMOL/L — SIGNIFICANT CHANGE UP (ref 22–31)
CREAT SERPL-MCNC: 6.87 MG/DL — HIGH (ref 0.5–1.3)
CREAT SERPL-MCNC: 7.6 MG/DL — HIGH (ref 0.5–1.3)
D DIMER BLD IA.RAPID-MCNC: 217 NG/ML DDU — SIGNIFICANT CHANGE UP
DIGOXIN SERPL-MCNC: 0.6 NG/ML — LOW (ref 0.8–2)
EGFR: 7 ML/MIN/1.73M2 — LOW
EGFR: 8 ML/MIN/1.73M2 — LOW
EOSINOPHIL # BLD AUTO: 0 K/UL — SIGNIFICANT CHANGE UP (ref 0–0.5)
EOSINOPHIL # BLD AUTO: 0.08 K/UL — SIGNIFICANT CHANGE UP (ref 0–0.5)
EOSINOPHIL NFR BLD AUTO: 0 % — SIGNIFICANT CHANGE UP (ref 0–6)
EOSINOPHIL NFR BLD AUTO: 1 % — SIGNIFICANT CHANGE UP (ref 0–6)
FLUAV AG NPH QL: SIGNIFICANT CHANGE UP
FLUBV AG NPH QL: SIGNIFICANT CHANGE UP
GAS PNL BLDA: SIGNIFICANT CHANGE UP
GIANT PLATELETS BLD QL SMEAR: PRESENT — SIGNIFICANT CHANGE UP
GLUCOSE SERPL-MCNC: 117 MG/DL — HIGH (ref 70–99)
GLUCOSE SERPL-MCNC: 173 MG/DL — HIGH (ref 70–99)
HCO3 BLDA-SCNC: 26 MMOL/L — SIGNIFICANT CHANGE UP (ref 21–28)
HCT VFR BLD CALC: 43.9 % — SIGNIFICANT CHANGE UP (ref 39–50)
HCT VFR BLD CALC: 54.5 % — HIGH (ref 39–50)
HGB BLD-MCNC: 14.3 G/DL — SIGNIFICANT CHANGE UP (ref 13–17)
HGB BLD-MCNC: 18.2 G/DL — HIGH (ref 13–17)
INR BLD: 2.18 RATIO — HIGH (ref 0.85–1.18)
INR BLD: 2.62 RATIO — HIGH (ref 0.85–1.18)
LACTATE BLDV-MCNC: 2.1 MMOL/L — HIGH (ref 0.5–2)
LACTATE SERPL-SCNC: 2.6 MMOL/L — HIGH (ref 0.7–2)
LACTATE SERPL-SCNC: 5 MMOL/L — CRITICAL HIGH (ref 0.7–2)
LYMPHOCYTES # BLD AUTO: 1.61 K/UL — SIGNIFICANT CHANGE UP (ref 1–3.3)
LYMPHOCYTES # BLD AUTO: 2.75 K/UL — SIGNIFICANT CHANGE UP (ref 1–3.3)
LYMPHOCYTES # BLD AUTO: 21 % — SIGNIFICANT CHANGE UP (ref 13–44)
LYMPHOCYTES # BLD AUTO: 32.2 % — SIGNIFICANT CHANGE UP (ref 13–44)
MAGNESIUM SERPL-MCNC: 2.4 MG/DL — SIGNIFICANT CHANGE UP (ref 1.6–2.6)
MANUAL SMEAR VERIFICATION: SIGNIFICANT CHANGE UP
MANUAL SMEAR VERIFICATION: SIGNIFICANT CHANGE UP
MCHC RBC-ENTMCNC: 28 PG — SIGNIFICANT CHANGE UP (ref 27–34)
MCHC RBC-ENTMCNC: 28.3 PG — SIGNIFICANT CHANGE UP (ref 27–34)
MCHC RBC-ENTMCNC: 32.6 GM/DL — SIGNIFICANT CHANGE UP (ref 32–36)
MCHC RBC-ENTMCNC: 33.4 GM/DL — SIGNIFICANT CHANGE UP (ref 32–36)
MCV RBC AUTO: 84.9 FL — SIGNIFICANT CHANGE UP (ref 80–100)
MCV RBC AUTO: 85.9 FL — SIGNIFICANT CHANGE UP (ref 80–100)
METAMYELOCYTES # FLD: 1 % — HIGH (ref 0–0)
MICROCYTES BLD QL: SLIGHT — SIGNIFICANT CHANGE UP
MONOCYTES # BLD AUTO: 1.93 K/UL — HIGH (ref 0–0.9)
MONOCYTES # BLD AUTO: 2.08 K/UL — HIGH (ref 0–0.9)
MONOCYTES NFR BLD AUTO: 22.6 % — HIGH (ref 2–14)
MONOCYTES NFR BLD AUTO: 27 % — HIGH (ref 2–14)
NEUTROPHILS # BLD AUTO: 3.54 K/UL — SIGNIFICANT CHANGE UP (ref 1.8–7.4)
NEUTROPHILS # BLD AUTO: 3.78 K/UL — SIGNIFICANT CHANGE UP (ref 1.8–7.4)
NEUTROPHILS NFR BLD AUTO: 33 % — LOW (ref 43–77)
NEUTROPHILS NFR BLD AUTO: 44.3 % — SIGNIFICANT CHANGE UP (ref 43–77)
NEUTS BAND # BLD: 13 % — HIGH (ref 0–8)
NRBC # BLD: 0 /100 — SIGNIFICANT CHANGE UP (ref 0–0)
NT-PROBNP SERPL-SCNC: 8744 PG/ML — HIGH (ref 0–450)
PCO2 BLDA: 36 MMHG — SIGNIFICANT CHANGE UP (ref 35–48)
PH BLDA: 7.46 — HIGH (ref 7.35–7.45)
PLAT MORPH BLD: NORMAL — SIGNIFICANT CHANGE UP
PLAT MORPH BLD: NORMAL — SIGNIFICANT CHANGE UP
PLATELET # BLD AUTO: 224 K/UL — SIGNIFICANT CHANGE UP (ref 150–400)
PLATELET # BLD AUTO: 331 K/UL — SIGNIFICANT CHANGE UP (ref 150–400)
PO2 BLDA: 107 MMHG — SIGNIFICANT CHANGE UP (ref 83–108)
POLYCHROMASIA BLD QL SMEAR: SLIGHT — SIGNIFICANT CHANGE UP
POTASSIUM SERPL-MCNC: 4.7 MMOL/L — SIGNIFICANT CHANGE UP (ref 3.5–5.3)
POTASSIUM SERPL-MCNC: 4.8 MMOL/L — SIGNIFICANT CHANGE UP (ref 3.5–5.3)
POTASSIUM SERPL-SCNC: 4.7 MMOL/L — SIGNIFICANT CHANGE UP (ref 3.5–5.3)
POTASSIUM SERPL-SCNC: 4.8 MMOL/L — SIGNIFICANT CHANGE UP (ref 3.5–5.3)
PROT SERPL-MCNC: 6.4 G/DL — LOW (ref 6.6–8.7)
PROT SERPL-MCNC: 9 G/DL — HIGH (ref 6–8.3)
PROTHROM AB SERPL-ACNC: 24.9 SEC — HIGH (ref 9.5–13)
PROTHROM AB SERPL-ACNC: 28.3 SEC — HIGH (ref 9.5–13)
RBC # BLD: 5.11 M/UL — SIGNIFICANT CHANGE UP (ref 4.2–5.8)
RBC # BLD: 6.42 M/UL — HIGH (ref 4.2–5.8)
RBC # FLD: 14.6 % — HIGH (ref 10.3–14.5)
RBC # FLD: 15.9 % — HIGH (ref 10.3–14.5)
RBC BLD AUTO: ABNORMAL
RBC BLD AUTO: SIGNIFICANT CHANGE UP
RSV RNA NPH QL NAA+NON-PROBE: SIGNIFICANT CHANGE UP
SAO2 % BLDA: 98.8 % — HIGH (ref 94–98)
SARS-COV-2 RNA SPEC QL NAA+PROBE: SIGNIFICANT CHANGE UP
SMUDGE CELLS # BLD: PRESENT — SIGNIFICANT CHANGE UP
SODIUM SERPL-SCNC: 131 MMOL/L — LOW (ref 135–145)
SODIUM SERPL-SCNC: 134 MMOL/L — LOW (ref 135–145)
TROPONIN I, HIGH SENSITIVITY RESULT: 61.2 NG/L — SIGNIFICANT CHANGE UP
VARIANT LYMPHS # BLD: 0.9 % — SIGNIFICANT CHANGE UP (ref 0–6)
VARIANT LYMPHS # BLD: 3 % — SIGNIFICANT CHANGE UP (ref 0–6)
WBC # BLD: 7.69 K/UL — SIGNIFICANT CHANGE UP (ref 3.8–10.5)
WBC # BLD: 8.54 K/UL — SIGNIFICANT CHANGE UP (ref 3.8–10.5)
WBC # FLD AUTO: 7.69 K/UL — SIGNIFICANT CHANGE UP (ref 3.8–10.5)
WBC # FLD AUTO: 8.54 K/UL — SIGNIFICANT CHANGE UP (ref 3.8–10.5)

## 2023-08-22 PROCEDURE — 74176 CT ABD & PELVIS W/O CONTRAST: CPT | Mod: 26,MA

## 2023-08-22 PROCEDURE — 99223 1ST HOSP IP/OBS HIGH 75: CPT

## 2023-08-22 PROCEDURE — L9997: CPT

## 2023-08-22 PROCEDURE — 99291 CRITICAL CARE FIRST HOUR: CPT

## 2023-08-22 PROCEDURE — 71250 CT THORAX DX C-: CPT | Mod: 26,MA

## 2023-08-22 PROCEDURE — 99283 EMERGENCY DEPT VISIT LOW MDM: CPT

## 2023-08-22 PROCEDURE — 71045 X-RAY EXAM CHEST 1 VIEW: CPT | Mod: 26

## 2023-08-22 PROCEDURE — 93010 ELECTROCARDIOGRAM REPORT: CPT

## 2023-08-22 PROCEDURE — 71045 X-RAY EXAM CHEST 1 VIEW: CPT | Mod: 26,77

## 2023-08-22 RX ORDER — HEPARIN SODIUM 5000 [USP'U]/ML
5000 INJECTION INTRAVENOUS; SUBCUTANEOUS EVERY 8 HOURS
Refills: 0 | Status: DISCONTINUED | OUTPATIENT
Start: 2023-08-22 | End: 2023-08-22

## 2023-08-22 RX ORDER — PIPERACILLIN AND TAZOBACTAM 4; .5 G/20ML; G/20ML
3.38 INJECTION, POWDER, LYOPHILIZED, FOR SOLUTION INTRAVENOUS ONCE
Refills: 0 | Status: COMPLETED | OUTPATIENT
Start: 2023-08-22 | End: 2023-08-22

## 2023-08-22 RX ORDER — ACETAMINOPHEN 500 MG
1000 TABLET ORAL EVERY 6 HOURS
Refills: 0 | Status: DISCONTINUED | OUTPATIENT
Start: 2023-08-22 | End: 2023-08-26

## 2023-08-22 RX ORDER — HEPARIN SODIUM 5000 [USP'U]/ML
INJECTION INTRAVENOUS; SUBCUTANEOUS
Qty: 25000 | Refills: 0 | Status: DISCONTINUED | OUTPATIENT
Start: 2023-08-22 | End: 2023-08-23

## 2023-08-22 RX ORDER — DIGOXIN 250 MCG
125 TABLET ORAL DAILY
Refills: 0 | Status: DISCONTINUED | OUTPATIENT
Start: 2023-08-22 | End: 2023-08-23

## 2023-08-22 RX ORDER — ONDANSETRON 8 MG/1
4 TABLET, FILM COATED ORAL EVERY 6 HOURS
Refills: 0 | Status: DISCONTINUED | OUTPATIENT
Start: 2023-08-22 | End: 2023-08-29

## 2023-08-22 RX ORDER — SODIUM CHLORIDE 9 MG/ML
2000 INJECTION INTRAMUSCULAR; INTRAVENOUS; SUBCUTANEOUS ONCE
Refills: 0 | Status: COMPLETED | OUTPATIENT
Start: 2023-08-22 | End: 2023-08-22

## 2023-08-22 RX ORDER — LEVOTHYROXINE SODIUM 125 MCG
68 TABLET ORAL AT BEDTIME
Refills: 0 | Status: DISCONTINUED | OUTPATIENT
Start: 2023-08-22 | End: 2023-08-26

## 2023-08-22 RX ORDER — ONDANSETRON 8 MG/1
4 TABLET, FILM COATED ORAL ONCE
Refills: 0 | Status: COMPLETED | OUTPATIENT
Start: 2023-08-22 | End: 2023-08-22

## 2023-08-22 RX ORDER — HEPARIN SODIUM 5000 [USP'U]/ML
INJECTION INTRAVENOUS; SUBCUTANEOUS
Qty: 25000 | Refills: 0 | Status: DISCONTINUED | OUTPATIENT
Start: 2023-08-22 | End: 2023-08-22

## 2023-08-22 RX ORDER — SODIUM CHLORIDE 9 MG/ML
1000 INJECTION INTRAMUSCULAR; INTRAVENOUS; SUBCUTANEOUS
Refills: 0 | Status: DISCONTINUED | OUTPATIENT
Start: 2023-08-22 | End: 2023-08-25

## 2023-08-22 RX ORDER — PANTOPRAZOLE SODIUM 20 MG/1
40 TABLET, DELAYED RELEASE ORAL ONCE
Refills: 0 | Status: COMPLETED | OUTPATIENT
Start: 2023-08-22 | End: 2023-08-22

## 2023-08-22 RX ORDER — SODIUM CHLORIDE 9 MG/ML
1000 INJECTION INTRAMUSCULAR; INTRAVENOUS; SUBCUTANEOUS ONCE
Refills: 0 | Status: COMPLETED | OUTPATIENT
Start: 2023-08-22 | End: 2023-08-22

## 2023-08-22 RX ADMIN — SODIUM CHLORIDE 1000 MILLILITER(S): 9 INJECTION INTRAMUSCULAR; INTRAVENOUS; SUBCUTANEOUS at 17:00

## 2023-08-22 RX ADMIN — PIPERACILLIN AND TAZOBACTAM 3.38 GRAM(S): 4; .5 INJECTION, POWDER, LYOPHILIZED, FOR SOLUTION INTRAVENOUS at 12:30

## 2023-08-22 RX ADMIN — HEPARIN SODIUM 1900 UNIT(S)/HR: 5000 INJECTION INTRAVENOUS; SUBCUTANEOUS at 22:22

## 2023-08-22 RX ADMIN — SODIUM CHLORIDE 2000 MILLILITER(S): 9 INJECTION INTRAMUSCULAR; INTRAVENOUS; SUBCUTANEOUS at 13:00

## 2023-08-22 RX ADMIN — SODIUM CHLORIDE 2000 MILLILITER(S): 9 INJECTION INTRAMUSCULAR; INTRAVENOUS; SUBCUTANEOUS at 11:50

## 2023-08-22 RX ADMIN — ONDANSETRON 4 MILLIGRAM(S): 8 TABLET, FILM COATED ORAL at 11:50

## 2023-08-22 RX ADMIN — PIPERACILLIN AND TAZOBACTAM 200 GRAM(S): 4; .5 INJECTION, POWDER, LYOPHILIZED, FOR SOLUTION INTRAVENOUS at 11:50

## 2023-08-22 RX ADMIN — PANTOPRAZOLE SODIUM 40 MILLIGRAM(S): 20 TABLET, DELAYED RELEASE ORAL at 11:50

## 2023-08-22 NOTE — ED ADULT NURSE NOTE - ED STAT RN HANDOFF DETAILS
patient being sent to [facility] for urgent transfer.  patient safely transferred from ED

## 2023-08-22 NOTE — ED PROVIDER NOTE - CLINICAL SUMMARY MEDICAL DECISION MAKING FREE TEXT BOX
Patient was sent from [facility] for surgical consultation surgery consultation was called.  Patient was also noted to be in acute renal failure based on the labs from [facility].  Patient has increase in creatinine to 7.6 which is 7 times his baseline.  We will call stat nephrology consult.

## 2023-08-22 NOTE — CONSULT NOTE ADULT - ASSESSMENT
78 year old male with CKD, chronic AF, ischemic cardiomyopathy s/p CABG x 2, HTN, HLD, and bladder cancer s/p ileal conduit presents with shortness of breath and left lower quadrant abdominal pain associated with nausea and vomiting for past four days. On CT found to have high-grade small bowel obstruction with transition at the right lower quadrant parastomal hernia sac at the level of the ileal-ileal anastomosis.    Small bowel obstruction  Hypotension  Lactic acidosis  Acute kidney injury    BP improved with IVF  Lactate downtrended from 5 to 2.6  Suspect ARAM is pre-renal due to dehydration  Case discussed with surgical team at bedside - recommending transfer to tertiary center for surgical evaluation given prior surgical history  ED to facilitate transfer. Discussed with ED attending. 78 year old male with CKD, chronic AF, ischemic cardiomyopathy s/p CABG x 2, HTN, HLD, and bladder cancer s/p ileal conduit presents with shortness of breath and left lower quadrant abdominal pain associated with nausea and vomiting for past four days. On CT found to have high-grade small bowel obstruction with transition at the right lower quadrant parastomal hernia sac at the level of the ileal-ileal anastomosis.    Small bowel obstruction  Hypotension  Lactic acidosis  Acute kidney injury    BP improved with IVF  Lactate downtrended from 5 to 2.6  Suspect ARAM is pre-renal due to dehydration  Agree with empiric antibiotics  Case discussed with surgical team at bedside - recommending transfer to tertiary center for surgical evaluation given prior surgical history  ED to facilitate transfer. Discussed with ED attending.

## 2023-08-22 NOTE — H&P ADULT - HISTORY OF PRESENT ILLNESS
ACUTE CARE SURGERY CONSULT     HPI: 78-year-old male with history of bladder cancer with urostomy done 12 years ago A-fib hypertension hyperlipidemia CAD with 2 stents placed 1 month ago who presents with abdominal pain cough 4 to 5 days with nausea vomiting for 4 days.  Patient was seen at [facility] and was found to have high-grade SBO near his urostomy.  Patient was sent to [facility] for advanced surgical services.  Patient is currently not in pain and is not vomiting.  Per patient patient has been urinating within normal limits from his urostomy as his usual.    ROS: 10-system review is otherwise negative except HPI above.      PAST MEDICAL & SURGICAL HISTORY:  Atrial fibrillation  Hypothyroid  Hypertension  Bladder cancer  Bronchitis  Former smoker  CAD (coronary artery disease)  History of bladder surgery  on urostomy  S/P CABG x 3  History of automatic internal cardiac defibrillator (AICD)  H/O knee surgery      FAMILY HISTORY:  Family history of heart attack (Father, Mother)    SOCIAL HISTORY:  Denies any toxic habits    ALLERGIES: NKA No Known Allergies    HOME MEDICATIONS:   digoxin 125 mcg (0.125 mg) oral tablet: 1 orally once a day (14 Jul 2023 06:47)  Ecotrin Adult Low Strength 81 mg oral delayed release tablet: 1 tab(s) orally once a day (14 Jul 2023 15:40)  Entresto 24 mg-26 mg oral tablet: 1 tablet orally 2 times a day (14 Jul 2023 15:40)  furosemide 20 mg oral tablet: 1 tablet orally once a day (14 Jul 2023 15:40)  levothyroxine 137 mcg (0.137 mg) oral tablet: 1 tab(s) orally once a day (14 Jul 2023 06:47)  metoprolol succinate 50 mg oral tablet, extended release: 1 tab(s) orally 2 times a day (14 Jul 2023 06:46)  rosuvastatin 40 mg oral tablet: 1 orally once a day (14 Jul 2023 06:47)  warfarin 5 mg oral tablet: 1 tab(s) orally once a day (14 Jul 2023 06:47)  --------------------------------------------------------------------------------------------    PHYSICAL EXAM:   General: NAD, Lying in bed comfortably  Neuro: A+Ox3  HEENT: EOMI, PERRLA, MMM  Cardio: RRR  Resp: Non labored breathing on RA  GI/Abd: Soft, NT, Distended. no rebound/guarding, no masses palpated. Urobladder with urine and adjacent non reducible hernia that is non tender to palpation.   Vascular: All 4 extremities warm and well perfused.   Pelvis: stable  Musculoskeletal: All 4 extremities moving spontaneously, no limitations, no spinal tenderness.  --------------------------------------------------------------------------------------------    LABS                 14.3   8.54   )----------(  224       ( 22 Aug 2023 20:15 )               43.9      134    |  89     |  98.9   ----------------------------<  117        ( 22 Aug 2023 20:15 )  4.7     |  23.0   |  6.87     Ca    7.5        ( 22 Aug 2023 20:15 )  Mg     2.4       ( 22 Aug 2023 11:32 )    TPro  6.4    /  Alb  3.3    /  TBili  0.5    /  DBili  x      /  AST  19     /  ALT  11     /  AlkPhos  58     ( 22 Aug 2023 20:15 )    LIVER FUNCTIONS - ( 22 Aug 2023 20:15 )  Alb: 3.3 g/dL / Pro: 6.4 g/dL / ALK PHOS: 58 U/L / ALT: 11 U/L / AST: 19 U/L / GGT: x           PT/INR -  28.3 sec / 2.62 ratio   ( 22 Aug 2023 20:15 )       PTT -  33.0 sec   ( 22 Aug 2023 20:15 )  CAPILLARY BLOOD GLUCOSE        Urinalysis Basic - ( 22 Aug 2023 20:15 )    Color: x / Appearance: x / SG: x / pH: x  Gluc: 117 mg/dL / Ketone: x  / Bili: x / Urobili: x   Blood: x / Protein: x / Nitrite: x   Leuk Esterase: x / RBC: x / WBC x   Sq Epi: x / Non Sq Epi: x / Bacteria: x      ABG - ( 22 Aug 2023 11:18 )  pH: 7.46  /  pCO2: 36    /  pO2: 107   / HCO3: 26    / Base Excess: 1.8   /  SaO2: 98.8              20:15 - VBG - pH:       | pCO2:       | pO2:       | Lactate: 2.10     --------------------------------------------------------------------------------------------  IMAGING       ACUTE CARE SURGERY CONSULT     HPI: 78-year-old male with history of bladder cancer with urostomy done 12 years ago A-fib hypertension hyperlipidemia CAD with 2 stents placed 1 month ago who presents with abdominal pain cough 4 to 5 days with nausea vomiting for 4 days.  Patient was seen at [facility] and was found to have high-grade SBO near his urostomy.  Patient was sent to [facility] for advanced surgical services.  Patient is currently not in pain and is not vomiting.  Per patient patient has been urinating within normal limits from his urostomy as his usual.    ROS: 10-system review is otherwise negative except HPI above.      PAST MEDICAL & SURGICAL HISTORY:  Atrial fibrillation  Hypothyroid  Hypertension  Bladder cancer  Bronchitis  Former smoker  CAD (coronary artery disease)  History of bladder surgery  on urostomy  S/P CABG x 3  History of automatic internal cardiac defibrillator (AICD)  H/O knee surgery      FAMILY HISTORY:  Family history of heart attack (Father, Mother)    SOCIAL HISTORY:  Denies any toxic habits    ALLERGIES: NKA No Known Allergies    HOME MEDICATIONS:   digoxin 125 mcg (0.125 mg) oral tablet: 1 orally once a day (14 Jul 2023 06:47)  Ecotrin Adult Low Strength 81 mg oral delayed release tablet: 1 tab(s) orally once a day (14 Jul 2023 15:40)  Entresto 24 mg-26 mg oral tablet: 1 tablet orally 2 times a day (14 Jul 2023 15:40)  furosemide 20 mg oral tablet: 1 tablet orally once a day (14 Jul 2023 15:40)  levothyroxine 137 mcg (0.137 mg) oral tablet: 1 tab(s) orally once a day (14 Jul 2023 06:47)  metoprolol succinate 50 mg oral tablet, extended release: 1 tab(s) orally 2 times a day (14 Jul 2023 06:46)  rosuvastatin 40 mg oral tablet: 1 orally once a day (14 Jul 2023 06:47)  warfarin 5 mg oral tablet: 1 tab(s) orally once a day (14 Jul 2023 06:47)  --------------------------------------------------------------------------------------------    PHYSICAL EXAM:   General: NAD, Lying in bed comfortably  Neuro: A+Ox3  HEENT: EOMI, PERRLA, MMM  Cardio: RRR  Resp: Non labored breathing on RA  GI/Abd: Soft, NT, Distended. no rebound/guarding, no masses palpated. Urobladder with urine and adjacent non reducible hernia that is non tender to palpation.   Vascular: All 4 extremities warm and well perfused.   Pelvis: stable  Musculoskeletal: All 4 extremities moving spontaneously, no limitations, no spinal tenderness.  --------------------------------------------------------------------------------------------    LABS                 14.3   8.54   )----------(  224       ( 22 Aug 2023 20:15 )               43.9      134    |  89     |  98.9   ----------------------------<  117        ( 22 Aug 2023 20:15 )  4.7     |  23.0   |  6.87     Ca    7.5        ( 22 Aug 2023 20:15 )  Mg     2.4       ( 22 Aug 2023 11:32 )    TPro  6.4    /  Alb  3.3    /  TBili  0.5    /  DBili  x      /  AST  19     /  ALT  11     /  AlkPhos  58     ( 22 Aug 2023 20:15 )    LIVER FUNCTIONS - ( 22 Aug 2023 20:15 )  Alb: 3.3 g/dL / Pro: 6.4 g/dL / ALK PHOS: 58 U/L / ALT: 11 U/L / AST: 19 U/L / GGT: x           PT/INR -  28.3 sec / 2.62 ratio   ( 22 Aug 2023 20:15 )       PTT -  33.0 sec   ( 22 Aug 2023 20:15 )  CAPILLARY BLOOD GLUCOSE        Urinalysis Basic - ( 22 Aug 2023 20:15 )    Color: x / Appearance: x / SG: x / pH: x  Gluc: 117 mg/dL / Ketone: x  / Bili: x / Urobili: x   Blood: x / Protein: x / Nitrite: x   Leuk Esterase: x / RBC: x / WBC x   Sq Epi: x / Non Sq Epi: x / Bacteria: x      ABG - ( 22 Aug 2023 11:18 )  pH: 7.46  /  pCO2: 36    /  pO2: 107   / HCO3: 26    / Base Excess: 1.8   /  SaO2: 98.8              20:15 - VBG - pH:       | pCO2:       | pO2:       | Lactate: 2.10     --------------------------------------------------------------------------------------------  IMAGING  ABDOMEN AND PELVIS:    Evaluation of thesolid organ parenchyma is limited without intravenous   contrast.    LIVER: Steatosis.  BILE DUCTS: Normal caliber.  GALLBLADDER: Mild with biliary sludge and/or gallstones.  SPLEEN: Within normal limits.  PANCREAS: Within normal limits.  ADRENALS: Within normal limits.  KIDNEYS/URETERS: Bilateral renal cysts.  No hydroureteronephrosis.    BLADDER:  REPRODUCTIVE ORGANS:  Status post cystoproctectomy with ileal conduit.    BOWEL:  Distended fluid-filled stomach.  Markedly distended fluid-filled small bowel loops.  There is abrupt change in transition at the level of the right lower   quadrant parastomal hernia and the ileal-ileal anastomosis.  PERITONEUM: No ascites.  There is mild mesenteric edema/stranding.  Evaluation of the bowel wall is limited without intravenous contrast.    VESSELS: Atherosclerotic changes.  RETROPERITONEUM/LYMPH NODES: No lymphadenopathy.    ABDOMINAL WALL:  Right parastomal hernia containing dilated small bowel and collapse small   bowel loops at the ileal ileal anastomosis.  Nondistended ileal conduit into is noted extending to the stomal surface.    BONES: Degenerative changes.  Posterior interspinous instrumentation at L4-5.    IMPRESSION:    High-grade small bowel obstruction with transition at the right lower   quadrant parastomal hernia sac at the level of the ileal- ileal   anastomosis.         ACUTE CARE SURGERY CONSULT     HPI: 78-year-old male with history of bladder cancer with urostomy done 12 years ago, A-fib on Warfarin, hypertension, hyperlipidemia, CAD with 2 stents placed 1 month ago who presents with abdominal pain cough 4 to 5 days with nausea vomiting for 4 days.  Patient was seen at [facility] and was found to have high-grade SBO near his urostomy.  Patient was sent to [facility] for advanced surgical services.  Patient is currently not in pain and is not vomiting.  Per patient patient has been urinating within normal limits from his urostomy as his usual.    ROS: 10-system review is otherwise negative except HPI above.      PAST MEDICAL & SURGICAL HISTORY:  Atrial fibrillation  Hypothyroid  Hypertension  Bladder cancer  Bronchitis  Former smoker  CAD (coronary artery disease)  History of bladder surgery  on urostomy  S/P CABG x 3  History of automatic internal cardiac defibrillator (AICD)  H/O knee surgery      FAMILY HISTORY:  Family history of heart attack (Father, Mother)    SOCIAL HISTORY:  Denies any toxic habits    ALLERGIES: NKA No Known Allergies    HOME MEDICATIONS:   digoxin 125 mcg (0.125 mg) oral tablet: 1 orally once a day (14 Jul 2023 06:47)  Ecotrin Adult Low Strength 81 mg oral delayed release tablet: 1 tab(s) orally once a day (14 Jul 2023 15:40)  Entresto 24 mg-26 mg oral tablet: 1 tablet orally 2 times a day (14 Jul 2023 15:40)  furosemide 20 mg oral tablet: 1 tablet orally once a day (14 Jul 2023 15:40)  levothyroxine 137 mcg (0.137 mg) oral tablet: 1 tab(s) orally once a day (14 Jul 2023 06:47)  metoprolol succinate 50 mg oral tablet, extended release: 1 tab(s) orally 2 times a day (14 Jul 2023 06:46)  rosuvastatin 40 mg oral tablet: 1 orally once a day (14 Jul 2023 06:47)  warfarin 5 mg oral tablet: 1 tab(s) orally once a day (14 Jul 2023 06:47)  --------------------------------------------------------------------------------------------    PHYSICAL EXAM:   General: NAD, Lying in bed comfortably  Neuro: A+Ox3  HEENT: EOMI, PERRLA, MMM  Cardio: RRR  Resp: Non labored breathing on RA  GI/Abd: Soft, NT, Distended. no rebound/guarding, no masses palpated. Urobladder with urine and adjacent non reducible hernia that is non tender to palpation.   Vascular: All 4 extremities warm and well perfused.   Pelvis: stable  Musculoskeletal: All 4 extremities moving spontaneously, no limitations, no spinal tenderness.  --------------------------------------------------------------------------------------------    LABS                 14.3   8.54   )----------(  224       ( 22 Aug 2023 20:15 )               43.9      134    |  89     |  98.9   ----------------------------<  117        ( 22 Aug 2023 20:15 )  4.7     |  23.0   |  6.87     Ca    7.5        ( 22 Aug 2023 20:15 )  Mg     2.4       ( 22 Aug 2023 11:32 )    TPro  6.4    /  Alb  3.3    /  TBili  0.5    /  DBili  x      /  AST  19     /  ALT  11     /  AlkPhos  58     ( 22 Aug 2023 20:15 )    LIVER FUNCTIONS - ( 22 Aug 2023 20:15 )  Alb: 3.3 g/dL / Pro: 6.4 g/dL / ALK PHOS: 58 U/L / ALT: 11 U/L / AST: 19 U/L / GGT: x           PT/INR -  28.3 sec / 2.62 ratio   ( 22 Aug 2023 20:15 )       PTT -  33.0 sec   ( 22 Aug 2023 20:15 )  CAPILLARY BLOOD GLUCOSE        Urinalysis Basic - ( 22 Aug 2023 20:15 )    Color: x / Appearance: x / SG: x / pH: x  Gluc: 117 mg/dL / Ketone: x  / Bili: x / Urobili: x   Blood: x / Protein: x / Nitrite: x   Leuk Esterase: x / RBC: x / WBC x   Sq Epi: x / Non Sq Epi: x / Bacteria: x      ABG - ( 22 Aug 2023 11:18 )  pH: 7.46  /  pCO2: 36    /  pO2: 107   / HCO3: 26    / Base Excess: 1.8   /  SaO2: 98.8              20:15 - VBG - pH:       | pCO2:       | pO2:       | Lactate: 2.10     --------------------------------------------------------------------------------------------  IMAGING  ABDOMEN AND PELVIS:    Evaluation of thesolid organ parenchyma is limited without intravenous   contrast.    LIVER: Steatosis.  BILE DUCTS: Normal caliber.  GALLBLADDER: Mild with biliary sludge and/or gallstones.  SPLEEN: Within normal limits.  PANCREAS: Within normal limits.  ADRENALS: Within normal limits.  KIDNEYS/URETERS: Bilateral renal cysts.  No hydroureteronephrosis.    BLADDER:  REPRODUCTIVE ORGANS:  Status post cystoproctectomy with ileal conduit.    BOWEL:  Distended fluid-filled stomach.  Markedly distended fluid-filled small bowel loops.  There is abrupt change in transition at the level of the right lower   quadrant parastomal hernia and the ileal-ileal anastomosis.  PERITONEUM: No ascites.  There is mild mesenteric edema/stranding.  Evaluation of the bowel wall is limited without intravenous contrast.    VESSELS: Atherosclerotic changes.  RETROPERITONEUM/LYMPH NODES: No lymphadenopathy.    ABDOMINAL WALL:  Right parastomal hernia containing dilated small bowel and collapse small   bowel loops at the ileal ileal anastomosis.  Nondistended ileal conduit into is noted extending to the stomal surface.    BONES: Degenerative changes.  Posterior interspinous instrumentation at L4-5.    IMPRESSION:    High-grade small bowel obstruction with transition at the right lower   quadrant parastomal hernia sac at the level of the ileal- ileal   anastomosis.

## 2023-08-22 NOTE — ED ADULT TRIAGE NOTE - CHIEF COMPLAINT QUOTE
Pt BIBEMs transfer from [place] for [facility]. No NG tube in place. Breathing even and unlabored.

## 2023-08-22 NOTE — ED PROVIDER NOTE - CARE PLAN
1 Principal Discharge DX:	Small bowel obstruction  Secondary Diagnosis:	ARAM (acute kidney injury)  Secondary Diagnosis:	Severe dehydration  Secondary Diagnosis:	Atrial fibrillation with RVR

## 2023-08-22 NOTE — ED PROVIDER NOTE - COVID-19 RESULT DATE/TIME
Hpi Title: Evaluation of Skin Lesions How Severe Are Your Spot(S)?: moderate Have Your Spot(S) Been Treated In The Past?: has not been treated 22-Aug-2023 12:58

## 2023-08-22 NOTE — ED PROVIDER NOTE - PROGRESS NOTE DETAILS
hr improvedd to 105 Reevaluated patient at bedside.  Patient feeling much improved.  Discussed the results of all diagnostic testing in ED and copies of all reports given. await ct reports for admission dw radiology dr lopez pt has small bowel obstruction at the ileal conduit surgery site.    pt and family made aware of the finding surgery dr Betts called  dw dr clements aware 102/57 is bp  will re-order additional iv fluids. seen by surgery- pt is a complex post op urological and gen surg case that has obstn needing care at facility that does this surgery.  transfer center called for urgent transfer to  [facility] to gen surgery acceptted dr Murcia

## 2023-08-22 NOTE — ED PROVIDER NOTE - PROGRESS NOTE DETAILS
Patient was seen by surgery and nephrology consult was called for acute renal failure.  Patient admitted to surgery for high-grade SBO

## 2023-08-22 NOTE — ED PROVIDER NOTE - BOWEL SOUNDS
Patient would like their lab results     Person calling in Patient    Lab Draw Date 1/13/21   Lab Draw [facility]    Call Back Number [phone]        * Patient also states she was referred to GI by Dr Raiza Brunner to GI and is scheduled  2/23 for a appt  ABSENT

## 2023-08-22 NOTE — CONSULT NOTE ADULT - ASSESSMENT
Fletcher is a 78 year old male with persistent AF, CAD s/p CABG x 2, and most recently s/p a PCI to his native LCx , here with nausea, vomiting, abdominal pain, and hypoxia.    - symptoms suggest GI etiology, though he is notably dehydrated and creatinine >7  - despite elevated BNP, he is dry on exam, and has significant gloria  - hold off on diuretics at this time  - hold entresto  - cont 02 supplementation as needed  - ef has been in the 40-45% range    - no sign of acute ischemia and hs troponin is negative  - known cad s/p 2 vessel CABG (patent SVG to OM1, though an atretic LIMA)  - recently s/p pci the  of lcx  - cont with plavix  - cont statin    - known persistent af, with rates higher than usual in the setting of dehydration  - can restart bb once hypotension resolved  - hold digoxin in setting of gloria  - on ac with with coumadin, can continue with goal inr 2-3    - trend creatinine and electrolytes. Keep K>4, Mg>2  - will follow with you

## 2023-08-22 NOTE — ED ADULT NURSE NOTE - NSFALLHARMRISKINTERV_ED_ALL_ED

## 2023-08-22 NOTE — ED PROVIDER NOTE - OBJECTIVE STATEMENT
78-year-old male with history of bladder cancer with urostomy done 12 years ago A-fib hypertension hyperlipidemia CAD with 2 stents presents with abdominal pain cough 4 to 5 days with nausea vomiting for 2 days.  Patient was seen at [facility] and was found to have high-grade SBO near his urostomy.  Patient was sent to [facility] for advanced surgical services.  Patient is currently not in pain and is not vomiting.  Per patient patient has been urinating within normal limits from his urostomy as his usual.

## 2023-08-22 NOTE — H&P ADULT - ATTENDING COMMENTS
Patient seen and examined at bedside. Urostomy with urine in bag, abd soft, NTTP, ND, parastomal hernia in place. Will place NG for decompression, trend lactate and labs, conservative management trial. NPO, IVF.

## 2023-08-22 NOTE — ED ADULT NURSE NOTE - CHIEF COMPLAINT QUOTE
Patient A/Ox4 with clear speech. Accompanied by his wife. Comes in for shortness of breath, weakness and lack of PO intake that has worsened over the past 2-3 days. Went to PCP today and was instructed to come to ED. Patient taken immediately to exam room based on VS in triage.

## 2023-08-22 NOTE — ED PROVIDER NOTE - CONSTITUTIONAL, MLM
normal... Well appearing, awake, alert, oriented to person, place, time/situation and in no apparent distress. who when he moves about the exam stretcher becomes acutely dyspneic and has cyanotic lips. resolves quickly.

## 2023-08-22 NOTE — CONSULT NOTE ADULT - SUBJECTIVE AND OBJECTIVE BOX
SURGERY PA CONSULT NOTE:    CHIEF COMPLAINT:  Patient is a 78y old  Male who presents with a chief complaint of shortness of breath.    HPI:  HPI:  Patient is a 78-year-old male sent in by his primary care physician Dr. Valentine for evaluation of shortness of breath and weakness.  Past medical history significant for hyperkalemia, CKD, CAD, A-fib, CHF, cardiomyopathy, status post CABG x2 on Coumadin, HLD, HTN, bladder cancer  	Cards dr Chu  He underwent cardiac catheterization 4 weeks ago, has known renal dysfunction but was cleared.  Presents with abdominal pain to the left lower quadrant with nausea and vomiting for 4 days.  Unable to tolerate anything by mouth as he vomits everything he takes.  No diarrhea no constipation.  He is making urine but a little less than usual.  Now very short of breath.  He also has a history of bladder cancer with urostomy.    INTERVAL HPI:  78yoM with extensive PMHx including CAD, s/p CABG by Dr. Harding 2021, Stent (most recently 7/14/23 at [facility] w/ Dr. Clarke) takes plavix last taken 8/21 PM, COPD, HTN, HLD, CKD3 (per chart review f/b Dr. Shell Bauman, baseline Cr 1.5-2.0), Bladder ca s/p cystectomy and urostomy with Dr. Dominique Arauz 12 years ago (sees yearly for f/u and ultrasound, pAfib on Coumadin last taken 8/21 PM, s/p DCCV x2, AICD, CHF (Systolic), hypothyroidism currently on Synthroid, former smoker (25p/yr); now presents to [facility] ED with vomiting since Saturday night, after tolerating lunch and dinner that day. Since Saturday night, patient has not been able to tolerate PO intake. Describes vomiting as dark purple liquid, last episode this AM at 5. Pt reports no previous episodes of like issues. Pt and family report believing patient had enteritis, however when symptoms persisted and worsened to include SOB, and did not include diarrhea, he came to ED. Pt reports having little output from urostomy. Pt denies chest pain, headaches, dizziness, diarrhea.     PAST MEDICAL & SURGICAL HISTORY:  Atrial fibrillation  Hypothyroid  Hypertension  Bladder cancer s/p cystoprostatectomy with urostomy  Bronchitis  Former smoker  CAD (coronary artery disease)  S/P CABG   History of automatic internal cardiac defibrillator (AICD)  H/O knee surgery    REVIEW OF SYSTEMS:  CONSTITUTIONAL: No weakness, fevers or chills, no weight loss  EYES/ENT: No visual changes;  No vertigo or throat pain   NECK: No pain or stiffness  ENDOCRINE: +hypothyroidism  RESPIRATORY: + SOB, No cough, wheezing, hemoptysis  CARDIOVASCULAR: No chest pain or palpitations  GASTROINTESTINAL: + vomiting, + abdominal pain. No hematemesis; No diarrhea or constipation. No melena or hematochezia.  GENITOURINARY: + decreased urinary output  MSK: No joint swelling  NEUROLOGICAL: No numbness or weakness  SKIN: No itching, burning, rashes, or lesions   All other review of systems is negative unless indicated above.    MEDICATIONS:  Home Medications:  digoxin 125 mcg (0.125 mg) oral tablet: 1 orally once a day (14 Jul 2023 06:47)  Ecotrin Adult Low Strength 81 mg oral delayed release tablet: 1 tab(s) orally once a day (14 Jul 2023 15:40)  Entresto 24 mg-26 mg oral tablet: 1 tablet orally 2 times a day (14 Jul 2023 15:40)  furosemide 20 mg oral tablet: 1 tablet orally once a day (14 Jul 2023 15:40)  levothyroxine 137 mcg (0.137 mg) oral tablet: 1 tab(s) orally once a day (14 Jul 2023 06:47)  metoprolol succinate 50 mg oral tablet, extended release: 1 tab(s) orally 2 times a day (14 Jul 2023 06:46)  rosuvastatin 40 mg oral tablet: 1 orally once a day (14 Jul 2023 06:47)  warfarin 5 mg oral tablet: 1 tab(s) orally once a day (14 Jul 2023 06:47)    MEDICATIONS  (STANDING):  sodium chloride 0.9% Bolus 1000 milliLiter(s) IV Bolus once    MEDICATIONS  (PRN):      ALLERGIES:  No Known Allergies, Intolerances    SOCIAL HISTORY:  Smoking: previous smoker 25PPY, quit 1985  ETOH  Yes [ ]  No [ ]  Social [ x]  DRUGS:  Yes [ ]  No [ x]  if so what______________    FAMILY HISTORY:  Family history of heart attack (Father, Mother)    PHYSICAL EXAM:  Vital Signs Last 24 Hrs  T(C): 36.7 (22 Aug 2023 13:16), Max: 36.7 (22 Aug 2023 13:16)  T(F): 98 (22 Aug 2023 13:16), Max: 98 (22 Aug 2023 13:16)  HR: 100 (22 Aug 2023 13:16) (100 - 129)  BP: 103/67 (22 Aug 2023 13:16) (92/55 - 103/67)  RR: 18 (22 Aug 2023 13:16) (18 - 24)  SpO2: 96% (22 Aug 2023 13:16) (74% - 98%)    Parameters below as of 22 Aug 2023 13:16  Patient On (Oxygen Delivery Method): nasal cannula  O2 Flow (L/min): 5    CONSTITUTIONAL: No apparent distress, lying comfortably in bed  HEAD:  Atraumatic, normocephalic  EYES: EOMI, PERRLA, conjunctiva and sclera clear  ENMT: Oral mucosa with moist membranes. Normal dentition; no pharyngeal injection or exudates. Hearing aid in place  NECK: Supple, symmetric and without tracheal deviation   RESP: No respiratory distress, no use of accessory muscles; CTA b/l, no WRR  CV: irregularly, irregular; no peripheral edema  GI: Soft, mildly distended, +ileal conduit urostomy present in LLQ subcutaneous soft mass surrounding urostomy consistent with hernia , no rebound, no guarding; no palpable masses; no hepatosplenomegaly; no hernia palpated  LYMPH: No cervical LAD or tenderness; no axillary LAD or tenderness; no inguinal LAD or tenderness  EXTREMITIES: 2+ peripheral pulses, no clubbing, cyanosis, or edema  PSYCH: A&O x3  NEUROLOGY: Non-focal, motor & sensory grossly intact  SKIN: Warm & dry, no rashes or lesions; no subcutaneous nodules or induration palpable    LABS:                        18.2   7.69  )-----------( 331      ( 22 Aug 2023 11:32 )             54.5     08-22    131<L>  |  81<L>  |  101<H>  ----------------------------<  173<H>  4.8   |  29  |  7.60<H>    Ca    9.5      22 Aug 2023 11:32  Mg     2.4     08-22    TPro  9.0<H>  /  Alb  3.5  /  TBili  0.5  /  DBili  x   /  AST  25  /  ALT  23  /  AlkPhos  82  08-22    PT/INR - ( 22 Aug 2023 11:32 )   PT: 24.9 sec;   INR: 2.18 ratio         PTT - ( 22 Aug 2023 11:32 )  PTT:36.6 sec  Lactate, Blood: 2.6 mmol/L (08-22 @ 14:03)  Lactate, Blood: 5.0 mmol/L (08-22 @ 11:32)    Urinalysis Basic - ( 22 Aug 2023 11:32 )    Color: x / Appearance: x / SG: x / pH: x  Gluc: 173 mg/dL / Ketone: x  / Bili: x / Urobili: x   Blood: x / Protein: x / Nitrite: x   Leuk Esterase: x / RBC: x / WBC x   Sq Epi: x / Non Sq Epi: x / Bacteria: x      LIVER FUNCTIONS - ( 22 Aug 2023 11:32 )  Alb: 3.5 g/dL / Pro: 9.0 g/dL / ALK PHOS: 82 U/L / ALT: 23 U/L / AST: 25 U/L / GGT: x           RADIOLOGY & ADDITIONAL STUDIES:  < from: CT Chest No Cont (08.22.23 @ 14:17) >  ACC: [id number] EXAM:  CT ABDOMEN AND PELVIS   ORDERED BY: LINA PADGETT     ACC: [id number] EXAM:  CT CHEST   ORDERED BY: LINA PADGETT     PROCEDURE DATE:  08/22/2023      INTERPRETATION:  CLINICAL INFORMATION: Shortness of breath.  Abdominal pain nausea and vomiting.  Prior history of bladder cancer.    COMPARISON: CT scan chest 11/12/2019.    CONTRAST/COMPLICATIONS:  IV Contrast: NONE  90 cc administered   10 cc discarded  Oral Contrast: NONE  Complications: None reported at time of study completion    PROCEDURE:  CT of the Chest, Abdomen and Pelvis was performed.  Sagittal and coronal reformats were performed.    FINDINGS:    CHEST:    LUNGS AND LARGE AIRWAYS: PLEURA:    Bibasilar subsegmental atelectasis.    The central airways are patent.    No pleural effusion or pneumothorax.    VESSELS: Atherosclerotic changes thoracic aorta with ectasia ascending   aorta measuring 4.0 cm.  Coronary artery calcification.    HEART:  The heart is mildly enlarged.  Cardiac pacemaker leads.   No pericardial effusion.    MEDIASTINUM AND MARY:  Small, subcentimeter short axis prevascular, pretracheal and subcarinal   lymph nodes.    CHEST WALL AND LOWER NECK:  Sternotomy.  Cardiac device left chest wall.    ABDOMEN AND PELVIS:    Evaluation of thesolid organ parenchyma is limited without intravenous   contrast.    LIVER: Steatosis.  BILE DUCTS: Normal caliber.  GALLBLADDER: Mild with biliary sludge and/or gallstones.  SPLEEN: Within normal limits.  PANCREAS: Within normal limits.  ADRENALS: Within normal limits.  KIDNEYS/URETERS: Bilateral renal cysts.  No hydroureteronephrosis.    BLADDER:  REPRODUCTIVE ORGANS:  Status post cystoproctectomy with ileal conduit.    BOWEL:  Distended fluid-filled stomach.  Markedly distended fluid-filled small bowel loops.  There is abrupt change in transition at the level of the right lower   quadrant parastomal hernia and the ileal-ileal anastomosis.  PERITONEUM: No ascites.  There is mild mesenteric edema/stranding.  Evaluation of the bowel wall is limited without intravenous contrast.    VESSELS: Atherosclerotic changes.  RETROPERITONEUM/LYMPH NODES: No lymphadenopathy.    ABDOMINAL WALL:  Right parastomal hernia containing dilated small bowel and collapse small   bowel loops at the ileal ileal anastomosis.  Nondistended ileal conduit into is noted extending to the stomal surface.    BONES: Degenerative changes.  Posterior interspinous instrumentation at L4-5.    IMPRESSION:    High-grade small bowel obstruction with transition at the right lower   quadrant parastomal hernia sac at the level of the ileal- ileal   anastomosis.    This finding was discussed with Dr. Padgett  by telephone at the time of   interpretation on 8/22/2023.    Other findings as discussed above.    --- End of Report---    JANESSA DE JESUS MD; Attending Radiologist  This document has been electronically signed. Aug 22 2023  4:08PM    < end of copied text >    ASSESSMENT:  78yMale with PMHx Intestinal obstruction    Family history of heart attack (Father, Mother)    MEWS Score    Atrial fibrillation    Hypothyroid    Hypertension    Bladder cancer    Bronchitis    Former smoker    CAD (coronary artery disease)    Small bowel obstruction    History of bladder surgery    S/P CABG x 3    History of automatic internal cardiac defibrillator (AICD)    H/O knee surgery    DEHYDRATED    90+    ARAM (acute kidney injury)    Severe dehydration    Atrial fibrillation with RVR    [email or web address]        PLAN:  - VSS  - Labs with  - CT findings with   - Exam   - Diet  - KRISTEN  - Pain regimen/supportive care  - VTE ppx    To discuss with   SURGERY PA CONSULT NOTE:    CHIEF COMPLAINT:  Patient is a 78y old  Male who presents with a chief complaint of shortness of breath.    HPI:  HPI:  Patient is a 78-year-old male sent in by his primary care physician Dr. Valentine for evaluation of shortness of breath and weakness.  Past medical history significant for hyperkalemia, CKD, CAD, A-fib, CHF, cardiomyopathy, status post CABG x2 on Coumadin, HLD, HTN, bladder cancer  	Cards dr Chu  He underwent cardiac catheterization 4 weeks ago, has known renal dysfunction but was cleared.  Presents with abdominal pain to the left lower quadrant with nausea and vomiting for 4 days.  Unable to tolerate anything by mouth as he vomits everything he takes.  No diarrhea no constipation.  He is making urine but a little less than usual.  Now very short of breath.  He also has a history of bladder cancer with urostomy.    INTERVAL HPI:  78yoM with extensive PMHx including CAD, s/p CABG by Dr. Harding 2021, Stent (most recently 7/14/23 at [facility] w/ Dr. Clarke) takes plavix last taken 8/21 PM, COPD, HTN, HLD, CKD3 (per chart review f/b Dr. Shell Bauman, baseline Cr 1.5-2.0), Bladder ca s/p cystectomy and urostomy with Dr. Dominique Arauz 12 years ago (sees yearly for f/u and ultrasound, pAfib on Coumadin last taken 8/21 PM, s/p DCCV x2, AICD, CHF (Systolic), hypothyroidism currently on Synthroid, former smoker (25p/yr); now presents to \[facility]\"" ED with vomiting since Saturday night, after tolerating lunch and dinner that day. Since Saturday night, patient has not been able to tolerate PO intake. Describes vomiting as dark purple liquid, last episode this AM at 5. Pt reports no previous episodes of like issues. Pt and family report believing patient had enteritis, however when symptoms persisted and worsened to include SOB, and did not include diarrhea, he came to ED. Pt reports having little output from urostomy. Pt denies chest pain, headaches, dizziness, diarrhea.     PAST MEDICAL & SURGICAL HISTORY:  Atrial fibrillation  Hypothyroid  Hypertension  Bladder cancer s/p cystoprostatectomy with urostomy  Bronchitis  Former smoker  CAD (coronary artery disease)  S/P CABG   History of automatic internal cardiac defibrillator (AICD)  H/O knee surgery    REVIEW OF SYSTEMS:  CONSTITUTIONAL: No weakness, fevers or chills, no weight loss  EYES/ENT: No visual changes;  No vertigo or throat pain   NECK: No pain or stiffness  ENDOCRINE: +hypothyroidism  RESPIRATORY: + SOB, No cough, wheezing, hemoptysis  CARDIOVASCULAR: No chest pain or palpitations  GASTROINTESTINAL: + vomiting, + abdominal pain. No hematemesis; No diarrhea or constipation. No melena or hematochezia.  GENITOURINARY: + decreased urinary output  MSK: No joint swelling  NEUROLOGICAL: No numbness or weakness  SKIN: No itching, burning, rashes, or lesions   All other review of systems is negative unless indicated above.    MEDICATIONS:  Home Medications:  digoxin 125 mcg (0.125 mg) oral tablet: 1 orally once a day (14 Jul 2023 06:47)  Ecotrin Adult Low Strength 81 mg oral delayed release tablet: 1 tab(s) orally once a day (14 Jul 2023 15:40)  Entresto 24 mg-26 mg oral tablet: 1 tablet orally 2 times a day (14 Jul 2023 15:40)  furosemide 20 mg oral tablet: 1 tablet orally once a day (14 Jul 2023 15:40)  levothyroxine 137 mcg (0.137 mg) oral tablet: 1 tab(s) orally once a day (14 Jul 2023 06:47)  metoprolol succinate 50 mg oral tablet, extended release: 1 tab(s) orally 2 times a day (14 Jul 2023 06:46)  rosuvastatin 40 mg oral tablet: 1 orally once a day (14 Jul 2023 06:47)  warfarin 5 mg oral tablet: 1 tab(s) orally once a day (14 Jul 2023 06:47)    MEDICATIONS  (STANDING):  sodium chloride 0.9% Bolus 1000 milliLiter(s) IV Bolus once    MEDICATIONS  (PRN):      ALLERGIES:  No Known Allergies, Intolerances    SOCIAL HISTORY:  Smoking: previous smoker 25PPY, quit 1985  ETOH  Yes [ ]  No [ ]  Social [ x]  DRUGS:  Yes [ ]  No [ x]  if so what______________    FAMILY HISTORY:  Family history of heart attack (Father, Mother)    PHYSICAL EXAM:  Vital Signs Last 24 Hrs  T(C): 36.7 (22 Aug 2023 13:16), Max: 36.7 (22 Aug 2023 13:16)  T(F): 98 (22 Aug 2023 13:16), Max: 98 (22 Aug 2023 13:16)  HR: 100 (22 Aug 2023 13:16) (100 - 129)  BP: 103/67 (22 Aug 2023 13:16) (92/55 - 103/67)  RR: 18 (22 Aug 2023 13:16) (18 - 24)  SpO2: 96% (22 Aug 2023 13:16) (74% - 98%)    Parameters below as of 22 Aug 2023 13:16  Patient On (Oxygen Delivery Method): nasal cannula  O2 Flow (L/min): 5    CONSTITUTIONAL: No apparent distress, lying comfortably in bed  HEAD:  Atraumatic, normocephalic  EYES: EOMI, PERRLA, conjunctiva and sclera clear  ENMT: Oral mucosa with moist membranes. Normal dentition; no pharyngeal injection or exudates. Hearing aid in place  NECK: Supple, symmetric and without tracheal deviation   RESP: No respiratory distress, no use of accessory muscles; CTA b/l, no WRR  CV: irregularly, irregular; no peripheral edema  GI: Soft, mildly distended, +ileal conduit urostomy present in LLQ subcutaneous soft mass surrounding urostomy consistent with hernia soft, non-tender, no overlying skin changes, no rebound, no guarding  EXTREMITIES: peripheral pulses palpable, no clubbing, cyanosis, or edema  PSYCH: A&O x3  NEUROLOGY: Non-focal, motor & sensory grossly intact  SKIN: Warm & dry, no rashes or lesions; no subcutaneous nodules or induration palpable    LABS:                        18.2   7.69  )-----------( 331      ( 22 Aug 2023 11:32 )             54.5     08-22    131<L>  |  81<L>  |  101<H>  ----------------------------<  173<H>  4.8   |  29  |  7.60<H>    Ca    9.5      22 Aug 2023 11:32  Mg     2.4     08-22    TPro  9.0<H>  /  Alb  3.5  /  TBili  0.5  /  DBili  x   /  AST  25  /  ALT  23  /  AlkPhos  82  08-22    PT/INR - ( 22 Aug 2023 11:32 )   PT: 24.9 sec;   INR: 2.18 ratio         PTT - ( 22 Aug 2023 11:32 )  PTT:36.6 sec  Lactate, Blood: 2.6 mmol/L (08-22 @ 14:03)  Lactate, Blood: 5.0 mmol/L (08-22 @ 11:32)    Urinalysis Basic - ( 22 Aug 2023 11:32 )    Color: x / Appearance: x / SG: x / pH: x  Gluc: 173 mg/dL / Ketone: x  / Bili: x / Urobili: x   Blood: x / Protein: x / Nitrite: x   Leuk Esterase: x / RBC: x / WBC x   Sq Epi: x / Non Sq Epi: x / Bacteria: x      LIVER FUNCTIONS - ( 22 Aug 2023 11:32 )  Alb: 3.5 g/dL / Pro: 9.0 g/dL / ALK PHOS: 82 U/L / ALT: 23 U/L / AST: 25 U/L / GGT: x           RADIOLOGY & ADDITIONAL STUDIES:  < from: CT Chest No Cont (08.22.23 @ 14:17) >  ACC: [id number] EXAM:  CT ABDOMEN AND PELVIS   ORDERED BY: LINA PADGETT     ACC: [id number] EXAM:  CT CHEST   ORDERED BY: LINA PADGETT     PROCEDURE DATE:  08/22/2023      INTERPRETATION:  CLINICAL INFORMATION: Shortness of breath.  Abdominal pain nausea and vomiting.  Prior history of bladder cancer.    COMPARISON: CT scan chest 11/12/2019.    CONTRAST/COMPLICATIONS:  IV Contrast: NONE  90 cc administered   10 cc discarded  Oral Contrast: NONE  Complications: None reported at time of study completion    PROCEDURE:  CT of the Chest, Abdomen and Pelvis was performed.  Sagittal and coronal reformats were performed.    FINDINGS:    CHEST:    LUNGS AND LARGE AIRWAYS: PLEURA:    Bibasilar subsegmental atelectasis.    The central airways are patent.    No pleural effusion or pneumothorax.    VESSELS: Atherosclerotic changes thoracic aorta with ectasia ascending   aorta measuring 4.0 cm.  Coronary artery calcification.    HEART:  The heart is mildly enlarged.  Cardiac pacemaker leads.   No pericardial effusion.    MEDIASTINUM AND MARY:  Small, subcentimeter short axis prevascular, pretracheal and subcarinal   lymph nodes.    CHEST WALL AND LOWER NECK:  Sternotomy.  Cardiac device left chest wall.    ABDOMEN AND PELVIS:    Evaluation of thesolid organ parenchyma is limited without intravenous   contrast.    LIVER: Steatosis.  BILE DUCTS: Normal caliber.  GALLBLADDER: Mild with biliary sludge and/or gallstones.  SPLEEN: Within normal limits.  PANCREAS: Within normal limits.  ADRENALS: Within normal limits.  KIDNEYS/URETERS: Bilateral renal cysts.  No hydroureteronephrosis.    BLADDER:  REPRODUCTIVE ORGANS:  Status post cystoproctectomy with ileal conduit.    BOWEL:  Distended fluid-filled stomach.  Markedly distended fluid-filled small bowel loops.  There is abrupt change in transition at the level of the right lower   quadrant parastomal hernia and the ileal-ileal anastomosis.  PERITONEUM: No ascites.  There is mild mesenteric edema/stranding.  Evaluation of the bowel wall is limited without intravenous contrast.    VESSELS: Atherosclerotic changes.  RETROPERITONEUM/LYMPH NODES: No lymphadenopathy.    ABDOMINAL WALL:  Right parastomal hernia containing dilated small bowel and collapse small   bowel loops at the ileal ileal anastomosis.  Nondistended ileal conduit into is noted extending to the stomal surface.    BONES: Degenerative changes.  Posterior interspinous instrumentation at L4-5.    IMPRESSION:    High-grade small bowel obstruction with transition at the right lower   quadrant parastomal hernia sac at the level of the ileal- ileal   anastomosis.    This finding was discussed with Dr. Padgett  by telephone at the time of   interpretation on 8/22/2023.    Other findings as discussed above.    --- End of Report---    JANESSA DE JESUS MD; Attending Radiologist  This document has been electronically signed. Aug 22 2023  4:08PM    < end of copied text >    ASSESSMENT:  77yo Male with PMHx CAD, s/p CABG by Dr. Harding 2021, Stent (most recently 7/14/23 at [facility] w/ Dr. Clarke) takes plavix, COPD, HTN, HLD, CKD3 (per chart review f/b Dr. Shell Bauman, baseline Cr 1.5-2.0), Bladder ca s/p cystectomy and urostomy with Dr. Dominique Arauz 12 years ago (sees yearly for f/u and ultrasound), pAfib on Coumadin last taken 8/21 PM, s/p DCCV x2, AICD, CHF (Systolic), hypothyroidism currently on Synthroid, former smoker (25p/yr); presents to \[facility]\"" ED with vomiting since Saturday night. In the ED, patient with tachycardia to 100 currently in afib, BP to 103/67, afebrile, labs with elevated lactate initially 5 & 2.6 on repeat, elevated Hgb/Hct, hypokalemia 131, ARAM  Cr 7.6. CT imaging revealed high grade SBO with TP at RLQ parastomal hernia sac at level of ileal-ileal anastomosis. Exam reassuring with soft, non-tender abdomen, parastomal hernia appreciated soft. With patient's medical comorbidities and surgical history including ileal conduit recommend patient to be transferred to tertiary care center or to [facility] where patient had his cystectomy/urostomy with Dr. Dominique Arauz. Discussed with patient & family at bedside, ICU attending Dr. Guillen, Dr. Padgett, and Dr. Betts.    PLAN:  - Tachycardia to 100 currently in afib, BP to 103/67, afebrile, labs with elevated lactate initially 5 & 2.6 on repeat, elevated Hgb/Hct, hypokalemia 131, ARAM  Cr 7.6. CT imaging revealed high grade SBO with TP at RLQ parastomal hernia sac at level of ileal-ileal anastomosis. Exam reassuring.   - Recommend transfer to tertiary care ([facility]) or [facility] for proper management of patient's condition in setting of previous ileal conduit with urostomy. Rec urology consult.  - Keep NPO/IVF  - Discussed with Dr. Betts. Discussed with patient and family. Discussed with ED Dr. Padgett, and Dr. Guillen ICU.

## 2023-08-22 NOTE — ED PROVIDER NOTE - OBJECTIVE STATEMENT
Patient is a 78-year-old male sent in by his primary care physician Dr. Valentine for evaluation of shortness of breath and weakness.  Past medical history significant for hyperkalemia, CKD, CAD, A-fib, CHF, cardiomyopathy, status post CABG x2 on Coumadin, HLD, HTN, bladder cancer  Cards dr Chu  He underwent cardiac catheterization 4 weeks ago, has known renal dysfunction but was cleared.  Presents with abdominal pain to the left lower quadrant with nausea and vomiting for 4 days.  Unable to tolerate anything by mouth as he vomits everything he takes.  No diarrhea no constipation.  He is making urine but a little less than usual.  Now very short of breath.  He also has a history of bladder cancer with urostomy.

## 2023-08-22 NOTE — ED PROVIDER NOTE - ENMT, MLM
Airway patent, Nasal mucosa clear. Mouth with normal mucosa. Throat has no vesicles, no oropharyngeal exudates and uvula is midline. [place] wears hearing aides.

## 2023-08-22 NOTE — ED ADULT NURSE NOTE - OBJECTIVE STATEMENT
patient alert and oriented from [facility] with wife.  patient went to his primary doctor this due his SOB and left lower abdominal pain.  white at his appointment his doctor instructed him to come to the ED.  patient states that for months now he has been having SOB.  patient received a carotid stent about a month ago but it still did no relieve his SOB.  patient is on coumadin and Plavix.  Patient states that he ate pizza on Saturday and had 10 episodes of emesis since.

## 2023-08-22 NOTE — ED PROVIDER NOTE - CLINICAL SUMMARY MEDICAL DECISION MAKING FREE TEXT BOX
This is a critically ill male patient 78 years old with multiple organ systems affected.  He becomes hypoxic with presentation into the emergency room with an oxygen saturation in the 70s requiring 100% nonrebreather.  Presents with abdominal pain nausea vomiting for 4 days dehydration.  Rapid heartbeat low blood pressure.  No melena no hematochezia.  No chest pain.  He gets profound dyspnea on exertion where he becomes cyanotic with minimal exertion.  Sent by his primary care physician from the doctor's office directly to the emergency room for evaluation.  Rule out PE although less likely given the patient is on Coumadin and his most recent INR was 2.5.  Rule out infectious, rule out anemia rule out intra-abdominal pathology.  Tachycardia may be reactive secondary to hypotension.  Hypotension may be reactive secondary to dehydration.  Will assess electrolytes lipase magnesium digoxin level chest x-ray CT imaging of the chest abdomen and pelvis with IV contrast.  Looking for PE abscess or inflammation or perforation.  Patient has a history of bladder cancer with urostomy.  Given the complexity of the patient's and his acuity of presentation would likely need admission to the hospital.  We will work on stabilization of vital signs and discerning more definitive diagnosis.  Consult cardiology this chart was made with dictation software and may contain typographical errors.

## 2023-08-22 NOTE — CONSULT NOTE ADULT - SUBJECTIVE AND OBJECTIVE BOX
CHIEF COMPLAINT:  Abdominal pain    HPI:  78 year old male with CKD, chronic AF, ischemic cardiomyopathy s/p CABG x 2, HTN, HLD, and bladder cancer s/p ileal conduit presents with shortness of breath and left lower quadrant abdominal pain associated with nausea and vomiting for past four days. Pain is non-radiating. Denies any alleviating or exacerbating factors. Emesis is dark. Denies bright red blood. Reports decreased oral intake. On CT found to have high-grade small bowel obstruction with transition at the right lower quadrant parastomal hernia sac at the level of the ileal- ileal anastomosis.    ICU called for hypotension. Patient received IVF. BP improved. Most recent recorded /67. Patient denies fevers, chest pain. Surgery consulted in ED. Recommended transfer to tertiary center for surgical evaluation.     PAST MEDICAL & SURGICAL HISTORY:  Atrial fibrillation      Hypothyroid      Hypertension      Bladder cancer      Bronchitis      Former smoker      CAD (coronary artery disease)      History of bladder surgery  on urostomy      S/P CABG x 3      History of automatic internal cardiac defibrillator (AICD)      H/O knee surgery          FAMILY HISTORY:  Family history of heart attack (Father, Mother)        SOCIAL HISTORY:  Smoking: Denies  Substance Use: [x] Never Used [ ] Used ____  EtOH Use: Reports occasional drinkers several times per week  Marital Status: [ ] Single [x]  [ ]  [ ]   Sexual History:   Occupation:  Recent Travel:  Country of Birth:  Advance Directives:    Allergies    No Known Allergies    Intolerances        HOME MEDICATIONS:    REVIEW OF SYSTEMS:  CONSTITUTIONAL: No fever  EYES: No eye pain, visual disturbances, or discharge  ENMT:  No difficulty hearing, tinnitus, vertigo; No sinus or throat pain  NECK: No pain or stiffness  RESPIRATORY: No cough, wheezing, chills or hemoptysis  CARDIOVASCULAR: No chest pain, palpitations, dizziness, or leg swelling  GASTROINTESTINAL: Abdominal pain as above  GENITOURINARY: Decreased urine output  NEUROLOGICAL: No headaches  SKIN: No itching, burning, rashes, or lesions   LYMPH NODES: No enlarged glands  ENDOCRINE: No heat or cold intolerance; No hair loss  MUSCULOSKELETAL: No joint pain or swelling; No muscle, back, or extremity pain  PSYCHIATRIC: No depression, anxiety, mood swings, or difficulty sleeping  HEME/LYMPH: No easy bruising  ALLERGY AND IMMUNOLOGIC: No hives or eczema    OBJECTIVE:  ICU Vital Signs Last 24 Hrs  T(C): 36.7 (22 Aug 2023 13:16), Max: 36.7 (22 Aug 2023 13:16)  T(F): 98 (22 Aug 2023 13:16), Max: 98 (22 Aug 2023 13:16)  HR: 100 (22 Aug 2023 13:16) (100 - 129)  BP: 103/67 (22 Aug 2023 13:16) (92/55 - 103/67)  BP(mean): --  ABP: --  ABP(mean): --  RR: 18 (22 Aug 2023 13:16) (18 - 24)  SpO2: 96% (22 Aug 2023 13:16) (74% - 98%)    O2 Parameters below as of 22 Aug 2023 13:16  Patient On (Oxygen Delivery Method): nasal cannula  O2 Flow (L/min): 5            CAPILLARY BLOOD GLUCOSE          PHYSICAL EXAM:  General: NAD  HEENT: NC/AT  Neck: Supple  Respiratory: CTAB. No wheezing or crackles.  Cardiovascular: Irregular rhythm. No edema.  Abdomen: Soft. Distended. Pain with palpation lower quadrants. Absent bowel sounds.  Extremities: Warm  Skin: Intact  Neurological: A&Ox3  Psychiatry: Normal mood and affect    LINES:     HOSPITAL MEDICATIONS:          LABS:                        18.2   7.69  )-----------( 331      ( 22 Aug 2023 11:32 )             54.5     Hgb Trend: 18.2<--  08-22    131<L>  |  81<L>  |  101<H>  ----------------------------<  173<H>  4.8   |  29  |  7.60<H>    Ca    9.5      22 Aug 2023 11:32  Mg     2.4     08-22    TPro  9.0<H>  /  Alb  3.5  /  TBili  0.5  /  DBili  x   /  AST  25  /  ALT  23  /  AlkPhos  82  08-22    Creatinine Trend: 7.60<--  PT/INR - ( 22 Aug 2023 11:32 )   PT: 24.9 sec;   INR: 2.18 ratio         PTT - ( 22 Aug 2023 11:32 )  PTT:36.6 sec  Urinalysis Basic - ( 22 Aug 2023 11:32 )    Color: x / Appearance: x / SG: x / pH: x  Gluc: 173 mg/dL / Ketone: x  / Bili: x / Urobili: x   Blood: x / Protein: x / Nitrite: x   Leuk Esterase: x / RBC: x / WBC x   Sq Epi: x / Non Sq Epi: x / Bacteria: x      Arterial Blood Gas:  08-22 @ 11:18  7.46/36/107/26/98.8/1.8  ABG lactate: --        MICROBIOLOGY:     RADIOLOGY:  [x] Reviewed and interpreted by me  CXR 8/22/23 - no consolidations or effusions        < from: CT Abdomen and Pelvis No Cont (08.22.23 @ 14:16) >  ACC: [id number] EXAM:  CT ABDOMEN AND PELVIS   ORDERED BY: LINA PADGETT     ACC: [id number] EXAM:  CT CHEST   ORDERED BY: LINA PADGETT     PROCEDURE DATE:  08/22/2023          INTERPRETATION:  CLINICAL INFORMATION: Shortness of breath.  Abdominal pain nausea and vomiting.  Prior history of bladder cancer.    COMPARISON: CT scan chest 11/12/2019.    CONTRAST/COMPLICATIONS:  IV Contrast: NONE  90 cc administered   10 cc discarded  Oral Contrast: NONE  Complications: None reported at time of study completion    PROCEDURE:  CT of the Chest, Abdomen and Pelvis was performed.  Sagittal and coronal reformats were performed.    FINDINGS:    CHEST:    LUNGS AND LARGE AIRWAYS: PLEURA:    Bibasilar subsegmental atelectasis.    The central airways are patent.    No pleural effusion or pneumothorax.    VESSELS: Atherosclerotic changes thoracic aorta with ectasia ascending   aorta measuring 4.0 cm.  Coronary artery calcification.    HEART:  The heart is mildly enlarged.  Cardiac pacemaker leads.   No pericardial effusion.    MEDIASTINUM AND MARY:  Small, subcentimeter short axis prevascular, pretracheal and subcarinal   lymph nodes.    CHEST WALL AND LOWER NECK:  Sternotomy.  Cardiac device left chest wall.    ABDOMEN AND PELVIS:    Evaluation of thesolid organ parenchyma is limited without intravenous   contrast.    LIVER: Steatosis.  BILE DUCTS: Normal caliber.  GALLBLADDER: Mild with biliary sludge and/or gallstones.  SPLEEN: Within normal limits.  PANCREAS: Within normal limits.  ADRENALS: Within normal limits.  KIDNEYS/URETERS: Bilateral renal cysts.  No hydroureteronephrosis.    BLADDER:  REPRODUCTIVE ORGANS:  Status post cystoproctectomy with ileal conduit.    BOWEL:  Distended fluid-filled stomach.  Markedly distended fluid-filled small bowel loops.  There is abrupt change in transition at the level of the right lower   quadrant parastomal hernia and the ileal-ileal anastomosis.  PERITONEUM: No ascites.  There is mild mesenteric edema/stranding.  Evaluation of the bowel wall is limited without intravenous contrast.    VESSELS: Atherosclerotic changes.  RETROPERITONEUM/LYMPH NODES: No lymphadenopathy.    ABDOMINAL WALL:  Right parastomal hernia containing dilated small bowel and collapse small   bowel loops at the ileal ileal anastomosis.  Nondistended ileal conduit into is noted extending to the stomal surface.    BONES: Degenerative changes.  Posterior interspinous instrumentation at L4-5.    IMPRESSION:    High-grade small bowel obstruction with transition at the right lower   quadrant parastomal hernia sac at the level of the ileal- ileal   anastomosis.    This finding was discussed with Dr. Padgett  by telephone at the time of   interpretation on 8/22/2023.    Other findings as discussed above.    --- End of Report---    < end of copied text >

## 2023-08-22 NOTE — PROVIDER CONTACT NOTE (CRITICAL VALUE NOTIFICATION) - TEST AND RESULT REPORTED:
unable to care for self 13% bands  lactic 2.6 unable to care for self unable to care for self unable to care for self unable to care for self unable to care for self unable to care for self unable to care for self unable to care for self unable to care for self unable to care for self unable to care for self unable to care for self unable to care for self unable to care for self

## 2023-08-22 NOTE — H&P ADULT - ASSESSMENT
ASSESSMENT: Patient is a 78y old male with SBO    PLAN:    - Admit to ACS floor under Dr. Khan  - NPO/IVF  - pain control  - DVT ppx  - OOB/ambulate  - strict I/Os  - Plan discussed with Attending,       ASSESSMENT: Patient is a 78y old male with SBO    PLAN:    - Admit to ACS floor under Dr. Khan on Telemetry bed  - NGT decompression   - KRISTEN  - NPO/IVF  - pain control  - DVT ppx  - OOB/ambulate  - strict I/Os  - Plan discussed with Attending, Dr. Khan

## 2023-08-22 NOTE — ED ADULT TRIAGE NOTE - CHIEF COMPLAINT QUOTE
Patient A/Ox4 with clear speech. Accompanied by his wife. Comes in for shortness of breath, weakness and lack of PO intake that has worsened over the past 2-3 days. Went to PCP today and was instructed to come to ED. Patient taken immediately to exam room based on VS in triage. Patient A/Ox4 with clear speech. Accompanied by his wife. Comes in for shortness of breath, weakness and lack of PO intake that has worsened over the past 2-3 days. Went to PCP today and was instructed to come to ED. Had stent placed 4 weeks ago. Patient taken immediately to exam room based on VS in triage.

## 2023-08-23 DIAGNOSIS — I48.91 UNSPECIFIED ATRIAL FIBRILLATION: ICD-10-CM

## 2023-08-23 DIAGNOSIS — I50.22 CHRONIC SYSTOLIC (CONGESTIVE) HEART FAILURE: ICD-10-CM

## 2023-08-23 DIAGNOSIS — I25.10 ATHEROSCLEROTIC HEART DISEASE OF NATIVE CORONARY ARTERY WITHOUT ANGINA PECTORIS: ICD-10-CM

## 2023-08-23 LAB
ACETONE SERPL-MCNC: NEGATIVE — SIGNIFICANT CHANGE UP
ANION GAP SERPL CALC-SCNC: 19 MMOL/L — HIGH (ref 5–17)
ANION GAP SERPL CALC-SCNC: 23 MMOL/L — HIGH (ref 5–17)
APTT BLD: 184.5 SEC — CRITICAL HIGH (ref 24.5–35.6)
APTT BLD: 33.4 SEC — SIGNIFICANT CHANGE UP (ref 24.5–35.6)
BASOPHILS # BLD AUTO: 0.05 K/UL — SIGNIFICANT CHANGE UP (ref 0–0.2)
BASOPHILS # BLD AUTO: 0.09 K/UL — SIGNIFICANT CHANGE UP (ref 0–0.2)
BASOPHILS NFR BLD AUTO: 0.5 % — SIGNIFICANT CHANGE UP (ref 0–2)
BASOPHILS NFR BLD AUTO: 0.9 % — SIGNIFICANT CHANGE UP (ref 0–2)
BUN SERPL-MCNC: 100.8 MG/DL — HIGH (ref 8–20)
BUN SERPL-MCNC: 98.6 MG/DL — HIGH (ref 8–20)
CALCIUM SERPL-MCNC: 6.8 MG/DL — LOW (ref 8.4–10.5)
CALCIUM SERPL-MCNC: 7.4 MG/DL — LOW (ref 8.4–10.5)
CHLORIDE SERPL-SCNC: 91 MMOL/L — LOW (ref 96–108)
CHLORIDE SERPL-SCNC: 96 MMOL/L — SIGNIFICANT CHANGE UP (ref 96–108)
CO2 SERPL-SCNC: 20 MMOL/L — LOW (ref 22–29)
CO2 SERPL-SCNC: 20 MMOL/L — LOW (ref 22–29)
CREAT SERPL-MCNC: 5.65 MG/DL — HIGH (ref 0.5–1.3)
CREAT SERPL-MCNC: 6 MG/DL — HIGH (ref 0.5–1.3)
EGFR: 10 ML/MIN/1.73M2 — LOW
EGFR: 9 ML/MIN/1.73M2 — LOW
EOSINOPHIL # BLD AUTO: 0 K/UL — SIGNIFICANT CHANGE UP (ref 0–0.5)
EOSINOPHIL # BLD AUTO: 0.06 K/UL — SIGNIFICANT CHANGE UP (ref 0–0.5)
EOSINOPHIL NFR BLD AUTO: 0 % — SIGNIFICANT CHANGE UP (ref 0–6)
EOSINOPHIL NFR BLD AUTO: 0.6 % — SIGNIFICANT CHANGE UP (ref 0–6)
GLUCOSE SERPL-MCNC: 111 MG/DL — HIGH (ref 70–99)
GLUCOSE SERPL-MCNC: 133 MG/DL — HIGH (ref 70–99)
HCT VFR BLD CALC: 41.8 % — SIGNIFICANT CHANGE UP (ref 39–50)
HCT VFR BLD CALC: 46 % — SIGNIFICANT CHANGE UP (ref 39–50)
HGB BLD-MCNC: 14.1 G/DL — SIGNIFICANT CHANGE UP (ref 13–17)
HGB BLD-MCNC: 14.9 G/DL — SIGNIFICANT CHANGE UP (ref 13–17)
IMM GRANULOCYTES NFR BLD AUTO: 1.7 % — HIGH (ref 0–0.9)
INR BLD: 2.72 RATIO — HIGH (ref 0.85–1.18)
INR BLD: 3.22 RATIO — HIGH (ref 0.85–1.18)
LACTATE SERPL-SCNC: 1.7 MMOL/L — SIGNIFICANT CHANGE UP (ref 0.5–2)
LYMPHOCYTES # BLD AUTO: 1.07 K/UL — SIGNIFICANT CHANGE UP (ref 1–3.3)
LYMPHOCYTES # BLD AUTO: 1.36 K/UL — SIGNIFICANT CHANGE UP (ref 1–3.3)
LYMPHOCYTES # BLD AUTO: 11.3 % — LOW (ref 13–44)
LYMPHOCYTES # BLD AUTO: 13.3 % — SIGNIFICANT CHANGE UP (ref 13–44)
MAGNESIUM SERPL-MCNC: 1.9 MG/DL — SIGNIFICANT CHANGE UP (ref 1.6–2.6)
MAGNESIUM SERPL-MCNC: 2.1 MG/DL — SIGNIFICANT CHANGE UP (ref 1.6–2.6)
MANUAL SMEAR VERIFICATION: SIGNIFICANT CHANGE UP
MCHC RBC-ENTMCNC: 28 PG — SIGNIFICANT CHANGE UP (ref 27–34)
MCHC RBC-ENTMCNC: 28.8 PG — SIGNIFICANT CHANGE UP (ref 27–34)
MCHC RBC-ENTMCNC: 32.4 GM/DL — SIGNIFICANT CHANGE UP (ref 32–36)
MCHC RBC-ENTMCNC: 33.7 GM/DL — SIGNIFICANT CHANGE UP (ref 32–36)
MCV RBC AUTO: 85.3 FL — SIGNIFICANT CHANGE UP (ref 80–100)
MCV RBC AUTO: 86.3 FL — SIGNIFICANT CHANGE UP (ref 80–100)
MONOCYTES # BLD AUTO: 2.3 K/UL — HIGH (ref 0–0.9)
MONOCYTES # BLD AUTO: 2.87 K/UL — HIGH (ref 0–0.9)
MONOCYTES NFR BLD AUTO: 24.3 % — HIGH (ref 2–14)
MONOCYTES NFR BLD AUTO: 28.2 % — HIGH (ref 2–14)
MYELOCYTES NFR BLD: 0.9 % — HIGH (ref 0–0)
NEUTROPHILS # BLD AUTO: 5.68 K/UL — SIGNIFICANT CHANGE UP (ref 1.8–7.4)
NEUTROPHILS # BLD AUTO: 5.84 K/UL — SIGNIFICANT CHANGE UP (ref 1.8–7.4)
NEUTROPHILS NFR BLD AUTO: 55.7 % — SIGNIFICANT CHANGE UP (ref 43–77)
NEUTROPHILS NFR BLD AUTO: 60.8 % — SIGNIFICANT CHANGE UP (ref 43–77)
NEUTS BAND # BLD: 0.9 % — SIGNIFICANT CHANGE UP (ref 0–8)
PHOSPHATE SERPL-MCNC: 6.2 MG/DL — HIGH (ref 2.4–4.7)
PHOSPHATE SERPL-MCNC: 6.6 MG/DL — HIGH (ref 2.4–4.7)
PLAT MORPH BLD: NORMAL — SIGNIFICANT CHANGE UP
PLATELET # BLD AUTO: 186 K/UL — SIGNIFICANT CHANGE UP (ref 150–400)
PLATELET # BLD AUTO: 198 K/UL — SIGNIFICANT CHANGE UP (ref 150–400)
POTASSIUM SERPL-MCNC: 4.4 MMOL/L — SIGNIFICANT CHANGE UP (ref 3.5–5.3)
POTASSIUM SERPL-MCNC: 5.1 MMOL/L — SIGNIFICANT CHANGE UP (ref 3.5–5.3)
POTASSIUM SERPL-SCNC: 4.4 MMOL/L — SIGNIFICANT CHANGE UP (ref 3.5–5.3)
POTASSIUM SERPL-SCNC: 5.1 MMOL/L — SIGNIFICANT CHANGE UP (ref 3.5–5.3)
PROTHROM AB SERPL-ACNC: 29.3 SEC — HIGH (ref 9.5–13)
PROTHROM AB SERPL-ACNC: 34.5 SEC — HIGH (ref 9.5–13)
RBC # BLD: 4.9 M/UL — SIGNIFICANT CHANGE UP (ref 4.2–5.8)
RBC # BLD: 5.33 M/UL — SIGNIFICANT CHANGE UP (ref 4.2–5.8)
RBC # FLD: 14.5 % — SIGNIFICANT CHANGE UP (ref 10.3–14.5)
RBC # FLD: 14.6 % — HIGH (ref 10.3–14.5)
RBC BLD AUTO: NORMAL — SIGNIFICANT CHANGE UP
SMUDGE CELLS # BLD: PRESENT — SIGNIFICANT CHANGE UP
SODIUM SERPL-SCNC: 134 MMOL/L — LOW (ref 135–145)
SODIUM SERPL-SCNC: 135 MMOL/L — SIGNIFICANT CHANGE UP (ref 135–145)
VARIANT LYMPHS # BLD: 0.9 % — SIGNIFICANT CHANGE UP (ref 0–6)
WBC # BLD: 10.19 K/UL — SIGNIFICANT CHANGE UP (ref 3.8–10.5)
WBC # BLD: 9.46 K/UL — SIGNIFICANT CHANGE UP (ref 3.8–10.5)
WBC # FLD AUTO: 10.19 K/UL — SIGNIFICANT CHANGE UP (ref 3.8–10.5)
WBC # FLD AUTO: 9.46 K/UL — SIGNIFICANT CHANGE UP (ref 3.8–10.5)

## 2023-08-23 PROCEDURE — 99222 1ST HOSP IP/OBS MODERATE 55: CPT

## 2023-08-23 PROCEDURE — 99284 EMERGENCY DEPT VISIT MOD MDM: CPT

## 2023-08-23 RX ORDER — SODIUM CHLORIDE 9 MG/ML
1000 INJECTION INTRAMUSCULAR; INTRAVENOUS; SUBCUTANEOUS ONCE
Refills: 0 | Status: COMPLETED | OUTPATIENT
Start: 2023-08-23 | End: 2023-08-23

## 2023-08-23 RX ORDER — PANTOPRAZOLE SODIUM 20 MG/1
40 TABLET, DELAYED RELEASE ORAL EVERY 24 HOURS
Refills: 0 | Status: DISCONTINUED | OUTPATIENT
Start: 2023-08-23 | End: 2023-08-30

## 2023-08-23 RX ORDER — CLOPIDOGREL BISULFATE 75 MG/1
75 TABLET, FILM COATED ORAL DAILY
Refills: 0 | Status: DISCONTINUED | OUTPATIENT
Start: 2023-08-23 | End: 2023-08-29

## 2023-08-23 RX ORDER — MAGNESIUM SULFATE 500 MG/ML
1 VIAL (ML) INJECTION ONCE
Refills: 0 | Status: COMPLETED | OUTPATIENT
Start: 2023-08-23 | End: 2023-08-23

## 2023-08-23 RX ORDER — METOPROLOL TARTRATE 50 MG
5 TABLET ORAL EVERY 6 HOURS
Refills: 0 | Status: DISCONTINUED | OUTPATIENT
Start: 2023-08-23 | End: 2023-08-25

## 2023-08-23 RX ADMIN — SODIUM CHLORIDE 110 MILLILITER(S): 9 INJECTION INTRAMUSCULAR; INTRAVENOUS; SUBCUTANEOUS at 09:55

## 2023-08-23 RX ADMIN — Medication 5 MILLIGRAM(S): at 18:05

## 2023-08-23 RX ADMIN — SODIUM CHLORIDE 1000 MILLILITER(S): 9 INJECTION INTRAMUSCULAR; INTRAVENOUS; SUBCUTANEOUS at 08:09

## 2023-08-23 RX ADMIN — Medication 5 MILLIGRAM(S): at 23:46

## 2023-08-23 RX ADMIN — PANTOPRAZOLE SODIUM 40 MILLIGRAM(S): 20 TABLET, DELAYED RELEASE ORAL at 09:59

## 2023-08-23 RX ADMIN — SODIUM CHLORIDE 110 MILLILITER(S): 9 INJECTION INTRAMUSCULAR; INTRAVENOUS; SUBCUTANEOUS at 18:10

## 2023-08-23 RX ADMIN — SODIUM CHLORIDE 110 MILLILITER(S): 9 INJECTION INTRAMUSCULAR; INTRAVENOUS; SUBCUTANEOUS at 00:05

## 2023-08-23 RX ADMIN — HEPARIN SODIUM 1900 UNIT(S)/HR: 5000 INJECTION INTRAVENOUS; SUBCUTANEOUS at 01:03

## 2023-08-23 RX ADMIN — Medication 68 MICROGRAM(S): at 23:47

## 2023-08-23 RX ADMIN — CLOPIDOGREL BISULFATE 75 MILLIGRAM(S): 75 TABLET, FILM COATED ORAL at 12:45

## 2023-08-23 RX ADMIN — Medication 100 GRAM(S): at 13:45

## 2023-08-23 RX ADMIN — Medication 5 MILLIGRAM(S): at 12:45

## 2023-08-23 RX ADMIN — Medication 68 MICROGRAM(S): at 00:13

## 2023-08-23 NOTE — CONSULT NOTE ADULT - SUBJECTIVE AND OBJECTIVE BOX
[facility]                                              CARDIOLOGY AT [address], [place] [address]                                             Telephone: [phone]. Fax:[phone]                                                       CARDIOLOGY CONSULTATION NOTE                                                                                             History obtained by: Patient and medical record  Community Cardiologist: Dr. Chu   obtained: Yes [  ] No [ x ]  Reason for Consultation: Recent PCI  Available out pt records reviewed: Yes [ x ] No [  ]    Chief complaint:    Patient is a 78y old  Male who presents with a chief complaint of SBO.     HPI: Patient is a 77 y/o M with a PMHx of CAD s/p CABG x 2 (SVG-OM1/mLAD, LIMA-D1) with PCI/ALAN ([facility] 07/23 with patent SVG, LIMA-D1 atretic, and s/p ALAN x 1 to mLCx PTO 07/14/23), HFrEF (EF 40-45% 09/22), Bladder Cancer s/p Urostomy (12 years ago), atrial fibrillation (on Coumadin), HTN, HLD, hyperkalemia, and CKD who presented to [facility] with nausea, vomiting, and abdominal pain found to have a high-grade SBO transferred to [facility] for further workup. Patient currently with an NGT in place, but his abdominal pain is improving. Patient also with no chest pain or dyspnea at this time as well. Surgery consulted for further recommendations regarding antiplatelets. Patient denies any fevers, chills, CP, SOB, syncope, diarrhea, headache, or dizziness.       CARDIAC TESTING   ECHO:  Echo 09/22 EF 40-45%, hypokinesis of anterior wall and apex.     STRESS:    CATH:   < from: Cardiac Catheterization (07.14.23 @ 07:49) >  Diagnostic Findings:     Coronary Angiography   The coronary circulation is left dominant.      LM   Left main artery: Angiography shows minor irregularities.      LAD   Mid left anterior descending: Angiography shows complete occlusion.  There is a 100 % stenosis.    Patient: MARÍA ELENA BEAR             MRN: [id number]  Study Date: 07/14/2023   07:49 AM      Page 1 of 4          CX   Distal circumflex: Angiography shows complete occlusion. There is a  100 % stenosis.    Interventional Findings:     Interventional Details   Distal circumflex: This was a 100 % total occlusion stenosis. This was  an ACC/AHA High/C lesion for intervention. This is the  culprit lesion. Guidewire crossing was successful.      < end of copied text >    ELECTROPHYSIOLOGY:     PAST MEDICAL HISTORY  Atrial fibrillation    Hypothyroid    Hypertension    Bladder cancer    Bronchitis    Former smoker    CAD (coronary artery disease)        PAST SURGICAL HISTORY  History of bladder surgery    S/P CABG x 3    History of automatic internal cardiac defibrillator (AICD)    H/O knee surgery        SOCIAL HISTORY:    CIGARETTES:   Former smoker  ALCOHOL: Drinks on the weekends  DRUGS: Denies    FAMILY HISTORY:  Family history of heart attack (Father, Mother)      Family History of Cardiovascular Disease:  Yes [  ] No [  ]  Coronary Artery Disease in first degree relative: Yes [  ] No [  ]  Sudden Cardiac Death in First degree relative: Yes [  ] No [  ]    HOME MEDICATIONS:  digoxin 125 mcg (0.125 mg) oral tablet: 1 orally once a day (14 Jul 2023 06:47)  Ecotrin Adult Low Strength 81 mg oral delayed release tablet: 1 tab(s) orally once a day (14 Jul 2023 15:40)  Entresto 24 mg-26 mg oral tablet: 1 tablet orally 2 times a day (14 Jul 2023 15:40)  furosemide 20 mg oral tablet: 1 tablet orally once a day (14 Jul 2023 15:40)  levothyroxine 137 mcg (0.137 mg) oral tablet: 1 tab(s) orally once a day (14 Jul 2023 06:47)  metoprolol succinate 50 mg oral tablet, extended release: 1 tab(s) orally 2 times a day (14 Jul 2023 06:46)  rosuvastatin 40 mg oral tablet: 1 orally once a day (14 Jul 2023 06:47)  warfarin 5 mg oral tablet: 1 tab(s) orally once a day (14 Jul 2023 06:47)      CURRENT CARDIAC MEDICATIONS:  metoprolol tartrate Injectable 5 milliGRAM(s) IV Push every 6 hours      CURRENT OTHER MEDICATIONS:  acetaminophen   IVPB .. 1000 milliGRAM(s) IV Intermittent every 6 hours, Stop order after: 4 Doses PRN Mild Pain (1 - 3)  ondansetron Injectable 4 milliGRAM(s) IV Push every 6 hours PRN Nausea  pantoprazole  Injectable 40 milliGRAM(s) IV Push every 24 hours  clopidogrel Tablet 75 milliGRAM(s) Oral daily  levothyroxine Injectable 68 MICROGram(s) IV Push at bedtime  magnesium sulfate  IVPB 1 Gram(s) IV Intermittent once, Stop order after: 1 Doses  sodium chloride 0.9%. 1000 milliLiter(s) (110 mL/Hr) IV Continuous <Continuous>      ALLERGIES:   No Known Allergies      REVIEW OF SYMPTOMS:   CONSTITUTIONAL: No fever, no chills, no weight loss, no weight gain, no fatigue   ENMT:  No vertigo; No sinus or throat pain  NECK: No pain or stiffness  CARDIOVASCULAR: AS PER HPI  RESPIRATORY: no Shortness of breath, no cough, no wheezing  : No dysuria, no hematuria   GI: AS PER HPI  NEURO: No headache, no slurred speech   MUSCULOSKELETAL: No joint pain or swelling; No muscle, back, or extremity pain  PSYCH: No agitation, no anxiety.    ALL OTHER REVIEW OF SYSTEMS ARE NEGATIVE.    VITAL SIGNS:  T(C): 36.8 (08-23-23 @ 12:18), Max: 36.9 (08-23-23 @ 05:16)  T(F): 98.3 (08-23-23 @ 12:18), Max: 98.4 (08-23-23 @ 05:16)  HR: 118 (08-23-23 @ 12:18) (93 - 118)  BP: 113/60 (08-23-23 @ 12:18) (90/55 - 135/78)  RR: 16 (08-23-23 @ 12:18) (16 - 22)  SpO2: 93% (08-23-23 @ 12:18) (92% - 98%)    INTAKE AND OUTPUT:     08-22 @ 07:01  -  08-23 @ 07:00  --------------------------------------------------------  IN: 0 mL / OUT: 700 mL / NET: -700 mL    08-23 @ 07:01  -  08-23 @ 13:01  --------------------------------------------------------  IN: 0 mL / OUT: 620 mL / NET: -620 mL        PHYSICAL EXAM:  Constitutional: Comfortable . No acute distress.   HEENT: Atraumatic and normocephalic , neck is supple . no JVD. No carotid bruit., +NGT  CNS: A&Ox3. No focal deficits.   Respiratory: CTAB, unlabored   Cardiovascular: RRR normal s1 s2. No murmur. No rubs or gallop.  Gastrointestinal: +distended, Soft, non-tender. +Bowel sounds.   Extremities: 2+ Peripheral Pulses, No clubbing, cyanosis, or edema  Psychiatric: Calm . no agitation.   Skin: Warm and dry, no ulcers on extremities     LABS:                            14.1   9.46  )-----------( 186      ( 23 Aug 2023 11:24 )             41.8     08-23    135  |  96  |  98.6<H>  ----------------------------<  111<H>  4.4   |  20.0<L>  |  5.65<H>    Ca    6.8<L>      23 Aug 2023 11:24  Phos  6.2     08-23  Mg     1.9     08-23    TPro  6.4<L>  /  Alb  3.3  /  TBili  0.5  /  DBili  x   /  AST  19  /  ALT  11  /  AlkPhos  58  08-22    PT/INR - ( 23 Aug 2023 11:24 )   PT: 29.3 sec;   INR: 2.72 ratio         PTT - ( 23 Aug 2023 11:24 )  PTT:33.4 sec  Urinalysis Basic - ( 23 Aug 2023 11:24 )    Color: x / Appearance: x / SG: x / pH: x  Gluc: 111 mg/dL / Ketone: x  / Bili: x / Urobili: x   Blood: x / Protein: x / Nitrite: x   Leuk Esterase: x / RBC: x / WBC x   Sq Epi: x / Non Sq Epi: x / Bacteria: x              INTERPRETATION OF TELEMETRY: Afib HR in the low 100s    ECG: Afib with RVR, RBBB  Prior ECG: Yes [  ] No [  ]    RADIOLOGY & ADDITIONAL STUDIES:    X-ray:    CT scan:     < from: CT Chest No Cont (08.22.23 @ 14:17) >  FINDINGS:    CHEST:    LUNGS AND LARGE AIRWAYS: PLEURA:    Bibasilar subsegmental atelectasis.    The central airways are patent.    No pleural effusion or pneumothorax.    VESSELS: Atherosclerotic changes thoracic aorta with ectasia ascending   aorta measuring 4.0 cm.  Coronary artery calcification.    HEART:  The heart is mildly enlarged.  Cardiac pacemaker leads.   No pericardial effusion.    MEDIASTINUM AND MARY:  Small, subcentimeter short axis prevascular, pretracheal and subcarinal   lymph nodes.    CHEST WALL AND LOWER NECK:  Sternotomy.  Cardiac device left chest wall.    ABDOMEN AND PELVIS:    Evaluation of thesolid organ parenchyma is limited without intravenous   contrast.    LIVER: Steatosis.  BILE DUCTS: Normal caliber.  GALLBLADDER: Mild with biliary sludge and/or gallstones.  SPLEEN: Within normal limits.  PANCREAS: Within normal limits.  ADRENALS: Within normal limits.  KIDNEYS/URETERS: Bilateral renal cysts.  No hydroureteronephrosis.    BLADDER:  REPRODUCTIVE ORGANS:  Status post cystoproctectomy with ileal conduit.    BOWEL:  Distended fluid-filled stomach.  Markedly distended fluid-filled small bowel loops.  There is abrupt change in transition at the level of the right lower   quadrant parastomal hernia and the ileal-ileal anastomosis.  PERITONEUM: No ascites.  There is mild mesenteric edema/stranding.  Evaluation of the bowel wall is limited without intravenous contrast.    VESSELS: Atherosclerotic changes.  RETROPERITONEUM/LYMPH NODES: No lymphadenopathy.    ABDOMINAL WALL:  Right parastomal hernia containing dilated small bowel and collapse small   bowel loops at the ileal ileal anastomosis.  Nondistended ileal conduit into is noted extending to the stomal surface.    BONES: Degenerative changes.  Posterior interspinous instrumentation at L4-5.    IMPRESSION:    High-grade small bowel obstruction with transition at the right lower   quadrant parastomal hernia sac at the level of the ileal- ileal   anastomosis.    This finding was discussed with Dr. Padgett  by telephone at the time of   interpretation on 8/22/2023.    Other findings as discussed above.    --- End of Report---      < end of copied text >  MRI:   US:

## 2023-08-23 NOTE — CONSULT NOTE ADULT - PROBLEM SELECTOR RECOMMENDATION 2
- Still with uncontrolled rates in setting of an SBO.   - Hold Digoxin in the setting of ARAM/CKD.   - Start metoprolol 5mg IV q6 for rate control while NPO.   - Coumadin on hold.   - INR 3.22.   - When INR <2, can bridge with heparin gtt.   - Continue telemetry monitoring.

## 2023-08-23 NOTE — CONSULT NOTE ADULT - SUBJECTIVE AND OBJECTIVE BOX
Patient is a 78y old  Male who presents with a chief complaint of SBO (23 Aug 2023 05:27)      HPI:  ACUTE CARE SURGERY CONSULT     HPI: 78-year-old male with history of bladder cancer with urostomy done 12 years ago, A-fib on Warfarin, hypertension, hyperlipidemia, CAD with 2 stents placed 1 month ago who presents with abdominal pain cough 4 to 5 days with nausea vomiting for 4 days.  Patient was seen at [facility] and was found to have high-grade SBO near his urostomy.  Patient was sent to [facility] for advanced surgical services.  Patient is currently not in pain and is not vomiting.  Per patient patient has been urinating within normal limits from his urostomy as his usual.    ROS: 10-system review is otherwise negative except HPI above.      PAST MEDICAL & SURGICAL HISTORY:  Atrial fibrillation  Hypothyroid  Hypertension  Bladder cancer  Bronchitis  Former smoker  CAD (coronary artery disease)  History of bladder surgery  on urostomy  S/P CABG x 3  History of automatic internal cardiac defibrillator (AICD)  H/O knee surgery      FAMILY HISTORY:  Family history of heart attack (Father, Mother)    SOCIAL HISTORY:  Denies any toxic habits    ALLERGIES: NKA No Known Allergies    HOME MEDICATIONS:   digoxin 125 mcg (0.125 mg) oral tablet: 1 orally once a day (14 Jul 2023 06:47)  Ecotrin Adult Low Strength 81 mg oral delayed release tablet: 1 tab(s) orally once a day (14 Jul 2023 15:40)  Entresto 24 mg-26 mg oral tablet: 1 tablet orally 2 times a day (14 Jul 2023 15:40)  furosemide 20 mg oral tablet: 1 tablet orally once a day (14 Jul 2023 15:40)  levothyroxine 137 mcg (0.137 mg) oral tablet: 1 tab(s) orally once a day (14 Jul 2023 06:47)  metoprolol succinate 50 mg oral tablet, extended release: 1 tab(s) orally 2 times a day (14 Jul 2023 06:46)  rosuvastatin 40 mg oral tablet: 1 orally once a day (14 Jul 2023 06:47)  warfarin 5 mg oral tablet: 1 tab(s) orally once a day (14 Jul 2023 06:47)  --------------------------------------------------------------------------------------------    PHYSICAL EXAM:   General: NAD, Lying in bed comfortably  Neuro: A+Ox3  HEENT: EOMI, PERRLA, MMM  Cardio: RRR  Resp: Non labored breathing on RA  GI/Abd: Soft, NT, Distended. no rebound/guarding, no masses palpated. Urobladder with urine and adjacent non reducible hernia that is non tender to palpation.   Vascular: All 4 extremities warm and well perfused.   Pelvis: stable  Musculoskeletal: All 4 extremities moving spontaneously, no limitations, no spinal tenderness.  --------------------------------------------------------------------------------------------    LABS                 14.3   8.54   )----------(  224       ( 22 Aug 2023 20:15 )               43.9      134    |  89     |  98.9   ----------------------------<  117        ( 22 Aug 2023 20:15 )  4.7     |  23.0   |  6.87     Ca    7.5        ( 22 Aug 2023 20:15 )  Mg     2.4       ( 22 Aug 2023 11:32 )    TPro  6.4    /  Alb  3.3    /  TBili  0.5    /  DBili  x      /  AST  19     /  ALT  11     /  AlkPhos  58     ( 22 Aug 2023 20:15 )    LIVER FUNCTIONS - ( 22 Aug 2023 20:15 )  Alb: 3.3 g/dL / Pro: 6.4 g/dL / ALK PHOS: 58 U/L / ALT: 11 U/L / AST: 19 U/L / GGT: x           PT/INR -  28.3 sec / 2.62 ratio   ( 22 Aug 2023 20:15 )       PTT -  33.0 sec   ( 22 Aug 2023 20:15 )  CAPILLARY BLOOD GLUCOSE        Urinalysis Basic - ( 22 Aug 2023 20:15 )    Color: x / Appearance: x / SG: x / pH: x  Gluc: 117 mg/dL / Ketone: x  / Bili: x / Urobili: x   Blood: x / Protein: x / Nitrite: x   Leuk Esterase: x / RBC: x / WBC x   Sq Epi: x / Non Sq Epi: x / Bacteria: x      ABG - ( 22 Aug 2023 11:18 )  pH: 7.46  /  pCO2: 36    /  pO2: 107   / HCO3: 26    / Base Excess: 1.8   /  SaO2: 98.8              20:15 - VBG - pH:       | pCO2:       | pO2:       | Lactate: 2.10     --------------------------------------------------------------------------------------------  IMAGING  ABDOMEN AND PELVIS:    Evaluation of thesolid organ parenchyma is limited without intravenous   contrast.    LIVER: Steatosis.  BILE DUCTS: Normal caliber.  GALLBLADDER: Mild with biliary sludge and/or gallstones.  SPLEEN: Within normal limits.  PANCREAS: Within normal limits.  ADRENALS: Within normal limits.  KIDNEYS/URETERS: Bilateral renal cysts.  No hydroureteronephrosis.    BLADDER:  REPRODUCTIVE ORGANS:  Status post cystoproctectomy with ileal conduit.    BOWEL:  Distended fluid-filled stomach.  Markedly distended fluid-filled small bowel loops.  There is abrupt change in transition at the level of the right lower   quadrant parastomal hernia and the ileal-ileal anastomosis.  PERITONEUM: No ascites.  There is mild mesenteric edema/stranding.  Evaluation of the bowel wall is limited without intravenous contrast.    VESSELS: Atherosclerotic changes.  RETROPERITONEUM/LYMPH NODES: No lymphadenopathy.    ABDOMINAL WALL:  Right parastomal hernia containing dilated small bowel and collapse small   bowel loops at the ileal ileal anastomosis.  Nondistended ileal conduit into is noted extending to the stomal surface.    BONES: Degenerative changes.  Posterior interspinous instrumentation at L4-5.    IMPRESSION:    High-grade small bowel obstruction with transition at the right lower   quadrant parastomal hernia sac at the level of the ileal- ileal   anastomosis.         (22 Aug 2023 21:06)      PAST MEDICAL & SURGICAL HISTORY:  Atrial fibrillation      Hypothyroid      Hypertension      Bladder cancer      Bronchitis      Former smoker      CAD (coronary artery disease)      History of bladder surgery  on urostomy      S/P CABG x 3      History of automatic internal cardiac defibrillator (AICD)      H/O knee surgery          FAMILY HISTORY:  Family history of heart attack (Father, Mother)        SOCIAL HISTORY:    MEDICATIONS  (STANDING):  digoxin  Injectable 125 MICROGram(s) IV Push daily  levothyroxine Injectable 68 MICROGram(s) IV Push at bedtime  pantoprazole  Injectable 40 milliGRAM(s) IV Push every 24 hours  sodium chloride 0.9%. 1000 milliLiter(s) (110 mL/Hr) IV Continuous <Continuous>    MEDICATIONS  (PRN):  acetaminophen   IVPB .. 1000 milliGRAM(s) IV Intermittent every 6 hours PRN Mild Pain (1 - 3)  ondansetron Injectable 4 milliGRAM(s) IV Push every 6 hours PRN Nausea      Allergies    No Known Allergies    Intolerances        REVIEW OF SYSTEMS:  CONSTITUTIONAL: No fever, weight loss, or fatigue  EYES: No eye pain, visual disturbances, or discharge  ENMT:  No difficulty hearing, tinnitus, vertigo; No sinus or throat pain  NECK: No pain or stiffness  BREASTS: No pain, masses, or nipple discharge  RESPIRATORY: No cough, wheezing, chills or hemoptysis; No shortness of breath  CARDIOVASCULAR: No chest pain, palpitations, dizziness, or leg swelling  GASTROINTESTINAL: No abdominal or epigastric pain. No nausea, vomiting, or hematemesis; No diarrhea or constipation. No melena or hematochezia.  GENITOURINARY: No dysuria, frequency, hematuria, or incontinence  NEUROLOGICAL: No headaches, memory loss, loss of strength, numbness, or tremors  SKIN: No itching, burning, rashes, or lesions   LYMPH NODES: No enlarged glands  ENDOCRINE: No heat or cold intolerance; No hair loss  MUSCULOSKELETAL: No joint pain or swelling; No muscle, back, or extremity pain  PSYCHIATRIC: No depression, anxiety, mood swings, or difficulty sleeping  HEME/LYMPH: No easy bruising, or bleeding gums  ALLERGY AND IMMUNOLOGIC: No hives or eczema    Vital Signs Last 24 Hrs  T(C): 36.5 (23 Aug 2023 08:05), Max: 36.9 (23 Aug 2023 05:16)  T(F): 97.7 (23 Aug 2023 08:05), Max: 98.4 (23 Aug 2023 05:16)  HR: 102 (23 Aug 2023 08:05) (93 - 129)  BP: 105/64 (23 Aug 2023 08:05) (90/55 - 135/78)  BP(mean): --  RR: 16 (23 Aug 2023 08:05) (16 - 24)  SpO2: 92% (23 Aug 2023 08:05) (74% - 98%)    Parameters below as of 23 Aug 2023 08:05  Patient On (Oxygen Delivery Method): room air        PHYSICAL EXAM:  GENERAL: NAD, well-groomed, well-developed  HEAD:  Atraumatic, Normocephalic  EYES: EOMI, PERRLA, conjunctiva and sclera clear  ENMT: No tonsillar erythema, exudates, or enlargement; Moist mucous membranes, Good dentition, No lesions  NECK: Supple, No JVD, Normal thyroid  NERVOUS SYSTEM:  Alert & Oriented X3, Good concentration; Motor Strength 5/5 B/L upper and lower extremities; DTRs 2+ intact and symmetric  CHEST/LUNG: Clear to percussion bilaterally; No rales, rhonchi, wheezing, or rubs  HEART: Regular rate and rhythm; No murmurs, rubs, or gallops  ABDOMEN: Soft, Nontender, Nondistended; Bowel sounds present  EXTREMITIES:  2+ Peripheral Pulses, No clubbing, cyanosis, or edema  LYMPH: No lymphadenopathy noted  SKIN: No rashes or lesions    LABS:                        14.9   10.19 )-----------( 198      ( 23 Aug 2023 06:35 )             46.0     08-23    134<L>  |  91<L>  |  100.8<H>  ----------------------------<  133<H>  5.1   |  20.0<L>  |  6.00<H>    Ca    7.4<L>      23 Aug 2023 06:35  Phos  6.6     08-23  Mg     2.1     08-23    TPro  6.4<L>  /  Alb  3.3  /  TBili  0.5  /  DBili  x   /  AST  19  /  ALT  11  /  AlkPhos  58  08-22    PT/INR - ( 23 Aug 2023 06:35 )   PT: 34.5 sec;   INR: 3.22 ratio         PTT - ( 23 Aug 2023 06:35 )  PTT:184.5 sec  Urinalysis Basic - ( 23 Aug 2023 06:35 )    Color: x / Appearance: x / SG: x / pH: x  Gluc: 133 mg/dL / Ketone: x  / Bili: x / Urobili: x   Blood: x / Protein: x / Nitrite: x   Leuk Esterase: x / RBC: x / WBC x   Sq Epi: x / Non Sq Epi: x / Bacteria: x      Magnesium: 2.1 mg/dL (08-23 @ 06:35)  Phosphorus: 6.6 mg/dL (08-23 @ 06:35)  Magnesium: 2.4 mg/dL (08-22 @ 11:32)      RADIOLOGY & ADDITIONAL TESTS: Reviewed   HPI: 78-year-old male with history of bladder cancer with urostomy done 12 years ago, A-fib on Warfarin, hypertension, hyperlipidemia, CAD with 2 stents placed 1 month ago who presents with abdominal pain cough 4 to 5 days with nausea vomiting for 4 days.  Patient was seen at [facility] and was found to have high-grade SBO near his urostomy.  Patient was sent to [facility] for advanced surgical services.  Patient is currently not in pain and is not vomiting.  Per patient patient has been urinating within normal limits from his urostomy as his usual. He denied dysuria, gross hematuria, fever, chills, skin rash or itching, bleeding problems and joint pain or swelling. Review of other systems was negative. Scr elevated to 7.6 on arrival, baseline ~1.6 mg/dl.    PAST MEDICAL & SURGICAL HISTORY:  Atrial fibrillation  Hypothyroid  Hypertension  Bladder cancer  Bronchitis  Former smoker  CAD (coronary artery disease)  History of bladder surgery  on urostomy  S/P CABG x 3  History of automatic internal cardiac defibrillator (AICD)  H/O knee surgery    FAMILY HISTORY:  Family history of heart attack (Father, Mother)    SOCIAL HISTORY: Denies tobacco, alcohol, drug abuse.     ALLERGIES: NKA No Known Allergies    HOME MEDICATIONS:   digoxin 125 mcg (0.125 mg) oral tablet: 1 orally once a day (14 Jul 2023 06:47)  Ecotrin Adult Low Strength 81 mg oral delayed release tablet: 1 tab(s) orally once a day (14 Jul 2023 15:40)  Entresto 24 mg-26 mg oral tablet: 1 tablet orally 2 times a day (14 Jul 2023 15:40)  furosemide 20 mg oral tablet: 1 tablet orally once a day (14 Jul 2023 15:40)  levothyroxine 137 mcg (0.137 mg) oral tablet: 1 tab(s) orally once a day (14 Jul 2023 06:47)  metoprolol succinate 50 mg oral tablet, extended release: 1 tab(s) orally 2 times a day (14 Jul 2023 06:46)  rosuvastatin 40 mg oral tablet: 1 orally once a day (14 Jul 2023 06:47)  warfarin 5 mg oral tablet: 1 tab(s) orally once a day (14 Jul 2023 06:47)    Vital Signs Last 24 Hrs  T(C): 36.5 (23 Aug 2023 08:05), Max: 36.9 (23 Aug 2023 05:16)  T(F): 97.7 (23 Aug 2023 08:05), Max: 98.4 (23 Aug 2023 05:16)  HR: 102 (23 Aug 2023 08:05) (93 - 129)  BP: 105/64 (23 Aug 2023 08:05) (90/55 - 135/78)  BP(mean): --  RR: 16 (23 Aug 2023 08:05) (16 - 24)  SpO2: 92% (23 Aug 2023 08:05) (74% - 98%)    Parameters below as of 23 Aug 2023 08:05  Patient On (Oxygen Delivery Method): room air    PHYSICAL EXAM:  Gen: no acute distress  MS: alert, conversing normally  Eyes: EOMI, no icterus  HENT: NCAT, MMM  CV: rhythm reg reg, rate normal, no m/g/r, no LE edema  Chest: CTAB, no w/r/r,  Abd: soft, NT, Neobladder w/ urostomy (UOP+)  Neuro: moving all 4 limbs spontaneously, no tremor  MSK: normal bulk and tone, no joint swelling  Skin: dry, warm, no rash or jaundice    LABS:                        14.9   10.19 )-----------( 198      ( 23 Aug 2023 06:35 )             46.0     08-23    134<L>  |  91<L>  |  100.8<H>  ----------------------------<  133<H>  5.1   |  20.0<L>  |  6.00<H>    Ca    7.4<L>      23 Aug 2023 06:35  Phos  6.6     08-23  Mg     2.1     08-23    TPro  6.4<L>  /  Alb  3.3  /  TBili  0.5  /  DBili  x   /  AST  19  /  ALT  11  /  AlkPhos  58  08-22    PT/INR - ( 23 Aug 2023 06:35 )   PT: 34.5 sec;   INR: 3.22 ratio         PTT - ( 23 Aug 2023 06:35 )  PTT:184.5 sec  Urinalysis Basic - ( 23 Aug 2023 06:35 )    Color: x / Appearance: x / SG: x / pH: x  Gluc: 133 mg/dL / Ketone: x  / Bili: x / Urobili: x   Blood: x / Protein: x / Nitrite: x   Leuk Esterase: x / RBC: x / WBC x   Sq Epi: x / Non Sq Epi: x / Bacteria: x      Magnesium: 2.1 mg/dL (08-23 @ 06:35)  Phosphorus: 6.6 mg/dL (08-23 @ 06:35)  Magnesium: 2.4 mg/dL (08-22 @ 11:32)      RADIOLOGY & ADDITIONAL TESTS: Reviewed    CT ABDOMEN AND PELVIS:    Evaluation of thesolid organ parenchyma is limited without intravenous contrast.    LIVER: Steatosis.  BILE DUCTS: Normal caliber.  GALLBLADDER: Mild with biliary sludge and/or gallstones.  SPLEEN: Within normal limits.  PANCREAS: Within normal limits.  ADRENALS: Within normal limits.  KIDNEYS/URETERS: Bilateral renal cysts.  No hydroureteronephrosis.    BLADDER:  REPRODUCTIVE ORGANS:  Status post cystoproctectomy with ileal conduit.    BOWEL:  Distended fluid-filled stomach.  Markedly distended fluid-filled small bowel loops.  There is abrupt change in transition at the level of the right lower quadrant parastomal hernia and the ileal-ileal anastomosis.  PERITONEUM: No ascites.  There is mild mesenteric edema/stranding.  Evaluation of the bowel wall is limited without intravenous contrast.    VESSELS: Atherosclerotic changes.  RETROPERITONEUM/LYMPH NODES: No lymphadenopathy.    ABDOMINAL WALL:  Right parastomal hernia containing dilated small bowel and collapse small bowel loops at the ileal ileal anastomosis.  Nondistended ileal conduit into is noted extending to the stomal surface.    BONES: Degenerative changes.  Posterior interspinous instrumentation at L4-5.    IMPRESSION:  High-grade small bowel obstruction with transition at the right lower quadrant parastomal hernia sac at the level of the ileal- ileal anastomosis.

## 2023-08-23 NOTE — PROGRESS NOTE ADULT - ATTENDING COMMENTS
78-year-old male with history of bladder cancer with urostomy done 12 years ago, A-fib on Warfarin, hypertension, hyperlipidemia, CAD with 2 stents placed 1 month ago who presents with abdominal pain cough 4 to 5 days with nausea vomiting for 4 days.  Patient was seen at [facility] and was found to have high-grade SBO near his urostomy.  Patient was sent to [facility] for advanced surgical services.  Patient is currently not in pain and is not vomiting.  Per patient patient has been urinating within normal limits from his urostomy as his usual. Patient in [facility] urology to be consulted. Patient with no acute events, NG tube placed with 500cc on insertion, no pain issues.  Awake alert  Bilateral BS  Hemodynamic intact. Had recent cardiac stents, to remain on Plavix and heparin  Abdomen soft, moderately distended, partial SBO  Neurologic grossly intact    Major renal insufficiency, likely acute pre-renal failure superimposed on some degree of chronic insufficiency  Need to correct metabolic issues before any surgery contemplated.

## 2023-08-23 NOTE — PROGRESS NOTE ADULT - ASSESSMENT
Patient is a 78y old male with SBO    PLAN:    - nephrology consult?  - urology consult?  - f/u am labs  - NGT decompression, monitor output   - KRISTEN  - NPO/IVF  - pain control  - DVT ppx  - OOB/ambulate  - strict I/Os

## 2023-08-23 NOTE — CONSULT NOTE ADULT - NS ATTEND AMEND GEN_ALL_CORE FT
Patient seen and examined by me.    T(C): 36.8 (08-23-23 @ 17:50), Max: 37 (08-23-23 @ 16:03)  HR: 73 (08-23-23 @ 17:50) (73 - 118)  BP: 129/69 (08-23-23 @ 17:50) (105/64 - 135/78)  RR: 18 (08-23-23 @ 17:50) (16 - 20)  SpO2: 94% (08-23-23 @ 17:50) (92% - 94%)  Patient alert and awake.  Chest- Bilateral Clear BS  Cardiac- S1 and S2  Abdomen- Soft    Assessment/Plan:  1. CAD  2. AFib  3. SBO    I have discussed my recommendation with the PA which are outlined above.  Will follow.

## 2023-08-23 NOTE — ED ADULT NURSE NOTE - CHIEF COMPLAINT QUOTE
Pt BIBEMs transfer from [place] for [facility]. No NG tube in place. Breathing even and unlabored.

## 2023-08-23 NOTE — PROGRESS NOTE ADULT - SUBJECTIVE AND OBJECTIVE BOX
SUBJECTIVE/24 hour events:  78-year-old male with history of bladder cancer with urostomy done 12 years ago, A-fib on Warfarin, hypertension, hyperlipidemia, CAD with 2 stents placed 1 month ago who presents with abdominal pain cough 4 to 5 days with nausea vomiting for 4 days.  Patient was seen at [facility] and was found to have high-grade SBO near his urostomy.  Patient was sent to [facility] for advanced surgical services.  Patient is currently not in pain and is not vomiting.  Per patient patient has been urinating within normal limits from his urostomy as his usual. Patient in [facility] urology to be consulted. Patient with no acute events, NG tube placed with 500cc on insertion, no pain issues. dispo pending         Vital Signs Last 24 Hrs  T(C): 36.9 (23 Aug 2023 05:16), Max: 36.9 (23 Aug 2023 05:16)  T(F): 98.4 (23 Aug 2023 05:16), Max: 98.4 (23 Aug 2023 05:16)  HR: 103 (23 Aug 2023 05:16) (93 - 129)  BP: 113/72 (23 Aug 2023 05:16) (90/55 - 135/78)  BP(mean): --  RR: 16 (23 Aug 2023 05:16) (16 - 24)  SpO2: 92% (23 Aug 2023 05:16) (74% - 98%)    Parameters below as of 23 Aug 2023 00:20  Patient On (Oxygen Delivery Method): room air      Drug Dosing Weight  Height (cm): 177.8 (22 Aug 2023 18:08)  Weight (kg): 108.9 (22 Aug 2023 22:01)  BMI (kg/m2): 34.4 (22 Aug 2023 22:01)  BSA (m2): 2.26 (22 Aug 2023 22:01)  I&O's Detail    Allergies    No Known Allergies    Intolerances                              14.3   8.54  )-----------( 224      ( 22 Aug 2023 20:15 )             43.9   08-22    134<L>  |  89<L>  |  98.9<H>  ----------------------------<  117<H>  4.7   |  23.0  |  6.87<H>    Ca    7.5<L>      22 Aug 2023 20:15  Mg     2.4     08-22    TPro  6.4<L>  /  Alb  3.3  /  TBili  0.5  /  DBili  x   /  AST  19  /  ALT  11  /  AlkPhos  58  08-22  PT/INR - ( 22 Aug 2023 20:15 )   PT: 28.3 sec;   INR: 2.62 ratio         PTT - ( 22 Aug 2023 20:15 )  PTT:33.0 sec      PHYSICAL EXAM:   General: NAD, Lying in bed comfortably  Neuro: A+Ox3  HEENT: EOMI, PERRLA, MMM  Cardio: RRR  Resp: Non labored breathing on RA  GI/Abd: Soft, NT, Distended. no rebound/guarding, no masses palpated. Urobladder with urine and adjacent non reducible hernia that is non tender to palpation. NG tube well seated   Vascular: All 4 extremities warm and well perfused.   Pelvis: stable  Musculoskeletal: All 4 extremities moving spontaneously, no limitations, no spinal tenderness.        MEDICATIONS  (STANDING):  digoxin  Injectable 125 MICROGram(s) IV Push daily  heparin  Infusion.  Unit(s)/Hr (19 mL/Hr) IV Continuous <Continuous>  levothyroxine Injectable 68 MICROGram(s) IV Push at bedtime  sodium chloride 0.9%. 1000 milliLiter(s) (110 mL/Hr) IV Continuous <Continuous>    MEDICATIONS  (PRN):  acetaminophen   IVPB .. 1000 milliGRAM(s) IV Intermittent every 6 hours PRN Mild Pain (1 - 3)  ondansetron Injectable 4 milliGRAM(s) IV Push every 6 hours PRN Nausea      RADIOLOGY STUDIES:    CULTURES:

## 2023-08-23 NOTE — PATIENT PROFILE ADULT - FUNCTIONAL ASSESSMENT - BASIC MOBILITY 6.
3-calculated by average/Not able to assess (calculate score using [facility] averaging method)

## 2023-08-23 NOTE — PATIENT PROFILE ADULT - FALL HARM RISK - HARM RISK INTERVENTIONS

## 2023-08-23 NOTE — ED ADULT NURSE NOTE - OBJECTIVE STATEMENT
Pt in no apparent distress at this time. Airway patent, breathing spontaneous and nonlabored. Pt A&Ox3 resting in stretcher. Pt c/o       , n/v x 4 days. urostomy, right hearing aid

## 2023-08-23 NOTE — PATIENT PROFILE ADULT - VISION (WITH CORRECTIVE LENSES IF THE PATIENT USUALLY WEARS THEM):
Partially impaired: cannot see medication labels or newsprint, but can see obstacles in path, and the surrounding layout; can count fingers at arm's length
jaylen alan rn

## 2023-08-23 NOTE — CONSULT NOTE ADULT - PROBLEM SELECTOR RECOMMENDATION 3
- EF 40-45%.   - Lasix on hold for hypovolemia.   - Hold Entresto until renal function is back to baseline.

## 2023-08-24 LAB
ANION GAP SERPL CALC-SCNC: 20 MMOL/L — HIGH (ref 5–17)
APPEARANCE UR: CLEAR — SIGNIFICANT CHANGE UP
APTT BLD: 106.8 SEC — HIGH (ref 24.5–35.6)
APTT BLD: 29.6 SEC — SIGNIFICANT CHANGE UP (ref 24.5–35.6)
BACTERIA # UR AUTO: ABNORMAL
BASOPHILS # BLD AUTO: 0 K/UL — SIGNIFICANT CHANGE UP (ref 0–0.2)
BASOPHILS NFR BLD AUTO: 0 % — SIGNIFICANT CHANGE UP (ref 0–2)
BILIRUB UR-MCNC: NEGATIVE — SIGNIFICANT CHANGE UP
BUN SERPL-MCNC: 86.5 MG/DL — HIGH (ref 8–20)
CALCIUM SERPL-MCNC: 7 MG/DL — LOW (ref 8.4–10.5)
CHLORIDE SERPL-SCNC: 101 MMOL/L — SIGNIFICANT CHANGE UP (ref 96–108)
CO2 SERPL-SCNC: 20 MMOL/L — LOW (ref 22–29)
COLOR SPEC: YELLOW — SIGNIFICANT CHANGE UP
CREAT SERPL-MCNC: 4.29 MG/DL — HIGH (ref 0.5–1.3)
DIFF PNL FLD: ABNORMAL
DIGOXIN SERPL-MCNC: <0.4 NG/ML — LOW (ref 0.8–2)
EGFR: 13 ML/MIN/1.73M2 — LOW
EOSINOPHIL # BLD AUTO: 0.1 K/UL — SIGNIFICANT CHANGE UP (ref 0–0.5)
EOSINOPHIL NFR BLD AUTO: 0.9 % — SIGNIFICANT CHANGE UP (ref 0–6)
EPI CELLS # UR: SIGNIFICANT CHANGE UP
GLUCOSE SERPL-MCNC: 92 MG/DL — SIGNIFICANT CHANGE UP (ref 70–99)
GLUCOSE UR QL: NEGATIVE MG/DL — SIGNIFICANT CHANGE UP
HCT VFR BLD CALC: 43.9 % — SIGNIFICANT CHANGE UP (ref 39–50)
HCT VFR BLD CALC: 45.9 % — SIGNIFICANT CHANGE UP (ref 39–50)
HGB BLD-MCNC: 14.3 G/DL — SIGNIFICANT CHANGE UP (ref 13–17)
HGB BLD-MCNC: 14.8 G/DL — SIGNIFICANT CHANGE UP (ref 13–17)
INR BLD: 1.92 RATIO — HIGH (ref 0.85–1.18)
KETONES UR-MCNC: NEGATIVE — SIGNIFICANT CHANGE UP
LEUKOCYTE ESTERASE UR-ACNC: ABNORMAL
LYMPHOCYTES # BLD AUTO: 1.85 K/UL — SIGNIFICANT CHANGE UP (ref 1–3.3)
LYMPHOCYTES # BLD AUTO: 16.7 % — SIGNIFICANT CHANGE UP (ref 13–44)
MAGNESIUM SERPL-MCNC: 2.1 MG/DL — SIGNIFICANT CHANGE UP (ref 1.8–2.6)
MANUAL SMEAR VERIFICATION: SIGNIFICANT CHANGE UP
MCHC RBC-ENTMCNC: 28 PG — SIGNIFICANT CHANGE UP (ref 27–34)
MCHC RBC-ENTMCNC: 28.4 PG — SIGNIFICANT CHANGE UP (ref 27–34)
MCHC RBC-ENTMCNC: 32.2 GM/DL — SIGNIFICANT CHANGE UP (ref 32–36)
MCHC RBC-ENTMCNC: 32.6 GM/DL — SIGNIFICANT CHANGE UP (ref 32–36)
MCV RBC AUTO: 86.9 FL — SIGNIFICANT CHANGE UP (ref 80–100)
MCV RBC AUTO: 87.1 FL — SIGNIFICANT CHANGE UP (ref 80–100)
MONOCYTES # BLD AUTO: 1.46 K/UL — HIGH (ref 0–0.9)
MONOCYTES NFR BLD AUTO: 13.2 % — SIGNIFICANT CHANGE UP (ref 2–14)
MYELOCYTES NFR BLD: 1.7 % — HIGH (ref 0–0)
NEUTROPHILS # BLD AUTO: 7.46 K/UL — HIGH (ref 1.8–7.4)
NEUTROPHILS NFR BLD AUTO: 67.5 % — SIGNIFICANT CHANGE UP (ref 43–77)
NITRITE UR-MCNC: NEGATIVE — SIGNIFICANT CHANGE UP
PH UR: 6 — SIGNIFICANT CHANGE UP (ref 5–8)
PHOSPHATE SERPL-MCNC: 5.1 MG/DL — HIGH (ref 2.4–4.7)
PLAT MORPH BLD: NORMAL — SIGNIFICANT CHANGE UP
PLATELET # BLD AUTO: 181 K/UL — SIGNIFICANT CHANGE UP (ref 150–400)
PLATELET # BLD AUTO: 186 K/UL — SIGNIFICANT CHANGE UP (ref 150–400)
POTASSIUM SERPL-MCNC: 4.4 MMOL/L — SIGNIFICANT CHANGE UP (ref 3.5–5.3)
POTASSIUM SERPL-SCNC: 4.4 MMOL/L — SIGNIFICANT CHANGE UP (ref 3.5–5.3)
PROT UR-MCNC: 100 MG/DL
PROTHROM AB SERPL-ACNC: 20.9 SEC — HIGH (ref 9.5–13)
RBC # BLD: 5.04 M/UL — SIGNIFICANT CHANGE UP (ref 4.2–5.8)
RBC # BLD: 5.28 M/UL — SIGNIFICANT CHANGE UP (ref 4.2–5.8)
RBC # FLD: 14.3 % — SIGNIFICANT CHANGE UP (ref 10.3–14.5)
RBC # FLD: 14.4 % — SIGNIFICANT CHANGE UP (ref 10.3–14.5)
RBC BLD AUTO: NORMAL — SIGNIFICANT CHANGE UP
RBC CASTS # UR COMP ASSIST: ABNORMAL /HPF (ref 0–4)
SODIUM SERPL-SCNC: 141 MMOL/L — SIGNIFICANT CHANGE UP (ref 135–145)
SP GR SPEC: 1.01 — SIGNIFICANT CHANGE UP (ref 1.01–1.02)
UROBILINOGEN FLD QL: NEGATIVE MG/DL — SIGNIFICANT CHANGE UP
WBC # BLD: 11.05 K/UL — HIGH (ref 3.8–10.5)
WBC # BLD: 12.52 K/UL — HIGH (ref 3.8–10.5)
WBC # FLD AUTO: 11.05 K/UL — HIGH (ref 3.8–10.5)
WBC # FLD AUTO: 12.52 K/UL — HIGH (ref 3.8–10.5)
WBC UR QL: SIGNIFICANT CHANGE UP /HPF (ref 0–5)

## 2023-08-24 PROCEDURE — 99231 SBSQ HOSP IP/OBS SF/LOW 25: CPT | Mod: FS

## 2023-08-24 PROCEDURE — 99232 SBSQ HOSP IP/OBS MODERATE 35: CPT

## 2023-08-24 RX ORDER — HEPARIN SODIUM 5000 [USP'U]/ML
INJECTION INTRAVENOUS; SUBCUTANEOUS
Qty: 25000 | Refills: 0 | Status: DISCONTINUED | OUTPATIENT
Start: 2023-08-24 | End: 2023-08-25

## 2023-08-24 RX ADMIN — CLOPIDOGREL BISULFATE 75 MILLIGRAM(S): 75 TABLET, FILM COATED ORAL at 13:02

## 2023-08-24 RX ADMIN — HEPARIN SODIUM 1900 UNIT(S)/HR: 5000 INJECTION INTRAVENOUS; SUBCUTANEOUS at 19:25

## 2023-08-24 RX ADMIN — PANTOPRAZOLE SODIUM 40 MILLIGRAM(S): 20 TABLET, DELAYED RELEASE ORAL at 09:11

## 2023-08-24 RX ADMIN — HEPARIN SODIUM 1900 UNIT(S)/HR: 5000 INJECTION INTRAVENOUS; SUBCUTANEOUS at 10:46

## 2023-08-24 RX ADMIN — Medication 5 MILLIGRAM(S): at 18:23

## 2023-08-24 RX ADMIN — Medication 5 MILLIGRAM(S): at 06:46

## 2023-08-24 RX ADMIN — Medication 5 MILLIGRAM(S): at 13:02

## 2023-08-24 RX ADMIN — Medication 68 MICROGRAM(S): at 21:45

## 2023-08-24 RX ADMIN — SODIUM CHLORIDE 110 MILLILITER(S): 9 INJECTION INTRAMUSCULAR; INTRAVENOUS; SUBCUTANEOUS at 05:15

## 2023-08-24 RX ADMIN — HEPARIN SODIUM 1700 UNIT(S)/HR: 5000 INJECTION INTRAVENOUS; SUBCUTANEOUS at 19:51

## 2023-08-24 NOTE — PROGRESS NOTE ADULT - ASSESSMENT
78-year-old male with history of bladder cancer with urostomy done 12 years ago, A-fib on Warfarin, hypertension, hyperlipidemia, CAD with 2 stents placed 1 month ago who presents with abdominal pain cough 4 to 5 days with nausea vomiting for 4 days.  Patient was seen at [facility] and was found to have high-grade SBO near his urostomy.      1. Acute kidney injury on CKD 3:  -ARAM likely prerenal + related to local pressure/Inc IAP in setting of SBO    -Baseline SCr: 1.6, SCr on admission 7.6 and today 4.29 mg/dl  -UA: 100 pr, blood+  -Imaging: reviewed, High-grade small bowel obstruction with transition at the right lower quadrant parastomal hernia sac at the level of the ileal- ileal anastomosis.    -Agree w/ conservative management: decompression of SBO, holding Entresto, gentle IVF. Expecting SCr to continue improve.    2. SBO: management per Sx   3. Afib : oN digoxin, dig level reviewed.   4. HTN; Controlled.

## 2023-08-24 NOTE — PROGRESS NOTE ADULT - SUBJECTIVE AND OBJECTIVE BOX
Subjective: Patient seen at bedside, no current complaints, no overnight events, ngt in place to suction      MEDICATIONS  (STANDING):  clopidogrel Tablet 75 milliGRAM(s) Oral daily  levothyroxine Injectable 68 MICROGram(s) IV Push at bedtime  metoprolol tartrate Injectable 5 milliGRAM(s) IV Push every 6 hours  pantoprazole  Injectable 40 milliGRAM(s) IV Push every 24 hours  sodium chloride 0.9%. 1000 milliLiter(s) (110 mL/Hr) IV Continuous <Continuous>    MEDICATIONS  (PRN):  acetaminophen   IVPB .. 1000 milliGRAM(s) IV Intermittent every 6 hours PRN Mild Pain (1 - 3)  ondansetron Injectable 4 milliGRAM(s) IV Push every 6 hours PRN Nausea      Vital Signs Last 24 Hrs  T(C): 36.8 (23 Aug 2023 23:23), Max: 37 (23 Aug 2023 16:03)  T(F): 98.2 (23 Aug 2023 23:23), Max: 98.6 (23 Aug 2023 16:03)  HR: 93 (23 Aug 2023 23:23) (73 - 118)  BP: 133/81 (23 Aug 2023 23:23) (105/64 - 133/81)  BP(mean): 81 (23 Aug 2023 19:53) (81 - 81)  RR: 18 (23 Aug 2023 23:23) (16 - 18)  SpO2: 94% (23 Aug 2023 23:23) (92% - 94%)    Parameters below as of 23 Aug 2023 23:23  Patient On (Oxygen Delivery Method): room air        Physical Exam:    Constitutional: NAD  HEENT: PERRL, EOMI  Respiratory: Respirations non-labored, no accessory muscle use  Gastrointestinal: Softly distended, non-tender, urostomy with urine output  Neurological: A&O x 3      LABS:                        14.1   9.46  )-----------( 186      ( 23 Aug 2023 11:24 )             41.8     08-23    135  |  96  |  98.6<H>  ----------------------------<  111<H>  4.4   |  20.0<L>  |  5.65<H>    Ca    6.8<L>      23 Aug 2023 11:24  Phos  6.2     08-23  Mg     1.9     08-23    TPro  6.4<L>  /  Alb  3.3  /  TBili  0.5  /  DBili  x   /  AST  19  /  ALT  11  /  AlkPhos  58  08-22    PT/INR - ( 23 Aug 2023 11:24 )   PT: 29.3 sec;   INR: 2.72 ratio         PTT - ( 23 Aug 2023 11:24 )  PTT:33.4 sec  Urinalysis Basic - ( 23 Aug 2023 11:24 )    Color: x / Appearance: x / SG: x / pH: x  Gluc: 111 mg/dL / Ketone: x  / Bili: x / Urobili: x   Blood: x / Protein: x / Nitrite: x   Leuk Esterase: x / RBC: x / WBC x   Sq Epi: x / Non Sq Epi: x / Bacteria: x        A: 78M iwth SBO which is not limiting his urostomy, found with ARAM/ARF    P;  Pain control  NGT, monitor output  Cardio recs: restart statin when tolerating PO, continue plavix, digoxin and entresto held in setting of ARAM, metoprolol IV for rate control, lasix on hold for hypovolemia, can bridge heparin when INR 2  F/u nephro recs  KRISTEN  AM labs

## 2023-08-24 NOTE — PROGRESS NOTE ADULT - NS ATTEND AMEND GEN_ALL_CORE FT
CAD: s/p PCI/ALAN x 1 to mLCx 07/14/23.  Patient was transitioned off triple therapy after 1 week to only Plavix and Coumadin.   Due to recent PCI would continue plavix uninterrupted.  Discussed with the surgery team, states able to give plavix.  If unable to give PO meds, would transition to aspirin 300mg rectal suppository.   Restart statin when able to take PO.   If surgery is required wouldn't hold plavix if at all able.  Atrial fibrillation:  heparin gtt for bridge.  Restart home coumadin when cleared by surgery.  Chronic HFrEF:  Hold Entresto until renal function is back to baseline.

## 2023-08-24 NOTE — PROGRESS NOTE ADULT - SUBJECTIVE AND OBJECTIVE BOX
Reason for visit: ARAM    Subjective: No acute overnight event. Patient denied any cardiac or urinary complains. No fever/chills. Feeling better, asking if he can eat.    ROS: All systems were reviewed in detail pertinent positive and negative mentioned above, rest are negative.    Physical Exam:  Gen: no acute distress  MS: alert, conversing normally  Eyes: EOMI, no icterus  HENT: NCAT, MMM  CV: rhythm reg reg, rate normal, no m/g/r, no LE edema  Chest: CTAB, no w/r/r,  Abd: soft, NT, Neobladder w/ urostomy (UOP+)  Neuro: moving all 4 limbs spontaneously, no tremor  MSK: normal bulk and tone, no joint swelling  Skin: dry, warm, no rash or jaundice    =======================================================  Vital Signs Last 24 Hrs  T(C): 36.6 (24 Aug 2023 11:40), Max: 37 (23 Aug 2023 16:03)  T(F): 97.8 (24 Aug 2023 11:40), Max: 98.6 (23 Aug 2023 16:03)  HR: 104 (24 Aug 2023 11:40) (73 - 104)  BP: 123/70 (24 Aug 2023 11:40) (107/65 - 133/81)  BP(mean): 81 (23 Aug 2023 19:53) (81 - 81)  RR: 18 (24 Aug 2023 11:40) (17 - 18)  SpO2: 94% (24 Aug 2023 11:40) (93% - 95%)    Parameters below as of 24 Aug 2023 11:40  Patient On (Oxygen Delivery Method): room air      I&O's Summary    23 Aug 2023 07:01  -  24 Aug 2023 07:00  --------------------------------------------------------  IN: 0 mL / OUT: 870 mL / NET: -870 mL      =======================================================  Current Antibiotics:    Other medications:  clopidogrel Tablet 75 milliGRAM(s) Oral daily  heparin  Infusion.  Unit(s)/Hr IV Continuous <Continuous>  levothyroxine Injectable 68 MICROGram(s) IV Push at bedtime  metoprolol tartrate Injectable 5 milliGRAM(s) IV Push every 6 hours  pantoprazole  Injectable 40 milliGRAM(s) IV Push every 24 hours  sodium chloride 0.9%. 1000 milliLiter(s) IV Continuous <Continuous>    =======================================================  08-24    141  |  101  |  86.5<H>  ----------------------------<  92  4.4   |  20.0<L>  |  4.29<H>    Ca    7.0<L>      24 Aug 2023 03:07  Phos  5.1     08-24  Mg     2.1     08-24    TPro  6.4<L>  /  Alb  3.3  /  TBili  0.5  /  DBili  x   /  AST  19  /  ALT  11  /  AlkPhos  58  08-22    Creatinine: 4.29 mg/dL (08-24-23 @ 03:07)  Creatinine: 5.65 mg/dL (08-23-23 @ 11:24)  Creatinine: 6.00 mg/dL (08-23-23 @ 06:35)  Creatinine: 6.87 mg/dL (08-22-23 @ 20:15)  Creatinine: 7.60 mg/dL (08-22-23 @ 11:32)    Urinalysis Basic - ( 24 Aug 2023 03:07 )  Color: x / Appearance: x / SG: x / pH: x  Gluc: 92 mg/dL / Ketone: x  / Bili: x / Urobili: x   Blood: x / Protein: x / Nitrite: x   Leuk Esterase: x / RBC: x / WBC x   Sq Epi: x / Non Sq Epi: x / Bacteria: x      =======================================================

## 2023-08-24 NOTE — PROGRESS NOTE ADULT - ASSESSMENT
A/P: Patient is a 79 y/o M with a PMHx of CAD s/p CABG x 2 (SVG-OM1/mLAD, LIMA-D1) with PCI/ALAN ([facility] 07/23 with patent SVG, LIMA-D1 atretic, and s/p ALAN x 1 to mLCx PTO 07/14/23), HFrEF (EF 40-45% 09/22), Bladder Cancer s/p Urostomy (12 years ago), atrial fibrillation (on Coumadin), HTN, HLD, hyperkalemia, and CKD who presented to [facility] with nausea, vomiting, and abdominal pain found to have a high-grade SBO transferred to [facility] for further workup. Patient currently with an NGT in place, but his abdominal pain is improving. Patient also with no chest pain or dyspnea at this time as well. Surgery consulted for further recommendations regarding antiplatelets. Patient denies any fevers, chills, CP, SOB, syncope, diarrhea, headache, or dizziness.

## 2023-08-24 NOTE — PROGRESS NOTE ADULT - NS ATTEND AMEND GEN_ALL_CORE FT
I have seen and examined the patient during rounds form [id number] hrs  events noted    passing flatus,  and soft, hernia reducible, no pain.  Sips, will reevaluate for liquids,   Patient removed NGT ( no out out from overnight)  RICCARDO Bush

## 2023-08-24 NOTE — PROGRESS NOTE ADULT - SUBJECTIVE AND OBJECTIVE BOX
[facility]                                                         CARDIOLOGY AT [facility]                                                                  [address], [place] [address]                                                         Telephone: [phone]. Fax:[phone]                                                                             PROGRESS NOTE    Reason for follow up:   Update:       Review of symptoms:   Cardiac:  No chest pain. No dyspnea. No palpitations.  Respiratory: no cough. No dyspnea  Gastrointestinal: No diarrhea. No abdominal pain. No bleeding.   Neuro: No focal neuro complaints.    Vitals:  T(C): 36.7 (08-24-23 @ 07:15), Max: 37 (08-23-23 @ 16:03)  HR: 99 (08-24-23 @ 07:15) (73 - 118)  BP: 108/70 (08-24-23 @ 07:15) (107/65 - 133/81)  RR: 18 (08-24-23 @ 05:01) (16 - 18)  SpO2: 95% (08-24-23 @ 07:15) (93% - 95%)  Wt(kg): --  I&O's Summary    23 Aug 2023 07:01  -  24 Aug 2023 07:00  --------------------------------------------------------  IN: 0 mL / OUT: 870 mL / NET: -870 mL      Weight (kg): 108.9 (08-22 @ 22:01), 104.3 (08-22 @ 10:53)    PHYSICAL EXAM:  Appearance: Comfortable. No acute distress  HEENT:  Atraumatic. Normocephalic.  Normal oral mucosa  Neurologic: A & O x 3, no gross focal deficits.  Cardiovascular: RRR S1 S2, No murmur, no rubs/gallops. No JVD  Respiratory: Lungs clear to auscultation, unlabored   Gastrointestinal:  Soft, Non-tender, + BS  Lower Extremities: 2+ Peripheral Pulses, No clubbing, cyanosis, or edema  Psychiatry: Patient is calm. No agitation.   Skin: warm and dry.    CURRENT CARDIAC MEDICATIONS:  metoprolol tartrate Injectable 5 milliGRAM(s) IV Push every 6 hours      CURRENT OTHER MEDICATIONS:  acetaminophen   IVPB .. 1000 milliGRAM(s) IV Intermittent every 6 hours PRN Mild Pain (1 - 3)  ondansetron Injectable 4 milliGRAM(s) IV Push every 6 hours PRN Nausea  pantoprazole  Injectable 40 milliGRAM(s) IV Push every 24 hours  levothyroxine Injectable 68 MICROGram(s) IV Push at bedtime  clopidogrel Tablet 75 milliGRAM(s) Oral daily  sodium chloride 0.9%. 1000 milliLiter(s) (110 mL/Hr) IV Continuous <Continuous>      LABS:	 	                            14.8   11.05 )-----------( 186      ( 24 Aug 2023 03:07 )             45.9     08-24    141  |  101  |  86.5<H>  ----------------------------<  92  4.4   |  20.0<L>  |  4.29<H>    Ca    7.0<L>      24 Aug 2023 03:07  Phos  5.1     08-24  Mg     2.1     08-24    TPro  6.4<L>  /  Alb  3.3  /  TBili  0.5  /  DBili  x   /  AST  19  /  ALT  11  /  AlkPhos  58  08-22    PT/INR/PTT ( 24 Aug 2023 06:50 )                       :                       :      20.9         :       29.6                  .        .                   .              .           .       1.92        .                                       Lipid Profile:   HgA1c:   TSH:     TELEMETRY:   ECG:    DIAGNOSTIC TESTING:  [ ] Echocardiogram:   [ ]  Catheterization:  [ ] Stress Test:    OTHER: 	                                                                [facility]                                                         CARDIOLOGY AT [facility]                                                                  [address], [place] [address]                                                         Telephone: [phone]. Fax:[phone]                                                                             PROGRESS NOTE    Reason for follow up: Afib, DAPT  Update: Patient's NGT dislodged this morning, but surgery team planning to slowly advance diet to ice chips. Patient not experiencing any complaints at this time, but still with some Afib with RVR.       Review of symptoms:   Cardiac:  No chest pain. No dyspnea. No palpitations.  Respiratory: no cough. No dyspnea  Gastrointestinal: No diarrhea. No abdominal pain. No bleeding.   Neuro: No focal neuro complaints.    Vitals:  T(C): 36.7 (08-24-23 @ 07:15), Max: 37 (08-23-23 @ 16:03)  HR: 99 (08-24-23 @ 07:15) (73 - 118)  BP: 108/70 (08-24-23 @ 07:15) (107/65 - 133/81)  RR: 18 (08-24-23 @ 05:01) (16 - 18)  SpO2: 95% (08-24-23 @ 07:15) (93% - 95%)  Wt(kg): --  I&O's Summary    23 Aug 2023 07:01  -  24 Aug 2023 07:00  --------------------------------------------------------  IN: 0 mL / OUT: 870 mL / NET: -870 mL      Weight (kg): 108.9 (08-22 @ 22:01), 104.3 (08-22 @ 10:53)    PHYSICAL EXAM:  Appearance: Comfortable. No acute distress  HEENT:  Atraumatic. Normocephalic.  Normal oral mucosa  Neurologic: A & O x 3, no gross focal deficits.  Cardiovascular: Irregularly irregular, No murmur, no rubs/gallops. No JVD  Respiratory: Lungs clear to auscultation, unlabored   Gastrointestinal:  +distended  Lower Extremities: 2+ Peripheral Pulses, No clubbing, cyanosis, or edema  Psychiatry: Patient is calm. No agitation.   Skin: warm and dry.    CURRENT CARDIAC MEDICATIONS:  metoprolol tartrate Injectable 5 milliGRAM(s) IV Push every 6 hours      CURRENT OTHER MEDICATIONS:  acetaminophen   IVPB .. 1000 milliGRAM(s) IV Intermittent every 6 hours PRN Mild Pain (1 - 3)  ondansetron Injectable 4 milliGRAM(s) IV Push every 6 hours PRN Nausea  pantoprazole  Injectable 40 milliGRAM(s) IV Push every 24 hours  levothyroxine Injectable 68 MICROGram(s) IV Push at bedtime  clopidogrel Tablet 75 milliGRAM(s) Oral daily  sodium chloride 0.9%. 1000 milliLiter(s) (110 mL/Hr) IV Continuous <Continuous>      LABS:	 	                            14.8   11.05 )-----------( 186      ( 24 Aug 2023 03:07 )             45.9     08-24    141  |  101  |  86.5<H>  ----------------------------<  92  4.4   |  20.0<L>  |  4.29<H>    Ca    7.0<L>      24 Aug 2023 03:07  Phos  5.1     08-24  Mg     2.1     08-24    TPro  6.4<L>  /  Alb  3.3  /  TBili  0.5  /  DBili  x   /  AST  19  /  ALT  11  /  AlkPhos  58  08-22    PT/INR/PTT ( 24 Aug 2023 06:50 )                       :                       :      20.9         :       29.6                  .        .                   .              .           .       1.92        .                                       Lipid Profile:   HgA1c:   TSH:     TELEMETRY: Afib -120s  ECG:    DIAGNOSTIC TESTING:  ECHO:  Echo 09/22 EF 40-45%, hypokinesis of anterior wall and apex.     STRESS:    CATH:   < from: Cardiac Catheterization (07.14.23 @ 07:49) >  Diagnostic Findings:     Coronary Angiography   The coronary circulation is left dominant.      LM   Left main artery: Angiography shows minor irregularities.      LAD   Mid left anterior descending: Angiography shows complete occlusion.  There is a 100 % stenosis.    Patient: MARÍA ELENA BEAR             MRN: [id number]  Study Date: 07/14/2023   07:49 AM      Page 1 of 4          CX   Distal circumflex: Angiography shows complete occlusion. There is a  100 % stenosis.    Interventional Findings:     Interventional Details   Distal circumflex: This was a 100 % total occlusion stenosis. This was  an ACC/AHA High/C lesion for intervention. This is the  culprit lesion. Guidewire crossing was successful.      < end of copied text >

## 2023-08-25 LAB
ANION GAP SERPL CALC-SCNC: 18 MMOL/L — HIGH (ref 5–17)
APTT BLD: 101.7 SEC — HIGH (ref 24.5–35.6)
APTT BLD: 77.5 SEC — HIGH (ref 24.5–35.6)
APTT BLD: 86 SEC — HIGH (ref 24.5–35.6)
BASOPHILS # BLD AUTO: 0.02 K/UL — SIGNIFICANT CHANGE UP (ref 0–0.2)
BASOPHILS NFR BLD AUTO: 0.1 % — SIGNIFICANT CHANGE UP (ref 0–2)
BUN SERPL-MCNC: 69.1 MG/DL — HIGH (ref 8–20)
CALCIUM SERPL-MCNC: 7.2 MG/DL — LOW (ref 8.4–10.5)
CHLORIDE SERPL-SCNC: 104 MMOL/L — SIGNIFICANT CHANGE UP (ref 96–108)
CO2 SERPL-SCNC: 18 MMOL/L — LOW (ref 22–29)
CREAT SERPL-MCNC: 2.59 MG/DL — HIGH (ref 0.5–1.3)
EGFR: 25 ML/MIN/1.73M2 — LOW
EOSINOPHIL # BLD AUTO: 0.16 K/UL — SIGNIFICANT CHANGE UP (ref 0–0.5)
EOSINOPHIL NFR BLD AUTO: 1.2 % — SIGNIFICANT CHANGE UP (ref 0–6)
GLUCOSE SERPL-MCNC: 152 MG/DL — HIGH (ref 70–99)
HCT VFR BLD CALC: 41.2 % — SIGNIFICANT CHANGE UP (ref 39–50)
HCT VFR BLD CALC: 43.4 % — SIGNIFICANT CHANGE UP (ref 39–50)
HGB BLD-MCNC: 13.2 G/DL — SIGNIFICANT CHANGE UP (ref 13–17)
HGB BLD-MCNC: 14.2 G/DL — SIGNIFICANT CHANGE UP (ref 13–17)
IMM GRANULOCYTES NFR BLD AUTO: 9.9 % — HIGH (ref 0–0.9)
INR BLD: 1.35 RATIO — HIGH (ref 0.85–1.18)
LYMPHOCYTES # BLD AUTO: 1.56 K/UL — SIGNIFICANT CHANGE UP (ref 1–3.3)
LYMPHOCYTES # BLD AUTO: 11.6 % — LOW (ref 13–44)
MAGNESIUM SERPL-MCNC: 2.1 MG/DL — SIGNIFICANT CHANGE UP (ref 1.6–2.6)
MCHC RBC-ENTMCNC: 27.8 PG — SIGNIFICANT CHANGE UP (ref 27–34)
MCHC RBC-ENTMCNC: 28.2 PG — SIGNIFICANT CHANGE UP (ref 27–34)
MCHC RBC-ENTMCNC: 32 GM/DL — SIGNIFICANT CHANGE UP (ref 32–36)
MCHC RBC-ENTMCNC: 32.7 GM/DL — SIGNIFICANT CHANGE UP (ref 32–36)
MCV RBC AUTO: 86.3 FL — SIGNIFICANT CHANGE UP (ref 80–100)
MCV RBC AUTO: 86.7 FL — SIGNIFICANT CHANGE UP (ref 80–100)
MONOCYTES # BLD AUTO: 2.23 K/UL — HIGH (ref 0–0.9)
MONOCYTES NFR BLD AUTO: 16.6 % — HIGH (ref 2–14)
NEUTROPHILS # BLD AUTO: 8.15 K/UL — HIGH (ref 1.8–7.4)
NEUTROPHILS NFR BLD AUTO: 60.6 % — SIGNIFICANT CHANGE UP (ref 43–77)
PHOSPHATE SERPL-MCNC: 3.8 MG/DL — SIGNIFICANT CHANGE UP (ref 2.4–4.7)
PLATELET # BLD AUTO: 189 K/UL — SIGNIFICANT CHANGE UP (ref 150–400)
PLATELET # BLD AUTO: 198 K/UL — SIGNIFICANT CHANGE UP (ref 150–400)
POTASSIUM SERPL-MCNC: 3.8 MMOL/L — SIGNIFICANT CHANGE UP (ref 3.5–5.3)
POTASSIUM SERPL-SCNC: 3.8 MMOL/L — SIGNIFICANT CHANGE UP (ref 3.5–5.3)
PROTHROM AB SERPL-ACNC: 14.9 SEC — HIGH (ref 9.5–13)
RBC # BLD: 4.75 M/UL — SIGNIFICANT CHANGE UP (ref 4.2–5.8)
RBC # BLD: 5.03 M/UL — SIGNIFICANT CHANGE UP (ref 4.2–5.8)
RBC # FLD: 14.3 % — SIGNIFICANT CHANGE UP (ref 10.3–14.5)
RBC # FLD: 14.4 % — SIGNIFICANT CHANGE UP (ref 10.3–14.5)
SODIUM SERPL-SCNC: 139 MMOL/L — SIGNIFICANT CHANGE UP (ref 135–145)
TROPONIN T SERPL-MCNC: <0.01 NG/ML — SIGNIFICANT CHANGE UP (ref 0–0.06)
WBC # BLD: 13.45 K/UL — HIGH (ref 3.8–10.5)
WBC # BLD: 16.06 K/UL — HIGH (ref 3.8–10.5)
WBC # FLD AUTO: 13.45 K/UL — HIGH (ref 3.8–10.5)
WBC # FLD AUTO: 16.06 K/UL — HIGH (ref 3.8–10.5)

## 2023-08-25 PROCEDURE — 99232 SBSQ HOSP IP/OBS MODERATE 35: CPT

## 2023-08-25 PROCEDURE — 99231 SBSQ HOSP IP/OBS SF/LOW 25: CPT | Mod: FS

## 2023-08-25 PROCEDURE — 93010 ELECTROCARDIOGRAM REPORT: CPT

## 2023-08-25 RX ORDER — WARFARIN SODIUM 2.5 MG/1
5 TABLET ORAL ONCE
Refills: 0 | Status: COMPLETED | OUTPATIENT
Start: 2023-08-25 | End: 2023-08-25

## 2023-08-25 RX ORDER — IPRATROPIUM/ALBUTEROL SULFATE 18-103MCG
3 AEROSOL WITH ADAPTER (GRAM) INHALATION EVERY 6 HOURS
Refills: 0 | Status: DISCONTINUED | OUTPATIENT
Start: 2023-08-25 | End: 2023-08-26

## 2023-08-25 RX ORDER — METOPROLOL TARTRATE 50 MG
50 TABLET ORAL
Refills: 0 | Status: DISCONTINUED | OUTPATIENT
Start: 2023-08-25 | End: 2023-08-28

## 2023-08-25 RX ORDER — HEPARIN SODIUM 5000 [USP'U]/ML
1700 INJECTION INTRAVENOUS; SUBCUTANEOUS
Qty: 25000 | Refills: 0 | Status: DISCONTINUED | OUTPATIENT
Start: 2023-08-25 | End: 2023-08-28

## 2023-08-25 RX ORDER — HEPARIN SODIUM 5000 [USP'U]/ML
INJECTION INTRAVENOUS; SUBCUTANEOUS
Qty: 25000 | Refills: 0 | Status: DISCONTINUED | OUTPATIENT
Start: 2023-08-25 | End: 2023-08-25

## 2023-08-25 RX ORDER — SODIUM CHLORIDE 9 MG/ML
1000 INJECTION, SOLUTION INTRAVENOUS
Refills: 0 | Status: DISCONTINUED | OUTPATIENT
Start: 2023-08-25 | End: 2023-08-25

## 2023-08-25 RX ORDER — METOPROLOL TARTRATE 50 MG
2.5 TABLET ORAL ONCE
Refills: 0 | Status: COMPLETED | OUTPATIENT
Start: 2023-08-25 | End: 2023-08-25

## 2023-08-25 RX ADMIN — Medication 68 MICROGRAM(S): at 22:50

## 2023-08-25 RX ADMIN — HEPARIN SODIUM 1500 UNIT(S)/HR: 5000 INJECTION INTRAVENOUS; SUBCUTANEOUS at 18:41

## 2023-08-25 RX ADMIN — CLOPIDOGREL BISULFATE 75 MILLIGRAM(S): 75 TABLET, FILM COATED ORAL at 15:17

## 2023-08-25 RX ADMIN — PANTOPRAZOLE SODIUM 40 MILLIGRAM(S): 20 TABLET, DELAYED RELEASE ORAL at 10:06

## 2023-08-25 RX ADMIN — Medication 2.5 MILLIGRAM(S): at 22:49

## 2023-08-25 RX ADMIN — Medication 3 MILLILITER(S): at 20:49

## 2023-08-25 RX ADMIN — Medication 50 MILLIGRAM(S): at 18:10

## 2023-08-25 RX ADMIN — WARFARIN SODIUM 5 MILLIGRAM(S): 2.5 TABLET ORAL at 22:51

## 2023-08-25 RX ADMIN — Medication 3 MILLILITER(S): at 08:54

## 2023-08-25 RX ADMIN — HEPARIN SODIUM 1700 UNIT(S)/HR: 5000 INJECTION INTRAVENOUS; SUBCUTANEOUS at 09:43

## 2023-08-25 RX ADMIN — Medication 3 MILLILITER(S): at 01:28

## 2023-08-25 RX ADMIN — HEPARIN SODIUM 1500 UNIT(S)/HR: 5000 INJECTION INTRAVENOUS; SUBCUTANEOUS at 19:26

## 2023-08-25 RX ADMIN — HEPARIN SODIUM 1700 UNIT(S)/HR: 5000 INJECTION INTRAVENOUS; SUBCUTANEOUS at 16:53

## 2023-08-25 RX ADMIN — HEPARIN SODIUM 1700 UNIT(S)/HR: 5000 INJECTION INTRAVENOUS; SUBCUTANEOUS at 00:52

## 2023-08-25 RX ADMIN — SODIUM CHLORIDE 75 MILLILITER(S): 9 INJECTION, SOLUTION INTRAVENOUS at 10:06

## 2023-08-25 RX ADMIN — Medication 50 MILLIGRAM(S): at 06:43

## 2023-08-25 RX ADMIN — HEPARIN SODIUM 1700 UNIT(S)/HR: 5000 INJECTION INTRAVENOUS; SUBCUTANEOUS at 02:55

## 2023-08-25 NOTE — PROGRESS NOTE ADULT - SUBJECTIVE AND OBJECTIVE BOX
[facility]                                                         CARDIOLOGY AT [facility]                                                                  [address], [place] [address]                                                         Telephone: [phone]. Fax:[phone]                                                                             PROGRESS NOTE    Reason for follow up: Afib, DAPT  Update: States abd distention improving. Tolerating PO  remains on heparin gtt. transition to Coumadin home dose, 5mg PO daily when cleared by Sx  ARAM improving, Cr 2.59 today from 4.29 yesterday.       Review of symptoms:   Cardiac:  No chest pain. No dyspnea. No palpitations.  Respiratory: no cough. No dyspnea  Gastrointestinal: No diarrhea. No abdominal pain. No bleeding.   Neuro: No focal neuro complaints.      Vitals:  T(C): 36.6 (08-25-23 @ 10:12), Max: 36.7 (08-24-23 @ 23:49)  HR: 77 (08-25-23 @ 10:12) (66 - 102)  BP: 107/72 (08-25-23 @ 10:12) (98/60 - 140/79)  RR: 18 (08-25-23 @ 10:12) (18 - 18)  SpO2: 92% (08-25-23 @ 10:12) (89% - 95%)        I&O's Summary    24 Aug 2023 07:01  -  25 Aug 2023 07:00  --------------------------------------------------------  IN: 1210 mL / OUT: 850 mL / NET: 360 mL      Weight (kg): 102.1 (08-25 @ 00:35), 104.3 (08-22 @ 10:53)      PHYSICAL EXAM:  Appearance: Comfortable. No acute distress  HEENT:  Atraumatic. Normocephalic.  Normal oral mucosa  Neurologic: A & O x 3, no gross focal deficits.  Cardiovascular: RRR S1 S2, No murmur, no rubs/gallops. No JVD  Respiratory: Lungs clear to auscultation, unlabored   Gastrointestinal: distended, Non-tender, + BS  Lower Extremities: No edema  Psychiatry: Patient is calm. No agitation.   Skin: warm and dry.      CURRENT CARDIAC MEDICATIONS:  metoprolol succinate ER 50 milliGRAM(s) Oral two times a day        CURRENT OTHER MEDICATIONS:  albuterol/ipratropium for Nebulization 3 milliLiter(s) Nebulizer every 6 hours  acetaminophen   IVPB .. 1000 milliGRAM(s) IV Intermittent every 6 hours PRN Mild Pain (1 - 3)  ondansetron Injectable 4 milliGRAM(s) IV Push every 6 hours PRN Nausea  pantoprazole  Injectable 40 milliGRAM(s) IV Push every 24 hours  levothyroxine Injectable 68 MICROGram(s) IV Push at bedtime  clopidogrel Tablet 75 milliGRAM(s) Oral daily  heparin  Infusion. 1700 Unit(s)/Hr (17 mL/Hr) IV Continuous <Continuous>  lactated ringers. 1000 milliLiter(s) (75 mL/Hr) IV Continuous <Continuous>      LABS:	 	                            14.2   13.45 )-----------( 189      ( 25 Aug 2023 06:48 )             43.4     08-25    139  |  104  |  69.1<H>  ----------------------------<  152<H>  3.8   |  18.0<L>  |  2.59<H>    Ca    7.2<L>      25 Aug 2023 06:48  Phos  3.8     08-25  Mg     2.1     08-25      PT/INR/PTT ( 25 Aug 2023 06:48 )                       :                       :      14.9         :       86.0                  .        .                   .              .           .       1.35        .                                           TELEMETRY: AFib rates     DIAGNOSTIC TESTING:  [ ] Echocardiogram:   	  Echo 09/22 EF 40-45%, hypokinesis of anterior wall and apex.       HOME MEDICATIONS  ?? Acetaminophen 325 MG Oral Tablet; TAKE 2 TABLET Every 6 hours PRN  ?? Amoxicillin 500 MG Oral Tablet  ?? Anoro Ellipta 62.5-25 MCG/INH AEPB; INHALE 1 PUFFS Daily  ?? Aspirin 81 MG Oral Tablet Delayed Release; TAKE 1 TABLET EVERY DAY  ?? Digoxin 125 MCG Oral Tablet; TAKE 1 TABLET BY MOUTH DAILY  ?? Entresto 24-26 MG Oral Tablet; TAKE 1 TABLET BY MOUTH EVERY 12 HOURS  ?? Furosemide 20 MG Oral Tablet; TAKE 1 TABLET BY MOUTH EVERY DAY  ?? Latanoprost 0.005 % Ophthalmic Solution; INSTILL 1 DROP IN BOTH EYES AT BEDTIME  ?? Levothyroxine Sodium 137 MCG Oral Tablet; TAKE 1 TABLET BY MOUTH DAILY  ?? Metoprolol Succinate ER 50 MG Oral Tablet Extended Release 24 Hour; TAKE 1 TABLET  BY MOUTH TWICE DAILY  ?? Rosuvastatin Calcium 40 MG Oral Tablet; Take 1 tablet by mouth daily  ?? Senna 8.6 MG Oral Tablet; TAKE 2 TABLET AT BEDTIME PRN  ?? Spironolactone 25 MG Oral Tablet; TAKE 1 TABLET BY MOUTH EVERY DAY  ?? Warfarin Sodium 5 MG Oral Tablet; TAKE 1 TABLET BY MOUTH DAILY AS DIRECTED

## 2023-08-25 NOTE — PROGRESS NOTE ADULT - PROBLEM SELECTOR PLAN 2
.  - Patient with hx of CAD with recent PCI/ALAN x 1 to mLCx 07/14/23  - Patient was transitioned off triple therapy after 1 week to only Plavix and Coumadin.   - Due to recent PCI would continue plavix uninterrupted.   - Discussed with the surgery team, states able to give plavix.  - Resume statin (Home med: Rosuvastatin Calcium 40 MG Oral Tablet; Take 1 tablet by mouth daily)
- Still with uncontrolled rates in setting of an SBO.   - Hold Digoxin in the setting of ARAM/CKD.   - If taking PO meds can transition to home Toprol XL 50mg PO BID.   - If not, can increase metoprolol 5mg IV to q4 hours.  - Coumadin on hold.   - INR 1.92.   - Start heparin gtt for bridge.   - Restart home coumadin when cleared by surgery.  - Continue telemetry monitoring.

## 2023-08-25 NOTE — PROGRESS NOTE ADULT - NS ATTEND AMEND GEN_ALL_CORE FT
Atrial fibrillation: Transition to Coumadin (5mg PO daily as per office cardiology notes) once cleared By sx team.  cont heparin gtt  Chronic HFrEF.  Hold Entresto, spironolactone until renal function is back to baseline.  would resume lasix 20mg PO daily tomorrow if renal function continues to improve.

## 2023-08-25 NOTE — PROGRESS NOTE ADULT - ASSESSMENT
77 y/o M with a PMHx of CAD s/p CABG x 2 (SVG-OM1/mLAD, LIMA-D1) with PCI/ALAN (C 07/23 with patent SVG, LIMA-D1 atretic, and s/p ALAN x 1 to mLCx PTO 07/14/23), HFrEF (EF 40-45% 09/22), Bladder Cancer s/p Urostomy (12 years ago), atrial fibrillation (on Coumadin), HTN, HLD, hyperkalemia, and CKD who presented to [facility] with nausea, vomiting, and abdominal pain found to have a high-grade SBO transferred to [facility] for further workup. Patient currently with an NGT in place, but his abdominal pain is improving. Patient also with no chest pain or dyspnea at this time as well. Surgery consulted for further recommendations regarding antiplatelets. Patient denies any fevers, chills, CP, SOB, syncope, diarrhea, headache, or dizziness.

## 2023-08-25 NOTE — PROGRESS NOTE ADULT - ASSESSMENT
77 78-year-old male with history of bladder cancer with urostomy done 12 years ago, A-fib on Warfarin, hypertension, hyperlipidemia, CAD with 2 stents placed 1 month ago who presents with abdominal pain cough 4 to 5 days with nausea vomiting for 4 days.  Patient was seen at [facility] and was found to have high-grade SBO near his urostomy.      1. Acute kidney injury on CKD 3:  -ARAM likely prerenal + related to local pressure/Inc IAP in setting of SBO    -Baseline SCr: 1.6, SCr on admission 7.6 and today 2.59 mg/dl  -UA: 100 pr, blood+  -Imaging: reviewed, High-grade small bowel obstruction with transition at the right lower quadrant parastomal hernia sac at the level of the ileal- ileal anastomosis.    -Agree w/ conservative management: decompression of SBO, holding Entresto, gentle IVF. Expecting SCr to continue improve.    2. SBO: management per Sx   3. Afib : was oN digoxin, dig level reviewed.   4. HTN; Controlled.

## 2023-08-25 NOTE — PROGRESS NOTE ADULT - SUBJECTIVE AND OBJECTIVE BOX
Reason for visit: ARAM    Subjective: No acute overnight event. Patient reports abdominal pain/distension improving, tolerating PO.     ROS: All systems were reviewed in detail pertinent positive and negative mentioned above, rest are negative.    Physical Exam:  Gen: no acute distress  MS: alert, conversing normally  Eyes: EOMI, no icterus  HENT: NCAT, MMM  CV: rhythm reg reg, rate normal, no m/g/r, no LE edema  Chest: CTAB, no w/r/r,  Abd: soft, NT, Neobladder w/ urostomy (UOP+)  Skin: dry, warm, no rash or jaundice    =======================================================  Vital Signs Last 24 Hrs  T(C): 36.6 (25 Aug 2023 10:12), Max: 36.7 (24 Aug 2023 23:49)  T(F): 97.9 (25 Aug 2023 10:12), Max: 98.1 (24 Aug 2023 23:49)  HR: 77 (25 Aug 2023 10:12) (66 - 102)  BP: 107/72 (25 Aug 2023 10:12) (98/60 - 140/79)  BP(mean): --  RR: 18 (25 Aug 2023 10:12) (18 - 18)  SpO2: 92% (25 Aug 2023 10:12) (89% - 95%)    Parameters below as of 25 Aug 2023 10:12  Patient On (Oxygen Delivery Method): room air      I&O's Summary    24 Aug 2023 07:01  -  25 Aug 2023 07:00  --------------------------------------------------------  IN: 1210 mL / OUT: 850 mL / NET: 360 mL      =======================================================  Current Antibiotics:    Other medications:  albuterol/ipratropium for Nebulization 3 milliLiter(s) Nebulizer every 6 hours  clopidogrel Tablet 75 milliGRAM(s) Oral daily  heparin  Infusion. 1700 Unit(s)/Hr IV Continuous <Continuous>  lactated ringers. 1000 milliLiter(s) IV Continuous <Continuous>  levothyroxine Injectable 68 MICROGram(s) IV Push at bedtime  metoprolol succinate ER 50 milliGRAM(s) Oral two times a day  pantoprazole  Injectable 40 milliGRAM(s) IV Push every 24 hours    =======================================================  08-25    139  |  104  |  69.1<H>  ----------------------------<  152<H>  3.8   |  18.0<L>  |  2.59<H>    Ca    7.2<L>      25 Aug 2023 06:48  Phos  3.8     08-25  Mg     2.1     08-25      Creatinine: 2.59 mg/dL (08-25-23 @ 06:48)  Creatinine: 4.29 mg/dL (08-24-23 @ 03:07)  Creatinine: 5.65 mg/dL (08-23-23 @ 11:24)  Creatinine: 6.00 mg/dL (08-23-23 @ 06:35)  Creatinine: 6.87 mg/dL (08-22-23 @ 20:15)  Creatinine: 7.60 mg/dL (08-22-23 @ 11:32)    Urinalysis Basic - ( 25 Aug 2023 06:48 )    Color: x / Appearance: x / SG: x / pH: x  Gluc: 152 mg/dL / Ketone: x  / Bili: x / Urobili: x   Blood: x / Protein: x / Nitrite: x   Leuk Esterase: x / RBC: x / WBC x   Sq Epi: x / Non Sq Epi: x / Bacteria: x      =======================================================

## 2023-08-25 NOTE — PROGRESS NOTE ADULT - PROBLEM SELECTOR PLAN 1
- Patient with hx of CAD with recent PCI/ALAN x 1 to mLCx 07/14/23  - Patient was transitioned off triple therapy after 1 week to only Plavix and Coumadin.   - Due to recent PCI would continue plavix uninterrupted.   - Discussed with the surgery team, states able to give plavix.  - If unable to give PO meds, would transition to aspirin 300mg rectal suppository.   - Restart statin when able to take PO.   - If surgery is required wouldn't hold plavix if at all able.
-   - Hold Digoxin in the setting of ARAM/CKD.   - If taking PO meds can transition to home Toprol XL 50mg PO BID.   - Transition to Coumadin (5mg PO daily as per office cardiology notes) once cleared By sx team  - cont heparin gtt  -

## 2023-08-25 NOTE — PROGRESS NOTE ADULT - PROBLEM SELECTOR PLAN 3
- EF 40-45%.   - Restart home lasix when taking normal PO diet.  - Hold Entresto until renal function is back to baseline.
.  - EF 40-45%.   - ARAM, renal funtion improving   - Hold Entresto, spironolactone until renal function is back to baseline.  - would resume lasix 20mg PO daily tomorrow if renal function continues to improve.

## 2023-08-25 NOTE — PROGRESS NOTE ADULT - NSPROGADDITIONALINFOA_GEN_ALL_CORE
Thank you for allowing me to participate in care of your patient.   Please call as needed.  Pt should follow up with Dr. Chu 1-2 weeks after discharge.

## 2023-08-25 NOTE — PROGRESS NOTE ADULT - SUBJECTIVE AND OBJECTIVE BOX
I have seen and examined the patient during rounds form [id number] hrs  events noted    passing flatus, abd obese soft, no pain, hernia reducible  tolerating diet    ADAT to regular diet  OOB  ambulate resume warfarin  Dispo planning

## 2023-08-26 LAB
ANION GAP SERPL CALC-SCNC: 14 MMOL/L — SIGNIFICANT CHANGE UP (ref 5–17)
ANISOCYTOSIS BLD QL: SLIGHT — SIGNIFICANT CHANGE UP
APTT BLD: 73 SEC — HIGH (ref 24.5–35.6)
APTT BLD: 95.6 SEC — HIGH (ref 24.5–35.6)
BASOPHILS # BLD AUTO: 0 K/UL — SIGNIFICANT CHANGE UP (ref 0–0.2)
BASOPHILS NFR BLD AUTO: 0 % — SIGNIFICANT CHANGE UP (ref 0–2)
BUN SERPL-MCNC: 57 MG/DL — HIGH (ref 8–20)
BURR CELLS BLD QL SMEAR: PRESENT — SIGNIFICANT CHANGE UP
CALCIUM SERPL-MCNC: 7.3 MG/DL — LOW (ref 8.4–10.5)
CHLORIDE SERPL-SCNC: 103 MMOL/L — SIGNIFICANT CHANGE UP (ref 96–108)
CO2 SERPL-SCNC: 23 MMOL/L — SIGNIFICANT CHANGE UP (ref 22–29)
CREAT SERPL-MCNC: 2.21 MG/DL — HIGH (ref 0.5–1.3)
DIGOXIN SERPL-MCNC: <0.4 NG/ML — LOW (ref 0.8–2)
EGFR: 30 ML/MIN/1.73M2 — LOW
ELLIPTOCYTES BLD QL SMEAR: SLIGHT — SIGNIFICANT CHANGE UP
EOSINOPHIL # BLD AUTO: 0.14 K/UL — SIGNIFICANT CHANGE UP (ref 0–0.5)
EOSINOPHIL NFR BLD AUTO: 0.9 % — SIGNIFICANT CHANGE UP (ref 0–6)
GIANT PLATELETS BLD QL SMEAR: PRESENT — SIGNIFICANT CHANGE UP
GLUCOSE SERPL-MCNC: 160 MG/DL — HIGH (ref 70–99)
HCT VFR BLD CALC: 37.3 % — LOW (ref 39–50)
HGB BLD-MCNC: 12.4 G/DL — LOW (ref 13–17)
INR BLD: 1.28 RATIO — HIGH (ref 0.85–1.18)
LYMPHOCYTES # BLD AUTO: 1.18 K/UL — SIGNIFICANT CHANGE UP (ref 1–3.3)
LYMPHOCYTES # BLD AUTO: 7.7 % — LOW (ref 13–44)
MAGNESIUM SERPL-MCNC: 2 MG/DL — SIGNIFICANT CHANGE UP (ref 1.6–2.6)
MANUAL SMEAR VERIFICATION: SIGNIFICANT CHANGE UP
MCHC RBC-ENTMCNC: 28.8 PG — SIGNIFICANT CHANGE UP (ref 27–34)
MCHC RBC-ENTMCNC: 33.2 GM/DL — SIGNIFICANT CHANGE UP (ref 32–36)
MCV RBC AUTO: 86.5 FL — SIGNIFICANT CHANGE UP (ref 80–100)
MICROCYTES BLD QL: SLIGHT — SIGNIFICANT CHANGE UP
MONOCYTES # BLD AUTO: 1.72 K/UL — HIGH (ref 0–0.9)
MONOCYTES NFR BLD AUTO: 11.2 % — SIGNIFICANT CHANGE UP (ref 2–14)
NEUTROPHILS # BLD AUTO: 11.93 K/UL — HIGH (ref 1.8–7.4)
NEUTROPHILS NFR BLD AUTO: 75.9 % — SIGNIFICANT CHANGE UP (ref 43–77)
NEUTS BAND # BLD: 1.7 % — SIGNIFICANT CHANGE UP (ref 0–8)
NRBC # BLD: 1 /100 — HIGH (ref 0–0)
OVALOCYTES BLD QL SMEAR: SLIGHT — SIGNIFICANT CHANGE UP
PHOSPHATE SERPL-MCNC: 3.7 MG/DL — SIGNIFICANT CHANGE UP (ref 2.4–4.7)
PLAT MORPH BLD: NORMAL — SIGNIFICANT CHANGE UP
PLATELET # BLD AUTO: 191 K/UL — SIGNIFICANT CHANGE UP (ref 150–400)
POIKILOCYTOSIS BLD QL AUTO: SLIGHT — SIGNIFICANT CHANGE UP
POLYCHROMASIA BLD QL SMEAR: SLIGHT — SIGNIFICANT CHANGE UP
POTASSIUM SERPL-MCNC: 4 MMOL/L — SIGNIFICANT CHANGE UP (ref 3.5–5.3)
POTASSIUM SERPL-SCNC: 4 MMOL/L — SIGNIFICANT CHANGE UP (ref 3.5–5.3)
PROTHROM AB SERPL-ACNC: 14.1 SEC — HIGH (ref 9.5–13)
RBC # BLD: 4.31 M/UL — SIGNIFICANT CHANGE UP (ref 4.2–5.8)
RBC # FLD: 14.4 % — SIGNIFICANT CHANGE UP (ref 10.3–14.5)
RBC BLD AUTO: ABNORMAL
SODIUM SERPL-SCNC: 140 MMOL/L — SIGNIFICANT CHANGE UP (ref 135–145)
VARIANT LYMPHS # BLD: 2.6 % — SIGNIFICANT CHANGE UP (ref 0–6)
WBC # BLD: 15.37 K/UL — HIGH (ref 3.8–10.5)
WBC # FLD AUTO: 15.37 K/UL — HIGH (ref 3.8–10.5)

## 2023-08-26 PROCEDURE — 93970 EXTREMITY STUDY: CPT | Mod: 26

## 2023-08-26 PROCEDURE — 99232 SBSQ HOSP IP/OBS MODERATE 35: CPT

## 2023-08-26 PROCEDURE — 99231 SBSQ HOSP IP/OBS SF/LOW 25: CPT

## 2023-08-26 RX ORDER — SODIUM CHLORIDE 9 MG/ML
1000 INJECTION, SOLUTION INTRAVENOUS ONCE
Refills: 0 | Status: COMPLETED | OUTPATIENT
Start: 2023-08-26 | End: 2023-08-26

## 2023-08-26 RX ORDER — ACETAMINOPHEN 500 MG
975 TABLET ORAL EVERY 6 HOURS
Refills: 0 | Status: DISCONTINUED | OUTPATIENT
Start: 2023-08-26 | End: 2023-08-28

## 2023-08-26 RX ORDER — WARFARIN SODIUM 2.5 MG/1
10 TABLET ORAL AT BEDTIME
Refills: 0 | Status: COMPLETED | OUTPATIENT
Start: 2023-08-26 | End: 2023-08-26

## 2023-08-26 RX ORDER — METOPROLOL TARTRATE 50 MG
5 TABLET ORAL ONCE
Refills: 0 | Status: COMPLETED | OUTPATIENT
Start: 2023-08-26 | End: 2023-08-26

## 2023-08-26 RX ORDER — ACETAMINOPHEN 500 MG
975 TABLET ORAL ONCE
Refills: 0 | Status: COMPLETED | OUTPATIENT
Start: 2023-08-26 | End: 2023-08-26

## 2023-08-26 RX ORDER — IPRATROPIUM/ALBUTEROL SULFATE 18-103MCG
3 AEROSOL WITH ADAPTER (GRAM) INHALATION EVERY 6 HOURS
Refills: 0 | Status: DISCONTINUED | OUTPATIENT
Start: 2023-08-26 | End: 2023-08-30

## 2023-08-26 RX ORDER — ATORVASTATIN CALCIUM 80 MG/1
80 TABLET, FILM COATED ORAL AT BEDTIME
Refills: 0 | Status: DISCONTINUED | OUTPATIENT
Start: 2023-08-26 | End: 2023-08-28

## 2023-08-26 RX ORDER — LEVOTHYROXINE SODIUM 125 MCG
137 TABLET ORAL DAILY
Refills: 0 | Status: DISCONTINUED | OUTPATIENT
Start: 2023-08-26 | End: 2023-08-28

## 2023-08-26 RX ORDER — ALBUMIN HUMAN 25 %
250 VIAL (ML) INTRAVENOUS ONCE
Refills: 0 | Status: COMPLETED | OUTPATIENT
Start: 2023-08-26 | End: 2023-08-26

## 2023-08-26 RX ADMIN — PANTOPRAZOLE SODIUM 40 MILLIGRAM(S): 20 TABLET, DELAYED RELEASE ORAL at 09:36

## 2023-08-26 RX ADMIN — Medication 975 MILLIGRAM(S): at 23:00

## 2023-08-26 RX ADMIN — Medication 50 MILLIGRAM(S): at 22:03

## 2023-08-26 RX ADMIN — HEPARIN SODIUM 1500 UNIT(S)/HR: 5000 INJECTION INTRAVENOUS; SUBCUTANEOUS at 19:38

## 2023-08-26 RX ADMIN — Medication 975 MILLIGRAM(S): at 01:58

## 2023-08-26 RX ADMIN — SODIUM CHLORIDE 1000 MILLILITER(S): 9 INJECTION, SOLUTION INTRAVENOUS at 03:17

## 2023-08-26 RX ADMIN — HEPARIN SODIUM 1500 UNIT(S)/HR: 5000 INJECTION INTRAVENOUS; SUBCUTANEOUS at 01:41

## 2023-08-26 RX ADMIN — HEPARIN SODIUM 1500 UNIT(S)/HR: 5000 INJECTION INTRAVENOUS; SUBCUTANEOUS at 07:46

## 2023-08-26 RX ADMIN — Medication 5 MILLIGRAM(S): at 09:37

## 2023-08-26 RX ADMIN — HEPARIN SODIUM 1500 UNIT(S)/HR: 5000 INJECTION INTRAVENOUS; SUBCUTANEOUS at 09:40

## 2023-08-26 RX ADMIN — CLOPIDOGREL BISULFATE 75 MILLIGRAM(S): 75 TABLET, FILM COATED ORAL at 14:41

## 2023-08-26 RX ADMIN — WARFARIN SODIUM 10 MILLIGRAM(S): 2.5 TABLET ORAL at 21:53

## 2023-08-26 RX ADMIN — Medication 5 MILLIGRAM(S): at 03:18

## 2023-08-26 RX ADMIN — Medication 50 MILLIGRAM(S): at 06:00

## 2023-08-26 RX ADMIN — ATORVASTATIN CALCIUM 80 MILLIGRAM(S): 80 TABLET, FILM COATED ORAL at 21:53

## 2023-08-26 RX ADMIN — Medication 975 MILLIGRAM(S): at 22:04

## 2023-08-26 RX ADMIN — Medication 975 MILLIGRAM(S): at 02:58

## 2023-08-26 RX ADMIN — ONDANSETRON 4 MILLIGRAM(S): 8 TABLET, FILM COATED ORAL at 01:57

## 2023-08-26 NOTE — PROGRESS NOTE ADULT - ASSESSMENT
78-year-old male with history of bladder cancer with urostomy done 12 years ago, A-fib on Warfarin, hypertension, hyperlipidemia, CAD with 2 stents placed 1 month ago who presents with abdominal pain cough 4 to 5 days with nausea vomiting for 4 days.  Patient was seen at [facility] and was found to have high-grade SBO near his urostomy.      1. Acute kidney injury on CKD 3:  -ARAM likely prerenal + related to local pressure/Inc IAP in setting of SBO    -Baseline SCr: 1.6, SCr on admission 7.6 and today 2.21 mg/dl  -UA: 100 pr, blood+  -Imaging: reviewed, High-grade small bowel obstruction with transition at the right lower quadrant parastomal hernia sac at the level of the ileal- ileal anastomosis.    -Agree w/ conservative management: decompression of SBO, holding Entresto, gentle IVF. Expecting SCr to continue improve.    2. SBO: management per Sx   3. Afib : was oN digoxin, dig level reviewed.   4. HTN; Controlled.    Nephrology will sign off, thanks for the consult.

## 2023-08-26 NOTE — PROGRESS NOTE ADULT - NS ATTEND AMEND GEN_ALL_CORE FT
Above assessment noted.  The patient was seen and examined by myself with the surgical PA.  The patient reports that he had some mild abdominal discomfort last night that now has resolved. He has no nausea, no vomit.  He is complaining of pain and swelling to the left calf.  Abdomen is soft without localizing tenderness, no guarding, no rebound, urostomy site intact.   The left leg and calf is with swelling and mild tenderness, the RLE is without swelling or tenderness.  Will order duplex of the lower extremities to rule out DVT. Continue with heparin, dose Coumadin to a target INR of 2-3.

## 2023-08-26 NOTE — PROGRESS NOTE ADULT - SUBJECTIVE AND OBJECTIVE BOX
Subjective: Patient seen at bedside, had some abd pain and nausea overnight which were improved with medications. Patient in continued a.fib with PVCs, EKG taken shows RBBB and some t-wave changes, troponins negative, patient asymptomatic. Cardiology called for recs for rate control with low BP, recommended to give patient 1L LR bolus and 5mg metoprolol push. New IV placed via US guidance, heparin gtt running at therapeutic level.       MEDICATIONS  (STANDING):  albuterol/ipratropium for Nebulization 3 milliLiter(s) Nebulizer every 6 hours  clopidogrel Tablet 75 milliGRAM(s) Oral daily  heparin  Infusion. 1700 Unit(s)/Hr (17 mL/Hr) IV Continuous <Continuous>  lactated ringers Bolus 1000 milliLiter(s) IV Bolus once  levothyroxine Injectable 68 MICROGram(s) IV Push at bedtime  metoprolol succinate ER 50 milliGRAM(s) Oral two times a day  metoprolol tartrate Injectable 5 milliGRAM(s) IV Push once  pantoprazole  Injectable 40 milliGRAM(s) IV Push every 24 hours    MEDICATIONS  (PRN):  acetaminophen   IVPB .. 1000 milliGRAM(s) IV Intermittent every 6 hours PRN Mild Pain (1 - 3)  ondansetron Injectable 4 milliGRAM(s) IV Push every 6 hours PRN Nausea      Vital Signs Last 24 Hrs  T(C): 36.4 (26 Aug 2023 01:01), Max: 36.7 (25 Aug 2023 05:00)  T(F): 97.5 (26 Aug 2023 01:01), Max: 98.1 (25 Aug 2023 05:00)  HR: 106 (26 Aug 2023 01:01) (60 - 106)  BP: 102/66 (26 Aug 2023 01:01) (102/66 - 122/61)  BP(mean): --  RR: 18 (26 Aug 2023 01:01) (18 - 18)  SpO2: 96% (26 Aug 2023 01:01) (92% - 98%)    Parameters below as of 26 Aug 2023 01:01  Patient On (Oxygen Delivery Method): room air        Physical Exam:    Constitutional: NAD  HEENT: PERRL, EOMI  Respiratory: Respirations non-labored, no accessory muscle use  Gastrointestinal: Soft, distended, non-tender  Neurological: A&O x 3      LABS:                        13.2   16.06 )-----------( 198      ( 25 Aug 2023 16:00 )             41.2     08-25    139  |  104  |  69.1<H>  ----------------------------<  152<H>  3.8   |  18.0<L>  |  2.59<H>    Ca    7.2<L>      25 Aug 2023 06:48  Phos  3.8     08-25  Mg     2.1     08-25      PT/INR - ( 25 Aug 2023 06:48 )   PT: 14.9 sec;   INR: 1.35 ratio         PTT - ( 26 Aug 2023 00:47 )  PTT:73.0 sec  Urinalysis Basic - ( 25 Aug 2023 06:48 )    Color: x / Appearance: x / SG: x / pH: x  Gluc: 152 mg/dL / Ketone: x  / Bili: x / Urobili: x   Blood: x / Protein: x / Nitrite: x   Leuk Esterase: x / RBC: x / WBC x   Sq Epi: x / Non Sq Epi: x / Bacteria: x        A: 78M with resolving SBO, also found to have ARAM and Cr is trending down, Cardiology following for multiple cardiac Hx and meds, patient advanced to regular diet but had tolerance issues     P;  Pain control  Monitor diet rich  Cardio recs appreciated   lasix on hold for hypovolemia  Statin restarted today  Entresto on hold for ARAM  Restarted digoxin 8/25, check AM dig level  Heparin gtt in place, will begin coumadin when able

## 2023-08-26 NOTE — PROGRESS NOTE ADULT - SUBJECTIVE AND OBJECTIVE BOX
Reason for visit: ARAM    Subjective: No acute overnight event. Patient reports abdominal pain/distension improving, tolerating PO. Making good UOP.    ROS: All systems were reviewed in detail pertinent positive and negative mentioned above, rest are negative.    Physical Exam:  Gen: no acute distress  MS: alert, conversing normally  Eyes: EOMI, no icterus  HENT: NCAT, MMM  CV: rhythm reg reg, rate normal, no m/g/r, no LE edema  Chest: CTAB, no w/r/r,  Abd: soft, NT, Neobladder w/ urostomy (UOP+)  Skin: dry, warm, no rash or jaundice    =======================================================  Vital Signs Last 24 Hrs  T(C): 36.7 (26 Aug 2023 09:24), Max: 37 (26 Aug 2023 05:00)  T(F): 98 (26 Aug 2023 09:24), Max: 98.6 (26 Aug 2023 05:00)  HR: 149 (26 Aug 2023 09:24) (60 - 149)  BP: 105/74 (26 Aug 2023 09:24) (102/66 - 127/70)  BP(mean): --  RR: 18 (26 Aug 2023 09:24) (18 - 18)  SpO2: 91% (26 Aug 2023 09:24) (91% - 98%)    Parameters below as of 26 Aug 2023 09:24  Patient On (Oxygen Delivery Method): room air      I&O's Summary    25 Aug 2023 07:01  -  26 Aug 2023 07:00  --------------------------------------------------------  IN: 1000 mL / OUT: 700 mL / NET: 300 mL      =======================================================  Current Antibiotics:    Other medications:  atorvastatin 80 milliGRAM(s) Oral at bedtime  clopidogrel Tablet 75 milliGRAM(s) Oral daily  heparin  Infusion. 1700 Unit(s)/Hr IV Continuous <Continuous>  levothyroxine Injectable 68 MICROGram(s) IV Push at bedtime  metoprolol succinate ER 50 milliGRAM(s) Oral two times a day  pantoprazole  Injectable 40 milliGRAM(s) IV Push every 24 hours  warfarin 10 milliGRAM(s) Oral at bedtime    =======================================================  08-26    140  |  103  |  57.0<H>  ----------------------------<  160<H>  4.0   |  23.0  |  2.21<H>    Ca    7.3<L>      26 Aug 2023 05:50  Phos  3.7     08-26  Mg     2.0     08-26      Creatinine: 2.21 mg/dL (08-26-23 @ 05:50)  Creatinine: 2.59 mg/dL (08-25-23 @ 06:48)  Creatinine: 4.29 mg/dL (08-24-23 @ 03:07)  Creatinine: 5.65 mg/dL (08-23-23 @ 11:24)  Creatinine: 6.00 mg/dL (08-23-23 @ 06:35)  Creatinine: 6.87 mg/dL (08-22-23 @ 20:15)  Creatinine: 7.60 mg/dL (08-22-23 @ 11:32)    Urinalysis Basic - ( 26 Aug 2023 05:50 )    Color: x / Appearance: x / SG: x / pH: x  Gluc: 160 mg/dL / Ketone: x  / Bili: x / Urobili: x   Blood: x / Protein: x / Nitrite: x   Leuk Esterase: x / RBC: x / WBC x   Sq Epi: x / Non Sq Epi: x / Bacteria: x      =======================================================

## 2023-08-27 ENCOUNTER — TRANSCRIPTION ENCOUNTER (OUTPATIENT)
Age: 78
End: 2023-08-27

## 2023-08-27 LAB
ANION GAP SERPL CALC-SCNC: 16 MMOL/L — SIGNIFICANT CHANGE UP (ref 5–17)
ANION GAP SERPL CALC-SCNC: 16 MMOL/L — SIGNIFICANT CHANGE UP (ref 5–17)
ANISOCYTOSIS BLD QL: SLIGHT — SIGNIFICANT CHANGE UP
APTT BLD: 93.7 SEC — HIGH (ref 24.5–35.6)
BASOPHILS # BLD AUTO: 0 K/UL — SIGNIFICANT CHANGE UP (ref 0–0.2)
BASOPHILS NFR BLD AUTO: 0 % — SIGNIFICANT CHANGE UP (ref 0–2)
BUN SERPL-MCNC: 63.6 MG/DL — HIGH (ref 8–20)
BUN SERPL-MCNC: 66.2 MG/DL — HIGH (ref 8–20)
CALCIUM SERPL-MCNC: 6.9 MG/DL — LOW (ref 8.4–10.5)
CALCIUM SERPL-MCNC: 7.5 MG/DL — LOW (ref 8.4–10.5)
CHLORIDE SERPL-SCNC: 102 MMOL/L — SIGNIFICANT CHANGE UP (ref 96–108)
CHLORIDE SERPL-SCNC: 96 MMOL/L — SIGNIFICANT CHANGE UP (ref 96–108)
CO2 SERPL-SCNC: 18 MMOL/L — LOW (ref 22–29)
CO2 SERPL-SCNC: 21 MMOL/L — LOW (ref 22–29)
CREAT SERPL-MCNC: 2.33 MG/DL — HIGH (ref 0.5–1.3)
CREAT SERPL-MCNC: 2.56 MG/DL — HIGH (ref 0.5–1.3)
CULTURE RESULTS: SIGNIFICANT CHANGE UP
CULTURE RESULTS: SIGNIFICANT CHANGE UP
DIGOXIN SERPL-MCNC: <0.4 NG/ML — LOW (ref 0.8–2)
EGFR: 25 ML/MIN/1.73M2 — LOW
EGFR: 28 ML/MIN/1.73M2 — LOW
EOSINOPHIL # BLD AUTO: 0.37 K/UL — SIGNIFICANT CHANGE UP (ref 0–0.5)
EOSINOPHIL NFR BLD AUTO: 2.6 % — SIGNIFICANT CHANGE UP (ref 0–6)
GAS PNL BLDA: SIGNIFICANT CHANGE UP
GLUCOSE SERPL-MCNC: 116 MG/DL — HIGH (ref 70–99)
GLUCOSE SERPL-MCNC: 146 MG/DL — HIGH (ref 70–99)
HCT VFR BLD CALC: 34.1 % — LOW (ref 39–50)
HCT VFR BLD CALC: 34.7 % — LOW (ref 39–50)
HGB BLD-MCNC: 11.5 G/DL — LOW (ref 13–17)
HGB BLD-MCNC: 11.6 G/DL — LOW (ref 13–17)
INR BLD: 1.64 RATIO — HIGH (ref 0.85–1.18)
LYMPHOCYTES # BLD AUTO: 1.36 K/UL — SIGNIFICANT CHANGE UP (ref 1–3.3)
LYMPHOCYTES # BLD AUTO: 9.6 % — LOW (ref 13–44)
MAGNESIUM SERPL-MCNC: 1.9 MG/DL — SIGNIFICANT CHANGE UP (ref 1.6–2.6)
MAGNESIUM SERPL-MCNC: 2.2 MG/DL — SIGNIFICANT CHANGE UP (ref 1.6–2.6)
MANUAL SMEAR VERIFICATION: SIGNIFICANT CHANGE UP
MCHC RBC-ENTMCNC: 28.8 PG — SIGNIFICANT CHANGE UP (ref 27–34)
MCHC RBC-ENTMCNC: 28.8 PG — SIGNIFICANT CHANGE UP (ref 27–34)
MCHC RBC-ENTMCNC: 33.4 GM/DL — SIGNIFICANT CHANGE UP (ref 32–36)
MCHC RBC-ENTMCNC: 33.7 GM/DL — SIGNIFICANT CHANGE UP (ref 32–36)
MCV RBC AUTO: 85.5 FL — SIGNIFICANT CHANGE UP (ref 80–100)
MCV RBC AUTO: 86.1 FL — SIGNIFICANT CHANGE UP (ref 80–100)
METAMYELOCYTES # FLD: 0.9 % — HIGH (ref 0–0)
MICROCYTES BLD QL: SLIGHT — SIGNIFICANT CHANGE UP
MONOCYTES # BLD AUTO: 1.74 K/UL — HIGH (ref 0–0.9)
MONOCYTES NFR BLD AUTO: 12.3 % — SIGNIFICANT CHANGE UP (ref 2–14)
MYELOCYTES NFR BLD: 1.8 % — HIGH (ref 0–0)
NEUTROPHILS # BLD AUTO: 9.82 K/UL — HIGH (ref 1.8–7.4)
NEUTROPHILS NFR BLD AUTO: 67.5 % — SIGNIFICANT CHANGE UP (ref 43–77)
NEUTS BAND # BLD: 1.8 % — SIGNIFICANT CHANGE UP (ref 0–8)
NT-PROBNP SERPL-SCNC: 3987 PG/ML — HIGH (ref 0–300)
OVALOCYTES BLD QL SMEAR: SLIGHT — SIGNIFICANT CHANGE UP
PHOSPHATE SERPL-MCNC: 2.8 MG/DL — SIGNIFICANT CHANGE UP (ref 2.4–4.7)
PHOSPHATE SERPL-MCNC: 4.2 MG/DL — SIGNIFICANT CHANGE UP (ref 2.4–4.7)
PLAT MORPH BLD: NORMAL — SIGNIFICANT CHANGE UP
PLATELET # BLD AUTO: 205 K/UL — SIGNIFICANT CHANGE UP (ref 150–400)
PLATELET # BLD AUTO: 262 K/UL — SIGNIFICANT CHANGE UP (ref 150–400)
POIKILOCYTOSIS BLD QL AUTO: SLIGHT — SIGNIFICANT CHANGE UP
POLYCHROMASIA BLD QL SMEAR: SLIGHT — SIGNIFICANT CHANGE UP
POTASSIUM SERPL-MCNC: 3.9 MMOL/L — SIGNIFICANT CHANGE UP (ref 3.5–5.3)
POTASSIUM SERPL-MCNC: 4.2 MMOL/L — SIGNIFICANT CHANGE UP (ref 3.5–5.3)
POTASSIUM SERPL-SCNC: 3.9 MMOL/L — SIGNIFICANT CHANGE UP (ref 3.5–5.3)
POTASSIUM SERPL-SCNC: 4.2 MMOL/L — SIGNIFICANT CHANGE UP (ref 3.5–5.3)
PROMYELOCYTES # FLD: 0.9 % — HIGH (ref 0–0)
PROTHROM AB SERPL-ACNC: 17.9 SEC — HIGH (ref 9.5–13)
RBC # BLD: 3.99 M/UL — LOW (ref 4.2–5.8)
RBC # BLD: 4.03 M/UL — LOW (ref 4.2–5.8)
RBC # FLD: 14.5 % — SIGNIFICANT CHANGE UP (ref 10.3–14.5)
RBC # FLD: 14.5 % — SIGNIFICANT CHANGE UP (ref 10.3–14.5)
RBC BLD AUTO: ABNORMAL
SODIUM SERPL-SCNC: 133 MMOL/L — LOW (ref 135–145)
SODIUM SERPL-SCNC: 136 MMOL/L — SIGNIFICANT CHANGE UP (ref 135–145)
SPECIMEN SOURCE: SIGNIFICANT CHANGE UP
SPECIMEN SOURCE: SIGNIFICANT CHANGE UP
TROPONIN T SERPL-MCNC: <0.01 NG/ML — SIGNIFICANT CHANGE UP (ref 0–0.06)
VARIANT LYMPHS # BLD: 2.6 % — SIGNIFICANT CHANGE UP (ref 0–6)
WBC # BLD: 14.17 K/UL — HIGH (ref 3.8–10.5)
WBC # BLD: 30.12 K/UL — HIGH (ref 3.8–10.5)
WBC # FLD AUTO: 14.17 K/UL — HIGH (ref 3.8–10.5)
WBC # FLD AUTO: 30.12 K/UL — HIGH (ref 3.8–10.5)

## 2023-08-27 PROCEDURE — ZZZZZ: CPT

## 2023-08-27 PROCEDURE — 71045 X-RAY EXAM CHEST 1 VIEW: CPT | Mod: 26

## 2023-08-27 PROCEDURE — 76937 US GUIDE VASCULAR ACCESS: CPT | Mod: 26,59

## 2023-08-27 PROCEDURE — 73700 CT LOWER EXTREMITY W/O DYE: CPT | Mod: 26,LT

## 2023-08-27 PROCEDURE — 74018 RADEX ABDOMEN 1 VIEW: CPT | Mod: 26

## 2023-08-27 PROCEDURE — 71045 X-RAY EXAM CHEST 1 VIEW: CPT | Mod: 26,77

## 2023-08-27 PROCEDURE — 36569 INSJ PICC 5 YR+ W/O IMAGING: CPT

## 2023-08-27 PROCEDURE — 93010 ELECTROCARDIOGRAM REPORT: CPT

## 2023-08-27 RX ORDER — METOPROLOL TARTRATE 50 MG
5 TABLET ORAL ONCE
Refills: 0 | Status: COMPLETED | OUTPATIENT
Start: 2023-08-27 | End: 2023-08-27

## 2023-08-27 RX ORDER — SODIUM CHLORIDE 9 MG/ML
1000 INJECTION, SOLUTION INTRAVENOUS
Refills: 0 | Status: DISCONTINUED | OUTPATIENT
Start: 2023-08-27 | End: 2023-08-28

## 2023-08-27 RX ORDER — SIMETHICONE 80 MG/1
80 TABLET, CHEWABLE ORAL ONCE
Refills: 0 | Status: COMPLETED | OUTPATIENT
Start: 2023-08-27 | End: 2023-08-27

## 2023-08-27 RX ORDER — MAGNESIUM SULFATE 500 MG/ML
2 VIAL (ML) INJECTION ONCE
Refills: 0 | Status: COMPLETED | OUTPATIENT
Start: 2023-08-27 | End: 2023-08-27

## 2023-08-27 RX ORDER — FUROSEMIDE 40 MG
20 TABLET ORAL ONCE
Refills: 0 | Status: DISCONTINUED | OUTPATIENT
Start: 2023-08-27 | End: 2023-08-27

## 2023-08-27 RX ADMIN — Medication 25 GRAM(S): at 23:53

## 2023-08-27 RX ADMIN — ATORVASTATIN CALCIUM 80 MILLIGRAM(S): 80 TABLET, FILM COATED ORAL at 20:45

## 2023-08-27 RX ADMIN — SODIUM CHLORIDE 42 MILLILITER(S): 9 INJECTION, SOLUTION INTRAVENOUS at 23:52

## 2023-08-27 RX ADMIN — HEPARIN SODIUM 1500 UNIT(S)/HR: 5000 INJECTION INTRAVENOUS; SUBCUTANEOUS at 11:01

## 2023-08-27 RX ADMIN — Medication 975 MILLIGRAM(S): at 12:00

## 2023-08-27 RX ADMIN — SIMETHICONE 80 MILLIGRAM(S): 80 TABLET, CHEWABLE ORAL at 14:06

## 2023-08-27 RX ADMIN — HEPARIN SODIUM 1500 UNIT(S)/HR: 5000 INJECTION INTRAVENOUS; SUBCUTANEOUS at 07:34

## 2023-08-27 RX ADMIN — Medication 5 MILLIGRAM(S): at 22:24

## 2023-08-27 RX ADMIN — HEPARIN SODIUM 1500 UNIT(S)/HR: 5000 INJECTION INTRAVENOUS; SUBCUTANEOUS at 21:03

## 2023-08-27 RX ADMIN — HEPARIN SODIUM 1500 UNIT(S)/HR: 5000 INJECTION INTRAVENOUS; SUBCUTANEOUS at 19:33

## 2023-08-27 RX ADMIN — Medication 63.75 MILLIMOLE(S): at 16:25

## 2023-08-27 RX ADMIN — Medication 50 MILLIGRAM(S): at 20:45

## 2023-08-27 RX ADMIN — Medication 5 MILLIGRAM(S): at 21:03

## 2023-08-27 RX ADMIN — HEPARIN SODIUM 1500 UNIT(S)/HR: 5000 INJECTION INTRAVENOUS; SUBCUTANEOUS at 09:12

## 2023-08-27 RX ADMIN — PANTOPRAZOLE SODIUM 40 MILLIGRAM(S): 20 TABLET, DELAYED RELEASE ORAL at 09:06

## 2023-08-27 RX ADMIN — Medication 137 MICROGRAM(S): at 05:26

## 2023-08-27 RX ADMIN — Medication 5 MILLIGRAM(S): at 23:30

## 2023-08-27 RX ADMIN — Medication 50 MILLIGRAM(S): at 09:07

## 2023-08-27 RX ADMIN — Medication 25 GRAM(S): at 14:03

## 2023-08-27 RX ADMIN — CLOPIDOGREL BISULFATE 75 MILLIGRAM(S): 75 TABLET, FILM COATED ORAL at 12:00

## 2023-08-27 RX ADMIN — Medication 125 MILLILITER(S): at 12:00

## 2023-08-27 RX ADMIN — Medication 975 MILLIGRAM(S): at 13:00

## 2023-08-27 NOTE — PROGRESS NOTE ADULT - ATTENDING COMMENTS
Agree with above assessment.  The patient was seen and examined by myself with the surgical PA and resident. The patient is without abdominal pain, nausea, or vomit.  The patient is still with left leg swelling.  The pain is less than yesterday, but still swollen.  CT scan reveals a ruptured hemorrhagic Brennan's cyst.  Will hold Coumadin for now, will continue with the Heparin drip given the patient's cardiac history and risk of being off AC.  Will monitor H/H, will monitor neurovascular checks.

## 2023-08-27 NOTE — PROGRESS NOTE ADULT - SUBJECTIVE AND OBJECTIVE BOX
Subjective:      STATUS POST:      POST OPERATIVE DAY #:     MEDICATIONS  (STANDING):  albumin human  5% IVPB 250 milliLiter(s) IV Intermittent once  atorvastatin 80 milliGRAM(s) Oral at bedtime  clopidogrel Tablet 75 milliGRAM(s) Oral daily  heparin  Infusion. 1700 Unit(s)/Hr (17 mL/Hr) IV Continuous <Continuous>  levothyroxine 137 MICROGram(s) Oral daily  metoprolol succinate ER 50 milliGRAM(s) Oral two times a day  pantoprazole  Injectable 40 milliGRAM(s) IV Push every 24 hours    MEDICATIONS  (PRN):  acetaminophen     Tablet .. 975 milliGRAM(s) Oral every 6 hours PRN Mild Pain (1 - 3)  albuterol/ipratropium for Nebulization 3 milliLiter(s) Nebulizer every 6 hours PRN Shortness of Breath and/or Wheezing  ondansetron Injectable 4 milliGRAM(s) IV Push every 6 hours PRN Nausea      Vital Signs Last 24 Hrs  T(C): 36.8 (27 Aug 2023 01:00), Max: 37 (26 Aug 2023 05:00)  T(F): 98.2 (27 Aug 2023 01:00), Max: 98.6 (26 Aug 2023 05:00)  HR: 104 (27 Aug 2023 01:00) (74 - 149)  BP: 91/52 (27 Aug 2023 01:00) (80/58 - 127/70)  BP(mean): --  RR: 18 (27 Aug 2023 01:00) (18 - 18)  SpO2: 95% (27 Aug 2023 01:00) (91% - 95%)    Parameters below as of 27 Aug 2023 01:00  Patient On (Oxygen Delivery Method): room air        Physical Exam:    Constitutional: NAD  HEENT: PERRL, EOMI  Neck: No JVD, FROM without pain  Respiratory: Respirations non-labored, no accessory muscle use  Gastrointestinal: Soft, non-tender, + distention, no peritonitic signs   Extremities: No peripheral edema, No cyanosis  Neurological: A&O x 3; without gross deficit  Musculoskeletal: No joint pain, swelling, deformity, or point tenderness; no limitation of movement    LABS:  pending                        A: Patient is a 79 yo M with an SBO, being managed conservatively and an ARAM Cr yesterday was 2.12, Cr upon presentation was 6.9, with a hx of a fib on Coumadin, he was downgraded.     Plan:      Subjective: Patient seen and examined at bedside, no acute complaints, having bowel function and voiding. He reports abdominal pain and LE pain improved. Denies nausea, vomiting,     MEDICATIONS  (STANDING):  albumin human  5% IVPB 250 milliLiter(s) IV Intermittent once  atorvastatin 80 milliGRAM(s) Oral at bedtime  clopidogrel Tablet 75 milliGRAM(s) Oral daily  heparin  Infusion. 1700 Unit(s)/Hr (17 mL/Hr) IV Continuous <Continuous>  levothyroxine 137 MICROGram(s) Oral daily  metoprolol succinate ER 50 milliGRAM(s) Oral two times a day  pantoprazole  Injectable 40 milliGRAM(s) IV Push every 24 hours    MEDICATIONS  (PRN):  acetaminophen     Tablet .. 975 milliGRAM(s) Oral every 6 hours PRN Mild Pain (1 - 3)  albuterol/ipratropium for Nebulization 3 milliLiter(s) Nebulizer every 6 hours PRN Shortness of Breath and/or Wheezing  ondansetron Injectable 4 milliGRAM(s) IV Push every 6 hours PRN Nausea    Vital Signs Last 24 Hrs  T(C): 36.8 (27 Aug 2023 01:00), Max: 37 (26 Aug 2023 05:00)  T(F): 98.2 (27 Aug 2023 01:00), Max: 98.6 (26 Aug 2023 05:00)  HR: 104 (27 Aug 2023 01:00) (74 - 149)  BP: 91/52 (27 Aug 2023 01:00) (80/58 - 127/70)  BP(mean): --  RR: 18 (27 Aug 2023 01:00) (18 - 18)  SpO2: 95% (27 Aug 2023 01:00) (91% - 95%)  Parameters below as of 27 Aug 2023 01:00  Patient On (Oxygen Delivery Method): room air    Physical Exam:  Constitutional: NAD  HEENT: PERRL, EOMI  Neck: No JVD, FROM without pain  Respiratory: Respirations non-labored, no accessory muscle use  Gastrointestinal: Soft, non-tender, + distention, no peritonitic signs, urostomy site intact   Extremities: + L posterior knee tenderness, L calf swelling and tenderness, RLE without swelling or tenderness, sensation intact bilateral LEs   Neurological: A&O x 3; without gross deficit    LABS:  pending                        A: Patient is a 79 yo M with an SBO, being managed conservatively, he removed his own NGT, now having bowel function, and an ARAM Cr yesterday was 2.12, Cr upon presentation was 6.9, with a hx of a fib on Coumadin, he was downgraded from sicu yesterday. He has a LE u/s to r/o possible DVT for his LE swelling, which was negative for a DVT, but noted the L popliteal vein and calf veins are obscured by a large cystic structure, measuring 1.5x3x8.3cm, from popliteal fossa into the calf, concern for popliteal artery or vein aneurysm vs bakers cyst.     Plan:   Pain control   monitor bowel function   serial abdominal exams   Monitor LLE, overrnight Dr. Horton made aware of u/s results, NTD urgently, Cr is still elevated so will monitor prior to getting CTA if CTA is necessary   Cont heparin and transition to Coumadin   Regular diet   Follow cardio recs- hold Digoxin with ARAM, IV metoprolol and hep until transition to po, cont plavix   Nephrology recs- holding Entresto, gentle IVFs

## 2023-08-27 NOTE — PROCEDURE NOTE - NSMIDLINEBRANDFT_VASC_A_CORE
Pt c/o back pain - states she takes oxycodone at home for same. Pt repositioned and Dr. Hemphill contacted via voalte to assess need for prn pain medication.   [place]

## 2023-08-28 ENCOUNTER — TRANSCRIPTION ENCOUNTER (OUTPATIENT)
Age: 78
End: 2023-08-28

## 2023-08-28 LAB
ANION GAP SERPL CALC-SCNC: 18 MMOL/L — HIGH (ref 5–17)
ANION GAP SERPL CALC-SCNC: 18 MMOL/L — HIGH (ref 5–17)
ANION GAP SERPL CALC-SCNC: 20 MMOL/L — HIGH (ref 5–17)
ANION GAP SERPL CALC-SCNC: 20 MMOL/L — HIGH (ref 5–17)
ANISOCYTOSIS BLD QL: SLIGHT — SIGNIFICANT CHANGE UP
APTT BLD: 144.9 SEC — CRITICAL HIGH (ref 24.5–35.6)
APTT BLD: 25.6 SEC — SIGNIFICANT CHANGE UP (ref 24.5–35.6)
APTT BLD: 26.6 SEC — SIGNIFICANT CHANGE UP (ref 24.5–35.6)
APTT BLD: 27.2 SEC — SIGNIFICANT CHANGE UP (ref 24.5–35.6)
BASOPHILS # BLD AUTO: 0 K/UL — SIGNIFICANT CHANGE UP (ref 0–0.2)
BASOPHILS # BLD AUTO: 0 K/UL — SIGNIFICANT CHANGE UP (ref 0–0.2)
BASOPHILS NFR BLD AUTO: 0 % — SIGNIFICANT CHANGE UP (ref 0–2)
BASOPHILS NFR BLD AUTO: 0 % — SIGNIFICANT CHANGE UP (ref 0–2)
BLD GP AB SCN SERPL QL: SIGNIFICANT CHANGE UP
BUN SERPL-MCNC: 72.3 MG/DL — HIGH (ref 8–20)
BUN SERPL-MCNC: 73.3 MG/DL — HIGH (ref 8–20)
BUN SERPL-MCNC: 74.1 MG/DL — HIGH (ref 8–20)
BUN SERPL-MCNC: 74.8 MG/DL — HIGH (ref 8–20)
BURR CELLS BLD QL SMEAR: PRESENT — SIGNIFICANT CHANGE UP
BURR CELLS BLD QL SMEAR: PRESENT — SIGNIFICANT CHANGE UP
CALCIUM SERPL-MCNC: 6.6 MG/DL — LOW (ref 8.4–10.5)
CALCIUM SERPL-MCNC: 6.7 MG/DL — LOW (ref 8.4–10.5)
CALCIUM SERPL-MCNC: 6.7 MG/DL — LOW (ref 8.4–10.5)
CALCIUM SERPL-MCNC: 6.9 MG/DL — LOW (ref 8.4–10.5)
CHLORIDE SERPL-SCNC: 100 MMOL/L — SIGNIFICANT CHANGE UP (ref 96–108)
CHLORIDE SERPL-SCNC: 101 MMOL/L — SIGNIFICANT CHANGE UP (ref 96–108)
CHLORIDE SERPL-SCNC: 97 MMOL/L — SIGNIFICANT CHANGE UP (ref 96–108)
CHLORIDE SERPL-SCNC: 99 MMOL/L — SIGNIFICANT CHANGE UP (ref 96–108)
CK SERPL-CCNC: 103 U/L — SIGNIFICANT CHANGE UP (ref 30–200)
CO2 SERPL-SCNC: 14 MMOL/L — LOW (ref 22–29)
CO2 SERPL-SCNC: 15 MMOL/L — LOW (ref 22–29)
CO2 SERPL-SCNC: 16 MMOL/L — LOW (ref 22–29)
CO2 SERPL-SCNC: 16 MMOL/L — LOW (ref 22–29)
CREAT SERPL-MCNC: 2.53 MG/DL — HIGH (ref 0.5–1.3)
CREAT SERPL-MCNC: 2.61 MG/DL — HIGH (ref 0.5–1.3)
CREAT SERPL-MCNC: 2.64 MG/DL — HIGH (ref 0.5–1.3)
CREAT SERPL-MCNC: 3.1 MG/DL — HIGH (ref 0.5–1.3)
EGFR: 20 ML/MIN/1.73M2 — LOW
EGFR: 24 ML/MIN/1.73M2 — LOW
EGFR: 24 ML/MIN/1.73M2 — LOW
EGFR: 25 ML/MIN/1.73M2 — LOW
ELLIPTOCYTES BLD QL SMEAR: SLIGHT — SIGNIFICANT CHANGE UP
EOSINOPHIL # BLD AUTO: 0 K/UL — SIGNIFICANT CHANGE UP (ref 0–0.5)
EOSINOPHIL # BLD AUTO: 0 K/UL — SIGNIFICANT CHANGE UP (ref 0–0.5)
EOSINOPHIL NFR BLD AUTO: 0 % — SIGNIFICANT CHANGE UP (ref 0–6)
EOSINOPHIL NFR BLD AUTO: 0 % — SIGNIFICANT CHANGE UP (ref 0–6)
GAS PNL BLDA: SIGNIFICANT CHANGE UP
GLUCOSE SERPL-MCNC: 160 MG/DL — HIGH (ref 70–99)
GLUCOSE SERPL-MCNC: 170 MG/DL — HIGH (ref 70–99)
GLUCOSE SERPL-MCNC: 195 MG/DL — HIGH (ref 70–99)
GLUCOSE SERPL-MCNC: 204 MG/DL — HIGH (ref 70–99)
HCT VFR BLD CALC: 26.3 % — LOW (ref 39–50)
HCT VFR BLD CALC: 28.6 % — LOW (ref 39–50)
HCT VFR BLD CALC: 30.6 % — LOW (ref 39–50)
HCT VFR BLD CALC: 33.8 % — LOW (ref 39–50)
HGB BLD-MCNC: 10.3 G/DL — LOW (ref 13–17)
HGB BLD-MCNC: 11.6 G/DL — LOW (ref 13–17)
HGB BLD-MCNC: 8.9 G/DL — LOW (ref 13–17)
HGB BLD-MCNC: 9.9 G/DL — LOW (ref 13–17)
INR BLD: 1.31 RATIO — HIGH (ref 0.85–1.18)
INR BLD: 1.31 RATIO — HIGH (ref 0.85–1.18)
INR BLD: 1.38 RATIO — HIGH (ref 0.85–1.18)
INR BLD: 3.87 RATIO — HIGH (ref 0.85–1.18)
LACTATE SERPL-SCNC: 2.5 MMOL/L — HIGH (ref 0.5–2)
LYMPHOCYTES # BLD AUTO: 0.29 K/UL — LOW (ref 1–3.3)
LYMPHOCYTES # BLD AUTO: 0.8 % — LOW (ref 13–44)
LYMPHOCYTES # BLD AUTO: 1.05 K/UL — SIGNIFICANT CHANGE UP (ref 1–3.3)
LYMPHOCYTES # BLD AUTO: 2.6 % — LOW (ref 13–44)
MAGNESIUM SERPL-MCNC: 2.2 MG/DL — SIGNIFICANT CHANGE UP (ref 1.6–2.6)
MAGNESIUM SERPL-MCNC: 2.4 MG/DL — SIGNIFICANT CHANGE UP (ref 1.6–2.6)
MAGNESIUM SERPL-MCNC: 2.5 MG/DL — SIGNIFICANT CHANGE UP (ref 1.6–2.6)
MAGNESIUM SERPL-MCNC: 2.6 MG/DL — SIGNIFICANT CHANGE UP (ref 1.6–2.6)
MANUAL SMEAR VERIFICATION: SIGNIFICANT CHANGE UP
MANUAL SMEAR VERIFICATION: SIGNIFICANT CHANGE UP
MCHC RBC-ENTMCNC: 28.5 PG — SIGNIFICANT CHANGE UP (ref 27–34)
MCHC RBC-ENTMCNC: 28.6 PG — SIGNIFICANT CHANGE UP (ref 27–34)
MCHC RBC-ENTMCNC: 28.8 PG — SIGNIFICANT CHANGE UP (ref 27–34)
MCHC RBC-ENTMCNC: 29.3 PG — SIGNIFICANT CHANGE UP (ref 27–34)
MCHC RBC-ENTMCNC: 33.7 GM/DL — SIGNIFICANT CHANGE UP (ref 32–36)
MCHC RBC-ENTMCNC: 33.8 GM/DL — SIGNIFICANT CHANGE UP (ref 32–36)
MCHC RBC-ENTMCNC: 34.3 GM/DL — SIGNIFICANT CHANGE UP (ref 32–36)
MCHC RBC-ENTMCNC: 34.6 GM/DL — SIGNIFICANT CHANGE UP (ref 32–36)
MCV RBC AUTO: 83.1 FL — SIGNIFICANT CHANGE UP (ref 80–100)
MCV RBC AUTO: 84.5 FL — SIGNIFICANT CHANGE UP (ref 80–100)
MCV RBC AUTO: 84.6 FL — SIGNIFICANT CHANGE UP (ref 80–100)
MCV RBC AUTO: 85.4 FL — SIGNIFICANT CHANGE UP (ref 80–100)
METAMYELOCYTES # FLD: 0.9 % — HIGH (ref 0–0)
MONOCYTES # BLD AUTO: 1.05 K/UL — HIGH (ref 0–0.9)
MONOCYTES # BLD AUTO: 2.21 K/UL — HIGH (ref 0–0.9)
MONOCYTES NFR BLD AUTO: 2.6 % — SIGNIFICANT CHANGE UP (ref 2–14)
MONOCYTES NFR BLD AUTO: 6.1 % — SIGNIFICANT CHANGE UP (ref 2–14)
MRSA PCR RESULT.: SIGNIFICANT CHANGE UP
MYELOCYTES NFR BLD: 1.7 % — HIGH (ref 0–0)
NEUTROPHILS # BLD AUTO: 33.66 K/UL — HIGH (ref 1.8–7.4)
NEUTROPHILS # BLD AUTO: 37.38 K/UL — HIGH (ref 1.8–7.4)
NEUTROPHILS NFR BLD AUTO: 92.2 % — HIGH (ref 43–77)
NEUTROPHILS NFR BLD AUTO: 92.2 % — HIGH (ref 43–77)
NEUTS BAND # BLD: 0.9 % — SIGNIFICANT CHANGE UP (ref 0–8)
OVALOCYTES BLD QL SMEAR: SLIGHT — SIGNIFICANT CHANGE UP
PHOSPHATE SERPL-MCNC: 4.1 MG/DL — SIGNIFICANT CHANGE UP (ref 2.4–4.7)
PHOSPHATE SERPL-MCNC: 4.5 MG/DL — SIGNIFICANT CHANGE UP (ref 2.4–4.7)
PHOSPHATE SERPL-MCNC: 5.2 MG/DL — HIGH (ref 2.4–4.7)
PHOSPHATE SERPL-MCNC: 5.5 MG/DL — HIGH (ref 2.4–4.7)
PLAT MORPH BLD: NORMAL — SIGNIFICANT CHANGE UP
PLAT MORPH BLD: NORMAL — SIGNIFICANT CHANGE UP
PLATELET # BLD AUTO: 172 K/UL — SIGNIFICANT CHANGE UP (ref 150–400)
PLATELET # BLD AUTO: 192 K/UL — SIGNIFICANT CHANGE UP (ref 150–400)
PLATELET # BLD AUTO: 255 K/UL — SIGNIFICANT CHANGE UP (ref 150–400)
PLATELET # BLD AUTO: 266 K/UL — SIGNIFICANT CHANGE UP (ref 150–400)
POIKILOCYTOSIS BLD QL AUTO: SIGNIFICANT CHANGE UP
POIKILOCYTOSIS BLD QL AUTO: SLIGHT — SIGNIFICANT CHANGE UP
POLYCHROMASIA BLD QL SMEAR: SLIGHT — SIGNIFICANT CHANGE UP
POLYCHROMASIA BLD QL SMEAR: SLIGHT — SIGNIFICANT CHANGE UP
POTASSIUM SERPL-MCNC: 3.6 MMOL/L — SIGNIFICANT CHANGE UP (ref 3.5–5.3)
POTASSIUM SERPL-MCNC: 4 MMOL/L — SIGNIFICANT CHANGE UP (ref 3.5–5.3)
POTASSIUM SERPL-MCNC: 4.2 MMOL/L — SIGNIFICANT CHANGE UP (ref 3.5–5.3)
POTASSIUM SERPL-MCNC: 4.2 MMOL/L — SIGNIFICANT CHANGE UP (ref 3.5–5.3)
POTASSIUM SERPL-SCNC: 3.6 MMOL/L — SIGNIFICANT CHANGE UP (ref 3.5–5.3)
POTASSIUM SERPL-SCNC: 4 MMOL/L — SIGNIFICANT CHANGE UP (ref 3.5–5.3)
POTASSIUM SERPL-SCNC: 4.2 MMOL/L — SIGNIFICANT CHANGE UP (ref 3.5–5.3)
POTASSIUM SERPL-SCNC: 4.2 MMOL/L — SIGNIFICANT CHANGE UP (ref 3.5–5.3)
PROTHROM AB SERPL-ACNC: 14.4 SEC — HIGH (ref 9.5–13)
PROTHROM AB SERPL-ACNC: 14.4 SEC — HIGH (ref 9.5–13)
PROTHROM AB SERPL-ACNC: 15.2 SEC — HIGH (ref 9.5–13)
PROTHROM AB SERPL-ACNC: 41.3 SEC — HIGH (ref 9.5–13)
RBC # BLD: 3.11 M/UL — LOW (ref 4.2–5.8)
RBC # BLD: 3.44 M/UL — LOW (ref 4.2–5.8)
RBC # BLD: 3.62 M/UL — LOW (ref 4.2–5.8)
RBC # BLD: 3.96 M/UL — LOW (ref 4.2–5.8)
RBC # FLD: 14.6 % — HIGH (ref 10.3–14.5)
RBC # FLD: 14.7 % — HIGH (ref 10.3–14.5)
RBC # FLD: 14.7 % — HIGH (ref 10.3–14.5)
RBC # FLD: 14.9 % — HIGH (ref 10.3–14.5)
RBC BLD AUTO: ABNORMAL
RBC BLD AUTO: ABNORMAL
S AUREUS DNA NOSE QL NAA+PROBE: SIGNIFICANT CHANGE UP
SODIUM SERPL-SCNC: 133 MMOL/L — LOW (ref 135–145)
SODIUM SERPL-SCNC: 135 MMOL/L — SIGNIFICANT CHANGE UP (ref 135–145)
TROPONIN T SERPL-MCNC: 0.01 NG/ML — SIGNIFICANT CHANGE UP (ref 0–0.06)
WBC # BLD: 29.22 K/UL — HIGH (ref 3.8–10.5)
WBC # BLD: 30.57 K/UL — HIGH (ref 3.8–10.5)
WBC # BLD: 36.16 K/UL — HIGH (ref 3.8–10.5)
WBC # BLD: 40.54 K/UL — CRITICAL HIGH (ref 3.8–10.5)
WBC # FLD AUTO: 29.22 K/UL — HIGH (ref 3.8–10.5)
WBC # FLD AUTO: 30.57 K/UL — HIGH (ref 3.8–10.5)
WBC # FLD AUTO: 36.16 K/UL — HIGH (ref 3.8–10.5)
WBC # FLD AUTO: 40.54 K/UL — CRITICAL HIGH (ref 3.8–10.5)

## 2023-08-28 PROCEDURE — 71045 X-RAY EXAM CHEST 1 VIEW: CPT | Mod: 26,77

## 2023-08-28 PROCEDURE — 76937 US GUIDE VASCULAR ACCESS: CPT | Mod: 26

## 2023-08-28 PROCEDURE — 44005 FREEING OF BOWEL ADHESION: CPT

## 2023-08-28 PROCEDURE — 74176 CT ABD & PELVIS W/O CONTRAST: CPT | Mod: 26

## 2023-08-28 PROCEDURE — 36620 INSERTION CATHETER ARTERY: CPT

## 2023-08-28 PROCEDURE — 99291 CRITICAL CARE FIRST HOUR: CPT | Mod: 57

## 2023-08-28 PROCEDURE — 71045 X-RAY EXAM CHEST 1 VIEW: CPT | Mod: 26,76

## 2023-08-28 PROCEDURE — 36620 INSERTION CATHETER ARTERY: CPT | Mod: LT

## 2023-08-28 RX ORDER — SODIUM CHLORIDE 9 MG/ML
1000 INJECTION, SOLUTION INTRAVENOUS ONCE
Refills: 0 | Status: COMPLETED | OUTPATIENT
Start: 2023-08-28 | End: 2023-08-28

## 2023-08-28 RX ORDER — ACETAMINOPHEN 500 MG
1000 TABLET ORAL EVERY 6 HOURS
Refills: 0 | Status: COMPLETED | OUTPATIENT
Start: 2023-08-28 | End: 2023-08-29

## 2023-08-28 RX ORDER — SODIUM CHLORIDE 9 MG/ML
1000 INJECTION, SOLUTION INTRAVENOUS
Refills: 0 | Status: DISCONTINUED | OUTPATIENT
Start: 2023-08-28 | End: 2023-08-30

## 2023-08-28 RX ORDER — CALCIUM GLUCONATE 100 MG/ML
2 VIAL (ML) INTRAVENOUS ONCE
Refills: 0 | Status: COMPLETED | OUTPATIENT
Start: 2023-08-28 | End: 2023-08-28

## 2023-08-28 RX ORDER — NOREPINEPHRINE BITARTRATE/D5W 8 MG/250ML
0.05 PLASTIC BAG, INJECTION (ML) INTRAVENOUS
Qty: 8 | Refills: 0 | Status: DISCONTINUED | OUTPATIENT
Start: 2023-08-28 | End: 2023-08-30

## 2023-08-28 RX ORDER — HEPARIN SODIUM 5000 [USP'U]/ML
8500 INJECTION INTRAVENOUS; SUBCUTANEOUS EVERY 6 HOURS
Refills: 0 | Status: DISCONTINUED | OUTPATIENT
Start: 2023-08-28 | End: 2023-08-28

## 2023-08-28 RX ORDER — HEPARIN SODIUM 5000 [USP'U]/ML
4000 INJECTION INTRAVENOUS; SUBCUTANEOUS EVERY 6 HOURS
Refills: 0 | Status: DISCONTINUED | OUTPATIENT
Start: 2023-08-28 | End: 2023-08-28

## 2023-08-28 RX ORDER — PHYTONADIONE (VIT K1) 5 MG
10 TABLET ORAL ONCE
Refills: 0 | Status: COMPLETED | OUTPATIENT
Start: 2023-08-28 | End: 2023-08-28

## 2023-08-28 RX ORDER — PIPERACILLIN AND TAZOBACTAM 4; .5 G/20ML; G/20ML
3.38 INJECTION, POWDER, LYOPHILIZED, FOR SOLUTION INTRAVENOUS ONCE
Refills: 0 | Status: COMPLETED | OUTPATIENT
Start: 2023-08-28 | End: 2023-08-28

## 2023-08-28 RX ORDER — ALBUMIN HUMAN 25 %
250 VIAL (ML) INTRAVENOUS ONCE
Refills: 0 | Status: COMPLETED | OUTPATIENT
Start: 2023-08-28 | End: 2023-08-28

## 2023-08-28 RX ORDER — LEVOTHYROXINE SODIUM 125 MCG
68 TABLET ORAL AT BEDTIME
Refills: 0 | Status: DISCONTINUED | OUTPATIENT
Start: 2023-08-28 | End: 2023-08-30

## 2023-08-28 RX ORDER — CHLORHEXIDINE GLUCONATE 213 G/1000ML
15 SOLUTION TOPICAL EVERY 12 HOURS
Refills: 0 | Status: DISCONTINUED | OUTPATIENT
Start: 2023-08-28 | End: 2023-08-30

## 2023-08-28 RX ORDER — VASOPRESSIN 20 [USP'U]/ML
0.04 INJECTION INTRAVENOUS
Qty: 40 | Refills: 0 | Status: DISCONTINUED | OUTPATIENT
Start: 2023-08-28 | End: 2023-08-30

## 2023-08-28 RX ORDER — SODIUM CHLORIDE 9 MG/ML
1000 INJECTION, SOLUTION INTRAVENOUS
Refills: 0 | Status: DISCONTINUED | OUTPATIENT
Start: 2023-08-28 | End: 2023-08-28

## 2023-08-28 RX ORDER — VANCOMYCIN HCL 1 G
1500 VIAL (EA) INTRAVENOUS ONCE
Refills: 0 | Status: COMPLETED | OUTPATIENT
Start: 2023-08-28 | End: 2023-08-28

## 2023-08-28 RX ORDER — FENTANYL CITRATE 50 UG/ML
1.5 INJECTION INTRAVENOUS
Qty: 2500 | Refills: 0 | Status: DISCONTINUED | OUTPATIENT
Start: 2023-08-28 | End: 2023-08-30

## 2023-08-28 RX ORDER — HEPARIN SODIUM 5000 [USP'U]/ML
1500 INJECTION INTRAVENOUS; SUBCUTANEOUS
Qty: 25000 | Refills: 0 | Status: DISCONTINUED | OUTPATIENT
Start: 2023-08-28 | End: 2023-08-30

## 2023-08-28 RX ORDER — HEPARIN SODIUM 5000 [USP'U]/ML
1700 INJECTION INTRAVENOUS; SUBCUTANEOUS
Qty: 25000 | Refills: 0 | Status: DISCONTINUED | OUTPATIENT
Start: 2023-08-28 | End: 2023-08-28

## 2023-08-28 RX ORDER — METOPROLOL TARTRATE 50 MG
5 TABLET ORAL ONCE
Refills: 0 | Status: DISCONTINUED | OUTPATIENT
Start: 2023-08-28 | End: 2023-08-28

## 2023-08-28 RX ORDER — PIPERACILLIN AND TAZOBACTAM 4; .5 G/20ML; G/20ML
3.38 INJECTION, POWDER, LYOPHILIZED, FOR SOLUTION INTRAVENOUS ONCE
Refills: 0 | Status: COMPLETED | OUTPATIENT
Start: 2023-08-29 | End: 2023-08-29

## 2023-08-28 RX ORDER — SODIUM CHLORIDE 9 MG/ML
500 INJECTION, SOLUTION INTRAVENOUS ONCE
Refills: 0 | Status: COMPLETED | OUTPATIENT
Start: 2023-08-28 | End: 2023-08-28

## 2023-08-28 RX ORDER — PIPERACILLIN AND TAZOBACTAM 4; .5 G/20ML; G/20ML
3.38 INJECTION, POWDER, LYOPHILIZED, FOR SOLUTION INTRAVENOUS EVERY 8 HOURS
Refills: 0 | Status: DISCONTINUED | OUTPATIENT
Start: 2023-08-29 | End: 2023-08-30

## 2023-08-28 RX ORDER — CALCIUM GLUCONATE 100 MG/ML
2 VIAL (ML) INTRAVENOUS
Refills: 0 | Status: COMPLETED | OUTPATIENT
Start: 2023-08-28 | End: 2023-08-28

## 2023-08-28 RX ORDER — PROTHROMBIN COMPLEX CONCENTRATE (HUMAN) 25.5; 16.5; 24; 22; 22; 26 [IU]/ML; [IU]/ML; [IU]/ML; [IU]/ML; [IU]/ML; [IU]/ML
2500 POWDER, FOR SOLUTION INTRAVENOUS ONCE
Refills: 0 | Status: COMPLETED | OUTPATIENT
Start: 2023-08-28 | End: 2023-08-28

## 2023-08-28 RX ORDER — SODIUM CHLORIDE 9 MG/ML
500 INJECTION INTRAMUSCULAR; INTRAVENOUS; SUBCUTANEOUS ONCE
Refills: 0 | Status: DISCONTINUED | OUTPATIENT
Start: 2023-08-28 | End: 2023-08-28

## 2023-08-28 RX ADMIN — FENTANYL CITRATE 5.11 MICROGRAM(S)/KG/HR: 50 INJECTION INTRAVENOUS at 15:15

## 2023-08-28 RX ADMIN — PROTHROMBIN COMPLEX CONCENTRATE (HUMAN) 400 INTERNATIONAL UNIT(S): 25.5; 16.5; 24; 22; 22; 26 POWDER, FOR SOLUTION INTRAVENOUS at 10:39

## 2023-08-28 RX ADMIN — SODIUM CHLORIDE 500 MILLILITER(S): 9 INJECTION, SOLUTION INTRAVENOUS at 04:36

## 2023-08-28 RX ADMIN — HEPARIN SODIUM 0 UNIT(S)/HR: 5000 INJECTION INTRAVENOUS; SUBCUTANEOUS at 06:10

## 2023-08-28 RX ADMIN — Medication 68 MICROGRAM(S): at 21:50

## 2023-08-28 RX ADMIN — PIPERACILLIN AND TAZOBACTAM 25 GRAM(S): 4; .5 INJECTION, POWDER, LYOPHILIZED, FOR SOLUTION INTRAVENOUS at 17:52

## 2023-08-28 RX ADMIN — SODIUM CHLORIDE 100 MILLILITER(S): 9 INJECTION, SOLUTION INTRAVENOUS at 07:22

## 2023-08-28 RX ADMIN — Medication 102 MILLIGRAM(S): at 10:40

## 2023-08-28 RX ADMIN — PIPERACILLIN AND TAZOBACTAM 200 GRAM(S): 4; .5 INJECTION, POWDER, LYOPHILIZED, FOR SOLUTION INTRAVENOUS at 14:09

## 2023-08-28 RX ADMIN — Medication 300 MILLIGRAM(S): at 17:56

## 2023-08-28 RX ADMIN — PANTOPRAZOLE SODIUM 40 MILLIGRAM(S): 20 TABLET, DELAYED RELEASE ORAL at 10:43

## 2023-08-28 RX ADMIN — Medication 200 GRAM(S): at 17:51

## 2023-08-28 RX ADMIN — SODIUM CHLORIDE 1000 MILLILITER(S): 9 INJECTION, SOLUTION INTRAVENOUS at 05:29

## 2023-08-28 RX ADMIN — HEPARIN SODIUM 1500 UNIT(S)/HR: 5000 INJECTION INTRAVENOUS; SUBCUTANEOUS at 23:28

## 2023-08-28 RX ADMIN — Medication 400 MILLIGRAM(S): at 23:43

## 2023-08-28 RX ADMIN — Medication 400 MILLIGRAM(S): at 17:51

## 2023-08-28 RX ADMIN — SODIUM CHLORIDE 1000 MILLILITER(S): 9 INJECTION, SOLUTION INTRAVENOUS at 06:14

## 2023-08-28 RX ADMIN — CHLORHEXIDINE GLUCONATE 15 MILLILITER(S): 213 SOLUTION TOPICAL at 17:51

## 2023-08-28 RX ADMIN — Medication 200 GRAM(S): at 06:17

## 2023-08-28 RX ADMIN — Medication 200 GRAM(S): at 05:23

## 2023-08-28 RX ADMIN — Medication 125 MILLILITER(S): at 21:49

## 2023-08-28 RX ADMIN — Medication 200 GRAM(S): at 22:42

## 2023-08-28 NOTE — CHART NOTE - NSCHARTNOTEFT_GEN_A_CORE
Patient seen and examined at bedside.    Briefly, this is a 78-year-old male with PMHx atrial fibrillation who presented with an SBO, treated non-operatively.  He now presents in the SICU with tachypnea, rapid atrial fibrillation, worsening ARAM, and increasing distension with 2L output from his NGT.  After appropriate resuscitation in the SICU, CT A/P was performed which showed pneumatosis intestinalis.  An exploratory laparotomy is indicated.    I had a long discussion with the patient and his wife regarding the risks and benefits of exploratory laparotomy with possible small bowel resection.  Risks discussed include bleeding, infection, need for re-operation, possibility of leaving his abdomen open, anastomotic leak, fistulization, injury to surrounding structures, and death.  He and his wife agree with the risks as presented, are able to verbalize them back to me, and wish to proceed with surgical intervention.

## 2023-08-28 NOTE — DIETITIAN INITIAL EVALUATION ADULT - NUTRITION DIAGNOSIS
Assessment & Plan   (H65.91) OME (otitis media with effusion), right  (primary encounter diagnosis)  Comment:   Plan: azithromycin (ZITHROMAX) 200 MG/5ML suspension        Continue with Ibuprofen as needed    (T78.40XA) Allergic reaction, initial encounter  Comment:   Plan: stopped Amoxicillin,  Continue with Benadryl as needed.        Shannan Newberry is a 17 month old, presenting for the following health issues:  Rash (Pt mom believes Allergic reaction to antibiotic. Dx with an ear infection and Tx of amoxicillin. Rash started yesterday , called th e nurse triage and they suggested benadryl and keep taking.)    She has no fever, no cough,       View : No data to display.                Review of Systems   Constitutional, eye, ENT, skin, respiratory, cardiac, and GI are normal except as otherwise noted.      Objective    Pulse 125   Temp 98.2  F (36.8  C) (Tympanic)   Resp 30   Wt 13.4 kg (29 lb 9.6 oz)   SpO2 93%   99 %ile (Z= 2.30) based on WHO (Girls, 0-2 years) weight-for-age data using vitals from 4/29/2023.     Physical Exam   GENERAL: Active, alert, in no acute distress.  SKIN: bright confluent erythematous rash on Torso area, neck and lower ext.  HEAD: Normocephalic. Normal fontanels and sutures.  RIGHT EAR: erythematous and bulging membrane  LEFT EAR: normal: no effusions, no erythema, normal landmarks  NECK: Supple, no masses.  LYMPH NODES: No adenopathy  LUNGS: Clear. No rales, rhonchi, wheezing or retractions  HEART: Regular rhythm. Normal S1/S2. No murmurs. Normal femoral pulses.  ABDOMEN: Soft, non-tender, no masses or hepatosplenomegaly.  NEUROLOGIC: Normal tone throughout. Normal reflexes for age    Diagnostics:       Elena Ruggiero MD            
yes...

## 2023-08-28 NOTE — DIETITIAN INITIAL EVALUATION ADULT - NSFNSGIIOFT_GEN_A_CORE
08-27-23 @ 07:01  -  08-28-23 @ 07:00  --------------------------------------------------------  OUT:    Nasogastric/Oral tube (mL): 1300 mL  Total OUT: 1300 mL    Total NET: -1300 mL

## 2023-08-28 NOTE — DIETITIAN INITIAL EVALUATION ADULT - PERTINENT MEDS FT
MEDICATIONS  (STANDING):  atorvastatin 80 milliGRAM(s) Oral at bedtime  clopidogrel Tablet 75 milliGRAM(s) Oral daily  levothyroxine Injectable 68 MICROGram(s) IV Push at bedtime  multiple electrolytes Injection Type 1 1000 milliLiter(s) (100 mL/Hr) IV Continuous <Continuous>  pantoprazole  Injectable 40 milliGRAM(s) IV Push every 24 hours  phytonadione  IVPB 10 milliGRAM(s) IV Intermittent once  prothrombin complex concentrate IVPB (KCENTRA) 2500 International Unit(s) IV Intermittent once    MEDICATIONS  (PRN):  acetaminophen     Tablet .. 975 milliGRAM(s) Oral every 6 hours PRN Mild Pain (1 - 3)  albuterol/ipratropium for Nebulization 3 milliLiter(s) Nebulizer every 6 hours PRN Shortness of Breath and/or Wheezing  ondansetron Injectable 4 milliGRAM(s) IV Push every 6 hours PRN Nausea

## 2023-08-28 NOTE — DIETITIAN INITIAL EVALUATION ADULT - OTHER INFO
Patient is a 77 y/o M with a PMHx of CAD s/p CABG x 2 with PCI/ALAN, and s/p ALAN x 1 to, HFrEF (EF 40-45% 09/22), Bladder Cancer s/p Urostomy (12 years ago), atrial fibrillation (on Coumadin), HTN, HLD, hyperkalemia, and CKD who presented to [facility] with nausea, vomiting, and abdominal pain found to have a high-grade SBO transferred to [facility] for further workup.   Had NGT Placed with improvement of symptoms but pt removed own NGT after ~ 3 days.  This was two days ago.  Was advanced to regular diet yesterday. Had breakfast but did not want lunch due to nausea.    Overnight, last night pt had increased HR in A-fib.  Was given Lopressor 5mg IVP on 3 separate occasions. Was going to get 4th dose but nurse could not get BP reading.  Pt currently CO SOB, nausea that has been increasing since the AM yesterday.  Admits BM yesterday.  Admits Bowel distention and mild generalized pain but not severe. Denies CP, Vomiting dizziness , weakness or other CO.

## 2023-08-28 NOTE — DIETITIAN INITIAL EVALUATION ADULT - PERTINENT LABORATORY DATA
08-28    133<L>  |  97  |  74.1<H>  ----------------------------<  160<H>  4.2   |  16.0<L>  |  3.10<H>    Ca    6.7<L>      28 Aug 2023 05:15  Phos  4.1     08-28  Mg     2.6     08-28

## 2023-08-28 NOTE — CHART NOTE - NSCHARTNOTEFT_GEN_A_CORE
Pt HR in 140s again.   Pt seen and examined at bedside, he is c/o difficulty breathing. He moved into the chair, oob, because it was easier for him to breath. He is tachypneic to about 22-24 breaths per minute. A BP was difficult to obtain. SICU PA made aware and also at bedside evaluating patient. POCUS was done. EF looks ok. IVF not seen.   Repeat abg cxr ordered   pt being upgraded to sicu, discussed with attending, concern for airway, increased work of breathing, needs resuscitation and close monitoring Pt HR in 140s again.   Pt seen and examined at bedside, he is c/o difficulty breathing. He moved into the chair, oob, because it was easier for him to breath. He is tachypneic to about 22-24 breaths per minute. Was unable to obtain a BP. SICU PA made aware of change in clinical status and also at bedside evaluating patient.   POCUS was done. EF looks ok. IVF not seen.     Repeat abg cxr ordered, IVFs   pt being upgraded to sicu

## 2023-08-28 NOTE — DIETITIAN INITIAL EVALUATION ADULT - ETIOLOGY
Related to inability to meet sufficient protein energy needs with altered GI function in setting of SBO

## 2023-08-28 NOTE — CONSULT NOTE ADULT - SUBJECTIVE AND OBJECTIVE BOX
INTERVAL HPI/OVERNIGHT EVENTS/SUBJECTIVE:    ICU Vital Signs Last 24 Hrs  T(C): 36.8 (27 Aug 2023 23:28), Max: 36.8 (27 Aug 2023 20:42)  T(F): 98.2 (27 Aug 2023 23:28), Max: 98.2 (27 Aug 2023 20:42)  HR: 115 (27 Aug 2023 23:28) (103 - 132)  BP: 142/64 (27 Aug 2023 23:28) (98/63 - 142/64)  BP(mean): --  ABP: --  ABP(mean): --  RR: 21 (27 Aug 2023 23:28) (18 - 22)  SpO2: 93% (27 Aug 2023 23:28) (91% - 94%)    O2 Parameters below as of 27 Aug 2023 23:28  Patient On (Oxygen Delivery Method): room air            I&O's Detail    26 Aug 2023 07:01  -  27 Aug 2023 07:00  --------------------------------------------------------  IN:    Heparin Infusion: 120 mL    Oral Fluid: 240 mL  Total IN: 360 mL    OUT:    Urostomy (mL): 525 mL    Voided (mL): 400 mL  Total OUT: 925 mL    Total NET: -565 mL      27 Aug 2023 07:01  -  28 Aug 2023 05:18  --------------------------------------------------------  IN:    Heparin Infusion: 135 mL    Lactated Ringers: 210 mL  Total IN: 345 mL    OUT:    Nasogastric/Oral tube (mL): 400 mL    Urostomy (mL): 750 mL  Total OUT: 1150 mL    Total NET: -805 mL            ABG - ( 28 Aug 2023 04:46 )  pH, Arterial: 7.390 pH, Blood: x     /  pCO2: 22    /  pO2: 93    / HCO3: 13    / Base Excess: -11.7 /  SaO2: 99.8                MEDICATIONS  (STANDING):  atorvastatin 80 milliGRAM(s) Oral at bedtime  calcium gluconate IVPB 2 Gram(s) IV Intermittent every 1 hour  clopidogrel Tablet 75 milliGRAM(s) Oral daily  heparin  Infusion. 1700 Unit(s)/Hr (17 mL/Hr) IV Continuous <Continuous>  levothyroxine Injectable 68 MICROGram(s) IV Push at bedtime  multiple electrolytes Injection Type 1 1000 milliLiter(s) (100 mL/Hr) IV Continuous <Continuous>  multiple electrolytes Injection Type 1 Bolus 1000 milliLiter(s) IV Bolus once  multiple electrolytes Injection Type 1 Bolus 1000 milliLiter(s) IV Bolus once  pantoprazole  Injectable 40 milliGRAM(s) IV Push every 24 hours    MEDICATIONS  (PRN):  acetaminophen     Tablet .. 975 milliGRAM(s) Oral every 6 hours PRN Mild Pain (1 - 3)  albuterol/ipratropium for Nebulization 3 milliLiter(s) Nebulizer every 6 hours PRN Shortness of Breath and/or Wheezing  ondansetron Injectable 4 milliGRAM(s) IV Push every 6 hours PRN Nausea      NUTRITION/IVF:     CENTRAL LINE:  LOCATION:   DATE INSERTED:  CVP:  SCVO2:    CORRALES:   DATE INSERTED:    A-LINE:    LOCATION:   DATE INSERTED:   SVV:  CO/CI:     CHEST TUBE:  LOCATION:  DATE INSERTED: OUTPUT/24 HRS:  SUCTION/WATER SEAL:     NG/OG TUBE:  DATE INSERTED:  OUTPUT/24 HRS:    MISC:     PHYSICAL EXAM:     Gen:NAD, Well appearing, No cyanosis, Pallor.    Eyes: PERRL ~ 3mm, EOMI,     Neurological: A&Ox3, GCS 15, No focal defficit.     ENMT: Clear canals, clear throat.      Neck: Supple. NT AT, FROM no pain.  No JVD. No meningeal signs    Pulmonary: NAD, CTA, = BL .      Cardiovascular: RRR, S1, S2, No Murmurs, rubs or gallops noted.    Gastrointestinal:ND, Soft, NT.    Genitourinary:     Back:     Extremities: NT, AT, no edema, erythema or palpable cord noted.  FROM, = 2+ pulses throughout.    Skin:     Musculoskeletal:          LABS:  CBC Full  -  ( 27 Aug 2023 22:40 )  WBC Count : 30.12 K/uL  RBC Count : 4.03 M/uL  Hemoglobin : 11.6 g/dL  Hematocrit : 34.7 %  Platelet Count - Automated : 262 K/uL  Mean Cell Volume : 86.1 fl  Mean Cell Hemoglobin : 28.8 pg  Mean Cell Hemoglobin Concentration : 33.4 gm/dL  Auto Neutrophil # : x  Auto Lymphocyte # : x  Auto Monocyte # : x  Auto Eosinophil # : x  Auto Basophil # : x  Auto Neutrophil % : x  Auto Lymphocyte % : x  Auto Monocyte % : x  Auto Eosinophil % : x  Auto Basophil % : x    08-27    136  |  102  |  66.2<H>  ----------------------------<  146<H>  4.2   |  18.0<L>  |  2.56<H>    Ca    6.9<L>      27 Aug 2023 22:40  Phos  4.2     08-27  Mg     2.2     08-27      PT/INR - ( 27 Aug 2023 09:45 )   PT: 17.9 sec;   INR: 1.64 ratio         PTT - ( 27 Aug 2023 09:45 )  PTT:93.7 sec  Urinalysis Basic - ( 27 Aug 2023 22:40 )    Color: x / Appearance: x / SG: x / pH: x  Gluc: 146 mg/dL / Ketone: x  / Bili: x / Urobili: x   Blood: x / Protein: x / Nitrite: x   Leuk Esterase: x / RBC: x / WBC x   Sq Epi: x / Non Sq Epi: x / Bacteria: x      RECENT CULTURES:  08-22 .Blood Blood-Peripheral XXXX XXXX   No growth at 5 days    08-22 .Blood Blood-Peripheral XXXX XXXX   No growth at 5 days          CARDIAC MARKERS ( 27 Aug 2023 22:40 )  x     / <0.01 ng/mL / x     / x     / x          CAPILLARY BLOOD GLUCOSE      RADIOLOGY & ADDITIONAL STUDIES:    ASSESSMENT/PLAN:  78yMale presenting with:        Neurological:    ENMT:    Neck:    Pulmonary:    Cardiovascular:    Gastrointestinal:    Genitourinary:    Heme:    ID:    Skin:    Lines/ Tubes:    Dispo:            CRITICAL CARE TIME SPENT:   INTERVAL HPI/OVERNIGHT EVENTS/SUBJECTIVE:  Patient is a 77 y/o M with a PMHx of CAD s/p CABG x 2 (SVG-OM1/mLAD, LIMA-D1) with PCI/ALAN ([facility] 07/23 with patent SVG, LIMA-D1 atretic, and s/p ALAN x 1 to mLCx PTO 07/14/23), HFrEF (EF 40-45% 09/22), Bladder Cancer s/p Urostomy (12 years ago), atrial fibrillation (on Coumadin), HTN, HLD, hyperkalemia, and CKD who presented to [facility] with nausea, vomiting, and abdominal pain found to have a high-grade SBO transferred to [facility] for further workup.   Had NGT Placed with improvement of symptoms but pt removed own NGT after ~ 3 days.  This was two days ago.  Was advanced to regular diet yesterday. Had breakfast but did not want lunch due to nausea.    Overnight, last night pt had increased HR in A-fib.  Was given Lopressor 5mg IVP on 3 separate occasions. Was going to get 4th dose but nurse could not get BP reading.  Pt currently CO SOB, nausea that has been increasing since the AM yesterday.  Admits BM yesterday.  Admits Bowel distention and mild generalized pain but not severe. Denies CP, Vomiting dizziness , weakness or other CO.       CARDIAC TESTING   ECHO:  Echo 09/22 EF 40-45%, hypokinesis of anterior wall and apex.     STRESS:    CATH:   < from: Cardiac Catheterization (07.14.23 @ 07:49) >  Diagnostic Findings:     Coronary Angiography   The coronary circulation is left dominant.      LM   Left main artery: Angiography shows minor irregularities.      LAD   Mid left anterior descending: Angiography shows complete occlusion.  There is a 100 % stenosis.    Patient: MARÍA ELENA BEAR             MRN: [id number]  Study Date: 07/14/2023   07:49 AM      Page 1 of 4          CX   Distal circumflex: Angiography shows complete occlusion. There is a  100 % stenosis.    Interventional Findings:     Interventional Details   Distal circumflex: This was a 100 % total occlusion stenosis. This was  an ACC/AHA High/C lesion for intervention. This is the  culprit lesion. Guidewire crossing was successful.      < end of copied text >    ELECTROPHYSIOLOGY:     PAST MEDICAL HISTORY  Atrial fibrillation    Hypothyroid    Hypertension    Bladder cancer    Bronchitis    Former smoker    CAD (coronary artery disease)        PAST SURGICAL HISTORY  History of bladder surgery    S/P CABG x 3    History of automatic internal cardiac defibrillator (AICD)    H/O knee surgery        SOCIAL HISTORY:    CIGARETTES:   Former smoker  ALCOHOL: Drinks on the weekends  DRUGS: Denies    FAMILY HISTORY:  Family history of heart attack (Father, Mother)      Family History of Cardiovascular Disease:  Yes [  ] No [  ]  Coronary Artery Disease in first degree relative: Yes [  ] No [  ]  Sudden Cardiac Death in First degree relative: Yes [  ] No [  ]      ICU Vital Signs Last 24 Hrs  T(C): 36.8 (27 Aug 2023 23:28), Max: 36.8 (27 Aug 2023 20:42)  T(F): 98.2 (27 Aug 2023 23:28), Max: 98.2 (27 Aug 2023 20:42)  HR: 115 (27 Aug 2023 23:28) (103 - 132)  BP: 142/64 (27 Aug 2023 23:28) (98/63 - 142/64)  BP(mean): --  ABP: --  ABP(mean): --  RR: 21 (27 Aug 2023 23:28) (18 - 22)  SpO2: 93% (27 Aug 2023 23:28) (91% - 94%)    O2 Parameters below as of 27 Aug 2023 23:28  Patient On (Oxygen Delivery Method): room air            I&O's Detail    26 Aug 2023 07:01  -  27 Aug 2023 07:00  --------------------------------------------------------  IN:    Heparin Infusion: 120 mL    Oral Fluid: 240 mL  Total IN: 360 mL    OUT:    Urostomy (mL): 525 mL    Voided (mL): 400 mL  Total OUT: 925 mL    Total NET: -565 mL      27 Aug 2023 07:01  -  28 Aug 2023 05:18  --------------------------------------------------------  IN:    Heparin Infusion: 135 mL    Lactated Ringers: 210 mL  Total IN: 345 mL    OUT:    Nasogastric/Oral tube (mL): 400 mL    Urostomy (mL): 750 mL  Total OUT: 1150 mL    Total NET: -805 mL            ABG - ( 28 Aug 2023 04:46 )  pH, Arterial: 7.390 pH, Blood: x     /  pCO2: 22    /  pO2: 93    / HCO3: 13    / Base Excess: -11.7 /  SaO2: 99.8                MEDICATIONS  (STANDING):  atorvastatin 80 milliGRAM(s) Oral at bedtime  calcium gluconate IVPB 2 Gram(s) IV Intermittent every 1 hour  clopidogrel Tablet 75 milliGRAM(s) Oral daily  heparin  Infusion. 1700 Unit(s)/Hr (17 mL/Hr) IV Continuous <Continuous>  levothyroxine Injectable 68 MICROGram(s) IV Push at bedtime  multiple electrolytes Injection Type 1 1000 milliLiter(s) (100 mL/Hr) IV Continuous <Continuous>  multiple electrolytes Injection Type 1 Bolus 1000 milliLiter(s) IV Bolus once  multiple electrolytes Injection Type 1 Bolus 1000 milliLiter(s) IV Bolus once  pantoprazole  Injectable 40 milliGRAM(s) IV Push every 24 hours    MEDICATIONS  (PRN):  acetaminophen     Tablet .. 975 milliGRAM(s) Oral every 6 hours PRN Mild Pain (1 - 3)  albuterol/ipratropium for Nebulization 3 milliLiter(s) Nebulizer every 6 hours PRN Shortness of Breath and/or Wheezing  ondansetron Injectable 4 milliGRAM(s) IV Push every 6 hours PRN Nausea    PHYSICAL EXAM:     Gen: Unwell appearing. + Mild pallor.  Most noted circumoral. + Tachypnea. No cyanosis.     Eyes: PERRL ~ 3mm, EOMI,     Neurological: A&Ox3, GCS 15, No focal deficit.     ENMT: Clear canals, clear throat.      Neck: Supple. NT AT, FROM no pain.  No JVD. No meningeal signs    Pulmonary: Tachypnea but able to speak in short sentences.  CTA, = BL .      Cardiovascular: Tachy.  Irregularly irregular.  S1, S2, No Murmurs, rubs or gallops noted.    Gastrointestinal: Distended.  Soft, NT. Urostomy / hernia to Right abdomen.  Soft and reducible    Extremities: Upper extremity cap refill >3 sec throughout.  FROM, = 2+ pulses throughout.          LABS:  CBC Full  -  ( 27 Aug 2023 22:40 )  WBC Count : 30.12 K/uL  RBC Count : 4.03 M/uL  Hemoglobin : 11.6 g/dL  Hematocrit : 34.7 %  Platelet Count - Automated : 262 K/uL  Mean Cell Volume : 86.1 fl  Mean Cell Hemoglobin : 28.8 pg  Mean Cell Hemoglobin Concentration : 33.4 gm/dL  Auto Neutrophil # : x  Auto Lymphocyte # : x  Auto Monocyte # : x  Auto Eosinophil # : x  Auto Basophil # : x  Auto Neutrophil % : x  Auto Lymphocyte % : x  Auto Monocyte % : x  Auto Eosinophil % : x  Auto Basophil % : x    08-27    136  |  102  |  66.2<H>  ----------------------------<  146<H>  4.2   |  18.0<L>  |  2.56<H>    Ca    6.9<L>      27 Aug 2023 22:40  Phos  4.2     08-27  Mg     2.2     08-27      PT/INR - ( 27 Aug 2023 09:45 )   PT: 17.9 sec;   INR: 1.64 ratio         PTT - ( 27 Aug 2023 09:45 )  PTT:93.7 sec  Urinalysis Basic - ( 27 Aug 2023 22:40 )    Color: x / Appearance: x / SG: x / pH: x  Gluc: 146 mg/dL / Ketone: x  / Bili: x / Urobili: x   Blood: x / Protein: x / Nitrite: x   Leuk Esterase: x / RBC: x / WBC x   Sq Epi: x / Non Sq Epi: x / Bacteria: x      RECENT CULTURES:  08-22 .Blood Blood-Peripheral XXXX XXXX   No growth at 5 days    08-22 .Blood Blood-Peripheral XXXX XXXX   No growth at 5 days          CARDIAC MARKERS ( 27 Aug 2023 22:40 )  x     / <0.01 ng/mL / x     / x     / x          CAPILLARY BLOOD GLUCOSE      RADIOLOGY & ADDITIONAL STUDIES:    ASSESSMENT/PLAN:  78yMale presenting with: SBO, Anion gap metabolic acidosis, ARAM, Rapid afib.    Neurological: Avoid deliriogenic meds and situations.     Pulmonary: I have ordered repeat ABG.  It reveals compensated Metabolic acidosis.  However, worse than earlier in night. I ahve placed low NC to maintain SpO2 >92%.  CXR Clear.    Cardiovascular: Tachycardia is result of compensation from Shock.  Pt has + Shock index.  My US reveals Heart with mildly diminished function.  No B-lines.  Can not find IVC.  IJ's are very collapsed.  I believe he is hypovolemic and can tolerate IVF.  I will give 2 L IVF and Maintainance.     Gastrointestinal:    Genitourinary:    Heme:    ID:    Skin:    Lines/ Tubes:    Dispo:            CRITICAL CARE TIME SPENT:   INTERVAL HPI/OVERNIGHT EVENTS/SUBJECTIVE:  Patient is a 77 y/o M with a PMHx of CAD s/p CABG x 2 (SVG-OM1/mLAD, LIMA-D1) with PCI/ALAN ([facility] 07/23 with patent SVG, LIMA-D1 atretic, and s/p ALAN x 1 to mLCx PTO 07/14/23), HFrEF (EF 40-45% 09/22), Bladder Cancer s/p Urostomy (12 years ago), atrial fibrillation (on Coumadin), HTN, HLD, hyperkalemia, and CKD who presented to [facility] with nausea, vomiting, and abdominal pain found to have a high-grade SBO transferred to [facility] for further workup.   Had NGT Placed with improvement of symptoms but pt removed own NGT after ~ 3 days.  This was two days ago.  Was advanced to regular diet yesterday. Had breakfast but did not want lunch due to nausea.    Overnight, last night pt had increased HR in A-fib.  Was given Lopressor 5mg IVP on 3 separate occasions. Was going to get 4th dose but nurse could not get BP reading.  Pt currently CO SOB, nausea that has been increasing since the AM yesterday.  Admits BM yesterday.  Admits Bowel distention and mild generalized pain but not severe. Denies CP, Vomiting dizziness , weakness or other CO.       CARDIAC TESTING   ECHO:  Echo 09/22 EF 40-45%, hypokinesis of anterior wall and apex.     STRESS:    CATH:   < from: Cardiac Catheterization (07.14.23 @ 07:49) >  Diagnostic Findings:     Coronary Angiography   The coronary circulation is left dominant.      LM   Left main artery: Angiography shows minor irregularities.      LAD   Mid left anterior descending: Angiography shows complete occlusion.  There is a 100 % stenosis.    Patient: MARÍA ELENA BEAR             MRN: [id number]  Study Date: 07/14/2023   07:49 AM      Page 1 of 4          CX   Distal circumflex: Angiography shows complete occlusion. There is a  100 % stenosis.    Interventional Findings:     Interventional Details   Distal circumflex: This was a 100 % total occlusion stenosis. This was  an ACC/AHA High/C lesion for intervention. This is the  culprit lesion. Guidewire crossing was successful.      < end of copied text >    ELECTROPHYSIOLOGY:     PAST MEDICAL HISTORY  Atrial fibrillation    Hypothyroid    Hypertension    Bladder cancer    Bronchitis    Former smoker    CAD (coronary artery disease)        PAST SURGICAL HISTORY  History of bladder surgery    S/P CABG x 3    History of automatic internal cardiac defibrillator (AICD)    H/O knee surgery        SOCIAL HISTORY:    CIGARETTES:   Former smoker  ALCOHOL: Drinks on the weekends  DRUGS: Denies    FAMILY HISTORY:  Family history of heart attack (Father, Mother)      Family History of Cardiovascular Disease:  Yes [  ] No [  ]  Coronary Artery Disease in first degree relative: Yes [  ] No [  ]  Sudden Cardiac Death in First degree relative: Yes [  ] No [  ]      ICU Vital Signs Last 24 Hrs  T(C): 36.8 (27 Aug 2023 23:28), Max: 36.8 (27 Aug 2023 20:42)  T(F): 98.2 (27 Aug 2023 23:28), Max: 98.2 (27 Aug 2023 20:42)  HR: 115 (27 Aug 2023 23:28) (103 - 132)  BP: 142/64 (27 Aug 2023 23:28) (98/63 - 142/64)  BP(mean): --  ABP: --  ABP(mean): --  RR: 21 (27 Aug 2023 23:28) (18 - 22)  SpO2: 93% (27 Aug 2023 23:28) (91% - 94%)    O2 Parameters below as of 27 Aug 2023 23:28  Patient On (Oxygen Delivery Method): room air            I&O's Detail    26 Aug 2023 07:01  -  27 Aug 2023 07:00  --------------------------------------------------------  IN:    Heparin Infusion: 120 mL    Oral Fluid: 240 mL  Total IN: 360 mL    OUT:    Urostomy (mL): 525 mL    Voided (mL): 400 mL  Total OUT: 925 mL    Total NET: -565 mL      27 Aug 2023 07:01  -  28 Aug 2023 05:18  --------------------------------------------------------  IN:    Heparin Infusion: 135 mL    Lactated Ringers: 210 mL  Total IN: 345 mL    OUT:    Nasogastric/Oral tube (mL): 400 mL    Urostomy (mL): 750 mL  Total OUT: 1150 mL    Total NET: -805 mL            ABG - ( 28 Aug 2023 04:46 )  pH, Arterial: 7.390 pH, Blood: x     /  pCO2: 22    /  pO2: 93    / HCO3: 13    / Base Excess: -11.7 /  SaO2: 99.8                MEDICATIONS  (STANDING):  atorvastatin 80 milliGRAM(s) Oral at bedtime  calcium gluconate IVPB 2 Gram(s) IV Intermittent every 1 hour  clopidogrel Tablet 75 milliGRAM(s) Oral daily  heparin  Infusion. 1700 Unit(s)/Hr (17 mL/Hr) IV Continuous <Continuous>  levothyroxine Injectable 68 MICROGram(s) IV Push at bedtime  multiple electrolytes Injection Type 1 1000 milliLiter(s) (100 mL/Hr) IV Continuous <Continuous>  multiple electrolytes Injection Type 1 Bolus 1000 milliLiter(s) IV Bolus once  multiple electrolytes Injection Type 1 Bolus 1000 milliLiter(s) IV Bolus once  pantoprazole  Injectable 40 milliGRAM(s) IV Push every 24 hours    MEDICATIONS  (PRN):  acetaminophen     Tablet .. 975 milliGRAM(s) Oral every 6 hours PRN Mild Pain (1 - 3)  albuterol/ipratropium for Nebulization 3 milliLiter(s) Nebulizer every 6 hours PRN Shortness of Breath and/or Wheezing  ondansetron Injectable 4 milliGRAM(s) IV Push every 6 hours PRN Nausea    PHYSICAL EXAM:     Gen: Unwell appearing. + Mild pallor.  Most noted circumoral. + Tachypnea. No cyanosis.     Eyes: PERRL ~ 3mm, EOMI,     Neurological: A&Ox3, GCS 15, No focal deficit.     ENMT: Clear canals, clear throat.      Neck: Supple. NT AT, FROM no pain.  No JVD. No meningeal signs    Pulmonary: Tachypnea but able to speak in short sentences.  CTA, = BL .      Cardiovascular: Tachy.  Irregularly irregular.  S1, S2, No Murmurs, rubs or gallops noted.    Gastrointestinal: Distended.  Soft, NT. Urostomy / hernia to Right abdomen.  Soft and reducible    Extremities: Upper extremity cap refill >3 sec throughout.  FROM, = 2+ pulses throughout.          LABS:  CBC Full  -  ( 27 Aug 2023 22:40 )  WBC Count : 30.12 K/uL  RBC Count : 4.03 M/uL  Hemoglobin : 11.6 g/dL  Hematocrit : 34.7 %  Platelet Count - Automated : 262 K/uL  Mean Cell Volume : 86.1 fl  Mean Cell Hemoglobin : 28.8 pg  Mean Cell Hemoglobin Concentration : 33.4 gm/dL  Auto Neutrophil # : x  Auto Lymphocyte # : x  Auto Monocyte # : x  Auto Eosinophil # : x  Auto Basophil # : x  Auto Neutrophil % : x  Auto Lymphocyte % : x  Auto Monocyte % : x  Auto Eosinophil % : x  Auto Basophil % : x    08-27    136  |  102  |  66.2<H>  ----------------------------<  146<H>  4.2   |  18.0<L>  |  2.56<H>    Ca    6.9<L>      27 Aug 2023 22:40  Phos  4.2     08-27  Mg     2.2     08-27      PT/INR - ( 27 Aug 2023 09:45 )   PT: 17.9 sec;   INR: 1.64 ratio         PTT - ( 27 Aug 2023 09:45 )  PTT:93.7 sec  Urinalysis Basic - ( 27 Aug 2023 22:40 )    Color: x / Appearance: x / SG: x / pH: x  Gluc: 146 mg/dL / Ketone: x  / Bili: x / Urobili: x   Blood: x / Protein: x / Nitrite: x   Leuk Esterase: x / RBC: x / WBC x   Sq Epi: x / Non Sq Epi: x / Bacteria: x      RECENT CULTURES:  08-22 .Blood Blood-Peripheral XXXX XXXX   No growth at 5 days    08-22 .Blood Blood-Peripheral XXXX XXXX   No growth at 5 days          CARDIAC MARKERS ( 27 Aug 2023 22:40 )  x     / <0.01 ng/mL / x     / x     / x          CAPILLARY BLOOD GLUCOSE      RADIOLOGY & ADDITIONAL STUDIES:    ASSESSMENT/PLAN:  78yMale presenting with: SBO, Anion gap metabolic acidosis, ARAM, Rapid afib.    Neurological: Avoid deliriogenic meds and situations.     Pulmonary: I have ordered repeat ABG.  It reveals compensated Metabolic acidosis.  However, worse than earlier in night. I ahve placed low NC to maintain SpO2 >92%.  CXR Clear.    Cardiovascular: Tachycardia is result of compensation from Shock.  Pt has + Shock index.  My US reveals Heart with mildly diminished function.  No B-lines.  Can not find IVC.  IJ's are very collapsed.  I believe he is hypovolemic and can tolerate IVF.  I will give 2 L IVF and Maintainance.     Gastrointestinal: I have made Pt NPO.     Genitourinary: Pt make urine into urostomy.  REquires Tight IO    Heme: Heparin drip for A-fib.  No further Coumadin at this time.  I will hold Heparin drip due to elevated PTT and potential for OR.    ID: No indicationfor Abx    Lines/ Tubes: PIV in place.  I will place Art line.  TTLC if requires pressors.    Dispo: Pt critically ill.  I am concerned for failed non-operative mgt of SBO leading to ischemic bowel. Likely to require OR. Hypotensive , Metabolic acidosis, ARAM.  I am aggressively resuscitating with IVF to prevent further organ failure and death.  I will admit to SICU.    I have discussed case with Dr Kemp who has evaluated pt.     CRITICAL CARE TIME SPENT: 57 minutes excluding procedures.

## 2023-08-28 NOTE — DIETITIAN INITIAL EVALUATION ADULT - ORAL INTAKE PTA/DIET HISTORY
Pt asleep during visit; Unable to interview at this time. NG in place to suction. NPO status maintained.

## 2023-08-28 NOTE — CONSULT NOTE ADULT - CRITICAL CARE ATTENDING COMMENT
Events overnight noted.  2L fluids given overnight.  NGT w/2.5l out since placed earlier this am    GEN:  Awake, alert, conversant, comfortable  CVS:  Irregularly Irregular  PUL:  Clear b/l  ABD:  Soft, softly distended, reducible parastomal hernia, urostomy w/urine  EXT: LLE calf tender w/fullness/tight, sensation of foot slightly decreased but had some parestheia per patient prior, no pain to dorsiflexion of great toe, +DP/PT signals, warm    -Ongoing atrial fibrillation w/HR in 100-110  -Breathing comfortably on RA w/adequate O2 sats, CXR w/o acute infiltrate  -Plan to reverse anticoagulation w/Kcentra  -Tylenol for pain  -300 cc urine output overnight  -check CK -serial lower extremity (L) exams  -Abx on hold, note very large increase in WBC of unclear etiology, afebrile  -Continue w/plavix  -will check CT abd/pelvis   -Continue NPO, NGT  -Discussion w/surgery regarding possible operative plan in setting that obstructive symptoms recurred after patient started PO intake. Events overnight noted.  2L fluids given overnight.  NGT w/2.5l out since placed earlier this am    GEN:  Awake, alert, conversant, comfortable  CVS:  Irregularly Irregular  PUL:  Clear b/l  ABD:  Soft, softly distended, reducible parastomal hernia, urostomy w/urine  EXT: LLE calf tender w/fullness/tight, sensation of foot slightly decreased but had some parestheias per patient prior, no pain to dorsiflexion of great toe, +DP/PT signals, warm    Small bowel obstruction  Parastomal hernia  Atrial fibrillation w/RVR  Ruptured Baker's Cyst    -Ongoing atrial fibrillation w/HR in 100-110, bedside POCUS reported to be difficult to obtain adequate windows but noted to have grossly adequate cardiac function /unable to evaluate IVC.  Patient did respond to fluid bolus overnight but did not seem to respond to small f/u bolus given this am  -Breathing comfortably on RA w/adequate O2 sats, CXR w/o acute infiltrate, respiratory status improved after drainage of 2.5L from NGT -now off NC and breathing comfortably  -Plan to reverse anticoagulation w/Kcentra and vitamin K in plan for OR  -Tylenol for pain  -300 cc urine output overnight but slight bump in Cr today - follow trend  -check CK -serial lower extremity (L) exams w/concern for possible development of a compartment syndrome in setting of hemorrhage after rupture of a Baker's cyst  -H/H stable  -Abx on hold, note very large increase in WBC of unclear etiology, afebrile  -Continue w/plavix (recent cardiac stents)  -will check CT abd/pelvis   -Continue NPO, NGT  -Discussion w/surgery (Dr. Horton) and patient and his wife regarding possible operative plan in setting that obstructive symptoms recurred after patient started PO intake yesterday.  Currently abdominal exam is benign but large NGT output, atrial fibrillation w/RVR, metabolic acidosis, worsening renal insuficiency and significantly elevated WBC of concern.  Will proceed with CT scan abd/pelvis w/o contrast for further guidance but likely patient will come to operative intervention  -Spoke w/patient and wife at length. Answered questions. Events overnight noted.  2L fluids given overnight.  NGT w/2.5l out since placed earlier this am    GEN:  Awake, alert, conversant, comfortable  CVS:  Irregularly Irregular  PUL:  Clear b/l  ABD:  Soft, softly distended, reducible parastomal hernia, urostomy w/urine in bag, minimally tender to deep palpation, no rebound or guarding  EXT: LLE calf tender w/fullness/tight, sensation of foot slightly decreased but had some parestheias per patient prior, no pain to dorsiflexion of great toe, +DP/PT signals, warm    Small bowel obstruction  Parastomal hernia  Atrial fibrillation w/RVR  Ruptured Baker's Cyst    -Ongoing atrial fibrillation w/HR in 100-110, bedside POCUS reported to be difficult to obtain adequate windows but noted to have grossly adequate cardiac function /unable to evaluate IVC.  Patient did respond to fluid bolus overnight but did not seem to respond to small f/u bolus given this am  -Breathing comfortably on RA w/adequate O2 sats, CXR w/o acute infiltrate, respiratory status improved after drainage of 2.5L from NGT -now off NC and breathing comfortably  -Plan to reverse anticoagulation w/Kcentra and vitamin K in plan for OR  -Tylenol for pain  -300 cc urine output overnight but slight bump in Cr today - follow trend  -check CK -serial lower extremity (L) exams w/concern for possible development of a compartment syndrome in setting of hemorrhage after rupture of a Baker's cyst  -H/H stable  -Abx on hold, note very large increase in WBC of unclear etiology, afebrile  -Continue w/plavix (recent cardiac stents)  -will check CT abd/pelvis   -Continue NPO, NGT  -Discussion w/surgery (Dr. Horton) and patient and his wife regarding possible operative plan in setting that obstructive symptoms recurred after patient started PO intake yesterday.  Currently abdominal exam is benign but large NGT output, atrial fibrillation w/RVR, metabolic acidosis, worsening renal insuficiency and significantly elevated WBC of concern in patient w/recent small bowel obstruction and multiple stacked loops of bowel noted in KUB from yesterday.  Will proceed with CT scan abd/pelvis w/o contrast for further guidance but likely patient will come to operative intervention  -Spoke w/patient and wife at length. Answered questions.

## 2023-08-28 NOTE — PROGRESS NOTE ADULT - SUBJECTIVE AND OBJECTIVE BOX
Subjective:  Patient seen and examined at bedside multiple times overnight. He was seen sitting up in tripod position multiple times overnight   His HR was >120s, EKG was obtained which showed a fib w rvr, 3 doses of 5 Lopressor given, pt responded.       MEDICATIONS  (STANDING):  atorvastatin 80 milliGRAM(s) Oral at bedtime  clopidogrel Tablet 75 milliGRAM(s) Oral daily  heparin  Infusion. 1700 Unit(s)/Hr (17 mL/Hr) IV Continuous <Continuous>  lactated ringers. 1000 milliLiter(s) (42 mL/Hr) IV Continuous <Continuous>  levothyroxine 137 MICROGram(s) Oral daily  metoprolol succinate ER 50 milliGRAM(s) Oral two times a day  pantoprazole  Injectable 40 milliGRAM(s) IV Push every 24 hours    MEDICATIONS  (PRN):  acetaminophen     Tablet .. 975 milliGRAM(s) Oral every 6 hours PRN Mild Pain (1 - 3)  albuterol/ipratropium for Nebulization 3 milliLiter(s) Nebulizer every 6 hours PRN Shortness of Breath and/or Wheezing  ondansetron Injectable 4 milliGRAM(s) IV Push every 6 hours PRN Nausea      Vital Signs Last 24 Hrs  T(C): 36.8 (27 Aug 2023 23:28), Max: 36.8 (27 Aug 2023 01:00)  T(F): 98.2 (27 Aug 2023 23:28), Max: 98.2 (27 Aug 2023 01:00)  HR: 115 (27 Aug 2023 23:28) (87 - 132)  BP: 142/64 (27 Aug 2023 23:28) (91/52 - 142/64)  BP(mean): --  RR: 21 (27 Aug 2023 23:28) (18 - 22)  SpO2: 93% (27 Aug 2023 23:28) (91% - 96%)    Parameters below as of 27 Aug 2023 23:28  Patient On (Oxygen Delivery Method): room air        Physical Exam:    Constitutional: NAD  HEENT: PERRL, EOMI  Neck: No JVD, FROM without pain  Respiratory: Respirations non-labored, no accessory muscle use  Gastrointestinal: Soft, non-tender, non-distended  Extremities: No peripheral edema, No cyanosis  Neurological: A&O x 3; without gross deficit  Musculoskeletal: No joint pain, swelling, deformity, or point tenderness; no limitation of movement      LABS:                        11.6   30.12 )-----------( 262      ( 27 Aug 2023 22:40 )             34.7     08-27    136  |  102  |  66.2<H>  ----------------------------<  146<H>  4.2   |  18.0<L>  |  2.56<H>    Ca    6.9<L>      27 Aug 2023 22:40  Phos  4.2     08-27  Mg     2.2     08-27      PT/INR - ( 27 Aug 2023 09:45 )   PT: 17.9 sec;   INR: 1.64 ratio         PTT - ( 27 Aug 2023 09:45 )  PTT:93.7 sec  Urinalysis Basic - ( 27 Aug 2023 22:40 )    Color: x / Appearance: x / SG: x / pH: x  Gluc: 146 mg/dL / Ketone: x  / Bili: x / Urobili: x   Blood: x / Protein: x / Nitrite: x   Leuk Esterase: x / RBC: x / WBC x   Sq Epi: x / Non Sq Epi: x / Bacteria: x        A:     Plan:   -   Subjective:  Patient seen and examined at bedside multiple times overnight. He was seen sitting up in tripod position multiple times overnight and his HR was >120s, EKG was obtained which showed a fib w rvr, 3 doses of 5 Lopressor given, HR <120s.   Pt was c/o of nausea, and low appetite. KUB was done which showed large dilated loops of bowel, NGT was replaced >450      MEDICATIONS  (STANDING):  atorvastatin 80 milliGRAM(s) Oral at bedtime  clopidogrel Tablet 75 milliGRAM(s) Oral daily  heparin  Infusion. 1700 Unit(s)/Hr (17 mL/Hr) IV Continuous <Continuous>  lactated ringers. 1000 milliLiter(s) (42 mL/Hr) IV Continuous <Continuous>  levothyroxine 137 MICROGram(s) Oral daily  metoprolol succinate ER 50 milliGRAM(s) Oral two times a day  pantoprazole  Injectable 40 milliGRAM(s) IV Push every 24 hours    MEDICATIONS  (PRN):  acetaminophen     Tablet .. 975 milliGRAM(s) Oral every 6 hours PRN Mild Pain (1 - 3)  albuterol/ipratropium for Nebulization 3 milliLiter(s) Nebulizer every 6 hours PRN Shortness of Breath and/or Wheezing  ondansetron Injectable 4 milliGRAM(s) IV Push every 6 hours PRN Nausea      Vital Signs Last 24 Hrs  T(C): 36.8 (27 Aug 2023 23:28), Max: 36.8 (27 Aug 2023 01:00)  T(F): 98.2 (27 Aug 2023 23:28), Max: 98.2 (27 Aug 2023 01:00)  HR: 115 (27 Aug 2023 23:28) (87 - 132)  BP: 142/64 (27 Aug 2023 23:28) (91/52 - 142/64)  BP(mean): --  RR: 21 (27 Aug 2023 23:28) (18 - 22)  SpO2: 93% (27 Aug 2023 23:28) (91% - 96%)    Parameters below as of 27 Aug 2023 23:28  Patient On (Oxygen Delivery Method): room air        Physical Exam:    Constitutional: NAD  HEENT: PERRL, EOMI  Neck: No JVD, FROM without pain  Respiratory: Respirations non-labored, no accessory muscle use  Gastrointestinal: Soft, non-tender, non-distended  Extremities: No peripheral edema, No cyanosis  Neurological: A&O x 3; without gross deficit  Musculoskeletal: No joint pain, swelling, deformity, or point tenderness; no limitation of movement      LABS:                        11.6   30.12 )-----------( 262      ( 27 Aug 2023 22:40 )             34.7     08-27    136  |  102  |  66.2<H>  ----------------------------<  146<H>  4.2   |  18.0<L>  |  2.56<H>    Ca    6.9<L>      27 Aug 2023 22:40  Phos  4.2     08-27  Mg     2.2     08-27      PT/INR - ( 27 Aug 2023 09:45 )   PT: 17.9 sec;   INR: 1.64 ratio         PTT - ( 27 Aug 2023 09:45 )  PTT:93.7 sec  Urinalysis Basic - ( 27 Aug 2023 22:40 )    Color: x / Appearance: x / SG: x / pH: x  Gluc: 146 mg/dL / Ketone: x  / Bili: x / Urobili: x   Blood: x / Protein: x / Nitrite: x   Leuk Esterase: x / RBC: x / WBC x   Sq Epi: x / Non Sq Epi: x / Bacteria: x        A:     Plan:   -   Subjective:  Patient seen and examined at bedside multiple times overnight. He was seen sitting up in tripod position multiple times overnight.  Pt was c/o of nausea, and low appetite.   Stat cbc bmp troponin abg w lytes lactate BNP CXR and KUB were done.   CXR looked unchanged, KUB which showed large dilated loops of bowel-->NGT was replaced with >450cc output.   Earlier last evening, his HR was >120s, EKG was obtained, along with cxr, which showed a fib w rvr, 3 doses of 5 Lopressor given, HR <120s. Pt was evaluated by SICU PA as well.     Earlier yesterday a CT of the LLE was done which showed a rupture Bakers cyst.     After NGT placement, pt is resting comfortably in bed, reports feeling a little better. His LLE pain has also improved.       MEDICATIONS  (STANDING):  atorvastatin 80 milliGRAM(s) Oral at bedtime  clopidogrel Tablet 75 milliGRAM(s) Oral daily  heparin  Infusion. 1700 Unit(s)/Hr (17 mL/Hr) IV Continuous <Continuous>  lactated ringers. 1000 milliLiter(s) (42 mL/Hr) IV Continuous <Continuous>  levothyroxine 137 MICROGram(s) Oral daily  metoprolol succinate ER 50 milliGRAM(s) Oral two times a day  pantoprazole  Injectable 40 milliGRAM(s) IV Push every 24 hours    MEDICATIONS  (PRN):  acetaminophen     Tablet .. 975 milliGRAM(s) Oral every 6 hours PRN Mild Pain (1 - 3)  albuterol/ipratropium for Nebulization 3 milliLiter(s) Nebulizer every 6 hours PRN Shortness of Breath and/or Wheezing  ondansetron Injectable 4 milliGRAM(s) IV Push every 6 hours PRN Nause    Vital Signs Last 24 Hrs  T(C): 36.8 (27 Aug 2023 23:28), Max: 36.8 (27 Aug 2023 01:00)  T(F): 98.2 (27 Aug 2023 23:28), Max: 98.2 (27 Aug 2023 01:00)  HR: 115 (27 Aug 2023 23:28) (87 - 132)  BP: 142/64 (27 Aug 2023 23:28) (91/52 - 142/64)  BP(mean): --  RR: 21 (27 Aug 2023 23:28) (18 - 22)  SpO2: 93% (27 Aug 2023 23:28) (91% - 96%)  Parameters below as of 27 Aug 2023 23:28  Patient On (Oxygen Delivery Method): room air    Physical Exam:  Constitutional: NAD  HEENT: PERRL, EOMI  Neck: No JVD, FROM without pain  Respiratory: Respirations non-labored, no accessory muscle use  Gastrointestinal: Soft, non-tender, + distention, no peritonitic signs, urostomy site intact   Extremities: + L posterior knee tenderness, L calf swelling and tenderness, RLE without swelling or tenderness, sensation intact bilateral LEs, bilateral DP pulses 2+   Neurological: A&O x 3; without gross deficit      LABS:   pending     A: Patient is a 77 yo M with an SBO, being managed conservatively, had an NGT,        he removed his own NGT, now having bowel function, and an ARAM Cr yesterday was 2.12, Cr upon presentation was 6.9, with a hx of a fib on Coumadin, he was downgraded from sicu yesterday. He has a LE u/s to r/o possible DVT for his LE swelling, which was negative for a DVT, but noted the L popliteal vein and calf veins are obscured by a large cystic structure, measuring 1.5x3x8.3cm, from popliteal fossa into the calf, concern for popliteal artery or vein aneurysm vs bakers cyst.         concern for pt progressing poorly with non op management.     Plan:   AM labs   monitor NGT output   monitor bowel function      Subjective:  Patient seen and examined at bedside multiple times overnight. He was seen sitting up in tripod position multiple times overnight.  Pt was c/o of nausea, and low appetite.   Stat cbc bmp troponin abg w lytes lactate BNP CXR and KUB were done.   CXR looked unchanged, KUB which showed large dilated loops of bowel-->NGT was replaced with >450cc output.   Earlier last evening, his HR was >120s, EKG was obtained, along with cxr, which showed a fib w rvr, 3 doses of 5 Lopressor given, HR <120s. Pt was evaluated by SICU PA as well.     Earlier yesterday a CT of the LLE was done which showed a rupture Bakers cyst.     After NGT placement, pt is resting comfortably in bed, reports feeling a little better. His LLE pain has also improved.       MEDICATIONS  (STANDING):  atorvastatin 80 milliGRAM(s) Oral at bedtime  clopidogrel Tablet 75 milliGRAM(s) Oral daily  heparin  Infusion. 1700 Unit(s)/Hr (17 mL/Hr) IV Continuous <Continuous>  lactated ringers. 1000 milliLiter(s) (42 mL/Hr) IV Continuous <Continuous>  levothyroxine 137 MICROGram(s) Oral daily  metoprolol succinate ER 50 milliGRAM(s) Oral two times a day  pantoprazole  Injectable 40 milliGRAM(s) IV Push every 24 hours    MEDICATIONS  (PRN):  acetaminophen     Tablet .. 975 milliGRAM(s) Oral every 6 hours PRN Mild Pain (1 - 3)  albuterol/ipratropium for Nebulization 3 milliLiter(s) Nebulizer every 6 hours PRN Shortness of Breath and/or Wheezing  ondansetron Injectable 4 milliGRAM(s) IV Push every 6 hours PRN Nause    Vital Signs Last 24 Hrs  T(C): 36.8 (27 Aug 2023 23:28), Max: 36.8 (27 Aug 2023 01:00)  T(F): 98.2 (27 Aug 2023 23:28), Max: 98.2 (27 Aug 2023 01:00)  HR: 115 (27 Aug 2023 23:28) (87 - 132)  BP: 142/64 (27 Aug 2023 23:28) (91/52 - 142/64)  BP(mean): --  RR: 21 (27 Aug 2023 23:28) (18 - 22)  SpO2: 93% (27 Aug 2023 23:28) (91% - 96%)  Parameters below as of 27 Aug 2023 23:28  Patient On (Oxygen Delivery Method): room air    Physical Exam:  Constitutional: NAD  HEENT: PERRL, EOMI, NGT   Neck: No JVD, FROM without pain  Respiratory: Respirations non-labored, no accessory muscle use  Gastrointestinal: Soft, non-tender, + distention, no peritonitic signs, urostomy site intact   Extremities: + L posterior knee tenderness, L calf swelling and tenderness, RLE without swelling or tenderness, sensation intact bilateral LEs, bilateral DP pulses 2+   Neurological: A&O x 3; without gross deficit    LABS:   pending     A: Patient is a 77 yo M admitted with an SBO, being managed conservatively, had an NGT placed on admission, was tolerating a diet and having bowel function, until last night, he was not eating, tachycardic, back in afib w rvr, Cr increased from 2.2 to 2.56, and wbc last night increased to 30 from 14 yesterday AM. His labs were sig for a metabolic acidosis. His NGT was replaced, with >450 cc output upon placement. There is concern for pt progressing poorly with non op management.   Yesterday he also had a CT for his LLE, which revealed a ruptured baker cyst, so the coumadin was no longer being transitioned, and the heparin drip continued.     Plan:   AM labs   monitor NGT output   monitor bowel function   Monitor LLE   Monitor HR, tele monitor, vitals   Pain control prn   Serial abdominal exams   Coumadin transition put on hold for now d/t ruptured Bakers cyst   NPO w IVFs   Follow cardio recs- hold Digoxin with ARAM, IV metoprolol and hep until transition to po, cont plavix   Nephrology recs- holding Entresto, gentle IVFs

## 2023-08-29 ENCOUNTER — TRANSCRIPTION ENCOUNTER (OUTPATIENT)
Age: 78
End: 2023-08-29

## 2023-08-29 LAB
ANION GAP SERPL CALC-SCNC: 14 MMOL/L — SIGNIFICANT CHANGE UP (ref 5–17)
ANION GAP SERPL CALC-SCNC: 18 MMOL/L — HIGH (ref 5–17)
ANISOCYTOSIS BLD QL: SLIGHT — SIGNIFICANT CHANGE UP
APTT BLD: 73.5 SEC — HIGH (ref 24.5–35.6)
APTT BLD: 78.5 SEC — HIGH (ref 24.5–35.6)
BASOPHILS # BLD AUTO: 0 K/UL — SIGNIFICANT CHANGE UP (ref 0–0.2)
BASOPHILS # BLD AUTO: 0 K/UL — SIGNIFICANT CHANGE UP (ref 0–0.2)
BASOPHILS NFR BLD AUTO: 0 % — SIGNIFICANT CHANGE UP (ref 0–2)
BASOPHILS NFR BLD AUTO: 0 % — SIGNIFICANT CHANGE UP (ref 0–2)
BUN SERPL-MCNC: 62.5 MG/DL — HIGH (ref 8–20)
BUN SERPL-MCNC: 70.2 MG/DL — HIGH (ref 8–20)
BURR CELLS BLD QL SMEAR: PRESENT — SIGNIFICANT CHANGE UP
CALCIUM SERPL-MCNC: 6.5 MG/DL — CRITICAL LOW (ref 8.4–10.5)
CALCIUM SERPL-MCNC: 7 MG/DL — LOW (ref 8.4–10.5)
CHLORIDE SERPL-SCNC: 101 MMOL/L — SIGNIFICANT CHANGE UP (ref 96–108)
CHLORIDE SERPL-SCNC: 102 MMOL/L — SIGNIFICANT CHANGE UP (ref 96–108)
CO2 SERPL-SCNC: 17 MMOL/L — LOW (ref 22–29)
CO2 SERPL-SCNC: 19 MMOL/L — LOW (ref 22–29)
CREAT SERPL-MCNC: 2.07 MG/DL — HIGH (ref 0.5–1.3)
CREAT SERPL-MCNC: 2.59 MG/DL — HIGH (ref 0.5–1.3)
EGFR: 25 ML/MIN/1.73M2 — LOW
EGFR: 32 ML/MIN/1.73M2 — LOW
EOSINOPHIL # BLD AUTO: 0 K/UL — SIGNIFICANT CHANGE UP (ref 0–0.5)
EOSINOPHIL # BLD AUTO: 0 K/UL — SIGNIFICANT CHANGE UP (ref 0–0.5)
EOSINOPHIL NFR BLD AUTO: 0 % — SIGNIFICANT CHANGE UP (ref 0–6)
EOSINOPHIL NFR BLD AUTO: 0 % — SIGNIFICANT CHANGE UP (ref 0–6)
GAS PNL BLDA: SIGNIFICANT CHANGE UP
GLUCOSE SERPL-MCNC: 167 MG/DL — HIGH (ref 70–99)
GLUCOSE SERPL-MCNC: 189 MG/DL — HIGH (ref 70–99)
HCT VFR BLD CALC: 22.4 % — LOW (ref 39–50)
HCT VFR BLD CALC: 23.7 % — LOW (ref 39–50)
HCT VFR BLD CALC: 24.3 % — LOW (ref 39–50)
HGB BLD-MCNC: 7.5 G/DL — LOW (ref 13–17)
HGB BLD-MCNC: 8.1 G/DL — LOW (ref 13–17)
HGB BLD-MCNC: 8.3 G/DL — LOW (ref 13–17)
INR BLD: 1.46 RATIO — HIGH (ref 0.85–1.18)
LYMPHOCYTES # BLD AUTO: 0.64 K/UL — LOW (ref 1–3.3)
LYMPHOCYTES # BLD AUTO: 1.25 K/UL — SIGNIFICANT CHANGE UP (ref 1–3.3)
LYMPHOCYTES # BLD AUTO: 2.6 % — LOW (ref 13–44)
LYMPHOCYTES # BLD AUTO: 5.2 % — LOW (ref 13–44)
MACROCYTES BLD QL: SLIGHT — SIGNIFICANT CHANGE UP
MAGNESIUM SERPL-MCNC: 2.2 MG/DL — SIGNIFICANT CHANGE UP (ref 1.6–2.6)
MAGNESIUM SERPL-MCNC: 2.2 MG/DL — SIGNIFICANT CHANGE UP (ref 1.6–2.6)
MANUAL SMEAR VERIFICATION: SIGNIFICANT CHANGE UP
MANUAL SMEAR VERIFICATION: SIGNIFICANT CHANGE UP
MCHC RBC-ENTMCNC: 28.1 PG — SIGNIFICANT CHANGE UP (ref 27–34)
MCHC RBC-ENTMCNC: 28.4 PG — SIGNIFICANT CHANGE UP (ref 27–34)
MCHC RBC-ENTMCNC: 29.4 PG — SIGNIFICANT CHANGE UP (ref 27–34)
MCHC RBC-ENTMCNC: 33.3 GM/DL — SIGNIFICANT CHANGE UP (ref 32–36)
MCHC RBC-ENTMCNC: 33.5 GM/DL — SIGNIFICANT CHANGE UP (ref 32–36)
MCHC RBC-ENTMCNC: 35 GM/DL — SIGNIFICANT CHANGE UP (ref 32–36)
MCV RBC AUTO: 84 FL — SIGNIFICANT CHANGE UP (ref 80–100)
MCV RBC AUTO: 84.4 FL — SIGNIFICANT CHANGE UP (ref 80–100)
MCV RBC AUTO: 84.8 FL — SIGNIFICANT CHANGE UP (ref 80–100)
MONOCYTES # BLD AUTO: 0.43 K/UL — SIGNIFICANT CHANGE UP (ref 0–0.9)
MONOCYTES # BLD AUTO: 1.91 K/UL — HIGH (ref 0–0.9)
MONOCYTES NFR BLD AUTO: 1.8 % — LOW (ref 2–14)
MONOCYTES NFR BLD AUTO: 7.8 % — SIGNIFICANT CHANGE UP (ref 2–14)
NEUTROPHILS # BLD AUTO: 21.96 K/UL — HIGH (ref 1.8–7.4)
NEUTROPHILS # BLD AUTO: 22.38 K/UL — HIGH (ref 1.8–7.4)
NEUTROPHILS NFR BLD AUTO: 84.4 % — HIGH (ref 43–77)
NEUTROPHILS NFR BLD AUTO: 90.4 % — HIGH (ref 43–77)
NEUTS BAND # BLD: 2.6 % — SIGNIFICANT CHANGE UP (ref 0–8)
NEUTS BAND # BLD: 5.2 % — SIGNIFICANT CHANGE UP (ref 0–8)
OVALOCYTES BLD QL SMEAR: SLIGHT — SIGNIFICANT CHANGE UP
PHOSPHATE SERPL-MCNC: 4 MG/DL — SIGNIFICANT CHANGE UP (ref 2.4–4.7)
PHOSPHATE SERPL-MCNC: 5.3 MG/DL — HIGH (ref 2.4–4.7)
PLAT MORPH BLD: NORMAL — SIGNIFICANT CHANGE UP
PLAT MORPH BLD: NORMAL — SIGNIFICANT CHANGE UP
PLATELET # BLD AUTO: 140 K/UL — LOW (ref 150–400)
PLATELET # BLD AUTO: 148 K/UL — LOW (ref 150–400)
PLATELET # BLD AUTO: 150 K/UL — SIGNIFICANT CHANGE UP (ref 150–400)
POIKILOCYTOSIS BLD QL AUTO: SIGNIFICANT CHANGE UP
POLYCHROMASIA BLD QL SMEAR: SLIGHT — SIGNIFICANT CHANGE UP
POLYCHROMASIA BLD QL SMEAR: SLIGHT — SIGNIFICANT CHANGE UP
POTASSIUM SERPL-MCNC: 4.1 MMOL/L — SIGNIFICANT CHANGE UP (ref 3.5–5.3)
POTASSIUM SERPL-MCNC: 4.2 MMOL/L — SIGNIFICANT CHANGE UP (ref 3.5–5.3)
POTASSIUM SERPL-SCNC: 4.1 MMOL/L — SIGNIFICANT CHANGE UP (ref 3.5–5.3)
POTASSIUM SERPL-SCNC: 4.2 MMOL/L — SIGNIFICANT CHANGE UP (ref 3.5–5.3)
PROTHROM AB SERPL-ACNC: 16 SEC — HIGH (ref 9.5–13)
RBC # BLD: 2.64 M/UL — LOW (ref 4.2–5.8)
RBC # BLD: 2.82 M/UL — LOW (ref 4.2–5.8)
RBC # BLD: 2.88 M/UL — LOW (ref 4.2–5.8)
RBC # FLD: 14.9 % — HIGH (ref 10.3–14.5)
RBC # FLD: 14.9 % — HIGH (ref 10.3–14.5)
RBC # FLD: 15.3 % — HIGH (ref 10.3–14.5)
RBC BLD AUTO: ABNORMAL
RBC BLD AUTO: SIGNIFICANT CHANGE UP
SODIUM SERPL-SCNC: 135 MMOL/L — SIGNIFICANT CHANGE UP (ref 135–145)
SODIUM SERPL-SCNC: 136 MMOL/L — SIGNIFICANT CHANGE UP (ref 135–145)
WBC # BLD: 24.06 K/UL — HIGH (ref 3.8–10.5)
WBC # BLD: 24.51 K/UL — HIGH (ref 3.8–10.5)
WBC # BLD: 27.16 K/UL — HIGH (ref 3.8–10.5)
WBC # FLD AUTO: 24.06 K/UL — HIGH (ref 3.8–10.5)
WBC # FLD AUTO: 24.51 K/UL — HIGH (ref 3.8–10.5)
WBC # FLD AUTO: 27.16 K/UL — HIGH (ref 3.8–10.5)

## 2023-08-29 PROCEDURE — 99291 CRITICAL CARE FIRST HOUR: CPT

## 2023-08-29 RX ORDER — DIGOXIN 250 MCG
250 TABLET ORAL ONCE
Refills: 0 | Status: COMPLETED | OUTPATIENT
Start: 2023-08-29 | End: 2023-08-29

## 2023-08-29 RX ORDER — ASPIRIN/CALCIUM CARB/MAGNESIUM 324 MG
300 TABLET ORAL DAILY
Refills: 0 | Status: DISCONTINUED | OUTPATIENT
Start: 2023-08-29 | End: 2023-08-30

## 2023-08-29 RX ORDER — CALCIUM GLUCONATE 100 MG/ML
2 VIAL (ML) INTRAVENOUS ONCE
Refills: 0 | Status: COMPLETED | OUTPATIENT
Start: 2023-08-29 | End: 2023-08-29

## 2023-08-29 RX ORDER — FENTANYL CITRATE 50 UG/ML
50 INJECTION INTRAVENOUS ONCE
Refills: 0 | Status: DISCONTINUED | OUTPATIENT
Start: 2023-08-29 | End: 2023-08-29

## 2023-08-29 RX ORDER — CHLORHEXIDINE GLUCONATE 213 G/1000ML
1 SOLUTION TOPICAL DAILY
Refills: 0 | Status: DISCONTINUED | OUTPATIENT
Start: 2023-08-29 | End: 2023-08-30

## 2023-08-29 RX ORDER — SODIUM CHLORIDE 9 MG/ML
500 INJECTION, SOLUTION INTRAVENOUS ONCE
Refills: 0 | Status: COMPLETED | OUTPATIENT
Start: 2023-08-29 | End: 2023-08-29

## 2023-08-29 RX ADMIN — SODIUM CHLORIDE 100 MILLILITER(S): 9 INJECTION, SOLUTION INTRAVENOUS at 20:15

## 2023-08-29 RX ADMIN — Medication 300 MILLIGRAM(S): at 16:29

## 2023-08-29 RX ADMIN — Medication 200 GRAM(S): at 20:13

## 2023-08-29 RX ADMIN — SODIUM CHLORIDE 100 MILLILITER(S): 9 INJECTION, SOLUTION INTRAVENOUS at 00:16

## 2023-08-29 RX ADMIN — HEPARIN SODIUM 1500 UNIT(S)/HR: 5000 INJECTION INTRAVENOUS; SUBCUTANEOUS at 07:21

## 2023-08-29 RX ADMIN — HEPARIN SODIUM 1500 UNIT(S)/HR: 5000 INJECTION INTRAVENOUS; SUBCUTANEOUS at 05:44

## 2023-08-29 RX ADMIN — PIPERACILLIN AND TAZOBACTAM 25 GRAM(S): 4; .5 INJECTION, POWDER, LYOPHILIZED, FOR SOLUTION INTRAVENOUS at 21:29

## 2023-08-29 RX ADMIN — FENTANYL CITRATE 50 MICROGRAM(S): 50 INJECTION INTRAVENOUS at 07:53

## 2023-08-29 RX ADMIN — HEPARIN SODIUM 1500 UNIT(S)/HR: 5000 INJECTION INTRAVENOUS; SUBCUTANEOUS at 12:04

## 2023-08-29 RX ADMIN — SODIUM CHLORIDE 1000 MILLILITER(S): 9 INJECTION, SOLUTION INTRAVENOUS at 01:29

## 2023-08-29 RX ADMIN — VASOPRESSIN 6 UNIT(S)/MIN: 20 INJECTION INTRAVENOUS at 20:15

## 2023-08-29 RX ADMIN — VASOPRESSIN 6 UNIT(S)/MIN: 20 INJECTION INTRAVENOUS at 03:05

## 2023-08-29 RX ADMIN — Medication 250 MICROGRAM(S): at 13:32

## 2023-08-29 RX ADMIN — CHLORHEXIDINE GLUCONATE 15 MILLILITER(S): 213 SOLUTION TOPICAL at 16:35

## 2023-08-29 RX ADMIN — Medication 400 MILLIGRAM(S): at 05:11

## 2023-08-29 RX ADMIN — FENTANYL CITRATE 15.3 MICROGRAM(S)/KG/HR: 50 INJECTION INTRAVENOUS at 08:02

## 2023-08-29 RX ADMIN — PANTOPRAZOLE SODIUM 40 MILLIGRAM(S): 20 TABLET, DELAYED RELEASE ORAL at 07:52

## 2023-08-29 RX ADMIN — PIPERACILLIN AND TAZOBACTAM 25 GRAM(S): 4; .5 INJECTION, POWDER, LYOPHILIZED, FOR SOLUTION INTRAVENOUS at 13:32

## 2023-08-29 RX ADMIN — FENTANYL CITRATE 50 MICROGRAM(S): 50 INJECTION INTRAVENOUS at 08:10

## 2023-08-29 RX ADMIN — FENTANYL CITRATE 15.3 MICROGRAM(S)/KG/HR: 50 INJECTION INTRAVENOUS at 11:26

## 2023-08-29 RX ADMIN — CHLORHEXIDINE GLUCONATE 15 MILLILITER(S): 213 SOLUTION TOPICAL at 05:11

## 2023-08-29 RX ADMIN — CHLORHEXIDINE GLUCONATE 1 APPLICATION(S): 213 SOLUTION TOPICAL at 13:42

## 2023-08-29 RX ADMIN — Medication 68 MICROGRAM(S): at 21:30

## 2023-08-29 RX ADMIN — PIPERACILLIN AND TAZOBACTAM 25 GRAM(S): 4; .5 INJECTION, POWDER, LYOPHILIZED, FOR SOLUTION INTRAVENOUS at 00:16

## 2023-08-29 RX ADMIN — HEPARIN SODIUM 1500 UNIT(S)/HR: 5000 INJECTION INTRAVENOUS; SUBCUTANEOUS at 19:05

## 2023-08-29 RX ADMIN — PIPERACILLIN AND TAZOBACTAM 25 GRAM(S): 4; .5 INJECTION, POWDER, LYOPHILIZED, FOR SOLUTION INTRAVENOUS at 05:11

## 2023-08-29 NOTE — PROGRESS NOTE ADULT - CRITICAL CARE ATTENDING COMMENT
x Events yesterday noted    GEN:  Intubated, arouseable, comfortable  ABD:  Soft, NGT  EXT:      Small bowel obstruction  Parastomal hernia  Atrial fibrillation w/RVR  Ruptured Baker's Cyst  Acute kidney injury    -Remains on fentanyl - now more comfortable  -Ongoing intermittent low dose norepinephrine requirement w/HR in low 110s to 120s, ongoing vasopressin  -Adequate abg on current settings but still w/ongoing metabolic acidosis, now tolerating PSV at 40% (until return to OR), to remain vented today in anticipation of return to OR, scant secretions  -Full dose AC in place w/heparin -therapeutic  -NPO, NGT w/1000cc overnight -very thick effluent -flush NGT  -H/H stable, wound vac w/650 total plus 50cc this am-serosanguinous fluid  -Urostomy w/adequate urine output, Cr essentially unchanged, net positive fluid balance  -Ongoing metabolic acidosis, likely partially related to resolving ARAM, lactic acidosis has cleared  -L IJ TLC, L lizbeth  -Lytes adequate    Plan to return to OR -to discuss timing w/surgery. Events yesterday noted    GEN:  Intubated, arouseable, comfortable, able to make needs known even though vented  ABD:  Soft, NGT w/more bilious drainage noted this am, non tender, vac dressing in place, non distended  EXT:  L calf swollen but seems somewhat less firm that yesterday, mild ecchymosis posterior knee, +DP signals (b/l), able to wiggle toes/ankle to command, no pain w/passive motion, S1 sensation intact    Small bowel obstruction  Parastomal hernia  Atrial fibrillation w/RVR  Ruptured Baker's Cyst  Acute kidney injury    -Remains on fentanyl - now more comfortable  -Ongoing intermittent low dose norepinephrine requirement w/HR in low 110s to 120s, ongoing vasopressin, SVV now adequate, will restart digoxin (home med) - renal function overall improved (although not quite at baseline as of yet) - but given ongoing vasopressor (albeit low) requirement in setting of ongoing tachycardia (afib w/RVR) - will restart digoxin in hopes to help w/rate control  -Adequate abg on current settings, now tolerating PSV at 40% (until return to OR), to remain vented today in anticipation of return to OR, scant secretions  -Full dose AC in place w/heparin -therapeutic  -NPO, NGT w/1000cc overnight -very thick effluent -flush NGT - now somewhat bilious  -H/H stable, wound vac w/650 total plus 50cc this am-serosanguinous fluid  -Urostomy w/adequate urine output, Cr essentially unchanged, net positive fluid balance  -WBC increased, remains on zosyn (since 8/28) -will reassess duration after take back to OR -likely 4 days, afebrile  -Ongoing metabolic acidosis, likely partially related to resolving ARAM, lactic acidosis has cleared  -CPKs unremarkable, L calf seems softer today, no clinical evidence for compartment syndrome, will gently wrap LLE to give mild compression in setting of hemorrhagic baker's cyst, will continue w/serial exams  -L IJ TLC, L lizbeth  -Lytes adequate    Plan to return to OR for 'second look' and formal closure - to discuss timing w/surgery.  Overall improving but still w/some degree of acidosis and elevated WBC.  Vasopressor requirement decreasing and benign abdominal exam.  Will repeat exams and labs again this afternoon to help follow clinical course.    Updated wife at bedside.  Answered questions.

## 2023-08-29 NOTE — PROGRESS NOTE ADULT - ASSESSMENT
ASSESSMENT: 78yMale presenting with: SBO, Anion gap metabolic acidosis, ARAM, Rapid afib.    Plan:    Neuro:   - Continue Fentanyl gtt for analgosedation. Appears comfortable with pain well controlled currently.   - Maintain RASS 0 - -1  - Delirium precautions  - Daily sedation holidays  - Optimize sleep/wake cycle  - q1h neurovascular checks to LLE for ruptured hemorrhagic Baker's cyst. No signs of compartment syndrome currently.    CV:   - Lactate remains cleared at 1.0  - Currently on Vaso 0.04 and intermittent Levo 0.02.   - IVC very difficult to find on POCUS exam.   - Continue invasive hemodynamic monitoring  - Maintain MAP > 65  - Richard Trac    Pulm:   - Continue AC 16/500/6/40%  - AM ABG was - pH, Arterial: 7.370 pH, Blood: x  /  pCO2: 31  /  pO2: 161   / HCO3: 18    / Base Excess: -7.4  /  SaO2: 100.0       GI/Nutrition:   - NPO  - P-lyte @ 100/hr  - Protonix ppx 40 daily  - Monitor bowel movements  - Apthera in place with serosanguinous output  - NGT with thick feculent output. Continue to monitor for patency.    /Renal:   - Urostomy bag for ileoconduit. Continue to empty bag frequently for close I&O's. Clear yellow urine  - Monitor kidney fxn  - Monitor UOP    ID:  - Continue Zosyn  - Monitor fever curve. Afebrile currently  - Leukocytosis improving  - 8/22 Bl cx negative    Endo:  - Monitor blood glucose  - Maintain glucose <180    Skin:   - Repositioning for DTI prevention while in bed    Heme/DVT Prophylaxis:  - SCDs  - Continue heparin gtt w/ goal PTT 60-90.  - Continue Plavix    Lines:  - Arterial Line - Left axillary A-line  - Central Line - L IJ TLC  - Peripheral IV's    Dispo:  - Patient remains critically ill  - Care per SICU  - Patient to RTOR today vs. tomorrow for 2nd look

## 2023-08-29 NOTE — PROGRESS NOTE ADULT - SUBJECTIVE AND OBJECTIVE BOX
24h Events: Patient developed abdominal pain associated with SOB and nausea/vomiting yesterday early morning. CT scan was performed that was concerning for pneumoatosis. INR was reversed with K-Centra and Vitamin K and patient was brought to OR. Underwent Ex-lap with KEANU and obstruction was relieved. Patient was left open and Apthera was placed to re-evaluate bowel. He returned from OR on Levo/Vaso and was able to be weaned down to just Vaso after resuscitation. Heparin gtt was restarted and was also started on Vanc/Zosyn. Overnight, patient received 250 albumin and 500 CC Plasma-lyte for SVV in the 20’s along with increased tachycardia. Responded well to fluids. Continues to make adequate urine. Lactate remains clear. Patient continues to have very thick feculent output from NGT.     ICU Vital Signs Last 24 Hrs  T(C): 36.7 (29 Aug 2023 05:45), Max: 36.7 (29 Aug 2023 04:30)  T(F): 98.1 (29 Aug 2023 05:45), Max: 98.1 (29 Aug 2023 04:30)  HR: 112 (29 Aug 2023 05:45) (95 - 130)  BP: 122/49 (29 Aug 2023 05:00) (99/80 - 139/98)  BP(mean): 63 (29 Aug 2023 05:00) (59 - 102)  ABP: 112/44 (29 Aug 2023 05:45) (94/78 - 146/60)  ABP(mean): 67 (29 Aug 2023 05:45) (39 - 102)  RR: 17 (29 Aug 2023 05:45) (7 - 29)  SpO2: 100% (29 Aug 2023 05:45) (96% - 100%)    O2 Parameters below as of 29 Aug 2023 04:00  Patient On (Oxygen Delivery Method): ventilator    O2 Concentration (%): 50        I&O's Detail    27 Aug 2023 07:01  -  28 Aug 2023 07:00  --------------------------------------------------------  IN:    Heparin Infusion: 150 mL    IV PiggyBack: 200 mL    Lactated Ringers: 210 mL    multiple electrolytes Injection Type 1 Bolus: 2000 mL  Total IN: 2560 mL    OUT:    Nasogastric/Oral tube (mL): 1300 mL    Urostomy (mL): 750 mL  Total OUT: 2050 mL    Total NET: 510 mL      28 Aug 2023 07:01  -  29 Aug 2023 06:03  --------------------------------------------------------  IN:    FentaNYL: 76.5 mL    FentaNYL: 91.8 mL    Heparin Infusion: 90 mL    IV PiggyBack: 250 mL    IV PiggyBack: 200 mL    IV PiggyBack: 125 mL    IV PiggyBack: 200 mL    multiple electrolytes Injection Type 1 Bolus: 500 mL    multiple electrolytes Injection Type 1.: 2100 mL    Norepinephrine: 132 mL    Vasopressin: 96 mL  Total IN: 3861.3 mL    OUT:    Nasogastric/Oral tube (mL): 1000 mL    Urostomy (mL): 1350 mL    VAC (Vacuum Assisted Closure) System (mL): 450 mL  Total OUT: 2800 mL    Total NET: 1061.3 mL      ABG - ( 29 Aug 2023 04:11 )  pH, Arterial: 7.370 pH, Blood: x     /  pCO2: 31    /  pO2: 161   / HCO3: 18    / Base Excess: -7.4  /  SaO2: 100.0         MEDICATIONS  (STANDING):  chlorhexidine 0.12% Liquid 15 milliLiter(s) Oral Mucosa every 12 hours  chlorhexidine 2% Cloths 1 Application(s) Topical daily  clopidogrel Tablet 75 milliGRAM(s) Oral daily  fentaNYL   Infusion 1 MICROgram(s)/kG/Hr (10.2 mL/Hr) IV Continuous <Continuous>  heparin  Infusion. 1500 Unit(s)/Hr (15 mL/Hr) IV Continuous <Continuous>  levothyroxine Injectable 68 MICROGram(s) IV Push at bedtime  multiple electrolytes Injection Type 1 1000 milliLiter(s) (100 mL/Hr) IV Continuous <Continuous>  norepinephrine Infusion 0.05 MICROgram(s)/kG/Min (9.57 mL/Hr) IV Continuous <Continuous>  pantoprazole  Injectable 40 milliGRAM(s) IV Push every 24 hours  piperacillin/tazobactam IVPB.. 3.375 Gram(s) IV Intermittent every 8 hours  vasopressin Infusion 0.04 Unit(s)/Min (6 mL/Hr) IV Continuous <Continuous>    MEDICATIONS  (PRN):  albuterol/ipratropium for Nebulization 3 milliLiter(s) Nebulizer every 6 hours PRN Shortness of Breath and/or Wheezing  ondansetron Injectable 4 milliGRAM(s) IV Push every 6 hours PRN Nausea      Physical Exam:    Neurological: Sedated on Fentanyl @ 1. RASS -1. Appears comfortable in bed.     Pulmonary: mechanical ventilation; 16/500/6/40%    Cardiovascular: A-Fib    Gastrointestinal: soft, non-distended, Apthera in place with wound vac. Serosanguinous output, NGT in place with thick feculent output    : Urostomy bag for ileoconduit draining clear/yellow urine    Skin: Edematous LLE, dopplerable pulses bilaterally, warm, dry, no diaphoresis, no pallor, cyanosis, or jaundice    LABS:  CBC Full  -  ( 29 Aug 2023 05:20 )  WBC Count : 27.16 K/uL  RBC Count : 2.82 M/uL  Hemoglobin : 8.3 g/dL  Hematocrit : 23.7 %  Platelet Count - Automated : 140 K/uL  Mean Cell Volume : 84.0 fl  Mean Cell Hemoglobin : 29.4 pg  Mean Cell Hemoglobin Concentration : 35.0 gm/dL  Auto Neutrophil # : x  Auto Lymphocyte # : x  Auto Monocyte # : x  Auto Eosinophil # : x  Auto Basophil # : x  Auto Neutrophil % : x  Auto Lymphocyte % : x  Auto Monocyte % : x  Auto Eosinophil % : x  Auto Basophil % : x    08-29    136  |  101  |  70.2<H>  ----------------------------<  189<H>  4.2   |  17.0<L>  |  2.59<H>    Ca    7.0<L>      29 Aug 2023 03:55  Phos  5.3     08-29  Mg     2.2     08-29      PT/INR - ( 29 Aug 2023 05:20 )   PT: 16.0 sec;   INR: 1.46 ratio         PTT - ( 29 Aug 2023 05:20 )  PTT:73.5 sec  Urinalysis Basic - ( 29 Aug 2023 03:55 )    Color: x / Appearance: x / SG: x / pH: x  Gluc: 189 mg/dL / Ketone: x  / Bili: x / Urobili: x   Blood: x / Protein: x / Nitrite: x   Leuk Esterase: x / RBC: x / WBC x   Sq Epi: x / Non Sq Epi: x / Bacteria: x      RECENT CULTURES:  08-22 .Blood Blood-Peripheral XXXX XXXX   No growth at 5 days    08-22 .Blood Blood-Peripheral XXXX XXXX   No growth at 5 days          CARDIAC MARKERS ( 28 Aug 2023 11:10 )  x     / x     / 103 U/L / x     / x      CARDIAC MARKERS ( 28 Aug 2023 05:15 )  x     / 0.01 ng/mL / x     / x     / x      CARDIAC MARKERS ( 27 Aug 2023 22:40 )  x     / <0.01 ng/mL / x     / x     / x

## 2023-08-30 ENCOUNTER — TRANSCRIPTION ENCOUNTER (OUTPATIENT)
Age: 78
End: 2023-08-30

## 2023-08-30 LAB
ALBUMIN SERPL ELPH-MCNC: 2.1 G/DL — LOW (ref 3.3–5.2)
ALP SERPL-CCNC: 404 U/L — HIGH (ref 40–120)
ALT FLD-CCNC: 49 U/L — HIGH
ANION GAP SERPL CALC-SCNC: 14 MMOL/L — SIGNIFICANT CHANGE UP (ref 5–17)
ANION GAP SERPL CALC-SCNC: 16 MMOL/L — SIGNIFICANT CHANGE UP (ref 5–17)
APTT BLD: 87.8 SEC — HIGH (ref 24.5–35.6)
AST SERPL-CCNC: 93 U/L — HIGH
BASOPHILS # BLD AUTO: 0 K/UL — SIGNIFICANT CHANGE UP (ref 0–0.2)
BASOPHILS # BLD AUTO: 0.03 K/UL — SIGNIFICANT CHANGE UP (ref 0–0.2)
BASOPHILS NFR BLD AUTO: 0 % — SIGNIFICANT CHANGE UP (ref 0–2)
BASOPHILS NFR BLD AUTO: 0.1 % — SIGNIFICANT CHANGE UP (ref 0–2)
BILIRUB DIRECT SERPL-MCNC: 0.6 MG/DL — HIGH (ref 0–0.3)
BILIRUB INDIRECT FLD-MCNC: 0.2 MG/DL — SIGNIFICANT CHANGE UP (ref 0.2–1)
BILIRUB SERPL-MCNC: 0.9 MG/DL — SIGNIFICANT CHANGE UP (ref 0.4–2)
BUN SERPL-MCNC: 49.1 MG/DL — HIGH (ref 8–20)
BUN SERPL-MCNC: 55.1 MG/DL — HIGH (ref 8–20)
BURR CELLS BLD QL SMEAR: PRESENT — SIGNIFICANT CHANGE UP
CALCIUM SERPL-MCNC: 6.8 MG/DL — LOW (ref 8.4–10.5)
CALCIUM SERPL-MCNC: 7.6 MG/DL — LOW (ref 8.4–10.5)
CHLORIDE SERPL-SCNC: 103 MMOL/L — SIGNIFICANT CHANGE UP (ref 96–108)
CHLORIDE SERPL-SCNC: 105 MMOL/L — SIGNIFICANT CHANGE UP (ref 96–108)
CK MB CFR SERPL CALC: 14.8 NG/ML — HIGH (ref 0–6.7)
CK SERPL-CCNC: 555 U/L — HIGH (ref 30–200)
CO2 SERPL-SCNC: 19 MMOL/L — LOW (ref 22–29)
CO2 SERPL-SCNC: 20 MMOL/L — LOW (ref 22–29)
CREAT SERPL-MCNC: 1.73 MG/DL — HIGH (ref 0.5–1.3)
CREAT SERPL-MCNC: 1.95 MG/DL — HIGH (ref 0.5–1.3)
DIGOXIN SERPL-MCNC: <0.4 NG/ML — LOW (ref 0.8–2)
EGFR: 35 ML/MIN/1.73M2 — LOW
EGFR: 40 ML/MIN/1.73M2 — LOW
EOSINOPHIL # BLD AUTO: 0 K/UL — SIGNIFICANT CHANGE UP (ref 0–0.5)
EOSINOPHIL # BLD AUTO: 0.03 K/UL — SIGNIFICANT CHANGE UP (ref 0–0.5)
EOSINOPHIL NFR BLD AUTO: 0 % — SIGNIFICANT CHANGE UP (ref 0–6)
EOSINOPHIL NFR BLD AUTO: 0.1 % — SIGNIFICANT CHANGE UP (ref 0–6)
GAS PNL BLDA: SIGNIFICANT CHANGE UP
GIANT PLATELETS BLD QL SMEAR: PRESENT — SIGNIFICANT CHANGE UP
GLUCOSE BLDC GLUCOMTR-MCNC: 135 MG/DL — HIGH (ref 70–99)
GLUCOSE SERPL-MCNC: 153 MG/DL — HIGH (ref 70–99)
GLUCOSE SERPL-MCNC: 161 MG/DL — HIGH (ref 70–99)
HCT VFR BLD CALC: 23.9 % — LOW (ref 39–50)
HCT VFR BLD CALC: 30 % — LOW (ref 39–50)
HGB BLD-MCNC: 10.1 G/DL — LOW (ref 13–17)
HGB BLD-MCNC: 8 G/DL — LOW (ref 13–17)
IMM GRANULOCYTES NFR BLD AUTO: 1.9 % — HIGH (ref 0–0.9)
INR BLD: 1.73 RATIO — HIGH (ref 0.85–1.18)
INR BLD: 1.97 RATIO — HIGH (ref 0.85–1.18)
LYMPHOCYTES # BLD AUTO: 0.35 K/UL — LOW (ref 1–3.3)
LYMPHOCYTES # BLD AUTO: 1.01 K/UL — SIGNIFICANT CHANGE UP (ref 1–3.3)
LYMPHOCYTES # BLD AUTO: 1.7 % — LOW (ref 13–44)
LYMPHOCYTES # BLD AUTO: 4.2 % — LOW (ref 13–44)
MAGNESIUM SERPL-MCNC: 1.9 MG/DL — SIGNIFICANT CHANGE UP (ref 1.8–2.6)
MAGNESIUM SERPL-MCNC: 2 MG/DL — SIGNIFICANT CHANGE UP (ref 1.6–2.6)
MANUAL SMEAR VERIFICATION: SIGNIFICANT CHANGE UP
MCHC RBC-ENTMCNC: 28.3 PG — SIGNIFICANT CHANGE UP (ref 27–34)
MCHC RBC-ENTMCNC: 28.9 PG — SIGNIFICANT CHANGE UP (ref 27–34)
MCHC RBC-ENTMCNC: 33.5 GM/DL — SIGNIFICANT CHANGE UP (ref 32–36)
MCHC RBC-ENTMCNC: 33.7 GM/DL — SIGNIFICANT CHANGE UP (ref 32–36)
MCV RBC AUTO: 84.5 FL — SIGNIFICANT CHANGE UP (ref 80–100)
MCV RBC AUTO: 85.7 FL — SIGNIFICANT CHANGE UP (ref 80–100)
METAMYELOCYTES # FLD: 0.9 % — HIGH (ref 0–0)
MONOCYTES # BLD AUTO: 1.23 K/UL — HIGH (ref 0–0.9)
MONOCYTES # BLD AUTO: 2.18 K/UL — HIGH (ref 0–0.9)
MONOCYTES NFR BLD AUTO: 6 % — SIGNIFICANT CHANGE UP (ref 2–14)
MONOCYTES NFR BLD AUTO: 9 % — SIGNIFICANT CHANGE UP (ref 2–14)
NEUTROPHILS # BLD AUTO: 18.68 K/UL — HIGH (ref 1.8–7.4)
NEUTROPHILS # BLD AUTO: 20.49 K/UL — HIGH (ref 1.8–7.4)
NEUTROPHILS NFR BLD AUTO: 84.7 % — HIGH (ref 43–77)
NEUTROPHILS NFR BLD AUTO: 91.4 % — HIGH (ref 43–77)
OVALOCYTES BLD QL SMEAR: SLIGHT — SIGNIFICANT CHANGE UP
PHOSPHATE SERPL-MCNC: 3.4 MG/DL — SIGNIFICANT CHANGE UP (ref 2.4–4.7)
PHOSPHATE SERPL-MCNC: 3.9 MG/DL — SIGNIFICANT CHANGE UP (ref 2.4–4.7)
PLAT MORPH BLD: NORMAL — SIGNIFICANT CHANGE UP
PLATELET # BLD AUTO: 155 K/UL — SIGNIFICANT CHANGE UP (ref 150–400)
PLATELET # BLD AUTO: 165 K/UL — SIGNIFICANT CHANGE UP (ref 150–400)
POLYCHROMASIA BLD QL SMEAR: SLIGHT — SIGNIFICANT CHANGE UP
POTASSIUM SERPL-MCNC: 3.9 MMOL/L — SIGNIFICANT CHANGE UP (ref 3.5–5.3)
POTASSIUM SERPL-MCNC: 4.1 MMOL/L — SIGNIFICANT CHANGE UP (ref 3.5–5.3)
POTASSIUM SERPL-SCNC: 3.9 MMOL/L — SIGNIFICANT CHANGE UP (ref 3.5–5.3)
POTASSIUM SERPL-SCNC: 4.1 MMOL/L — SIGNIFICANT CHANGE UP (ref 3.5–5.3)
PROT SERPL-MCNC: 4.9 G/DL — LOW (ref 6.6–8.7)
PROTHROM AB SERPL-ACNC: 18.9 SEC — HIGH (ref 9.5–13)
PROTHROM AB SERPL-ACNC: 21.4 SEC — HIGH (ref 9.5–13)
RBC # BLD: 2.83 M/UL — LOW (ref 4.2–5.8)
RBC # BLD: 3.5 M/UL — LOW (ref 4.2–5.8)
RBC # FLD: 15.2 % — HIGH (ref 10.3–14.5)
RBC # FLD: 15.4 % — HIGH (ref 10.3–14.5)
RBC BLD AUTO: ABNORMAL
SODIUM SERPL-SCNC: 137 MMOL/L — SIGNIFICANT CHANGE UP (ref 135–145)
SODIUM SERPL-SCNC: 139 MMOL/L — SIGNIFICANT CHANGE UP (ref 135–145)
WBC # BLD: 20.44 K/UL — HIGH (ref 3.8–10.5)
WBC # BLD: 24.21 K/UL — HIGH (ref 3.8–10.5)
WBC # FLD AUTO: 20.44 K/UL — HIGH (ref 3.8–10.5)
WBC # FLD AUTO: 24.21 K/UL — HIGH (ref 3.8–10.5)

## 2023-08-30 PROCEDURE — 49621 RPR PARASTOMAL HERNIA RDC: CPT | Mod: 58

## 2023-08-30 PROCEDURE — 99024 POSTOP FOLLOW-UP VISIT: CPT

## 2023-08-30 PROCEDURE — 71045 X-RAY EXAM CHEST 1 VIEW: CPT | Mod: 26

## 2023-08-30 DEVICE — IMPLANTABLE DEVICE: Type: IMPLANTABLE DEVICE | Status: FUNCTIONAL

## 2023-08-30 RX ORDER — CHLORHEXIDINE GLUCONATE 213 G/1000ML
15 SOLUTION TOPICAL EVERY 12 HOURS
Refills: 0 | Status: DISCONTINUED | OUTPATIENT
Start: 2023-08-30 | End: 2023-08-31

## 2023-08-30 RX ORDER — NOREPINEPHRINE BITARTRATE/D5W 8 MG/250ML
0.05 PLASTIC BAG, INJECTION (ML) INTRAVENOUS
Qty: 8 | Refills: 0 | Status: DISCONTINUED | OUTPATIENT
Start: 2023-08-30 | End: 2023-08-31

## 2023-08-30 RX ORDER — PANTOPRAZOLE SODIUM 20 MG/1
40 TABLET, DELAYED RELEASE ORAL EVERY 24 HOURS
Refills: 0 | Status: DISCONTINUED | OUTPATIENT
Start: 2023-08-30 | End: 2023-09-04

## 2023-08-30 RX ORDER — CALCIUM GLUCONATE 100 MG/ML
2 VIAL (ML) INTRAVENOUS ONCE
Refills: 0 | Status: COMPLETED | OUTPATIENT
Start: 2023-08-30 | End: 2023-08-30

## 2023-08-30 RX ORDER — IPRATROPIUM/ALBUTEROL SULFATE 18-103MCG
3 AEROSOL WITH ADAPTER (GRAM) INHALATION EVERY 6 HOURS
Refills: 0 | Status: DISCONTINUED | OUTPATIENT
Start: 2023-08-30 | End: 2023-09-05

## 2023-08-30 RX ORDER — LEVOTHYROXINE SODIUM 125 MCG
68 TABLET ORAL AT BEDTIME
Refills: 0 | Status: DISCONTINUED | OUTPATIENT
Start: 2023-08-30 | End: 2023-09-01

## 2023-08-30 RX ORDER — SODIUM CHLORIDE 9 MG/ML
1000 INJECTION, SOLUTION INTRAVENOUS
Refills: 0 | Status: DISCONTINUED | OUTPATIENT
Start: 2023-08-30 | End: 2023-09-01

## 2023-08-30 RX ORDER — FENTANYL CITRATE 50 UG/ML
50 INJECTION INTRAVENOUS ONCE
Refills: 0 | Status: DISCONTINUED | OUTPATIENT
Start: 2023-08-30 | End: 2023-08-30

## 2023-08-30 RX ORDER — FENTANYL CITRATE 50 UG/ML
1 INJECTION INTRAVENOUS
Qty: 2500 | Refills: 0 | Status: DISCONTINUED | OUTPATIENT
Start: 2023-08-30 | End: 2023-08-31

## 2023-08-30 RX ORDER — PIPERACILLIN AND TAZOBACTAM 4; .5 G/20ML; G/20ML
3.38 INJECTION, POWDER, LYOPHILIZED, FOR SOLUTION INTRAVENOUS EVERY 8 HOURS
Refills: 0 | Status: COMPLETED | OUTPATIENT
Start: 2023-08-31 | End: 2023-09-03

## 2023-08-30 RX ORDER — ASPIRIN/CALCIUM CARB/MAGNESIUM 324 MG
300 TABLET ORAL DAILY
Refills: 0 | Status: DISCONTINUED | OUTPATIENT
Start: 2023-08-30 | End: 2023-09-01

## 2023-08-30 RX ORDER — DIGOXIN 250 MCG
500 TABLET ORAL ONCE
Refills: 0 | Status: COMPLETED | OUTPATIENT
Start: 2023-08-30 | End: 2023-08-30

## 2023-08-30 RX ORDER — DIGOXIN 250 MCG
250 TABLET ORAL ONCE
Refills: 0 | Status: COMPLETED | OUTPATIENT
Start: 2023-08-31 | End: 2023-08-31

## 2023-08-30 RX ADMIN — CHLORHEXIDINE GLUCONATE 15 MILLILITER(S): 213 SOLUTION TOPICAL at 17:47

## 2023-08-30 RX ADMIN — SODIUM CHLORIDE 100 MILLILITER(S): 9 INJECTION, SOLUTION INTRAVENOUS at 17:47

## 2023-08-30 RX ADMIN — PANTOPRAZOLE SODIUM 40 MILLIGRAM(S): 20 TABLET, DELAYED RELEASE ORAL at 17:47

## 2023-08-30 RX ADMIN — CHLORHEXIDINE GLUCONATE 1 APPLICATION(S): 213 SOLUTION TOPICAL at 11:41

## 2023-08-30 RX ADMIN — Medication 500 MICROGRAM(S): at 23:30

## 2023-08-30 RX ADMIN — FENTANYL CITRATE 10.2 MICROGRAM(S)/KG/HR: 50 INJECTION INTRAVENOUS at 17:46

## 2023-08-30 RX ADMIN — FENTANYL CITRATE 50 MICROGRAM(S): 50 INJECTION INTRAVENOUS at 02:28

## 2023-08-30 RX ADMIN — FENTANYL CITRATE 50 MICROGRAM(S): 50 INJECTION INTRAVENOUS at 02:13

## 2023-08-30 RX ADMIN — PIPERACILLIN AND TAZOBACTAM 25 GRAM(S): 4; .5 INJECTION, POWDER, LYOPHILIZED, FOR SOLUTION INTRAVENOUS at 05:28

## 2023-08-30 RX ADMIN — FENTANYL CITRATE 15.3 MICROGRAM(S)/KG/HR: 50 INJECTION INTRAVENOUS at 02:42

## 2023-08-30 RX ADMIN — FENTANYL CITRATE 50 MICROGRAM(S): 50 INJECTION INTRAVENOUS at 10:13

## 2023-08-30 RX ADMIN — SODIUM CHLORIDE 100 MILLILITER(S): 9 INJECTION, SOLUTION INTRAVENOUS at 05:28

## 2023-08-30 RX ADMIN — Medication 300 MILLIGRAM(S): at 17:47

## 2023-08-30 RX ADMIN — HEPARIN SODIUM 1500 UNIT(S)/HR: 5000 INJECTION INTRAVENOUS; SUBCUTANEOUS at 02:23

## 2023-08-30 RX ADMIN — HEPARIN SODIUM 1500 UNIT(S)/HR: 5000 INJECTION INTRAVENOUS; SUBCUTANEOUS at 04:56

## 2023-08-30 RX ADMIN — Medication 68 MICROGRAM(S): at 21:23

## 2023-08-30 RX ADMIN — PANTOPRAZOLE SODIUM 40 MILLIGRAM(S): 20 TABLET, DELAYED RELEASE ORAL at 09:24

## 2023-08-30 RX ADMIN — CHLORHEXIDINE GLUCONATE 15 MILLILITER(S): 213 SOLUTION TOPICAL at 05:28

## 2023-08-30 RX ADMIN — Medication 200 GRAM(S): at 05:28

## 2023-08-30 RX ADMIN — FENTANYL CITRATE 50 MICROGRAM(S): 50 INJECTION INTRAVENOUS at 09:45

## 2023-08-30 NOTE — BRIEF OPERATIVE NOTE - NSICDXBRIEFPROCEDURE_GEN_ALL_CORE_FT
PROCEDURES:  Repair, hernia, parastomal 30-Aug-2023 16:21:13  Bernadine Trivedi  Repeat exploratory laparotomy 30-Aug-2023 16:26:48  Bernadine Trivedi  
PROCEDURES:  Exploratory laparotomy 28-Aug-2023 13:52:42  Lorenzo Slater

## 2023-08-30 NOTE — PROGRESS NOTE ADULT - SUBJECTIVE AND OBJECTIVE BOX
24h Events: Continued to do well throughout the day yesterday. Fentanyl gtt was increased for additional analgosedation. Patient now comfortable w/ pain controlled. On/Off low dose levophed. Remains on Vaso 0.04. Plavix was DC’d and started on rectal ASA yesterday. HR consistently in 130’s yesterday and was loaded with digoxin with good results. NGT output is becoming more bilious.     ICU Vital Signs Last 24 Hrs  T(C): 37.3 (30 Aug 2023 04:15), Max: 37.4 (30 Aug 2023 01:45)  T(F): 99.1 (30 Aug 2023 04:15), Max: 99.3 (30 Aug 2023 01:45)  HR: 106 (30 Aug 2023 04:15) (101 - 131)  BP: 101/55 (30 Aug 2023 04:01) (101/55 - 146/65)  BP(mean): 68 (30 Aug 2023 04:01) (63 - 128)  ABP: 123/50 (30 Aug 2023 04:15) (105/43 - 130/51)  ABP(mean): 75 (30 Aug 2023 04:15) (63 - 84)  RR: 15 (30 Aug 2023 04:15) (9 - 19)  SpO2: 100% (30 Aug 2023 04:15) (92% - 100%)    O2 Parameters below as of 30 Aug 2023 00:00  Patient On (Oxygen Delivery Method): ventilator    O2 Concentration (%): 40        I&O's Detail    28 Aug 2023 07:01  -  29 Aug 2023 07:00  --------------------------------------------------------  IN:    FentaNYL: 76.5 mL    FentaNYL: 102 mL    Heparin Infusion: 120 mL    IV PiggyBack: 250 mL    IV PiggyBack: 200 mL    IV PiggyBack: 200 mL    IV PiggyBack: 150 mL    multiple electrolytes Injection Type 1 Bolus: 500 mL    multiple electrolytes Injection Type 1.: 2200 mL    Norepinephrine: 139.6 mL    Vasopressin: 108 mL  Total IN: 4046.1 mL    OUT:    Nasogastric/Oral tube (mL): 1500 mL    Urostomy (mL): 1550 mL    VAC (Vacuum Assisted Closure) System (mL): 650 mL  Total OUT: 3700 mL    Total NET: 346.1 mL      29 Aug 2023 07:01  -  30 Aug 2023 04:39  --------------------------------------------------------  IN:    FentaNYL: 336.6 mL    Heparin Infusion: 315 mL    IV PiggyBack: 100 mL    IV PiggyBack: 200 mL    multiple electrolytes Injection Type 1.: 2200 mL    Norepinephrine: 78.2 mL    PRBCs (Packed Red Blood Cells): 330 mL    Vasopressin: 132 mL  Total IN: 3691.8 mL    OUT:    Nasogastric/Oral tube (mL): 500 mL    Urostomy (mL): 2300 mL    VAC (Vacuum Assisted Closure) System (mL): 250 mL  Total OUT: 3050 mL    Total NET: 641.8 mL    ABG - ( 29 Aug 2023 04:11 )  pH, Arterial: 7.370 pH, Blood: x     /  pCO2: 31    /  pO2: 161   / HCO3: 18    / Base Excess: -7.4  /  SaO2: 100.0         MEDICATIONS  (STANDING):  aspirin Suppository 300 milliGRAM(s) Rectal daily  chlorhexidine 0.12% Liquid 15 milliLiter(s) Oral Mucosa every 12 hours  chlorhexidine 2% Cloths 1 Application(s) Topical daily  fentaNYL   Infusion 1.5 MICROgram(s)/kG/Hr (15.3 mL/Hr) IV Continuous <Continuous>  heparin  Infusion. 1500 Unit(s)/Hr (15 mL/Hr) IV Continuous <Continuous>  levothyroxine Injectable 68 MICROGram(s) IV Push at bedtime  multiple electrolytes Injection Type 1 1000 milliLiter(s) (100 mL/Hr) IV Continuous <Continuous>  norepinephrine Infusion 0.05 MICROgram(s)/kG/Min (9.57 mL/Hr) IV Continuous <Continuous>  pantoprazole  Injectable 40 milliGRAM(s) IV Push every 24 hours  piperacillin/tazobactam IVPB.. 3.375 Gram(s) IV Intermittent every 8 hours  vasopressin Infusion 0.04 Unit(s)/Min (6 mL/Hr) IV Continuous <Continuous>    MEDICATIONS  (PRN):  albuterol/ipratropium for Nebulization 3 milliLiter(s) Nebulizer every 6 hours PRN Shortness of Breath and/or Wheezing      Physical Exam:    Neurological: Sedated on Fentanyl @ 1.5. RASS -1. Appears comfortable in bed.     Pulmonary: mechanical ventilation; 16/500/6/40%    Cardiovascular: A-Fib    Gastrointestinal: soft, non-distended, Apthera in place with wound vac. Serosanguinous output, NGT with now bilious output.    : Urostomy bag for ileoconduit draining clear/yellow urine    Skin: Edematous LLE, dopplerable pulses bilaterally, warm, dry, no diaphoresis, no pallor, cyanosis, or jaundice    LABS:  CBC Full  -  ( 29 Aug 2023 17:53 )  WBC Count : 24.06 K/uL  RBC Count : 2.64 M/uL  Hemoglobin : 7.5 g/dL  Hematocrit : 22.4 %  Platelet Count - Automated : 150 K/uL  Mean Cell Volume : 84.8 fl  Mean Cell Hemoglobin : 28.4 pg  Mean Cell Hemoglobin Concentration : 33.5 gm/dL  Auto Neutrophil # : 22.38 K/uL  Auto Lymphocyte # : 1.25 K/uL  Auto Monocyte # : 0.43 K/uL  Auto Eosinophil # : 0.00 K/uL  Auto Basophil # : 0.00 K/uL  Auto Neutrophil % : 90.4 %  Auto Lymphocyte % : 5.2 %  Auto Monocyte % : 1.8 %  Auto Eosinophil % : 0.0 %  Auto Basophil % : 0.0 %    08-29    135  |  102  |  62.5<H>  ----------------------------<  167<H>  4.1   |  19.0<L>  |  2.07<H>    Ca    6.5<LL>      29 Aug 2023 17:53  Phos  4.0     08-29  Mg     2.2     08-29      PT/INR - ( 29 Aug 2023 05:20 )   PT: 16.0 sec;   INR: 1.46 ratio         PTT - ( 29 Aug 2023 11:23 )  PTT:78.5 sec  Urinalysis Basic - ( 29 Aug 2023 17:53 )    Color: x / Appearance: x / SG: x / pH: x  Gluc: 167 mg/dL / Ketone: x  / Bili: x / Urobili: x   Blood: x / Protein: x / Nitrite: x   Leuk Esterase: x / RBC: x / WBC x   Sq Epi: x / Non Sq Epi: x / Bacteria: x      RECENT CULTURES:        CARDIAC MARKERS ( 28 Aug 2023 11:10 )  x     / x     / 103 U/L / x     / x      CARDIAC MARKERS ( 28 Aug 2023 05:15 )  x     / 0.01 ng/mL / x     / x     / x

## 2023-08-30 NOTE — PROGRESS NOTE ADULT - NS ATTEND AMEND GEN_ALL_CORE FT
I have seen and examined the patient during SICU multidisciplinary rounds from 1845-3165 hrs.   Events noted.    Neuro: Rass +1 CABALLERO  CV: Valentine controlled afib, HD abnormal on pressors (lower doses). CO10.2 CU 4.7 SV 80-90 LActene normal  Pulm: Gas exchange adequate, vented  GI: open abd, for reexploration today, no bloody BM to suggest further bowel ischemia  : urie flow adequate, creatinine trending down  ID: shara op ABX  Heme: blood loss anemia, hep drip for afib  Endo: glycemia ta [id number]    Plan:  SBO s/p KEANU, afib RVR  OR today, planned second look. tentative closure  Hep drip for a fib, rate controlled.  Wean pressors as able no further bowel ischemia will limit ABX to 4 days  WQean vent with plan to exubte in the next 24 hrs

## 2023-08-30 NOTE — CHART NOTE - NSCHARTNOTEFT_GEN_A_CORE
Called to  patient from OR, while waiting outside for patient I was emergently called into the operating room. Upon entering the room, I noticed the patient was lying on the table,intubated, connected to Anesthesia ventilator cyanotic, no chest rising and monitor showed B/P 40/20, o2 sat% not reading.  I immediately called for an Ambubag, and proceeded to ventilate the patient with 100% oxygen.   Patients became less cyanotic and B/P started to increase.  Patient was reconnected to Anesthesia ventilator.

## 2023-08-30 NOTE — PROGRESS NOTE ADULT - ASSESSMENT
ASSESSMENT: 78yMale presenting with: SBO, Anion gap metabolic acidosis, ARAM, Rapid afib.    Plan:    Neuro:   - Continue Fentanyl gtt for analgosedation. Appears comfortable with pain well controlled currently.   - Maintain RASS 0 - -1  - Delirium precautions  - Daily sedation holidays  - Optimize sleep/wake cycle  - q1h neurovascular checks to LLE for ruptured hemorrhagic Baker's cyst. No signs of compartment syndrome currently.    CV:   - Lactate remains cleared  - Currently on Vaso 0.04 and intermittent Levo 0.02.   - IVC very difficult to find on POCUS exam.   - Continue invasive hemodynamic monitoring  - Maintain MAP > 65  - Richard Trac    Pulm:   - Continue AC 16/500/6/40%  - f/u AM ABG and adjust settings accordingly    GI/Nutrition:   - NPO  - P-lyte @ 100/hr  - Protonix ppx 40 daily  - Monitor bowel movements  - Apthera in place with serosanguinous output  - NGT with now bilious output. Continue to monitor for patency.    /Renal:   - Urostomy bag for ileoconduit. Continue to empty bag frequently for close I&O's. Clear yellow urine  - Monitor kidney fxn  - Monitor UOP    ID:  - Continue Zosyn  - Monitor fever curve. Afebrile currently  - Leukocytosis improving  - 8/22 Bl cx negative    Endo:  - Monitor blood glucose  - Maintain glucose <180    Skin:   - Repositioning for DTI prevention while in bed    Heme/DVT Prophylaxis:  - SCDs  - Continue heparin gtt w/ goal PTT 60-90.  - Plavix DC'd  - Continue ASA 300mg CT    Lines:  - Arterial Line - Left axillary A-line  - Central Line - L IJ TLC  - Peripheral IV's    Dispo:  - Patient remains critically ill  - Care per SICU  - Patient to RTOR today for 2nd look

## 2023-08-30 NOTE — BRIEF OPERATIVE NOTE - COMMENTS
Pt became cyanotic post op when passing patient from OR table to transportation table, stabilized by anesthesia attending Pt became cyanotic post op when passing patient from OR table to transportation table, stabilized by anesthesia attending.

## 2023-08-30 NOTE — BRIEF OPERATIVE NOTE - OPERATION/FINDINGS
abdominal re exploration with parastomal hernia repair with 6x10cm mesh, no bowel resection and closure of abdominal wall post removal of VAC
Exploratory laparotomy. Dilated proximal loops of small bowel. Transition point identified at border or parastomal hernia where adhesions were lysed and obstruction relived. Abthera wound vac placed.

## 2023-08-31 LAB
ANION GAP SERPL CALC-SCNC: 14 MMOL/L — SIGNIFICANT CHANGE UP (ref 5–17)
APTT BLD: 29.9 SEC — SIGNIFICANT CHANGE UP (ref 24.5–35.6)
APTT BLD: 43.9 SEC — HIGH (ref 24.5–35.6)
BASOPHILS # BLD AUTO: 0 K/UL — SIGNIFICANT CHANGE UP (ref 0–0.2)
BASOPHILS NFR BLD AUTO: 0 % — SIGNIFICANT CHANGE UP (ref 0–2)
BUN SERPL-MCNC: 46.5 MG/DL — HIGH (ref 8–20)
CALCIUM SERPL-MCNC: 7.2 MG/DL — LOW (ref 8.4–10.5)
CHLORIDE SERPL-SCNC: 106 MMOL/L — SIGNIFICANT CHANGE UP (ref 96–108)
CK MB CFR SERPL CALC: 25.3 NG/ML — HIGH (ref 0–6.7)
CK MB CFR SERPL CALC: 29.2 NG/ML — HIGH (ref 0–6.7)
CK SERPL-CCNC: 1023 U/L — HIGH (ref 30–200)
CK SERPL-CCNC: 1165 U/L — HIGH (ref 30–200)
CO2 SERPL-SCNC: 19 MMOL/L — LOW (ref 22–29)
CREAT SERPL-MCNC: 1.67 MG/DL — HIGH (ref 0.5–1.3)
EGFR: 42 ML/MIN/1.73M2 — LOW
EOSINOPHIL # BLD AUTO: 0 K/UL — SIGNIFICANT CHANGE UP (ref 0–0.5)
EOSINOPHIL NFR BLD AUTO: 0 % — SIGNIFICANT CHANGE UP (ref 0–6)
GAS PNL BLDA: SIGNIFICANT CHANGE UP
GLUCOSE BLDC GLUCOMTR-MCNC: 106 MG/DL — HIGH (ref 70–99)
GLUCOSE BLDC GLUCOMTR-MCNC: 118 MG/DL — HIGH (ref 70–99)
GLUCOSE SERPL-MCNC: 120 MG/DL — HIGH (ref 70–99)
HCT VFR BLD CALC: 26.1 % — LOW (ref 39–50)
HCT VFR BLD CALC: 28.3 % — LOW (ref 39–50)
HGB BLD-MCNC: 8.8 G/DL — LOW (ref 13–17)
HGB BLD-MCNC: 9.5 G/DL — LOW (ref 13–17)
INR BLD: 1.91 RATIO — HIGH (ref 0.85–1.18)
LYMPHOCYTES # BLD AUTO: 1.35 K/UL — SIGNIFICANT CHANGE UP (ref 1–3.3)
LYMPHOCYTES # BLD AUTO: 5.2 % — LOW (ref 13–44)
MAGNESIUM SERPL-MCNC: 2 MG/DL — SIGNIFICANT CHANGE UP (ref 1.6–2.6)
MANUAL SMEAR VERIFICATION: SIGNIFICANT CHANGE UP
MCHC RBC-ENTMCNC: 28.6 PG — SIGNIFICANT CHANGE UP (ref 27–34)
MCHC RBC-ENTMCNC: 28.9 PG — SIGNIFICANT CHANGE UP (ref 27–34)
MCHC RBC-ENTMCNC: 33.6 GM/DL — SIGNIFICANT CHANGE UP (ref 32–36)
MCHC RBC-ENTMCNC: 33.7 GM/DL — SIGNIFICANT CHANGE UP (ref 32–36)
MCV RBC AUTO: 85.2 FL — SIGNIFICANT CHANGE UP (ref 80–100)
MCV RBC AUTO: 85.9 FL — SIGNIFICANT CHANGE UP (ref 80–100)
MONOCYTES # BLD AUTO: 0.68 K/UL — SIGNIFICANT CHANGE UP (ref 0–0.9)
MONOCYTES NFR BLD AUTO: 2.6 % — SIGNIFICANT CHANGE UP (ref 2–14)
NEUTROPHILS # BLD AUTO: 24 K/UL — HIGH (ref 1.8–7.4)
NEUTROPHILS NFR BLD AUTO: 92.2 % — HIGH (ref 43–77)
PHOSPHATE SERPL-MCNC: 3 MG/DL — SIGNIFICANT CHANGE UP (ref 2.4–4.7)
PLAT MORPH BLD: NORMAL — SIGNIFICANT CHANGE UP
PLATELET # BLD AUTO: 173 K/UL — SIGNIFICANT CHANGE UP (ref 150–400)
PLATELET # BLD AUTO: 198 K/UL — SIGNIFICANT CHANGE UP (ref 150–400)
POLYCHROMASIA BLD QL SMEAR: SLIGHT — SIGNIFICANT CHANGE UP
POTASSIUM SERPL-MCNC: 4 MMOL/L — SIGNIFICANT CHANGE UP (ref 3.5–5.3)
POTASSIUM SERPL-SCNC: 4 MMOL/L — SIGNIFICANT CHANGE UP (ref 3.5–5.3)
PROTHROM AB SERPL-ACNC: 20.8 SEC — HIGH (ref 9.5–13)
RBC # BLD: 3.04 M/UL — LOW (ref 4.2–5.8)
RBC # BLD: 3.32 M/UL — LOW (ref 4.2–5.8)
RBC # FLD: 15.8 % — HIGH (ref 10.3–14.5)
RBC # FLD: 15.8 % — HIGH (ref 10.3–14.5)
RBC BLD AUTO: ABNORMAL
SODIUM SERPL-SCNC: 139 MMOL/L — SIGNIFICANT CHANGE UP (ref 135–145)
WBC # BLD: 18.65 K/UL — HIGH (ref 3.8–10.5)
WBC # BLD: 26.03 K/UL — HIGH (ref 3.8–10.5)
WBC # FLD AUTO: 18.65 K/UL — HIGH (ref 3.8–10.5)
WBC # FLD AUTO: 26.03 K/UL — HIGH (ref 3.8–10.5)

## 2023-08-31 PROCEDURE — 99024 POSTOP FOLLOW-UP VISIT: CPT

## 2023-08-31 RX ORDER — HEPARIN SODIUM 5000 [USP'U]/ML
4000 INJECTION INTRAVENOUS; SUBCUTANEOUS EVERY 6 HOURS
Refills: 0 | Status: DISCONTINUED | OUTPATIENT
Start: 2023-08-31 | End: 2023-09-01

## 2023-08-31 RX ORDER — CALCIUM GLUCONATE 100 MG/ML
2 VIAL (ML) INTRAVENOUS ONCE
Refills: 0 | Status: COMPLETED | OUTPATIENT
Start: 2023-08-31 | End: 2023-08-31

## 2023-08-31 RX ORDER — HEPARIN SODIUM 5000 [USP'U]/ML
8500 INJECTION INTRAVENOUS; SUBCUTANEOUS EVERY 6 HOURS
Refills: 0 | Status: DISCONTINUED | OUTPATIENT
Start: 2023-08-31 | End: 2023-09-01

## 2023-08-31 RX ORDER — ACETAMINOPHEN 500 MG
1000 TABLET ORAL EVERY 6 HOURS
Refills: 0 | Status: COMPLETED | OUTPATIENT
Start: 2023-08-31 | End: 2023-09-01

## 2023-08-31 RX ORDER — DIGOXIN 250 MCG
250 TABLET ORAL ONCE
Refills: 0 | Status: COMPLETED | OUTPATIENT
Start: 2023-08-31 | End: 2023-08-31

## 2023-08-31 RX ORDER — HEPARIN SODIUM 5000 [USP'U]/ML
1700 INJECTION INTRAVENOUS; SUBCUTANEOUS
Qty: 25000 | Refills: 0 | Status: DISCONTINUED | OUTPATIENT
Start: 2023-08-31 | End: 2023-09-01

## 2023-08-31 RX ADMIN — Medication 68 MICROGRAM(S): at 21:44

## 2023-08-31 RX ADMIN — Medication 250 MICROGRAM(S): at 07:41

## 2023-08-31 RX ADMIN — Medication 300 MILLIGRAM(S): at 11:04

## 2023-08-31 RX ADMIN — Medication 400 MILLIGRAM(S): at 20:06

## 2023-08-31 RX ADMIN — PIPERACILLIN AND TAZOBACTAM 25 GRAM(S): 4; .5 INJECTION, POWDER, LYOPHILIZED, FOR SOLUTION INTRAVENOUS at 15:29

## 2023-08-31 RX ADMIN — Medication 200 GRAM(S): at 07:41

## 2023-08-31 RX ADMIN — CHLORHEXIDINE GLUCONATE 15 MILLILITER(S): 213 SOLUTION TOPICAL at 05:34

## 2023-08-31 RX ADMIN — PIPERACILLIN AND TAZOBACTAM 25 GRAM(S): 4; .5 INJECTION, POWDER, LYOPHILIZED, FOR SOLUTION INTRAVENOUS at 21:44

## 2023-08-31 RX ADMIN — HEPARIN SODIUM 1800 UNIT(S)/HR: 5000 INJECTION INTRAVENOUS; SUBCUTANEOUS at 10:16

## 2023-08-31 RX ADMIN — PIPERACILLIN AND TAZOBACTAM 25 GRAM(S): 4; .5 INJECTION, POWDER, LYOPHILIZED, FOR SOLUTION INTRAVENOUS at 06:02

## 2023-08-31 RX ADMIN — PANTOPRAZOLE SODIUM 40 MILLIGRAM(S): 20 TABLET, DELAYED RELEASE ORAL at 18:44

## 2023-08-31 RX ADMIN — HEPARIN SODIUM 2000 UNIT(S)/HR: 5000 INJECTION INTRAVENOUS; SUBCUTANEOUS at 18:44

## 2023-08-31 RX ADMIN — HEPARIN SODIUM 2000 UNIT(S)/HR: 5000 INJECTION INTRAVENOUS; SUBCUTANEOUS at 21:45

## 2023-08-31 RX ADMIN — Medication 250 MICROGRAM(S): at 04:27

## 2023-08-31 NOTE — PROGRESS NOTE ADULT - ATTENDING COMMENTS
I have seen and examined the patient during SICU multidisciplinary rounds from 2298-8330 hrs.   Events noted.    Neuro: Awake, RASS 0 to +1, CABALLERO  CV: rate controlled afib, off pressors this am, perfusion is adequate (lactate 1.3)  Pulm: Gas exchange adequate, P:F 283, RSBI < 80  GI: Soft, tympanic, dressing c/d/i minimal pain  : urine output adequate, electrolytes ok, cr trending down  ID: zosyn for pneumatosis  Heme: H/H stable on hep drip  Endo: glycemia at target    Plan:  SBO s/p X lap hernia repair, bowel was viable on ICH firefly exploration in OR.  GAs exchange adequate, adequate mental status, plan to extubate  Rate controlled afib, will resume digoxin ( home medication )  [employer] to evaluate device, ? pacing overnight.  Keep NGT wait for flatus before starting TEN  Hep drip.  left knee hematoma improving, no pain-out-of-proportion  PT I have seen and examined the patient during SICU multidisciplinary rounds from 6059-4444 hrs.   Events noted.    Neuro: Awake, RASS 0 to +1, CABALLERO  CV: rate controlled afib, off pressors this am, perfusion is adequate (lactate 1.3)  Pulm: Gas exchange adequate, P:F 283, RSBI < 80  GI: Soft, tympanic, dressing c/d/i minimal pain  : urine output adequate, electrolytes ok, cr trending down  ID: zosyn for pneumatosis  Heme: H/H stable on hep drip  Endo: glycemia at target    Plan:  SBO s/p X lap hernia repair, bowel was viable on ICH firefly exploration in OR.  GAs exchange adequate, adequate mental status, plan to extubate  Rate controlled afib, will resume digoxin ( home medication )  [employer] to evaluate device, ? pacing overnight.  Keep NGT wait for flatus before starting TEN  Hep drip.  left knee hematoma improving, no pain-out-of-proportion  4 days of zosyn from abdominal closure.  PT

## 2023-08-31 NOTE — PROGRESS NOTE ADULT - SUBJECTIVE AND OBJECTIVE BOX
24hr events: Went to OR for abdominal re-exploration, no bowel resection was needed. Parastomal hernia was repaired and abdomen was closed. Remained intubated postoperatively on low dose pressors. A fib with RVR, digoxin given overnight. NGT with bilious output. UOP appropriate.     Vitals:  T(C): 37.2 (08-30-23 @ 23:30), Max: 37.6 (08-30-23 @ 05:00)  T(F): 99 (08-30-23 @ 23:30), Max: 99.7 (08-30-23 @ 05:00)  HR: 128 (08-30-23 @ 23:30) (101 - 144)  BP: 132/70 (08-30-23 @ 23:15) (101/55 - 146/65)  ABP: 120/54 (08-30-23 @ 23:30) (99/46 - 152/73)  ABP(mean): 78 (08-30-23 @ 23:30) (62 - 101)  RR: 14 (08-30-23 @ 23:30) (14 - 25)  SpO2: 100% (08-30-23 @ 23:30) (96% - 100%)      Labs:    LABS:  08-30 @ 03:50 Creatine 555 U/L<H> [30 - 200]  cret                        10.1   20.44 )-----------( 165      ( 30 Aug 2023 16:42 )             30.0     08-30    139  |  105  |  49.1<H>  ----------------------------<  161<H>  4.1   |  19.0<L>  |  1.73<H>    Ca    7.6<L>      30 Aug 2023 16:42  Phos  3.9     08-30  Mg     1.9     08-30    TPro  4.9<L>  /  Alb  2.1<L>  /  TBili  0.9  /  DBili  0.6<H>  /  AST  93<H>  /  ALT  49<H>  /  AlkPhos  404<H>  08-30    PT/INR - ( 30 Aug 2023 16:42 )   PT: 18.9 sec;   INR: 1.73 ratio         PTT - ( 30 Aug 2023 03:50 )  PTT:87.8 sec    PHYSICAL EXAM:  Neurological: Sedated on Fentanyl @ 1.5. RASS -1. Appears comfortable in bed.     Pulmonary: mechanical ventilation; tolerating pressure support     Cardiovascular: A-Fib with RVR    Gastrointestinal: soft, non-distended. Serosanguinous output, NGT with now bilious output.    : Urostomy bag for ileoconduit draining clear/yellow urine    Skin: Edematous LLE, dopplerable pulses bilaterally, warm, dry, no diaphoresis, no pallor, cyanosis, or jaundice

## 2023-08-31 NOTE — PROGRESS NOTE ADULT - ASSESSMENT
ASSESSMENT/PLAN: 78yMale presenting with: SBO, Anion gap metabolic acidosis, ARAM, Rapid afib now s/p OR and repair of parastomal hernia.     Neuro:   - Continue Fentanyl gtt for analgosedation.   - Maintain RASS 0 - -1  - Delirium precautions  - Daily sedation holidays  - Optimize sleep/wake cycle  - q1h neurovascular checks to LLE for ruptured hemorrhagic Baker's cyst. No signs of compartment syndrome currently.    CV:   - Lactate remains cleared  - Vaso now off, on levophed .06 for MAP goal > 65  - a fib with RVR, digoxin given     Pulm:   - tolerating CPAP, possible extubation today     GI/Nutrition:   - NPO  - P-lyte @ 100/hr  - Protonix ppx 40 daily  - Monitor bowel movements  - abdomen now closed, serial abdominal exams   - NGT with now bilious output. Continue to monitor for patency.    /Renal:   - Urostomy bag for ileoconduit. Continue to empty bag frequently for close I&O's. Clear yellow urine    ID:  - Continue Zosyn for 4 days postoperatively  - Monitor fever curve. Afebrile currently  - Leukocytosis improving  - 8/22 Bl cx negative    Endo:  - Monitor blood glucose  - Maintain glucose <180    Skin:   - Repositioning for DTI prevention while in bed    Heme/DVT Prophylaxis:  - SCDs  - heparin gtt currently held, will resume tomorrow w/ goal PTT 60-90.  - Plavix DC'd as pt is NPO, Continue ASA 300mg [place]    Lines:  - Arterial Line - Left axillary A-line  - Central Line - L IJ TLC  - Peripheral IV's    Dispo: SICU level of care

## 2023-09-01 LAB
ANION GAP SERPL CALC-SCNC: 14 MMOL/L — SIGNIFICANT CHANGE UP (ref 5–17)
ANION GAP SERPL CALC-SCNC: 15 MMOL/L — SIGNIFICANT CHANGE UP (ref 5–17)
APTT BLD: 46.5 SEC — HIGH (ref 24.5–35.6)
APTT BLD: 46.6 SEC — HIGH (ref 24.5–35.6)
APTT BLD: 55.3 SEC — HIGH (ref 24.5–35.6)
BASOPHILS # BLD AUTO: 0.03 K/UL — SIGNIFICANT CHANGE UP (ref 0–0.2)
BASOPHILS NFR BLD AUTO: 0.2 % — SIGNIFICANT CHANGE UP (ref 0–2)
BUN SERPL-MCNC: 33.4 MG/DL — HIGH (ref 8–20)
BUN SERPL-MCNC: 37.4 MG/DL — HIGH (ref 8–20)
CALCIUM SERPL-MCNC: 7.4 MG/DL — LOW (ref 8.4–10.5)
CALCIUM SERPL-MCNC: 7.4 MG/DL — LOW (ref 8.4–10.5)
CHLORIDE SERPL-SCNC: 105 MMOL/L — SIGNIFICANT CHANGE UP (ref 96–108)
CHLORIDE SERPL-SCNC: 106 MMOL/L — SIGNIFICANT CHANGE UP (ref 96–108)
CK MB CFR SERPL CALC: 31.8 NG/ML — HIGH (ref 0–6.7)
CK MB CFR SERPL CALC: 34.2 NG/ML — HIGH (ref 0–6.7)
CK SERPL-CCNC: 1393 U/L — HIGH (ref 30–200)
CK SERPL-CCNC: 1525 U/L — HIGH (ref 30–200)
CO2 SERPL-SCNC: 19 MMOL/L — LOW (ref 22–29)
CO2 SERPL-SCNC: 20 MMOL/L — LOW (ref 22–29)
CREAT SERPL-MCNC: 1.43 MG/DL — HIGH (ref 0.5–1.3)
CREAT SERPL-MCNC: 1.49 MG/DL — HIGH (ref 0.5–1.3)
EGFR: 48 ML/MIN/1.73M2 — LOW
EGFR: 50 ML/MIN/1.73M2 — LOW
EOSINOPHIL # BLD AUTO: 0.05 K/UL — SIGNIFICANT CHANGE UP (ref 0–0.5)
EOSINOPHIL NFR BLD AUTO: 0.3 % — SIGNIFICANT CHANGE UP (ref 0–6)
GAS PNL BLDA: SIGNIFICANT CHANGE UP
GLUCOSE SERPL-MCNC: 123 MG/DL — HIGH (ref 70–99)
GLUCOSE SERPL-MCNC: 123 MG/DL — HIGH (ref 70–99)
HCT VFR BLD CALC: 26.1 % — LOW (ref 39–50)
HGB BLD-MCNC: 8.6 G/DL — LOW (ref 13–17)
IMM GRANULOCYTES NFR BLD AUTO: 2.9 % — HIGH (ref 0–0.9)
INR BLD: 1.83 RATIO — HIGH (ref 0.85–1.18)
LYMPHOCYTES # BLD AUTO: 1.04 K/UL — SIGNIFICANT CHANGE UP (ref 1–3.3)
LYMPHOCYTES # BLD AUTO: 6.1 % — LOW (ref 13–44)
MAGNESIUM SERPL-MCNC: 2 MG/DL — SIGNIFICANT CHANGE UP (ref 1.6–2.6)
MCHC RBC-ENTMCNC: 28.6 PG — SIGNIFICANT CHANGE UP (ref 27–34)
MCHC RBC-ENTMCNC: 33 GM/DL — SIGNIFICANT CHANGE UP (ref 32–36)
MCV RBC AUTO: 86.7 FL — SIGNIFICANT CHANGE UP (ref 80–100)
MONOCYTES # BLD AUTO: 1.6 K/UL — HIGH (ref 0–0.9)
MONOCYTES NFR BLD AUTO: 9.4 % — SIGNIFICANT CHANGE UP (ref 2–14)
NEUTROPHILS # BLD AUTO: 13.74 K/UL — HIGH (ref 1.8–7.4)
NEUTROPHILS NFR BLD AUTO: 81.1 % — HIGH (ref 43–77)
PHOSPHATE SERPL-MCNC: 2.3 MG/DL — LOW (ref 2.4–4.7)
PLATELET # BLD AUTO: 172 K/UL — SIGNIFICANT CHANGE UP (ref 150–400)
POTASSIUM SERPL-MCNC: 3.6 MMOL/L — SIGNIFICANT CHANGE UP (ref 3.5–5.3)
POTASSIUM SERPL-MCNC: 3.8 MMOL/L — SIGNIFICANT CHANGE UP (ref 3.5–5.3)
POTASSIUM SERPL-SCNC: 3.6 MMOL/L — SIGNIFICANT CHANGE UP (ref 3.5–5.3)
POTASSIUM SERPL-SCNC: 3.8 MMOL/L — SIGNIFICANT CHANGE UP (ref 3.5–5.3)
PROTHROM AB SERPL-ACNC: 20 SEC — HIGH (ref 9.5–13)
RBC # BLD: 3.01 M/UL — LOW (ref 4.2–5.8)
RBC # FLD: 15.7 % — HIGH (ref 10.3–14.5)
SODIUM SERPL-SCNC: 139 MMOL/L — SIGNIFICANT CHANGE UP (ref 135–145)
SODIUM SERPL-SCNC: 140 MMOL/L — SIGNIFICANT CHANGE UP (ref 135–145)
WBC # BLD: 16.96 K/UL — HIGH (ref 3.8–10.5)
WBC # FLD AUTO: 16.96 K/UL — HIGH (ref 3.8–10.5)

## 2023-09-01 PROCEDURE — 93971 EXTREMITY STUDY: CPT | Mod: 26,LT

## 2023-09-01 RX ORDER — LEVOTHYROXINE SODIUM 125 MCG
137 TABLET ORAL DAILY
Refills: 0 | Status: DISCONTINUED | OUTPATIENT
Start: 2023-09-01 | End: 2023-09-04

## 2023-09-01 RX ORDER — HEPARIN SODIUM 5000 [USP'U]/ML
2600 INJECTION INTRAVENOUS; SUBCUTANEOUS
Qty: 25000 | Refills: 0 | Status: DISCONTINUED | OUTPATIENT
Start: 2023-09-01 | End: 2023-09-04

## 2023-09-01 RX ORDER — METOPROLOL TARTRATE 50 MG
25 TABLET ORAL
Refills: 0 | Status: DISCONTINUED | OUTPATIENT
Start: 2023-09-01 | End: 2023-09-02

## 2023-09-01 RX ORDER — CALCIUM GLUCONATE 100 MG/ML
1 VIAL (ML) INTRAVENOUS ONCE
Refills: 0 | Status: COMPLETED | OUTPATIENT
Start: 2023-09-01 | End: 2023-09-01

## 2023-09-01 RX ORDER — CLOPIDOGREL BISULFATE 75 MG/1
75 TABLET, FILM COATED ORAL DAILY
Refills: 0 | Status: DISCONTINUED | OUTPATIENT
Start: 2023-09-01 | End: 2023-09-04

## 2023-09-01 RX ORDER — FUROSEMIDE 40 MG
20 TABLET ORAL DAILY
Refills: 0 | Status: DISCONTINUED | OUTPATIENT
Start: 2023-09-01 | End: 2023-09-04

## 2023-09-01 RX ORDER — ASPIRIN/CALCIUM CARB/MAGNESIUM 324 MG
81 TABLET ORAL DAILY
Refills: 0 | Status: DISCONTINUED | OUTPATIENT
Start: 2023-09-01 | End: 2023-09-04

## 2023-09-01 RX ORDER — SACUBITRIL AND VALSARTAN 24; 26 MG/1; MG/1
1 TABLET, FILM COATED ORAL
Refills: 0 | Status: DISCONTINUED | OUTPATIENT
Start: 2023-09-01 | End: 2023-09-01

## 2023-09-01 RX ORDER — DIGOXIN 250 MCG
125 TABLET ORAL DAILY
Refills: 0 | Status: DISCONTINUED | OUTPATIENT
Start: 2023-09-01 | End: 2023-09-01

## 2023-09-01 RX ORDER — POTASSIUM PHOSPHATE, MONOBASIC POTASSIUM PHOSPHATE, DIBASIC 236; 224 MG/ML; MG/ML
15 INJECTION, SOLUTION INTRAVENOUS ONCE
Refills: 0 | Status: COMPLETED | OUTPATIENT
Start: 2023-09-01 | End: 2023-09-01

## 2023-09-01 RX ORDER — DIGOXIN 250 MCG
125 TABLET ORAL DAILY
Refills: 0 | Status: DISCONTINUED | OUTPATIENT
Start: 2023-09-01 | End: 2023-09-04

## 2023-09-01 RX ORDER — SACUBITRIL AND VALSARTAN 24; 26 MG/1; MG/1
1 TABLET, FILM COATED ORAL
Refills: 0 | Status: DISCONTINUED | OUTPATIENT
Start: 2023-09-01 | End: 2023-09-04

## 2023-09-01 RX ADMIN — Medication 400 MILLIGRAM(S): at 17:47

## 2023-09-01 RX ADMIN — SACUBITRIL AND VALSARTAN 1 TABLET(S): 24; 26 TABLET, FILM COATED ORAL at 17:47

## 2023-09-01 RX ADMIN — HEPARIN SODIUM 4000 UNIT(S): 5000 INJECTION INTRAVENOUS; SUBCUTANEOUS at 01:51

## 2023-09-01 RX ADMIN — Medication 1000 MILLIGRAM(S): at 13:15

## 2023-09-01 RX ADMIN — Medication 20 MILLIGRAM(S): at 21:32

## 2023-09-01 RX ADMIN — PIPERACILLIN AND TAZOBACTAM 25 GRAM(S): 4; .5 INJECTION, POWDER, LYOPHILIZED, FOR SOLUTION INTRAVENOUS at 13:14

## 2023-09-01 RX ADMIN — PIPERACILLIN AND TAZOBACTAM 25 GRAM(S): 4; .5 INJECTION, POWDER, LYOPHILIZED, FOR SOLUTION INTRAVENOUS at 06:24

## 2023-09-01 RX ADMIN — Medication 25 MILLIGRAM(S): at 17:47

## 2023-09-01 RX ADMIN — Medication 400 MILLIGRAM(S): at 04:57

## 2023-09-01 RX ADMIN — Medication 100 GRAM(S): at 06:23

## 2023-09-01 RX ADMIN — POTASSIUM PHOSPHATE, MONOBASIC POTASSIUM PHOSPHATE, DIBASIC 62.5 MILLIMOLE(S): 236; 224 INJECTION, SOLUTION INTRAVENOUS at 04:56

## 2023-09-01 RX ADMIN — PANTOPRAZOLE SODIUM 40 MILLIGRAM(S): 20 TABLET, DELAYED RELEASE ORAL at 17:46

## 2023-09-01 RX ADMIN — Medication 400 MILLIGRAM(S): at 13:11

## 2023-09-01 RX ADMIN — Medication 1000 MILLIGRAM(S): at 17:58

## 2023-09-01 RX ADMIN — HEPARIN SODIUM 2600 UNIT(S)/HR: 5000 INJECTION INTRAVENOUS; SUBCUTANEOUS at 19:24

## 2023-09-01 RX ADMIN — HEPARIN SODIUM 2200 UNIT(S)/HR: 5000 INJECTION INTRAVENOUS; SUBCUTANEOUS at 18:53

## 2023-09-01 RX ADMIN — PIPERACILLIN AND TAZOBACTAM 25 GRAM(S): 4; .5 INJECTION, POWDER, LYOPHILIZED, FOR SOLUTION INTRAVENOUS at 21:30

## 2023-09-01 RX ADMIN — CLOPIDOGREL BISULFATE 75 MILLIGRAM(S): 75 TABLET, FILM COATED ORAL at 13:11

## 2023-09-01 RX ADMIN — HEPARIN SODIUM 4000 UNIT(S): 5000 INJECTION INTRAVENOUS; SUBCUTANEOUS at 09:02

## 2023-09-01 RX ADMIN — Medication 81 MILLIGRAM(S): at 13:11

## 2023-09-01 RX ADMIN — HEPARIN SODIUM 2200 UNIT(S)/HR: 5000 INJECTION INTRAVENOUS; SUBCUTANEOUS at 01:51

## 2023-09-01 RX ADMIN — HEPARIN SODIUM 2200 UNIT(S)/HR: 5000 INJECTION INTRAVENOUS; SUBCUTANEOUS at 08:43

## 2023-09-01 NOTE — PROGRESS NOTE ADULT - ATTENDING COMMENTS
78yMale presenting with: SBO, Anion gap metabolic acidosis, ARAM, Rapid afib now s/p OR and repair of parastomal hernia.   Awake alert  Bilateral BS  Hemodynamic intact  Heplock maintenance IV, for patient is mobilizing 3rd space  Abdomen soft, active BS, passed gas  Incision clean / dry  DC NGT, start on liquids  Renal function normalized   Neurovascular checks to LLE for possibly ruptured hemorrhagic Baker's cyst. No signs of compartment syndrome  Will watch carefully and if compartment syndrome suspected, patient may need evacuation / drainage  OOB with binder    Neuro:   - Multimodal pain control  - Delirium precautions  - Optimize sleep/wake cycle  -

## 2023-09-01 NOTE — PROGRESS NOTE ADULT - SUBJECTIVE AND OBJECTIVE BOX
INTERVAL HPI/OVERNIGHT EVENTS:    Pt was extubated without issue.  Pt is improving clinically.  Pt has been weaned off vasopressors and continues with renal recovery.  Remains with NGT in place.  Pt remains hemodynamically stable with rate controlled Afib.  Reloaded with digoxin.  Pt on heparin gtt for afib but not yet therapeutic.      MEDICATIONS  (STANDING):  acetaminophen   IVPB .. 1000 milliGRAM(s) IV Intermittent every 6 hours  aspirin Suppository 300 milliGRAM(s) Rectal daily  heparin  Infusion. 1700 Unit(s)/Hr (18 mL/Hr) IV Continuous <Continuous>  levothyroxine Injectable 68 MICROGram(s) IV Push at bedtime  multiple electrolytes Injection Type 1 1000 milliLiter(s) (100 mL/Hr) IV Continuous <Continuous>  pantoprazole  Injectable 40 milliGRAM(s) IV Push every 24 hours  piperacillin/tazobactam IVPB.. 3.375 Gram(s) IV Intermittent every 8 hours    MEDICATIONS  (PRN):  albuterol/ipratropium for Nebulization 3 milliLiter(s) Nebulizer every 6 hours PRN Shortness of Breath and/or Wheezing  heparin   Injectable 4000 Unit(s) IV Push every 6 hours PRN For aPTT between 40 - 57  heparin   Injectable 8500 Unit(s) IV Push every 6 hours PRN For aPTT less than 40      Drug Dosing Weight  Height (cm): 177.8 (30 Aug 2023 09:30)  Weight (kg): 102.1 (30 Aug 2023 09:30)  BMI (kg/m2): 32.3 (30 Aug 2023 09:30)  BSA (m2): 2.19 (30 Aug 2023 09:30)      PAST MEDICAL & SURGICAL HISTORY:  Atrial fibrillation      Hypothyroid      Hypertension      Bladder cancer      Bronchitis      Former smoker      CAD (coronary artery disease)      History of bladder surgery  on urostomy      S/P CABG x 3      History of automatic internal cardiac defibrillator (AICD)      H/O knee surgery          ICU Vital Signs Last 24 Hrs  T(C): 36.5 (31 Aug 2023 23:44), Max: 37.4 (31 Aug 2023 02:45)  T(F): 97.7 (31 Aug 2023 23:44), Max: 99.3 (31 Aug 2023 02:45)  HR: 91 (31 Aug 2023 23:00) (88 - 128)  BP: 137/112 (31 Aug 2023 22:00) (106/74 - 163/69)  BP(mean): 121 (31 Aug 2023 22:00) (65 - 121)  ABP: 119/46 (31 Aug 2023 23:00) (98/43 - 149/72)  ABP(mean): 70 (31 Aug 2023 23:00) (61 - 100)  RR: 25 (31 Aug 2023 23:00) (12 - 29)  SpO2: 96% (31 Aug 2023 23:00) (93% - 100%)    O2 Parameters below as of 31 Aug 2023 19:00  Patient On (Oxygen Delivery Method): nasal cannula  O2 Flow (L/min): 2          ABG - ( 31 Aug 2023 04:25 )  pH, Arterial: 7.360 pH, Blood: x     /  pCO2: 36    /  pO2: 113   / HCO3: 20    / Base Excess: -5.1  /  SaO2: 99.4                I&O's Detail    30 Aug 2023 07:01  -  31 Aug 2023 07:00  --------------------------------------------------------  IN:    FentaNYL: 76.5 mL    FentaNYL: 142.8 mL    Heparin Infusion: 15 mL    IV PiggyBack: 50 mL    multiple electrolytes Injection Type 1.: 500 mL    multiple electrolytes Injection Type 1.: 1500 mL    Norepinephrine: 30.7 mL    Norepinephrine: 65.4 mL    Vasopressin: 6 mL  Total IN: 2386.4 mL    OUT:    Nasogastric/Oral tube (mL): 800 mL    Urostomy (mL): 1345 mL    VAC (Vacuum Assisted Closure) System (mL): 100 mL  Total OUT: 2245 mL    Total NET: 141.4 mL      31 Aug 2023 07:01  -  01 Sep 2023 01:20  --------------------------------------------------------  IN:    Heparin Infusion: 242 mL    IV PiggyBack: 100 mL    IV PiggyBack: 150 mL    multiple electrolytes Injection Type 1.: 1600 mL  Total IN: 2092 mL    OUT:    FentaNYL: 0 mL    Nasogastric/Oral tube (mL): 750 mL    Norepinephrine: 0 mL    Urostomy (mL): 1435 mL  Total OUT: 2185 mL    Total NET: -93 mL          Mode: CPAP with PS  FiO2: 40  PEEP: 6  PS: 5  MAP: 9      Physical Exam:    Neurological:  A&O x 3. Non-focal.  Moving all extremities.  No appreciable motor deficits    HEENT: PERRLA, no drainage or redness.     Neck: Neck supple, No JVD    Respiratory:  Trachea midline, equal chest rise.  Breath Sounds equal bilateral    Cardiovascular: Irregularly irregular rhythm with normal rate.      Gastrointestinal: soft, non-distended. Serosanguinous output, NGT with now bilious output.    : Urostomy bag for ileoconduit draining clear/yellow urine    Skin: Edematous LLE, dopplerable pulses bilaterally, warm, dry, no diaphoresis, no pallor, cyanosis, or jaundice      LABS:  CBC Full  -  ( 31 Aug 2023 16:05 )  WBC Count : 18.65 K/uL  RBC Count : 3.04 M/uL  Hemoglobin : 8.8 g/dL  Hematocrit : 26.1 %  Platelet Count - Automated : 173 K/uL  Mean Cell Volume : 85.9 fl  Mean Cell Hemoglobin : 28.9 pg  Mean Cell Hemoglobin Concentration : 33.7 gm/dL  Auto Neutrophil # : x  Auto Lymphocyte # : x  Auto Monocyte # : x  Auto Eosinophil # : x  Auto Basophil # : x  Auto Neutrophil % : x  Auto Lymphocyte % : x  Auto Monocyte % : x  Auto Eosinophil % : x  Auto Basophil % : x    08-31    139  |  106  |  46.5<H>  ----------------------------<  120<H>  4.0   |  19.0<L>  |  1.67<H>    Ca    7.2<L>      31 Aug 2023 02:35  Phos  3.0     08-31  Mg     2.0     08-31    TPro  4.9<L>  /  Alb  2.1<L>  /  TBili  0.9  /  DBili  0.6<H>  /  AST  93<H>  /  ALT  49<H>  /  AlkPhos  404<H>  08-30    PT/INR - ( 31 Aug 2023 02:35 )   PT: 20.8 sec;   INR: 1.91 ratio         PTT - ( 01 Sep 2023 00:10 )  PTT:46.5 sec  Urinalysis Basic - ( 31 Aug 2023 02:35 )    Color: x / Appearance: x / SG: x / pH: x  Gluc: 120 mg/dL / Ketone: x  / Bili: x / Urobili: x   Blood: x / Protein: x / Nitrite: x   Leuk Esterase: x / RBC: x / WBC x   Sq Epi: x / Non Sq Epi: x / Bacteria: x           INTERVAL HPI/OVERNIGHT EVENTS:    Pt was extubated without issue.  Pt is improving clinically.  Pt has been weaned off vasopressors and continues with renal recovery.  Remains with NGT in place.  Pt remains hemodynamically stable with rate controlled Afib.  Reloaded with digoxin.  Pt on heparin gtt for afib but not yet therapeutic. CK trending upward but LLE pain is improved as per patient.  Creatinine continues to downtrend.    MEDICATIONS  (STANDING):  acetaminophen   IVPB .. 1000 milliGRAM(s) IV Intermittent every 6 hours  aspirin Suppository 300 milliGRAM(s) Rectal daily  heparin  Infusion. 1700 Unit(s)/Hr (18 mL/Hr) IV Continuous <Continuous>  levothyroxine Injectable 68 MICROGram(s) IV Push at bedtime  multiple electrolytes Injection Type 1 1000 milliLiter(s) (100 mL/Hr) IV Continuous <Continuous>  pantoprazole  Injectable 40 milliGRAM(s) IV Push every 24 hours  piperacillin/tazobactam IVPB.. 3.375 Gram(s) IV Intermittent every 8 hours    MEDICATIONS  (PRN):  albuterol/ipratropium for Nebulization 3 milliLiter(s) Nebulizer every 6 hours PRN Shortness of Breath and/or Wheezing  heparin   Injectable 4000 Unit(s) IV Push every 6 hours PRN For aPTT between 40 - 57  heparin   Injectable 8500 Unit(s) IV Push every 6 hours PRN For aPTT less than 40      Drug Dosing Weight  Height (cm): 177.8 (30 Aug 2023 09:30)  Weight (kg): 102.1 (30 Aug 2023 09:30)  BMI (kg/m2): 32.3 (30 Aug 2023 09:30)  BSA (m2): 2.19 (30 Aug 2023 09:30)      PAST MEDICAL & SURGICAL HISTORY:  Atrial fibrillation      Hypothyroid      Hypertension      Bladder cancer      Bronchitis      Former smoker      CAD (coronary artery disease)      History of bladder surgery  on urostomy      S/P CABG x 3      History of automatic internal cardiac defibrillator (AICD)      H/O knee surgery          ICU Vital Signs Last 24 Hrs  T(C): 36.5 (31 Aug 2023 23:44), Max: 37.4 (31 Aug 2023 02:45)  T(F): 97.7 (31 Aug 2023 23:44), Max: 99.3 (31 Aug 2023 02:45)  HR: 91 (31 Aug 2023 23:00) (88 - 128)  BP: 137/112 (31 Aug 2023 22:00) (106/74 - 163/69)  BP(mean): 121 (31 Aug 2023 22:00) (65 - 121)  ABP: 119/46 (31 Aug 2023 23:00) (98/43 - 149/72)  ABP(mean): 70 (31 Aug 2023 23:00) (61 - 100)  RR: 25 (31 Aug 2023 23:00) (12 - 29)  SpO2: 96% (31 Aug 2023 23:00) (93% - 100%)    O2 Parameters below as of 31 Aug 2023 19:00  Patient On (Oxygen Delivery Method): nasal cannula  O2 Flow (L/min): 2          ABG - ( 31 Aug 2023 04:25 )  pH, Arterial: 7.360 pH, Blood: x     /  pCO2: 36    /  pO2: 113   / HCO3: 20    / Base Excess: -5.1  /  SaO2: 99.4                I&O's Detail    30 Aug 2023 07:01  -  31 Aug 2023 07:00  --------------------------------------------------------  IN:    FentaNYL: 76.5 mL    FentaNYL: 142.8 mL    Heparin Infusion: 15 mL    IV PiggyBack: 50 mL    multiple electrolytes Injection Type 1.: 500 mL    multiple electrolytes Injection Type 1.: 1500 mL    Norepinephrine: 30.7 mL    Norepinephrine: 65.4 mL    Vasopressin: 6 mL  Total IN: 2386.4 mL    OUT:    Nasogastric/Oral tube (mL): 800 mL    Urostomy (mL): 1345 mL    VAC (Vacuum Assisted Closure) System (mL): 100 mL  Total OUT: 2245 mL    Total NET: 141.4 mL      31 Aug 2023 07:01  -  01 Sep 2023 01:20  --------------------------------------------------------  IN:    Heparin Infusion: 242 mL    IV PiggyBack: 100 mL    IV PiggyBack: 150 mL    multiple electrolytes Injection Type 1.: 1600 mL  Total IN: 2092 mL    OUT:    FentaNYL: 0 mL    Nasogastric/Oral tube (mL): 750 mL    Norepinephrine: 0 mL    Urostomy (mL): 1435 mL  Total OUT: 2185 mL    Total NET: -93 mL          Mode: CPAP with PS  FiO2: 40  PEEP: 6  PS: 5  MAP: 9      Physical Exam:    Neurological:  A&O x 3. Non-focal.  Moving all extremities.  No appreciable motor deficits    HEENT: PERRLA, no drainage or redness.     Neck: Neck supple, No JVD    Respiratory:  Trachea midline, equal chest rise.  Breath Sounds equal bilateral    Cardiovascular: Irregularly irregular rhythm with normal rate.      Gastrointestinal: soft, non-distended. Serosanguinous output, NGT with now bilious output.    : Urostomy bag for ileoconduit draining clear/yellow urine    Skin: Edematous LLE, dopplerable pulses bilaterally, warm, dry, no diaphoresis, no pallor, cyanosis, or jaundice      LABS:  CBC Full  -  ( 31 Aug 2023 16:05 )  WBC Count : 18.65 K/uL  RBC Count : 3.04 M/uL  Hemoglobin : 8.8 g/dL  Hematocrit : 26.1 %  Platelet Count - Automated : 173 K/uL  Mean Cell Volume : 85.9 fl  Mean Cell Hemoglobin : 28.9 pg  Mean Cell Hemoglobin Concentration : 33.7 gm/dL  Auto Neutrophil # : x  Auto Lymphocyte # : x  Auto Monocyte # : x  Auto Eosinophil # : x  Auto Basophil # : x  Auto Neutrophil % : x  Auto Lymphocyte % : x  Auto Monocyte % : x  Auto Eosinophil % : x  Auto Basophil % : x    08-31    139  |  106  |  46.5<H>  ----------------------------<  120<H>  4.0   |  19.0<L>  |  1.67<H>    Ca    7.2<L>      31 Aug 2023 02:35  Phos  3.0     08-31  Mg     2.0     08-31    TPro  4.9<L>  /  Alb  2.1<L>  /  TBili  0.9  /  DBili  0.6<H>  /  AST  93<H>  /  ALT  49<H>  /  AlkPhos  404<H>  08-30    PT/INR - ( 31 Aug 2023 02:35 )   PT: 20.8 sec;   INR: 1.91 ratio         PTT - ( 01 Sep 2023 00:10 )  PTT:46.5 sec  Urinalysis Basic - ( 31 Aug 2023 02:35 )    Color: x / Appearance: x / SG: x / pH: x  Gluc: 120 mg/dL / Ketone: x  / Bili: x / Urobili: x   Blood: x / Protein: x / Nitrite: x   Leuk Esterase: x / RBC: x / WBC x   Sq Epi: x / Non Sq Epi: x / Bacteria: x           INTERVAL HPI/OVERNIGHT EVENTS:    Pt was extubated without issue.  Pt is improving clinically.  Pt has been weaned off vasopressors and continues with renal recovery.  Remains with NGT in place.  Pt remains hemodynamically stable with rate controlled Afib.  Reloaded with digoxin.  Pt on heparin gtt for afib but not yet therapeutic. CK trending down and LLE pain is improved as per patient.  Creatinine continues to downtrend.    MEDICATIONS  (STANDING):  acetaminophen   IVPB .. 1000 milliGRAM(s) IV Intermittent every 6 hours  aspirin Suppository 300 milliGRAM(s) Rectal daily  heparin  Infusion. 1700 Unit(s)/Hr (18 mL/Hr) IV Continuous <Continuous>  levothyroxine Injectable 68 MICROGram(s) IV Push at bedtime  multiple electrolytes Injection Type 1 1000 milliLiter(s) (100 mL/Hr) IV Continuous <Continuous>  pantoprazole  Injectable 40 milliGRAM(s) IV Push every 24 hours  piperacillin/tazobactam IVPB.. 3.375 Gram(s) IV Intermittent every 8 hours    MEDICATIONS  (PRN):  albuterol/ipratropium for Nebulization 3 milliLiter(s) Nebulizer every 6 hours PRN Shortness of Breath and/or Wheezing  heparin   Injectable 4000 Unit(s) IV Push every 6 hours PRN For aPTT between 40 - 57  heparin   Injectable 8500 Unit(s) IV Push every 6 hours PRN For aPTT less than 40      Drug Dosing Weight  Height (cm): 177.8 (30 Aug 2023 09:30)  Weight (kg): 102.1 (30 Aug 2023 09:30)  BMI (kg/m2): 32.3 (30 Aug 2023 09:30)  BSA (m2): 2.19 (30 Aug 2023 09:30)      PAST MEDICAL & SURGICAL HISTORY:  Atrial fibrillation      Hypothyroid      Hypertension      Bladder cancer      Bronchitis      Former smoker      CAD (coronary artery disease)      History of bladder surgery  on urostomy      S/P CABG x 3      History of automatic internal cardiac defibrillator (AICD)      H/O knee surgery          ICU Vital Signs Last 24 Hrs  T(C): 36.5 (31 Aug 2023 23:44), Max: 37.4 (31 Aug 2023 02:45)  T(F): 97.7 (31 Aug 2023 23:44), Max: 99.3 (31 Aug 2023 02:45)  HR: 91 (31 Aug 2023 23:00) (88 - 128)  BP: 137/112 (31 Aug 2023 22:00) (106/74 - 163/69)  BP(mean): 121 (31 Aug 2023 22:00) (65 - 121)  ABP: 119/46 (31 Aug 2023 23:00) (98/43 - 149/72)  ABP(mean): 70 (31 Aug 2023 23:00) (61 - 100)  RR: 25 (31 Aug 2023 23:00) (12 - 29)  SpO2: 96% (31 Aug 2023 23:00) (93% - 100%)    O2 Parameters below as of 31 Aug 2023 19:00  Patient On (Oxygen Delivery Method): nasal cannula  O2 Flow (L/min): 2          ABG - ( 31 Aug 2023 04:25 )  pH, Arterial: 7.360 pH, Blood: x     /  pCO2: 36    /  pO2: 113   / HCO3: 20    / Base Excess: -5.1  /  SaO2: 99.4                I&O's Detail    30 Aug 2023 07:01  -  31 Aug 2023 07:00  --------------------------------------------------------  IN:    FentaNYL: 76.5 mL    FentaNYL: 142.8 mL    Heparin Infusion: 15 mL    IV PiggyBack: 50 mL    multiple electrolytes Injection Type 1.: 500 mL    multiple electrolytes Injection Type 1.: 1500 mL    Norepinephrine: 30.7 mL    Norepinephrine: 65.4 mL    Vasopressin: 6 mL  Total IN: 2386.4 mL    OUT:    Nasogastric/Oral tube (mL): 800 mL    Urostomy (mL): 1345 mL    VAC (Vacuum Assisted Closure) System (mL): 100 mL  Total OUT: 2245 mL    Total NET: 141.4 mL      31 Aug 2023 07:01  -  01 Sep 2023 01:20  --------------------------------------------------------  IN:    Heparin Infusion: 242 mL    IV PiggyBack: 100 mL    IV PiggyBack: 150 mL    multiple electrolytes Injection Type 1.: 1600 mL  Total IN: 2092 mL    OUT:    FentaNYL: 0 mL    Nasogastric/Oral tube (mL): 750 mL    Norepinephrine: 0 mL    Urostomy (mL): 1435 mL  Total OUT: 2185 mL    Total NET: -93 mL          Mode: CPAP with PS  FiO2: 40  PEEP: 6  PS: 5  MAP: 9      Physical Exam:    Neurological:  A&O x 3. Non-focal.  Moving all extremities.  No appreciable motor deficits    HEENT: PERRLA, no drainage or redness.     Neck: Neck supple, No JVD    Respiratory:  Trachea midline, equal chest rise.  Breath Sounds equal bilateral    Cardiovascular: Irregularly irregular rhythm with normal rate.      Gastrointestinal: soft, non-distended. Serosanguinous output, NGT with now bilious output.    : Urostomy bag for ileoconduit draining clear/yellow urine    Skin: Edematous LLE, dopplerable pulses bilaterally, warm, dry, no diaphoresis, no pallor, cyanosis, or jaundice      LABS:  CBC Full  -  ( 31 Aug 2023 16:05 )  WBC Count : 18.65 K/uL  RBC Count : 3.04 M/uL  Hemoglobin : 8.8 g/dL  Hematocrit : 26.1 %  Platelet Count - Automated : 173 K/uL  Mean Cell Volume : 85.9 fl  Mean Cell Hemoglobin : 28.9 pg  Mean Cell Hemoglobin Concentration : 33.7 gm/dL  Auto Neutrophil # : x  Auto Lymphocyte # : x  Auto Monocyte # : x  Auto Eosinophil # : x  Auto Basophil # : x  Auto Neutrophil % : x  Auto Lymphocyte % : x  Auto Monocyte % : x  Auto Eosinophil % : x  Auto Basophil % : x    08-31    139  |  106  |  46.5<H>  ----------------------------<  120<H>  4.0   |  19.0<L>  |  1.67<H>    Ca    7.2<L>      31 Aug 2023 02:35  Phos  3.0     08-31  Mg     2.0     08-31    TPro  4.9<L>  /  Alb  2.1<L>  /  TBili  0.9  /  DBili  0.6<H>  /  AST  93<H>  /  ALT  49<H>  /  AlkPhos  404<H>  08-30    PT/INR - ( 31 Aug 2023 02:35 )   PT: 20.8 sec;   INR: 1.91 ratio         PTT - ( 01 Sep 2023 00:10 )  PTT:46.5 sec  Urinalysis Basic - ( 31 Aug 2023 02:35 )    Color: x / Appearance: x / SG: x / pH: x  Gluc: 120 mg/dL / Ketone: x  / Bili: x / Urobili: x   Blood: x / Protein: x / Nitrite: x   Leuk Esterase: x / RBC: x / WBC x   Sq Epi: x / Non Sq Epi: x / Bacteria: x

## 2023-09-01 NOTE — PROGRESS NOTE ADULT - ASSESSMENT
78yMale presenting with: SBO, Anion gap metabolic acidosis, ARAM, Rapid afib now s/p OR and repair of parastomal hernia.     Neuro:   - Multimodal pain control  - Delirium precautions  - Optimize sleep/wake cycle  - Neurovascular checks to LLE for ruptured hemorrhagic Baker's cyst. No signs of compartment syndrome currently.    CV:   - Lactate remains cleared  - Vaso now off, on levophed weaned off and maintaining MAP goal > 65  - a fib with RVR, digoxin restarted and now rate controlled    Pulm:   - S/p extubation  - Pulmonary toilet  - Incentive spirometry  - encourage OOBTC    GI/Nutrition:   - NPO  - P-lyte @ 100/hr  - Protonix ppx 40 daily  - Monitor bowel function  - abdomen now closed, serial abdominal exams   - NGT with now bilious output. Continue to monitor for patency.    /Renal:   - Urostomy bag for ileoconduit. Continue to empty bag frequently for close I&O's. Clear yellow urine    ID:  - Continue Zosyn for 4 days postoperatively  - Monitor fever curve. Afebrile currently  - Leukocytosis improving  - 8/22 Bl cx negative    Endo:  - Monitor blood glucose  - Maintain glucose <180    Skin:   - Repositioning for DTI prevention while in bed    Heme/DVT Prophylaxis:  - SCDs  - heparin gtt w/ goal PTT 60-90.  - Plavix DC'd as pt is NPO, Continue ASA 300mg SC    Lines:  - Arterial Line - Left axillary A-line  - Central Line - L IJ TLC  - Peripheral IV's    Dispo: Consider floor transfer now with stable vitals and rate controlled Afib   78yMale presenting with: SBO, Anion gap metabolic acidosis, ARAM, Rapid afib now s/p OR and repair of parastomal hernia.     Neuro:   - Multimodal pain control  - Delirium precautions  - Optimize sleep/wake cycle  - Neurovascular checks to LLE for ruptured hemorrhagic Baker's cyst. No signs of compartment syndrome currently.    CV:   - Lactate remains cleared  - Vaso now off, on levophed weaned off and maintaining MAP goal > 65  - a fib with RVR, digoxin restarted and now rate controlled    Pulm:   - S/p extubation  - Pulmonary toilet  - Incentive spirometry  - encourage OOBTC    GI/Nutrition:   - NPO  - P-lyte @ 100/hr  - Protonix ppx 40 daily  - Monitor bowel function  - abdomen now closed, serial abdominal exams   - NGT with now bilious output. Continue to monitor for patency.    /Renal:   - Urostomy bag for ileoconduit. Continue to empty bag frequently for close I&O's. Clear yellow urine  - CK elevated with swelling / tightness to LLE calf.  Mild increase to 1300.  Pt states pain to LLE has improved since yesterday.    - Continue serial vascular and compartment checks and trend CK  - Creatinine continues to downtrend    ID:  - Continue Zosyn for 4 days postoperatively  - Monitor fever curve. Afebrile currently  - Leukocytosis improving  - 8/22 Bl cx negative    Endo:  - Monitor blood glucose  - Maintain glucose <180    Skin:   - Repositioning for DTI prevention while in bed    Heme/DVT Prophylaxis:  - SCDs  - heparin gtt w/ goal PTT 60-90.  - Plavix DC'd as pt is NPO, Continue ASA 300mg ID    Lines:  - Arterial Line - Left axillary A-line  - Central Line - L IJ TLC  - Peripheral IV's    Dispo: Consider floor transfer now with stable vitals and rate controlled Afib   78yMale presenting with: SBO, Anion gap metabolic acidosis, ARAM, Rapid afib now s/p OR and repair of parastomal hernia.     Neuro:   - Multimodal pain control  - Delirium precautions  - Optimize sleep/wake cycle  - Neurovascular checks to LLE for ruptured hemorrhagic Baker's cyst. No signs of compartment syndrome currently.    CV:   - Lactate remains cleared  - Vaso now off, on levophed weaned off and maintaining MAP goal > 65  - a fib with RVR, digoxin restarted and now rate controlled    Pulm:   - S/p extubation  - Pulmonary toilet  - Incentive spirometry  - encourage OOBTC    GI/Nutrition:   - NPO  - P-lyte @ 100/hr  - Protonix ppx 40 daily  - Monitor bowel function  - abdomen now closed, serial abdominal exams   - NGT with now bilious output. Continue to monitor for patency.    /Renal:   - Urostomy bag for ileoconduit. Continue to empty bag frequently for close I&O's. Clear yellow urine  - CK elevated with swelling / tightness to LLE calf.  Mild increase to 1500 and now downtrending.  Pt states pain to LLE has improved since yesterday.    - Continue serial vascular and compartment checks and trend CK  - Creatinine continues to downtrend    ID:  - Continue Zosyn for 4 days postoperatively  - Monitor fever curve. Afebrile currently  - Leukocytosis improving  - 8/22 Bl cx negative    Endo:  - Monitor blood glucose  - Maintain glucose <180    Skin:   - Repositioning for DTI prevention while in bed    Heme/DVT Prophylaxis:  - SCDs  - heparin gtt w/ goal PTT 60-90.  - Plavix DC'd as pt is NPO, Continue ASA 300mg OR    Lines:  - Arterial Line - Left axillary A-line  - Central Line - L IJ TLC  - Peripheral IV's    Dispo: Consider floor transfer now with stable vitals and rate controlled Afib

## 2023-09-02 LAB
ALBUMIN SERPL ELPH-MCNC: 1.9 G/DL — LOW (ref 3.3–5.2)
ALP SERPL-CCNC: 325 U/L — HIGH (ref 40–120)
ALT FLD-CCNC: 31 U/L — SIGNIFICANT CHANGE UP
ANION GAP SERPL CALC-SCNC: 11 MMOL/L — SIGNIFICANT CHANGE UP (ref 5–17)
APTT BLD: 65.7 SEC — HIGH (ref 24.5–35.6)
APTT BLD: 72 SEC — HIGH (ref 24.5–35.6)
AST SERPL-CCNC: 74 U/L — HIGH
BASOPHILS # BLD AUTO: 0.03 K/UL — SIGNIFICANT CHANGE UP (ref 0–0.2)
BASOPHILS NFR BLD AUTO: 0.2 % — SIGNIFICANT CHANGE UP (ref 0–2)
BILIRUB SERPL-MCNC: 0.6 MG/DL — SIGNIFICANT CHANGE UP (ref 0.4–2)
BUN SERPL-MCNC: 34.4 MG/DL — HIGH (ref 8–20)
CALCIUM SERPL-MCNC: 7.2 MG/DL — LOW (ref 8.4–10.5)
CHLORIDE SERPL-SCNC: 107 MMOL/L — SIGNIFICANT CHANGE UP (ref 96–108)
CK MB CFR SERPL CALC: 19.7 NG/ML — HIGH (ref 0–6.7)
CK SERPL-CCNC: 1213 U/L — HIGH (ref 30–200)
CO2 SERPL-SCNC: 22 MMOL/L — SIGNIFICANT CHANGE UP (ref 22–29)
CREAT SERPL-MCNC: 1.39 MG/DL — HIGH (ref 0.5–1.3)
DIGOXIN SERPL-MCNC: 0.9 NG/ML — SIGNIFICANT CHANGE UP (ref 0.8–2)
EGFR: 52 ML/MIN/1.73M2 — LOW
EOSINOPHIL # BLD AUTO: 0.1 K/UL — SIGNIFICANT CHANGE UP (ref 0–0.5)
EOSINOPHIL NFR BLD AUTO: 0.7 % — SIGNIFICANT CHANGE UP (ref 0–6)
GLUCOSE SERPL-MCNC: 170 MG/DL — HIGH (ref 70–99)
HCT VFR BLD CALC: 26.1 % — LOW (ref 39–50)
HGB BLD-MCNC: 8.6 G/DL — LOW (ref 13–17)
IMM GRANULOCYTES NFR BLD AUTO: 4.4 % — HIGH (ref 0–0.9)
LYMPHOCYTES # BLD AUTO: 0.82 K/UL — LOW (ref 1–3.3)
LYMPHOCYTES # BLD AUTO: 6.1 % — LOW (ref 13–44)
MAGNESIUM SERPL-MCNC: 1.8 MG/DL — SIGNIFICANT CHANGE UP (ref 1.6–2.6)
MCHC RBC-ENTMCNC: 28.2 PG — SIGNIFICANT CHANGE UP (ref 27–34)
MCHC RBC-ENTMCNC: 33 GM/DL — SIGNIFICANT CHANGE UP (ref 32–36)
MCV RBC AUTO: 85.6 FL — SIGNIFICANT CHANGE UP (ref 80–100)
MONOCYTES # BLD AUTO: 1.43 K/UL — HIGH (ref 0–0.9)
MONOCYTES NFR BLD AUTO: 10.6 % — SIGNIFICANT CHANGE UP (ref 2–14)
NEUTROPHILS # BLD AUTO: 10.58 K/UL — HIGH (ref 1.8–7.4)
NEUTROPHILS NFR BLD AUTO: 78 % — HIGH (ref 43–77)
PHOSPHATE SERPL-MCNC: 1.9 MG/DL — LOW (ref 2.4–4.7)
PLATELET # BLD AUTO: 230 K/UL — SIGNIFICANT CHANGE UP (ref 150–400)
POTASSIUM SERPL-MCNC: 3.3 MMOL/L — LOW (ref 3.5–5.3)
POTASSIUM SERPL-SCNC: 3.3 MMOL/L — LOW (ref 3.5–5.3)
PROT SERPL-MCNC: 4.9 G/DL — LOW (ref 6.6–8.7)
RBC # BLD: 3.05 M/UL — LOW (ref 4.2–5.8)
RBC # FLD: 15.4 % — HIGH (ref 10.3–14.5)
SODIUM SERPL-SCNC: 140 MMOL/L — SIGNIFICANT CHANGE UP (ref 135–145)
WBC # BLD: 13.55 K/UL — HIGH (ref 3.8–10.5)
WBC # FLD AUTO: 13.55 K/UL — HIGH (ref 3.8–10.5)

## 2023-09-02 RX ORDER — MAGNESIUM SULFATE 500 MG/ML
2 VIAL (ML) INJECTION ONCE
Refills: 0 | Status: COMPLETED | OUTPATIENT
Start: 2023-09-02 | End: 2023-09-02

## 2023-09-02 RX ORDER — METOPROLOL TARTRATE 50 MG
50 TABLET ORAL
Refills: 0 | Status: DISCONTINUED | OUTPATIENT
Start: 2023-09-02 | End: 2023-09-04

## 2023-09-02 RX ORDER — WARFARIN SODIUM 2.5 MG/1
5 TABLET ORAL ONCE
Refills: 0 | Status: COMPLETED | OUTPATIENT
Start: 2023-09-02 | End: 2023-09-02

## 2023-09-02 RX ORDER — POTASSIUM PHOSPHATE, MONOBASIC POTASSIUM PHOSPHATE, DIBASIC 236; 224 MG/ML; MG/ML
15 INJECTION, SOLUTION INTRAVENOUS ONCE
Refills: 0 | Status: COMPLETED | OUTPATIENT
Start: 2023-09-02 | End: 2023-09-02

## 2023-09-02 RX ORDER — SODIUM,POTASSIUM PHOSPHATES 278-250MG
1 POWDER IN PACKET (EA) ORAL ONCE
Refills: 0 | Status: COMPLETED | OUTPATIENT
Start: 2023-09-02 | End: 2023-09-02

## 2023-09-02 RX ADMIN — CLOPIDOGREL BISULFATE 75 MILLIGRAM(S): 75 TABLET, FILM COATED ORAL at 13:30

## 2023-09-02 RX ADMIN — Medication 25 GRAM(S): at 05:18

## 2023-09-02 RX ADMIN — Medication 81 MILLIGRAM(S): at 13:30

## 2023-09-02 RX ADMIN — SACUBITRIL AND VALSARTAN 1 TABLET(S): 24; 26 TABLET, FILM COATED ORAL at 18:19

## 2023-09-02 RX ADMIN — WARFARIN SODIUM 5 MILLIGRAM(S): 2.5 TABLET ORAL at 23:03

## 2023-09-02 RX ADMIN — HEPARIN SODIUM 2600 UNIT(S)/HR: 5000 INJECTION INTRAVENOUS; SUBCUTANEOUS at 16:08

## 2023-09-02 RX ADMIN — Medication 125 MICROGRAM(S): at 05:18

## 2023-09-02 RX ADMIN — PIPERACILLIN AND TAZOBACTAM 25 GRAM(S): 4; .5 INJECTION, POWDER, LYOPHILIZED, FOR SOLUTION INTRAVENOUS at 14:45

## 2023-09-02 RX ADMIN — HEPARIN SODIUM 2600 UNIT(S)/HR: 5000 INJECTION INTRAVENOUS; SUBCUTANEOUS at 13:31

## 2023-09-02 RX ADMIN — Medication 50 MILLIGRAM(S): at 18:19

## 2023-09-02 RX ADMIN — HEPARIN SODIUM 2600 UNIT(S)/HR: 5000 INJECTION INTRAVENOUS; SUBCUTANEOUS at 19:11

## 2023-09-02 RX ADMIN — HEPARIN SODIUM 2600 UNIT(S)/HR: 5000 INJECTION INTRAVENOUS; SUBCUTANEOUS at 03:41

## 2023-09-02 RX ADMIN — Medication 137 MICROGRAM(S): at 05:18

## 2023-09-02 RX ADMIN — PANTOPRAZOLE SODIUM 40 MILLIGRAM(S): 20 TABLET, DELAYED RELEASE ORAL at 18:20

## 2023-09-02 RX ADMIN — PIPERACILLIN AND TAZOBACTAM 25 GRAM(S): 4; .5 INJECTION, POWDER, LYOPHILIZED, FOR SOLUTION INTRAVENOUS at 23:03

## 2023-09-02 RX ADMIN — SACUBITRIL AND VALSARTAN 1 TABLET(S): 24; 26 TABLET, FILM COATED ORAL at 05:22

## 2023-09-02 RX ADMIN — POTASSIUM PHOSPHATE, MONOBASIC POTASSIUM PHOSPHATE, DIBASIC 62.5 MILLIMOLE(S): 236; 224 INJECTION, SOLUTION INTRAVENOUS at 05:18

## 2023-09-02 RX ADMIN — PIPERACILLIN AND TAZOBACTAM 25 GRAM(S): 4; .5 INJECTION, POWDER, LYOPHILIZED, FOR SOLUTION INTRAVENOUS at 05:22

## 2023-09-02 RX ADMIN — Medication 1 PACKET(S): at 05:19

## 2023-09-02 RX ADMIN — Medication 50 MILLIGRAM(S): at 05:22

## 2023-09-02 RX ADMIN — Medication 20 MILLIGRAM(S): at 05:18

## 2023-09-02 RX ADMIN — HEPARIN SODIUM 2600 UNIT(S)/HR: 5000 INJECTION INTRAVENOUS; SUBCUTANEOUS at 05:32

## 2023-09-02 NOTE — CHART NOTE - NSCHARTNOTEFT_GEN_A_CORE
SICU TRANSFER NOTE  -----------------------------  ICU Admission Date: 08-28-23  Transfer Date: 09-02-23 @ 17:56    Admission Diagnosis: SBO    Active Problems/injuries: Pt was extubated  overnight without issue.  Pt is improving clinically.  Pt has been weaned off vasopressors and continues with renal recovery.  Remains with NGT in place.  Pt remains hemodynamically stable with rate controlled Afib.  Reloaded with digoxin.  Pt on heparin gtt for afib but not yet therapeutic, coumadin restarted 9/2. CK trending down and LLE pain is improved as per patient.  Creatinine continues to downtrend. Okay to downgrade.     Procedures: OR 8-28-23: Ex-Lap with KEANU. Abthera placed.                    OR 8-30-23: Repaired hernia with mesh. All bowel viable.   Consultants:  [x ] Cardiology  [ ] Endocrine  [ ] Infectious Disease  [ ] Medicine  [ ]Neurosurgery  [ ] Ortho       [ ] Weight Bearing Status:  [ ] Palliative       [ ] Advanced Directives:    [ ] Physical Medicine and Rehab       [ ] Disposition :   [ ] Plastics  [ ] Pulmonary    Medications  albuterol/ipratropium for Nebulization 3 milliLiter(s) Nebulizer every 6 hours PRN  aspirin  chewable 81 milliGRAM(s) Oral daily  clopidogrel Tablet 75 milliGRAM(s) Oral daily  digoxin     Tablet 125 MICROGram(s) Oral daily  furosemide    Tablet 20 milliGRAM(s) Oral daily  heparin  Infusion. 2600 Unit(s)/Hr IV Continuous <Continuous>  levothyroxine 137 MICROGram(s) Oral daily  metoprolol tartrate 50 milliGRAM(s) Oral two times a day  pantoprazole  Injectable 40 milliGRAM(s) IV Push every 24 hours  piperacillin/tazobactam IVPB.. 3.375 Gram(s) IV Intermittent every 8 hours  sacubitril 24 mG/valsartan 26 mG 1 Tablet(s) Oral two times a day  warfarin 5 milliGRAM(s) Oral once      [x ] I attest I have reviewed and reconciled all medications prior to transfer    IV Fluids  lactated ringers.: Solution, 1000 milliLiter(s) infuse at 75 mL/Hr  Provider's Contact #: [phone]  sodium chloride 0.9% Bolus:   500 milliLiter(s), IV Bolus, once, infuse over 60 Minute(s), Stop After 1 Doses  Provider's Contact #: [phone]  lactated ringers.: Solution, 1000 milliLiter(s) infuse at 42 mL/Hr  Provider's Contact #: [phone]  lactated ringers Bolus:   1000 milliLiter(s), IV Bolus, once, infuse over 60 Minute(s), Stop After 1 Doses  lactated ringers.: Solution, 1000 milliLiter(s) infuse at 75 mL/Hr  Provider's Contact #: [phone]  sodium chloride 0.9% Bolus:   1000 milliLiter(s), IV Bolus, once, infuse over 60 Minute(s), Stop After 1 Doses  Provider's Contact #: [phone]  sodium chloride 0.9%.: Solution, 1000 milliLiter(s) infuse at 110 mL/Hr        Antibiotics:  piperacillin/tazobactam IVPB.. 3.375 Gram(s) IV Intermittent every 8 hours    Indication: post-op  End Date: 09-04-23-      I have discussed this case with ACS PA  upon transfer and all questions regarding ICU course were answered.  The following items are to be followed up:      1)Neuro:   - Neurovascular checks to LLE for ruptured hemorrhagic Baker's cyst. No signs of compartment syndrome currently.  -Pain management     2)CV:   - a fib with RVR, digoxin restarted and now rate controlled  -heparin gtt, bridge to coumadin    3) GI  - Advance diet  - Protonix ppx 40 daily  - Monitor bowel function    4)/Renal:   - Urostomy bag for ileoconduit. Continue to empty bag frequently for close I&O's. Clear yellow urine  - CK elevated with swelling / tightness to LLE calf.  Mild increase to 1500 and now downtrending.  Pt states pain to LLE has improved since yesterday.    - Continue serial vascular and compartment checks and trend CK  - Creatinine continues to downtrend    5)ID:  - Continue Zosyn for 4 days postoperatively. End 9/4  - Monitor fever curve. Afebrile currently  - Leukocytosis improving  - 8/22 Bl cx negative    6)Endo:  - Monitor blood glucose  - Maintain glucose <180    DVT prophylaxis: AC, SCDs

## 2023-09-03 LAB
ANION GAP SERPL CALC-SCNC: 12 MMOL/L — SIGNIFICANT CHANGE UP (ref 5–17)
ANION GAP SERPL CALC-SCNC: 13 MMOL/L — SIGNIFICANT CHANGE UP (ref 5–17)
ANISOCYTOSIS BLD QL: SLIGHT — SIGNIFICANT CHANGE UP
APTT BLD: 69.1 SEC — HIGH (ref 24.5–35.6)
BASOPHILS # BLD AUTO: 0 K/UL — SIGNIFICANT CHANGE UP (ref 0–0.2)
BASOPHILS NFR BLD AUTO: 0 % — SIGNIFICANT CHANGE UP (ref 0–2)
BUN SERPL-MCNC: 35.8 MG/DL — HIGH (ref 8–20)
BUN SERPL-MCNC: 40.5 MG/DL — HIGH (ref 8–20)
BURR CELLS BLD QL SMEAR: PRESENT — SIGNIFICANT CHANGE UP
CALCIUM SERPL-MCNC: 6.6 MG/DL — LOW (ref 8.4–10.5)
CALCIUM SERPL-MCNC: 6.7 MG/DL — LOW (ref 8.4–10.5)
CHLORIDE SERPL-SCNC: 107 MMOL/L — SIGNIFICANT CHANGE UP (ref 96–108)
CHLORIDE SERPL-SCNC: 108 MMOL/L — SIGNIFICANT CHANGE UP (ref 96–108)
CO2 SERPL-SCNC: 22 MMOL/L — SIGNIFICANT CHANGE UP (ref 22–29)
CO2 SERPL-SCNC: 22 MMOL/L — SIGNIFICANT CHANGE UP (ref 22–29)
CREAT SERPL-MCNC: 1.46 MG/DL — HIGH (ref 0.5–1.3)
CREAT SERPL-MCNC: 1.51 MG/DL — HIGH (ref 0.5–1.3)
EGFR: 47 ML/MIN/1.73M2 — LOW
EGFR: 49 ML/MIN/1.73M2 — LOW
ELLIPTOCYTES BLD QL SMEAR: SLIGHT — SIGNIFICANT CHANGE UP
EOSINOPHIL # BLD AUTO: 0 K/UL — SIGNIFICANT CHANGE UP (ref 0–0.5)
EOSINOPHIL NFR BLD AUTO: 0 % — SIGNIFICANT CHANGE UP (ref 0–6)
GLUCOSE SERPL-MCNC: 155 MG/DL — HIGH (ref 70–99)
GLUCOSE SERPL-MCNC: 155 MG/DL — HIGH (ref 70–99)
HCT VFR BLD CALC: 23.9 % — LOW (ref 39–50)
HGB BLD-MCNC: 7.9 G/DL — LOW (ref 13–17)
INR BLD: 1.72 RATIO — HIGH (ref 0.85–1.18)
LYMPHOCYTES # BLD AUTO: 1.11 K/UL — SIGNIFICANT CHANGE UP (ref 1–3.3)
LYMPHOCYTES # BLD AUTO: 13.2 % — SIGNIFICANT CHANGE UP (ref 13–44)
MAGNESIUM SERPL-MCNC: 1.5 MG/DL — LOW (ref 1.8–2.6)
MANUAL SMEAR VERIFICATION: SIGNIFICANT CHANGE UP
MCHC RBC-ENTMCNC: 28.8 PG — SIGNIFICANT CHANGE UP (ref 27–34)
MCHC RBC-ENTMCNC: 33.1 GM/DL — SIGNIFICANT CHANGE UP (ref 32–36)
MCV RBC AUTO: 87.2 FL — SIGNIFICANT CHANGE UP (ref 80–100)
METAMYELOCYTES # FLD: 1.7 % — HIGH (ref 0–0)
MONOCYTES # BLD AUTO: 1.26 K/UL — HIGH (ref 0–0.9)
MONOCYTES NFR BLD AUTO: 14.9 % — HIGH (ref 2–14)
NEUTROPHILS # BLD AUTO: 5.85 K/UL — SIGNIFICANT CHANGE UP (ref 1.8–7.4)
NEUTROPHILS NFR BLD AUTO: 68.4 % — SIGNIFICANT CHANGE UP (ref 43–77)
NEUTS BAND # BLD: 0.9 % — SIGNIFICANT CHANGE UP (ref 0–8)
OVALOCYTES BLD QL SMEAR: SLIGHT — SIGNIFICANT CHANGE UP
PHOSPHATE SERPL-MCNC: 1.9 MG/DL — LOW (ref 2.4–4.7)
PLAT MORPH BLD: NORMAL — SIGNIFICANT CHANGE UP
PLATELET # BLD AUTO: 285 K/UL — SIGNIFICANT CHANGE UP (ref 150–400)
POIKILOCYTOSIS BLD QL AUTO: SLIGHT — SIGNIFICANT CHANGE UP
POLYCHROMASIA BLD QL SMEAR: SLIGHT — SIGNIFICANT CHANGE UP
POTASSIUM SERPL-MCNC: 3.2 MMOL/L — LOW (ref 3.5–5.3)
POTASSIUM SERPL-MCNC: 3.7 MMOL/L — SIGNIFICANT CHANGE UP (ref 3.5–5.3)
POTASSIUM SERPL-SCNC: 3.2 MMOL/L — LOW (ref 3.5–5.3)
POTASSIUM SERPL-SCNC: 3.7 MMOL/L — SIGNIFICANT CHANGE UP (ref 3.5–5.3)
PROTHROM AB SERPL-ACNC: 18.8 SEC — HIGH (ref 9.5–13)
RBC # BLD: 2.74 M/UL — LOW (ref 4.2–5.8)
RBC # FLD: 15.7 % — HIGH (ref 10.3–14.5)
RBC BLD AUTO: ABNORMAL
SODIUM SERPL-SCNC: 141 MMOL/L — SIGNIFICANT CHANGE UP (ref 135–145)
SODIUM SERPL-SCNC: 143 MMOL/L — SIGNIFICANT CHANGE UP (ref 135–145)
VARIANT LYMPHS # BLD: 0.9 % — SIGNIFICANT CHANGE UP (ref 0–6)
WBC # BLD: 8.44 K/UL — SIGNIFICANT CHANGE UP (ref 3.8–10.5)
WBC # FLD AUTO: 8.44 K/UL — SIGNIFICANT CHANGE UP (ref 3.8–10.5)

## 2023-09-03 PROCEDURE — 99024 POSTOP FOLLOW-UP VISIT: CPT

## 2023-09-03 RX ORDER — MAGNESIUM SULFATE 500 MG/ML
2 VIAL (ML) INJECTION ONCE
Refills: 0 | Status: COMPLETED | OUTPATIENT
Start: 2023-09-03 | End: 2023-09-03

## 2023-09-03 RX ORDER — POTASSIUM CHLORIDE 20 MEQ
20 PACKET (EA) ORAL
Refills: 0 | Status: COMPLETED | OUTPATIENT
Start: 2023-09-03 | End: 2023-09-03

## 2023-09-03 RX ORDER — WARFARIN SODIUM 2.5 MG/1
5 TABLET ORAL ONCE
Refills: 0 | Status: COMPLETED | OUTPATIENT
Start: 2023-09-03 | End: 2023-09-03

## 2023-09-03 RX ORDER — POTASSIUM PHOSPHATE, MONOBASIC POTASSIUM PHOSPHATE, DIBASIC 236; 224 MG/ML; MG/ML
15 INJECTION, SOLUTION INTRAVENOUS ONCE
Refills: 0 | Status: COMPLETED | OUTPATIENT
Start: 2023-09-03 | End: 2023-09-03

## 2023-09-03 RX ADMIN — Medication 20 MILLIEQUIVALENT(S): at 07:54

## 2023-09-03 RX ADMIN — CLOPIDOGREL BISULFATE 75 MILLIGRAM(S): 75 TABLET, FILM COATED ORAL at 09:56

## 2023-09-03 RX ADMIN — Medication 3 MILLILITER(S): at 21:52

## 2023-09-03 RX ADMIN — Medication 50 MILLIGRAM(S): at 06:36

## 2023-09-03 RX ADMIN — WARFARIN SODIUM 5 MILLIGRAM(S): 2.5 TABLET ORAL at 23:20

## 2023-09-03 RX ADMIN — Medication 137 MICROGRAM(S): at 06:36

## 2023-09-03 RX ADMIN — PIPERACILLIN AND TAZOBACTAM 25 GRAM(S): 4; .5 INJECTION, POWDER, LYOPHILIZED, FOR SOLUTION INTRAVENOUS at 14:31

## 2023-09-03 RX ADMIN — Medication 81 MILLIGRAM(S): at 09:56

## 2023-09-03 RX ADMIN — PANTOPRAZOLE SODIUM 40 MILLIGRAM(S): 20 TABLET, DELAYED RELEASE ORAL at 16:32

## 2023-09-03 RX ADMIN — Medication 3 MILLILITER(S): at 09:56

## 2023-09-03 RX ADMIN — HEPARIN SODIUM 2600 UNIT(S)/HR: 5000 INJECTION INTRAVENOUS; SUBCUTANEOUS at 07:31

## 2023-09-03 RX ADMIN — Medication 125 MICROGRAM(S): at 06:36

## 2023-09-03 RX ADMIN — PIPERACILLIN AND TAZOBACTAM 25 GRAM(S): 4; .5 INJECTION, POWDER, LYOPHILIZED, FOR SOLUTION INTRAVENOUS at 23:20

## 2023-09-03 RX ADMIN — HEPARIN SODIUM 2600 UNIT(S)/HR: 5000 INJECTION INTRAVENOUS; SUBCUTANEOUS at 02:02

## 2023-09-03 RX ADMIN — Medication 50 MILLIGRAM(S): at 16:32

## 2023-09-03 RX ADMIN — HEPARIN SODIUM 2600 UNIT(S)/HR: 5000 INJECTION INTRAVENOUS; SUBCUTANEOUS at 19:37

## 2023-09-03 RX ADMIN — Medication 20 MILLIGRAM(S): at 06:36

## 2023-09-03 RX ADMIN — HEPARIN SODIUM 2600 UNIT(S)/HR: 5000 INJECTION INTRAVENOUS; SUBCUTANEOUS at 13:45

## 2023-09-03 RX ADMIN — Medication 20 MILLIEQUIVALENT(S): at 10:36

## 2023-09-03 RX ADMIN — POTASSIUM PHOSPHATE, MONOBASIC POTASSIUM PHOSPHATE, DIBASIC 62.5 MILLIMOLE(S): 236; 224 INJECTION, SOLUTION INTRAVENOUS at 18:18

## 2023-09-03 RX ADMIN — Medication 25 GRAM(S): at 11:31

## 2023-09-03 RX ADMIN — PIPERACILLIN AND TAZOBACTAM 25 GRAM(S): 4; .5 INJECTION, POWDER, LYOPHILIZED, FOR SOLUTION INTRAVENOUS at 06:37

## 2023-09-03 RX ADMIN — HEPARIN SODIUM 2600 UNIT(S)/HR: 5000 INJECTION INTRAVENOUS; SUBCUTANEOUS at 07:35

## 2023-09-03 NOTE — PROGRESS NOTE ADULT - SUBJECTIVE AND OBJECTIVE BOX
Patient seen and examined at bedside. Given improved labs and hemodynamics was downgraded yesterday. Doing well this morning with minimal pain, no n/v or other acute complaints. On heparin gtt.     MEDICATIONS  (STANDING):  aspirin  chewable 81 milliGRAM(s) Oral daily  clopidogrel Tablet 75 milliGRAM(s) Oral daily  digoxin     Tablet 125 MICROGram(s) Oral daily  furosemide    Tablet 20 milliGRAM(s) Oral daily  heparin  Infusion. 2600 Unit(s)/Hr (26 mL/Hr) IV Continuous <Continuous>  levothyroxine 137 MICROGram(s) Oral daily  metoprolol tartrate 50 milliGRAM(s) Oral two times a day  pantoprazole  Injectable 40 milliGRAM(s) IV Push every 24 hours  piperacillin/tazobactam IVPB.. 3.375 Gram(s) IV Intermittent every 8 hours  sacubitril 24 mG/valsartan 26 mG 1 Tablet(s) Oral two times a day    MEDICATIONS  (PRN):  albuterol/ipratropium for Nebulization 3 milliLiter(s) Nebulizer every 6 hours PRN Shortness of Breath and/or Wheezing      Vital Signs Last 24 Hrs  T(C): 36.8 (02 Sep 2023 23:45), Max: 36.8 (02 Sep 2023 07:14)  T(F): 98.2 (02 Sep 2023 23:45), Max: 98.3 (02 Sep 2023 07:14)  HR: 90 (02 Sep 2023 23:45) (85 - 104)  BP: 114/68 (02 Sep 2023 23:45) (109/64 - 150/67)  BP(mean): 81 (02 Sep 2023 14:00) (74 - 86)  RR: 20 (02 Sep 2023 23:45) (18 - 33)  SpO2: 92% (02 Sep 2023 23:45) (92% - 97%)    Parameters below as of 02 Sep 2023 23:45  Patient On (Oxygen Delivery Method): room air                            8.6    13.55 )-----------( 230      ( 02 Sep 2023 02:35 )             26.1   09-02    140  |  107  |  34.4<H>  ----------------------------<  170<H>  3.3<L>   |  22.0  |  1.39<H>    Ca    7.2<L>      02 Sep 2023 02:35  Phos  1.9     09-02  Mg     1.8     09-02    TPro  4.9<L>  /  Alb  1.9<L>  /  TBili  0.6  /  DBili  x   /  AST  74<H>  /  ALT  31  /  AlkPhos  325<H>  09-02      Exam:  Gen: pt lying in bed, alert, in NAD  Resp: unlabored  CVS: irregularly irregular rhythm   Abd: soft, mildly distended, appropriate perincisional ttp. Urostomy appears healthy and is functioning well  Ext: moving all extremities spontaneously, sensation intact, pulses 2+

## 2023-09-03 NOTE — PROGRESS NOTE ADULT - ASSESSMENT
Assessment: Patient is a 78y Male presenting with: SBO, Anion gap metabolic acidosis, ARAM, Rapid afib now s/p OR and repair of parastomal hernia.     Plan:  -Multimodal pain control  -delirium precautions  -Neurovasc checks for ruptured bakers cyst  -strict I/Os given urostomy  -oob/IS as tolerated  -DASH diet, tolerating well  -zosyn x4 days post op  -dvt ppx  -Heparin gtt, will transition to oral ac

## 2023-09-03 NOTE — PROGRESS NOTE ADULT - ATTENDING COMMENTS
Agree with above assessment.  The patient was seen and examined by myself with the surgical PA.  The patient is with flatus and BM.  He states he has no nausea or vomit.  Mild incisional discomfort with movement otherwise no pain.  Abdomen is softly distended, incision site dressing is clean.  Left leg still with edema and mild tenderness but less than before.  Continue with PO, OOB mobilize.  Follow up labs in AM. Agree with above assessment.  The patient was seen and examined by myself with the surgical PA.  The patient is with flatus and BM.  He states he has no nausea or vomit.  Mild incisional discomfort with movement otherwise no pain.  Abdomen is softly distended, incision site dressing is clean.  Left leg still with edema and mild tenderness but less than before.  Continue with PO, OOB mobilize.  Follow up labs in AM. Surgically stable.

## 2023-09-04 DIAGNOSIS — R06.03 ACUTE RESPIRATORY DISTRESS: ICD-10-CM

## 2023-09-04 LAB
ALBUMIN SERPL ELPH-MCNC: 2 G/DL — LOW (ref 3.3–5.2)
ALBUMIN SERPL ELPH-MCNC: 2.2 G/DL — LOW (ref 3.3–5.2)
ALP SERPL-CCNC: 158 U/L — HIGH (ref 40–120)
ALP SERPL-CCNC: 193 U/L — HIGH (ref 40–120)
ALT FLD-CCNC: 21 U/L — SIGNIFICANT CHANGE UP
ALT FLD-CCNC: 54 U/L — HIGH
ANION GAP SERPL CALC-SCNC: 10 MMOL/L — SIGNIFICANT CHANGE UP (ref 5–17)
ANION GAP SERPL CALC-SCNC: 12 MMOL/L — SIGNIFICANT CHANGE UP (ref 5–17)
ANION GAP SERPL CALC-SCNC: 13 MMOL/L — SIGNIFICANT CHANGE UP (ref 5–17)
ANISOCYTOSIS BLD QL: SLIGHT — SIGNIFICANT CHANGE UP
ANISOCYTOSIS BLD QL: SLIGHT — SIGNIFICANT CHANGE UP
APTT BLD: 167 SEC — CRITICAL HIGH (ref 24.5–35.6)
APTT BLD: 26.2 SEC — SIGNIFICANT CHANGE UP (ref 24.5–35.6)
AST SERPL-CCNC: 120 U/L — HIGH
AST SERPL-CCNC: 45 U/L — HIGH
BASOPHILS # BLD AUTO: 0 K/UL — SIGNIFICANT CHANGE UP (ref 0–0.2)
BASOPHILS # BLD AUTO: 0 K/UL — SIGNIFICANT CHANGE UP (ref 0–0.2)
BASOPHILS NFR BLD AUTO: 0 % — SIGNIFICANT CHANGE UP (ref 0–2)
BASOPHILS NFR BLD AUTO: 0 % — SIGNIFICANT CHANGE UP (ref 0–2)
BILIRUB SERPL-MCNC: 0.5 MG/DL — SIGNIFICANT CHANGE UP (ref 0.4–2)
BILIRUB SERPL-MCNC: 0.6 MG/DL — SIGNIFICANT CHANGE UP (ref 0.4–2)
BLD GP AB SCN SERPL QL: SIGNIFICANT CHANGE UP
BUN SERPL-MCNC: 45.1 MG/DL — HIGH (ref 8–20)
BUN SERPL-MCNC: 48.9 MG/DL — HIGH (ref 8–20)
BUN SERPL-MCNC: 49.9 MG/DL — HIGH (ref 8–20)
BURR CELLS BLD QL SMEAR: PRESENT — SIGNIFICANT CHANGE UP
CALCIUM SERPL-MCNC: 6.6 MG/DL — LOW (ref 8.4–10.5)
CALCIUM SERPL-MCNC: 6.8 MG/DL — LOW (ref 8.4–10.5)
CALCIUM SERPL-MCNC: 6.9 MG/DL — LOW (ref 8.4–10.5)
CHLORIDE SERPL-SCNC: 103 MMOL/L — SIGNIFICANT CHANGE UP (ref 96–108)
CHLORIDE SERPL-SCNC: 104 MMOL/L — SIGNIFICANT CHANGE UP (ref 96–108)
CHLORIDE SERPL-SCNC: 105 MMOL/L — SIGNIFICANT CHANGE UP (ref 96–108)
CO2 SERPL-SCNC: 21 MMOL/L — LOW (ref 22–29)
CO2 SERPL-SCNC: 23 MMOL/L — SIGNIFICANT CHANGE UP (ref 22–29)
CO2 SERPL-SCNC: 23 MMOL/L — SIGNIFICANT CHANGE UP (ref 22–29)
CREAT SERPL-MCNC: 1.53 MG/DL — HIGH (ref 0.5–1.3)
CREAT SERPL-MCNC: 1.54 MG/DL — HIGH (ref 0.5–1.3)
CREAT SERPL-MCNC: 1.63 MG/DL — HIGH (ref 0.5–1.3)
EGFR: 43 ML/MIN/1.73M2 — LOW
EGFR: 46 ML/MIN/1.73M2 — LOW
EGFR: 46 ML/MIN/1.73M2 — LOW
EOSINOPHIL # BLD AUTO: 0.08 K/UL — SIGNIFICANT CHANGE UP (ref 0–0.5)
EOSINOPHIL # BLD AUTO: 0.09 K/UL — SIGNIFICANT CHANGE UP (ref 0–0.5)
EOSINOPHIL NFR BLD AUTO: 0.9 % — SIGNIFICANT CHANGE UP (ref 0–6)
EOSINOPHIL NFR BLD AUTO: 0.9 % — SIGNIFICANT CHANGE UP (ref 0–6)
FIBRINOGEN PPP-MCNC: 612 MG/DL — HIGH (ref 200–450)
GAS PNL BLDA: SIGNIFICANT CHANGE UP
GAS PNL BLDA: SIGNIFICANT CHANGE UP
GIANT PLATELETS BLD QL SMEAR: PRESENT — SIGNIFICANT CHANGE UP
GLUCOSE BLDC GLUCOMTR-MCNC: 162 MG/DL — HIGH (ref 70–99)
GLUCOSE SERPL-MCNC: 150 MG/DL — HIGH (ref 70–99)
GLUCOSE SERPL-MCNC: 160 MG/DL — HIGH (ref 70–99)
GLUCOSE SERPL-MCNC: 190 MG/DL — HIGH (ref 70–99)
HCT VFR BLD CALC: 16.8 % — CRITICAL LOW (ref 39–50)
HCT VFR BLD CALC: 20.5 % — CRITICAL LOW (ref 39–50)
HCT VFR BLD CALC: 23.5 % — LOW (ref 39–50)
HCT VFR BLD CALC: 24.6 % — LOW (ref 39–50)
HGB BLD-MCNC: 5.6 G/DL — CRITICAL LOW (ref 13–17)
HGB BLD-MCNC: 7 G/DL — CRITICAL LOW (ref 13–17)
HGB BLD-MCNC: 8.1 G/DL — LOW (ref 13–17)
HGB BLD-MCNC: 8.4 G/DL — LOW (ref 13–17)
HYPOCHROMIA BLD QL: SLIGHT — SIGNIFICANT CHANGE UP
INR BLD: 1.29 RATIO — HIGH (ref 0.85–1.18)
INR BLD: 1.68 RATIO — HIGH (ref 0.85–1.18)
INR BLD: 2.78 RATIO — HIGH (ref 0.85–1.18)
LYMPHOCYTES # BLD AUTO: 0.55 K/UL — LOW (ref 1–3.3)
LYMPHOCYTES # BLD AUTO: 1.92 K/UL — SIGNIFICANT CHANGE UP (ref 1–3.3)
LYMPHOCYTES # BLD AUTO: 18.6 % — SIGNIFICANT CHANGE UP (ref 13–44)
LYMPHOCYTES # BLD AUTO: 6.1 % — LOW (ref 13–44)
MACROCYTES BLD QL: SLIGHT — SIGNIFICANT CHANGE UP
MAGNESIUM SERPL-MCNC: 1.8 MG/DL — SIGNIFICANT CHANGE UP (ref 1.6–2.6)
MAGNESIUM SERPL-MCNC: 1.9 MG/DL — SIGNIFICANT CHANGE UP (ref 1.6–2.6)
MANUAL SMEAR VERIFICATION: SIGNIFICANT CHANGE UP
MANUAL SMEAR VERIFICATION: SIGNIFICANT CHANGE UP
MCHC RBC-ENTMCNC: 29.2 PG — SIGNIFICANT CHANGE UP (ref 27–34)
MCHC RBC-ENTMCNC: 29.4 PG — SIGNIFICANT CHANGE UP (ref 27–34)
MCHC RBC-ENTMCNC: 29.7 PG — SIGNIFICANT CHANGE UP (ref 27–34)
MCHC RBC-ENTMCNC: 29.9 PG — SIGNIFICANT CHANGE UP (ref 27–34)
MCHC RBC-ENTMCNC: 33.3 GM/DL — SIGNIFICANT CHANGE UP (ref 32–36)
MCHC RBC-ENTMCNC: 34.1 GM/DL — SIGNIFICANT CHANGE UP (ref 32–36)
MCHC RBC-ENTMCNC: 34.1 GM/DL — SIGNIFICANT CHANGE UP (ref 32–36)
MCHC RBC-ENTMCNC: 34.5 GM/DL — SIGNIFICANT CHANGE UP (ref 32–36)
MCV RBC AUTO: 86 FL — SIGNIFICANT CHANGE UP (ref 80–100)
MCV RBC AUTO: 86.7 FL — SIGNIFICANT CHANGE UP (ref 80–100)
MCV RBC AUTO: 86.9 FL — SIGNIFICANT CHANGE UP (ref 80–100)
MCV RBC AUTO: 87.5 FL — SIGNIFICANT CHANGE UP (ref 80–100)
METAMYELOCYTES # FLD: 1.8 % — HIGH (ref 0–0)
MONOCYTES # BLD AUTO: 0.4 K/UL — SIGNIFICANT CHANGE UP (ref 0–0.9)
MONOCYTES # BLD AUTO: 0.73 K/UL — SIGNIFICANT CHANGE UP (ref 0–0.9)
MONOCYTES NFR BLD AUTO: 4.4 % — SIGNIFICANT CHANGE UP (ref 2–14)
MONOCYTES NFR BLD AUTO: 7.1 % — SIGNIFICANT CHANGE UP (ref 2–14)
MYELOCYTES NFR BLD: 0.9 % — HIGH (ref 0–0)
NEUTROPHILS # BLD AUTO: 7.56 K/UL — HIGH (ref 1.8–7.4)
NEUTROPHILS # BLD AUTO: 7.6 K/UL — HIGH (ref 1.8–7.4)
NEUTROPHILS NFR BLD AUTO: 69 % — SIGNIFICANT CHANGE UP (ref 43–77)
NEUTROPHILS NFR BLD AUTO: 73.7 % — SIGNIFICANT CHANGE UP (ref 43–77)
NEUTS BAND # BLD: 10.5 % — HIGH (ref 0–8)
NEUTS BAND # BLD: 4.4 % — SIGNIFICANT CHANGE UP (ref 0–8)
NRBC # BLD: 2 /100 WBCS — HIGH (ref 0–0)
NRBC # BLD: 2 /100 WBCS — HIGH (ref 0–0)
NRBC # BLD: 2 /100 — HIGH (ref 0–0)
NRBC # BLD: 4 /100 — HIGH (ref 0–0)
OVALOCYTES BLD QL SMEAR: SLIGHT — SIGNIFICANT CHANGE UP
OVALOCYTES BLD QL SMEAR: SLIGHT — SIGNIFICANT CHANGE UP
PHOSPHATE SERPL-MCNC: 2.1 MG/DL — LOW (ref 2.4–4.7)
PHOSPHATE SERPL-MCNC: 3.4 MG/DL — SIGNIFICANT CHANGE UP (ref 2.4–4.7)
PHOSPHATE SERPL-MCNC: 3.8 MG/DL — SIGNIFICANT CHANGE UP (ref 2.4–4.7)
PLAT MORPH BLD: NORMAL — SIGNIFICANT CHANGE UP
PLAT MORPH BLD: NORMAL — SIGNIFICANT CHANGE UP
PLATELET # BLD AUTO: 210 K/UL — SIGNIFICANT CHANGE UP (ref 150–400)
PLATELET # BLD AUTO: 234 K/UL — SIGNIFICANT CHANGE UP (ref 150–400)
PLATELET # BLD AUTO: 312 K/UL — SIGNIFICANT CHANGE UP (ref 150–400)
PLATELET # BLD AUTO: 320 K/UL — SIGNIFICANT CHANGE UP (ref 150–400)
POIKILOCYTOSIS BLD QL AUTO: SLIGHT — SIGNIFICANT CHANGE UP
POIKILOCYTOSIS BLD QL AUTO: SLIGHT — SIGNIFICANT CHANGE UP
POLYCHROMASIA BLD QL SMEAR: SLIGHT — SIGNIFICANT CHANGE UP
POLYCHROMASIA BLD QL SMEAR: SLIGHT — SIGNIFICANT CHANGE UP
POTASSIUM SERPL-MCNC: 3.8 MMOL/L — SIGNIFICANT CHANGE UP (ref 3.5–5.3)
POTASSIUM SERPL-MCNC: 4.2 MMOL/L — SIGNIFICANT CHANGE UP (ref 3.5–5.3)
POTASSIUM SERPL-MCNC: 4.3 MMOL/L — SIGNIFICANT CHANGE UP (ref 3.5–5.3)
POTASSIUM SERPL-SCNC: 3.8 MMOL/L — SIGNIFICANT CHANGE UP (ref 3.5–5.3)
POTASSIUM SERPL-SCNC: 4.2 MMOL/L — SIGNIFICANT CHANGE UP (ref 3.5–5.3)
POTASSIUM SERPL-SCNC: 4.3 MMOL/L — SIGNIFICANT CHANGE UP (ref 3.5–5.3)
PROT SERPL-MCNC: 4.7 G/DL — LOW (ref 6.6–8.7)
PROT SERPL-MCNC: 5 G/DL — LOW (ref 6.6–8.7)
PROTHROM AB SERPL-ACNC: 14.2 SEC — HIGH (ref 9.5–13)
PROTHROM AB SERPL-ACNC: 18.4 SEC — HIGH (ref 9.5–13)
PROTHROM AB SERPL-ACNC: 30 SEC — HIGH (ref 9.5–13)
RBC # BLD: 1.92 M/UL — LOW (ref 4.2–5.8)
RBC # BLD: 2.36 M/UL — LOW (ref 4.2–5.8)
RBC # BLD: 2.71 M/UL — LOW (ref 4.2–5.8)
RBC # BLD: 2.86 M/UL — LOW (ref 4.2–5.8)
RBC # FLD: 14.3 % — SIGNIFICANT CHANGE UP (ref 10.3–14.5)
RBC # FLD: 14.4 % — SIGNIFICANT CHANGE UP (ref 10.3–14.5)
RBC # FLD: 14.8 % — HIGH (ref 10.3–14.5)
RBC # FLD: 16 % — HIGH (ref 10.3–14.5)
RBC BLD AUTO: ABNORMAL
RBC BLD AUTO: ABNORMAL
SMUDGE CELLS # BLD: PRESENT — SIGNIFICANT CHANGE UP
SODIUM SERPL-SCNC: 138 MMOL/L — SIGNIFICANT CHANGE UP (ref 135–145)
TROPONIN T SERPL-MCNC: 0.18 NG/ML — HIGH (ref 0–0.06)
TROPONIN T SERPL-MCNC: 0.19 NG/ML — HIGH (ref 0–0.06)
TROPONIN T SERPL-MCNC: 0.2 NG/ML — HIGH (ref 0–0.06)
VARIANT LYMPHS # BLD: 1.7 % — SIGNIFICANT CHANGE UP (ref 0–6)
WBC # BLD: 10.3 K/UL — SIGNIFICANT CHANGE UP (ref 3.8–10.5)
WBC # BLD: 13.73 K/UL — HIGH (ref 3.8–10.5)
WBC # BLD: 8.38 K/UL — SIGNIFICANT CHANGE UP (ref 3.8–10.5)
WBC # BLD: 9.03 K/UL — SIGNIFICANT CHANGE UP (ref 3.8–10.5)
WBC # FLD AUTO: 10.3 K/UL — SIGNIFICANT CHANGE UP (ref 3.8–10.5)
WBC # FLD AUTO: 13.73 K/UL — HIGH (ref 3.8–10.5)
WBC # FLD AUTO: 8.38 K/UL — SIGNIFICANT CHANGE UP (ref 3.8–10.5)
WBC # FLD AUTO: 9.03 K/UL — SIGNIFICANT CHANGE UP (ref 3.8–10.5)

## 2023-09-04 PROCEDURE — 71045 X-RAY EXAM CHEST 1 VIEW: CPT | Mod: 26,77

## 2023-09-04 PROCEDURE — 76937 US GUIDE VASCULAR ACCESS: CPT | Mod: 26

## 2023-09-04 PROCEDURE — 99024 POSTOP FOLLOW-UP VISIT: CPT

## 2023-09-04 PROCEDURE — 31500 INSERT EMERGENCY AIRWAY: CPT | Mod: 78

## 2023-09-04 PROCEDURE — 36556 INSERT NON-TUNNEL CV CATH: CPT

## 2023-09-04 PROCEDURE — 74178 CT ABD&PLV WO CNTR FLWD CNTR: CPT | Mod: 26

## 2023-09-04 PROCEDURE — 70450 CT HEAD/BRAIN W/O DYE: CPT | Mod: 26

## 2023-09-04 PROCEDURE — 99222 1ST HOSP IP/OBS MODERATE 55: CPT

## 2023-09-04 PROCEDURE — 93010 ELECTROCARDIOGRAM REPORT: CPT

## 2023-09-04 PROCEDURE — 71045 X-RAY EXAM CHEST 1 VIEW: CPT | Mod: 26

## 2023-09-04 RX ORDER — ALBUMIN HUMAN 25 %
100 VIAL (ML) INTRAVENOUS ONCE
Refills: 0 | Status: DISCONTINUED | OUTPATIENT
Start: 2023-09-04 | End: 2023-09-04

## 2023-09-04 RX ORDER — SODIUM CHLORIDE 9 MG/ML
10 INJECTION INTRAMUSCULAR; INTRAVENOUS; SUBCUTANEOUS
Refills: 0 | Status: DISCONTINUED | OUTPATIENT
Start: 2023-09-04 | End: 2023-09-12

## 2023-09-04 RX ORDER — LEVOTHYROXINE SODIUM 125 MCG
100 TABLET ORAL AT BEDTIME
Refills: 0 | Status: DISCONTINUED | OUTPATIENT
Start: 2023-09-04 | End: 2023-09-05

## 2023-09-04 RX ORDER — ETOMIDATE 2 MG/ML
20 INJECTION INTRAVENOUS ONCE
Refills: 0 | Status: COMPLETED | OUTPATIENT
Start: 2023-09-04 | End: 2023-09-04

## 2023-09-04 RX ORDER — CHLORHEXIDINE GLUCONATE 213 G/1000ML
15 SOLUTION TOPICAL EVERY 12 HOURS
Refills: 0 | Status: DISCONTINUED | OUTPATIENT
Start: 2023-09-04 | End: 2023-09-05

## 2023-09-04 RX ORDER — SUCCINYLCHOLINE CHLORIDE 100 MG/5ML
100 SYRINGE (ML) INTRAVENOUS ONCE
Refills: 0 | Status: COMPLETED | OUTPATIENT
Start: 2023-09-04 | End: 2023-09-04

## 2023-09-04 RX ORDER — METOPROLOL TARTRATE 50 MG
2.5 TABLET ORAL EVERY 6 HOURS
Refills: 0 | Status: DISCONTINUED | OUTPATIENT
Start: 2023-09-04 | End: 2023-09-05

## 2023-09-04 RX ORDER — NOREPINEPHRINE BITARTRATE/D5W 8 MG/250ML
0.05 PLASTIC BAG, INJECTION (ML) INTRAVENOUS
Qty: 8 | Refills: 0 | Status: DISCONTINUED | OUTPATIENT
Start: 2023-09-04 | End: 2023-09-05

## 2023-09-04 RX ORDER — ASPIRIN/CALCIUM CARB/MAGNESIUM 324 MG
300 TABLET ORAL DAILY
Refills: 0 | Status: DISCONTINUED | OUTPATIENT
Start: 2023-09-04 | End: 2023-09-05

## 2023-09-04 RX ORDER — PHYTONADIONE (VIT K1) 5 MG
10 TABLET ORAL ONCE
Refills: 0 | Status: COMPLETED | OUTPATIENT
Start: 2023-09-04 | End: 2023-09-04

## 2023-09-04 RX ORDER — MAGNESIUM SULFATE 500 MG/ML
2 VIAL (ML) INJECTION ONCE
Refills: 0 | Status: COMPLETED | OUTPATIENT
Start: 2023-09-04 | End: 2023-09-04

## 2023-09-04 RX ORDER — SODIUM CHLORIDE 9 MG/ML
1000 INJECTION, SOLUTION INTRAVENOUS ONCE
Refills: 0 | Status: COMPLETED | OUTPATIENT
Start: 2023-09-04 | End: 2023-09-04

## 2023-09-04 RX ORDER — FENTANYL CITRATE 50 UG/ML
1 INJECTION INTRAVENOUS
Qty: 2500 | Refills: 0 | Status: DISCONTINUED | OUTPATIENT
Start: 2023-09-04 | End: 2023-09-05

## 2023-09-04 RX ORDER — VASOPRESSIN 20 [USP'U]/ML
0.04 INJECTION INTRAVENOUS
Qty: 40 | Refills: 0 | Status: DISCONTINUED | OUTPATIENT
Start: 2023-09-04 | End: 2023-09-05

## 2023-09-04 RX ORDER — CHLORHEXIDINE GLUCONATE 213 G/1000ML
1 SOLUTION TOPICAL
Refills: 0 | Status: DISCONTINUED | OUTPATIENT
Start: 2023-09-04 | End: 2023-09-06

## 2023-09-04 RX ORDER — PANTOPRAZOLE SODIUM 20 MG/1
40 TABLET, DELAYED RELEASE ORAL EVERY 12 HOURS
Refills: 0 | Status: DISCONTINUED | OUTPATIENT
Start: 2023-09-04 | End: 2023-09-12

## 2023-09-04 RX ORDER — PANTOPRAZOLE SODIUM 20 MG/1
40 TABLET, DELAYED RELEASE ORAL EVERY 24 HOURS
Refills: 0 | Status: DISCONTINUED | OUTPATIENT
Start: 2023-09-04 | End: 2023-09-04

## 2023-09-04 RX ORDER — CALCIUM GLUCONATE 100 MG/ML
2 VIAL (ML) INTRAVENOUS ONCE
Refills: 0 | Status: COMPLETED | OUTPATIENT
Start: 2023-09-04 | End: 2023-09-04

## 2023-09-04 RX ORDER — CHLORHEXIDINE GLUCONATE 213 G/1000ML
1 SOLUTION TOPICAL DAILY
Refills: 0 | Status: DISCONTINUED | OUTPATIENT
Start: 2023-09-04 | End: 2023-09-08

## 2023-09-04 RX ADMIN — CHLORHEXIDINE GLUCONATE 15 MILLILITER(S): 213 SOLUTION TOPICAL at 06:10

## 2023-09-04 RX ADMIN — Medication 100 MICROGRAM(S): at 21:38

## 2023-09-04 RX ADMIN — Medication 102 MILLIGRAM(S): at 03:07

## 2023-09-04 RX ADMIN — CHLORHEXIDINE GLUCONATE 1 APPLICATION(S): 213 SOLUTION TOPICAL at 11:23

## 2023-09-04 RX ADMIN — Medication 25 GRAM(S): at 09:10

## 2023-09-04 RX ADMIN — FENTANYL CITRATE 10.2 MICROGRAM(S)/KG/HR: 50 INJECTION INTRAVENOUS at 21:17

## 2023-09-04 RX ADMIN — ETOMIDATE 20 MILLIGRAM(S): 2 INJECTION INTRAVENOUS at 03:09

## 2023-09-04 RX ADMIN — Medication 200 GRAM(S): at 03:13

## 2023-09-04 RX ADMIN — Medication 300 MILLIGRAM(S): at 11:23

## 2023-09-04 RX ADMIN — SODIUM CHLORIDE 2000 MILLILITER(S): 9 INJECTION, SOLUTION INTRAVENOUS at 03:08

## 2023-09-04 RX ADMIN — PANTOPRAZOLE SODIUM 40 MILLIGRAM(S): 20 TABLET, DELAYED RELEASE ORAL at 07:00

## 2023-09-04 RX ADMIN — CHLORHEXIDINE GLUCONATE 1 APPLICATION(S): 213 SOLUTION TOPICAL at 06:11

## 2023-09-04 RX ADMIN — Medication 9.57 MICROGRAM(S)/KG/MIN: at 22:46

## 2023-09-04 RX ADMIN — Medication 100 MILLIGRAM(S): at 03:08

## 2023-09-04 RX ADMIN — CHLORHEXIDINE GLUCONATE 15 MILLILITER(S): 213 SOLUTION TOPICAL at 17:12

## 2023-09-04 RX ADMIN — FENTANYL CITRATE 5.11 MICROGRAM(S)/KG/HR: 50 INJECTION INTRAVENOUS at 03:08

## 2023-09-04 RX ADMIN — PANTOPRAZOLE SODIUM 40 MILLIGRAM(S): 20 TABLET, DELAYED RELEASE ORAL at 17:13

## 2023-09-04 NOTE — CHART NOTE - NSCHARTNOTEFT_GEN_A_CORE
Patient was RRT on floor and found to have episode of melena on floor with hgb of 5.6. Hypotensive and tachycardia. CKD at baseline, however benefits outweigh the risks of IV contrast to evaluate GIB. Will continue to monitor creatinine and GFR post-scan.

## 2023-09-04 NOTE — CHART NOTE - NSCHARTNOTEFT_GEN_A_CORE
Rapid response note    Called by RN when for patient acutely decompensating, desaturated to 86% on room air, placed on 2L and originally had O2 sat of 96, then saturation continued to downtrend into low 90's on 2L, BP 71/45, HR 99, temp 97.8, glucose 162. Patient had been seen at approximately 9:30 PM and had no complaints of chest pain or SOB, he just stated he felt his mucous was "thicker than normal" and was given a nebulizer treatment with some relief.     When rapid response was called, monitor was placed which showed patient in a.fib, BP 94/64, , SPO2 93 on 3L. EKG, CXR, ABG, CBC, BMP, coags, troponin were ordered. ABG was as followed     Blood Gas Profile - Arterial (09.04.23 @ 00:22)    pH, Arterial: 7.560: As of 6/9/2020 Reference Ranges have been amended for: JULY, COHB, GLUWB,  HCO3, KWB, METHHB, NAWB, O2HB, PCO2.    pCO2, Arterial: 25 mmHg    pO2, Arterial: 101 mmHg    HCO3, Arterial: 22 mmol/L    Base Excess, Arterial: 0.2 mmol/L    Oxygen Saturation, Arterial: 99.9 %    FIO2, Arterial: 3L    Blood Gas Comments Arterial: 3L nc    Blood Gas Source Arterial: Arterial      Patient remained A+Ox4 for the duration of the rapid response. SICU team was notified and patient was transferred to ICU for further workup and resuscitation. Vitals at the time of transfer were BP 93/63, , SPO2 98, RR 20. Rapid response note    Called by RN when for patient acutely decompensating, desaturated to 86% on room air, placed on 2L and originally had O2 sat of 96, then saturation continued to downtrend into low 90's on 2L, BP 71/45, HR 99, temp 97.8, glucose 162. Patient had been seen at approximately 9:30 PM and had no complaints of chest pain or SOB, he just stated he felt his mucous was "thicker than normal" and was given a nebulizer treatment with some relief.     When rapid response was called, monitor was placed which showed patient in a.fib, BP 94/64, , SPO2 93 on 3L. EKG, CXR, ABG, CBC, BMP, coags, troponin were ordered. ABG was as followed     Blood Gas Profile - Arterial (09.04.23 @ 00:22)    pH, Arterial: 7.560: As of 6/9/2020 Reference Ranges have been amended for: JULY, COHB, GLUWB,  HCO3, KWB, METHHB, NAWB, O2HB, PCO2.    pCO2, Arterial: 25 mmHg    pO2, Arterial: 101 mmHg    HCO3, Arterial: 22 mmol/L    Base Excess, Arterial: 0.2 mmol/L    Oxygen Saturation, Arterial: 99.9 %    FIO2, Arterial: 3L    Blood Gas Comments Arterial: 3L nc    Blood Gas Source Arterial: Arterial      Patient remained A+Ox4 for the duration of the rapid response. SICU team was notified and patient was transferred to ICU for further workup and resuscitation. Vitals at the time of transfer were BP 93/63, , SPO2 98, RR 20.    Once in ICU, POCUS was performed which showed normal contractility of heart. Patient continued with labored breathing and decision was made with patient to intubate to protect airway while patient underwent CTs for further workup. Wife was called to discuss events and plan. Rapid response note    Called by RN when for patient acutely decompensating, desaturated to 86% on room air, placed on 2L and originally had O2 sat of 96, then saturation continued to downtrend into low 90's on 2L, BP 71/45, HR 99, temp 97.8, glucose 162. Patient had been seen at approximately 9:30 PM and had no complaints of chest pain or SOB, he just stated he felt his mucous was "thicker than normal" and was given a nebulizer treatment with some relief.     When rapid response was called, monitor was placed which showed patient in a.fib, BP 94/64, , SPO2 93 on 3L. EKG, CXR, ABG, CBC, BMP, coags, troponin were ordered. ABG was as followed     Blood Gas Profile - Arterial (09.04.23 @ 00:22)    pH, Arterial: 7.560: As of 6/9/2020 Reference Ranges have been amended for: JULY, COHB, GLUWB,  HCO3, KWB, METHHB, NAWB, O2HB, PCO2.    pCO2, Arterial: 25 mmHg    pO2, Arterial: 101 mmHg    HCO3, Arterial: 22 mmol/L    Base Excess, Arterial: 0.2 mmol/L    Oxygen Saturation, Arterial: 99.9 %    FIO2, Arterial: 3L    Blood Gas Comments Arterial: 3L nc    Blood Gas Source Arterial: Arterial      Patient remained A+Ox4 for the duration of the rapid response. SICU team was notified and patient was transferred to ICU for further workup and resuscitation. Vitals at the time of transfer were BP 93/63, , SPO2 98, RR 20.    Once in ICU, POCUS was performed which showed normal contractility of heart. Patient continued with labored breathing and decision was made with patient to intubate to protect airway while patient underwent CTs for further workup. Wife was called to discuss events and plan.        ATTENDING ADDENDUM:  Patient seen and examined at bedside on floor, patient diaphoretic with altered mental status. I upgraded the patient to the ICU, found to be alkalotic decision made to intubate patient. H/H found to be 5.5, PTT>150, INR 2.75. Will order 2 units FFP, 2 units PRBC, 10 vit K. Will plan to scan head, chest,A/P CTA. will need volume as creatinine had improved. will hold hep gtt, give 300 ASA DE for fresh stents. I called the wife and updated her about the plan to intubate and that he was transferred to ICU. All questions answered.

## 2023-09-04 NOTE — CONSULT NOTE ADULT - SUBJECTIVE AND OBJECTIVE BOX
HPI:The patient is currently intubated.  History was obtained from the chart and found the patient's wife and the bedside RN.  78-year-old man with a history of bladder cancer status post urostomy about 12 years ago, atrial fibrillation on warfarin, hypertension, dyslipidemia, coronary artery disease with 2 stents 1 month ago and was admitted with small bowel obstruction.  He underwent 2 surgeries with first 1 with parastomal hernia repair and then subsequently mesh placement.  He was on anticoagulation.  He was noted to have bleeding this morning and RRT was called due to drop in the blood count and GI bleeding.  He was intubated and transferred back to surgical ICU.  He was on vasopressors and one of them has been tapered down.  He has received 2 units of blood today.  NG tube has been clearing up now with just bilious fluid.  No further rectal bleeding as per the RN.  Initially there was a concern of stool coming out of the urostomy site.  CT abdomen was performed which revealed evidence of some bleeding in the abdominal.  ROS: 10-system review is otherwise negative except HPI above.      PAST MEDICAL & SURGICAL HISTORY:  Atrial fibrillation      Hypothyroid      Hypertension      Bladder cancer      Bronchitis      Former smoker      CAD (coronary artery disease)      History of bladder surgery  on urostomy      S/P CABG x 3      History of automatic internal cardiac defibrillator (AICD)      H/O knee surgery          ROS:  Could not be obtained.      MEDICATIONS  (STANDING):  aspirin Suppository 300 milliGRAM(s) Rectal daily  chlorhexidine 0.12% Liquid 15 milliLiter(s) Oral Mucosa every 12 hours  chlorhexidine 2% Cloths 1 Application(s) Topical daily  chlorhexidine 4% Liquid 1 Application(s) Topical <User Schedule>  fentaNYL   Infusion 1 MICROgram(s)/kG/Hr (10.2 mL/Hr) IV Continuous <Continuous>  levothyroxine Injectable 100 MICROGram(s) IV Push at bedtime  metoprolol tartrate 50 milliGRAM(s) Oral two times a day  norepinephrine Infusion 0.05 MICROgram(s)/kG/Min (9.57 mL/Hr) IV Continuous <Continuous>  pantoprazole  Injectable 40 milliGRAM(s) IV Push every 24 hours  vasopressin Infusion 0.04 Unit(s)/Min (6 mL/Hr) IV Continuous <Continuous>    MEDICATIONS  (PRN):  albuterol/ipratropium for Nebulization 3 milliLiter(s) Nebulizer every 6 hours PRN Shortness of Breath and/or Wheezing  sodium chloride 0.9% lock flush 10 milliLiter(s) IV Push every 1 hour PRN Pre/post blood products, medications, blood draw, and to maintain line patency      Allergies    No Known Allergies    Intolerances        SOCIAL HISTORY:NC    ENDOSCOPIC/GI HISTORY:NC    FAMILY HISTORY:  Family history of heart attack (Father, Mother)        Vital Signs Last 24 Hrs  T(C): 36.5 (04 Sep 2023 09:15), Max: 37.3 (04 Sep 2023 05:30)  T(F): 97.7 (04 Sep 2023 09:15), Max: 99.1 (04 Sep 2023 05:30)  HR: 88 (04 Sep 2023 11:15) (76 - 132)  BP: 121/85 (04 Sep 2023 10:00) (74/50 - 145/82)  BP(mean): 98 (04 Sep 2023 10:00) (59 - 122)  RR: 28 (04 Sep 2023 11:15) (12 - 38)  SpO2: 100% (04 Sep 2023 11:15) (84% - 100%)    Parameters below as of 04 Sep 2023 08:00  Patient On (Oxygen Delivery Method): ventilator        PHYSICAL EXAM:    GENERAL: Intubated  HEAD:  Atraumatic, Normocephalic  EYES: EOMI, PERRLA, conjunctiva and sclera clear  ENMT: NG tube in place draining bilious fluid  NECK: Supple, No JVD, Normal thyroid  CHEST/LUNG: Clear to percussion bilaterally; No rales, rhonchi, wheezing, or rubs  HEART: Regular rate and rhythm; No murmurs, rubs, or gallops  ABDOMEN: Soft, Nontender, Nondistended; Bowel sounds present. Laparotomy staples. Urostomy in RLQ  EXTREMITIES:  2+ Peripheral Pulses, No clubbing, cyanosis, or edema  RECTAL: Trace brown stools  LYMPH: No lymphadenopathy noted  SKIN: No rashes or lesions      LABS:                        7.0    13.73 )-----------( 312      ( 04 Sep 2023 05:45 )             20.5     09-04    138  |  103  |  48.9<H>  ----------------------------<  190<H>  4.3   |  23.0  |  1.54<H>    Ca    6.9<L>      04 Sep 2023 05:45  Phos  3.8     09-04  Mg     1.9     09-04    TPro  4.7<L>  /  Alb  2.0<L>  /  TBili  0.5  /  DBili  x   /  AST  45<H>  /  ALT  21  /  AlkPhos  158<H>  09-04    PT/INR - ( 04 Sep 2023 05:45 )   PT: 18.4 sec;   INR: 1.68 ratio         PTT - ( 04 Sep 2023 05:45 )  PTT:26.2 sec   Urinalysis Basic - ( 04 Sep 2023 05:45 )    Color: x / Appearance: x / SG: x / pH: x  Gluc: 190 mg/dL / Ketone: x  / Bili: x / Urobili: x   Blood: x / Protein: x / Nitrite: x   Leuk Esterase: x / RBC: x / WBC x   Sq Epi: x / Non Sq Epi: x / Bacteria: x        LIVER FUNCTIONS - ( 04 Sep 2023 00:09 )  Alb: 2.0 g/dL / Pro: 4.7 g/dL / ALK PHOS: 158 U/L / ALT: 21 U/L / AST: 45 U/L / GGT: x               RADIOLOGY & ADDITIONAL STUDIES:< from: CT Abdomen and Pelvis w/wo IV Cont (09.04.23 @ 05:26) >    ******PRELIMINARY REPORT******      ******PRELIMINARY REPORT******         ACC: [id number] EXAM:  CT ABDOMEN AND PELVIS WAW IC   ORDERED BY: MK PALACIO     PROCEDURE DATE:  09/04/2023    ******PRELIMINARY REPORT******      ******PRELIMINARY REPORT******           INTERPRETATION:  Interval hernia mesh repair.  7.8 x 3.7 cm sized layering collection underneath the parastomal hernia   closure site. Hyperdensity in the bottom layer may indicate blood   component.  Interval improvement of small bowel obstruction.        ******PRELIMINARY REPORT******      ******PRELIMINARY REPORT******         JENNIFER BRYANT MD; Attending Radiologist  This document is a PRELIMINARY interpretation and is pending final   attending approval. Sep  4 2023  6:40AM    < end of copied text >

## 2023-09-04 NOTE — PROCEDURE NOTE - NSSITEPREP_SKIN_A_CORE
chlorhexidine
alcohol/chlorhexidine
chlorhexidine/Adherence to aseptic technique: hand hygiene prior to donning barriers (gown, gloves), don cap and mask, sterile drape over patient
chlorhexidine
chlorhexidine/Adherence to aseptic technique: hand hygiene prior to donning barriers (gown, gloves), don cap and mask, sterile drape over patient
chlorhexidine

## 2023-09-04 NOTE — PROCEDURE NOTE - NSINFORMCONSENT_GEN_A_CORE
This was an emergent procedure.
This was an emergent procedure.
wife also notified, patient gave verbal consent as well/This was an emergent procedure.
This was an emergent procedure.
This was an emergent procedure.
Benefits, risks, and possible complications of procedure explained to patient/caregiver who verbalized understanding and gave verbal consent.
Benefits, risks, and possible complications of procedure explained to patient/caregiver who verbalized understanding and gave verbal consent.
Benefits, risks, and possible complications of procedure explained to patient/caregiver who verbalized understanding and gave written consent.

## 2023-09-04 NOTE — PROCEDURE NOTE - NSINDICATIONS_GEN_A_CORE
venous access
airway protection/mental status change/respiratory distress
critical patient/monitoring purposes
intestinal obstruction
arterial puncture to obtain ABG's/blood sampling/critical patient/monitoring purposes/transfusion
critical illness/volume resuscitation
monitoring purposes
critical illness/emergency venous access/venous access

## 2023-09-04 NOTE — PROCEDURE NOTE - NSPROCNAME_GEN_A_CORE
Tracheal Intubation
Midline Insertion
Arterial Puncture/Cannulation
Gastric Intubation/Gastric Lavage
Arterial Puncture/Cannulation
Central Line Insertion
Central Line Insertion
Arterial Puncture/Cannulation

## 2023-09-04 NOTE — PROCEDURE NOTE - NSPOSTPRCRAD_GEN_A_CORE
central line located in the superior vena cava/post-procedure radiography performed
post-procedure radiography performed
central line located in the/central line located in the superior vena cava/no pneumothorax

## 2023-09-04 NOTE — PROGRESS NOTE ADULT - ATTENDING COMMENTS
RRT On Floor overnight.  Found anemia, melena, hypotension in hemorrhagic shock.  Received 4FFP, 2 PRBC overnight.  Intubated.  had CTA of Abd and non con of head without acute findings. No further Bloody BM.  Pt intubated and sedated but able to minimally communicate.  Denies any CO but ROS is limited.   Neurologic Intubated ventilated sedated   fentanyl / Propofol  Hemodynamic - intact now, required transfusion of PRBCs  Pulmonary - Bilateral BS AC ventilation 40% FiO2 5cmH2O PEEP  Good PO2/FiO2 gradient  Abdomen soft  Renal function preserved. The urostomy bag has sediment but this is definitely not feculent by smell or appearance.  patient was on heparin and Coumadin and clearly hyper anti-coagulated  Leg is still well perfused and there is no compartment syndrome  Plan  Keep intubated till equilibrates fluids and coagulation profile normalizes  Hgb stable  Wean to extubate tomorrow  Patient would be better off on Eliquis

## 2023-09-04 NOTE — PROGRESS NOTE ADULT - SUBJECTIVE AND OBJECTIVE BOX
INTERVAL HPI/OVERNIGHT EVENTS/SUBJECTIVE:     ICU Vital Signs Last 24 Hrs  T(C): 36.5 (04 Sep 2023 09:15), Max: 37.3 (04 Sep 2023 05:30)  T(F): 97.7 (04 Sep 2023 09:15), Max: 99.1 (04 Sep 2023 05:30)  HR: 88 (04 Sep 2023 11:15) (76 - 132)  BP: 121/85 (04 Sep 2023 10:00) (74/50 - 145/82)  BP(mean): 98 (04 Sep 2023 10:00) (59 - 122)  ABP: 118/49 (04 Sep 2023 11:15) (63/59 - 169/57)  ABP(mean): 73 (04 Sep 2023 11:15) (54 - 99)  RR: 28 (04 Sep 2023 11:15) (12 - 38)  SpO2: 100% (04 Sep 2023 11:15) (84% - 100%)    O2 Parameters below as of 04 Sep 2023 08:00  Patient On (Oxygen Delivery Method): ventilator            I&O's Detail    03 Sep 2023 07:01  -  04 Sep 2023 07:00  --------------------------------------------------------  IN:  Total IN: 0 mL    OUT:    Urostomy (mL): 325 mL  Total OUT: 325 mL    Total NET: -325 mL      04 Sep 2023 07:01  -  04 Sep 2023 12:04  --------------------------------------------------------  IN:  Total IN: 0 mL    OUT:    Urostomy (mL): 175 mL  Total OUT: 175 mL    Total NET: -175 mL          Mode: AC/ CMV (Assist Control/ Continuous Mandatory Ventilation)  RR (machine): 14  TV (machine): 450  FiO2: 50  PEEP: 6  ITime: 1  MAP: 10  PIP: 14    ABG - ( 04 Sep 2023 06:04 )  pH, Arterial: 7.380 pH, Blood: x     /  pCO2: 42    /  pO2: 149   / HCO3: 25    / Base Excess: -0.3  /  SaO2: 100.0               MEDICATIONS  (STANDING):  aspirin Suppository 300 milliGRAM(s) Rectal daily  chlorhexidine 0.12% Liquid 15 milliLiter(s) Oral Mucosa every 12 hours  chlorhexidine 2% Cloths 1 Application(s) Topical daily  chlorhexidine 4% Liquid 1 Application(s) Topical <User Schedule>  fentaNYL   Infusion 1 MICROgram(s)/kG/Hr (10.2 mL/Hr) IV Continuous <Continuous>  levothyroxine Injectable 100 MICROGram(s) IV Push at bedtime  metoprolol tartrate 50 milliGRAM(s) Oral two times a day  norepinephrine Infusion 0.05 MICROgram(s)/kG/Min (9.57 mL/Hr) IV Continuous <Continuous>  pantoprazole  Injectable 40 milliGRAM(s) IV Push every 24 hours  vasopressin Infusion 0.04 Unit(s)/Min (6 mL/Hr) IV Continuous <Continuous>    MEDICATIONS  (PRN):  albuterol/ipratropium for Nebulization 3 milliLiter(s) Nebulizer every 6 hours PRN Shortness of Breath and/or Wheezing  sodium chloride 0.9% lock flush 10 milliLiter(s) IV Push every 1 hour PRN Pre/post blood products, medications, blood draw, and to maintain line patency      NUTRITION/IVF:     CENTRAL LINE:  LOCATION:   DATE INSERTED:  CVP:  SCVO2:    CORRALES:   DATE INSERTED:    A-LINE:    LOCATION:   DATE INSERTED:   SVV:  CO/CI:     CHEST TUBE:  LOCATION:  DATE INSERTED: OUTPUT/24 HRS:  SUCTION/WATER SEAL:     NG/OG TUBE:  DATE INSERTED:  OUTPUT/24 HRS:    MISC:     PHYSICAL EXAM:     Gen:NAD, Well appearing, No cyanosis, Pallor.    Eyes: PERRL ~ 3mm, EOMI,     Neurological: A&Ox3, GCS 15, No focal defficit.     ENMT: Clear canals, clear throat.      Neck: Supple. NT AT, FROM no pain.  No JVD. No meningeal signs    Pulmonary: NAD, CTA, = BL .      Cardiovascular: RRR, S1, S2, No Murmurs, rubs or gallops noted.    Gastrointestinal:ND, Soft, NT.    Genitourinary:     Back:     Extremities: NT, AT, no edema, erythema or palpable cord noted.  FROM, = 2+ pulses throughout.    Skin:     Musculoskeletal:          LABS:  CBC Full  -  ( 04 Sep 2023 05:45 )  WBC Count : 13.73 K/uL  RBC Count : 2.36 M/uL  Hemoglobin : 7.0 g/dL  Hematocrit : 20.5 %  Platelet Count - Automated : 312 K/uL  Mean Cell Volume : 86.9 fl  Mean Cell Hemoglobin : 29.7 pg  Mean Cell Hemoglobin Concentration : 34.1 gm/dL  Auto Neutrophil # : x  Auto Lymphocyte # : x  Auto Monocyte # : x  Auto Eosinophil # : x  Auto Basophil # : x  Auto Neutrophil % : x  Auto Lymphocyte % : x  Auto Monocyte % : x  Auto Eosinophil % : x  Auto Basophil % : x    09-04    138  |  103  |  48.9<H>  ----------------------------<  190<H>  4.3   |  23.0  |  1.54<H>    Ca    6.9<L>      04 Sep 2023 05:45  Phos  3.8     09-04  Mg     1.9     09-04    TPro  4.7<L>  /  Alb  2.0<L>  /  TBili  0.5  /  DBili  x   /  AST  45<H>  /  ALT  21  /  AlkPhos  158<H>  09-04    PT/INR - ( 04 Sep 2023 05:45 )   PT: 18.4 sec;   INR: 1.68 ratio         PTT - ( 04 Sep 2023 05:45 )  PTT:26.2 sec  Urinalysis Basic - ( 04 Sep 2023 05:45 )    Color: x / Appearance: x / SG: x / pH: x  Gluc: 190 mg/dL / Ketone: x  / Bili: x / Urobili: x   Blood: x / Protein: x / Nitrite: x   Leuk Esterase: x / RBC: x / WBC x   Sq Epi: x / Non Sq Epi: x / Bacteria: x      RECENT CULTURES:      LIVER FUNCTIONS - ( 04 Sep 2023 00:09 )  Alb: 2.0 g/dL / Pro: 4.7 g/dL / ALK PHOS: 158 U/L / ALT: 21 U/L / AST: 45 U/L / GGT: x           CARDIAC MARKERS ( 04 Sep 2023 05:45 )  x     / 0.18 ng/mL / x     / x     / x          CAPILLARY BLOOD GLUCOSE      RADIOLOGY & ADDITIONAL STUDIES:    ASSESSMENT/PLAN:  78yMale presenting with:        Neurological:    ENMT:    Neck:    Pulmonary:    Cardiovascular:    Gastrointestinal:    Genitourinary:    Heme:    ID:    Skin:    Lines/ Tubes:    Dispo:            CRITICAL CARE TIME SPENT:   INTERVAL HPI/OVERNIGHT EVENTS/SUBJECTIVE: Was RRT On Floor overnight.  Found anemia, melena, hypotension in hemorrhagic shock.  Received 4FFP, 2 PRBC overnight.  Intubated.  had CTA of Abd and non con of head without acute findings. No further Bloody BM.  Potential stool from urostomy.  Pt intubated and sedated but able to minimally communicate.  Denies any CO but ROS is limited.       ICU Vital Signs Last 24 Hrs  T(C): 36.5 (04 Sep 2023 09:15), Max: 37.3 (04 Sep 2023 05:30)  T(F): 97.7 (04 Sep 2023 09:15), Max: 99.1 (04 Sep 2023 05:30)  HR: 88 (04 Sep 2023 11:15) (76 - 132)  BP: 121/85 (04 Sep 2023 10:00) (74/50 - 145/82)  BP(mean): 98 (04 Sep 2023 10:00) (59 - 122)  ABP: 118/49 (04 Sep 2023 11:15) (63/59 - 169/57)  ABP(mean): 73 (04 Sep 2023 11:15) (54 - 99)  RR: 28 (04 Sep 2023 11:15) (12 - 38)  SpO2: 100% (04 Sep 2023 11:15) (84% - 100%)    O2 Parameters below as of 04 Sep 2023 08:00  Patient On (Oxygen Delivery Method): ventilator            I&O's Detail    03 Sep 2023 07:01  -  04 Sep 2023 07:00  --------------------------------------------------------  IN:  Total IN: 0 mL    OUT:    Urostomy (mL): 325 mL  Total OUT: 325 mL    Total NET: -325 mL      04 Sep 2023 07:01  -  04 Sep 2023 12:04  --------------------------------------------------------  IN:  Total IN: 0 mL    OUT:    Urostomy (mL): 175 mL  Total OUT: 175 mL    Total NET: -175 mL          Mode: AC/ CMV (Assist Control/ Continuous Mandatory Ventilation)  RR (machine): 14  TV (machine): 450  FiO2: 50  PEEP: 6  ITime: 1  MAP: 10  PIP: 14    ABG - ( 04 Sep 2023 06:04 )  pH, Arterial: 7.380 pH, Blood: x     /  pCO2: 42    /  pO2: 149   / HCO3: 25    / Base Excess: -0.3  /  SaO2: 100.0               MEDICATIONS  (STANDING):  aspirin Suppository 300 milliGRAM(s) Rectal daily  chlorhexidine 0.12% Liquid 15 milliLiter(s) Oral Mucosa every 12 hours  chlorhexidine 2% Cloths 1 Application(s) Topical daily  chlorhexidine 4% Liquid 1 Application(s) Topical <User Schedule>  fentaNYL   Infusion 1 MICROgram(s)/kG/Hr (10.2 mL/Hr) IV Continuous <Continuous>  levothyroxine Injectable 100 MICROGram(s) IV Push at bedtime  metoprolol tartrate 50 milliGRAM(s) Oral two times a day  norepinephrine Infusion 0.05 MICROgram(s)/kG/Min (9.57 mL/Hr) IV Continuous <Continuous>  pantoprazole  Injectable 40 milliGRAM(s) IV Push every 24 hours  vasopressin Infusion 0.04 Unit(s)/Min (6 mL/Hr) IV Continuous <Continuous>    MEDICATIONS  (PRN):  albuterol/ipratropium for Nebulization 3 milliLiter(s) Nebulizer every 6 hours PRN Shortness of Breath and/or Wheezing  sodium chloride 0.9% lock flush 10 milliLiter(s) IV Push every 1 hour PRN Pre/post blood products, medications, blood draw, and to maintain line patency      PHYSICAL EXAM:     Gen:On vent. No cyanosis. + Mils pallor.    Eyes: PERRL ~ 3mm, EOMI,     Neurological: A&Ox3, GCS 15, No focal deficit.     Neck: Supple. NT AT, FROM no pain.  No JVD. No meningeal signs    Pulmonary: NAD, CTA, = BL .      Cardiovascular: Irreguly irregular, S1, S2, No Murmurs, rubs or gallops noted.    Gastrointestinal: ND, Soft, NT.    Extremities: NT, AT,. Mild-moderate Left calf firmness. + Noted anasarca. No Erythema or palpable cord noted.  FROM, = 2+ pulses throughout.      LABS:  CBC Full  -  ( 04 Sep 2023 05:45 )  WBC Count : 13.73 K/uL  RBC Count : 2.36 M/uL  Hemoglobin : 7.0 g/dL  Hematocrit : 20.5 %  Platelet Count - Automated : 312 K/uL  Mean Cell Volume : 86.9 fl  Mean Cell Hemoglobin : 29.7 pg  Mean Cell Hemoglobin Concentration : 34.1 gm/dL  Auto Neutrophil # : x  Auto Lymphocyte # : x  Auto Monocyte # : x  Auto Eosinophil # : x  Auto Basophil # : x  Auto Neutrophil % : x  Auto Lymphocyte % : x  Auto Monocyte % : x  Auto Eosinophil % : x  Auto Basophil % : x    09-04    138  |  103  |  48.9<H>  ----------------------------<  190<H>  4.3   |  23.0  |  1.54<H>    Ca    6.9<L>      04 Sep 2023 05:45  Phos  3.8     09-04  Mg     1.9     09-04    TPro  4.7<L>  /  Alb  2.0<L>  /  TBili  0.5  /  DBili  x   /  AST  45<H>  /  ALT  21  /  AlkPhos  158<H>  09-04    PT/INR - ( 04 Sep 2023 05:45 )   PT: 18.4 sec;   INR: 1.68 ratio         PTT - ( 04 Sep 2023 05:45 )  PTT:26.2 sec  Urinalysis Basic - ( 04 Sep 2023 05:45 )    Color: x / Appearance: x / SG: x / pH: x  Gluc: 190 mg/dL / Ketone: x  / Bili: x / Urobili: x   Blood: x / Protein: x / Nitrite: x   Leuk Esterase: x / RBC: x / WBC x   Sq Epi: x / Non Sq Epi: x / Bacteria: x      RECENT CULTURES:      LIVER FUNCTIONS - ( 04 Sep 2023 00:09 )  Alb: 2.0 g/dL / Pro: 4.7 g/dL / ALK PHOS: 158 U/L / ALT: 21 U/L / AST: 45 U/L / GGT: x           CARDIAC MARKERS ( 04 Sep 2023 05:45 )  x     / 0.18 ng/mL / x     / x     / x          CAPILLARY BLOOD GLUCOSE      RADIOLOGY & ADDITIONAL STUDIES:    ASSESSMENT/PLAN:  78yMale presenting with: SBO, Metabolic acidosis, L ruptured baker's cyst.  Chronic A-fib.  ARAM, Hemorrhagic shock, hypotension, Acute blood loss anemia.     Neurological: CW Fentanyl for now.  RASS is - 1.  Maintain 0 to -1    Pulmonary: Attempt PSV Trial but I am hesitant for extubation until plan from GI and stability is proven.    Cardiovascular: Pt on Levo and Vaso when I evaluated this AM.  I have added Richard trac.  POCUS shows good heart but can not reliably see IVC. I have transfused two additional PRBC in hopes of liberating from Pressors as long as MAPs >65. Okay to attempt Metoprolol for rate control but I will transition to IV form at low dose.     Gastrointestinal: NPO with NGT To suction.  I have called GI.  Pending plan.  We will hold off on further CT today due to instability. When more stable , will consider PO Contrast study of Abd to evaluate for injury of bowel leaking into urostomy.   I have ordered Protonix to BID from once a day.    Genitourinary: Tight monitor on urine out put.    Heme: ASA only.  Maintain INR < ~ 1.8. I have ordered Fibrinogen with next labs at 2 pm.    ID: No indication.    Lines/ Tubes: Maintain all invasive lines and tubes as ordered.     Dispo: Pt critically ill.  I am supporting and manipulating multiple body systems to prevent further failure and death.      CRITICAL CARE TIME SPENT: 64 minutes.

## 2023-09-04 NOTE — PROCEDURE NOTE - NSPROCDETAILS_GEN_ALL_CORE
guidewire recovered/lumen(s) aspirated and flushed/sterile dressing applied/sterile technique, catheter placed/ultrasound guidance with use of sterile gel and probe cove
location identified, draped/prepped, sterile technique used, needle inserted/introduced/connected to a pressurized flush line/sutured in place/hemostasis with direct pressure, dressing applied/Seldinger technique
location identified, draped/prepped, sterile technique used, needle inserted/introduced/positive blood return obtained via catheter/connected to a pressurized flush line/sutured in place/hemostasis with direct pressure, dressing applied/Seldinger technique/all materials/supplies accounted for at end of procedure
location identified, draped/prepped, sterile technique used/sterile dressing applied/sterile technique, catheter placed/supine position/ultrasound guidance
nasogastric
guidewire recovered/lumen(s) aspirated and flushed/sterile dressing applied/sterile technique, catheter placed/ultrasound guidance with use of sterile gel and probe cove
patient pre-oxygenated, tube inserted, placement confirmed
location identified, draped/prepped, sterile technique used, needle inserted/introduced/positive blood return obtained via catheter/connected to a pressurized flush line/sutured in place/hemostasis with direct pressure, dressing applied/Seldinger technique/all materials/supplies accounted for at end of procedure

## 2023-09-05 LAB
ANION GAP SERPL CALC-SCNC: 13 MMOL/L — SIGNIFICANT CHANGE UP (ref 5–17)
APPEARANCE UR: ABNORMAL
APTT BLD: 23.5 SEC — LOW (ref 24.5–35.6)
BACTERIA # UR AUTO: NEGATIVE — SIGNIFICANT CHANGE UP
BASOPHILS # BLD AUTO: 0 K/UL — SIGNIFICANT CHANGE UP (ref 0–0.2)
BASOPHILS NFR BLD AUTO: 0 % — SIGNIFICANT CHANGE UP (ref 0–2)
BILIRUB UR-MCNC: NEGATIVE — SIGNIFICANT CHANGE UP
BUN SERPL-MCNC: 47.4 MG/DL — HIGH (ref 8–20)
CALCIUM SERPL-MCNC: 6.8 MG/DL — LOW (ref 8.4–10.5)
CHLORIDE SERPL-SCNC: 107 MMOL/L — SIGNIFICANT CHANGE UP (ref 96–108)
CO2 SERPL-SCNC: 22 MMOL/L — SIGNIFICANT CHANGE UP (ref 22–29)
COLOR SPEC: YELLOW — SIGNIFICANT CHANGE UP
CREAT SERPL-MCNC: 1.56 MG/DL — HIGH (ref 0.5–1.3)
DIFF PNL FLD: ABNORMAL
EGFR: 45 ML/MIN/1.73M2 — LOW
EOSINOPHIL # BLD AUTO: 0.22 K/UL — SIGNIFICANT CHANGE UP (ref 0–0.5)
EOSINOPHIL NFR BLD AUTO: 2.6 % — SIGNIFICANT CHANGE UP (ref 0–6)
EPI CELLS # UR: SIGNIFICANT CHANGE UP
GAS PNL BLDA: SIGNIFICANT CHANGE UP
GLUCOSE SERPL-MCNC: 137 MG/DL — HIGH (ref 70–99)
GLUCOSE UR QL: NEGATIVE MG/DL — SIGNIFICANT CHANGE UP
HCT VFR BLD CALC: 23.9 % — LOW (ref 39–50)
HGB BLD-MCNC: 7.8 G/DL — LOW (ref 13–17)
INR BLD: 1.18 RATIO — SIGNIFICANT CHANGE UP (ref 0.85–1.18)
KETONES UR-MCNC: NEGATIVE — SIGNIFICANT CHANGE UP
LEUKOCYTE ESTERASE UR-ACNC: NEGATIVE — SIGNIFICANT CHANGE UP
LYMPHOCYTES # BLD AUTO: 0.3 K/UL — LOW (ref 1–3.3)
LYMPHOCYTES # BLD AUTO: 3.5 % — LOW (ref 13–44)
MAGNESIUM SERPL-MCNC: 1.8 MG/DL — SIGNIFICANT CHANGE UP (ref 1.6–2.6)
MANUAL SMEAR VERIFICATION: SIGNIFICANT CHANGE UP
MCHC RBC-ENTMCNC: 28.9 PG — SIGNIFICANT CHANGE UP (ref 27–34)
MCHC RBC-ENTMCNC: 32.6 GM/DL — SIGNIFICANT CHANGE UP (ref 32–36)
MCV RBC AUTO: 88.5 FL — SIGNIFICANT CHANGE UP (ref 80–100)
METAMYELOCYTES # FLD: 0.9 % — HIGH (ref 0–0)
MONOCYTES # BLD AUTO: 0.45 K/UL — SIGNIFICANT CHANGE UP (ref 0–0.9)
MONOCYTES NFR BLD AUTO: 5.3 % — SIGNIFICANT CHANGE UP (ref 2–14)
MYELOCYTES NFR BLD: 2.7 % — HIGH (ref 0–0)
NEUTROPHILS # BLD AUTO: 7.28 K/UL — SIGNIFICANT CHANGE UP (ref 1.8–7.4)
NEUTROPHILS NFR BLD AUTO: 85 % — HIGH (ref 43–77)
NITRITE UR-MCNC: NEGATIVE — SIGNIFICANT CHANGE UP
NRBC # BLD: 2 /100 — HIGH (ref 0–0)
PH UR: 6 — SIGNIFICANT CHANGE UP (ref 5–8)
PHOSPHATE SERPL-MCNC: 3.2 MG/DL — SIGNIFICANT CHANGE UP (ref 2.4–4.7)
PLAT MORPH BLD: NORMAL — SIGNIFICANT CHANGE UP
PLATELET # BLD AUTO: 223 K/UL — SIGNIFICANT CHANGE UP (ref 150–400)
POLYCHROMASIA BLD QL SMEAR: SLIGHT — SIGNIFICANT CHANGE UP
POTASSIUM SERPL-MCNC: 3.9 MMOL/L — SIGNIFICANT CHANGE UP (ref 3.5–5.3)
POTASSIUM SERPL-SCNC: 3.9 MMOL/L — SIGNIFICANT CHANGE UP (ref 3.5–5.3)
PROT UR-MCNC: 30 MG/DL
PROTHROM AB SERPL-ACNC: 13 SEC — SIGNIFICANT CHANGE UP (ref 9.5–13)
RBC # BLD: 2.7 M/UL — LOW (ref 4.2–5.8)
RBC # FLD: 15 % — HIGH (ref 10.3–14.5)
RBC BLD AUTO: ABNORMAL
RBC CASTS # UR COMP ASSIST: ABNORMAL /HPF (ref 0–4)
SODIUM SERPL-SCNC: 141 MMOL/L — SIGNIFICANT CHANGE UP (ref 135–145)
SP GR SPEC: 1.01 — SIGNIFICANT CHANGE UP (ref 1.01–1.02)
URATE CRY FLD QL MICRO: ABNORMAL
UROBILINOGEN FLD QL: NEGATIVE MG/DL — SIGNIFICANT CHANGE UP
WBC # BLD: 8.57 K/UL — SIGNIFICANT CHANGE UP (ref 3.8–10.5)
WBC # FLD AUTO: 8.57 K/UL — SIGNIFICANT CHANGE UP (ref 3.8–10.5)
WBC UR QL: SIGNIFICANT CHANGE UP /HPF (ref 0–5)

## 2023-09-05 PROCEDURE — 99024 POSTOP FOLLOW-UP VISIT: CPT

## 2023-09-05 RX ORDER — METOPROLOL TARTRATE 50 MG
5 TABLET ORAL EVERY 6 HOURS
Refills: 0 | Status: DISCONTINUED | OUTPATIENT
Start: 2023-09-05 | End: 2023-09-06

## 2023-09-05 RX ORDER — MAGNESIUM SULFATE 500 MG/ML
2 VIAL (ML) INJECTION ONCE
Refills: 0 | Status: COMPLETED | OUTPATIENT
Start: 2023-09-05 | End: 2023-09-05

## 2023-09-05 RX ORDER — ASPIRIN/CALCIUM CARB/MAGNESIUM 324 MG
81 TABLET ORAL DAILY
Refills: 0 | Status: DISCONTINUED | OUTPATIENT
Start: 2023-09-05 | End: 2023-09-05

## 2023-09-05 RX ORDER — ACETAMINOPHEN 500 MG
975 TABLET ORAL EVERY 6 HOURS
Refills: 0 | Status: DISCONTINUED | OUTPATIENT
Start: 2023-09-05 | End: 2023-09-12

## 2023-09-05 RX ORDER — CLOPIDOGREL BISULFATE 75 MG/1
75 TABLET, FILM COATED ORAL DAILY
Refills: 0 | Status: DISCONTINUED | OUTPATIENT
Start: 2023-09-05 | End: 2023-09-12

## 2023-09-05 RX ORDER — POTASSIUM CHLORIDE 20 MEQ
10 PACKET (EA) ORAL ONCE
Refills: 0 | Status: COMPLETED | OUTPATIENT
Start: 2023-09-05 | End: 2023-09-05

## 2023-09-05 RX ORDER — IPRATROPIUM/ALBUTEROL SULFATE 18-103MCG
3 AEROSOL WITH ADAPTER (GRAM) INHALATION EVERY 6 HOURS
Refills: 0 | Status: DISCONTINUED | OUTPATIENT
Start: 2023-09-05 | End: 2023-09-12

## 2023-09-05 RX ORDER — SACUBITRIL AND VALSARTAN 24; 26 MG/1; MG/1
1 TABLET, FILM COATED ORAL
Refills: 0 | Status: DISCONTINUED | OUTPATIENT
Start: 2023-09-05 | End: 2023-09-12

## 2023-09-05 RX ORDER — LEVOTHYROXINE SODIUM 125 MCG
137 TABLET ORAL DAILY
Refills: 0 | Status: DISCONTINUED | OUTPATIENT
Start: 2023-09-05 | End: 2023-09-12

## 2023-09-05 RX ADMIN — Medication 100 MILLIEQUIVALENT(S): at 06:28

## 2023-09-05 RX ADMIN — Medication 3 MILLILITER(S): at 23:44

## 2023-09-05 RX ADMIN — CLOPIDOGREL BISULFATE 75 MILLIGRAM(S): 75 TABLET, FILM COATED ORAL at 15:07

## 2023-09-05 RX ADMIN — Medication 81 MILLIGRAM(S): at 13:58

## 2023-09-05 RX ADMIN — CHLORHEXIDINE GLUCONATE 1 APPLICATION(S): 213 SOLUTION TOPICAL at 05:15

## 2023-09-05 RX ADMIN — PANTOPRAZOLE SODIUM 40 MILLIGRAM(S): 20 TABLET, DELAYED RELEASE ORAL at 17:15

## 2023-09-05 RX ADMIN — CHLORHEXIDINE GLUCONATE 1 APPLICATION(S): 213 SOLUTION TOPICAL at 11:51

## 2023-09-05 RX ADMIN — CHLORHEXIDINE GLUCONATE 15 MILLILITER(S): 213 SOLUTION TOPICAL at 05:15

## 2023-09-05 RX ADMIN — PANTOPRAZOLE SODIUM 40 MILLIGRAM(S): 20 TABLET, DELAYED RELEASE ORAL at 05:16

## 2023-09-05 RX ADMIN — Medication 25 GRAM(S): at 04:24

## 2023-09-05 RX ADMIN — Medication 100 MICROGRAM(S): at 21:27

## 2023-09-05 NOTE — AIRWAY REMOVAL NOTE  ADULT & PEDS - ARTIFICAL AIRWAY REMOVAL COMMENTS
no complications, no stridor noted, pt able to vocalize
patient awake, alert, able to speak clearly, nad, spo2 100%

## 2023-09-05 NOTE — PROGRESS NOTE ADULT - NS ATTEND AMEND GEN_ALL_CORE FT
s/p parastomal hernia repair with mesh.   Re-admitted to IB likely due to coagulopathy.      NEURO: Following commands. Off sedation.     RESP: 14/450/6/40%. 7.37/43/320/25  -Attempt PSV    CVS: Hypotension improving. Levo 0.03. Weaned off vaso.   -s/p stents cardiac stents 3 weeks ago.   -Metoprolol for afib.     GI: S/p parastomal hernia repair. No recent episodes of melena. CTA with no eivdnece of bleeding.   -ARAM on CLD. Baseline around 1.5. Continue resusctation.     HEME: Hb stable, no evidence of ongoing bleeding. Holding therapueidc AC due to riks of bleeding.     ENDO; Glucose controlled.     ID: No evendece of acitve infection. Monitor off abx. See below.

## 2023-09-05 NOTE — PROGRESS NOTE ADULT - CRITICAL CARE ATTENDING COMMENT
s/p parastomal hernia repair with mesh.   Admitted to SICU with LGIB secondary to coagulopathy.      NEURO: Following commands, pain controlled.     RESP: Acute respiratory insufficiency in the setting of hemodynamic instability, resolving. Oxygenating well on minimal vent settings. Will attempt pressure support trial with plan for possible extubation today.     CVS: Hemorrhagic shock secondary to LGIB. Remains of low dose pressors likely secondary to sedation.   -s/p cardiac stents 3 weeks ago. Appreciate cardiology recs. Continue Plavix Qd.   -Afib currently rate controlled. Hold metoprolol due to pressor requirements.     GI: S/p parastomal hernia repair. No recent episodes of melena. CTA with no evidence of bleeding.     : ARAM on CKD resolving. Baseline around 1.5. Continue IVF resuscitation.     HEME: Hb stable, no evidence of ongoing bleeding. Hold heparin.     ID: No evidence of active infection. Monitor off abx.    ENDO: Glucose controlled. s/p parastomal hernia repair with mesh.   Admitted to SICU with LGIB secondary to coagulopathy.      NEURO: Following commands, pain controlled.     RESP: Acute respiratory insufficiency in the setting of hemodynamic instability, resolving. Oxygenating well on minimal vent settings. Will attempt pressure support trial with plan for possible extubation today.     CVS: Hemorrhagic shock secondary to LGIB. Remains of low dose pressors likely secondary to sedation.   -s/p cardiac stents 7/14/23. Appreciate cardiology recs. Continue Plavix Qd.   -Afib currently rate controlled. Hold metoprolol due to pressor requirements.     GI: S/p parastomal hernia repair. No recent episodes of melena. CTA with no evidence of bleeding.     : ARAM on CKD resolving. Baseline around 1.5. Continue IVF resuscitation.     HEME: Hb stable, no evidence of ongoing bleeding. Hold heparin.     ID: No evidence of active infection. Monitor off abx.    ENDO: Glucose controlled.

## 2023-09-05 NOTE — AIRWAY REMOVAL NOTE  ADULT & PEDS - RESPIRATORY RHYTHM/PATTERN
rate regular/depth regular/pattern regular/unlabored
no shortness of breath/rate regular/depth regular/pattern regular/unlabored

## 2023-09-05 NOTE — PROGRESS NOTE ADULT - ASSESSMENT
Assessment: 78y Male presenting with: SBO, Metabolic acidosis, L ruptured baker's cyst.  Chronic A-fib.  ARAM, Hemorrhagic shock, hypotension, Acute blood loss anemia.     Plan:    Neurological: CW Fentanyl for now.  RASS is - 1.  Maintain 0 to -1.    Pulmonary: Will attempt PSV trials again today in effort to wean to extubation. Appears bleed has stabilized and pressors are weaning down significantly.     Cardiovascular: Responded well to 2U PRBC yesterday. Intermittent Levophed required at times and Vasopressin weaned to 0.02. Hgb remained stable after transfusion yesterday. Edematous and was IVL yesterday. Will continue to hold Lasix for now due to intermittent pressor requirements but will likely need to resume diuresis soon. Will continue Richard Trac monitoring and use concentrated albumin for any additional volume requirements.      Gastrointestinal: NPO with NGT to suction. GI consulted yesterday and believe bleeding was associated with supratherapeutic INR. Started Protonix BID yesterday. More HD stable and CT A/P w/ PO contrast to be performed today to evaluate for possible injury of bowel leaking into urostomy.     Genitourinary: Tight monitor on urine output via urostomy.    Heme: ASA only. Maintain INR < ~ 1.8. Fibrinogen normal.     ID: No indication.    Lines/ Tubes: Maintain all invasive lines and tubes as ordered.     Dispo: Pt critically ill. Continue SICU level of care.

## 2023-09-05 NOTE — CHART NOTE - NSCHARTNOTEFT_GEN_A_CORE
Central Line Removal Note    PROCEDURE NOTE:  Central Line removal    Site:    Right IJ Triple Lumen Catheter     Procedure explained to patient. Patient placed in Trendelenburg position. Skin sutures cut and catheter removed, tip noted to be intact. Pressure applied for greater than 5 mins, no hematoma or bleeding noted. A dry sterile dressing applied. Patient tolerated procedure well and was noted to be in preprocedural state of health at conclusion.

## 2023-09-05 NOTE — PROGRESS NOTE ADULT - SUBJECTIVE AND OBJECTIVE BOX
24h Events: Patient had no additional evidence of bleeding from NGT or melena yesterday. CT scan performed showed a right abdominal wall parastomal hematoma without evidence of GIB. GI was consulted and recommended Protonix BID as they think bleed was related to supratherapeutic INR. No scope planned. Patient received an additional 2U PRBC for hgb 7.0 yesterday morning and had response to 8.4. Levophed was able to be weaned off at times and low dose intermittent for MAP’s around 60. Vaso weaned to 0.02. Renal function remains adequate. Urostomy continues to function with UOP, however sediment remains in bag. CT A/P w/ PO contrast pending due to HD instability yesterday.     ICU Vital Signs Last 24 Hrs  T(C): 36.9 (05 Sep 2023 05:45), Max: 37.5 (04 Sep 2023 12:00)  T(F): 98.4 (05 Sep 2023 05:45), Max: 99.5 (04 Sep 2023 12:00)  HR: 73 (05 Sep 2023 05:45) (66 - 99)  BP: 119/62 (05 Sep 2023 04:00) (99/64 - 135/79)  BP(mean): 79 (05 Sep 2023 04:00) (70 - 112)  ABP: 109/44 (05 Sep 2023 05:45) (93/37 - 150/56)  ABP(mean): 65 (05 Sep 2023 05:45) (56 - 94)  RR: 17 (05 Sep 2023 05:45) (12 - 28)  SpO2: 100% (05 Sep 2023 05:45) (96% - 100%)    O2 Parameters below as of 05 Sep 2023 04:45  Patient On (Oxygen Delivery Method): ventilator      I&O's Detail    03 Sep 2023 07:01  -  04 Sep 2023 07:00  --------------------------------------------------------  IN:  Total IN: 0 mL    OUT:    Urostomy (mL): 325 mL  Total OUT: 325 mL    Total NET: -325 mL      04 Sep 2023 07:01  -  05 Sep 2023 06:08  --------------------------------------------------------  IN:    FentaNYL: 214.2 mL    IV PiggyBack: 50 mL    Norepinephrine: 42.1 mL    Vasopressin: 84 mL  Total IN: 390.3 mL    OUT:    Urostomy (mL): 1445 mL  Total OUT: 1445 mL    Total NET: -1054.7 mL      ABG - ( 05 Sep 2023 04:39 )  pH, Arterial: 7.370 pH, Blood: x     /  pCO2: 43    /  pO2: 320   / HCO3: 25    / Base Excess: -0.4  /  SaO2: 99.8        MEDICATIONS  (STANDING):  aspirin Suppository 300 milliGRAM(s) Rectal daily  chlorhexidine 0.12% Liquid 15 milliLiter(s) Oral Mucosa every 12 hours  chlorhexidine 2% Cloths 1 Application(s) Topical daily  chlorhexidine 4% Liquid 1 Application(s) Topical <User Schedule>  fentaNYL   Infusion 1 MICROgram(s)/kG/Hr (10.2 mL/Hr) IV Continuous <Continuous>  levothyroxine Injectable 100 MICROGram(s) IV Push at bedtime  metoprolol tartrate Injectable 2.5 milliGRAM(s) IV Push every 6 hours  norepinephrine Infusion 0.05 MICROgram(s)/kG/Min (9.57 mL/Hr) IV Continuous <Continuous>  pantoprazole  Injectable 40 milliGRAM(s) IV Push every 12 hours  potassium chloride  10 mEq/100 mL IVPB 10 milliEquivalent(s) IV Intermittent once  vasopressin Infusion 0.04 Unit(s)/Min (6 mL/Hr) IV Continuous <Continuous>    MEDICATIONS  (PRN):  albuterol/ipratropium for Nebulization 3 milliLiter(s) Nebulizer every 6 hours PRN Shortness of Breath and/or Wheezing  sodium chloride 0.9% lock flush 10 milliLiter(s) IV Push every 1 hour PRN Pre/post blood products, medications, blood draw, and to maintain line patency      Physical Exam:    Gen: On vent. No cyanosis. + Mild pallor.    Eyes: PERRL ~ 3mm, EOMI,     Neurological: A&O, GCS 11T, No focal deficit.     Neck: Supple. NT AT, FROM no pain.  No JVD. No meningeal signs    Pulmonary: NAD, lung sounds clear to auscultation bilaterally      Cardiovascular: Irregularly irregular, S1, S2, No Murmurs, rubs or gallops noted.    Gastrointestinal: ND, Soft, NT.    Extremities: NT, AT,. Mild-moderate Left calf firmness. + Noted anasarca. No Erythema or palpable cord noted.  FROM, = 2+ pulses throughout.    : Urostomy functioning. Sediment in bag.       LABS:  CBC Full  -  ( 05 Sep 2023 03:17 )  WBC Count : 8.57 K/uL  RBC Count : 2.70 M/uL  Hemoglobin : 7.8 g/dL  Hematocrit : 23.9 %  Platelet Count - Automated : 223 K/uL  Mean Cell Volume : 88.5 fl  Mean Cell Hemoglobin : 28.9 pg  Mean Cell Hemoglobin Concentration : 32.6 gm/dL  Auto Neutrophil # : 7.28 K/uL  Auto Lymphocyte # : 0.30 K/uL  Auto Monocyte # : 0.45 K/uL  Auto Eosinophil # : 0.22 K/uL  Auto Basophil # : 0.00 K/uL  Auto Neutrophil % : 85.0 %  Auto Lymphocyte % : 3.5 %  Auto Monocyte % : 5.3 %  Auto Eosinophil % : 2.6 %  Auto Basophil % : 0.0 %    09-05    141  |  107  |  47.4<H>  ----------------------------<  137<H>  3.9   |  22.0  |  1.56<H>    Ca    6.8<L>      05 Sep 2023 03:17  Phos  3.2     09-05  Mg     1.8     09-05    TPro  5.0<L>  /  Alb  2.2<L>  /  TBili  0.6  /  DBili  x   /  AST  120<H>  /  ALT  54<H>  /  AlkPhos  193<H>  09-04    PT/INR - ( 05 Sep 2023 03:17 )   PT: 13.0 sec;   INR: 1.18 ratio         PTT - ( 05 Sep 2023 03:17 )  PTT:23.5 sec  Urinalysis Basic - ( 05 Sep 2023 03:17 )    Color: x / Appearance: x / SG: x / pH: x  Gluc: 137 mg/dL / Ketone: x  / Bili: x / Urobili: x   Blood: x / Protein: x / Nitrite: x   Leuk Esterase: x / RBC: x / WBC x   Sq Epi: x / Non Sq Epi: x / Bacteria: x      RECENT CULTURES:      LIVER FUNCTIONS - ( 04 Sep 2023 13:40 )  Alb: 2.2 g/dL / Pro: 5.0 g/dL / ALK PHOS: 193 U/L / ALT: 54 U/L / AST: 120 U/L / GGT: x           CARDIAC MARKERS ( 04 Sep 2023 21:23 )  x     / 0.19 ng/mL / x     / x     / x      CARDIAC MARKERS ( 04 Sep 2023 13:40 )  x     / 0.20 ng/mL / x     / x     / x      CARDIAC MARKERS ( 04 Sep 2023 05:45 )  x     / 0.18 ng/mL / x     / x     / x

## 2023-09-06 LAB
ANION GAP SERPL CALC-SCNC: 12 MMOL/L — SIGNIFICANT CHANGE UP (ref 5–17)
APTT BLD: 21.2 SEC — LOW (ref 24.5–35.6)
APTT BLD: 28.2 SEC — SIGNIFICANT CHANGE UP (ref 24.5–35.6)
BASOPHILS # BLD AUTO: 0.03 K/UL — SIGNIFICANT CHANGE UP (ref 0–0.2)
BASOPHILS NFR BLD AUTO: 0.3 % — SIGNIFICANT CHANGE UP (ref 0–2)
BUN SERPL-MCNC: 42.9 MG/DL — HIGH (ref 8–20)
CALCIUM SERPL-MCNC: 6.8 MG/DL — LOW (ref 8.4–10.5)
CHLORIDE SERPL-SCNC: 106 MMOL/L — SIGNIFICANT CHANGE UP (ref 96–108)
CO2 SERPL-SCNC: 23 MMOL/L — SIGNIFICANT CHANGE UP (ref 22–29)
CREAT SERPL-MCNC: 1.33 MG/DL — HIGH (ref 0.5–1.3)
EGFR: 55 ML/MIN/1.73M2 — LOW
EOSINOPHIL # BLD AUTO: 0.11 K/UL — SIGNIFICANT CHANGE UP (ref 0–0.5)
EOSINOPHIL NFR BLD AUTO: 1.2 % — SIGNIFICANT CHANGE UP (ref 0–6)
GAS PNL BLDA: SIGNIFICANT CHANGE UP
GLUCOSE SERPL-MCNC: 128 MG/DL — HIGH (ref 70–99)
HCT VFR BLD CALC: 23.5 % — LOW (ref 39–50)
HCT VFR BLD CALC: 26.8 % — LOW (ref 39–50)
HGB BLD-MCNC: 8.1 G/DL — LOW (ref 13–17)
HGB BLD-MCNC: 8.7 G/DL — LOW (ref 13–17)
IMM GRANULOCYTES NFR BLD AUTO: 4.8 % — HIGH (ref 0–0.9)
INR BLD: 1.17 RATIO — SIGNIFICANT CHANGE UP (ref 0.85–1.18)
LYMPHOCYTES # BLD AUTO: 0.87 K/UL — LOW (ref 1–3.3)
LYMPHOCYTES # BLD AUTO: 9.5 % — LOW (ref 13–44)
MAGNESIUM SERPL-MCNC: 1.9 MG/DL — SIGNIFICANT CHANGE UP (ref 1.6–2.6)
MCHC RBC-ENTMCNC: 29.3 PG — SIGNIFICANT CHANGE UP (ref 27–34)
MCHC RBC-ENTMCNC: 30.3 PG — SIGNIFICANT CHANGE UP (ref 27–34)
MCHC RBC-ENTMCNC: 32.5 GM/DL — SIGNIFICANT CHANGE UP (ref 32–36)
MCHC RBC-ENTMCNC: 34.5 GM/DL — SIGNIFICANT CHANGE UP (ref 32–36)
MCV RBC AUTO: 88 FL — SIGNIFICANT CHANGE UP (ref 80–100)
MCV RBC AUTO: 90.2 FL — SIGNIFICANT CHANGE UP (ref 80–100)
MONOCYTES # BLD AUTO: 0.93 K/UL — HIGH (ref 0–0.9)
MONOCYTES NFR BLD AUTO: 10.2 % — SIGNIFICANT CHANGE UP (ref 2–14)
NEUTROPHILS # BLD AUTO: 6.76 K/UL — SIGNIFICANT CHANGE UP (ref 1.8–7.4)
NEUTROPHILS NFR BLD AUTO: 74 % — SIGNIFICANT CHANGE UP (ref 43–77)
PHOSPHATE SERPL-MCNC: 2 MG/DL — LOW (ref 2.4–4.7)
PLATELET # BLD AUTO: 236 K/UL — SIGNIFICANT CHANGE UP (ref 150–400)
PLATELET # BLD AUTO: 295 K/UL — SIGNIFICANT CHANGE UP (ref 150–400)
POTASSIUM SERPL-MCNC: 3.9 MMOL/L — SIGNIFICANT CHANGE UP (ref 3.5–5.3)
POTASSIUM SERPL-SCNC: 3.9 MMOL/L — SIGNIFICANT CHANGE UP (ref 3.5–5.3)
PROTHROM AB SERPL-ACNC: 12.9 SEC — SIGNIFICANT CHANGE UP (ref 9.5–13)
RBC # BLD: 2.67 M/UL — LOW (ref 4.2–5.8)
RBC # BLD: 2.97 M/UL — LOW (ref 4.2–5.8)
RBC # FLD: 15.3 % — HIGH (ref 10.3–14.5)
RBC # FLD: 15.4 % — HIGH (ref 10.3–14.5)
SODIUM SERPL-SCNC: 141 MMOL/L — SIGNIFICANT CHANGE UP (ref 135–145)
WBC # BLD: 8.83 K/UL — SIGNIFICANT CHANGE UP (ref 3.8–10.5)
WBC # BLD: 9.14 K/UL — SIGNIFICANT CHANGE UP (ref 3.8–10.5)
WBC # FLD AUTO: 8.83 K/UL — SIGNIFICANT CHANGE UP (ref 3.8–10.5)
WBC # FLD AUTO: 9.14 K/UL — SIGNIFICANT CHANGE UP (ref 3.8–10.5)

## 2023-09-06 PROCEDURE — 93010 ELECTROCARDIOGRAM REPORT: CPT

## 2023-09-06 PROCEDURE — 71045 X-RAY EXAM CHEST 1 VIEW: CPT | Mod: 26

## 2023-09-06 PROCEDURE — 99233 SBSQ HOSP IP/OBS HIGH 50: CPT | Mod: FS,24

## 2023-09-06 RX ORDER — METOPROLOL TARTRATE 50 MG
25 TABLET ORAL
Refills: 0 | Status: DISCONTINUED | OUTPATIENT
Start: 2023-09-06 | End: 2023-09-12

## 2023-09-06 RX ORDER — CALCIUM GLUCONATE 100 MG/ML
2 VIAL (ML) INTRAVENOUS ONCE
Refills: 0 | Status: COMPLETED | OUTPATIENT
Start: 2023-09-06 | End: 2023-09-06

## 2023-09-06 RX ORDER — HEPARIN SODIUM 5000 [USP'U]/ML
1400 INJECTION INTRAVENOUS; SUBCUTANEOUS
Qty: 25000 | Refills: 0 | Status: DISCONTINUED | OUTPATIENT
Start: 2023-09-06 | End: 2023-09-07

## 2023-09-06 RX ORDER — FUROSEMIDE 40 MG
20 TABLET ORAL DAILY
Refills: 0 | Status: DISCONTINUED | OUTPATIENT
Start: 2023-09-06 | End: 2023-09-12

## 2023-09-06 RX ORDER — POLYETHYLENE GLYCOL 3350 17 G/17G
17 POWDER, FOR SOLUTION ORAL DAILY
Refills: 0 | Status: DISCONTINUED | OUTPATIENT
Start: 2023-09-06 | End: 2023-09-12

## 2023-09-06 RX ORDER — SODIUM,POTASSIUM PHOSPHATES 278-250MG
2 POWDER IN PACKET (EA) ORAL
Refills: 0 | Status: COMPLETED | OUTPATIENT
Start: 2023-09-06 | End: 2023-09-06

## 2023-09-06 RX ORDER — MAGNESIUM SULFATE 500 MG/ML
2 VIAL (ML) INJECTION ONCE
Refills: 0 | Status: COMPLETED | OUTPATIENT
Start: 2023-09-06 | End: 2023-09-06

## 2023-09-06 RX ORDER — SENNA PLUS 8.6 MG/1
1 TABLET ORAL DAILY
Refills: 0 | Status: DISCONTINUED | OUTPATIENT
Start: 2023-09-06 | End: 2023-09-07

## 2023-09-06 RX ADMIN — Medication 2 PACKET(S): at 07:57

## 2023-09-06 RX ADMIN — Medication 25 MILLIGRAM(S): at 18:04

## 2023-09-06 RX ADMIN — PANTOPRAZOLE SODIUM 40 MILLIGRAM(S): 20 TABLET, DELAYED RELEASE ORAL at 18:04

## 2023-09-06 RX ADMIN — SACUBITRIL AND VALSARTAN 1 TABLET(S): 24; 26 TABLET, FILM COATED ORAL at 00:57

## 2023-09-06 RX ADMIN — POLYETHYLENE GLYCOL 3350 17 GRAM(S): 17 POWDER, FOR SOLUTION ORAL at 12:17

## 2023-09-06 RX ADMIN — CHLORHEXIDINE GLUCONATE 1 APPLICATION(S): 213 SOLUTION TOPICAL at 05:16

## 2023-09-06 RX ADMIN — Medication 20 MILLIGRAM(S): at 12:16

## 2023-09-06 RX ADMIN — PANTOPRAZOLE SODIUM 40 MILLIGRAM(S): 20 TABLET, DELAYED RELEASE ORAL at 05:12

## 2023-09-06 RX ADMIN — CHLORHEXIDINE GLUCONATE 1 APPLICATION(S): 213 SOLUTION TOPICAL at 12:22

## 2023-09-06 RX ADMIN — SACUBITRIL AND VALSARTAN 1 TABLET(S): 24; 26 TABLET, FILM COATED ORAL at 18:04

## 2023-09-06 RX ADMIN — Medication 25 GRAM(S): at 05:12

## 2023-09-06 RX ADMIN — CLOPIDOGREL BISULFATE 75 MILLIGRAM(S): 75 TABLET, FILM COATED ORAL at 12:16

## 2023-09-06 RX ADMIN — Medication 200 GRAM(S): at 05:13

## 2023-09-06 RX ADMIN — HEPARIN SODIUM 1400 UNIT(S)/HR: 5000 INJECTION INTRAVENOUS; SUBCUTANEOUS at 12:11

## 2023-09-06 RX ADMIN — Medication 2 PACKET(S): at 05:12

## 2023-09-06 RX ADMIN — SENNA PLUS 1 TABLET(S): 8.6 TABLET ORAL at 22:14

## 2023-09-06 RX ADMIN — Medication 137 MICROGRAM(S): at 05:16

## 2023-09-06 NOTE — PHYSICAL THERAPY INITIAL EVALUATION ADULT - PERTINENT HX OF CURRENT PROBLEM, REHAB EVAL
78yMale presenting with: SBO, Anion gap metabolic acidosis, ARAM, Rapid afib now s/p OR and repair of parastomal hernia. Neurovascular checks to LLE for ruptured hemorrhagic Baker's cyst. No signs of compartment syndrome currently.
78y Male presenting with: SBO, Metabolic acidosis, L ruptured baker's cyst.  Chronic A-fib.  ARAM, Hemorrhagic shock, hypotension, Acute blood loss anemia. Extubated on 9/5 without issue.

## 2023-09-06 NOTE — PHYSICAL THERAPY INITIAL EVALUATION ADULT - NSPTDISCHREC_GEN_A_CORE
home with A, pending further assessment of functional mobility, recommendation may change based on progress/Home PT
Sub-acute Rehab

## 2023-09-06 NOTE — PROGRESS NOTE ADULT - NS ATTEND AMEND GEN_ALL_CORE FT
s/p parastomal hernia repair with mesh.   Admitted to SICU with LGIB secondary to coagulopathy.      NEURO: A&Ox4, Pain controlled.     RESP: Extubated yesterday. Remains stable on room air.     CVS: Hemorrhagic shock secondary to LGIB resolved.   -s/p cardiac stents 3 weeks ago. Appreciate cardiology recs. Continue Plavix Qd.   -Afib currently rate controlled. Resume home Metoprolol.    GI: S/p parastomal hernia repair. Post op LGIB. No episodes of bleeding in the last 48 hours.     : ARAM on CKD resolved. Baseline around 1.5.  -Net negative 1L today. Hold home lasix.      HEME: Hb stable, no evidence of ongoing bleeding.  -Resume heparin drip today.     ID: No evidence of active infection. Monitor off abx.    ENDO: Glucose controlled. Continue home synthroid for HypoT. s/p parastomal hernia repair with mesh.   Admitted to SICU with LGIB.    NEURO: A&Ox4, Pain controlled.     RESP: Extubated yesterday. Remains stable on room air.     CVS: Hemorrhagic shock secondary to LGIB resolved.   -s/p cardiac stents 3 weeks ago. Appreciate cardiology recs. Continue Plavix Qd.   -Afib currently rate controlled. Resume home Metoprolol.    GI: S/p parastomal hernia repair. Post op LGIB. No episodes of bleeding in the last 48 hours.   -Abdominal exam benign. Diet as tolerated.     : ARAM on CKD resolved. Baseline around 1.5.  -Net negative 1L today. Hold home lasix.      HEME: Hb stable, no evidence of ongoing bleeding.  -Will resume heparin drip today and monitor for evidence of bleeding.     ID: No evidence of active infection. Monitor off abx.    ENDO: Glucose controlled. Continue home synthroid for HypoT. s/p parastomal hernia repair with mesh.   Admitted to SICU with LGIB.    NEURO: A&Ox4, Pain controlled.     RESP: Extubated yesterday. Remains stable on room air.     CVS: Hemorrhagic shock secondary to LGIB resolved.   -s/p cardiac stents 7/14/23. Appreciate cardiology recs. Continue Plavix Qd.   -Afib currently rate controlled. Resume home Metoprolol.    GI: S/p parastomal hernia repair. Post op LGIB. No episodes of bleeding in the last 48 hours.   -Abdominal exam benign. Diet as tolerated.     : ARAM on CKD resolved. Baseline around 1.5.  -Net negative 1L today. Hold home lasix.      HEME: Hb stable, no evidence of ongoing bleeding.  -Will resume heparin drip today and monitor for evidence of bleeding.     ID: No evidence of active infection. Monitor off abx.    ENDO: Glucose controlled. Continue home synthroid for HypoT.

## 2023-09-06 NOTE — PHYSICAL THERAPY INITIAL EVALUATION ADULT - PLANNED THERAPY INTERVENTIONS, PT EVAL
balance training/bed mobility training/gait training/ROM/strengthening/transfer training
bed mobility training/gait training/ROM/strengthening/transfer training

## 2023-09-06 NOTE — PROGRESS NOTE ADULT - SUBJECTIVE AND OBJECTIVE BOX
24h Events: Patient extubated yesterday without issue. Pressors were weaned off and remains HD stable. Now tolerating PO well. ASA was stopped and converted back to Plavix yesterday. Remains off AC for now until further plan established with cardiologist. RIJ cordis removed. No hematoma in neck noted. HR remains controlled <100. Patient states sediment in urostomy bag is normal for him and has had it for many years without issues. Feels well overall.     ICU Vital Signs Last 24 Hrs  T(C): 37.4 (06 Sep 2023 04:00), Max: 37.8 (05 Sep 2023 20:00)  T(F): 99.3 (06 Sep 2023 04:00), Max: 100 (05 Sep 2023 20:00)  HR: 98 (06 Sep 2023 04:00) (67 - 104)  BP: 125/53 (06 Sep 2023 03:00) (107/52 - 148/66)  BP(mean): 73 (06 Sep 2023 03:00) (67 - 101)  ABP: 125/53 (06 Sep 2023 04:00) (97/61 - 139/56)  ABP(mean): 77 (06 Sep 2023 04:00) (57 - 98)  RR: 26 (06 Sep 2023 04:00) (0 - 32)  SpO2: 99% (06 Sep 2023 04:00) (91% - 100%)    O2 Parameters below as of 06 Sep 2023 04:00  Patient On (Oxygen Delivery Method): room air      I&O's Detail    04 Sep 2023 07:01  -  05 Sep 2023 07:00  --------------------------------------------------------  IN:    FentaNYL: 214.2 mL    IV PiggyBack: 50 mL    IV PiggyBack: 100 mL    Norepinephrine: 47.8 mL    Vasopressin: 90 mL  Total IN: 502 mL    OUT:    Nasogastric/Oral tube (mL): 250 mL    Urostomy (mL): 1565 mL  Total OUT: 1815 mL    Total NET: -1313 mL      05 Sep 2023 07:01  -  06 Sep 2023 05:17  --------------------------------------------------------  IN:    Norepinephrine: 49.6 mL    Oral Fluid: 300 mL  Total IN: 349.6 mL    OUT:    FentaNYL: 0 mL    Urostomy (mL): 1255 mL    Vasopressin: 0 mL  Total OUT: 1255 mL    Total NET: -905.4 mL      ABG - ( 06 Sep 2023 04:31 )  pH, Arterial: 7.460 pH, Blood: x     /  pCO2: 36    /  pO2: 93    / HCO3: 26    / Base Excess: 1.8   /  SaO2: 100.0       MEDICATIONS  (STANDING):  chlorhexidine 2% Cloths 1 Application(s) Topical daily  chlorhexidine 4% Liquid 1 Application(s) Topical <User Schedule>  clopidogrel Tablet 75 milliGRAM(s) Oral daily  levothyroxine 137 MICROGram(s) Oral daily  metoprolol tartrate Injectable 5 milliGRAM(s) IV Push every 6 hours  pantoprazole  Injectable 40 milliGRAM(s) IV Push every 12 hours  potassium phosphate / sodium phosphate Powder (PHOS-NaK) 2 Packet(s) Oral every 2 hours  sacubitril 24 mG/valsartan 26 mG 1 Tablet(s) Oral two times a day    MEDICATIONS  (PRN):  acetaminophen     Tablet .. 975 milliGRAM(s) Oral every 6 hours PRN Mild Pain (1 - 3)  albuterol/ipratropium for Nebulization 3 milliLiter(s) Nebulizer every 6 hours PRN Wheezing  sodium chloride 0.9% lock flush 10 milliLiter(s) IV Push every 1 hour PRN Pre/post blood products, medications, blood draw, and to maintain line patency      Physical Exam:    Gen: NAD, conversing in bed    Eyes: PERRL ~ 3mm, EOMI,     Neurological: A&Ox3, GCS 15, No focal deficit.     Neck: Supple. NT AT, FROM no pain.  No JVD. No meningeal signs    Pulmonary: NAD, lung sounds clear to auscultation bilaterally      Cardiovascular: Irregularly irregular, S1, S2, No Murmurs, rubs or gallops noted.    Gastrointestinal: ND, Soft, NT.    Extremities: NT, AT,. Mild-moderate Left calf firmness. + Noted anasarca. No Erythema or palpable cord noted.  FROM, = 2+ pulses throughout.    : Urostomy functioning. Sediment in bag (normal for patient)      LABS:  CBC Full  -  ( 06 Sep 2023 02:11 )  WBC Count : 9.14 K/uL  RBC Count : 2.67 M/uL  Hemoglobin : 8.1 g/dL  Hematocrit : 23.5 %  Platelet Count - Automated : 236 K/uL  Mean Cell Volume : 88.0 fl  Mean Cell Hemoglobin : 30.3 pg  Mean Cell Hemoglobin Concentration : 34.5 gm/dL  Auto Neutrophil # : 6.76 K/uL  Auto Lymphocyte # : 0.87 K/uL  Auto Monocyte # : 0.93 K/uL  Auto Eosinophil # : 0.11 K/uL  Auto Basophil # : 0.03 K/uL  Auto Neutrophil % : 74.0 %  Auto Lymphocyte % : 9.5 %  Auto Monocyte % : 10.2 %  Auto Eosinophil % : 1.2 %  Auto Basophil % : 0.3 %    09-06    141  |  106  |  42.9<H>  ----------------------------<  128<H>  3.9   |  23.0  |  1.33<H>    Ca    6.8<L>      06 Sep 2023 02:11  Phos  2.0     09-06  Mg     1.9     09-06    TPro  5.0<L>  /  Alb  2.2<L>  /  TBili  0.6  /  DBili  x   /  AST  120<H>  /  ALT  54<H>  /  AlkPhos  193<H>  09-04    PT/INR - ( 05 Sep 2023 03:17 )   PT: 13.0 sec;   INR: 1.18 ratio         PTT - ( 05 Sep 2023 03:17 )  PTT:23.5 sec  Urinalysis Basic - ( 06 Sep 2023 02:11 )    Color: x / Appearance: x / SG: x / pH: x  Gluc: 128 mg/dL / Ketone: x  / Bili: x / Urobili: x   Blood: x / Protein: x / Nitrite: x   Leuk Esterase: x / RBC: x / WBC x   Sq Epi: x / Non Sq Epi: x / Bacteria: x      RECENT CULTURES:      LIVER FUNCTIONS - ( 04 Sep 2023 13:40 )  Alb: 2.2 g/dL / Pro: 5.0 g/dL / ALK PHOS: 193 U/L / ALT: 54 U/L / AST: 120 U/L / GGT: x           CARDIAC MARKERS ( 04 Sep 2023 21:23 )  x     / 0.19 ng/mL / x     / x     / x      CARDIAC MARKERS ( 04 Sep 2023 13:40 )  x     / 0.20 ng/mL / x     / x     / x      CARDIAC MARKERS ( 04 Sep 2023 05:45 )  x     / 0.18 ng/mL / x     / x     / x

## 2023-09-06 NOTE — PHYSICAL THERAPY INITIAL EVALUATION ADULT - RANGE OF MOTION EXAMINATION, REHAB EVAL
left knee ROM and hip flexion decreased due to weakness and pain/Right LE ROM was WNL (within normal limits)
left knee ROM and hip flexion decreased due to weakness and pain/Right LE ROM was WNL (within normal limits)

## 2023-09-06 NOTE — PHYSICAL THERAPY INITIAL EVALUATION ADULT - IMPAIRMENTS CONTRIBUTING IMPAIRED BED MOBILITY, REHAB EVAL
decreased flexibility/pain/impaired postural control/decreased ROM/decreased strength
decreased flexibility/pain/decreased ROM/decreased strength

## 2023-09-06 NOTE — CONSULT NOTE ADULT - ASSESSMENT
78-year-old man with a history of urostomy and now with SBO status post ORN repair of the parastomal hernia, A-fib and was on IV anticoagulation now with acute drop in the blood count and concern for bleeding.  1.  Acute blood loss anemia: There is a combination of supratherapeutic INR and also bleeding in the abdomen.  Transfused packed RBC.  Reversed the INR and there is no further bleeding noted.  Patient with improvement in the blood pressure and now is off one of the vasopressors.  Will recommend to start PPI twice daily and monitor.  There is no signs of active GI bleeding at this time.  Will recommend no endoscopic intervention at this time.  2.  GI bleeding: Most likely related to supratherapeutic INR.  PPI twice daily.  3. Call GI as needed
A: 79 yo M with a hx  A-fib on Warfarin, HTN,HLD,  CAD s/p stents, and bladder cancer s/p urostomy (~12 years ago)  who presented to [facility] for high grade SBO, s/p ex lap w/KEANU (8/28) with RTOR on on 8/28w/ RTOR on 8/30 for parastomal hernia repair and closure. Post-op course complicated by acute blood loss anemia, GIB and parstomal hernia hematoma. 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 Patient asymptomatic at this time. SMA and GUANACO appear calcified, however patient. No acute concern for visceral ischemia at this time.     Plan:    - No acute surgical intervention at this time   - Recommend anti-platelet/statin   - Follow up Vascular Surgery on outpatient basis     Plan discussed with Vascular Surgery Attending     
78-year-old male with history of bladder cancer with urostomy done 12 years ago, A-fib on Warfarin, hypertension, hyperlipidemia, CAD with 2 stents placed 1 month ago who presents with abdominal pain cough 4 to 5 days with nausea vomiting for 4 days.  Patient was seen at [facility] and was found to have high-grade SBO near his urostomy.      1. Acute kidney injury on CKD 3:  -ARAM likely related to local pressure/Inc IAP in setting of SBO    -Baseline SCr: 1.6, SCr on admission 7.6 and today 6 mg/dl  -UA: ordered  -Imaging: reviewed, High-grade small bowel obstruction with transition at the right lower quadrant parastomal hernia sac at the level of the ileal- ileal anastomosis.    -Agree w/ conservative management: decompression of SBO, holding Entresto, gentle IVF.    2. SBO: management per Sx   3. Afib : oN digoxin, dig level reviewed.   4. HTN; Controlled.
A/P: Patient is a 79 y/o M with a PMHx of CAD s/p CABG x 2 (SVG-OM1/mLAD, LIMA-D1) with PCI/ALAN ([facility] 07/23 with patent SVG, LIMA-D1 atretic, and s/p ALAN x 1 to mLCx PTO 07/14/23), HFrEF (EF 40-45% 09/22), Bladder Cancer s/p Urostomy (12 years ago), atrial fibrillation (on Coumadin), HTN, HLD, hyperkalemia, and CKD who presented to [facility] with nausea, vomiting, and abdominal pain found to have a high-grade SBO transferred to [facility] for further workup. Patient currently with an NGT in place, but his abdominal pain is improving. Patient also with no chest pain or dyspnea at this time as well. Surgery consulted for further recommendations regarding antiplatelets. Patient denies any fevers, chills, CP, SOB, syncope, diarrhea, headache, or dizziness.

## 2023-09-06 NOTE — PHYSICAL THERAPY INITIAL EVALUATION ADULT - MANUAL MUSCLE TESTING RESULTS, REHAB EVAL
Right LE grossly 3-/5, Left LE hip flex 2-/5, knee flexion 2/5, knee extension 2/5, ankle df/pf 2+/5
Right LE grossly 3+/5, Left LE hip flex 2/5, knee flexion 2/5, knee extension 3-/5, ankle df/pf 3/5

## 2023-09-06 NOTE — PHYSICAL THERAPY INITIAL EVALUATION ADULT - ADDITIONAL COMMENTS
Pt reports living in private home with wife who is able to assist. Pt has 3 ESTUARDO with handrail and 12 stairs inside with handrail. Independent prior to admission. Owns RW and cane but wasn't using prior to admission.
Pt reports living in private home with wife who is able to assist. Pt has 3 ESTUARDO with handrail and 12 stairs inside with handrail. Independent prior to admission. Owns RW and cane but wasn't using prior to admission.

## 2023-09-06 NOTE — PHYSICAL THERAPY INITIAL EVALUATION ADULT - LEVEL OF INDEPENDENCE: SIT/STAND, REHAB EVAL
attempted with maxAx2, unable to achieve full upright posture
pt declining at this time due to pain, TBA when able

## 2023-09-06 NOTE — CONSULT NOTE ADULT - SUBJECTIVE AND OBJECTIVE BOX
Vascular Surgery Consult    Consulting attending: Dr. Aranda       HPI: Mr. Mendosa is a 79 yo M with a hx  A-fib on Warfarin, HTN,HLD,  CAD s/p stents, and bladder cancer s/p urostomy (~12 years ago)  who presented to [facility] for high grade SBO. Transferred to [facility] (8/22). Initially, conservative management of SBO attempted by ACS team. However, patient failed conservative management. Now s/p ex lap w/KEANU on 8/28 (intra-operative dilated loops of bowel with transition point near urostomy), left is discontinuity at this time w/ RTOR on 8/30 for parastomal hernia repair and closure. No evidence of bowel ischemia. Patient hospital course was complicated by acute blood loss anemia 2/2 to GIB. Patient since stabilized. 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 Vascular Surgery consulted for findings. Patient assessed at bedside, tolerating a diet. Denies any post-prandial pain, N&V, early satiety, hematochezia. HDS, no recurrent melanotic episodes (since 9/4). Previous smoker, quit (several years ago).     PAST MEDICAL & SURGICAL HISTORY:  Atrial fibrillation  Hypothyroid  Hypertension  Bladder cancer  Bronchitis  Former smoker  CAD (coronary artery disease)  History of bladder surgery  on urostomy  S/P CABG x 3  History of automatic internal cardiac defibrillator (AICD)  H/O knee surgery      FAMILY HISTORY:  Family history of heart attack (Father, Mother)    SOCIAL HISTORY:  Denies any toxic habits    ALLERGIES: NKA No Known Allergies    HOME MEDICATIONS:   digoxin 125 mcg (0.125 mg) oral tablet: 1 orally once a day (14 Jul 2023 06:47)  Ecotrin Adult Low Strength 81 mg oral delayed release tablet: 1 tab(s) orally once a day (14 Jul 2023 15:40)  Entresto 24 mg-26 mg oral tablet: 1 tablet orally 2 times a day (14 Jul 2023 15:40)  furosemide 20 mg oral tablet: 1 tablet orally once a day (14 Jul 2023 15:40)  levothyroxine 137 mcg (0.137 mg) oral tablet: 1 tab(s) orally once a day (14 Jul 2023 06:47)  metoprolol succinate 50 mg oral tablet, extended release: 1 tab(s) orally 2 times a day (14 Jul 2023 06:46)  rosuvastatin 40 mg oral tablet: 1 orally once a day (14 Jul 2023 06:47)  warfarin 5 mg oral tablet: 1 tab(s) orally once a day (14 Jul 2023 06:47)  --------------------------------------------------------------------------------------------    PHYSICAL EXAM:   General: NAD, Lying in bed comfortably  Neuro: A+Ox3  HEENT: EOMI, PERRLA, MMM  Cardio: RRR  Resp: Non labored breathing on RA  GI/Abd: Soft, NT, Distended. no rebound/guarding, no masses palpated. Urobladder with urine and adjacent non reducible hernia that is non tender to palpation.   Vascular: All 4 extremities warm and well perfused.   Pelvis: stable  Musculoskeletal: All 4 extremities moving spontaneously, no limitations, no spinal tenderness.  --------------------------------------------------------------------------------------------    LABS                 14.3   8.54   )----------(  224       ( 22 Aug 2023 20:15 )               43.9      134    |  89     |  98.9   ----------------------------<  117        ( 22 Aug 2023 20:15 )  4.7     |  23.0   |  6.87     Ca    7.5        ( 22 Aug 2023 20:15 )  Mg     2.4       ( 22 Aug 2023 11:32 )    TPro  6.4    /  Alb  3.3    /  TBili  0.5    /  DBili  x      /  AST  19     /  ALT  11     /  AlkPhos  58     ( 22 Aug 2023 20:15 )    LIVER FUNCTIONS - ( 22 Aug 2023 20:15 )  Alb: 3.3 g/dL / Pro: 6.4 g/dL / ALK PHOS: 58 U/L / ALT: 11 U/L / AST: 19 U/L / GGT: x           PT/INR -  28.3 sec / 2.62 ratio   ( 22 Aug 2023 20:15 )       PTT -  33.0 sec   ( 22 Aug 2023 20:15 )  CAPILLARY BLOOD GLUCOSE        Urinalysis Basic - ( 22 Aug 2023 20:15 )    Color: x / Appearance: x / SG: x / pH: x  Gluc: 117 mg/dL / Ketone: x  / Bili: x / Urobili: x   Blood: x / Protein: x / Nitrite: x   Leuk Esterase: x / RBC: x / WBC x   Sq Epi: x / Non Sq Epi: x / Bacteria: x      ABG - ( 22 Aug 2023 11:18 )  pH: 7.46  /  pCO2: 36    /  pO2: 107   / HCO3: 26    / Base Excess: 1.8   /  SaO2: 98.8              20:15 - VBG - pH:       | pCO2:       | pO2:       | Lactate: 2.10     --------------------------------------------------------------------------------------------  IMAGING  ABDOMEN AND PELVIS:    Evaluation of thesolid organ parenchyma is limited without intravenous   contrast.    LIVER: Steatosis.  BILE DUCTS: Normal caliber.  GALLBLADDER: Mild with biliary sludge and/or gallstones.  SPLEEN: Within normal limits.  PANCREAS: Within normal limits.  ADRENALS: Within normal limits.  KIDNEYS/URETERS: Bilateral renal cysts.  No hydroureteronephrosis.    BLADDER:  REPRODUCTIVE ORGANS:  Status post cystoproctectomy with ileal conduit.    BOWEL:  Distended fluid-filled stomach.  Markedly distended fluid-filled small bowel loops.  There is abrupt change in transition at the level of the right lower   quadrant parastomal hernia and the ileal-ileal anastomosis.  PERITONEUM: No ascites.  There is mild mesenteric edema/stranding.  Evaluation of the bowel wall is limited without intravenous contrast.    VESSELS: Atherosclerotic changes.  RETROPERITONEUM/LYMPH NODES: No lymphadenopathy.    ABDOMINAL WALL:  Right parastomal hernia containing dilated small bowel and collapse small   bowel loops at the ileal ileal anastomosis.  Nondistended ileal conduit into is noted extending to the stomal surface.    BONES: Degenerative changes.  Posterior interspinous instrumentation at L4-5.    IMPRESSION:    High-grade small bowel obstruction with transition at the right lower   quadrant parastomal hernia sac at the level of the ileal- ileal   anastomosis.         (22 Aug 2023 21:06)        PAST MEDICAL HISTORY:  Atrial fibrillation    Hypothyroid    Hypertension    Bladder cancer    Bronchitis    Former smoker    CAD (coronary artery disease)          PAST SURGICAL HISTORY:  History of bladder surgery    S/P CABG x 3    History of automatic internal cardiac defibrillator (AICD)    H/O knee surgery          MEDICATIONS:  acetaminophen     Tablet .. 975 milliGRAM(s) Oral every 6 hours PRN  albuterol/ipratropium for Nebulization 3 milliLiter(s) Nebulizer every 6 hours PRN  chlorhexidine 2% Cloths 1 Application(s) Topical daily  clopidogrel Tablet 75 milliGRAM(s) Oral daily  furosemide    Tablet 20 milliGRAM(s) Oral daily  heparin  Infusion. 1400 Unit(s)/Hr IV Continuous <Continuous>  levothyroxine 137 MICROGram(s) Oral daily  metoprolol tartrate 25 milliGRAM(s) Oral two times a day  pantoprazole  Injectable 40 milliGRAM(s) IV Push every 12 hours  polyethylene glycol 3350 17 Gram(s) Oral daily  sacubitril 24 mG/valsartan 26 mG 1 Tablet(s) Oral two times a day  senna 1 Tablet(s) Oral daily  sodium chloride 0.9% lock flush 10 milliLiter(s) IV Push every 1 hour PRN        ALLERGIES:  No Known Allergies        VITALS & I/Os:  Vital Signs Last 24 Hrs  T(C): 37.4 (06 Sep 2023 14:00), Max: 37.8 (05 Sep 2023 20:00)  T(F): 99.3 (06 Sep 2023 14:00), Max: 100 (05 Sep 2023 20:00)  HR: 98 (06 Sep 2023 14:00) (74 - 111)  BP: 139/58 (06 Sep 2023 14:00) (107/52 - 140/75)  BP(mean): 81 (06 Sep 2023 14:00) (67 - 101)  RR: 28 (06 Sep 2023 14:00) (19 - 32)  SpO2: 100% (06 Sep 2023 14:00) (91% - 100%)    Parameters below as of 06 Sep 2023 08:00  Patient On (Oxygen Delivery Method): room air        I&O's Summary    05 Sep 2023 07:01  -  06 Sep 2023 07:00  --------------------------------------------------------  IN: 499.6 mL / OUT: 1605 mL / NET: -1105.4 mL    06 Sep 2023 07:01  -  06 Sep 2023 15:00  --------------------------------------------------------  IN: 56 mL / OUT: 445 mL / NET: -389 mL          PHYSICAL EXAM:  Constitutional: patient resting comfortably in bed, in no acute distress  HEENT: EOMI / PERRL b/l, no active drainage or redness  Neck: No JVD, full ROM without pain  Respiratory: respirations are unlabored, no accessory muscle use, no conversational dyspnea  Cardiovascular: regular rate & rhythm  Gastrointestinal: Abdomen soft, non-tender, non-distended, no rebound tenderness / guarding  Neurological: GCS: 15 (4/5/6). A&O x 3; no gross sensory / motor / coordination deficits  Psychiatric: Normal mood, normal affect  Musculoskeletal: No joint pain, swelling or deformity; no limitation of movement  Vascular:        LABS:                        8.1    9.14  )-----------( 236      ( 06 Sep 2023 02:11 )             23.5     09-06    141  |  106  |  42.9<H>  ----------------------------<  128<H>  3.9   |  23.0  |  1.33<H>    Ca    6.8<L>      06 Sep 2023 02:11  Phos  2.0     09-06  Mg     1.9     09-06      Lactate:    PT/INR - ( 06 Sep 2023 10:00 )   PT: 12.9 sec;   INR: 1.17 ratio         PTT - ( 06 Sep 2023 10:00 )  PTT:21.2 sec  ABG - ( 06 Sep 2023 04:31 )  pH, Arterial: 7.460 pH, Blood: x     /  pCO2: 36    /  pO2: 93    / HCO3: 26    / Base Excess: 1.8   /  SaO2: 100.0             CARDIAC MARKERS ( 04 Sep 2023 21:23 )  x     / 0.19 ng/mL / x     / x     / x            Urinalysis Basic - ( 06 Sep 2023 02:11 )    Color: x / Appearance: x / SG: x / pH: x  Gluc: 128 mg/dL / Ketone: x  / Bili: x / Urobili: x   Blood: x / Protein: x / Nitrite: x   Leuk Esterase: x / RBC: x / WBC x   Sq Epi: x / Non Sq Epi: x / Bacteria: x          IMAGING:

## 2023-09-06 NOTE — PROGRESS NOTE ADULT - ASSESSMENT
Assessment: 78y Male presenting with: SBO, Metabolic acidosis, L ruptured baker's cyst.  Chronic A-fib.  ARAM, Hemorrhagic shock, hypotension, Acute blood loss anemia. Extubated on 9/5 without issue.     Plan:    Neurological: No pain currently. Will add Tylenol PRN for mild pain PRN. Delirium precautions. Optimize sleep/wake cycle.     Pulmonary: Extubated on 9/5 without issue. Saturating well on NC. Maintain SpO2 >92%.      Cardiovascular: Weaned off vasopressors. Remains HD stable. Continued to be edematous. Will consider adding back home Lasix today now that pressors are off. Richard Trac DC’d. Continue Lopressor 5mg q6h for HR control.     Gastrointestinal: NGT removed yesterday. Started DASH diet. f/u w/ GI in regard to either EGD or C-scope if anymore melanotic stools. Continue Protonix BID.     Genitourinary: Tight monitor on urine output via urostomy.    Heme: ASA converted to Plavix yesterday. Maintain INR < ~ 1.8. Fibrinogen normal.   Endo: Converted Levothyroxine back to PO dose.     ID: No indication.    Lines/ Tubes: RIJ Cordis removed. Maintain A-line for now until downgraded.      Dispo: Continue SICU level of care for now.

## 2023-09-06 NOTE — CHART NOTE - NSCHARTNOTEFT_GEN_A_CORE
Source: Patient [x]  Family [x] other [ ]    Current Diet: Diet, DASH/TLC:   Sodium & Cholesterol Restricted (09-05-23 @ 17:21)    PO intake:  < 50% [ ]   50-75%  [x]   %  [ ]  other :    Source for PO intake [x] Patient [x] family [ ] chart [ ] staff [ ] other    Current Weight:   9/6 129.1 kg  9/1 117.9 kg  8/28 102.1 kg  8/22 103.9 kg  +3 generalized edema, weights skewed, continue to monitor trends    Pertinent Medications: MEDICATIONS  (STANDING):  chlorhexidine 2% Cloths 1 Application(s) Topical daily  clopidogrel Tablet 75 milliGRAM(s) Oral daily  furosemide    Tablet 20 milliGRAM(s) Oral daily  heparin  Infusion. 1400 Unit(s)/Hr (14 mL/Hr) IV Continuous <Continuous>  levothyroxine 137 MICROGram(s) Oral daily  metoprolol tartrate 25 milliGRAM(s) Oral two times a day  pantoprazole  Injectable 40 milliGRAM(s) IV Push every 12 hours  polyethylene glycol 3350 17 Gram(s) Oral daily  sacubitril 24 mG/valsartan 26 mG 1 Tablet(s) Oral two times a day  senna 1 Tablet(s) Oral daily    MEDICATIONS  (PRN):  acetaminophen     Tablet .. 975 milliGRAM(s) Oral every 6 hours PRN Mild Pain (1 - 3)  albuterol/ipratropium for Nebulization 3 milliLiter(s) Nebulizer every 6 hours PRN Wheezing  sodium chloride 0.9% lock flush 10 milliLiter(s) IV Push every 1 hour PRN Pre/post blood products, medications, blood draw, and to maintain line patency    Pertinent Labs: CBC Full  -  ( 06 Sep 2023 02:11 )  WBC Count : 9.14 K/uL  RBC Count : 2.67 M/uL  Hemoglobin : 8.1 g/dL  Hematocrit : 23.5 %  Platelet Count - Automated : 236 K/uL  Mean Cell Volume : 88.0 fl  Mean Cell Hemoglobin : 30.3 pg  Mean Cell Hemoglobin Concentration : 34.5 gm/dL  Auto Neutrophil # : 6.76 K/uL  Auto Lymphocyte # : 0.87 K/uL  Auto Monocyte # : 0.93 K/uL  Auto Eosinophil # : 0.11 K/uL  Auto Basophil # : 0.03 K/uL  Auto Neutrophil % : 74.0 %  Auto Lymphocyte % : 9.5 %  Auto Monocyte % : 10.2 %  Auto Eosinophil % : 1.2 %  Auto Basophil % : 0.3 %    Pertinent Labs: Phos (L) 2.0    Skin: midline abdominal surgical incision    Nutrition focused physical exam previously conducted - found signs of malnutrition [ ]absent [x]present    Subcutaneous fat loss: [x] Orbital fat pads region, [ ]Buccal fat region, [ ]Triceps region,  [ ]Ribs region    Muscle wasting: mild [x]Temples region, [ ]Clavicle region, [x]Shoulder region, [ ]Scapula region, [ ]Interosseous region,  [ ]thigh region, [ ]Calf region    Estimated Needs:   [x] no change since previous assessment  [ ] recalculated:     Hospital Course:  78y Male presenting with: SBO, Metabolic acidosis, L ruptured baker's cyst.  Chronic A-fib.  ARAM, Hemorrhagic shock, hypotension, Acute blood loss anemia. Extubated on 9/5 without issue.     Current Nutrition Diagnosis: Pt remains a high nutrition risk secondary to Malnutrition (acute, moderate) related to inability to meet sufficient protein-energy needs with altered GI function in setting of SBO as evidenced by meeting < 75% est needs > 7 days, physical signs of mild muscle/fat loss, +edema.  Met with pt and his wife at bedside this afternoon, he reports his appetite/po intake "aren't great", was eating well PTA. This morning he had 1 piece of french toast and had 100% of soup, ice cream and pudding for lunch. Pt dislikes ensure, family bringing fairlife protein shakes from home. He likes ensure clear and gelatein, however ensure clear is currently out of stock, can provide gelatein TID to supplement until po intake improves, pt receptive. Encouraged HBV protein food options to optimize nutrition status. Per pt report UBW is 232-238 lbs with no significant weight changes PTA, current weight 284 lbs with +3 edema. Reviewed DASH/TLC dietary restrictions. Pt denies food allergies, chewing/swallowing difficulties and has no c/o N/V/C/D. RD to follow. Recommendations below:    Recommendations:   1. Add Gelatein TID (80 kcals, 20 g pro each) per pt's preference  2. Encourage HBV protein food options with meals  3. Monitor electrolytes, replete as needed  4. Daily weights and trends    Monitoring and Evaluation:   [x] PO intake [ ] Tolerance to diet prescription [X] Weights  [X] Follow up per protocol [X] Labs:

## 2023-09-06 NOTE — PHYSICAL THERAPY INITIAL EVALUATION ADULT - GENERAL OBSERVATIONS, REHAB EVAL
Pt received in bed, + IV, +Tele//BP monitoring + a-line + sorto + VCBs, in NAD, in 5/10 left knee pain, agreeable to PT eval
Pt received in bed + IV, +Tele//BP + a-line + right VCB + NC O2 + NG tube, in NAD, in 5/10 abdominal and left LE pain, agreeable to PT eval

## 2023-09-06 NOTE — PHYSICAL THERAPY INITIAL EVALUATION ADULT - IMPAIRMENTS FOUND, PT EVAL
gait, locomotion, and balance/muscle strength/ROM
aerobic capacity/endurance/gait, locomotion, and balance/muscle strength/ROM

## 2023-09-07 LAB
ANION GAP SERPL CALC-SCNC: 10 MMOL/L — SIGNIFICANT CHANGE UP (ref 5–17)
ANION GAP SERPL CALC-SCNC: 11 MMOL/L — SIGNIFICANT CHANGE UP (ref 5–17)
ANISOCYTOSIS BLD QL: SLIGHT — SIGNIFICANT CHANGE UP
APTT BLD: 30.2 SEC — SIGNIFICANT CHANGE UP (ref 24.5–35.6)
APTT BLD: 35.3 SEC — SIGNIFICANT CHANGE UP (ref 24.5–35.6)
APTT BLD: 46.9 SEC — HIGH (ref 24.5–35.6)
APTT BLD: 51.7 SEC — HIGH (ref 24.5–35.6)
BASOPHILS # BLD AUTO: 0 K/UL — SIGNIFICANT CHANGE UP (ref 0–0.2)
BASOPHILS NFR BLD AUTO: 0 % — SIGNIFICANT CHANGE UP (ref 0–2)
BUN SERPL-MCNC: 31.2 MG/DL — HIGH (ref 8–20)
BUN SERPL-MCNC: 36.2 MG/DL — HIGH (ref 8–20)
CALCIUM SERPL-MCNC: 7 MG/DL — LOW (ref 8.4–10.5)
CALCIUM SERPL-MCNC: 7.5 MG/DL — LOW (ref 8.4–10.5)
CHLORIDE SERPL-SCNC: 105 MMOL/L — SIGNIFICANT CHANGE UP (ref 96–108)
CHLORIDE SERPL-SCNC: 106 MMOL/L — SIGNIFICANT CHANGE UP (ref 96–108)
CO2 SERPL-SCNC: 24 MMOL/L — SIGNIFICANT CHANGE UP (ref 22–29)
CO2 SERPL-SCNC: 25 MMOL/L — SIGNIFICANT CHANGE UP (ref 22–29)
CREAT SERPL-MCNC: 1.23 MG/DL — SIGNIFICANT CHANGE UP (ref 0.5–1.3)
CREAT SERPL-MCNC: 1.26 MG/DL — SIGNIFICANT CHANGE UP (ref 0.5–1.3)
EGFR: 58 ML/MIN/1.73M2 — LOW
EGFR: 60 ML/MIN/1.73M2 — SIGNIFICANT CHANGE UP
EOSINOPHIL # BLD AUTO: 0.08 K/UL — SIGNIFICANT CHANGE UP (ref 0–0.5)
EOSINOPHIL NFR BLD AUTO: 0.9 % — SIGNIFICANT CHANGE UP (ref 0–6)
GAS PNL BLDA: SIGNIFICANT CHANGE UP
GIANT PLATELETS BLD QL SMEAR: PRESENT — SIGNIFICANT CHANGE UP
GLUCOSE SERPL-MCNC: 140 MG/DL — HIGH (ref 70–99)
GLUCOSE SERPL-MCNC: 152 MG/DL — HIGH (ref 70–99)
HCT VFR BLD CALC: 26.2 % — LOW (ref 39–50)
HGB BLD-MCNC: 8.5 G/DL — LOW (ref 13–17)
LYMPHOCYTES # BLD AUTO: 0.54 K/UL — LOW (ref 1–3.3)
LYMPHOCYTES # BLD AUTO: 6.1 % — LOW (ref 13–44)
MACROCYTES BLD QL: SLIGHT — SIGNIFICANT CHANGE UP
MAGNESIUM SERPL-MCNC: 1.9 MG/DL — SIGNIFICANT CHANGE UP (ref 1.6–2.6)
MANUAL SMEAR VERIFICATION: SIGNIFICANT CHANGE UP
MCHC RBC-ENTMCNC: 29.2 PG — SIGNIFICANT CHANGE UP (ref 27–34)
MCHC RBC-ENTMCNC: 32.4 GM/DL — SIGNIFICANT CHANGE UP (ref 32–36)
MCV RBC AUTO: 90 FL — SIGNIFICANT CHANGE UP (ref 80–100)
METAMYELOCYTES # FLD: 1.8 % — HIGH (ref 0–0)
MICROCYTES BLD QL: SLIGHT — SIGNIFICANT CHANGE UP
MONOCYTES # BLD AUTO: 0.85 K/UL — SIGNIFICANT CHANGE UP (ref 0–0.9)
MONOCYTES NFR BLD AUTO: 9.6 % — SIGNIFICANT CHANGE UP (ref 2–14)
MYELOCYTES NFR BLD: 0.9 % — HIGH (ref 0–0)
NEUTROPHILS # BLD AUTO: 7.18 K/UL — SIGNIFICANT CHANGE UP (ref 1.8–7.4)
NEUTROPHILS NFR BLD AUTO: 80.7 % — HIGH (ref 43–77)
OVALOCYTES BLD QL SMEAR: SLIGHT — SIGNIFICANT CHANGE UP
PHOSPHATE SERPL-MCNC: 2.1 MG/DL — LOW (ref 2.4–4.7)
PLAT MORPH BLD: NORMAL — SIGNIFICANT CHANGE UP
PLATELET # BLD AUTO: 306 K/UL — SIGNIFICANT CHANGE UP (ref 150–400)
POIKILOCYTOSIS BLD QL AUTO: SLIGHT — SIGNIFICANT CHANGE UP
POLYCHROMASIA BLD QL SMEAR: SIGNIFICANT CHANGE UP
POTASSIUM SERPL-MCNC: 3.8 MMOL/L — SIGNIFICANT CHANGE UP (ref 3.5–5.3)
POTASSIUM SERPL-MCNC: 4 MMOL/L — SIGNIFICANT CHANGE UP (ref 3.5–5.3)
POTASSIUM SERPL-SCNC: 3.8 MMOL/L — SIGNIFICANT CHANGE UP (ref 3.5–5.3)
POTASSIUM SERPL-SCNC: 4 MMOL/L — SIGNIFICANT CHANGE UP (ref 3.5–5.3)
RBC # BLD: 2.91 M/UL — LOW (ref 4.2–5.8)
RBC # FLD: 15.4 % — HIGH (ref 10.3–14.5)
RBC BLD AUTO: ABNORMAL
SMUDGE CELLS # BLD: PRESENT — SIGNIFICANT CHANGE UP
SODIUM SERPL-SCNC: 140 MMOL/L — SIGNIFICANT CHANGE UP (ref 135–145)
SODIUM SERPL-SCNC: 141 MMOL/L — SIGNIFICANT CHANGE UP (ref 135–145)
WBC # BLD: 8.9 K/UL — SIGNIFICANT CHANGE UP (ref 3.8–10.5)
WBC # FLD AUTO: 8.9 K/UL — SIGNIFICANT CHANGE UP (ref 3.8–10.5)

## 2023-09-07 PROCEDURE — 71045 X-RAY EXAM CHEST 1 VIEW: CPT | Mod: 26

## 2023-09-07 PROCEDURE — 93970 EXTREMITY STUDY: CPT | Mod: 26

## 2023-09-07 PROCEDURE — 99222 1ST HOSP IP/OBS MODERATE 55: CPT | Mod: GC

## 2023-09-07 PROCEDURE — 99024 POSTOP FOLLOW-UP VISIT: CPT

## 2023-09-07 RX ORDER — SENNA PLUS 8.6 MG/1
1 TABLET ORAL AT BEDTIME
Refills: 0 | Status: DISCONTINUED | OUTPATIENT
Start: 2023-09-07 | End: 2023-09-12

## 2023-09-07 RX ORDER — FUROSEMIDE 40 MG
40 TABLET ORAL ONCE
Refills: 0 | Status: DISCONTINUED | OUTPATIENT
Start: 2023-09-07 | End: 2023-09-07

## 2023-09-07 RX ORDER — SODIUM,POTASSIUM PHOSPHATES 278-250MG
2 POWDER IN PACKET (EA) ORAL ONCE
Refills: 0 | Status: COMPLETED | OUTPATIENT
Start: 2023-09-07 | End: 2023-09-07

## 2023-09-07 RX ORDER — HEPARIN SODIUM 5000 [USP'U]/ML
1600 INJECTION INTRAVENOUS; SUBCUTANEOUS
Qty: 25000 | Refills: 0 | Status: DISCONTINUED | OUTPATIENT
Start: 2023-09-07 | End: 2023-09-11

## 2023-09-07 RX ORDER — FUROSEMIDE 40 MG
40 TABLET ORAL ONCE
Refills: 0 | Status: COMPLETED | OUTPATIENT
Start: 2023-09-07 | End: 2023-09-07

## 2023-09-07 RX ORDER — CALCIUM GLUCONATE 100 MG/ML
2 VIAL (ML) INTRAVENOUS ONCE
Refills: 0 | Status: COMPLETED | OUTPATIENT
Start: 2023-09-07 | End: 2023-09-07

## 2023-09-07 RX ORDER — MAGNESIUM SULFATE 500 MG/ML
2 VIAL (ML) INJECTION ONCE
Refills: 0 | Status: COMPLETED | OUTPATIENT
Start: 2023-09-07 | End: 2023-09-07

## 2023-09-07 RX ORDER — LANOLIN ALCOHOL/MO/W.PET/CERES
5 CREAM (GRAM) TOPICAL AT BEDTIME
Refills: 0 | Status: DISCONTINUED | OUTPATIENT
Start: 2023-09-07 | End: 2023-09-12

## 2023-09-07 RX ADMIN — PANTOPRAZOLE SODIUM 40 MILLIGRAM(S): 20 TABLET, DELAYED RELEASE ORAL at 05:03

## 2023-09-07 RX ADMIN — POLYETHYLENE GLYCOL 3350 17 GRAM(S): 17 POWDER, FOR SOLUTION ORAL at 12:38

## 2023-09-07 RX ADMIN — Medication 137 MICROGRAM(S): at 05:04

## 2023-09-07 RX ADMIN — Medication 25 MILLIGRAM(S): at 05:04

## 2023-09-07 RX ADMIN — SENNA PLUS 1 TABLET(S): 8.6 TABLET ORAL at 21:53

## 2023-09-07 RX ADMIN — PANTOPRAZOLE SODIUM 40 MILLIGRAM(S): 20 TABLET, DELAYED RELEASE ORAL at 18:32

## 2023-09-07 RX ADMIN — SACUBITRIL AND VALSARTAN 1 TABLET(S): 24; 26 TABLET, FILM COATED ORAL at 18:32

## 2023-09-07 RX ADMIN — HEPARIN SODIUM 2200 UNIT(S)/HR: 5000 INJECTION INTRAVENOUS; SUBCUTANEOUS at 14:04

## 2023-09-07 RX ADMIN — SACUBITRIL AND VALSARTAN 1 TABLET(S): 24; 26 TABLET, FILM COATED ORAL at 05:04

## 2023-09-07 RX ADMIN — HEPARIN SODIUM 2400 UNIT(S)/HR: 5000 INJECTION INTRAVENOUS; SUBCUTANEOUS at 20:11

## 2023-09-07 RX ADMIN — Medication 40 MILLIGRAM(S): at 10:56

## 2023-09-07 RX ADMIN — Medication 5 MILLIGRAM(S): at 21:53

## 2023-09-07 RX ADMIN — CLOPIDOGREL BISULFATE 75 MILLIGRAM(S): 75 TABLET, FILM COATED ORAL at 12:38

## 2023-09-07 RX ADMIN — Medication 200 GRAM(S): at 10:57

## 2023-09-07 RX ADMIN — Medication 25 MILLIGRAM(S): at 18:31

## 2023-09-07 RX ADMIN — Medication 40 MILLIGRAM(S): at 20:39

## 2023-09-07 RX ADMIN — Medication 3 MILLILITER(S): at 20:40

## 2023-09-07 RX ADMIN — CHLORHEXIDINE GLUCONATE 1 APPLICATION(S): 213 SOLUTION TOPICAL at 12:50

## 2023-09-07 RX ADMIN — HEPARIN SODIUM 1600 UNIT(S)/HR: 5000 INJECTION INTRAVENOUS; SUBCUTANEOUS at 01:01

## 2023-09-07 RX ADMIN — Medication 25 GRAM(S): at 06:16

## 2023-09-07 RX ADMIN — Medication 2 PACKET(S): at 06:16

## 2023-09-07 RX ADMIN — Medication 20 MILLIGRAM(S): at 05:03

## 2023-09-07 RX ADMIN — HEPARIN SODIUM 2000 UNIT(S)/HR: 5000 INJECTION INTRAVENOUS; SUBCUTANEOUS at 07:24

## 2023-09-07 NOTE — PROGRESS NOTE ADULT - SUBJECTIVE AND OBJECTIVE BOX
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  SMA and GUANACO appear calcified, asymptomatic at this time. No acute concern for visceral ischemia at this time. on hep ggt , no acute events ON       Vital Signs Last 24 Hrs  T(C): 37.1 (07 Sep 2023 07:00), Max: 37.6 (06 Sep 2023 16:00)  T(F): 98.8 (07 Sep 2023 07:00), Max: 99.7 (06 Sep 2023 16:00)  HR: 78 (07 Sep 2023 07:00) (76 - 111)  BP: 124/63 (07 Sep 2023 04:00) (114/54 - 144/98)  BP(mean): 81 (07 Sep 2023 04:00) (68 - 110)  RR: 27 (07 Sep 2023 07:00) (24 - 32)  SpO2: 99% (07 Sep 2023 07:00) (95% - 100%)    Parameters below as of 06 Sep 2023 20:00  Patient On (Oxygen Delivery Method): room air        I&O's Detail    06 Sep 2023 07:01  -  07 Sep 2023 07:00  --------------------------------------------------------  IN:    Heparin Infusion: 196 mL    Heparin Infusion: 96 mL  Total IN: 292 mL    OUT:    Urostomy (mL): 1450 mL  Total OUT: 1450 mL    Total NET: -1158 mL          Constitutional: patient resting comfortably in bed, in no acute distress  HEENT: EOMI, PERRLA, MMM.  Respiratory: Non labored breathing on RA  Cardiovascular: RRR  Gastrointestinal: Soft, NT, Distended. no rebound/guarding, no masses palpated. Urobladder with urine and adjacent non reducible hernia that is non tender to palpation.   Musculoskeletal: No joint pain, swelling or deformity; no limitation of movement  Vascular: Extremities warm and well perfused.     LABS:                        8.5    8.90  )-----------( 306      ( 07 Sep 2023 02:17 )             26.2     09-07    140  |  105  |  36.2<H>  ----------------------------<  152<H>  4.0   |  24.0  |  1.23    Ca    7.0<L>      07 Sep 2023 02:17  Phos  2.1     09-07  Mg     1.9     09-07      PT/INR - ( 06 Sep 2023 10:00 )   PT: 12.9 sec;   INR: 1.17 ratio         PTT - ( 07 Sep 2023 06:33 )  PTT:35.3 sec  Urinalysis Basic - ( 07 Sep 2023 02:17 )    Color: x / Appearance: x / SG: x / pH: x  Gluc: 152 mg/dL / Ketone: x  / Bili: x / Urobili: x   Blood: x / Protein: x / Nitrite: x   Leuk Esterase: x / RBC: x / WBC x   Sq Epi: x / Non Sq Epi: x / Bacteria: x        MEDICATIONS  (STANDING):  chlorhexidine 2% Cloths 1 Application(s) Topical daily  clopidogrel Tablet 75 milliGRAM(s) Oral daily  furosemide    Tablet 20 milliGRAM(s) Oral daily  heparin  Infusion. 1600 Unit(s)/Hr (16 mL/Hr) IV Continuous <Continuous>  levothyroxine 137 MICROGram(s) Oral daily  metoprolol tartrate 25 milliGRAM(s) Oral two times a day  pantoprazole  Injectable 40 milliGRAM(s) IV Push every 12 hours  polyethylene glycol 3350 17 Gram(s) Oral daily  sacubitril 24 mG/valsartan 26 mG 1 Tablet(s) Oral two times a day  senna 1 Tablet(s) Oral daily    MEDICATIONS  (PRN):  acetaminophen     Tablet .. 975 milliGRAM(s) Oral every 6 hours PRN Mild Pain (1 - 3)  albuterol/ipratropium for Nebulization 3 milliLiter(s) Nebulizer every 6 hours PRN Wheezing  sodium chloride 0.9% lock flush 10 milliLiter(s) IV Push every 1 hour PRN Pre/post blood products, medications, blood draw, and to maintain line patency      MICRO:   Cultures     STUDIES:   EKG, CXR, U/S, CT, MRI

## 2023-09-07 NOTE — PROGRESS NOTE ADULT - CRITICAL CARE ATTENDING COMMENT
NEURO: A&Ox4, Pain controlled.     RESP: Episode of tachypnea overnight, now resolved. POCUS with evidence of pulmonary edema.   -Diurese with 40 IV lasix.     CVS: Hemorrhagic shock secondary to LGIB resolved.   -s/p cardiac stents 3 weeks ago. Appreciate cardiology recs. Continue Plavix Qd.   -Afib currently rate controlled. Continue home Metoprolol.    GI: S/p parastomal hernia repair. Abdominal exam benign. Diet as tolerated.     : ARAM on CKD resolved. Creatinine at baseline.   -Net negative 800cc today. Continue home lasix and additional dose of 40mg IV for pulmonary edema.     HEME: Hb stable, no evidence of ongoing bleeding.  -Hx of Afib and recent cardiac stents on coumadin at home.   -PTT subtherapeutic at 35. Continue to titrate up heparin drip with no bolus due to risk of bleeding.     ID: No evidence of active infection. Monitor off abx.    ENDO: Glucose controlled. Continue home synthroid for HypoT.    Will continue to monitor in ICU due to respiratory status and risk on bleeding while adjusting AC. NEURO: A&Ox4, Pain controlled.     RESP: Episode of tachypnea overnight, now resolved. POCUS with evidence of pulmonary edema.   -Diurese with 40 IV lasix.     CVS: Hemorrhagic shock secondary to LGIB resolved.   -s/p cardiac stents 7/14/23. Appreciate cardiology recs. Continue Plavix Qd.   -Afib currently rate controlled. Continue home Metoprolol.    GI: S/p parastomal hernia repair. Abdominal exam benign. Diet as tolerated.     : ARAM on CKD resolved. Creatinine at baseline.   -Net negative 800cc today. Continue home lasix and additional dose of 40mg IV for pulmonary edema.     HEME: Hb stable, no evidence of ongoing bleeding.  -Hx of Afib and recent cardiac stents on coumadin at home.   -PTT subtherapeutic at 35. Continue to titrate up heparin drip with no bolus due to risk of bleeding.     ID: No evidence of active infection. Monitor off abx.    ENDO: Glucose controlled. Continue home synthroid for HypoT.    Will continue to monitor in ICU due to respiratory status and risk on bleeding while adjusting AC.

## 2023-09-07 NOTE — PROGRESS NOTE ADULT - NS ATTEND AMEND GEN_ALL_CORE FT
s/p parastomal hernia repair with mesh.   Admitted to SICU with LGIB.    NEURO: A&Ox4, Pain controlled.     RESP: Episode of tachypnea overnight, now resolved. POCUS with evidence of pulmonary edema. Will diuresis with 40 IV lasix.     CVS: Hemorrhagic shock secondary to LGIB resolved.   -s/p cardiac stents 3 weeks ago. Appreciate cardiology recs. Continue Plavix Qd.   -Afib currently rate controlled. Resume home Metoprolol.    GI: S/p parastomal hernia repair. Post op LGIB. No episodes of bleeding in the last 48 hours.   -Abdominal exam benign. Diet as tolerated.     : ARAM on CKD resolved. Creatinine at baseline.   -Net negative 800cc today. Hold home lasix and additional dose of 40mg IV.     HEME: Hb stable, no evidence of ongoing bleeding.  -Hx of Afib and CAD s.p recent stents on coumadin at home.   -PTT subtherapeutic at 35. Continue to titrate up heparin drip with no bolus due to risk of bleeding.     ID: No evidence of active infection. Monitor off abx.    ENDO: Glucose controlled. Continue home synthroid for HypoT.    Requires continues ICU management due to respiratory status and m=risk on bleeding while titrating AC. NEURO: A&Ox4, Pain controlled.     RESP: Episode of tachypnea overnight, now resolved. POCUS with evidence of pulmonary edema.   -Diurese with 40 IV lasix.     CVS: Hemorrhagic shock secondary to LGIB resolved.   -s/p cardiac stents 3 weeks ago. Appreciate cardiology recs. Continue Plavix Qd.   -Afib currently rate controlled. Continue home Metoprolol.    GI: S/p parastomal hernia repair. Abdominal exam benign. Diet as tolerated.     : ARAM on CKD resolved. Creatinine at baseline.   -Net negative 800cc today. Continue home lasix and additional dose of 40mg IV for pulmonary edema.     HEME: Hb stable, no evidence of ongoing bleeding.  -Hx of Afib and recent cardiac stents on coumadin at home.   -PTT subtherapeutic at 35. Continue to titrate up heparin drip with no bolus due to risk of bleeding.     ID: No evidence of active infection. Monitor off abx.    ENDO: Glucose controlled. Continue home synthroid for HypoT.    Will continue to monitor in ICU due to respiratory status and risk on bleeding while adjusting AC. See below

## 2023-09-07 NOTE — PROGRESS NOTE ADULT - ASSESSMENT
77 yo M with a hx  A-fib on Warfarin, HTN,HLD,  CAD s/p stents, and bladder cancer s/p urostomy (~12 years ago)  who presented to [facility] for high grade SBO, s/p ex lap w/KEANU (8/28) with RTOR on on 8/28w/ RTOR on 8/30 for parastomal hernia repair and closure. Post-op course complicated by acute blood loss anemia, GIB and parstomal hernia hematoma. 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 Patient asymptomatic at this time. SMA and GUANACO appear calcified, however patient. No acute concern for visceral ischemia at this time.     Plan:    - No acute surgical intervention at this time   - Recommend anti-platelet/statin   - Follow up Vascular Surgery on outpatient  Please call us for any questions/ concern

## 2023-09-07 NOTE — PROGRESS NOTE ADULT - ATTENDING COMMENTS
pt with no signs of ischemia on recent surgery , no mesenteric ischemia symptoms , + celiac artery occlusion , but SMA patent , GUANACO patent . no need for vascular intervention at this time

## 2023-09-07 NOTE — PROGRESS NOTE ADULT - SUBJECTIVE AND OBJECTIVE BOX
24h Events:    Restarted home Lasix. Started heparin ggt, not yet therapeutic. Vascular surgery consulted for celiac artery occlusion noted on CT scan (9/4), with reconstitution – no acute intervention recommended.     ICU Vital Signs Last 24 Hrs  T(C): 37 (07 Sep 2023 04:00), Max: 37.6 (06 Sep 2023 16:00)  T(F): 98.6 (07 Sep 2023 04:00), Max: 99.7 (06 Sep 2023 16:00)  HR: 89 (07 Sep 2023 04:00) (74 - 111)  BP: 124/63 (07 Sep 2023 04:00) (114/54 - 144/98)  BP(mean): 81 (07 Sep 2023 04:00) (67 - 110)  ABP: 141/52 (07 Sep 2023 04:00) (108/39 - 167/65)  ABP(mean): 84 (07 Sep 2023 04:00) (62 - 99)  RR: 32 (07 Sep 2023 04:00) (24 - 32)  SpO2: 98% (07 Sep 2023 04:00) (94% - 100%)    O2 Parameters below as of 06 Sep 2023 20:00  Patient On (Oxygen Delivery Method): room air    I&O's Detail    05 Sep 2023 07:01  -  06 Sep 2023 07:00  --------------------------------------------------------  IN:    IV PiggyBack: 100 mL    IV PiggyBack: 50 mL    Norepinephrine: 49.6 mL    Oral Fluid: 300 mL  Total IN: 499.6 mL    OUT:    FentaNYL: 0 mL    Urostomy (mL): 1605 mL    Vasopressin: 0 mL  Total OUT: 1605 mL    Total NET: -1105.4 mL    06 Sep 2023 07:01  -  07 Sep 2023 05:07  --------------------------------------------------------  IN:    Heparin Infusion: 196 mL    Heparin Infusion: 64 mL  Total IN: 260 mL    OUT:    Urostomy (mL): 1350 mL  Total OUT: 1350 mL    Total NET: -1090 mL    ABG - ( 06 Sep 2023 04:31 )  pH, Arterial: 7.460 pH, Blood: x     /  pCO2: 36    /  pO2: 93    / HCO3: 26    / Base Excess: 1.8   /  SaO2: 100.0     MEDICATIONS  (STANDING):  chlorhexidine 2% Cloths 1 Application(s) Topical daily  clopidogrel Tablet 75 milliGRAM(s) Oral daily  furosemide    Tablet 20 milliGRAM(s) Oral daily  heparin  Infusion. 1600 Unit(s)/Hr (16 mL/Hr) IV Continuous <Continuous>  levothyroxine 137 MICROGram(s) Oral daily  metoprolol tartrate 25 milliGRAM(s) Oral two times a day  pantoprazole  Injectable 40 milliGRAM(s) IV Push every 12 hours  polyethylene glycol 3350 17 Gram(s) Oral daily  sacubitril 24 mG/valsartan 26 mG 1 Tablet(s) Oral two times a day  senna 1 Tablet(s) Oral daily    MEDICATIONS  (PRN):  acetaminophen     Tablet .. 975 milliGRAM(s) Oral every 6 hours PRN Mild Pain (1 - 3)  albuterol/ipratropium for Nebulization 3 milliLiter(s) Nebulizer every 6 hours PRN Wheezing  sodium chloride 0.9% lock flush 10 milliLiter(s) IV Push every 1 hour PRN Pre/post blood products, medications, blood draw, and to maintain line patency    Physical Exam:    Gen: NAD, conversing in bed  Eyes: PERRL ~ 3mm, EOMI,   Neurological: A&Ox3, GCS 15, No focal deficit.   Neck: Supple. NT AT, FROM no pain.  No JVD. No meningeal signs  Pulmonary: NAD, lung sounds clear to auscultation bilaterally    Cardiovascular: Irregularly irregular, S1, S2, No Murmurs, rubs or gallops noted.  Gastrointestinal: ND, Soft, NT, midline incision closed with staples  Extremities: NT, AT,. Mild-moderate Left calf firmness – continues to improve. + anasarca. No Erythema or palpable cord noted.  FROM, = 2+ pulses throughout.  : Urostomy functioning. Sediment in bag (per patient this is normal)    LABS:  CBC Full  -  ( 07 Sep 2023 02:17 )  WBC Count : 8.90 K/uL  RBC Count : 2.91 M/uL  Hemoglobin : 8.5 g/dL  Hematocrit : 26.2 %  Platelet Count - Automated : 306 K/uL  Mean Cell Volume : 90.0 fl  Mean Cell Hemoglobin : 29.2 pg  Mean Cell Hemoglobin Concentration : 32.4 gm/dL  Auto Neutrophil # : 7.18 K/uL  Auto Lymphocyte # : 0.54 K/uL  Auto Monocyte # : 0.85 K/uL  Auto Eosinophil # : 0.08 K/uL  Auto Basophil # : 0.00 K/uL  Auto Neutrophil % : 80.7 %  Auto Lymphocyte % : 6.1 %  Auto Monocyte % : 9.6 %  Auto Eosinophil % : 0.9 %  Auto Basophil % : 0.0 %    09-07    140  |  105  |  36.2<H>  ----------------------------<  152<H>  4.0   |  24.0  |  1.23    Ca    7.0<L>      07 Sep 2023 02:17  Phos  2.1     09-07  Mg     1.9     09-07    PT/INR - ( 06 Sep 2023 10:00 )   PT: 12.9 sec;   INR: 1.17 ratio      PTT - ( 07 Sep 2023 00:19 )  PTT:30.2 sec  Urinalysis Basic - ( 07 Sep 2023 02:17 )    Color: x / Appearance: x / SG: x / pH: x  Gluc: 152 mg/dL / Ketone: x  / Bili: x / Urobili: x   Blood: x / Protein: x / Nitrite: x   Leuk Esterase: x / RBC: x / WBC x   Sq Epi: x / Non Sq Epi: x / Bacteria: x    RECENT CULTURES:    ASSESSMENT/PLAN:    78y Male w/ hx of bladder Ca s/p urostomy 12 years ago, A fib on coumadin, CAD w/ stents x2 (placed 8/2023) presented with SBO, Metabolic acidosis, L ruptured baker's cyst and ARAM, patient failed conservative management of SBO, taken to OR for ex lap w/ KEANU w/ temporary abdominal closure, returned to OR for closed two days later, and downgraded to floor. Hospital course complicated by GI bleed and hemorrhagic shock, now resolved.     Neurological: no issues  Denies pain, will add Tylenol PRN if necessary  Delirium precautions  Optimize sleep/wake cycle.     Pulmonary: acute respiratory failure 2/2 inability to protect airway in the setting of hemorrhagic shock (resolved)  Extubated 9/5  ISS 10x/hr while awake  Encourage coughing and deep breathing  Neb treatments PRN    Cardiovascular: septic shock (resolved), hemorrhagic shock (resolved), hx A fib, R heart block, CAD s/p 2 stents (8/2023)  Metoprolol 25 mg BID w/ hold parameters, lasix 20 mg daily, and entresto  Re-start statin today for CAD    Gastrointestinal: SBO (resolved), s/p ex lap with KEANU, GI bleed (resolved)  Tolerating DASH diet  Monitor for s/sx of GI bleed recurrence, continue protonix BID  bowel reg with senna and miralax    Genitourinary: hx bladder cancer w/ creation of urostomy, ARAM (resolved)  Strict I/Os, monitor urine output hourly  UA negative for infection, pt reports sediment is normal   Cr continues to downtrend, will continue to monitor  Replete lytes to maintain K >4, Mg >2, phos > 3    Heme: high risk for VTE 2/2 A fib + CAD w/ stents x2  Heparin ggt started 9/6, goal full AC (PTT 58-99), monitor PTT q 6 until therapeutic x2  Maintain INR < ~ 1.8, Fibrinogen normal  Continue Plavix    Endo: hx hypothyroidism  Continue Synthroid  Maintain euglycemia, glucose < 180, add ISS if needed    ID: no s/sx infection  Trend WBC, no leukocytosis  afebrile    Dispo:   Can consider downgrade to surgical floor this afternoon if no s/sx bleeding once therapeutic on heparin ggt

## 2023-09-08 LAB
ANION GAP SERPL CALC-SCNC: 10 MMOL/L — SIGNIFICANT CHANGE UP (ref 5–17)
APTT BLD: 72.6 SEC — HIGH (ref 24.5–35.6)
APTT BLD: 75.2 SEC — HIGH (ref 24.5–35.6)
BASOPHILS # BLD AUTO: 0.07 K/UL — SIGNIFICANT CHANGE UP (ref 0–0.2)
BASOPHILS # BLD AUTO: 0.09 K/UL — SIGNIFICANT CHANGE UP (ref 0–0.2)
BASOPHILS NFR BLD AUTO: 0.7 % — SIGNIFICANT CHANGE UP (ref 0–2)
BASOPHILS NFR BLD AUTO: 0.9 % — SIGNIFICANT CHANGE UP (ref 0–2)
BUN SERPL-MCNC: 30.9 MG/DL — HIGH (ref 8–20)
CALCIUM SERPL-MCNC: 7.3 MG/DL — LOW (ref 8.4–10.5)
CHLORIDE SERPL-SCNC: 105 MMOL/L — SIGNIFICANT CHANGE UP (ref 96–108)
CO2 SERPL-SCNC: 26 MMOL/L — SIGNIFICANT CHANGE UP (ref 22–29)
CREAT SERPL-MCNC: 1.28 MG/DL — SIGNIFICANT CHANGE UP (ref 0.5–1.3)
EGFR: 57 ML/MIN/1.73M2 — LOW
EOSINOPHIL # BLD AUTO: 0.09 K/UL — SIGNIFICANT CHANGE UP (ref 0–0.5)
EOSINOPHIL # BLD AUTO: 0.13 K/UL — SIGNIFICANT CHANGE UP (ref 0–0.5)
EOSINOPHIL NFR BLD AUTO: 0.9 % — SIGNIFICANT CHANGE UP (ref 0–6)
EOSINOPHIL NFR BLD AUTO: 1.4 % — SIGNIFICANT CHANGE UP (ref 0–6)
GLUCOSE SERPL-MCNC: 157 MG/DL — HIGH (ref 70–99)
HCT VFR BLD CALC: 26.6 % — LOW (ref 39–50)
HCT VFR BLD CALC: 30 % — LOW (ref 39–50)
HGB BLD-MCNC: 8.7 G/DL — LOW (ref 13–17)
HGB BLD-MCNC: 9.6 G/DL — LOW (ref 13–17)
IMM GRANULOCYTES NFR BLD AUTO: 6.5 % — HIGH (ref 0–0.9)
INR BLD: 1.2 RATIO — HIGH (ref 0.85–1.18)
LYMPHOCYTES # BLD AUTO: 0.71 K/UL — LOW (ref 1–3.3)
LYMPHOCYTES # BLD AUTO: 1 K/UL — SIGNIFICANT CHANGE UP (ref 1–3.3)
LYMPHOCYTES # BLD AUTO: 10.5 % — LOW (ref 13–44)
LYMPHOCYTES # BLD AUTO: 6.9 % — LOW (ref 13–44)
MAGNESIUM SERPL-MCNC: 1.8 MG/DL — SIGNIFICANT CHANGE UP (ref 1.6–2.6)
MANUAL SMEAR VERIFICATION: SIGNIFICANT CHANGE UP
MCHC RBC-ENTMCNC: 28.6 PG — SIGNIFICANT CHANGE UP (ref 27–34)
MCHC RBC-ENTMCNC: 29.3 PG — SIGNIFICANT CHANGE UP (ref 27–34)
MCHC RBC-ENTMCNC: 32 GM/DL — SIGNIFICANT CHANGE UP (ref 32–36)
MCHC RBC-ENTMCNC: 32.7 GM/DL — SIGNIFICANT CHANGE UP (ref 32–36)
MCV RBC AUTO: 89.3 FL — SIGNIFICANT CHANGE UP (ref 80–100)
MCV RBC AUTO: 89.6 FL — SIGNIFICANT CHANGE UP (ref 80–100)
METAMYELOCYTES # FLD: 0.9 % — HIGH (ref 0–0)
MONOCYTES # BLD AUTO: 0.9 K/UL — SIGNIFICANT CHANGE UP (ref 0–0.9)
MONOCYTES # BLD AUTO: 1.44 K/UL — HIGH (ref 0–0.9)
MONOCYTES NFR BLD AUTO: 15.2 % — HIGH (ref 2–14)
MONOCYTES NFR BLD AUTO: 8.7 % — SIGNIFICANT CHANGE UP (ref 2–14)
MYELOCYTES NFR BLD: 3.5 % — HIGH (ref 0–0)
NEUTROPHILS # BLD AUTO: 6.23 K/UL — SIGNIFICANT CHANGE UP (ref 1.8–7.4)
NEUTROPHILS # BLD AUTO: 8.06 K/UL — HIGH (ref 1.8–7.4)
NEUTROPHILS NFR BLD AUTO: 65.7 % — SIGNIFICANT CHANGE UP (ref 43–77)
NEUTROPHILS NFR BLD AUTO: 73.9 % — SIGNIFICANT CHANGE UP (ref 43–77)
NEUTS BAND # BLD: 4.3 % — SIGNIFICANT CHANGE UP (ref 0–8)
NRBC # BLD: 1 /100 — HIGH (ref 0–0)
PHOSPHATE SERPL-MCNC: 1.8 MG/DL — LOW (ref 2.4–4.7)
PLAT MORPH BLD: NORMAL — SIGNIFICANT CHANGE UP
PLATELET # BLD AUTO: 364 K/UL — SIGNIFICANT CHANGE UP (ref 150–400)
PLATELET # BLD AUTO: 434 K/UL — HIGH (ref 150–400)
POLYCHROMASIA BLD QL SMEAR: SIGNIFICANT CHANGE UP
POTASSIUM SERPL-MCNC: 3.8 MMOL/L — SIGNIFICANT CHANGE UP (ref 3.5–5.3)
POTASSIUM SERPL-SCNC: 3.8 MMOL/L — SIGNIFICANT CHANGE UP (ref 3.5–5.3)
PROTHROM AB SERPL-ACNC: 13.3 SEC — HIGH (ref 9.5–13)
RBC # BLD: 2.97 M/UL — LOW (ref 4.2–5.8)
RBC # BLD: 3.36 M/UL — LOW (ref 4.2–5.8)
RBC # FLD: 15.5 % — HIGH (ref 10.3–14.5)
RBC # FLD: 15.5 % — HIGH (ref 10.3–14.5)
RBC BLD AUTO: ABNORMAL
SMUDGE CELLS # BLD: PRESENT — SIGNIFICANT CHANGE UP
SODIUM SERPL-SCNC: 140 MMOL/L — SIGNIFICANT CHANGE UP (ref 135–145)
WBC # BLD: 10.31 K/UL — SIGNIFICANT CHANGE UP (ref 3.8–10.5)
WBC # BLD: 9.49 K/UL — SIGNIFICANT CHANGE UP (ref 3.8–10.5)
WBC # FLD AUTO: 10.31 K/UL — SIGNIFICANT CHANGE UP (ref 3.8–10.5)
WBC # FLD AUTO: 9.49 K/UL — SIGNIFICANT CHANGE UP (ref 3.8–10.5)

## 2023-09-08 PROCEDURE — 99024 POSTOP FOLLOW-UP VISIT: CPT

## 2023-09-08 RX ORDER — FUROSEMIDE 40 MG
40 TABLET ORAL ONCE
Refills: 0 | Status: COMPLETED | OUTPATIENT
Start: 2023-09-08 | End: 2023-09-08

## 2023-09-08 RX ORDER — WARFARIN SODIUM 2.5 MG/1
3 TABLET ORAL ONCE
Refills: 0 | Status: COMPLETED | OUTPATIENT
Start: 2023-09-08 | End: 2023-09-08

## 2023-09-08 RX ORDER — MAGNESIUM SULFATE 500 MG/ML
2 VIAL (ML) INJECTION ONCE
Refills: 0 | Status: COMPLETED | OUTPATIENT
Start: 2023-09-08 | End: 2023-09-08

## 2023-09-08 RX ORDER — SODIUM,POTASSIUM PHOSPHATES 278-250MG
1 POWDER IN PACKET (EA) ORAL EVERY 4 HOURS
Refills: 0 | Status: COMPLETED | OUTPATIENT
Start: 2023-09-08 | End: 2023-09-08

## 2023-09-08 RX ORDER — DIGOXIN 250 MCG
125 TABLET ORAL DAILY
Refills: 0 | Status: DISCONTINUED | OUTPATIENT
Start: 2023-09-08 | End: 2023-09-12

## 2023-09-08 RX ADMIN — Medication 1 PACKET(S): at 03:25

## 2023-09-08 RX ADMIN — Medication 137 MICROGRAM(S): at 05:05

## 2023-09-08 RX ADMIN — CLOPIDOGREL BISULFATE 75 MILLIGRAM(S): 75 TABLET, FILM COATED ORAL at 11:46

## 2023-09-08 RX ADMIN — Medication 5 MILLIGRAM(S): at 21:29

## 2023-09-08 RX ADMIN — SACUBITRIL AND VALSARTAN 1 TABLET(S): 24; 26 TABLET, FILM COATED ORAL at 05:05

## 2023-09-08 RX ADMIN — Medication 40 MILLIGRAM(S): at 11:46

## 2023-09-08 RX ADMIN — Medication 1 PACKET(S): at 13:00

## 2023-09-08 RX ADMIN — Medication 3 MILLILITER(S): at 18:35

## 2023-09-08 RX ADMIN — Medication 25 MILLIGRAM(S): at 05:05

## 2023-09-08 RX ADMIN — WARFARIN SODIUM 3 MILLIGRAM(S): 2.5 TABLET ORAL at 21:32

## 2023-09-08 RX ADMIN — POLYETHYLENE GLYCOL 3350 17 GRAM(S): 17 POWDER, FOR SOLUTION ORAL at 11:47

## 2023-09-08 RX ADMIN — SACUBITRIL AND VALSARTAN 1 TABLET(S): 24; 26 TABLET, FILM COATED ORAL at 18:53

## 2023-09-08 RX ADMIN — Medication 20 MILLIGRAM(S): at 05:05

## 2023-09-08 RX ADMIN — HEPARIN SODIUM 2400 UNIT(S)/HR: 5000 INJECTION INTRAVENOUS; SUBCUTANEOUS at 19:34

## 2023-09-08 RX ADMIN — Medication 1 PACKET(S): at 05:06

## 2023-09-08 RX ADMIN — PANTOPRAZOLE SODIUM 40 MILLIGRAM(S): 20 TABLET, DELAYED RELEASE ORAL at 18:38

## 2023-09-08 RX ADMIN — CHLORHEXIDINE GLUCONATE 1 APPLICATION(S): 213 SOLUTION TOPICAL at 11:50

## 2023-09-08 RX ADMIN — PANTOPRAZOLE SODIUM 40 MILLIGRAM(S): 20 TABLET, DELAYED RELEASE ORAL at 05:05

## 2023-09-08 RX ADMIN — Medication 25 GRAM(S): at 03:25

## 2023-09-08 RX ADMIN — HEPARIN SODIUM 2400 UNIT(S)/HR: 5000 INJECTION INTRAVENOUS; SUBCUTANEOUS at 02:25

## 2023-09-08 NOTE — CHART NOTE - NSCHARTNOTESELECT_GEN_ALL_CORE
Central line removal/Event Note
Event Note
Rapid Response
Transfer Note
DOWN GRADE/Transfer Note
Event Note
Nutrition Services
Transfer to Floor/Event Note

## 2023-09-08 NOTE — PROGRESS NOTE ADULT - ASSESSMENT
78y Male w/ hx of bladder Ca s/p urostomy 12 years ago, A fib on coumadin, CAD w/ stents x2 (placed 8/2023) presented with SBO, Metabolic acidosis, L ruptured baker's cyst and ARAM, patient failed conservative management of SBO, taken to OR for ex lap w/ KEANU w/ temporary abdominal closure, returned to OR for closed two days later, and downgraded to floor. Hospital course complicated by GI bleed and hemorrhagic shock, now resolved.     Neurological: no issues  Denies pain, will add Tylenol PRN if necessary  Delirium precautions  Optimize sleep/wake cycle.     Pulmonary: acute respiratory failure 2/2 inability to protect airway in the setting of hemorrhagic shock (resolved)  Extubated 9/5  ISS 10x/hr while awake  Encourage coughing and deep breathing  Neb treatments PRN  Pt noted with tachypnea and slight increased WOB during day shift.  XR with increased cephalization.  Received lasix x 2 with response  Continue to maintain net negative status as tolerated.      Cardiovascular: septic shock (resolved), hemorrhagic shock (resolved), hx A fib, R heart block, CAD s/p 2 stents (8/2023)  Metoprolol 25 mg BID w/ hold parameters, lasix 20 mg daily, and entresto  Re-start statin today for CAD    Gastrointestinal: SBO (resolved), s/p ex lap with KEANU, GI bleed (resolved)  Tolerating DASH diet  Monitor for s/sx of GI bleed recurrence, continue protonix BID  bowel reg with senna and miralax  Vascular surgery consulted for celiac artery occlusion noted on CT scan (9/4), with reconstitution – no acute intervention recommended.     Genitourinary: hx bladder cancer w/ creation of urostomy, ARAM (resolved)  Strict I/Os, monitor urine output hourly  UA negative for infection, pt reports sediment is normal   Cr continues to downtrend, will continue to monitor  Replete lytes to maintain K >4, Mg >2, phos > 3    Heme: high risk for VTE 2/2 A fib + CAD w/ stents x2  Heparin ggt started 9/6, goal full AC (PTT 58-99), monitor PTT q 6 until therapeutic x2  Maintain INR < ~ 1.8, Fibrinogen normal  Continue Plavix    Endo: hx hypothyroidism  Continue Synthroid  Maintain euglycemia, glucose < 180, add ISS if needed    ID: no s/sx infection  Trend WBC, no leukocytosis  afebrile    Dispo:   Can consider downgrade to surgical floor this afternoon if no s/sx bleeding once therapeutic on heparin ggt and if tachypnea improves

## 2023-09-08 NOTE — CHART NOTE - NSCHARTNOTEFT_GEN_A_CORE
78M PMHx bladder cancer with urostomy done 12 years ago, A-fib on Warfarin, HTN, HLD, CAD s/p 2 stents placed 1 month ago who originally presented to [facility] with abdominal pain and cough for 4 days.  Found to have high grade SBO there and transferred to [facility] for treatment.  Initially admitted to the floor and managed conservatively with NGT.  Pt. initially improved and had been tolerating diet.  Incidentally, pt had a bakers cyst that burst with mild bleeding but no intervention was required.  Overnight on HD#6, pt became nauseated again, decreased urine output, increased SOB, afib with RVR, NGT replaced and pt. transferred to SICU.  CT scan showed dilated loops of bowel and possible pneumatosis.  Taken to the OR on later that day and had ex-lap with KEANU.  Sent back to SICU post op and left intubated.  Taken back to the OR on 8/30 for 2nd look which was negative, mesh placed and abdomen closed.  Returned to SICU post op.  Course in the SICU post op uneventful, downgraded to the floor 9/2.  Had episode of increased SOB and melena and was RRT on 9/4.  Reintubated and returned to the SICU at that time.  GI consulted, thought to be due to supratherapeutic PTT with simultaneous therapeutic INR.  No intervention was required.  remainder of course in SICU uneventful.  Currently stable, tolerating diet, on Heparin gtt receiving coumadin.  Stable for downgrade to floor at this time.  Has No complaints when seen in the SICU.    VSS/Afeb    Gen: NAD, seated in chair  Pulm: no accessory muscle use or conversational dyspnea  CV: Irreg Irreg  GI: Soft, NT/ND, midline incision is closed with staples  EXT: FEDERICO    78M originally admitted with SBO that initially resolved on it's own but then recurred.  Now s/p ex-lap KEANU with resolution.  Admit further complicated with Bakers cyst rupture (pre op) and post op GI bleed now awaiting therapeutic INR.    - labs daily  - dose coumadin daily, continue Heparin gtt until INR 2-3  - continue regular diet  - continue meds  - restart Dig (home med) in am if renal function remains stable  - PT/OT for Dispo

## 2023-09-08 NOTE — PROGRESS NOTE ADULT - NS ATTEND AMEND GEN_ALL_CORE FT
NEURO: A&Ox4, Pain controlled.     RESP: Respiratory status imrpvoed after diureisis yesterday. Now witrh no SOB or tachypnea.     CVS: Hemorrhagic shock secondary to LGIB resolved.   -s/p cardiac stents 3 weeks ago. Appreciate cardiology recs. Continue Plavix Qd.   -Afib currently rate controlled. Continue home Metoprolol.    GI: S/p parastomal hernia repair. Abdominal exam benign. Diet as tolerated.     : ARAM on CKD resolved. Creatinine at baseline.   -Net negative 2.2L over the last 24 hours. Continue home lasix and additional dose of 40mg IV today.     HEME: Hb stable, no evidence of ongoing bleeding.  -Hx of Afib and recent cardiac stents on coumadin at home.   -PTT therapeutic at 75. Continue heparin drip. Start bridging to coumadin this evening.     ID: No evidence of active infection. Monitor off abx.  -DC A-line     ENDO: Glucose controlled. Continue home synthroid for HypoT. NEURO: A&Ox4, Pain controlled.     RESP: Respiratory status improved after diuresis yesterday. No with no SOB or tachypnea.     CVS: Hemorrhagic shock secondary to LGIB resolved.   -s/p cardiac stents on 7/14/23.. Appreciate cardiology recs. Continue Plavix Qd.   -Afib currently rate controlled. Continue home Metoprolol.    GI: S/p parastomal hernia repair. Abdominal exam benign. Diet as tolerated.     : ARAM on CKD resolved. Creatinine at baseline.   -Net negative 2.2L over the last 24 hours. Continue home lasix and additional dose of 40mg IV today.     HEME: Hb stable, no evidence of ongoing bleeding.  -Hx of Afib and recent cardiac stents on coumadin at home.   -PTT therapeutic at 75. Continue heparin drip. Start bridging to coumadin this evening.     ID: No evidence of active infection. Monitor off abx.  -DC A-line     ENDO: Glucose controlled. Continue home synthroid for HypoT.    Patient remains clinically stable. Now therapeutic on heparin drip with no evidence of GI bleeding. Plan for downgrade to the floor today.

## 2023-09-08 NOTE — CHART NOTE - NSCHARTNOTEFT_GEN_A_CORE
SICU TRANSFER NOTE  -----------------------------  ICU Admission Date: 08/23/2023  Transfer Date: 09-08-23 @ 14:58    Admission Diagnosis:  1) SBO  2) Acute blood loss anemia  3) UGIB    Active Problems/injuries:   1) Tachypnea  2) Urostomy s/p Bladder Ca  3) A Fib  4) HTN  5) HLD  6) CAD s/p 2 stents (1 month ago)   7) hypothyroidism    Procedures:  1) Ex-lap w/KEANU  2) Repaired hernia    Consultants:  [ ] Cardiology  [ ] Endocrine  [ ] Infectious Disease  [ ] Medicine  [ ] Neurosurgery  [ ] Ortho       [ ] Weight Bearing Status:  [ ] Palliative       [ ] Advanced Directives:    [ ] Physical Medicine and Rehab       [ ] Disposition :   [ ] Plastics  [ ] Pulmonary    Medications  acetaminophen     Tablet .. 975 milliGRAM(s) Oral every 6 hours PRN  albuterol/ipratropium for Nebulization 3 milliLiter(s) Nebulizer every 6 hours PRN  clopidogrel Tablet 75 milliGRAM(s) Oral daily  furosemide    Tablet 20 milliGRAM(s) Oral daily  heparin  Infusion. 1600 Unit(s)/Hr IV Continuous <Continuous>  levothyroxine 137 MICROGram(s) Oral daily  melatonin 5 milliGRAM(s) Oral at bedtime  metoprolol tartrate 25 milliGRAM(s) Oral two times a day  pantoprazole  Injectable 40 milliGRAM(s) IV Push every 12 hours  polyethylene glycol 3350 17 Gram(s) Oral daily  potassium phosphate / sodium phosphate Powder (PHOS-NaK) 1 Packet(s) Oral every 4 hours  sacubitril 24 mG/valsartan 26 mG 1 Tablet(s) Oral two times a day  senna 1 Tablet(s) Oral at bedtime  sodium chloride 0.9% lock flush 10 milliLiter(s) IV Push every 1 hour PRN  warfarin 3 milliGRAM(s) Oral once      [X] I attest I have reviewed and reconciled all medications prior to transfer    IV Fluids  lactated ringers Bolus:   1000 milliLiter(s), IV Bolus, once, infuse over 30 Minute(s), Stop After 1 Doses  Provider's Contact #: [phone]  lactated ringers.: Solution, 1000 milliLiter(s) infuse at 75 mL/Hr  Provider's Contact #: [phone]  sodium chloride 0.9% Bolus:   500 milliLiter(s), IV Bolus, once, infuse over 60 Minute(s), Stop After 1 Doses  Provider's Contact #: [phone]  lactated ringers.: Solution, 1000 milliLiter(s) infuse at 42 mL/Hr  Provider's Contact #: [phone]  lactated ringers Bolus:   1000 milliLiter(s), IV Bolus, once, infuse over 60 Minute(s), Stop After 1 Doses  lactated ringers.: Solution, 1000 milliLiter(s) infuse at 75 mL/Hr  Provider's Contact #: [phone]  sodium chloride 0.9% Bolus:   1000 milliLiter(s), IV Bolus, once, infuse over 60 Minute(s), Stop After 1 Doses  Provider's Contact #: [phone]  sodium chloride 0.9%.: Solution, 1000 milliLiter(s) infuse at 110 mL/Hr    I have discussed this case with ACS upon transfer and all questions regarding ICU course were answered.  The following items are to be followed up:    Neuro:   -No neurologic defects, NAD, A and O x 3  -Denies pain, acetaminophen PRN  -Delirium precautions  -Optimize sleep/wake cycle     Cardio: Maintain MAP >65  -Hx of a fib, on heparin bridging to coumadin  -Rate control with metoprolol  -Restarted home meds including Lasix PO, Entresto, clopidogrel  -Will restarted Digoxin tomorrow AM, monitor kidney function    Pulm: 9/4 RRT due to acute hypoxic RF in the setting of hemorrhagic shock (resolved)  -Neb treatments PRN  -Pt was extubated to NC on 9/5, maintain sPO2 of >88-92%  -Noted for tachypnea and increased WOB received lasix 40 IVP and resolved.  -Continue to monitor respiratory status and consider additional doses of lasix    GI:  -Tolerating DASH diet  -Monitor for s/sx of GIB  -Continue protonix BID and bowel regimen     :  -Urostomy in the setting of previous bladder ca, sediment around urostomy that patient admits is normal  -Strict I and Os  -Replete lytes to maintain K >4, Mg >2, Phos >3    Endo:  -Hx of hypothyroidism, continue synthroid  -Maintain euglycemia, glucose <180    Heme/DVT:  -High risk VTE due to CHADsVASc score  -Heparin gtt started 9/6, PTT therapeutic x2 on 9/8  -Continue plavix in setting of CAD  -Bridge to coumadin    ID:  -Trend WBC, no leukocytosis  -afebrile    Dispo: downgrade out of SICU

## 2023-09-08 NOTE — PROGRESS NOTE ADULT - SUBJECTIVE AND OBJECTIVE BOX
INTERVAL HPI/OVERNIGHT EVENTS:    Pt with tachypnea and slight increase work of breathing.  Chest XR with increased markings with increased cephalization.  POCUS with B-lines noted throughout all lung fields consistent with pulmonary edema.  Pt dosed Lasix x 2 and net negative 2.5 L. Pt continues to tolerate diet and remains with stable vitals and stable H/H.  Heparin is now therapeutic.      MEDICATIONS  (STANDING):  chlorhexidine 2% Cloths 1 Application(s) Topical daily  clopidogrel Tablet 75 milliGRAM(s) Oral daily  furosemide    Tablet 20 milliGRAM(s) Oral daily  heparin  Infusion. 1600 Unit(s)/Hr (16 mL/Hr) IV Continuous <Continuous>  levothyroxine 137 MICROGram(s) Oral daily  melatonin 5 milliGRAM(s) Oral at bedtime  metoprolol tartrate 25 milliGRAM(s) Oral two times a day  pantoprazole  Injectable 40 milliGRAM(s) IV Push every 12 hours  polyethylene glycol 3350 17 Gram(s) Oral daily  sacubitril 24 mG/valsartan 26 mG 1 Tablet(s) Oral two times a day  senna 1 Tablet(s) Oral at bedtime    MEDICATIONS  (PRN):  acetaminophen     Tablet .. 975 milliGRAM(s) Oral every 6 hours PRN Mild Pain (1 - 3)  albuterol/ipratropium for Nebulization 3 milliLiter(s) Nebulizer every 6 hours PRN Wheezing  sodium chloride 0.9% lock flush 10 milliLiter(s) IV Push every 1 hour PRN Pre/post blood products, medications, blood draw, and to maintain line patency      Drug Dosing Weight  Height (cm): 177.8 (30 Aug 2023 09:30)  Weight (kg): 102.1 (30 Aug 2023 09:30)  BMI (kg/m2): 32.3 (30 Aug 2023 09:30)  BSA (m2): 2.19 (30 Aug 2023 09:30)      PAST MEDICAL & SURGICAL HISTORY:  Atrial fibrillation      Hypothyroid      Hypertension      Bladder cancer      Bronchitis      Former smoker      CAD (coronary artery disease)      History of bladder surgery  on urostomy      S/P CABG x 3      History of automatic internal cardiac defibrillator (AICD)      H/O knee surgery          ICU Vital Signs Last 24 Hrs  T(C): 37.5 (08 Sep 2023 02:00), Max: 37.7 (07 Sep 2023 22:00)  T(F): 99.5 (08 Sep 2023 02:00), Max: 99.9 (07 Sep 2023 22:00)  HR: 101 (08 Sep 2023 02:00) (78 - 112)  BP: 130/71 (08 Sep 2023 00:00) (124/63 - 141/70)  BP(mean): 90 (08 Sep 2023 00:00) (81 - 100)  ABP: 129/48 (08 Sep 2023 02:00) (13/-15 - 152/58)  ABP(mean): 76 (08 Sep 2023 02:00) (-3 - 94)  RR: 24 (08 Sep 2023 02:00) (17 - 35)  SpO2: 95% (08 Sep 2023 02:00) (92% - 99%)    O2 Parameters below as of 08 Sep 2023 00:00  Patient On (Oxygen Delivery Method): room air            ABG - ( 07 Sep 2023 08:31 )  pH, Arterial: 7.420 pH, Blood: x     /  pCO2: 43    /  pO2: 73    / HCO3: 28    / Base Excess: 3.4   /  SaO2: 96.7                I&O's Detail    06 Sep 2023 07:01  -  07 Sep 2023 07:00  --------------------------------------------------------  IN:    Heparin Infusion: 196 mL    Heparin Infusion: 116 mL    IV PiggyBack: 50 mL  Total IN: 362 mL    OUT:    Urostomy (mL): 1500 mL  Total OUT: 1500 mL    Total NET: -1138 mL      07 Sep 2023 07:01  -  08 Sep 2023 02:43  --------------------------------------------------------  IN:    Heparin Infusion: 422 mL    IV PiggyBack: 100 mL  Total IN: 522 mL    OUT:    Urostomy (mL): 2970 mL  Total OUT: 2970 mL    Total NET: -2448 mL      Physical Exam:    Gen: NAD, conversing in bed    Eyes: PERRL ~ 3mm, EOMI,     Neurological: A&Ox3, GCS 15, No focal deficit.     Neck: Supple. NT AT, FROM no pain.  No JVD. No meningeal signs    Pulmonary: NAD, lung sounds clear to auscultation bilaterally      Cardiovascular: Irregularly irregular, S1, S2, No Murmurs, rubs or gallops noted.    Gastrointestinal: ND, Soft, NT, midline incision closed with staples    Extremities: NT, AT,. Mild-moderate Left calf firmness – continues to improve. + anasarca. No Erythema or palpable cord noted.  FROM, = 2+ pulses throughout.    : Urostomy functioning. Sediment in bag (per patient this is normal)      LABS:  CBC Full  -  ( 08 Sep 2023 02:18 )  WBC Count : 9.49 K/uL  RBC Count : 2.97 M/uL  Hemoglobin : 8.7 g/dL  Hematocrit : 26.6 %  Platelet Count - Automated : 364 K/uL  Mean Cell Volume : 89.6 fl  Mean Cell Hemoglobin : 29.3 pg  Mean Cell Hemoglobin Concentration : 32.7 gm/dL  Auto Neutrophil # : 6.23 K/uL  Auto Lymphocyte # : 1.00 K/uL  Auto Monocyte # : 1.44 K/uL  Auto Eosinophil # : 0.13 K/uL  Auto Basophil # : 0.07 K/uL  Auto Neutrophil % : 65.7 %  Auto Lymphocyte % : 10.5 %  Auto Monocyte % : 15.2 %  Auto Eosinophil % : 1.4 %  Auto Basophil % : 0.7 %    09-07    141  |  106  |  31.2<H>  ----------------------------<  140<H>  3.8   |  25.0  |  1.26    Ca    7.5<L>      07 Sep 2023 19:30  Phos  2.1     09-07  Mg     1.9     09-07      PT/INR - ( 06 Sep 2023 10:00 )   PT: 12.9 sec;   INR: 1.17 ratio         PTT - ( 08 Sep 2023 01:55 )  PTT:72.6 sec  Urinalysis Basic - ( 07 Sep 2023 19:30 )    Color: x / Appearance: x / SG: x / pH: x  Gluc: 140 mg/dL / Ketone: x  / Bili: x / Urobili: x   Blood: x / Protein: x / Nitrite: x   Leuk Esterase: x / RBC: x / WBC x   Sq Epi: x / Non Sq Epi: x / Bacteria: x

## 2023-09-09 LAB
ALBUMIN SERPL ELPH-MCNC: 2.2 G/DL — LOW (ref 3.3–5.2)
ALP SERPL-CCNC: 136 U/L — HIGH (ref 40–120)
ALT FLD-CCNC: 23 U/L — SIGNIFICANT CHANGE UP
ANION GAP SERPL CALC-SCNC: 10 MMOL/L — SIGNIFICANT CHANGE UP (ref 5–17)
APTT BLD: 93.6 SEC — HIGH (ref 24.5–35.6)
AST SERPL-CCNC: 28 U/L — SIGNIFICANT CHANGE UP
BASOPHILS # BLD AUTO: 0 K/UL — SIGNIFICANT CHANGE UP (ref 0–0.2)
BASOPHILS NFR BLD AUTO: 0 % — SIGNIFICANT CHANGE UP (ref 0–2)
BILIRUB SERPL-MCNC: 0.7 MG/DL — SIGNIFICANT CHANGE UP (ref 0.4–2)
BUN SERPL-MCNC: 29.4 MG/DL — HIGH (ref 8–20)
CALCIUM SERPL-MCNC: 7.3 MG/DL — LOW (ref 8.4–10.5)
CHLORIDE SERPL-SCNC: 105 MMOL/L — SIGNIFICANT CHANGE UP (ref 96–108)
CO2 SERPL-SCNC: 27 MMOL/L — SIGNIFICANT CHANGE UP (ref 22–29)
CREAT SERPL-MCNC: 1.14 MG/DL — SIGNIFICANT CHANGE UP (ref 0.5–1.3)
EGFR: 66 ML/MIN/1.73M2 — SIGNIFICANT CHANGE UP
EOSINOPHIL # BLD AUTO: 0 K/UL — SIGNIFICANT CHANGE UP (ref 0–0.5)
EOSINOPHIL NFR BLD AUTO: 0 % — SIGNIFICANT CHANGE UP (ref 0–6)
GIANT PLATELETS BLD QL SMEAR: PRESENT — SIGNIFICANT CHANGE UP
GLUCOSE SERPL-MCNC: 146 MG/DL — HIGH (ref 70–99)
HCT VFR BLD CALC: 27.4 % — LOW (ref 39–50)
HGB BLD-MCNC: 8.9 G/DL — LOW (ref 13–17)
INR BLD: 1.16 RATIO — SIGNIFICANT CHANGE UP (ref 0.85–1.18)
LYMPHOCYTES # BLD AUTO: 0.74 K/UL — LOW (ref 1–3.3)
LYMPHOCYTES # BLD AUTO: 10.8 % — LOW (ref 13–44)
MAGNESIUM SERPL-MCNC: 1.7 MG/DL — SIGNIFICANT CHANGE UP (ref 1.6–2.6)
MANUAL SMEAR VERIFICATION: SIGNIFICANT CHANGE UP
MCHC RBC-ENTMCNC: 29.8 PG — SIGNIFICANT CHANGE UP (ref 27–34)
MCHC RBC-ENTMCNC: 32.5 GM/DL — SIGNIFICANT CHANGE UP (ref 32–36)
MCV RBC AUTO: 91.6 FL — SIGNIFICANT CHANGE UP (ref 80–100)
METAMYELOCYTES # FLD: 0.9 % — HIGH (ref 0–0)
MONOCYTES # BLD AUTO: 1.1 K/UL — HIGH (ref 0–0.9)
MONOCYTES NFR BLD AUTO: 16.2 % — HIGH (ref 2–14)
MYELOCYTES NFR BLD: 3.6 % — HIGH (ref 0–0)
NEUTROPHILS # BLD AUTO: 4.67 K/UL — SIGNIFICANT CHANGE UP (ref 1.8–7.4)
NEUTROPHILS NFR BLD AUTO: 64.9 % — SIGNIFICANT CHANGE UP (ref 43–77)
NEUTS BAND # BLD: 3.6 % — SIGNIFICANT CHANGE UP (ref 0–8)
PHOSPHATE SERPL-MCNC: 1.9 MG/DL — LOW (ref 2.4–4.7)
PLAT MORPH BLD: NORMAL — SIGNIFICANT CHANGE UP
PLATELET # BLD AUTO: 385 K/UL — SIGNIFICANT CHANGE UP (ref 150–400)
POIKILOCYTOSIS BLD QL AUTO: SLIGHT — SIGNIFICANT CHANGE UP
POLYCHROMASIA BLD QL SMEAR: SLIGHT — SIGNIFICANT CHANGE UP
POTASSIUM SERPL-MCNC: 3.8 MMOL/L — SIGNIFICANT CHANGE UP (ref 3.5–5.3)
POTASSIUM SERPL-SCNC: 3.8 MMOL/L — SIGNIFICANT CHANGE UP (ref 3.5–5.3)
PROT SERPL-MCNC: 5.1 G/DL — LOW (ref 6.6–8.7)
PROTHROM AB SERPL-ACNC: 12.8 SEC — SIGNIFICANT CHANGE UP (ref 9.5–13)
RBC # BLD: 2.99 M/UL — LOW (ref 4.2–5.8)
RBC # FLD: 15.6 % — HIGH (ref 10.3–14.5)
RBC BLD AUTO: ABNORMAL
SMUDGE CELLS # BLD: PRESENT — SIGNIFICANT CHANGE UP
SODIUM SERPL-SCNC: 142 MMOL/L — SIGNIFICANT CHANGE UP (ref 135–145)
WBC # BLD: 6.82 K/UL — SIGNIFICANT CHANGE UP (ref 3.8–10.5)
WBC # FLD AUTO: 6.82 K/UL — SIGNIFICANT CHANGE UP (ref 3.8–10.5)

## 2023-09-09 PROCEDURE — 99024 POSTOP FOLLOW-UP VISIT: CPT

## 2023-09-09 RX ORDER — WARFARIN SODIUM 2.5 MG/1
5 TABLET ORAL ONCE
Refills: 0 | Status: COMPLETED | OUTPATIENT
Start: 2023-09-09 | End: 2023-09-09

## 2023-09-09 RX ORDER — SODIUM,POTASSIUM PHOSPHATES 278-250MG
2 POWDER IN PACKET (EA) ORAL EVERY 4 HOURS
Refills: 0 | Status: COMPLETED | OUTPATIENT
Start: 2023-09-09 | End: 2023-09-09

## 2023-09-09 RX ADMIN — Medication 125 MICROGRAM(S): at 06:09

## 2023-09-09 RX ADMIN — Medication 20 MILLIGRAM(S): at 06:09

## 2023-09-09 RX ADMIN — PANTOPRAZOLE SODIUM 40 MILLIGRAM(S): 20 TABLET, DELAYED RELEASE ORAL at 06:09

## 2023-09-09 RX ADMIN — HEPARIN SODIUM 2400 UNIT(S)/HR: 5000 INJECTION INTRAVENOUS; SUBCUTANEOUS at 22:33

## 2023-09-09 RX ADMIN — CLOPIDOGREL BISULFATE 75 MILLIGRAM(S): 75 TABLET, FILM COATED ORAL at 12:31

## 2023-09-09 RX ADMIN — Medication 25 MILLIGRAM(S): at 06:09

## 2023-09-09 RX ADMIN — Medication 3 MILLILITER(S): at 12:39

## 2023-09-09 RX ADMIN — Medication 2 TABLET(S): at 17:13

## 2023-09-09 RX ADMIN — HEPARIN SODIUM 2400 UNIT(S)/HR: 5000 INJECTION INTRAVENOUS; SUBCUTANEOUS at 19:10

## 2023-09-09 RX ADMIN — SENNA PLUS 1 TABLET(S): 8.6 TABLET ORAL at 22:32

## 2023-09-09 RX ADMIN — Medication 137 MICROGRAM(S): at 06:09

## 2023-09-09 RX ADMIN — WARFARIN SODIUM 5 MILLIGRAM(S): 2.5 TABLET ORAL at 22:31

## 2023-09-09 RX ADMIN — Medication 2 TABLET(S): at 12:32

## 2023-09-09 RX ADMIN — Medication 3 MILLILITER(S): at 00:52

## 2023-09-09 RX ADMIN — Medication 3 MILLILITER(S): at 18:53

## 2023-09-09 RX ADMIN — Medication 25 MILLIGRAM(S): at 17:12

## 2023-09-09 RX ADMIN — SACUBITRIL AND VALSARTAN 1 TABLET(S): 24; 26 TABLET, FILM COATED ORAL at 06:08

## 2023-09-09 RX ADMIN — Medication 5 MILLIGRAM(S): at 22:32

## 2023-09-09 RX ADMIN — PANTOPRAZOLE SODIUM 40 MILLIGRAM(S): 20 TABLET, DELAYED RELEASE ORAL at 17:12

## 2023-09-09 RX ADMIN — SACUBITRIL AND VALSARTAN 1 TABLET(S): 24; 26 TABLET, FILM COATED ORAL at 17:12

## 2023-09-09 NOTE — PROGRESS NOTE ADULT - NS ATTEND AMEND GEN_ALL_CORE FT
I have seen and examined the patient during rounds form 9060-7329 hrs  events noted    no new issues.  Pain under control  abd soft, staples in inplace.  D/C 1/2 stapes today  Warfarin  DIPSO planning

## 2023-09-09 NOTE — PROGRESS NOTE ADULT - SUBJECTIVE AND OBJECTIVE BOX
Subjective: Patient seen and examined at bedside, no acute complaints, no events overnight. He is tolerating a regular diet, having bowel function, and his urostomy is functioning.     MEDICATIONS  (STANDING):  clopidogrel Tablet 75 milliGRAM(s) Oral daily  digoxin     Tablet 125 MICROGram(s) Oral daily  furosemide    Tablet 20 milliGRAM(s) Oral daily  heparin  Infusion. 1600 Unit(s)/Hr (16 mL/Hr) IV Continuous <Continuous>  levothyroxine 137 MICROGram(s) Oral daily  melatonin 5 milliGRAM(s) Oral at bedtime  metoprolol tartrate 25 milliGRAM(s) Oral two times a day  pantoprazole  Injectable 40 milliGRAM(s) IV Push every 12 hours  polyethylene glycol 3350 17 Gram(s) Oral daily  sacubitril 24 mG/valsartan 26 mG 1 Tablet(s) Oral two times a day  senna 1 Tablet(s) Oral at bedtime    MEDICATIONS  (PRN):  acetaminophen     Tablet .. 975 milliGRAM(s) Oral every 6 hours PRN Mild Pain (1 - 3)  albuterol/ipratropium for Nebulization 3 milliLiter(s) Nebulizer every 6 hours PRN Wheezing  sodium chloride 0.9% lock flush 10 milliLiter(s) IV Push every 1 hour PRN Pre/post blood products, medications, blood draw, and to maintain line patency    Vital Signs Last 24 Hrs  T(C): 36.6 (09 Sep 2023 00:30), Max: 37.4 (08 Sep 2023 04:00)  T(F): 97.9 (09 Sep 2023 00:30), Max: 99.3 (08 Sep 2023 04:00)  HR: 100 (09 Sep 2023 00:54) (76 - 106)  BP: 118/64 (09 Sep 2023 00:30) (118/64 - 145/92)  BP(mean): 108 (08 Sep 2023 12:00) (86 - 108)  RR: 18 (09 Sep 2023 00:30) (15 - 35)  SpO2: 96% (09 Sep 2023 00:54) (93% - 99%)  Parameters below as of 09 Sep 2023 00:54  Patient On (Oxygen Delivery Method): room air    Physical Exam:  Constitutional: NAD  HEENT: PERRL, EOMI  Respiratory: Respirations non-labored, no accessory muscle use  Gastrointestinal: Soft, non-tender, non-distended, midline incision c/d/i   Extremities: + L calf tenderness improving, pulses 2+, sensation intact   Neurological: A&O x 3; without gross deficit  : urostomy functioning yellow urine, sediment is still noted     LABS:  pending                       A: Patient is a 77 yo M admitted with SBO, Metabolic acidosis, L ruptured baker's cyst and ARAM, patient failed conservative management of SBO, taken to OR for ex lap w/ KEANU w/ temporary abdominal closure 8/28, returned to OR for closed two days later for parastomal hernia repair and re ex lap.  Hospital course complicated by ruptured Bakers cyst, GI bleed, reintubation this week, extubated 9/5, now downgraded to the surgical floor, tolerating a regular diet.     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   Trend labs   Trend vitals   Daily coumadin, cont hep gtt until goal INR 2-3   Cont home meds   DC planning

## 2023-09-10 LAB
ANION GAP SERPL CALC-SCNC: 12 MMOL/L — SIGNIFICANT CHANGE UP (ref 5–17)
ANISOCYTOSIS BLD QL: SLIGHT — SIGNIFICANT CHANGE UP
APTT BLD: 115.7 SEC — HIGH (ref 24.5–35.6)
APTT BLD: 139 SEC — CRITICAL HIGH (ref 24.5–35.6)
APTT BLD: 85.3 SEC — HIGH (ref 24.5–35.6)
BASOPHILS # BLD AUTO: 0 K/UL — SIGNIFICANT CHANGE UP (ref 0–0.2)
BASOPHILS NFR BLD AUTO: 0 % — SIGNIFICANT CHANGE UP (ref 0–2)
BUN SERPL-MCNC: 27.1 MG/DL — HIGH (ref 8–20)
BURR CELLS BLD QL SMEAR: PRESENT — SIGNIFICANT CHANGE UP
CALCIUM SERPL-MCNC: 7.1 MG/DL — LOW (ref 8.4–10.5)
CHLORIDE SERPL-SCNC: 104 MMOL/L — SIGNIFICANT CHANGE UP (ref 96–108)
CO2 SERPL-SCNC: 27 MMOL/L — SIGNIFICANT CHANGE UP (ref 22–29)
CREAT SERPL-MCNC: 1.09 MG/DL — SIGNIFICANT CHANGE UP (ref 0.5–1.3)
EGFR: 69 ML/MIN/1.73M2 — SIGNIFICANT CHANGE UP
ELLIPTOCYTES BLD QL SMEAR: SLIGHT — SIGNIFICANT CHANGE UP
EOSINOPHIL # BLD AUTO: 0.13 K/UL — SIGNIFICANT CHANGE UP (ref 0–0.5)
EOSINOPHIL NFR BLD AUTO: 1.7 % — SIGNIFICANT CHANGE UP (ref 0–6)
GIANT PLATELETS BLD QL SMEAR: PRESENT — SIGNIFICANT CHANGE UP
GLUCOSE SERPL-MCNC: 125 MG/DL — HIGH (ref 70–99)
HCT VFR BLD CALC: 27 % — LOW (ref 39–50)
HGB BLD-MCNC: 8.2 G/DL — LOW (ref 13–17)
HYPOCHROMIA BLD QL: SLIGHT — SIGNIFICANT CHANGE UP
INR BLD: 1.3 RATIO — HIGH (ref 0.85–1.18)
LYMPHOCYTES # BLD AUTO: 0.68 K/UL — LOW (ref 1–3.3)
LYMPHOCYTES # BLD AUTO: 8.7 % — LOW (ref 13–44)
MAGNESIUM SERPL-MCNC: 1.5 MG/DL — LOW (ref 1.6–2.6)
MANUAL SMEAR VERIFICATION: SIGNIFICANT CHANGE UP
MCHC RBC-ENTMCNC: 28.3 PG — SIGNIFICANT CHANGE UP (ref 27–34)
MCHC RBC-ENTMCNC: 30.4 GM/DL — LOW (ref 32–36)
MCV RBC AUTO: 93.1 FL — SIGNIFICANT CHANGE UP (ref 80–100)
METAMYELOCYTES # FLD: 0.9 % — HIGH (ref 0–0)
MICROCYTES BLD QL: SLIGHT — SIGNIFICANT CHANGE UP
MONOCYTES # BLD AUTO: 0.95 K/UL — HIGH (ref 0–0.9)
MONOCYTES NFR BLD AUTO: 12.2 % — SIGNIFICANT CHANGE UP (ref 2–14)
MYELOCYTES NFR BLD: 2.6 % — HIGH (ref 0–0)
NEUTROPHILS # BLD AUTO: 5.34 K/UL — SIGNIFICANT CHANGE UP (ref 1.8–7.4)
NEUTROPHILS NFR BLD AUTO: 65.2 % — SIGNIFICANT CHANGE UP (ref 43–77)
NEUTS BAND # BLD: 3.5 % — SIGNIFICANT CHANGE UP (ref 0–8)
OVALOCYTES BLD QL SMEAR: SLIGHT — SIGNIFICANT CHANGE UP
PHOSPHATE SERPL-MCNC: 2.8 MG/DL — SIGNIFICANT CHANGE UP (ref 2.4–4.7)
PLAT MORPH BLD: NORMAL — SIGNIFICANT CHANGE UP
PLATELET # BLD AUTO: 399 K/UL — SIGNIFICANT CHANGE UP (ref 150–400)
POIKILOCYTOSIS BLD QL AUTO: SLIGHT — SIGNIFICANT CHANGE UP
POLYCHROMASIA BLD QL SMEAR: SLIGHT — SIGNIFICANT CHANGE UP
POTASSIUM SERPL-MCNC: 3.8 MMOL/L — SIGNIFICANT CHANGE UP (ref 3.5–5.3)
POTASSIUM SERPL-SCNC: 3.8 MMOL/L — SIGNIFICANT CHANGE UP (ref 3.5–5.3)
PROTHROM AB SERPL-ACNC: 14.3 SEC — HIGH (ref 9.5–13)
RBC # BLD: 2.9 M/UL — LOW (ref 4.2–5.8)
RBC # FLD: 15.6 % — HIGH (ref 10.3–14.5)
RBC BLD AUTO: ABNORMAL
SMUDGE CELLS # BLD: PRESENT — SIGNIFICANT CHANGE UP
SODIUM SERPL-SCNC: 143 MMOL/L — SIGNIFICANT CHANGE UP (ref 135–145)
VARIANT LYMPHS # BLD: 5.2 % — SIGNIFICANT CHANGE UP (ref 0–6)
WBC # BLD: 7.77 K/UL — SIGNIFICANT CHANGE UP (ref 3.8–10.5)
WBC # FLD AUTO: 7.77 K/UL — SIGNIFICANT CHANGE UP (ref 3.8–10.5)

## 2023-09-10 RX ORDER — WARFARIN SODIUM 2.5 MG/1
7 TABLET ORAL ONCE
Refills: 0 | Status: COMPLETED | OUTPATIENT
Start: 2023-09-10 | End: 2023-09-10

## 2023-09-10 RX ORDER — MAGNESIUM SULFATE 500 MG/ML
2 VIAL (ML) INJECTION ONCE
Refills: 0 | Status: COMPLETED | OUTPATIENT
Start: 2023-09-10 | End: 2023-09-10

## 2023-09-10 RX ORDER — SODIUM,POTASSIUM PHOSPHATES 278-250MG
1 POWDER IN PACKET (EA) ORAL ONCE
Refills: 0 | Status: COMPLETED | OUTPATIENT
Start: 2023-09-10 | End: 2023-09-10

## 2023-09-10 RX ADMIN — Medication 975 MILLIGRAM(S): at 14:31

## 2023-09-10 RX ADMIN — POLYETHYLENE GLYCOL 3350 17 GRAM(S): 17 POWDER, FOR SOLUTION ORAL at 11:46

## 2023-09-10 RX ADMIN — SACUBITRIL AND VALSARTAN 1 TABLET(S): 24; 26 TABLET, FILM COATED ORAL at 05:22

## 2023-09-10 RX ADMIN — HEPARIN SODIUM 2200 UNIT(S)/HR: 5000 INJECTION INTRAVENOUS; SUBCUTANEOUS at 19:10

## 2023-09-10 RX ADMIN — HEPARIN SODIUM 2400 UNIT(S)/HR: 5000 INJECTION INTRAVENOUS; SUBCUTANEOUS at 05:20

## 2023-09-10 RX ADMIN — Medication 975 MILLIGRAM(S): at 13:31

## 2023-09-10 RX ADMIN — CLOPIDOGREL BISULFATE 75 MILLIGRAM(S): 75 TABLET, FILM COATED ORAL at 11:46

## 2023-09-10 RX ADMIN — SENNA PLUS 1 TABLET(S): 8.6 TABLET ORAL at 21:34

## 2023-09-10 RX ADMIN — Medication 125 MICROGRAM(S): at 05:22

## 2023-09-10 RX ADMIN — Medication 25 MILLIGRAM(S): at 17:01

## 2023-09-10 RX ADMIN — Medication 25 MILLIGRAM(S): at 05:21

## 2023-09-10 RX ADMIN — WARFARIN SODIUM 7 MILLIGRAM(S): 2.5 TABLET ORAL at 21:33

## 2023-09-10 RX ADMIN — SACUBITRIL AND VALSARTAN 1 TABLET(S): 24; 26 TABLET, FILM COATED ORAL at 17:02

## 2023-09-10 RX ADMIN — Medication 20 MILLIGRAM(S): at 05:21

## 2023-09-10 RX ADMIN — Medication 1 TABLET(S): at 17:03

## 2023-09-10 RX ADMIN — Medication 25 GRAM(S): at 17:00

## 2023-09-10 RX ADMIN — Medication 3 MILLILITER(S): at 20:17

## 2023-09-10 RX ADMIN — PANTOPRAZOLE SODIUM 40 MILLIGRAM(S): 20 TABLET, DELAYED RELEASE ORAL at 17:01

## 2023-09-10 RX ADMIN — HEPARIN SODIUM 2200 UNIT(S)/HR: 5000 INJECTION INTRAVENOUS; SUBCUTANEOUS at 17:06

## 2023-09-10 RX ADMIN — PANTOPRAZOLE SODIUM 40 MILLIGRAM(S): 20 TABLET, DELAYED RELEASE ORAL at 05:21

## 2023-09-10 RX ADMIN — Medication 5 MILLIGRAM(S): at 21:32

## 2023-09-10 RX ADMIN — Medication 137 MICROGRAM(S): at 05:22

## 2023-09-10 RX ADMIN — HEPARIN SODIUM 0 UNIT(S)/HR: 5000 INJECTION INTRAVENOUS; SUBCUTANEOUS at 22:54

## 2023-09-10 RX ADMIN — HEPARIN SODIUM 2200 UNIT(S)/HR: 5000 INJECTION INTRAVENOUS; SUBCUTANEOUS at 15:51

## 2023-09-10 RX ADMIN — HEPARIN SODIUM 2200 UNIT(S)/HR: 5000 INJECTION INTRAVENOUS; SUBCUTANEOUS at 07:09

## 2023-09-10 NOTE — PROGRESS NOTE ADULT - ATTENDING COMMENTS
Patient is a 79 yo M admitted with SBO, Metabolic acidosis, L ruptured baker's cyst and ARAM, patient failed conservative management of SBO, taken to OR for ex lap w/ KEANU w/ temporary abdominal closure 8/28, returned to OR for closed two days later for parastomal hernia repair and re ex lap.  Hospital course complicated by ruptured Bakers cyst, GI bleed, reintubation last week, extubated 9/5, downgraded from sicu 9/8. Tolerating a regular diet and having function.   Awake alert  Bilateral BS  Hemodynamic intact  Abdomen soft, active BS, urostomy working well  Neurologic grossly intact   Bridging heparin to coumadin  Needs to be OOB

## 2023-09-10 NOTE — PROGRESS NOTE ADULT - SUBJECTIVE AND OBJECTIVE BOX
Subjective: Patient seen and examined at bedside, no acute complaints, no events overnight. He is tolerating a regular diet, having bowel function, and his urostomy is functioning.     MEDICATIONS  (STANDING):  clopidogrel Tablet 75 milliGRAM(s) Oral daily  digoxin     Tablet 125 MICROGram(s) Oral daily  furosemide    Tablet 20 milliGRAM(s) Oral daily  heparin  Infusion. 1600 Unit(s)/Hr (16 mL/Hr) IV Continuous <Continuous>  levothyroxine 137 MICROGram(s) Oral daily  melatonin 5 milliGRAM(s) Oral at bedtime  metoprolol tartrate 25 milliGRAM(s) Oral two times a day  pantoprazole  Injectable 40 milliGRAM(s) IV Push every 12 hours  polyethylene glycol 3350 17 Gram(s) Oral daily  sacubitril 24 mG/valsartan 26 mG 1 Tablet(s) Oral two times a day  senna 1 Tablet(s) Oral at bedtime    MEDICATIONS  (PRN):  acetaminophen     Tablet .. 975 milliGRAM(s) Oral every 6 hours PRN Mild Pain (1 - 3)  albuterol/ipratropium for Nebulization 3 milliLiter(s) Nebulizer every 6 hours PRN Wheezing  sodium chloride 0.9% lock flush 10 milliLiter(s) IV Push every 1 hour PRN Pre/post blood products, medications, blood draw, and to maintain line patency    Vital Signs Last 24 Hrs  T(C): 37 (10 Sep 2023 00:08), Max: 37.3 (09 Sep 2023 19:54)  T(F): 98.6 (10 Sep 2023 00:08), Max: 99.2 (09 Sep 2023 19:54)  HR: 94 (10 Sep 2023 00:08) (73 - 100)  BP: 144/71 (10 Sep 2023 00:08) (120/67 - 150/79)  BP(mean): --  RR: 18 (10 Sep 2023 00:08) (18 - 19)  SpO2: 99% (10 Sep 2023 00:08) (90% - 99%)  Parameters below as of 10 Sep 2023 00:08  Patient On (Oxygen Delivery Method): room air    Physical Exam:  Constitutional: NAD  HEENT: PERRL, EOMI  Respiratory: Respirations non-labored, no accessory muscle use  Gastrointestinal: Soft, non-tender, non-distended, midline incision c/d/i   Extremities: + L calf tenderness improving, pulses 2+, sensation intact   Neurological: A&O x 3; without gross deficit  : urostomy functioning yellow urine, sediment is still noted     LABS:  pending              A: Patient is a 79 yo M admitted with SBO, Metabolic acidosis, L ruptured baker's cyst and ARAM, patient failed conservative management of SBO, taken to OR for ex lap w/ KEANU w/ temporary abdominal closure 8/28, returned to OR for closed two days later for parastomal hernia repair and re ex lap.  Hospital course complicated by ruptured Bakers cyst, GI bleed, reintubation this week, extubated 9/5, downgraded from sicu 9/8. Tolerating a regular diet and having function.     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   Trend labs   Trend vitals   Daily coumadin, cont hep gtt until goal INR 2-3   Cont home meds   DC planning       Subjective: Patient seen and examined at bedside, no acute complaints, no events overnight. He is tolerating a regular diet, having bowel function, and his urostomy is functioning.     MEDICATIONS  (STANDING):  clopidogrel Tablet 75 milliGRAM(s) Oral daily  digoxin     Tablet 125 MICROGram(s) Oral daily  furosemide    Tablet 20 milliGRAM(s) Oral daily  heparin  Infusion. 1600 Unit(s)/Hr (16 mL/Hr) IV Continuous <Continuous>  levothyroxine 137 MICROGram(s) Oral daily  melatonin 5 milliGRAM(s) Oral at bedtime  metoprolol tartrate 25 milliGRAM(s) Oral two times a day  pantoprazole  Injectable 40 milliGRAM(s) IV Push every 12 hours  polyethylene glycol 3350 17 Gram(s) Oral daily  sacubitril 24 mG/valsartan 26 mG 1 Tablet(s) Oral two times a day  senna 1 Tablet(s) Oral at bedtime    MEDICATIONS  (PRN):  acetaminophen     Tablet .. 975 milliGRAM(s) Oral every 6 hours PRN Mild Pain (1 - 3)  albuterol/ipratropium for Nebulization 3 milliLiter(s) Nebulizer every 6 hours PRN Wheezing  sodium chloride 0.9% lock flush 10 milliLiter(s) IV Push every 1 hour PRN Pre/post blood products, medications, blood draw, and to maintain line patency    Vital Signs Last 24 Hrs  T(C): 37 (10 Sep 2023 00:08), Max: 37.3 (09 Sep 2023 19:54)  T(F): 98.6 (10 Sep 2023 00:08), Max: 99.2 (09 Sep 2023 19:54)  HR: 94 (10 Sep 2023 00:08) (73 - 100)  BP: 144/71 (10 Sep 2023 00:08) (120/67 - 150/79)  BP(mean): --  RR: 18 (10 Sep 2023 00:08) (18 - 19)  SpO2: 99% (10 Sep 2023 00:08) (90% - 99%)  Parameters below as of 10 Sep 2023 00:08  Patient On (Oxygen Delivery Method): room air    Physical Exam:  Constitutional: NAD  HEENT: PERRL, EOMI  Respiratory: Respirations non-labored, no accessory muscle use  Gastrointestinal: Soft, non-tender, non-distended, midline incision c/d/i   Extremities: + L calf tenderness improving, pulses 2+, sensation intact   Neurological: A&O x 3; without gross deficit  : urostomy functioning yellow urine, sediment is still noted     LABS:  pending              A: Patient is a 77 yo M admitted with SBO, Metabolic acidosis, L ruptured baker's cyst and ARAM, patient failed conservative management of SBO, taken to OR for ex lap w/ KEANU w/ temporary abdominal closure 8/28, returned to OR for closed two days later for parastomal hernia repair and re ex lap.  Hospital course complicated by ruptured Bakers cyst, GI bleed, reintubation this week, extubated 9/5, downgraded from sicu 9/8. Tolerating a regular diet and having function.     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   Trend labs   Trend vitals   Daily coumadin, cont hep gtt until goal INR 2-3   Cont home meds, home lasix restarted   DC planning

## 2023-09-11 LAB
ANION GAP SERPL CALC-SCNC: 9 MMOL/L — SIGNIFICANT CHANGE UP (ref 5–17)
APTT BLD: 45.4 SEC — HIGH (ref 24.5–35.6)
BASOPHILS # BLD AUTO: 0.04 K/UL — SIGNIFICANT CHANGE UP (ref 0–0.2)
BASOPHILS NFR BLD AUTO: 0.6 % — SIGNIFICANT CHANGE UP (ref 0–2)
BUN SERPL-MCNC: 28.5 MG/DL — HIGH (ref 8–20)
CALCIUM SERPL-MCNC: 7.2 MG/DL — LOW (ref 8.4–10.5)
CHLORIDE SERPL-SCNC: 106 MMOL/L — SIGNIFICANT CHANGE UP (ref 96–108)
CO2 SERPL-SCNC: 29 MMOL/L — SIGNIFICANT CHANGE UP (ref 22–29)
CREAT SERPL-MCNC: 1.01 MG/DL — SIGNIFICANT CHANGE UP (ref 0.5–1.3)
EGFR: 76 ML/MIN/1.73M2 — SIGNIFICANT CHANGE UP
EOSINOPHIL # BLD AUTO: 0.07 K/UL — SIGNIFICANT CHANGE UP (ref 0–0.5)
EOSINOPHIL NFR BLD AUTO: 1 % — SIGNIFICANT CHANGE UP (ref 0–6)
GLUCOSE SERPL-MCNC: 123 MG/DL — HIGH (ref 70–99)
HCT VFR BLD CALC: 26.1 % — LOW (ref 39–50)
HGB BLD-MCNC: 8.1 G/DL — LOW (ref 13–17)
IMM GRANULOCYTES NFR BLD AUTO: 6.7 % — HIGH (ref 0–0.9)
INR BLD: 1.46 RATIO — HIGH (ref 0.85–1.18)
LYMPHOCYTES # BLD AUTO: 0.88 K/UL — LOW (ref 1–3.3)
LYMPHOCYTES # BLD AUTO: 12.3 % — LOW (ref 13–44)
MAGNESIUM SERPL-MCNC: 1.7 MG/DL — SIGNIFICANT CHANGE UP (ref 1.6–2.6)
MCHC RBC-ENTMCNC: 29 PG — SIGNIFICANT CHANGE UP (ref 27–34)
MCHC RBC-ENTMCNC: 31 GM/DL — LOW (ref 32–36)
MCV RBC AUTO: 93.5 FL — SIGNIFICANT CHANGE UP (ref 80–100)
MONOCYTES # BLD AUTO: 1.14 K/UL — HIGH (ref 0–0.9)
MONOCYTES NFR BLD AUTO: 15.9 % — HIGH (ref 2–14)
NEUTROPHILS # BLD AUTO: 4.54 K/UL — SIGNIFICANT CHANGE UP (ref 1.8–7.4)
NEUTROPHILS NFR BLD AUTO: 63.5 % — SIGNIFICANT CHANGE UP (ref 43–77)
PHOSPHATE SERPL-MCNC: 3.3 MG/DL — SIGNIFICANT CHANGE UP (ref 2.4–4.7)
PLATELET # BLD AUTO: 350 K/UL — SIGNIFICANT CHANGE UP (ref 150–400)
POTASSIUM SERPL-MCNC: 3.6 MMOL/L — SIGNIFICANT CHANGE UP (ref 3.5–5.3)
POTASSIUM SERPL-SCNC: 3.6 MMOL/L — SIGNIFICANT CHANGE UP (ref 3.5–5.3)
PROTHROM AB SERPL-ACNC: 16 SEC — HIGH (ref 9.5–13)
RBC # BLD: 2.79 M/UL — LOW (ref 4.2–5.8)
RBC # FLD: 15.4 % — HIGH (ref 10.3–14.5)
SODIUM SERPL-SCNC: 143 MMOL/L — SIGNIFICANT CHANGE UP (ref 135–145)
WBC # BLD: 7.15 K/UL — SIGNIFICANT CHANGE UP (ref 3.8–10.5)
WBC # FLD AUTO: 7.15 K/UL — SIGNIFICANT CHANGE UP (ref 3.8–10.5)

## 2023-09-11 PROCEDURE — 99232 SBSQ HOSP IP/OBS MODERATE 35: CPT | Mod: FS,24

## 2023-09-11 RX ORDER — MAGNESIUM SULFATE 500 MG/ML
2 VIAL (ML) INJECTION ONCE
Refills: 0 | Status: COMPLETED | OUTPATIENT
Start: 2023-09-11 | End: 2023-09-11

## 2023-09-11 RX ORDER — WARFARIN SODIUM 2.5 MG/1
5 TABLET ORAL ONCE
Refills: 0 | Status: COMPLETED | OUTPATIENT
Start: 2023-09-11 | End: 2023-09-11

## 2023-09-11 RX ORDER — WARFARIN SODIUM 2.5 MG/1
5 TABLET ORAL ONCE
Refills: 0 | Status: DISCONTINUED | OUTPATIENT
Start: 2023-09-11 | End: 2023-09-11

## 2023-09-11 RX ORDER — POTASSIUM CHLORIDE 20 MEQ
40 PACKET (EA) ORAL EVERY 4 HOURS
Refills: 0 | Status: COMPLETED | OUTPATIENT
Start: 2023-09-11 | End: 2023-09-11

## 2023-09-11 RX ADMIN — Medication 40 MILLIEQUIVALENT(S): at 10:14

## 2023-09-11 RX ADMIN — SACUBITRIL AND VALSARTAN 1 TABLET(S): 24; 26 TABLET, FILM COATED ORAL at 05:33

## 2023-09-11 RX ADMIN — Medication 25 MILLIGRAM(S): at 05:33

## 2023-09-11 RX ADMIN — Medication 3 MILLILITER(S): at 05:06

## 2023-09-11 RX ADMIN — Medication 3 MILLILITER(S): at 15:12

## 2023-09-11 RX ADMIN — PANTOPRAZOLE SODIUM 40 MILLIGRAM(S): 20 TABLET, DELAYED RELEASE ORAL at 17:39

## 2023-09-11 RX ADMIN — CLOPIDOGREL BISULFATE 75 MILLIGRAM(S): 75 TABLET, FILM COATED ORAL at 11:19

## 2023-09-11 RX ADMIN — Medication 25 GRAM(S): at 10:14

## 2023-09-11 RX ADMIN — SACUBITRIL AND VALSARTAN 1 TABLET(S): 24; 26 TABLET, FILM COATED ORAL at 17:39

## 2023-09-11 RX ADMIN — Medication 40 MILLIEQUIVALENT(S): at 15:02

## 2023-09-11 RX ADMIN — Medication 125 MICROGRAM(S): at 05:33

## 2023-09-11 RX ADMIN — POLYETHYLENE GLYCOL 3350 17 GRAM(S): 17 POWDER, FOR SOLUTION ORAL at 11:19

## 2023-09-11 RX ADMIN — Medication 3 MILLILITER(S): at 22:43

## 2023-09-11 RX ADMIN — Medication 5 MILLIGRAM(S): at 22:23

## 2023-09-11 RX ADMIN — SENNA PLUS 1 TABLET(S): 8.6 TABLET ORAL at 22:24

## 2023-09-11 RX ADMIN — WARFARIN SODIUM 5 MILLIGRAM(S): 2.5 TABLET ORAL at 22:23

## 2023-09-11 RX ADMIN — HEPARIN SODIUM 2100 UNIT(S)/HR: 5000 INJECTION INTRAVENOUS; SUBCUTANEOUS at 06:47

## 2023-09-11 RX ADMIN — HEPARIN SODIUM 2100 UNIT(S)/HR: 5000 INJECTION INTRAVENOUS; SUBCUTANEOUS at 06:40

## 2023-09-11 RX ADMIN — PANTOPRAZOLE SODIUM 40 MILLIGRAM(S): 20 TABLET, DELAYED RELEASE ORAL at 05:33

## 2023-09-11 RX ADMIN — HEPARIN SODIUM 2100 UNIT(S)/HR: 5000 INJECTION INTRAVENOUS; SUBCUTANEOUS at 07:27

## 2023-09-11 RX ADMIN — HEPARIN SODIUM 1900 UNIT(S)/HR: 5000 INJECTION INTRAVENOUS; SUBCUTANEOUS at 00:04

## 2023-09-11 RX ADMIN — Medication 20 MILLIGRAM(S): at 05:33

## 2023-09-11 RX ADMIN — Medication 25 MILLIGRAM(S): at 19:15

## 2023-09-11 RX ADMIN — Medication 137 MICROGRAM(S): at 05:33

## 2023-09-11 NOTE — PROGRESS NOTE ADULT - SUBJECTIVE AND OBJECTIVE BOX
HPI/OVERNIGHT EVENTS: Patient seen and examined at bedside this AM. No overnight events. No complaints. Denies fever, chills, nausea, vomiting, chest pain, dizziness, abd pain or any other concerning symptoms.  C/o SOB at night, saturation wnl on 2L O2.  He is tolerating a regular diet, having bowel function, and his urostomy is functioning.         Vital Signs Last 24 Hrs  T(C): 36.6 (11 Sep 2023 08:20), Max: 36.9 (10 Sep 2023 20:26)  T(F): 97.9 (11 Sep 2023 08:20), Max: 98.4 (10 Sep 2023 20:26)  HR: 65 (11 Sep 2023 08:20) (65 - 92)  BP: 132/72 (11 Sep 2023 08:20) (100/58 - 143/72)  BP(mean): --  RR: 18 (11 Sep 2023 08:20) (18 - 18)  SpO2: 98% (11 Sep 2023 08:20) (95% - 99%)    Parameters below as of 11 Sep 2023 08:20  Patient On (Oxygen Delivery Method): nasal cannula  O2 Flow (L/min): 2      I&O's Detail    10 Sep 2023 07:01  -  11 Sep 2023 07:00  --------------------------------------------------------  IN:    Heparin Infusion: 506 mL    IV PiggyBack: 100 mL  Total IN: 606 mL    OUT:    Urostomy (mL): 1730 mL  Total OUT: 1730 mL    Total NET: -1124 mL          Constitutional: patient resting comfortably in bed, in no acute distress  HEENT: EOMI, PERRLA, MMM.  Respiratory: Non labored breathing on RA  Cardiovascular: RRR  Gastrointestinal: Abdomen soft, non-tender, non-distended, no rebound tenderness / guarding  Musculoskeletal: No joint pain, swelling or deformity; no limitation of movement  Vascular: Extremities warm and well perfused.     LABS:                        8.1    7.15  )-----------( 350      ( 11 Sep 2023 05:42 )             26.1     09-11    143  |  106  |  28.5<H>  ----------------------------<  123<H>  3.6   |  29.0  |  1.01    Ca    7.2<L>      11 Sep 2023 05:42  Phos  3.3     09-11  Mg     1.7     09-11      PT/INR - ( 11 Sep 2023 05:42 )   PT: 16.0 sec;   INR: 1.46 ratio         PTT - ( 11 Sep 2023 05:42 )  PTT:45.4 sec  Urinalysis Basic - ( 11 Sep 2023 05:42 )    Color: x / Appearance: x / SG: x / pH: x  Gluc: 123 mg/dL / Ketone: x  / Bili: x / Urobili: x   Blood: x / Protein: x / Nitrite: x   Leuk Esterase: x / RBC: x / WBC x   Sq Epi: x / Non Sq Epi: x / Bacteria: x        MEDICATIONS  (STANDING):  clopidogrel Tablet 75 milliGRAM(s) Oral daily  digoxin     Tablet 125 MICROGram(s) Oral daily  furosemide    Tablet 20 milliGRAM(s) Oral daily  levothyroxine 137 MICROGram(s) Oral daily  melatonin 5 milliGRAM(s) Oral at bedtime  metoprolol tartrate 25 milliGRAM(s) Oral two times a day  pantoprazole  Injectable 40 milliGRAM(s) IV Push every 12 hours  polyethylene glycol 3350 17 Gram(s) Oral daily  potassium chloride    Tablet ER 40 milliEquivalent(s) Oral every 4 hours  sacubitril 24 mG/valsartan 26 mG 1 Tablet(s) Oral two times a day  senna 1 Tablet(s) Oral at bedtime    MEDICATIONS  (PRN):  acetaminophen     Tablet .. 975 milliGRAM(s) Oral every 6 hours PRN Mild Pain (1 - 3)  albuterol/ipratropium for Nebulization 3 milliLiter(s) Nebulizer every 6 hours PRN Wheezing  sodium chloride 0.9% lock flush 10 milliLiter(s) IV Push every 1 hour PRN Pre/post blood products, medications, blood draw, and to maintain line patency      MICRO:   Cultures     STUDIES:   EKG, CXR, U/S, CT, MRI

## 2023-09-11 NOTE — PROGRESS NOTE ADULT - NS ATTEND AMEND GEN_ALL_CORE FT
I have seen and examined this patient with the surgical team.  No acute events overnight.  Patient appears well this morning.  Denies abdominal pain.  Denies nausea/vomiting.  Passing flatus/BMs.  On Heparin GTT and Coumadin, will DC Heparin GTT.  Will monitor INR.  DCP for this week.

## 2023-09-11 NOTE — PROGRESS NOTE ADULT - ASSESSMENT
Patient is a 77 yo M admitted with SBO, Metabolic acidosis, L ruptured baker's cyst and ARAM, patient failed conservative management of SBO, taken to OR for ex lap w/ KEANU w/ temporary abdominal closure 8/28, returned to OR for closed two days later for parastomal hernia repair and re ex lap.  Hospital course complicated by ruptured Bakers cyst, GI bleed, reintubation last week, extubated 9/5, downgraded from sicu 9/8. Tolerating a regular diet and having function.      Plan:  -Ambulation  -Restart coumadin  -DASH diet  -Monitor volume status  -PT evaluation Patient is a 79 yo M admitted with SBO, Metabolic acidosis, L ruptured baker's cyst and ARAM, patient failed conservative management of SBO, taken to OR for ex lap w/ KEANU w/ temporary abdominal closure 8/28, returned to OR for closed two days later for parastomal hernia repair and re ex lap.  Hospital course complicated by ruptured Bakers cyst, GI bleed, reintubation last week, extubated 9/5, downgraded from sicu 9/8. Tolerating a regular diet and having function. Pt states hes been having shortness of breath this AM, O2 sat is wnl, received albuterol and was sat 100 on 2L     Plan:  -Ambulation  -Restart coumadin  -DASH diet  -Monitor volume status  -PT evaluation

## 2023-09-12 ENCOUNTER — TRANSCRIPTION ENCOUNTER (OUTPATIENT)
Age: 78
End: 2023-09-12

## 2023-09-12 VITALS
DIASTOLIC BLOOD PRESSURE: 51 MMHG | OXYGEN SATURATION: 94 % | TEMPERATURE: 98 F | HEART RATE: 91 BPM | SYSTOLIC BLOOD PRESSURE: 113 MMHG | RESPIRATION RATE: 18 BRPM

## 2023-09-12 LAB
ANION GAP SERPL CALC-SCNC: 8 MMOL/L — SIGNIFICANT CHANGE UP (ref 5–17)
BASOPHILS # BLD AUTO: 0.05 K/UL — SIGNIFICANT CHANGE UP (ref 0–0.2)
BASOPHILS NFR BLD AUTO: 0.6 % — SIGNIFICANT CHANGE UP (ref 0–2)
BUN SERPL-MCNC: 27.2 MG/DL — HIGH (ref 8–20)
CALCIUM SERPL-MCNC: 7.6 MG/DL — LOW (ref 8.4–10.5)
CHLORIDE SERPL-SCNC: 105 MMOL/L — SIGNIFICANT CHANGE UP (ref 96–108)
CO2 SERPL-SCNC: 30 MMOL/L — HIGH (ref 22–29)
CREAT SERPL-MCNC: 1.03 MG/DL — SIGNIFICANT CHANGE UP (ref 0.5–1.3)
DIGOXIN SERPL-MCNC: 0.7 NG/ML — LOW (ref 0.8–2)
EGFR: 74 ML/MIN/1.73M2 — SIGNIFICANT CHANGE UP
EOSINOPHIL # BLD AUTO: 0.16 K/UL — SIGNIFICANT CHANGE UP (ref 0–0.5)
EOSINOPHIL NFR BLD AUTO: 1.8 % — SIGNIFICANT CHANGE UP (ref 0–6)
GLUCOSE SERPL-MCNC: 122 MG/DL — HIGH (ref 70–99)
HCT VFR BLD CALC: 27.6 % — LOW (ref 39–50)
HGB BLD-MCNC: 8.4 G/DL — LOW (ref 13–17)
IMM GRANULOCYTES NFR BLD AUTO: 4 % — HIGH (ref 0–0.9)
INR BLD: 1.74 RATIO — HIGH (ref 0.85–1.18)
LYMPHOCYTES # BLD AUTO: 1.04 K/UL — SIGNIFICANT CHANGE UP (ref 1–3.3)
LYMPHOCYTES # BLD AUTO: 11.7 % — LOW (ref 13–44)
MAGNESIUM SERPL-MCNC: 1.9 MG/DL — SIGNIFICANT CHANGE UP (ref 1.6–2.6)
MCHC RBC-ENTMCNC: 28.3 PG — SIGNIFICANT CHANGE UP (ref 27–34)
MCHC RBC-ENTMCNC: 30.4 GM/DL — LOW (ref 32–36)
MCV RBC AUTO: 92.9 FL — SIGNIFICANT CHANGE UP (ref 80–100)
MONOCYTES # BLD AUTO: 1.28 K/UL — HIGH (ref 0–0.9)
MONOCYTES NFR BLD AUTO: 14.3 % — HIGH (ref 2–14)
NEUTROPHILS # BLD AUTO: 6.03 K/UL — SIGNIFICANT CHANGE UP (ref 1.8–7.4)
NEUTROPHILS NFR BLD AUTO: 67.6 % — SIGNIFICANT CHANGE UP (ref 43–77)
PHOSPHATE SERPL-MCNC: 2.3 MG/DL — LOW (ref 2.4–4.7)
PLATELET # BLD AUTO: 435 K/UL — HIGH (ref 150–400)
POTASSIUM SERPL-MCNC: 4.2 MMOL/L — SIGNIFICANT CHANGE UP (ref 3.5–5.3)
POTASSIUM SERPL-SCNC: 4.2 MMOL/L — SIGNIFICANT CHANGE UP (ref 3.5–5.3)
PROTHROM AB SERPL-ACNC: 19 SEC — HIGH (ref 9.5–13)
RBC # BLD: 2.97 M/UL — LOW (ref 4.2–5.8)
RBC # FLD: 15.7 % — HIGH (ref 10.3–14.5)
SODIUM SERPL-SCNC: 143 MMOL/L — SIGNIFICANT CHANGE UP (ref 135–145)
WBC # BLD: 8.92 K/UL — SIGNIFICANT CHANGE UP (ref 3.8–10.5)
WBC # FLD AUTO: 8.92 K/UL — SIGNIFICANT CHANGE UP (ref 3.8–10.5)

## 2023-09-12 PROCEDURE — 82962 GLUCOSE BLOOD TEST: CPT

## 2023-09-12 PROCEDURE — 85014 HEMATOCRIT: CPT

## 2023-09-12 PROCEDURE — 85384 FIBRINOGEN ACTIVITY: CPT

## 2023-09-12 PROCEDURE — 85730 THROMBOPLASTIN TIME PARTIAL: CPT

## 2023-09-12 PROCEDURE — 85610 PROTHROMBIN TIME: CPT

## 2023-09-12 PROCEDURE — 74018 RADEX ABDOMEN 1 VIEW: CPT

## 2023-09-12 PROCEDURE — 84295 ASSAY OF SERUM SODIUM: CPT

## 2023-09-12 PROCEDURE — 70450 CT HEAD/BRAIN W/O DYE: CPT

## 2023-09-12 PROCEDURE — 71045 X-RAY EXAM CHEST 1 VIEW: CPT

## 2023-09-12 PROCEDURE — 84484 ASSAY OF TROPONIN QUANT: CPT

## 2023-09-12 PROCEDURE — 94003 VENT MGMT INPAT SUBQ DAY: CPT

## 2023-09-12 PROCEDURE — 80053 COMPREHEN METABOLIC PANEL: CPT

## 2023-09-12 PROCEDURE — 84132 ASSAY OF SERUM POTASSIUM: CPT

## 2023-09-12 PROCEDURE — 85025 COMPLETE CBC W/AUTO DIFF WBC: CPT

## 2023-09-12 PROCEDURE — 86850 RBC ANTIBODY SCREEN: CPT

## 2023-09-12 PROCEDURE — 86923 COMPATIBILITY TEST ELECTRIC: CPT

## 2023-09-12 PROCEDURE — 82009 KETONE BODYS QUAL: CPT

## 2023-09-12 PROCEDURE — 73700 CT LOWER EXTREMITY W/O DYE: CPT

## 2023-09-12 PROCEDURE — 36600 WITHDRAWAL OF ARTERIAL BLOOD: CPT

## 2023-09-12 PROCEDURE — 86900 BLOOD TYPING SEROLOGIC ABO: CPT

## 2023-09-12 PROCEDURE — 93005 ELECTROCARDIOGRAM TRACING: CPT

## 2023-09-12 PROCEDURE — 94640 AIRWAY INHALATION TREATMENT: CPT

## 2023-09-12 PROCEDURE — 80162 ASSAY OF DIGOXIN TOTAL: CPT

## 2023-09-12 PROCEDURE — 84100 ASSAY OF PHOSPHORUS: CPT

## 2023-09-12 PROCEDURE — 93971 EXTREMITY STUDY: CPT

## 2023-09-12 PROCEDURE — 82330 ASSAY OF CALCIUM: CPT

## 2023-09-12 PROCEDURE — 86901 BLOOD TYPING SEROLOGIC RH(D): CPT

## 2023-09-12 PROCEDURE — 94002 VENT MGMT INPAT INIT DAY: CPT

## 2023-09-12 PROCEDURE — 94760 N-INVAS EAR/PLS OXIMETRY 1: CPT

## 2023-09-12 PROCEDURE — 82947 ASSAY GLUCOSE BLOOD QUANT: CPT

## 2023-09-12 PROCEDURE — 93970 EXTREMITY STUDY: CPT

## 2023-09-12 PROCEDURE — 87640 STAPH A DNA AMP PROBE: CPT

## 2023-09-12 PROCEDURE — 74176 CT ABD & PELVIS W/O CONTRAST: CPT

## 2023-09-12 PROCEDURE — 82803 BLOOD GASES ANY COMBINATION: CPT

## 2023-09-12 PROCEDURE — 82550 ASSAY OF CK (CPK): CPT

## 2023-09-12 PROCEDURE — 36415 COLL VENOUS BLD VENIPUNCTURE: CPT

## 2023-09-12 PROCEDURE — 36430 TRANSFUSION BLD/BLD COMPNT: CPT

## 2023-09-12 PROCEDURE — 82435 ASSAY OF BLOOD CHLORIDE: CPT

## 2023-09-12 PROCEDURE — 87641 MR-STAPH DNA AMP PROBE: CPT

## 2023-09-12 PROCEDURE — 85018 HEMOGLOBIN: CPT

## 2023-09-12 PROCEDURE — 83605 ASSAY OF LACTIC ACID: CPT

## 2023-09-12 PROCEDURE — P9059: CPT

## 2023-09-12 PROCEDURE — 74178 CT ABD&PLV WO CNTR FLWD CNTR: CPT

## 2023-09-12 PROCEDURE — 80048 BASIC METABOLIC PNL TOTAL CA: CPT

## 2023-09-12 PROCEDURE — 97530 THERAPEUTIC ACTIVITIES: CPT

## 2023-09-12 PROCEDURE — 80076 HEPATIC FUNCTION PANEL: CPT

## 2023-09-12 PROCEDURE — 85027 COMPLETE CBC AUTOMATED: CPT

## 2023-09-12 PROCEDURE — 83735 ASSAY OF MAGNESIUM: CPT

## 2023-09-12 PROCEDURE — P9016: CPT

## 2023-09-12 PROCEDURE — 82553 CREATINE MB FRACTION: CPT

## 2023-09-12 PROCEDURE — 81001 URINALYSIS AUTO W/SCOPE: CPT

## 2023-09-12 PROCEDURE — P9045: CPT

## 2023-09-12 PROCEDURE — C1781: CPT

## 2023-09-12 PROCEDURE — 83880 ASSAY OF NATRIURETIC PEPTIDE: CPT

## 2023-09-12 RX ORDER — LANOLIN ALCOHOL/MO/W.PET/CERES
1 CREAM (GRAM) TOPICAL
Qty: 0 | Refills: 0 | DISCHARGE
Start: 2023-09-12

## 2023-09-12 RX ORDER — PANTOPRAZOLE SODIUM 20 MG/1
40 TABLET, DELAYED RELEASE ORAL EVERY 12 HOURS
Refills: 0 | Status: DISCONTINUED | OUTPATIENT
Start: 2023-09-12 | End: 2023-09-12

## 2023-09-12 RX ORDER — WARFARIN SODIUM 2.5 MG/1
5 TABLET ORAL ONCE
Refills: 0 | Status: DISCONTINUED | OUTPATIENT
Start: 2023-09-12 | End: 2023-09-12

## 2023-09-12 RX ORDER — IPRATROPIUM/ALBUTEROL SULFATE 18-103MCG
3 AEROSOL WITH ADAPTER (GRAM) INHALATION
Qty: 0 | Refills: 0 | DISCHARGE
Start: 2023-09-12

## 2023-09-12 RX ORDER — PANTOPRAZOLE SODIUM 20 MG/1
1 TABLET, DELAYED RELEASE ORAL
Qty: 0 | Refills: 0 | DISCHARGE
Start: 2023-09-12

## 2023-09-12 RX ORDER — ACETAMINOPHEN 500 MG
3 TABLET ORAL
Qty: 0 | Refills: 0 | DISCHARGE
Start: 2023-09-12

## 2023-09-12 RX ORDER — METOPROLOL TARTRATE 50 MG
1 TABLET ORAL
Qty: 0 | Refills: 0 | DISCHARGE
Start: 2023-09-12

## 2023-09-12 RX ORDER — POLYETHYLENE GLYCOL 3350 17 G/17G
17 POWDER, FOR SOLUTION ORAL
Qty: 0 | Refills: 0 | DISCHARGE
Start: 2023-09-12

## 2023-09-12 RX ORDER — SENNA PLUS 8.6 MG/1
1 TABLET ORAL
Qty: 0 | Refills: 0 | DISCHARGE
Start: 2023-09-12

## 2023-09-12 RX ORDER — METOPROLOL TARTRATE 50 MG
1 TABLET ORAL
Refills: 0 | DISCHARGE

## 2023-09-12 RX ORDER — ASPIRIN/CALCIUM CARB/MAGNESIUM 324 MG
1 TABLET ORAL
Qty: 0 | Refills: 0 | DISCHARGE

## 2023-09-12 RX ADMIN — Medication 3 MILLILITER(S): at 13:36

## 2023-09-12 RX ADMIN — SACUBITRIL AND VALSARTAN 1 TABLET(S): 24; 26 TABLET, FILM COATED ORAL at 06:08

## 2023-09-12 RX ADMIN — CLOPIDOGREL BISULFATE 75 MILLIGRAM(S): 75 TABLET, FILM COATED ORAL at 12:38

## 2023-09-12 RX ADMIN — Medication 125 MICROGRAM(S): at 06:08

## 2023-09-12 RX ADMIN — PANTOPRAZOLE SODIUM 40 MILLIGRAM(S): 20 TABLET, DELAYED RELEASE ORAL at 06:08

## 2023-09-12 RX ADMIN — Medication 137 MICROGRAM(S): at 06:22

## 2023-09-12 RX ADMIN — Medication 63.75 MILLIMOLE(S): at 12:39

## 2023-09-12 RX ADMIN — Medication 20 MILLIGRAM(S): at 06:09

## 2023-09-12 NOTE — DISCHARGE NOTE PROVIDER - NSDCCPTREATMENT_GEN_ALL_CORE_FT
PRINCIPAL PROCEDURE  Procedure: Exploratory laparotomy  Findings and Treatment:       SECONDARY PROCEDURE  Procedure: Repair, hernia, parastomal  Findings and Treatment:

## 2023-09-12 NOTE — PROGRESS NOTE ADULT - REASON FOR ADMISSION
SBO

## 2023-09-12 NOTE — PROGRESS NOTE ADULT - ASSESSMENT
79 y/o male admitted w/ SBO. S/p ex-lap KEANU and abthera placement on 8/28, RTOR on 8/30 for parastomal hernia repair. Pt recovering well, tolerating diet, pain minimal. Restarted on coumadin a few days ago for afib  - Continue daily dosing of coumadin & INR check - INR today 1.74, ordered for additional 5mg coumadin this evening  - Regular diet  - Tylenol for pain control  - Encourage ambulation & continued work with PT while in house  - Incentive spirometer for pulm hygiene  - SCDs b/l  - PT recs for [facility] - ZANE out yesterday

## 2023-09-12 NOTE — DISCHARGE NOTE PROVIDER - NSDCMRMEDTOKEN_GEN_ALL_CORE_FT
clopidogrel 75 mg oral tablet: 1 tab(s) orally once a day  digoxin 125 mcg (0.125 mg) oral tablet: 1 orally once a day  Ecotrin Adult Low Strength 81 mg oral delayed release tablet: 1 tab(s) orally once a day  Entresto 24 mg-26 mg oral tablet: 1 tablet orally 2 times a day  furosemide 20 mg oral tablet: 1 tablet orally once a day  levothyroxine 137 mcg (0.137 mg) oral tablet: 1 tab(s) orally once a day  metoprolol succinate 50 mg oral tablet, extended release: 1 tab(s) orally 2 times a day  rosuvastatin 40 mg oral tablet: 1 orally once a day  warfarin 5 mg oral tablet: 1 tab(s) orally once a day   acetaminophen 325 mg oral tablet: 3 tab(s) orally every 6 hours As needed Mild Pain (1 - 3)  clopidogrel 75 mg oral tablet: 1 tab(s) orally once a day  digoxin 125 mcg (0.125 mg) oral tablet: 1 orally once a day  Entresto 24 mg-26 mg oral tablet: 1 tablet orally 2 times a day  furosemide 20 mg oral tablet: 1 tablet orally once a day  ipratropium-albuterol 0.5 mg-2.5 mg/3 mL inhalation solution: 3 milliliter(s) inhaled every 6 hours As needed Wheezing  levothyroxine 137 mcg (0.137 mg) oral tablet: 1 tab(s) orally once a day  melatonin 5 mg oral tablet: 1 tab(s) orally once a day (at bedtime)  metoprolol tartrate 25 mg oral tablet: 1 tab(s) orally 2 times a day  pantoprazole 40 mg oral delayed release tablet: 1 tab(s) orally every 12 hours  polyethylene glycol 3350 oral powder for reconstitution: 17 gram(s) orally once a day  rosuvastatin 40 mg oral tablet: 1 orally once a day  senna leaf extract oral tablet: 1 tab(s) orally once a day (at bedtime)  warfarin 5 mg oral tablet: 1 tab(s) orally once a day

## 2023-09-12 NOTE — DISCHARGE NOTE PROVIDER - CARE PROVIDER_API CALL
Jhony Reinoso  Gastroenterology  [address], [address]  [place] [address]  Phone: [phone]  Fax: [phone]  Follow Up Time:     Curtis Aranda  Vascular Surgery  [address]  [place] [address]  Phone: [phone]  Fax: [phone]  Follow Up Time:

## 2023-09-12 NOTE — DISCHARGE NOTE PROVIDER - CARE PROVIDERS DIRECT ADDRESSES
[email or web address]

## 2023-09-12 NOTE — DISCHARGE NOTE PROVIDER - HOSPITAL COURSE
79 yo male with history of bladder cancer with urostomy performed 12 years ago, A-fib on Warfarin, hypertension, hyperlipidemia, CAD with 2 stents placed 1 month ago who presents with abdominal pain for 4 to 5 days with nausea vomiting for 4 days.  Patient was seen at [facility] and was found to have high-grade SBO near his urostomy.  Patient was sent to [facility] for advanced surgical services.  Patient is currently not in pain and is not vomiting.  Per patient patient has been urinating within normal limits from his urostomy as his usual.  Pt with ARAM with creatinine 6.87.  Pt admitted to surgical service and NGT was placed for conservative management of SBO. Pt received IV hydration and ARAM gradually resolved without necessitating dialysis. NGT remained for 3 days and pt had return of bowel function and was tolerating die.  Pt then developed A-fib with RVR.  Pt received Lopressor 5mg IVP on 3 separate occasions. Pt now with worsening abdominal distension.     79 yo male with history of bladder cancer with urostomy performed 12 years ago, A-fib on Warfarin, hypertension, hyperlipidemia, CAD with 2 stents placed 1 month ago who presents with abdominal pain for 4 to 5 days with nausea vomiting for 4 days.  Patient was seen at [facility] and was found to have high-grade SBO near his urostomy.  Patient was sent to [facility] for advanced surgical services.  Patient is currently not in pain and is not vomiting.  Per patient patient has been urinating within normal limits from his urostomy as his usual.  Pt with ARAM with creatinine 6.87.  Pt admitted to surgical service and NGT was placed for conservative management of SBO. Pt received IV hydration and ARAM gradually resolved without necessitating dialysis. NGT remained for 3 days and pt had return of bowel function and was tolerating die.  Pt then developed A-fib with RVR.  Pt received Lopressor 5mg IVP on 3 separate occasions. Pt developed worsening abdominal distension and began ot decline clinically.  On Hospital day #6 the pt was taken to the OR for Exploratory laparotomy: Dilated proximal loops of small bowel. Transition point identified at border or parastomal hernia where adhesions were lysed and obstruction relieved. Abthera wound vac placed. Pt returned to OR for abdominal re exploration with parastomal hernia repair with 6x10cm mesh, no bowel resection and closure of abdominal wall post removal of VAC.  Pt again was resuscitated and improved clinically and transferred to surgical floor when stable. Patient then acutely decompensated on surgical floor with desaturated to 86% on room air, placed on 2L and originally had O2 sat of 96, then saturation continued to downtrend into low 90's on 2L, BP 71/45, HR 99, temp 97.8, glucose 162. Patient diaphoretic with altered mental status. Pt was noted to have melena that day. Patient transferred to the ICU, found to be alkalotic and decision made to intubate patient. H/H found to be 5.5, PTT>150, INR 2.75. Will order 2 units FFP, 2 units PRBC, 10 vit K. Pt had CTA which revealed evidence of GI bleed but no active bleeding noted. Pt coagulopathy was reversed and bleeding subsided and NGT output cleared to bilious. GI stated no further intervention warranted and attributed bleeding to supra therapeutic INR. Pt again recovered clinically and Hgb remained stable and again was tolerating diet with bowel function.  Pt was evaluated by PT/OT and recommended [facility].  Pt remained HD stable, afebrile, tolerating diet, and with bowel function.  Pt tolerated heparin gtt and full AC without reoccurrence of bleeding.  Pt is stable for DC to [facility].

## 2023-09-12 NOTE — PROGRESS NOTE ADULT - PROVIDER SPECIALTY LIST ADULT
Cardiology
Nephrology
SICU
SICU
Surgery
Trauma Surgery
SICU
Surgery
Surgery
Vascular Surgery
SICU
Surgery
Nephrology
Nephrology
SICU
SICU
Trauma Surgery
SICU
Cardiology

## 2023-09-12 NOTE — DISCHARGE NOTE PROVIDER - DETAILS OF MALNUTRITION DIAGNOSIS/DIAGNOSES
This patient has been assessed with a concern for Malnutrition and was treated during this hospitalization for the following Nutrition diagnosis/diagnoses:     -  08/28/2023: Moderate protein-calorie malnutrition

## 2023-09-12 NOTE — DISCHARGE NOTE PROVIDER - NSDCCPCAREPLAN_GEN_ALL_CORE_FT
PRINCIPAL DISCHARGE DIAGNOSIS  Diagnosis: SBO (small bowel obstruction)  Assessment and Plan of Treatment: Please call and make an appointment with Acute Care Surgery Clinic in 7-14 days after discharge. Please follow-up [facility] gastroenetrology clinic in 1-2 weeks. Also, please call and make an appointment with your primary care physician as per your usual schedule.   Activity: Avoid heavy lifting or strenuous activity until follow-up appointment.   Diet: May continue DASH diet.  Medications: Please take all home medications as prescribed by your primary care doctor. Pain medication has been prescribed for you. Please, take it as it has been prescribed, do not drive or operate heavy machinery while taking narcotics.   Wound Care: Please keep surgical site clean and dry. You may shower, but do not bathe    If confusion, altered mental status, fever, chest pain, shortness of breath, severe or worsening pain, vomiting, change or worsening of medical status, please come back to the emergency room, and in case of emergency call 911      SECONDARY DISCHARGE DIAGNOSES  Diagnosis: Acute renal failure  Assessment and Plan of Treatment:     Diagnosis: GI bleeding  Assessment and Plan of Treatment:     Diagnosis: History of urostomy  Assessment and Plan of Treatment:      PRINCIPAL DISCHARGE DIAGNOSIS  Diagnosis: SBO (small bowel obstruction)  Assessment and Plan of Treatment: - FOLLOW UP: Please call and make an appointment with the Acute Care Surgery Clinic in 7-10 days after discharge, as well as with Gastroenterologist Dr. Reinoso or one of his partners in 1-2 weeks. Also, please call and make an appointment with your primary care physician as per your usual schedule.   - ACTIVITY: May return to normal activities as tolerated, however refrain from heavy lifting > 10-15 lbs.  - DIET: May continue regular diet.  - MEDICATIONS: Please take all medications listed on your discharge paperwork as prescribed. Tylenol for pain relief.   - WOUND CARE: Please, keep wound site clean and dry. You may shower, but do not bathe.  Patient is advised to RETURN TO THE EMERGENCY DEPARTMENT for any of the following - worsening pain, fever/chills, nausea/vomiting, altered mental status, chest pain, shortness of breath, or any other new / worsening symptom.      SECONDARY DISCHARGE DIAGNOSES  Diagnosis: Atrial fibrillation  Assessment and Plan of Treatment: Please continue taking coumadin daily at your current home dose. It is EXTREMELY important that your INR GETS CHECKED DAILY and COUMADIN DOSED ACCORDINGLY BY YOUR PROVIER AT REHAB so that your INR remains within range of 2-3. If your INR is not checked daily and it becomes supra-therapeutic (too high) or sub-therapeutic (too low), you risk bleeding or forming clots, respectively.  - Please also call and make an appointment to see your cardiologist 1-2 weeks after discharge.    Diagnosis: Acute renal failure  Assessment and Plan of Treatment: Resolved    Diagnosis: Occlusion of celiac artery  Assessment and Plan of Treatment: Please call and make a follow-up appointment to see vascular surgeon Dr. Carl after discharge.    Diagnosis: History of urostomy  Assessment and Plan of Treatment: Please follow-up with your urologist after discharge.

## 2023-09-12 NOTE — PROGRESS NOTE ADULT - SUBJECTIVE AND OBJECTIVE BOX
SUBJECTIVE / 24H EVENTS: Pt seen and examined at bedside. Reports he is feeling well. Pain around surgical site is minimal and well controlled. Tolerating diet without abd pain, nausea, or vomiting. OOB w/ assistance, urostomy functioning well and having bowel function. Denies fever, chills, CP or SOB.     MEDICATIONS  (STANDING):  clopidogrel Tablet 75 milliGRAM(s) Oral daily  digoxin     Tablet 125 MICROGram(s) Oral daily  furosemide    Tablet 20 milliGRAM(s) Oral daily  levothyroxine 137 MICROGram(s) Oral daily  melatonin 5 milliGRAM(s) Oral at bedtime  metoprolol tartrate 25 milliGRAM(s) Oral two times a day  pantoprazole  Injectable 40 milliGRAM(s) IV Push every 12 hours  polyethylene glycol 3350 17 Gram(s) Oral daily  sacubitril 24 mG/valsartan 26 mG 1 Tablet(s) Oral two times a day  senna 1 Tablet(s) Oral at bedtime  sodium phosphate 15 milliMole(s)/250 mL IVPB 15 milliMole(s) IV Intermittent once  warfarin 5 milliGRAM(s) Oral once    MEDICATIONS  (PRN):  acetaminophen     Tablet .. 975 milliGRAM(s) Oral every 6 hours PRN Mild Pain (1 - 3)  albuterol/ipratropium for Nebulization 3 milliLiter(s) Nebulizer every 6 hours PRN Wheezing  sodium chloride 0.9% lock flush 10 milliLiter(s) IV Push every 1 hour PRN Pre/post blood products, medications, blood draw, and to maintain line patency      Vital Signs Last 24 Hrs  T(C): 36.7 (12 Sep 2023 10:49), Max: 37 (11 Sep 2023 12:23)  T(F): 98.1 (12 Sep 2023 10:49), Max: 98.6 (11 Sep 2023 12:23)  HR: 76 (12 Sep 2023 10:49) (69 - 91)  BP: 113/58 (12 Sep 2023 10:49) (112/66 - 144/65)  BP(mean): --  RR: 18 (12 Sep 2023 10:49) (18 - 18)  SpO2: 95% (12 Sep 2023 10:49) (95% - 98%)    Parameters below as of 12 Sep 2023 10:49  Patient On (Oxygen Delivery Method): room air      Constitutional: patient resting comfortably in bed, in no acute distress  Respiratory: respirations are unlabored, no accessory muscle use, no conversational dyspnea  Cardiovascular: regular rate & rhythm  Gastrointestinal: abdomen soft & non-distended, very mild shara-incisional TTP, surgical wound is well approximated without drainage or surrounding erythema, no rebound tenderness / guarding, urostomy noted w/ urine in collection bag  Neurological: A&O x 3  Skin: mucous membranes moist, no diaphoresis, pallor, cyanosis or jaundice      I&O's Detail    11 Sep 2023 07:01  -  12 Sep 2023 07:00  --------------------------------------------------------  IN:  Total IN: 0 mL    OUT:    Urostomy (mL): 1625 mL  Total OUT: 1625 mL    Total NET: -1625 mL          LABS:                        8.4    8.92  )-----------( 435      ( 12 Sep 2023 05:36 )             27.6     09-12    143  |  105  |  27.2<H>  ----------------------------<  122<H>  4.2   |  30.0<H>  |  1.03    Ca    7.6<L>      12 Sep 2023 05:36  Phos  2.3     09-12  Mg     1.9     09-12      PT/INR - ( 12 Sep 2023 05:36 )   PT: 19.0 sec;   INR: 1.74 ratio         PTT - ( 11 Sep 2023 05:42 )  PTT:45.4 sec  Urinalysis Basic - ( 12 Sep 2023 05:36 )    Color: x / Appearance: x / SG: x / pH: x  Gluc: 122 mg/dL / Ketone: x  / Bili: x / Urobili: x   Blood: x / Protein: x / Nitrite: x   Leuk Esterase: x / RBC: x / WBC x   Sq Epi: x / Non Sq Epi: x / Bacteria: x

## 2023-09-12 NOTE — PROGRESS NOTE ADULT - ATTENDING COMMENTS
Awake alert  Bilateral BS  Hemodynamic intact  Abdomen soft, active BS, urostomy functioning well  Incision clean dry  Patient can be DC on Coumadin today  Neurologic grossly intact

## 2023-09-12 NOTE — DISCHARGE NOTE PROVIDER - NSFOLLOWUPCLINICS_GEN_ALL_ED_FT
[facility] Acute Care Surgery  Acute Care Surgery  [address] [place] [address]  Phone: [phone]  Fax:

## 2023-09-12 NOTE — DISCHARGE NOTE PROVIDER - NSDCFUSCHEDAPPT_GEN_ALL_CORE_FT
[facility] Physician [facility]  ELECTROPH [address]  Scheduled Appointment: 10/17/2023    Efraín Chu  [facility]  CARDIOLOGY [address]  Scheduled Appointment: 10/24/2023

## 2023-09-12 NOTE — DISCHARGE NOTE NURSING/CASE MANAGEMENT/SOCIAL WORK - PATIENT PORTAL LINK FT
You can access the FollowMyHealth Patient Portal offered by [facility] by registering at the following website: [email or web address]. By joining [facility]’s FollowMyHealth portal, you will also be able to view your health information using other applications (apps) compatible with our system.

## 2023-09-25 ENCOUNTER — INPATIENT (INPATIENT)
Facility: HOSPITAL | Age: 78
LOS: 3 days | Discharge: NURSING, REHAB OR LONG-TERM CARE | DRG: 377 | End: 2023-09-29
Attending: FAMILY MEDICINE | Admitting: STUDENT IN AN ORGANIZED HEALTH CARE EDUCATION/TRAINING PROGRAM
Payer: MEDICARE

## 2023-09-25 VITALS
DIASTOLIC BLOOD PRESSURE: 48 MMHG | HEIGHT: 70 IN | SYSTOLIC BLOOD PRESSURE: 115 MMHG | OXYGEN SATURATION: 99 % | HEART RATE: 100 BPM | TEMPERATURE: 98 F | WEIGHT: 265 LBS | RESPIRATION RATE: 18 BRPM

## 2023-09-25 DIAGNOSIS — Z95.810 PRESENCE OF AUTOMATIC (IMPLANTABLE) CARDIAC DEFIBRILLATOR: Chronic | ICD-10-CM

## 2023-09-25 DIAGNOSIS — Z95.1 PRESENCE OF AORTOCORONARY BYPASS GRAFT: Chronic | ICD-10-CM

## 2023-09-25 DIAGNOSIS — Z98.890 OTHER SPECIFIED POSTPROCEDURAL STATES: Chronic | ICD-10-CM

## 2023-09-25 LAB
ALBUMIN SERPL ELPH-MCNC: 2 G/DL — LOW (ref 3.3–5)
ALP SERPL-CCNC: 91 U/L — SIGNIFICANT CHANGE UP (ref 40–120)
ALT FLD-CCNC: 24 U/L — SIGNIFICANT CHANGE UP (ref 12–78)
ANION GAP SERPL CALC-SCNC: 6 MMOL/L — SIGNIFICANT CHANGE UP (ref 5–17)
ANISOCYTOSIS BLD QL: SIGNIFICANT CHANGE UP
APTT BLD: 26.6 SEC — SIGNIFICANT CHANGE UP (ref 24.5–35.6)
AST SERPL-CCNC: 29 U/L — SIGNIFICANT CHANGE UP (ref 15–37)
BASOPHILS # BLD AUTO: 0.04 K/UL — SIGNIFICANT CHANGE UP (ref 0–0.2)
BASOPHILS NFR BLD AUTO: 0.4 % — SIGNIFICANT CHANGE UP (ref 0–2)
BILIRUB SERPL-MCNC: 0.5 MG/DL — SIGNIFICANT CHANGE UP (ref 0.2–1.2)
BLD GP AB SCN SERPL QL: SIGNIFICANT CHANGE UP
BUN SERPL-MCNC: 37 MG/DL — HIGH (ref 7–23)
CALCIUM SERPL-MCNC: 8.3 MG/DL — LOW (ref 8.5–10.1)
CHLORIDE SERPL-SCNC: 104 MMOL/L — SIGNIFICANT CHANGE UP (ref 96–108)
CK MB CFR SERPL CALC: <1 NG/ML — SIGNIFICANT CHANGE UP (ref 0–3.6)
CO2 SERPL-SCNC: 33 MMOL/L — HIGH (ref 22–31)
CREAT SERPL-MCNC: 1.1 MG/DL — SIGNIFICANT CHANGE UP (ref 0.5–1.3)
EGFR: 69 ML/MIN/1.73M2 — SIGNIFICANT CHANGE UP
EOSINOPHIL # BLD AUTO: 0.36 K/UL — SIGNIFICANT CHANGE UP (ref 0–0.5)
EOSINOPHIL NFR BLD AUTO: 3.9 % — SIGNIFICANT CHANGE UP (ref 0–6)
FLUAV AG NPH QL: SIGNIFICANT CHANGE UP
FLUBV AG NPH QL: SIGNIFICANT CHANGE UP
GLUCOSE SERPL-MCNC: 113 MG/DL — HIGH (ref 70–99)
HCT VFR BLD CALC: 22 % — LOW (ref 39–50)
HGB BLD-MCNC: 6.7 G/DL — CRITICAL LOW (ref 13–17)
HYPOCHROMIA BLD QL: SIGNIFICANT CHANGE UP
IMM GRANULOCYTES NFR BLD AUTO: 1.5 % — HIGH (ref 0–0.9)
INR BLD: 1.4 RATIO — HIGH (ref 0.85–1.18)
LACTATE SERPL-SCNC: 1.1 MMOL/L — SIGNIFICANT CHANGE UP (ref 0.7–2)
LIDOCAIN IGE QN: 109 U/L — HIGH (ref 13–75)
LYMPHOCYTES # BLD AUTO: 1 K/UL — SIGNIFICANT CHANGE UP (ref 1–3.3)
LYMPHOCYTES # BLD AUTO: 10.9 % — LOW (ref 13–44)
MAGNESIUM SERPL-MCNC: 1.9 MG/DL — SIGNIFICANT CHANGE UP (ref 1.6–2.6)
MANUAL SMEAR VERIFICATION: SIGNIFICANT CHANGE UP
MCHC RBC-ENTMCNC: 28.1 PG — SIGNIFICANT CHANGE UP (ref 27–34)
MCHC RBC-ENTMCNC: 30.2 GM/DL — LOW (ref 32–36)
MCV RBC AUTO: 93 FL — SIGNIFICANT CHANGE UP (ref 80–100)
MICROCYTES BLD QL: SIGNIFICANT CHANGE UP
MONOCYTES # BLD AUTO: 1.33 K/UL — HIGH (ref 0–0.9)
MONOCYTES NFR BLD AUTO: 14.5 % — HIGH (ref 2–14)
NEUTROPHILS # BLD AUTO: 6.29 K/UL — SIGNIFICANT CHANGE UP (ref 1.8–7.4)
NEUTROPHILS NFR BLD AUTO: 68.8 % — SIGNIFICANT CHANGE UP (ref 43–77)
NRBC # BLD: 0 /100 WBCS — SIGNIFICANT CHANGE UP (ref 0–0)
NT-PROBNP SERPL-SCNC: 2703 PG/ML — HIGH (ref 0–450)
OB PNL STL: POSITIVE
PLAT MORPH BLD: NORMAL — SIGNIFICANT CHANGE UP
PLATELET # BLD AUTO: 279 K/UL — SIGNIFICANT CHANGE UP (ref 150–400)
POTASSIUM SERPL-MCNC: 4 MMOL/L — SIGNIFICANT CHANGE UP (ref 3.5–5.3)
POTASSIUM SERPL-SCNC: 4 MMOL/L — SIGNIFICANT CHANGE UP (ref 3.5–5.3)
PROT SERPL-MCNC: 6.3 G/DL — SIGNIFICANT CHANGE UP (ref 6–8.3)
PROTHROM AB SERPL-ACNC: 16.2 SEC — HIGH (ref 9.5–13)
RBC # BLD: 2.42 M/UL — LOW (ref 4.2–5.8)
RBC # FLD: 15.3 % — HIGH (ref 10.3–14.5)
RBC BLD AUTO: ABNORMAL
RSV RNA NPH QL NAA+NON-PROBE: SIGNIFICANT CHANGE UP
SARS-COV-2 RNA SPEC QL NAA+PROBE: SIGNIFICANT CHANGE UP
SODIUM SERPL-SCNC: 143 MMOL/L — SIGNIFICANT CHANGE UP (ref 135–145)
TROPONIN I, HIGH SENSITIVITY RESULT: 27.1 NG/L — SIGNIFICANT CHANGE UP
WBC # BLD: 9.16 K/UL — SIGNIFICANT CHANGE UP (ref 3.8–10.5)
WBC # FLD AUTO: 9.16 K/UL — SIGNIFICANT CHANGE UP (ref 3.8–10.5)

## 2023-09-25 PROCEDURE — 74177 CT ABD & PELVIS W/CONTRAST: CPT | Mod: 26,MA

## 2023-09-25 PROCEDURE — 93971 EXTREMITY STUDY: CPT | Mod: 26,LT

## 2023-09-25 PROCEDURE — 93010 ELECTROCARDIOGRAM REPORT: CPT

## 2023-09-25 PROCEDURE — 71045 X-RAY EXAM CHEST 1 VIEW: CPT | Mod: 26

## 2023-09-25 PROCEDURE — 99285 EMERGENCY DEPT VISIT HI MDM: CPT

## 2023-09-25 RX ORDER — PANTOPRAZOLE SODIUM 20 MG/1
40 TABLET, DELAYED RELEASE ORAL ONCE
Refills: 0 | Status: COMPLETED | OUTPATIENT
Start: 2023-09-25 | End: 2023-09-25

## 2023-09-25 RX ORDER — METOPROLOL TARTRATE 50 MG
25 TABLET ORAL ONCE
Refills: 0 | Status: COMPLETED | OUTPATIENT
Start: 2023-09-25 | End: 2023-09-25

## 2023-09-25 RX ORDER — PIPERACILLIN AND TAZOBACTAM 4; .5 G/20ML; G/20ML
3.38 INJECTION, POWDER, LYOPHILIZED, FOR SOLUTION INTRAVENOUS ONCE
Refills: 0 | Status: COMPLETED | OUTPATIENT
Start: 2023-09-25 | End: 2023-09-25

## 2023-09-25 RX ORDER — FUROSEMIDE 40 MG
20 TABLET ORAL ONCE
Refills: 0 | Status: COMPLETED | OUTPATIENT
Start: 2023-09-25 | End: 2023-09-25

## 2023-09-25 RX ADMIN — PIPERACILLIN AND TAZOBACTAM 3.38 GRAM(S): 4; .5 INJECTION, POWDER, LYOPHILIZED, FOR SOLUTION INTRAVENOUS at 21:47

## 2023-09-25 RX ADMIN — PIPERACILLIN AND TAZOBACTAM 200 GRAM(S): 4; .5 INJECTION, POWDER, LYOPHILIZED, FOR SOLUTION INTRAVENOUS at 21:17

## 2023-09-25 RX ADMIN — PANTOPRAZOLE SODIUM 40 MILLIGRAM(S): 20 TABLET, DELAYED RELEASE ORAL at 21:17

## 2023-09-25 NOTE — ED ADULT NURSE NOTE - CHIEF COMPLAINT QUOTE
Patient A/Ox4 with clear speech. BIBA from [facility] for low hgb of 6. Had recent umbilical hernia with mesh surgery. Per EMS, patient with BL wheezing. Patient denied nebulizer treatment in route. Currently 97% O2 on 3L NC which he uses everyday.

## 2023-09-25 NOTE — ED PROVIDER NOTE - DIFFERENTIAL DIAGNOSIS
GI bleed, left lower extremity cellulitis rule out DVT, electrolyte imbalance, anemia Differential Diagnosis

## 2023-09-25 NOTE — ED PROVIDER NOTE - CLINICAL SUMMARY MEDICAL DECISION MAKING FREE TEXT BOX
78-year-old male sent to the emergency department with report of low hemoglobin, patient also complaining of left lower extremity pain, concern for cellulitis, start Zosyn, Protonix, send CBC, CMP, coagulation studies, lactate, blood cultures, ultrasound lower extremity rule out DVT, CT angio abdomen and pelvis, evaluation for possible GI bleed, send guaiac and admit.

## 2023-09-25 NOTE — ED PROVIDER NOTE - PROGRESS NOTE DETAILS
patient seen and evaluated by surgical PA, no acute surgical intervention at this time, to be admitted for further medical management

## 2023-09-25 NOTE — ED ADULT TRIAGE NOTE - CHIEF COMPLAINT QUOTE
Patient A/Ox4 with clear speech. BIBA from [facility] for low hgb of 6. Had recent umbilical hernia with mesh surgery. Per EMS, patient with BL wheezing. Patient denied nebulizer treatment in route. Currently 97% O2 on 3L NC which he uses everyday.

## 2023-09-25 NOTE — ED ADULT NURSE NOTE - NSFALLHARMRISKINTERV_ED_ALL_ED

## 2023-09-25 NOTE — ED PROVIDER NOTE - OBJECTIVE STATEMENT
78-year-old male with history of bladder CA with urostomy 14 years ago, A-fib on warfarin, HTN, HLD, CAD with bypass two-vessel, and 1 cardiac stent, admitted to [facility] 8/22/2023 in which she was diagnosed with a high-grade SBO near urostomy, was then transferred over to [facility] went to the OR for ex lap of adhesions, obstruction was relieved, also had a parastomal hernia repair with mesh, patient stay at the hospital was complicated by altered mental status and he was intubated, GI bleed in which he was transfused and it resolved on its own, [facility] [place], while the patient developed left lower extremity pain, was told it was a Baker's cyst, having increased pain swelling and redness to the area, patient was sent to the emergency department today regarding a hemoglobin of 6, patient dates he does feel fatigued and shortness of breath with exertion, denies any chest pain, denies any recent fever.

## 2023-09-25 NOTE — ED ADULT NURSE NOTE - OBJECTIVE STATEMENT
Pt presents to the ED via ambulance s/p abnormal labs, SOB. EKG done, pt placed on cardiac monitor, continuos pulse ox. Pt has an urostomy , draining into a drainage bag, steri strips intact midline of the abd. Pt has bilat lower leg swelling, redness noted on the left lower leg. Pt presents to the ED via ambulance s/p abnormal labs, SOB. EKG done, pt placed on cardiac monitor, continuos pulse ox. Pt has an urostomy , draining into a drainage bag, steri strips intact midline of the abd. Pt has bilat lower leg swelling, redness noted on the left lower leg. Pt's left arm and hand + swelling.

## 2023-09-25 NOTE — CONSULT NOTE ADULT - SUBJECTIVE AND OBJECTIVE BOX
SURGERY PA CONSULT NOTE:    CHIEF COMPLAINT:  Patient is a 78y old  Male who presents with a chief complaint of anemia.    HPI FROM ED:  79 y/o with history of bladder CA with urostomy 14 years ago, A-fib on warfarin, HTN, HLD, CAD with bypass two-vessel, and 1 cardiac stent presenting from [facility] rehab facility after blood work revealed anemia. Of note, pt recently presented to [facility] ER on 08/22/23 w/ SBO w/ transition point proximal to his urostomy. Pt then transferred to [facility] from 08/22 to 09/12 for escalation of care. Per chart review, pt ultimately underwent ex-lap for SBO w/ KEANU w/ Abthera VAC placement and re-ex-lap w/ para-stomal hernia repair and abdominal wall closure 2 days later. Hospital course c/b GI bleed, necessitating ICU admission and intubation, however was managed conservatively. Pt sent to [facility] for JUANA.    General surgery consulted       PAST MEDICAL HISTORY:  PAST MEDICAL & SURGICAL HISTORY:  Atrial fibrillation      Hypothyroid      Hypertension      Bladder cancer      Bronchitis      Former smoker      CAD (coronary artery disease)      History of bladder surgery  on urostomy      S/P CABG x 3      History of automatic internal cardiac defibrillator (AICD)      H/O knee surgery          PAST SURGICAL HISTORY:    REVIEW OF SYSTEMS:  General/Constitutional: No acute distress, no headache, weakness, fevers, or chills   HEENT: Denies auditory or visual changes/disturbances, no vertigo, no throat pain, no dysphagia    Neck: Denies neck pain/stiffness, denies swelling/lumps/hoarseness   Lymphatic: Denies lumps or swelling in the axillae, groin, or neck bilaterally   Respiratory: Denies cough/hemoptysis, denies wheezing/SOB/dyspnea  Cardiac: Denies chest pain, palpitations  Abdomen: Denies abdominal bloating/fullness, nausea or vomiting, denies abdominal pain  Extremities: Denies sores, swelling, discoloration bilat UE/LE  Genitourinary: Denies urinary issues or complaints, denies dysuria/hematuria  Neuro: Denies weakness, paraesthesias, paralysis, syncope, loss of vision  Skin: Denies pruritus, pain, rashes  Psych: Denies hallucinations, visual disturbances, or depression    MEDICATIONS:  Home Medications:  acetaminophen 325 mg oral tablet: 3 tab(s) orally every 6 hours As needed Mild Pain (1 - 3) (12 Sep 2023 13:24)  digoxin 125 mcg (0.125 mg) oral tablet: 1 orally once a day (14 Jul 2023 06:47)  Entresto 24 mg-26 mg oral tablet: 1 tablet orally 2 times a day (14 Jul 2023 15:40)  furosemide 20 mg oral tablet: 1 tablet orally once a day (14 Jul 2023 15:40)  ipratropium-albuterol 0.5 mg-2.5 mg/3 mL inhalation solution: 3 milliliter(s) inhaled every 6 hours As needed Wheezing (12 Sep 2023 13:24)  levothyroxine 137 mcg (0.137 mg) oral tablet: 1 tab(s) orally once a day (14 Jul 2023 06:47)  melatonin 5 mg oral tablet: 1 tab(s) orally once a day (at bedtime) (12 Sep 2023 13:24)  metoprolol tartrate 25 mg oral tablet: 1 tab(s) orally 2 times a day (12 Sep 2023 13:24)  pantoprazole 40 mg oral delayed release tablet: 1 tab(s) orally every 12 hours (12 Sep 2023 13:33)  polyethylene glycol 3350 oral powder for reconstitution: 17 gram(s) orally once a day (12 Sep 2023 13:24)  rosuvastatin 40 mg oral tablet: 1 orally once a day (14 Jul 2023 06:47)  senna leaf extract oral tablet: 1 tab(s) orally once a day (at bedtime) (12 Sep 2023 13:24)  warfarin 5 mg oral tablet: 1 tab(s) orally once a day (14 Jul 2023 06:47)    MEDICATIONS  (STANDING):  furosemide   Injectable 20 milliGRAM(s) IV Push Once    MEDICATIONS  (PRN):      ALLERGIES:  Allergies    No Known Allergies    Intolerances        SOCIAL HISTORY:  Social History:    Smoking: Yes [ ]  No [ ]   ______pk yrs  ETOH  Yes [ ]  No [ ]  Social [ ]  DRUGS:  Yes [ ]  No [ ]  if so what______________    FAMILY HISTORY:  FAMILY HISTORY:  Family history of heart attack (Father, Mother)        VITAL SIGNS:  Vital Signs Last 24 Hrs  T(C): 36.6 (25 Sep 2023 22:49), Max: 36.8 (25 Sep 2023 18:36)  T(F): 97.8 (25 Sep 2023 22:49), Max: 98.2 (25 Sep 2023 18:36)  HR: 107 (25 Sep 2023 22:49) (100 - 107)  BP: 110/54 (25 Sep 2023 22:49) (110/54 - 115/48)  BP(mean): --  RR: 18 (25 Sep 2023 22:49) (18 - 18)  SpO2: 100% (25 Sep 2023 22:49) (99% - 100%)    Parameters below as of 25 Sep 2023 22:49  Patient On (Oxygen Delivery Method): room air        PHYSICAL EXAM:  General: No acute distress, appears comfortable, well nourished, well-groomed, appears stated age  Head, Eyes, Ears, Nose, Throat: Normal cephalic/atraumatic, anicteric, conjunctiva-non injected and moist, vision grossly intact, hearing grossly intact, no nasal discharge, ears and nose symmetrical and atraumatic.  Nasal, oral, and oropharyngeal mucosa pink moist with no evidence of ulceration  Neck: Supple, carotids have good upstroke, trachea in the midline, without JVD or thyromegaly  Lymphatic: No evidence of masses or lymphadenopathy in the head, neck, trunk, axillary, inguinal, or supraclavicular regions  Chest: Lungs are clear to P&A, no wheezing, no rales, no ronchi, with good inspiratory effort  Heart: Heart rhythm regular, no murmurs  Abdomen: Soft, non tender, good bowel sounds present in all four quadrants.  No guarding, rebound, and no peritoneal signs.  No evidence of hepatosplenomegaly.  No evidence of abdominal wall hernias.  Inguinal regions are unremarkable with no evidence of hernias.   Extremity: No swelling, or open sores, no gross deformities,  good range of motion, 2+ peripheral pulses bilat UE/LE, no edema,  negative Taz's sign, no lymphadenopathy  Neuro: Alert and oriented x3, motor and sensory intact  Psychiatric: Awake , alert, oriented x3 with an appropriate affect.   Skin: Good color, turgor, texture with no gross lesions, no eruptions, no rashes, no subcutaneous nodules and normal temperature.     LABS:                        6.7    9.16  )-----------( 279      ( 25 Sep 2023 20:50 )             22.0     09-25    143  |  104  |  37<H>  ----------------------------<  113<H>  4.0   |  33<H>  |  1.10    Ca    8.3<L>      25 Sep 2023 20:50  Mg     1.9     09-25    TPro  6.3  /  Alb  2.0<L>  /  TBili  0.5  /  DBili  x   /  AST  29  /  ALT  24  /  AlkPhos  91  09-25    PT/INR - ( 25 Sep 2023 20:50 )   PT: 16.2 sec;   INR: 1.40 ratio         PTT - ( 25 Sep 2023 20:50 )  PTT:26.6 sec  Urinalysis Basic - ( 25 Sep 2023 20:50 )    Color: x / Appearance: x / SG: x / pH: x  Gluc: 113 mg/dL / Ketone: x  / Bili: x / Urobili: x   Blood: x / Protein: x / Nitrite: x   Leuk Esterase: x / RBC: x / WBC x   Sq Epi: x / Non Sq Epi: x / Bacteria: x      LIVER FUNCTIONS - ( 25 Sep 2023 20:50 )  Alb: 2.0 g/dL / Pro: 6.3 g/dL / ALK PHOS: 91 U/L / ALT: 24 U/L / AST: 29 U/L / GGT: x               RADIOLOGY & ADDITIONAL STUDIES:    ASSESSMENT:    PLAN: SURGERY PA CONSULT NOTE:    CHIEF COMPLAINT:  Patient is a 78y old  Male who presents with a chief complaint of anemia.    HPI FROM ED:  79 y/o with history of bladder CA with urostomy 14 years ago, A-fib on warfarin, HTN, HLD, CAD with bypass two-vessel, and 1 cardiac stent presenting from [facility] rehab facility after blood work revealed anemia. Of note, pt recently presented to [facility] ER on 08/22/23 w/ SBO w/ transition point proximal to his urostomy. Pt then transferred to [facility] from 08/22 to 09/12 for escalation of care. Per chart review, pt ultimately underwent ex-lap for SBO w/ KEANU w/ Abthera VAC placement and re-ex-lap w/ para-stomal hernia repair and abdominal wall closure 2 days later. Hospital course c/b GI bleed, necessitating ICU admission and intubation, however was managed conservatively. Pt eventually deemed stable for discharge and sent to [facility] for JUANA.    General surgery consulted after abdominal wall hematoma noted on CTAP w/ IV contrast, described as follows "Simple density fluid collection in the subcutaneous fat along the medial inferior aspect of the urostomy is similar in size compared to the previous study. The hematoma along the medial inferior aspect of the urostomy in the right of midline anterior abdominal wall subcutaneous fat has organized and now has appearance of a chronic hematoma or seroma.". At time of this provider's encounter, pt does not offer any complaints and is comfortable. Pt denies abdominal pain and endorses bowel function, however is unable to endorse whether or not he has had blood in his stool.  Pt denies subjective fever, chills, N/V, change in urinary/bowel function, or LOC.    PAST MEDICAL & SURGICAL HISTORY:  Atrial fibrillation      Hypothyroid      Hypertension      Bladder cancer      Bronchitis      Former smoker      CAD (coronary artery disease)      History of bladder surgery  on urostomy      S/P CABG x 3      History of automatic internal cardiac defibrillator (AICD)      H/O knee surgery          PAST SURGICAL HISTORY:    REVIEW OF SYSTEMS:  General/Constitutional: No acute distress, no headache, weakness, fevers, or chills   HEENT: Denies auditory or visual changes/disturbances, no vertigo, no throat pain, no dysphagia    Neck: Denies neck pain/stiffness, denies swelling/lumps/hoarseness   Lymphatic: Denies lumps or swelling in the axillae, groin, or neck bilaterally   Respiratory: Denies cough/hemoptysis, denies wheezing/SOB/dyspnea  Cardiac: Denies chest pain, palpitations  Abdomen: Denies abdominal bloating/fullness, nausea or vomiting, denies abdominal pain  Extremities: Denies sores, swelling, discoloration bilat UE/LE  Genitourinary: Denies urinary issues or complaints, denies dysuria/hematuria  Neuro: Denies weakness, paraesthesias, paralysis, syncope, loss of vision  Skin: Denies pruritus, pain, rashes  Psych: Denies hallucinations, visual disturbances, or depression    MEDICATIONS:  Home Medications:  acetaminophen 325 mg oral tablet: 3 tab(s) orally every 6 hours As needed Mild Pain (1 - 3) (12 Sep 2023 13:24)  digoxin 125 mcg (0.125 mg) oral tablet: 1 orally once a day (14 Jul 2023 06:47)  Entresto 24 mg-26 mg oral tablet: 1 tablet orally 2 times a day (14 Jul 2023 15:40)  furosemide 20 mg oral tablet: 1 tablet orally once a day (14 Jul 2023 15:40)  ipratropium-albuterol 0.5 mg-2.5 mg/3 mL inhalation solution: 3 milliliter(s) inhaled every 6 hours As needed Wheezing (12 Sep 2023 13:24)  levothyroxine 137 mcg (0.137 mg) oral tablet: 1 tab(s) orally once a day (14 Jul 2023 06:47)  melatonin 5 mg oral tablet: 1 tab(s) orally once a day (at bedtime) (12 Sep 2023 13:24)  metoprolol tartrate 25 mg oral tablet: 1 tab(s) orally 2 times a day (12 Sep 2023 13:24)  pantoprazole 40 mg oral delayed release tablet: 1 tab(s) orally every 12 hours (12 Sep 2023 13:33)  polyethylene glycol 3350 oral powder for reconstitution: 17 gram(s) orally once a day (12 Sep 2023 13:24)  rosuvastatin 40 mg oral tablet: 1 orally once a day (14 Jul 2023 06:47)  senna leaf extract oral tablet: 1 tab(s) orally once a day (at bedtime) (12 Sep 2023 13:24)  warfarin 5 mg oral tablet: 1 tab(s) orally once a day (14 Jul 2023 06:47)    MEDICATIONS  (STANDING):  furosemide   Injectable 20 milliGRAM(s) IV Push Once    MEDICATIONS  (PRN):      ALLERGIES:  Allergies    No Known Allergies    Intolerances        SOCIAL HISTORY:  Social History:    Smoking: Yes [ ]  No [ ]   ______pk yrs  ETOH  Yes [ ]  No [ ]  Social [ ]  DRUGS:  Yes [ ]  No [ ]  if so what______________    FAMILY HISTORY:  FAMILY HISTORY:  Family history of heart attack (Father, Mother)        VITAL SIGNS:  Vital Signs Last 24 Hrs  T(C): 36.6 (25 Sep 2023 22:49), Max: 36.8 (25 Sep 2023 18:36)  T(F): 97.8 (25 Sep 2023 22:49), Max: 98.2 (25 Sep 2023 18:36)  HR: 107 (25 Sep 2023 22:49) (100 - 107)  BP: 110/54 (25 Sep 2023 22:49) (110/54 - 115/48)  BP(mean): --  RR: 18 (25 Sep 2023 22:49) (18 - 18)  SpO2: 100% (25 Sep 2023 22:49) (99% - 100%)    Parameters below as of 25 Sep 2023 22:49  Patient On (Oxygen Delivery Method): room air        PHYSICAL EXAM:  General: No acute distress, appears comfortable, well nourished, well-groomed, appears stated age  Head, Eyes, Ears, Nose, Throat: Normal cephalic/atraumatic, anicteric, conjunctiva-non injected and moist, vision grossly intact, hearing grossly intact, no nasal discharge, ears and nose symmetrical and atraumatic.  Nasal, oral, and oropharyngeal mucosa pink moist with no evidence of ulceration  Neck: Supple, carotids have good upstroke, trachea in the midline, without JVD or thyromegaly  Lymphatic: No evidence of masses or lymphadenopathy in the head, neck, trunk, axillary, inguinal, or supraclavicular regions  Chest: Lungs are clear to P&A, no wheezing, no rales, no ronchi, with good inspiratory effort  Heart: Heart rhythm regular, no murmurs  Abdomen: Soft, non tender, good bowel sounds present in all four quadrants.  No guarding, rebound, and no peritoneal signs.  No evidence of hepatosplenomegaly.  No evidence of abdominal wall hernias.  Inguinal regions are unremarkable with no evidence of hernias.   Extremity: No swelling, or open sores, no gross deformities,  good range of motion, 2+ peripheral pulses bilat UE/LE, no edema,  negative Taz's sign, no lymphadenopathy  Neuro: Alert and oriented x3, motor and sensory intact  Psychiatric: Awake , alert, oriented x3 with an appropriate affect.   Skin: Good color, turgor, texture with no gross lesions, no eruptions, no rashes, no subcutaneous nodules and normal temperature.     LABS:                        6.7    9.16  )-----------( 279      ( 25 Sep 2023 20:50 )             22.0     09-25    143  |  104  |  37<H>  ----------------------------<  113<H>  4.0   |  33<H>  |  1.10    Ca    8.3<L>      25 Sep 2023 20:50  Mg     1.9     09-25    TPro  6.3  /  Alb  2.0<L>  /  TBili  0.5  /  DBili  x   /  AST  29  /  ALT  24  /  AlkPhos  91  09-25    PT/INR - ( 25 Sep 2023 20:50 )   PT: 16.2 sec;   INR: 1.40 ratio         PTT - ( 25 Sep 2023 20:50 )  PTT:26.6 sec  Urinalysis Basic - ( 25 Sep 2023 20:50 )    Color: x / Appearance: x / SG: x / pH: x  Gluc: 113 mg/dL / Ketone: x  / Bili: x / Urobili: x   Blood: x / Protein: x / Nitrite: x   Leuk Esterase: x / RBC: x / WBC x   Sq Epi: x / Non Sq Epi: x / Bacteria: x      LIVER FUNCTIONS - ( 25 Sep 2023 20:50 )  Alb: 2.0 g/dL / Pro: 6.3 g/dL / ALK PHOS: 91 U/L / ALT: 24 U/L / AST: 29 U/L / GGT: x               RADIOLOGY & ADDITIONAL STUDIES:    ASSESSMENT:    PLAN: SURGERY PA CONSULT NOTE:    CHIEF COMPLAINT:  Patient is a 78y old  Male who presents with a chief complaint of anemia.    HPI FROM ED:  77 y/o with history of bladder CA with urostomy 14 years ago, A-fib on warfarin, HTN, HLD, CAD with bypass two-vessel, and 1 cardiac stent presenting from [facility] rehab facility after blood work revealed anemia. Of note, pt recently presented to [facility] ER on 08/22/23 w/ SBO w/ transition point proximal to his urostomy. Pt then transferred to [facility] from 08/22 to 09/12 for escalation of care. Per chart review, pt ultimately underwent ex-lap for SBO w/ KEANU w/ Abthera VAC placement and re-ex-lap w/ para-stomal hernia repair and abdominal wall closure 2 days later. Hospital course c/b GI bleed, necessitating ICU admission and intubation, however was managed conservatively. Pt eventually deemed stable for discharge and sent to [facility] for JUANA.    General surgery consulted after abdominal wall hematoma noted on CTAP w/ IV contrast, described as follows "Simple density fluid collection in the subcutaneous fat along the medial inferior aspect of the urostomy is similar in size compared to the previous study. The hematoma along the medial inferior aspect of the urostomy in the right of midline anterior abdominal wall subcutaneous fat has organized and now has appearance of a chronic hematoma or seroma.". At time of this provider's encounter, pt does not offer any complaints and is comfortable, is able to verify abovementioned hospital course. Pt denies abdominal pain and endorses bowel function, however is unable to endorse whether or not he has had blood in his stool.  Pt denies subjective fever, chills, N/V, change in urinary/bowel function, or LOC.    PAST MEDICAL & SURGICAL HISTORY:  Atrial fibrillation    Hypothyroid    Hypertension    Bladder cancer    Bronchitis    Former smoker    CAD (coronary artery disease)    History of bladder surgery  on urostomy    S/P CABG x 3    History of automatic internal cardiac defibrillator (AICD)    H/O knee surgery    REVIEW OF SYSTEMS:  General/Constitutional: No acute distress, no headache, weakness, fevers, or chills   Respiratory: Denies cough/hemoptysis, denies wheezing/SOB/dyspnea  Cardiac: Denies chest pain, palpitations  Abdomen: SEE HPI  Extremities: Denies sores, swelling, discoloration bilat UE/LE  Genitourinary: Denies urinary issues or complaints, denies dysuria/hematuria  Neuro: Denies weakness, paraesthesias, paralysis, syncope, loss of vision  Skin: Denies pruritus, pain, rashes  Psych: Denies hallucinations, visual disturbances, or depression    MEDICATIONS:  Home Medications:  acetaminophen 325 mg oral tablet: 3 tab(s) orally every 6 hours As needed Mild Pain (1 - 3) (12 Sep 2023 13:24)  digoxin 125 mcg (0.125 mg) oral tablet: 1 orally once a day (14 Jul 2023 06:47)  Entresto 24 mg-26 mg oral tablet: 1 tablet orally 2 times a day (14 Jul 2023 15:40)  furosemide 20 mg oral tablet: 1 tablet orally once a day (14 Jul 2023 15:40)  ipratropium-albuterol 0.5 mg-2.5 mg/3 mL inhalation solution: 3 milliliter(s) inhaled every 6 hours As needed Wheezing (12 Sep 2023 13:24)  levothyroxine 137 mcg (0.137 mg) oral tablet: 1 tab(s) orally once a day (14 Jul 2023 06:47)  melatonin 5 mg oral tablet: 1 tab(s) orally once a day (at bedtime) (12 Sep 2023 13:24)  metoprolol tartrate 25 mg oral tablet: 1 tab(s) orally 2 times a day (12 Sep 2023 13:24)  pantoprazole 40 mg oral delayed release tablet: 1 tab(s) orally every 12 hours (12 Sep 2023 13:33)  polyethylene glycol 3350 oral powder for reconstitution: 17 gram(s) orally once a day (12 Sep 2023 13:24)  rosuvastatin 40 mg oral tablet: 1 orally once a day (14 Jul 2023 06:47)  senna leaf extract oral tablet: 1 tab(s) orally once a day (at bedtime) (12 Sep 2023 13:24)  warfarin 5 mg oral tablet: 1 tab(s) orally once a day (14 Jul 2023 06:47)    MEDICATIONS  (STANDING):  furosemide   Injectable 20 milliGRAM(s) IV Push Once    ALLERGIES:  No known allergies    SOCIAL HISTORY:  Denies tobacco product, alcohol, and elicit drug use.    FAMILY HISTORY:  Family history of heart attack (Father, Mother)    VITAL SIGNS:  Vital Signs Last 24 Hrs  T(C): 36.6 (25 Sep 2023 22:49), Max: 36.8 (25 Sep 2023 18:36)  T(F): 97.8 (25 Sep 2023 22:49), Max: 98.2 (25 Sep 2023 18:36)  HR: 107 (25 Sep 2023 22:49) (100 - 107)  BP: 110/54 (25 Sep 2023 22:49) (110/54 - 115/48)  BP(mean): --  RR: 18 (25 Sep 2023 22:49) (18 - 18)  SpO2: 100% (25 Sep 2023 22:49) (99% - 100%)    Parameters below as of 25 Sep 2023 22:49  Patient On (Oxygen Delivery Method): room air    PHYSICAL EXAM:  General: No acute distress, appears comfortable, well-groomed, appears stated age, elderly male.  Head, Eyes, Ears, Nose, Throat: Normal cephalic/atraumatic, anicteric, conjunctiva-non injected and moist  Abdomen: Midline incision from recent procedure well-approximated w/ overlying steri-strips, soft and no shara-incisional ttp; Abdomen soft, nondistended and nontender; no guarding, no rebound ttp, no peritonitic features  Urostomy pink, straw-colored urine in collection bag. Medial to urostomy on deep palpation is a fist-sized firm collection that is nontender, without overlying skin changes.  Extremity: No open sores, no gross deformities, posterior to L knee is a swollen erythematous mass and that is tender and is limiting LLE ROM 2/2 pain.  Neuro: Alert and oriented x3, motor and sensory intact  Psychiatric: Awake , alert, oriented x3 with an appropriate affect.   Skin: Good color, turgor, texture with no gross lesions, no eruptions, no rashes, no subcutaneous nodules and normal temperature.     LABS:                        6.7    9.16  )-----------( 279      ( 25 Sep 2023 20:50 )             22.0     09-25    143  |  104  |  37<H>  ----------------------------<  113<H>  4.0   |  33<H>  |  1.10    Ca    8.3<L>      25 Sep 2023 20:50  Mg     1.9     09-25    TPro  6.3  /  Alb  2.0<L>  /  TBili  0.5  /  DBili  x   /  AST  29  /  ALT  24  /  AlkPhos  91  09-25    PT/INR - ( 25 Sep 2023 20:50 )   PT: 16.2 sec;   INR: 1.40 ratio         PTT - ( 25 Sep 2023 20:50 )  PTT:26.6 sec  Urinalysis Basic - ( 25 Sep 2023 20:50 )    Color: x / Appearance: x / SG: x / pH: x  Gluc: 113 mg/dL / Ketone: x  / Bili: x / Urobili: x   Blood: x / Protein: x / Nitrite: x   Leuk Esterase: x / RBC: x / WBC x   Sq Epi: x / Non Sq Epi: x / Bacteria: x      LIVER FUNCTIONS - ( 25 Sep 2023 20:50 )  Alb: 2.0 g/dL / Pro: 6.3 g/dL / ALK PHOS: 91 U/L / ALT: 24 U/L / AST: 29 U/L / GGT: x               RADIOLOGY & ADDITIONAL STUDIES:    ASSESSMENT:    PLAN: SURGERY PA CONSULT NOTE:    CHIEF COMPLAINT:  Patient is a 78y old  Male who presents with a chief complaint of anemia.    HPI FROM ED:  79 y/o with history of bladder CA with urostomy 14 years ago, A-fib on warfarin, HTN, HLD, CAD with bypass two-vessel, and 1 cardiac stent presenting from [facility] rehab facility after blood work revealed anemia. Of note, pt recently presented to [facility] ER on 08/22/23 w/ SBO w/ transition point proximal to his urostomy. Pt then transferred to [facility] from 08/22 to 09/12 for escalation of care. Per chart review, pt ultimately underwent ex-lap for SBO w/ KEANU w/ Abthera VAC placement and re-ex-lap w/ para-stomal hernia repair and abdominal wall closure 2 days later. Hospital course c/b GI bleed, necessitating ICU admission and intubation, however was managed conservatively. Pt eventually deemed stable for discharge and sent to [facility] for JUANA.    General surgery consulted after abdominal wall hematoma noted on CTAP w/ IV contrast, described as follows "Simple density fluid collection in the subcutaneous fat along the medial inferior aspect of the urostomy is similar in size compared to the previous study. The hematoma along the medial inferior aspect of the urostomy in the right of midline anterior abdominal wall subcutaneous fat has organized and now has appearance of a chronic hematoma or seroma.". At time of this provider's encounter, pt does not offer any complaints and is comfortable, is able to verify abovementioned hospital course. Pt denies abdominal pain and endorses bowel function, however is unable to endorse whether or not he has had blood in his stool.  Pt denies subjective fever, chills, N/V, change in urinary/bowel function, or LOC.    PAST MEDICAL & SURGICAL HISTORY:  Atrial fibrillation    Hypothyroid    Hypertension    Bladder cancer    Bronchitis    Former smoker    CAD (coronary artery disease)    History of bladder surgery  on urostomy    S/P CABG x 3    History of automatic internal cardiac defibrillator (AICD)    H/O knee surgery    REVIEW OF SYSTEMS:  General/Constitutional: No acute distress, no headache, weakness, fevers, or chills   Respiratory: Denies cough/hemoptysis, denies wheezing/SOB/dyspnea  Cardiac: Denies chest pain, palpitations  Abdomen: SEE HPI  Extremities: Denies sores, swelling, discoloration bilat UE/LE  Genitourinary: Denies urinary issues or complaints, denies dysuria/hematuria  Neuro: Denies weakness, paraesthesias, paralysis, syncope, loss of vision  Skin: Denies pruritus, pain, rashes  Psych: Denies hallucinations, visual disturbances, or depression    MEDICATIONS:  Home Medications:  acetaminophen 325 mg oral tablet: 3 tab(s) orally every 6 hours As needed Mild Pain (1 - 3) (12 Sep 2023 13:24)  digoxin 125 mcg (0.125 mg) oral tablet: 1 orally once a day (14 Jul 2023 06:47)  Entresto 24 mg-26 mg oral tablet: 1 tablet orally 2 times a day (14 Jul 2023 15:40)  furosemide 20 mg oral tablet: 1 tablet orally once a day (14 Jul 2023 15:40)  ipratropium-albuterol 0.5 mg-2.5 mg/3 mL inhalation solution: 3 milliliter(s) inhaled every 6 hours As needed Wheezing (12 Sep 2023 13:24)  levothyroxine 137 mcg (0.137 mg) oral tablet: 1 tab(s) orally once a day (14 Jul 2023 06:47)  melatonin 5 mg oral tablet: 1 tab(s) orally once a day (at bedtime) (12 Sep 2023 13:24)  metoprolol tartrate 25 mg oral tablet: 1 tab(s) orally 2 times a day (12 Sep 2023 13:24)  pantoprazole 40 mg oral delayed release tablet: 1 tab(s) orally every 12 hours (12 Sep 2023 13:33)  polyethylene glycol 3350 oral powder for reconstitution: 17 gram(s) orally once a day (12 Sep 2023 13:24)  rosuvastatin 40 mg oral tablet: 1 orally once a day (14 Jul 2023 06:47)  senna leaf extract oral tablet: 1 tab(s) orally once a day (at bedtime) (12 Sep 2023 13:24)  warfarin 5 mg oral tablet: 1 tab(s) orally once a day (14 Jul 2023 06:47)    MEDICATIONS  (STANDING):  furosemide   Injectable 20 milliGRAM(s) IV Push Once    ALLERGIES:  No known allergies    SOCIAL HISTORY:  Denies tobacco product, alcohol, and elicit drug use.    FAMILY HISTORY:  Family history of heart attack (Father, Mother)    VITAL SIGNS:  Vital Signs Last 24 Hrs  T(C): 36.6 (25 Sep 2023 22:49), Max: 36.8 (25 Sep 2023 18:36)  T(F): 97.8 (25 Sep 2023 22:49), Max: 98.2 (25 Sep 2023 18:36)  HR: 107 (25 Sep 2023 22:49) (100 - 107)  BP: 110/54 (25 Sep 2023 22:49) (110/54 - 115/48)  BP(mean): --  RR: 18 (25 Sep 2023 22:49) (18 - 18)  SpO2: 100% (25 Sep 2023 22:49) (99% - 100%)    Parameters below as of 25 Sep 2023 22:49  Patient On (Oxygen Delivery Method): room air    PHYSICAL EXAM:  General: No acute distress, appears comfortable, well-groomed, appears stated age, elderly male.  Head, Eyes, Ears, Nose, Throat: Normal cephalic/atraumatic, anicteric, conjunctiva-non injected and moist  Abdomen: Midline incision from recent procedure well-approximated w/ overlying steri-strips, soft and no shara-incisional ttp; Abdomen soft, nondistended and nontender; no guarding, no rebound ttp, no peritonitic features  Urostomy pink, straw-colored urine in collection bag. Medial to urostomy on deep palpation is a fist-sized firm collection that is nontender, without overlying skin changes.  Extremity: No open sores, no gross deformities, posterior to L knee is a swollen erythematous mass and that is tender and is limiting LLE ROM 2/2 pain.  Neuro: Alert and oriented x3, motor and sensory intact  Skin: Good color, turgor, texture with no gross lesions, no eruptions, no rashes, no subcutaneous nodules and normal temperature.     LABS:                    6.7    9.16  )-----------( 279      ( 25 Sep 2023 20:50 )             22.0     09-25    143  |  104  |  37<H>  ----------------------------<  113<H>  4.0   |  33<H>  |  1.10    Ca    8.3<L>      25 Sep 2023 20:50  Mg     1.9     09-25    TPro  6.3  /  Alb  2.0<L>  /  TBili  0.5  /  DBili  x   /  AST  29  /  ALT  24  /  AlkPhos  91  09-25    PT/INR - ( 25 Sep 2023 20:50 )   PT: 16.2 sec;   INR: 1.40 ratio   PTT - ( 25 Sep 2023 20:50 )  PTT:26.6 sec  Urinalysis Basic - ( 25 Sep 2023 20:50 )    Color: x / Appearance: x / SG: x / pH: x  Gluc: 113 mg/dL / Ketone: x  / Bili: x / Urobili: x   Blood: x / Protein: x / Nitrite: x   Leuk Esterase: x / RBC: x / WBC x   Sq Epi: x / Non Sq Epi: x / Bacteria: x      LIVER FUNCTIONS - ( 25 Sep 2023 20:50 )  Alb: 2.0 g/dL / Pro: 6.3 g/dL / ALK PHOS: 91 U/L / ALT: 24 U/L / AST: 29 U/L / GGT: x           RADIOLOGY & ADDITIONAL STUDIES:  ACC: [id number] EXAM:  CT ABDOMEN AND PELVIS IC   ORDERED BY: SARA FARMER   PROCEDURE DATE:  09/25/2023    COMPARISON: 9/4/2023 CT abdomen pelvis.    FINDINGS:  LOWER CHEST: No acute findings. Cardiomegaly, implanted cardiac device   leads partially visible similar to prior.    LIVER: Within normal limits.  BILE DUCTS: Normal caliber.  GALLBLADDER: Cholelithiasis, no evidence cholecystitis.  SPLEEN: Within normal limits.  PANCREAS: Within normal limits.  ADRENALS: Within normal limits.  KIDNEYS/URETERS: No hydronephrosis or concerning mass or acute finding.   Left greater than right simple renal cysts.    BLADDER: Status post cystectomy. Cystectomy operative bed with no   concerning interval change.  REPRODUCTIVE ORGANS: Status post prostatectomy.    BOWEL: No obstruction or inflammation of the bowel. No active GI bleeding   is evident. Postoperative changes right lower quadrant status post   urostomy again demonstrated. Status post appendectomy.  PERITONEUM: No ascites.  VESSELS: No abdominal aortic aneurysm. Dense calcific atherosclerosis.   Portal and mesenteric veins patent.  RETROPERITONEUM/LYMPH NODES: No lymphadenopathy.  ABDOMINAL WALL: Soft tissue swelling within the left lateral/posterior   lower chest wall muscles has decreased. Left greater than right lower   extremity soft tissue edema partially visible, also decreased. Now simple   density fluid collection in the subcutaneous fat along the medial   inferior aspect of the urostomy is similar in size compared to the   previous study.  BONES: No acute osseous abnormality. Fusion of the spinous processes at   L3-4 and L4-5 again demonstrated. Schmorl's node superior endplate T12.   Degenerative changes.    IMPRESSION:  No active GI bleeding is evident. No evidence of interval hemorrhage   compared to 9/4/2023 to explain the patient's low hemoglobin.    The hematoma along the medial inferior aspect of the urostomy in the   right of midline anterior abdominal wall subcutaneous fat has organized   and now has appearance of a chronic hematoma or seroma. Other chronic   findings as above.    --- End of Report ---   CHRISTOPHER ROWLAND MD; Attending Radiologist    ASSESSMENT:  79 y/o with history of bladder CA with urostomy 14 years ago, A-fib on warfarin, HTN, HLD, CAD with bypass two-vessel, and 1 cardiac stent [facility] revealed anemia. Of note, pt recently presented to [facility] ER on 08/22/23 w/ SBO w/ transition point proximal to his urostomy. Pt then transferred to [facility] from 08/22 to 09/12,  underwent ex-lap for SBO w/ KEANU w/ Abthera VAC placement and re-ex-lap w/ para-stomal hernia repair and abdominal wall closure 2 days later. Hospital course c/b GI bleed, managed conservatively.   Afebrile, low grade tachycardia appreciated.  Anemic to Hgb 6.8. FOB+. Pt unable to recall if BMs grossly bloody.  Subtherapeutic INR noted.    CT findings as above, general surgery consulted for abdominal wall hematoma. Hematoma seen on previous CTAP from 09/04 without significant change in size, now described as "Chronic hematoma."  Likely source of anemia from GI tract although unable to effectively rule out hematoma as source.    PLAN:    - Regular diet  - Transfuse prn; Trend H/H  - Hold AC at present  - CTA A/P may be pursued if pt persistently anemic  - No emergent surgical indication at this time, will follow SURGERY PA CONSULT NOTE:    CHIEF COMPLAINT:  Patient is a 78y old  Male who presents with a chief complaint of anemia.    HPI FROM ED:  79 y/o with history of bladder CA with urostomy 14 years ago, A-fib on warfarin, HTN, HLD, CAD with bypass two-vessel, and 1 cardiac stent presenting from [facility] rehab facility after blood work revealed anemia. Of note, pt recently presented to [facility] ER on 08/22/23 w/ SBO w/ transition point proximal to his urostomy. Pt then transferred to [facility] from 08/22 to 09/12 for escalation of care. Per chart review, pt ultimately underwent ex-lap for SBO w/ KEANU w/ Abthera VAC placement and re-ex-lap w/ para-stomal hernia repair and abdominal wall closure 2 days later. Hospital course c/b GI bleed, necessitating ICU admission and intubation, however was managed conservatively. Pt eventually deemed stable for discharge and sent to [facility] for [facility].    General surgery consulted after abdominal wall hematoma noted on CTAP w/ IV contrast, described as follows "Simple density fluid collection in the subcutaneous fat along the medial inferior aspect of the urostomy is similar in size compared to the previous study. The hematoma along the medial inferior aspect of the urostomy in the right of midline anterior abdominal wall subcutaneous fat has organized and now has appearance of a chronic hematoma or seroma.". At time of this provider's encounter, pt does not offer any complaints and is comfortable, is able to verify abovementioned hospital course. Pt denies abdominal pain and endorses bowel function, however is unable to endorse whether or not he has had blood in his stool.  Pt denies subjective fever, chills, N/V, change in urinary/bowel function, or LOC.    PAST MEDICAL & SURGICAL HISTORY:  Atrial fibrillation    Hypothyroid    Hypertension    Bladder cancer    Bronchitis    Former smoker    CAD (coronary artery disease)    History of bladder surgery  on urostomy    S/P CABG x 3    History of automatic internal cardiac defibrillator (AICD)    H/O knee surgery    REVIEW OF SYSTEMS:  General/Constitutional: No acute distress, no headache, weakness, fevers, or chills   Respiratory: Denies cough/hemoptysis, denies wheezing/SOB/dyspnea  Cardiac: Denies chest pain, palpitations  Abdomen: SEE HPI  Extremities: Denies sores, swelling, discoloration bilat UE/LE  Genitourinary: Denies urinary issues or complaints, denies dysuria/hematuria  Neuro: Denies weakness, paraesthesias, paralysis, syncope, loss of vision  Skin: Denies pruritus, pain, rashes  Psych: Denies hallucinations, visual disturbances, or depression    MEDICATIONS:  Home Medications:  acetaminophen 325 mg oral tablet: 3 tab(s) orally every 6 hours As needed Mild Pain (1 - 3) (12 Sep 2023 13:24)  digoxin 125 mcg (0.125 mg) oral tablet: 1 orally once a day (14 Jul 2023 06:47)  Entresto 24 mg-26 mg oral tablet: 1 tablet orally 2 times a day (14 Jul 2023 15:40)  furosemide 20 mg oral tablet: 1 tablet orally once a day (14 Jul 2023 15:40)  ipratropium-albuterol 0.5 mg-2.5 mg/3 mL inhalation solution: 3 milliliter(s) inhaled every 6 hours As needed Wheezing (12 Sep 2023 13:24)  levothyroxine 137 mcg (0.137 mg) oral tablet: 1 tab(s) orally once a day (14 Jul 2023 06:47)  melatonin 5 mg oral tablet: 1 tab(s) orally once a day (at bedtime) (12 Sep 2023 13:24)  metoprolol tartrate 25 mg oral tablet: 1 tab(s) orally 2 times a day (12 Sep 2023 13:24)  pantoprazole 40 mg oral delayed release tablet: 1 tab(s) orally every 12 hours (12 Sep 2023 13:33)  polyethylene glycol 3350 oral powder for reconstitution: 17 gram(s) orally once a day (12 Sep 2023 13:24)  rosuvastatin 40 mg oral tablet: 1 orally once a day (14 Jul 2023 06:47)  senna leaf extract oral tablet: 1 tab(s) orally once a day (at bedtime) (12 Sep 2023 13:24)  warfarin 5 mg oral tablet: 1 tab(s) orally once a day (14 Jul 2023 06:47)    MEDICATIONS  (STANDING):  furosemide   Injectable 20 milliGRAM(s) IV Push Once    ALLERGIES:  No known allergies    SOCIAL HISTORY:  Denies tobacco product, alcohol, and elicit drug use.    FAMILY HISTORY:  Family history of heart attack (Father, Mother)    VITAL SIGNS:  Vital Signs Last 24 Hrs  T(C): 36.6 (25 Sep 2023 22:49), Max: 36.8 (25 Sep 2023 18:36)  T(F): 97.8 (25 Sep 2023 22:49), Max: 98.2 (25 Sep 2023 18:36)  HR: 107 (25 Sep 2023 22:49) (100 - 107)  BP: 110/54 (25 Sep 2023 22:49) (110/54 - 115/48)  BP(mean): --  RR: 18 (25 Sep 2023 22:49) (18 - 18)  SpO2: 100% (25 Sep 2023 22:49) (99% - 100%)    Parameters below as of 25 Sep 2023 22:49  Patient On (Oxygen Delivery Method): room air    PHYSICAL EXAM:  General: No acute distress, appears comfortable, well-groomed, appears stated age, elderly male.  Head, Eyes, Ears, Nose, Throat: Normal cephalic/atraumatic, anicteric, conjunctiva-non injected and moist  Abdomen: Midline incision from recent procedure well-approximated w/ overlying steri-strips, soft and no shara-incisional ttp; Abdomen soft, nondistended and nontender; no guarding, no rebound ttp, no peritonitic features  Urostomy pink, straw-colored urine in collection bag. Medial to urostomy on deep palpation is a fist-sized firm collection that is nontender, without overlying skin changes.  Extremity: No open sores, no gross deformities, posterior to L knee is a swollen erythematous mass and that is tender and is limiting LLE ROM 2/2 pain.  Neuro: Alert and oriented x3, motor and sensory intact  Skin: Good color, turgor, texture with no gross lesions, no eruptions, no rashes, no subcutaneous nodules and normal temperature.     LABS:                    6.7    9.16  )-----------( 279      ( 25 Sep 2023 20:50 )             22.0     09-25    143  |  104  |  37<H>  ----------------------------<  113<H>  4.0   |  33<H>  |  1.10    Ca    8.3<L>      25 Sep 2023 20:50  Mg     1.9     09-25    TPro  6.3  /  Alb  2.0<L>  /  TBili  0.5  /  DBili  x   /  AST  29  /  ALT  24  /  AlkPhos  91  09-25    PT/INR - ( 25 Sep 2023 20:50 )   PT: 16.2 sec;   INR: 1.40 ratio   PTT - ( 25 Sep 2023 20:50 )  PTT:26.6 sec  Urinalysis Basic - ( 25 Sep 2023 20:50 )    Color: x / Appearance: x / SG: x / pH: x  Gluc: 113 mg/dL / Ketone: x  / Bili: x / Urobili: x   Blood: x / Protein: x / Nitrite: x   Leuk Esterase: x / RBC: x / WBC x   Sq Epi: x / Non Sq Epi: x / Bacteria: x      LIVER FUNCTIONS - ( 25 Sep 2023 20:50 )  Alb: 2.0 g/dL / Pro: 6.3 g/dL / ALK PHOS: 91 U/L / ALT: 24 U/L / AST: 29 U/L / GGT: x           RADIOLOGY & ADDITIONAL STUDIES:  ACC: [id number] EXAM:  CT ABDOMEN AND PELVIS IC   ORDERED BY: SARA FARMER   PROCEDURE DATE:  09/25/2023    COMPARISON: 9/4/2023 CT abdomen pelvis.    FINDINGS:  LOWER CHEST: No acute findings. Cardiomegaly, implanted cardiac device   leads partially visible similar to prior.    LIVER: Within normal limits.  BILE DUCTS: Normal caliber.  GALLBLADDER: Cholelithiasis, no evidence cholecystitis.  SPLEEN: Within normal limits.  PANCREAS: Within normal limits.  ADRENALS: Within normal limits.  KIDNEYS/URETERS: No hydronephrosis or concerning mass or acute finding.   Left greater than right simple renal cysts.    BLADDER: Status post cystectomy. Cystectomy operative bed with no   concerning interval change.  REPRODUCTIVE ORGANS: Status post prostatectomy.    BOWEL: No obstruction or inflammation of the bowel. No active GI bleeding   is evident. Postoperative changes right lower quadrant status post   urostomy again demonstrated. Status post appendectomy.  PERITONEUM: No ascites.  VESSELS: No abdominal aortic aneurysm. Dense calcific atherosclerosis.   Portal and mesenteric veins patent.  RETROPERITONEUM/LYMPH NODES: No lymphadenopathy.  ABDOMINAL WALL: Soft tissue swelling within the left lateral/posterior   lower chest wall muscles has decreased. Left greater than right lower   extremity soft tissue edema partially visible, also decreased. Now simple   density fluid collection in the subcutaneous fat along the medial   inferior aspect of the urostomy is similar in size compared to the   previous study.  BONES: No acute osseous abnormality. Fusion of the spinous processes at   L3-4 and L4-5 again demonstrated. Schmorl's node superior endplate T12.   Degenerative changes.    IMPRESSION:  No active GI bleeding is evident. No evidence of interval hemorrhage   compared to 9/4/2023 to explain the patient's low hemoglobin.    The hematoma along the medial inferior aspect of the urostomy in the   right of midline anterior abdominal wall subcutaneous fat has organized   and now has appearance of a chronic hematoma or seroma. Other chronic   findings as above.    --- End of Report ---   CHRISTOPHER ROWLAND MD; Attending Radiologist    ASSESSMENT:  79 y/o with history of bladder CA with urostomy 14 years ago, A-fib on warfarin, HTN, HLD, CAD with bypass two-vessel, and 1 cardiac stent [facility] revealed anemia. Of note, pt recently presented to [facility] ER on 08/22/23 w/ SBO w/ transition point proximal to his urostomy. Pt then transferred to [facility] from 08/22 to 09/12,  underwent ex-lap for SBO w/ KEANU w/ Abthera VAC placement and re-ex-lap w/ para-stomal hernia repair and abdominal wall closure 2 days later. Hospital course c/b GI bleed, managed conservatively.   Afebrile, low grade tachycardia appreciated.  Anemic to Hgb 6.8. FOB+. Pt unable to recall if BMs grossly bloody.  Subtherapeutic INR noted.    CT findings as above, general surgery consulted for abdominal wall hematoma. Hematoma seen on previous CTAP from 09/04 without significant change in size, now described as "Chronic hematoma."  Likely source of anemia from GI tract although unable to effectively rule out hematoma as source.    PLAN:    - Regular diet  - Transfuse prn; Trend H/H  - Hold AC at present  - CTA A/P may be pursued if pt persistently anemic  - No emergent surgical intervention at this time, will follow

## 2023-09-25 NOTE — ED PROVIDER NOTE - CARE PLAN
1 Principal Discharge DX:	Anemia   Principal Discharge DX:	Anemia  Secondary Diagnosis:	Cellulitis of left lower extremity

## 2023-09-26 ENCOUNTER — TRANSCRIPTION ENCOUNTER (OUTPATIENT)
Age: 78
End: 2023-09-26

## 2023-09-26 DIAGNOSIS — I50.9 HEART FAILURE, UNSPECIFIED: ICD-10-CM

## 2023-09-26 DIAGNOSIS — K21.9 GASTRO-ESOPHAGEAL REFLUX DISEASE WITHOUT ESOPHAGITIS: ICD-10-CM

## 2023-09-26 DIAGNOSIS — R93.89 ABNORMAL FINDINGS ON DIAGNOSTIC IMAGING OF OTHER SPECIFIED BODY STRUCTURES: ICD-10-CM

## 2023-09-26 DIAGNOSIS — K56.609 UNSPECIFIED INTESTINAL OBSTRUCTION, UNSPECIFIED AS TO PARTIAL VERSUS COMPLETE OBSTRUCTION: Chronic | ICD-10-CM

## 2023-09-26 DIAGNOSIS — E03.9 HYPOTHYROIDISM, UNSPECIFIED: ICD-10-CM

## 2023-09-26 DIAGNOSIS — E78.5 HYPERLIPIDEMIA, UNSPECIFIED: ICD-10-CM

## 2023-09-26 DIAGNOSIS — Z98.890 OTHER SPECIFIED POSTPROCEDURAL STATES: Chronic | ICD-10-CM

## 2023-09-26 DIAGNOSIS — J44.9 CHRONIC OBSTRUCTIVE PULMONARY DISEASE, UNSPECIFIED: ICD-10-CM

## 2023-09-26 DIAGNOSIS — I48.91 UNSPECIFIED ATRIAL FIBRILLATION: ICD-10-CM

## 2023-09-26 DIAGNOSIS — I25.10 ATHEROSCLEROTIC HEART DISEASE OF NATIVE CORONARY ARTERY WITHOUT ANGINA PECTORIS: ICD-10-CM

## 2023-09-26 DIAGNOSIS — D64.9 ANEMIA, UNSPECIFIED: ICD-10-CM

## 2023-09-26 DIAGNOSIS — I10 ESSENTIAL (PRIMARY) HYPERTENSION: ICD-10-CM

## 2023-09-26 DIAGNOSIS — K92.2 GASTROINTESTINAL HEMORRHAGE, UNSPECIFIED: ICD-10-CM

## 2023-09-26 DIAGNOSIS — Z29.9 ENCOUNTER FOR PROPHYLACTIC MEASURES, UNSPECIFIED: ICD-10-CM

## 2023-09-26 DIAGNOSIS — M66.0 RUPTURE OF POPLITEAL CYST: ICD-10-CM

## 2023-09-26 LAB
A1C WITH ESTIMATED AVERAGE GLUCOSE RESULT: 5.6 % — SIGNIFICANT CHANGE UP (ref 4–5.6)
ALBUMIN SERPL ELPH-MCNC: 1.9 G/DL — LOW (ref 3.3–5)
ALP SERPL-CCNC: 90 U/L — SIGNIFICANT CHANGE UP (ref 40–120)
ALT FLD-CCNC: 24 U/L — SIGNIFICANT CHANGE UP (ref 12–78)
ANION GAP SERPL CALC-SCNC: 4 MMOL/L — LOW (ref 5–17)
AST SERPL-CCNC: 27 U/L — SIGNIFICANT CHANGE UP (ref 15–37)
BILIRUB SERPL-MCNC: 0.5 MG/DL — SIGNIFICANT CHANGE UP (ref 0.2–1.2)
BUN SERPL-MCNC: 32 MG/DL — HIGH (ref 7–23)
CALCIUM SERPL-MCNC: 8.2 MG/DL — LOW (ref 8.5–10.1)
CHLORIDE SERPL-SCNC: 104 MMOL/L — SIGNIFICANT CHANGE UP (ref 96–108)
CHOLEST SERPL-MCNC: 107 MG/DL — SIGNIFICANT CHANGE UP
CO2 SERPL-SCNC: 32 MMOL/L — HIGH (ref 22–31)
CREAT SERPL-MCNC: 1.1 MG/DL — SIGNIFICANT CHANGE UP (ref 0.5–1.3)
EGFR: 69 ML/MIN/1.73M2 — SIGNIFICANT CHANGE UP
ESTIMATED AVERAGE GLUCOSE: 114 MG/DL — SIGNIFICANT CHANGE UP (ref 68–114)
GLUCOSE SERPL-MCNC: 109 MG/DL — HIGH (ref 70–99)
HCT VFR BLD CALC: 25 % — LOW (ref 39–50)
HCT VFR BLD CALC: 26.7 % — LOW (ref 39–50)
HDLC SERPL-MCNC: 34 MG/DL — LOW
HGB BLD-MCNC: 7.6 G/DL — LOW (ref 13–17)
HGB BLD-MCNC: 8.4 G/DL — LOW (ref 13–17)
INR BLD: 1.39 RATIO — HIGH (ref 0.85–1.18)
LIPID PNL WITH DIRECT LDL SERPL: 48 MG/DL — SIGNIFICANT CHANGE UP
MAGNESIUM SERPL-MCNC: 1.9 MG/DL — SIGNIFICANT CHANGE UP (ref 1.6–2.6)
MCHC RBC-ENTMCNC: 27.7 PG — SIGNIFICANT CHANGE UP (ref 27–34)
MCHC RBC-ENTMCNC: 28.1 PG — SIGNIFICANT CHANGE UP (ref 27–34)
MCHC RBC-ENTMCNC: 30.4 GM/DL — LOW (ref 32–36)
MCHC RBC-ENTMCNC: 31.5 GM/DL — LOW (ref 32–36)
MCV RBC AUTO: 89.3 FL — SIGNIFICANT CHANGE UP (ref 80–100)
MCV RBC AUTO: 91.2 FL — SIGNIFICANT CHANGE UP (ref 80–100)
NON HDL CHOLESTEROL: 73 MG/DL — SIGNIFICANT CHANGE UP
NRBC # BLD: 0 /100 WBCS — SIGNIFICANT CHANGE UP (ref 0–0)
NRBC # BLD: 0 /100 WBCS — SIGNIFICANT CHANGE UP (ref 0–0)
PHOSPHATE SERPL-MCNC: 2.5 MG/DL — SIGNIFICANT CHANGE UP (ref 2.5–4.5)
PLATELET # BLD AUTO: 260 K/UL — SIGNIFICANT CHANGE UP (ref 150–400)
PLATELET # BLD AUTO: 284 K/UL — SIGNIFICANT CHANGE UP (ref 150–400)
POTASSIUM SERPL-MCNC: 3.9 MMOL/L — SIGNIFICANT CHANGE UP (ref 3.5–5.3)
POTASSIUM SERPL-SCNC: 3.9 MMOL/L — SIGNIFICANT CHANGE UP (ref 3.5–5.3)
PROT SERPL-MCNC: 6.3 G/DL — SIGNIFICANT CHANGE UP (ref 6–8.3)
PROTHROM AB SERPL-ACNC: 16.1 SEC — HIGH (ref 9.5–13)
RBC # BLD: 2.74 M/UL — LOW (ref 4.2–5.8)
RBC # BLD: 2.99 M/UL — LOW (ref 4.2–5.8)
RBC # FLD: 15.6 % — HIGH (ref 10.3–14.5)
RBC # FLD: 15.7 % — HIGH (ref 10.3–14.5)
SODIUM SERPL-SCNC: 140 MMOL/L — SIGNIFICANT CHANGE UP (ref 135–145)
T3 SERPL-MCNC: 51 NG/DL — LOW (ref 80–200)
T4 AB SER-ACNC: 4 UG/DL — LOW (ref 4.6–12)
TRIGL SERPL-MCNC: 141 MG/DL — SIGNIFICANT CHANGE UP
TSH SERPL-MCNC: 16.5 UIU/ML — HIGH (ref 0.36–3.74)
WBC # BLD: 7.7 K/UL — SIGNIFICANT CHANGE UP (ref 3.8–10.5)
WBC # BLD: 8.08 K/UL — SIGNIFICANT CHANGE UP (ref 3.8–10.5)
WBC # FLD AUTO: 7.7 K/UL — SIGNIFICANT CHANGE UP (ref 3.8–10.5)
WBC # FLD AUTO: 8.08 K/UL — SIGNIFICANT CHANGE UP (ref 3.8–10.5)

## 2023-09-26 PROCEDURE — 99223 1ST HOSP IP/OBS HIGH 75: CPT | Mod: GC

## 2023-09-26 PROCEDURE — 99223 1ST HOSP IP/OBS HIGH 75: CPT

## 2023-09-26 PROCEDURE — ZZZZZ: CPT

## 2023-09-26 RX ORDER — SENNA PLUS 8.6 MG/1
1 TABLET ORAL AT BEDTIME
Refills: 0 | Status: DISCONTINUED | OUTPATIENT
Start: 2023-09-26 | End: 2023-09-29

## 2023-09-26 RX ORDER — POLYETHYLENE GLYCOL 3350 17 G/17G
17 POWDER, FOR SOLUTION ORAL DAILY
Refills: 0 | Status: DISCONTINUED | OUTPATIENT
Start: 2023-09-26 | End: 2023-09-29

## 2023-09-26 RX ORDER — TIOTROPIUM BROMIDE 18 UG/1
2 CAPSULE ORAL; RESPIRATORY (INHALATION) DAILY
Refills: 0 | Status: DISCONTINUED | OUTPATIENT
Start: 2023-09-26 | End: 2023-09-29

## 2023-09-26 RX ORDER — FUROSEMIDE 40 MG
20 TABLET ORAL ONCE
Refills: 0 | Status: COMPLETED | OUTPATIENT
Start: 2023-09-26 | End: 2023-09-26

## 2023-09-26 RX ORDER — SACUBITRIL AND VALSARTAN 24; 26 MG/1; MG/1
1 TABLET, FILM COATED ORAL
Refills: 0 | Status: DISCONTINUED | OUTPATIENT
Start: 2023-09-26 | End: 2023-09-29

## 2023-09-26 RX ORDER — ALBUTEROL 90 UG/1
2 AEROSOL, METERED ORAL EVERY 6 HOURS
Refills: 0 | Status: DISCONTINUED | OUTPATIENT
Start: 2023-09-26 | End: 2023-09-29

## 2023-09-26 RX ORDER — ACETAMINOPHEN 500 MG
650 TABLET ORAL EVERY 6 HOURS
Refills: 0 | Status: DISCONTINUED | OUTPATIENT
Start: 2023-09-26 | End: 2023-09-29

## 2023-09-26 RX ORDER — INFLUENZA VIRUS VACCINE 15; 15; 15; 15 UG/.5ML; UG/.5ML; UG/.5ML; UG/.5ML
0.7 SUSPENSION INTRAMUSCULAR ONCE
Refills: 0 | Status: DISCONTINUED | OUTPATIENT
Start: 2023-09-26 | End: 2023-09-29

## 2023-09-26 RX ORDER — CLOPIDOGREL BISULFATE 75 MG/1
75 TABLET, FILM COATED ORAL DAILY
Refills: 0 | Status: DISCONTINUED | OUTPATIENT
Start: 2023-09-26 | End: 2023-09-29

## 2023-09-26 RX ORDER — CALCIUM CARBONATE 500(1250)
1 TABLET ORAL
Refills: 0 | DISCHARGE

## 2023-09-26 RX ORDER — PANTOPRAZOLE SODIUM 20 MG/1
40 TABLET, DELAYED RELEASE ORAL
Refills: 0 | Status: DISCONTINUED | OUTPATIENT
Start: 2023-09-26 | End: 2023-09-29

## 2023-09-26 RX ORDER — ATORVASTATIN CALCIUM 80 MG/1
80 TABLET, FILM COATED ORAL AT BEDTIME
Refills: 0 | Status: DISCONTINUED | OUTPATIENT
Start: 2023-09-26 | End: 2023-09-29

## 2023-09-26 RX ORDER — DIGOXIN 250 MCG
125 TABLET ORAL DAILY
Refills: 0 | Status: DISCONTINUED | OUTPATIENT
Start: 2023-09-26 | End: 2023-09-29

## 2023-09-26 RX ORDER — DIGOXIN 250 MCG
1 TABLET ORAL
Refills: 0 | DISCHARGE

## 2023-09-26 RX ORDER — METOPROLOL TARTRATE 50 MG
25 TABLET ORAL
Refills: 0 | Status: DISCONTINUED | OUTPATIENT
Start: 2023-09-26 | End: 2023-09-27

## 2023-09-26 RX ORDER — FUROSEMIDE 40 MG
40 TABLET ORAL DAILY
Refills: 0 | Status: DISCONTINUED | OUTPATIENT
Start: 2023-09-26 | End: 2023-09-28

## 2023-09-26 RX ORDER — ROSUVASTATIN CALCIUM 5 MG/1
1 TABLET ORAL
Refills: 0 | DISCHARGE

## 2023-09-26 RX ORDER — LEVOTHYROXINE SODIUM 125 MCG
137 TABLET ORAL DAILY
Refills: 0 | Status: DISCONTINUED | OUTPATIENT
Start: 2023-09-26 | End: 2023-09-27

## 2023-09-26 RX ORDER — CALCIUM CARBONATE 500(1250)
1 TABLET ORAL
Refills: 0 | Status: DISCONTINUED | OUTPATIENT
Start: 2023-09-26 | End: 2023-09-29

## 2023-09-26 RX ORDER — FUROSEMIDE 40 MG
1 TABLET ORAL
Refills: 0 | DISCHARGE

## 2023-09-26 RX ORDER — LANOLIN ALCOHOL/MO/W.PET/CERES
5 CREAM (GRAM) TOPICAL AT BEDTIME
Refills: 0 | Status: DISCONTINUED | OUTPATIENT
Start: 2023-09-26 | End: 2023-09-29

## 2023-09-26 RX ADMIN — ALBUTEROL 2 PUFF(S): 90 AEROSOL, METERED ORAL at 06:58

## 2023-09-26 RX ADMIN — PANTOPRAZOLE SODIUM 40 MILLIGRAM(S): 20 TABLET, DELAYED RELEASE ORAL at 06:58

## 2023-09-26 RX ADMIN — PANTOPRAZOLE SODIUM 40 MILLIGRAM(S): 20 TABLET, DELAYED RELEASE ORAL at 17:11

## 2023-09-26 RX ADMIN — Medication 1 TABLET(S): at 18:01

## 2023-09-26 RX ADMIN — ALBUTEROL 2 PUFF(S): 90 AEROSOL, METERED ORAL at 13:18

## 2023-09-26 RX ADMIN — POLYETHYLENE GLYCOL 3350 17 GRAM(S): 17 POWDER, FOR SOLUTION ORAL at 11:47

## 2023-09-26 RX ADMIN — Medication 20 MILLIGRAM(S): at 14:39

## 2023-09-26 RX ADMIN — Medication 25 MILLIGRAM(S): at 00:44

## 2023-09-26 RX ADMIN — SACUBITRIL AND VALSARTAN 1 TABLET(S): 24; 26 TABLET, FILM COATED ORAL at 07:47

## 2023-09-26 RX ADMIN — Medication 1 TABLET(S): at 07:47

## 2023-09-26 RX ADMIN — SENNA PLUS 1 TABLET(S): 8.6 TABLET ORAL at 21:00

## 2023-09-26 RX ADMIN — Medication 125 MICROGRAM(S): at 06:59

## 2023-09-26 RX ADMIN — TIOTROPIUM BROMIDE 2 PUFF(S): 18 CAPSULE ORAL; RESPIRATORY (INHALATION) at 07:47

## 2023-09-26 RX ADMIN — ALBUTEROL 2 PUFF(S): 90 AEROSOL, METERED ORAL at 18:01

## 2023-09-26 RX ADMIN — CLOPIDOGREL BISULFATE 75 MILLIGRAM(S): 75 TABLET, FILM COATED ORAL at 11:46

## 2023-09-26 RX ADMIN — ATORVASTATIN CALCIUM 80 MILLIGRAM(S): 80 TABLET, FILM COATED ORAL at 21:00

## 2023-09-26 RX ADMIN — Medication 1 TABLET(S): at 11:46

## 2023-09-26 RX ADMIN — Medication 137 MICROGRAM(S): at 07:47

## 2023-09-26 RX ADMIN — Medication 40 MILLIGRAM(S): at 06:59

## 2023-09-26 RX ADMIN — Medication 20 MILLIGRAM(S): at 02:02

## 2023-09-26 RX ADMIN — Medication 25 MILLIGRAM(S): at 06:59

## 2023-09-26 RX ADMIN — Medication 5 MILLIGRAM(S): at 21:00

## 2023-09-26 NOTE — H&P ADULT - ATTENDING COMMENTS
78-year-old male with a PMH of bladder CA with urostomy 14 years ago, A-fib on warfarin, HTN, HLD, CAD s/p CABG + 1 cardiac stent, CHF, Hypothyroidism, GERD, COPD, SBO s/p ex lap of adhesions 8/2023 admitted for GI bleed.    Agree with above. Edited where appropriate

## 2023-09-26 NOTE — H&P ADULT - PROBLEM SELECTOR PLAN 6
BP on admission was 110/54  - Continue home Metoprolol 25mg daily  - Continue home Entresto BID   - Monitor BP

## 2023-09-26 NOTE — CONSULT NOTE ADULT - ATTENDING COMMENTS
78-year-old male with a PMH  bladder CA with urostomy 14 years ago, A-fib on warfarin, HTN, HLD, CAD s/p CABG + 1 cardiac stent, HFrEF, Hypothyroidism, GERD, COPD, SBO s/p ex lap of adhesions 8/2023 admitted for GI bleed.    no acute ischemia  known cad s/p remote cabg and more recent pci of occluded lcx, 7/2023  has been on plavix and ac  af, has been on warfarin  recent very complicated hospitalization with sbo, multiple surgeries and gib, not comprehensively evaluated at that time  known mild lv dysfxn, and is edematous on exam, likely in part from fluids and blood given during his last hospital stay  adm with severe anemia, getting blood  for now with soft bp would aim to have I=o, with plan to diurese more aggressively when bp firms  can continue hf meds as rich  cont plavix  ideally would be on dapt for his pci done in July, but will hold agents beyond plavix severe anemia/gib

## 2023-09-26 NOTE — CARE COORDINATION ASSESSMENT. - NSCAREPROVIDERS_GEN_ALL_CORE_FT
CARE PROVIDERS:  Accepting Physician: Shira Regan  Administration: Jens Franco  Administration: Karina Reaves  Administration: Denver Jolly  Administration: Roddy Lo  Administration: Danyell Kelly  Administration: Sara Graham  Admitting: Shira Regan  Attending: Shira Regan  Cardiology Technician: Shruthi Weinstein  Case Management: Melissa Peraza  Clinical Doc. Improvement: Rebecca Carroll  Consultant: Duong Coker  Consultant: Justin Lopez  Consultant: Weil, Patricia  Consultant: Сергей Santacruz  Consultant: Bryan Mccoy  Consultant: Purvi Glass  Consultant: Justin Damian  Consultant: Freddy Castillo  Covering Team: Dorothea Newsome  ED Attending: Oj Hercules  ED Nurse: Missy Copeland  ED Nurse 2: Naila Irvin  Nurse: Tsering Paniagua  Nurse: Angela Hampton  Ordered: ADM, User  Ordered: ServiceAccount, SCMMLM  Outpatient Provider: Efraín Chu  Outpatient Provider: Ross Ochoa  Outpatient Provider: Lucas Gaffney  Override: yashira Deluna  Override: Exime, Antide  PCA/Nursing Assistant: yashira Deluna  PCA/Nursing Assistant: Gatito Card  Physical Therapy: Suhas Aguilar  Primary Team: Shira Regan  Primary Team: Dima Vincent  Primary Team: Ricky Gomez  Primary Team: Kelly Lozano  Primary Team: Stefanie Faustin  Primary Team: Gamaliel Hernandez  : Doug Worrell  Student: Odalys Carter  Student: Quentin Estrella

## 2023-09-26 NOTE — PROGRESS NOTE ADULT - SUBJECTIVE AND OBJECTIVE BOX
no complaints  no bloody bms  ICU Vital Signs Last 24 Hrs  T(C): 36.5 (26 Sep 2023 12:23), Max: 36.9 (26 Sep 2023 01:24)  T(F): 97.7 (26 Sep 2023 12:23), Max: 98.4 (26 Sep 2023 01:24)  HR: 88 (26 Sep 2023 12:23) (78 - 107)  BP: 110/68 (26 Sep 2023 12:23) (101/56 - 125/63)  BP(mean): --  ABP: --  ABP(mean): --  RR: 20 (26 Sep 2023 12:23) (17 - 20)  SpO2: 96% (26 Sep 2023 12:23) (94% - 100%)    O2 Parameters below as of 26 Sep 2023 12:23  Patient On (Oxygen Delivery Method): room air        gen=-NAD  soft Nt/ND  incision healed.  some swelling medial to ostomy, nontender                            7.6    7.70  )-----------( 284      ( 26 Sep 2023 06:34 )             25.0   09-26    140  |  104  |  32<H>  ----------------------------<  109<H>  3.9   |  32<H>  |  1.10    Ca    8.2<L>      26 Sep 2023 06:34  Phos  2.5     09-26  Mg     1.9     09-26    TPro  6.3  /  Alb  1.9<L>  /  TBili  0.5  /  DBili  x   /  AST  27  /  ALT  24  /  AlkPhos  90  09-26

## 2023-09-26 NOTE — PROGRESS NOTE ADULT - PROBLEM SELECTOR PLAN 5
Patient has a hx of CAD s/p CABG and recent Cardiac Stent x1 (07/2023)  - Continue Plavix due to recent stent placement  - cardio consulted Patient has a hx of CAD s/p CABG and recent Cardiac Stent x1 (07/2023)  - Continue Plavix due to recent stent placement  - Cardio consulted

## 2023-09-26 NOTE — PATIENT PROFILE ADULT - NS PRO AD PATIENT TYPE
Health Care Proxy (HCP)/Do Not Resuscitate (DNR) Health Care Proxy (HCP)/Medical Orders for Life-Sustaining Treatment (MOLST)

## 2023-09-26 NOTE — PROGRESS NOTE ADULT - ASSESSMENT
77 yo with anemia.    cont serial cbc, with transfusion  Urology to f/o urostomy issue. likely seroma

## 2023-09-26 NOTE — H&P ADULT - PROBLEM SELECTOR PLAN 11
- Hold DVT prophylaxis anticoagulation due to +FOBT    - Continue extensive bowel regimen    - Continue all vitamins and minerals CT Ab/pelvis: hematoma along the medial inferior aspect of the urostomy in the right of midline anterior abdominal wall subcutaneous fat has organized and now has appearance of a chronic hematoma or seroma.   - Surgery Dr. Castillo consulted, recs as followed   - No emergent surgical intervention at this time, will follow

## 2023-09-26 NOTE — PROGRESS NOTE ADULT - PROBLEM SELECTOR PLAN 1
Patient sent from [facility] with a H/H 6.7/22  - CT Ab/pelvis: No active GI bleeding is evident. No evidence of interval hemorrhage compared to 9/4/2023 to explain the patient's low hemoglobin. Chronic hematoma noted  - FOBT +  - Clear liquid diet  - Blood type A+  - Repeat Type and Screen every 72hrs   - Continue IV Protonix 40mg BID   - F/u AM CBC  - Transfuse if Hgb <8 given cardiac history  - Transfuse one more unit packed rbc  - Hold home Coumadin   - Possible CTA A/P if persistently anemic   - GI Dr. Coker consulted, f/u recs  - Surgery Nabil Castillo consulted, f/u recs Patient sent from [facility] with a H/H 6.7/22  - CT Ab/pelvis: No active GI bleeding is evident. No evidence of interval hemorrhage compared to 9/4/2023 to explain the patient's low hemoglobin. Chronic hematoma noted  - FOBT +  - Clear liquid diet  - Blood type A+  - Repeat Type and Screen every 72hrs   - Continue IV Protonix 40mg BID   - F/u AM CBC  - Transfuse if Hgb <8 given cardiac history  - Transfuse 1U pRBC on 9/26; Last transfusion 1U pRBC on 9/25   - FU Post Transfusion CBC at 6pm   - Hold home Coumadin   - Possible CTA A/P if persistently anemic   - GI Dr. Coker consulted, f/u recs  - Surgery Nabil Castillo consulted, f/u recs

## 2023-09-26 NOTE — CONSULT NOTE ADULT - SUBJECTIVE AND OBJECTIVE BOX
[facility]  Jose Maria Singh PA-C  [address] [place] [id number]  [phone]      Chief Complaint:  Patient is a 78y old  Male who presents with a chief complaint of GI Bleed (26 Sep 2023 11:58)      HPI: 78-year-old male with a PMH of bladder CA with urostomy 14 years ago, A-fib on warfarin, HTN, HLD, CAD s/p CABG, 1 cardiac stent (7/2023), CHF, Hypothyroidism, GERD, COPD, SBO s/p ex lap of adhesions 8/2023 presents to the ED from [facility] with low hemoglobin. Patient was recently discharged from [facility] s/p ex lap of adhesions for SBO, and a parastomal hernia repair with mesh. During that admission, his stay was complicated by altered mental status and he was intubated. He also was found to have a GI bleed, in which he was transfused and it resolved on its own. [facility] Recently, patient also developed left lower extremity pain. The pain is making him unable to walk, causing him to be wheelchair bound. There is swelling and redness of the LLE, and the area is TTP. Today, the patient is complaining of SOB, fatigue, and LLE pain. Patient states his last BM was 2 days ago, and he did not notice any blood in his stool. He denies fever, chills, chest pain, palpitations, cough, abdominal pain, nausea, vomiting, diarrhea, hematochezia, melena, hematuria, headaches, changes in vision, dizziness, numbness, tingling. No other complaints at this time.    Allergies:  No Known Allergies      Medications:  acetaminophen     Tablet .. 650 milliGRAM(s) Oral every 6 hours PRN  albuterol    90 MICROgram(s) HFA Inhaler 2 Puff(s) Inhalation every 6 hours  atorvastatin 80 milliGRAM(s) Oral at bedtime  calcium carbonate   1250 mG (OsCal) 1 Tablet(s) Oral two times a day  clopidogrel Tablet 75 milliGRAM(s) Oral daily  digoxin     Tablet 125 MICROGram(s) Oral daily  furosemide    Tablet 40 milliGRAM(s) Oral daily  furosemide   Injectable 20 milliGRAM(s) IV Push once  influenza  Vaccine (HIGH DOSE) 0.7 milliLiter(s) IntraMuscular once  levothyroxine 137 MICROGram(s) Oral daily  melatonin 5 milliGRAM(s) Oral at bedtime  metoprolol tartrate 25 milliGRAM(s) Oral two times a day  multivitamin 1 Tablet(s) Oral daily  pantoprazole  Injectable 40 milliGRAM(s) IV Push two times a day  polyethylene glycol 3350 17 Gram(s) Oral daily  sacubitril 24 mG/valsartan 26 mG 1 Tablet(s) Oral two times a day  saline laxative (FLEET) Rectal Enema 1 Enema Rectal daily PRN  senna 1 Tablet(s) Oral at bedtime  tiotropium 2.5 MICROgram(s) Inhaler 2 Puff(s) Inhalation daily      PMHX/PSHX:  Atrial fibrillation    Hypothyroid    Hypertension    Bladder cancer    Bronchitis    Former smoker    CAD (coronary artery disease)    History of bladder surgery    S/P CABG x 3    History of automatic internal cardiac defibrillator (AICD)    H/O knee surgery    History of hernia repair    Small bowel obstruction        Family history:  Family history of heart attack (Father, Mother)        Social History:     ROS:     General:  no fevers, chills, night sweats, fatigue,   Eyes:  Good vision, no reported pain  ENT:  No sore throat, pain, runny nose, dysphagia  CV:  No pain, palpitations, hypo/hypertension  Resp:  No dyspnea, cough, tachypnea, wheezing  GI:  No pain, No nausea, No vomiting, No diarrhea, No constipation, No weight loss, No fever, No pruritis, No rectal bleeding, No tarry stools, No dysphagia,  :  No pain, bleeding, incontinence, nocturia  Muscle:  No pain, weakness  Neuro:  No weakness, tingling, memory problems  Psych:  No fatigue, insomnia, mood problems, depression  Endocrine:  No polyuria, polydipsia, cold/heat intolerance  Heme:  No petechiae, ecchymosis, easy bruisability  Skin:  No rash, tattoos, scars, edema      PHYSICAL EXAM:   Vital Signs:  Vital Signs Last 24 Hrs  T(C): 36.5 (26 Sep 2023 12:23), Max: 36.9 (26 Sep 2023 01:24)  T(F): 97.7 (26 Sep 2023 12:23), Max: 98.4 (26 Sep 2023 01:24)  HR: 88 (26 Sep 2023 12:23) (78 - 107)  BP: 110/68 (26 Sep 2023 12:23) (101/56 - 125/63)  BP(mean): --  RR: 20 (26 Sep 2023 12:23) (17 - 20)  SpO2: 96% (26 Sep 2023 12:23) (94% - 100%)    Parameters below as of 26 Sep 2023 12:23  Patient On (Oxygen Delivery Method): room air      Daily Height in cm: 177.8 (25 Sep 2023 18:36)    Daily     GENERAL:  Appears stated age,   HEENT:  NC/AT,    CHEST:  Full & symmetric excursion,   HEART:  Regular rhythm  ABDOMEN:  Soft, non-tender, non-distended,   EXTEREMITIES:  no cyanosis,clubbing or edema  SKIN:  No rash  NEURO:  Alert,    LABS:                        7.6    7.70  )-----------( 284      ( 26 Sep 2023 06:34 )             25.0     09-26    140  |  104  |  32<H>  ----------------------------<  109<H>  3.9   |  32<H>  |  1.10    Ca    8.2<L>      26 Sep 2023 06:34  Phos  2.5     09-26  Mg     1.9     09-26    TPro  6.3  /  Alb  1.9<L>  /  TBili  0.5  /  DBili  x   /  AST  27  /  ALT  24  /  AlkPhos  90  09-26    LIVER FUNCTIONS - ( 26 Sep 2023 06:34 )  Alb: 1.9 g/dL / Pro: 6.3 g/dL / ALK PHOS: 90 U/L / ALT: 24 U/L / AST: 27 U/L / GGT: x           PT/INR - ( 26 Sep 2023 06:34 )   PT: 16.1 sec;   INR: 1.39 ratio         PTT - ( 25 Sep 2023 20:50 )  PTT:26.6 sec  Urinalysis Basic - ( 26 Sep 2023 06:34 )    Color: x / Appearance: x / SG: x / pH: x  Gluc: 109 mg/dL / Ketone: x  / Bili: x / Urobili: x   Blood: x / Protein: x / Nitrite: x   Leuk Esterase: x / RBC: x / WBC x   Sq Epi: x / Non Sq Epi: x / Bacteria: x      Amylase Serum--      Lipase [id number]       Ammonia--      Imaging:

## 2023-09-26 NOTE — DISCHARGE NOTE PROVIDER - NSDCMRMEDTOKEN_GEN_ALL_CORE_FT
acetaminophen 325 mg oral tablet: 3 tab(s) orally every 6 hours As needed Mild Pain (1 - 3)  clopidogrel 75 mg oral tablet: 1 tab(s) orally once a day  digoxin 125 mcg (0.125 mg) oral tablet: 1 orally once a day  Entresto 24 mg-26 mg oral tablet: 1 tablet orally 2 times a day  Fleet Enema 19 g-7 g rectal enema: 133 milliliter(s) rectally once a day as needed for  constipation  furosemide 40 mg oral tablet: 1 tab(s) orally once a day  ipratropium-albuterol 0.5 mg-2.5 mg/3 mL inhalation solution: 3 milliliter(s) inhaled every 6 hours As needed Wheezing  levothyroxine 137 mcg (0.137 mg) oral tablet: 1 tab(s) orally once a day  melatonin 5 mg oral tablet: 1 tab(s) orally once a day (at bedtime)  metoprolol tartrate 25 mg oral tablet: 1 tab(s) orally 2 times a day  Multiple Vitamins oral tablet: 1 tab(s) orally once a day  Oyster Aldo 500 oral tablet: 1 tab(s) orally 2 times a day  pantoprazole 40 mg oral delayed release tablet: 1 tab(s) orally every 12 hours  polyethylene glycol 3350 oral powder for reconstitution: 17 gram(s) orally once a day  rosuvastatin 40 mg oral tablet: 1 orally once a day  senna leaf extract oral tablet: 1 tab(s) orally once a day (at bedtime)  warfarin 5 mg oral tablet: 1 tab(s) orally once a day   acetaminophen 325 mg oral tablet: 3 tab(s) orally every 6 hours As needed Mild Pain (1 - 3)  clopidogrel 75 mg oral tablet: 1 tab(s) orally once a day  digoxin 125 mcg (0.125 mg) oral tablet: 1 orally once a day  Fleet Enema 19 g-7 g rectal enema: 133 milliliter(s) rectally once a day as needed for  constipation  furosemide 40 mg oral tablet: 1 tab(s) orally once a day  ipratropium-albuterol 0.5 mg-2.5 mg/3 mL inhalation solution: 3 milliliter(s) inhaled every 6 hours As needed Wheezing  levothyroxine 137 mcg (0.137 mg) oral tablet: 1 tab(s) orally once a day  melatonin 5 mg oral tablet: 1 tab(s) orally once a day (at bedtime)  Multiple Vitamins oral tablet: 1 tab(s) orally once a day  Oyster Aldo 500 oral tablet: 1 tab(s) orally 2 times a day  pantoprazole 40 mg oral delayed release tablet: 1 tab(s) orally every 12 hours  polyethylene glycol 3350 oral powder for reconstitution: 17 gram(s) orally once a day  rosuvastatin 40 mg oral tablet: 1 orally once a day  sacubitril-valsartan 24 mg-26 mg oral tablet: 1 tab(s) orally 2 times a day  senna leaf extract oral tablet: 1 tab(s) orally once a day (at bedtime)   acetaminophen 325 mg oral tablet: 3 tab(s) orally every 6 hours As needed Mild Pain (1 - 3)  albuterol 90 mcg/inh inhalation aerosol: 2 puff(s) inhaled every 6 hours  ascorbic acid 500 mg oral tablet: 1 tab(s) orally 2 times a day  budesonide: 1 puff(s) once a day  clopidogrel 75 mg oral tablet: 1 tab(s) orally once a day  digoxin 125 mcg (0.125 mg) oral tablet: 1 orally once a day  Fleet Enema 19 g-7 g rectal enema: 133 milliliter(s) rectally once a day as needed for  constipation  furosemide 40 mg oral tablet: 1 tab(s) orally once a day  ipratropium-albuterol 0.5 mg-2.5 mg/3 mL inhalation solution: 3 milliliter(s) inhaled every 6 hours As needed Wheezing  levothyroxine 137 mcg (0.137 mg) oral tablet: 1 tab(s) orally once a day  lidocaine 4% topical film: Apply topically to affected area once a day  melatonin 5 mg oral tablet: 1 tab(s) orally once a day (at bedtime)  metoprolol succinate 50 mg oral tablet, extended release: 1 tab(s) orally once a day  Multiple Vitamins oral tablet: 1 tab(s) orally once a day  Oyster Aldo 500 oral tablet: 1 tab(s) orally 2 times a day  pantoprazole 40 mg oral delayed release tablet: 1 tab(s) orally once a day  polyethylene glycol 3350 oral powder for reconstitution: 17 gram(s) orally once a day  rosuvastatin 40 mg oral tablet: 1 orally once a day  sacubitril-valsartan 24 mg-26 mg oral tablet: 1 tab(s) orally 2 times a day  senna leaf extract oral tablet: 1 tab(s) orally once a day (at bedtime)  warfarin 5 mg oral tablet: 1 tab(s) orally once a day (at bedtime)   acetaminophen 325 mg oral tablet: 3 tab(s) orally every 6 hours As needed Mild Pain (1 - 3)  albuterol 90 mcg/inh inhalation aerosol: 2 puff(s) inhaled every 6 hours  ascorbic acid 500 mg oral tablet: 1 tab(s) orally 2 times a day  clopidogrel 75 mg oral tablet: 1 tab(s) orally once a day  digoxin 125 mcg (0.125 mg) oral tablet: 1 orally once a day  Fleet Enema 19 g-7 g rectal enema: 133 milliliter(s) rectally once a day as needed for  constipation  furosemide 40 mg oral tablet: 1 tab(s) orally once a day  ipratropium-albuterol 0.5 mg-2.5 mg/3 mL inhalation solution: 3 milliliter(s) inhaled every 6 hours As needed Wheezing  levothyroxine 137 mcg (0.137 mg) oral tablet: 1 tab(s) orally once a day  lidocaine 4% topical film: Apply topically to affected area once a day  melatonin 5 mg oral tablet: 1 tab(s) orally once a day (at bedtime)  metoprolol succinate 50 mg oral tablet, extended release: 1 tab(s) orally once a day  Multiple Vitamins oral tablet: 1 tab(s) orally once a day  Oyster Aldo 500 oral tablet: 1 tab(s) orally 2 times a day  pantoprazole 40 mg oral delayed release tablet: 1 tab(s) orally once a day  polyethylene glycol 3350 oral powder for reconstitution: 17 gram(s) orally once a day  rosuvastatin 40 mg oral tablet: 1 orally once a day  sacubitril-valsartan 24 mg-26 mg oral tablet: 1 tab(s) orally 2 times a day  senna leaf extract oral tablet: 1 tab(s) orally once a day (at bedtime)  warfarin 5 mg oral tablet: 1 tab(s) orally once a day (at bedtime)

## 2023-09-26 NOTE — DISCHARGE NOTE PROVIDER - CARE PROVIDER_API CALL
Deon Valentine  Internal Medicine  [address] [facility] [address], [address]  [place] [address]  Phone: [phone]  Fax: [phone]  Follow Up Time:     Efraín Chu  Cardiovascular Disease  [address] [place], [place] [id number]  Phone: [phone]  Fax: [phone]  Follow Up Time:    Deon Valentine  Internal Medicine  [address], [address]  [place] [address]  Phone: [phone]  Fax: [phone]  Follow Up Time: 1-3 days    Efraín Chu  Cardiovascular Disease  [address] [place], [place] [id number]  Phone: [phone]  Fax: [phone]  Scheduled Appointment: 10/24/2023    Duong Coker  Gastroenterology  [address] [place] [address]  Phone: [phone]  Fax: [phone]  Follow Up Time: 2 weeks    your surgeon,   Phone: (   )    -  Fax: (   )    -  Follow Up Time: Routine    your pulmonologist,   Phone: (   )    -  Fax: (   )    -  Follow Up Time: Routine

## 2023-09-26 NOTE — CARE COORDINATION ASSESSMENT. - NSPASTMEDSURGHISTORY_GEN_ALL_CORE_FT
PAST MEDICAL & SURGICAL HISTORY:  Hypertension      Hypothyroid      Atrial fibrillation      Former smoker      Bronchitis      Bladder cancer      History of bladder surgery  on urostomy      H/O knee surgery      History of automatic internal cardiac defibrillator (AICD)      S/P CABG x 3      CAD (coronary artery disease)      Small bowel obstruction      History of hernia repair

## 2023-09-26 NOTE — H&P ADULT - NSHPSOCIALHISTORY_GEN_ALL_CORE
Lives: [facility]  ADLs: Not independent, unable to ambulate, needs wheelchair   Diet: Regular   Alcohol Use: Socially, 1 glass of wine or 1 beer 2-3x a week  Tobacco Use: Former smoker, quit 1985  Recreational Drug Use: Never

## 2023-09-26 NOTE — H&P ADULT - PROBLEM SELECTOR PLAN 1
Patient sent from [facility] with a H/H 6.7/22  - CT Ab/pelvis:No active GI bleeding is evident. No evidence of interval hemorrhage compared to 9/4/2023 to explain the patient's low hemoglobin.    - FOBT +  - S/p 1 unit packed rbc in ED  - Blood type A+  - Repeat Type and Screen every 72hrs   - S/p IV Protonix 40mg in ED   - Continue home Pantoprazole 40mg PO  - F/u AM CBC  - Transfuse if Hgb <7  - Hold home Coumadin   - Consult GI. f/u recs Patient sent from [facility] with a H/H 6.7/22  - CT Ab/pelvis:No active GI bleeding is evident. No evidence of interval hemorrhage compared to 9/4/2023 to explain the patient's low hemoglobin.    - FOBT +  - Keep NPO except meds   - S/p 1 unit packed rbc in ED  - Blood type A+  - Repeat Type and Screen every 72hrs   - S/p IV Protonix 40mg in ED   - Continue IV Protonix 40mg BID   - F/u AM CBC  - Transfuse if Hgb <7  - Hold home Coumadin   - Possible CTA A/P if persistently anemic   - GI Dr. Coker consulted, f/u recs  - Surgery Nabil Castillo consulted, f/u recs Patient sent from [facility] with a H/H 6.7/22  - CT Ab/pelvis:No active GI bleeding is evident. No evidence of interval hemorrhage compared to 9/4/2023 to explain the patient's low hemoglobin.    - FOBT +  - Keep NPO except meds   - S/p 1 unit packed rbc in ED  - Blood type A+  - Repeat Type and Screen every 72hrs   - S/p IV Protonix 40mg in ED   - Continue IV Protonix 40mg BID   - F/u AM CBC  - Transfuse if Hgb <8 given cardiac history   - Hold home Coumadin   - Possible CTA A/P if persistently anemic   - GI Dr. Coker consulted, f/u recs  - Surgery Nabil Castillo consulted, f/u recs

## 2023-09-26 NOTE — H&P ADULT - PROBLEM SELECTOR PLAN 3
Patients has a hx of CHF and is fluid overloaded on exam   - BNP 2703  - s/p IV Lasix 40mg x1 in ED   - Continue home Lasix 40mg daily   -TTE 06/2021 showed EF 20-25%  - F/u repeat TTE  - Continue home Metoprolol 25mg daily  - Continue home Entresto BID   - F/u AM CMP, Mg, Phos, A1c   - Cardiology Dr. Chu consulted, f/u recs Patients has a hx of CHF   - BNP 2703  - s/p IV Lasix 20mg x1 in ED   - Continue home Lasix 40mg daily   -TTE 06/2021 showed EF 20-25%  - F/u repeat TTE  - Continue home Metoprolol 25mg daily  - Continue home Entresto BID   - F/u AM CMP, Mg, Phos, A1c   - Frequent reassessment of volume status while receiving blood products   - Cardiology Dr. Chu consulted, f/u recs

## 2023-09-26 NOTE — H&P ADULT - NSHPPHYSICALEXAM_GEN_ALL_CORE
T(C): 36.9 (09-26-23 @ 01:24), Max: 36.9 (09-26-23 @ 01:24)  HR: 97 (09-26-23 @ 01:24) (97 - 107)  BP: 124/58 (09-26-23 @ 01:24) (110/54 - 124/58)  RR: 17 (09-26-23 @ 01:24) (17 - 18)  SpO2: 99% (09-26-23 @ 01:24) (99% - 100%)  Wt(kg): --    Physical Exam:   GENERAL: NAD  HEENT: head NC/AT; EOM intact, PERRLA, conjunctiva & sclera clear; hearing aide used, moist mucous membranes  NECK: supple, no JVD  RESPIRATORY: b/l crackles   CARDIOVASCULAR: S1&S2, irregular rhythm, no murmurs or gallops  ABDOMEN: healing midline incision dry and intact, soft, urostomy site LLQ non TTP, non-tender, non-distended, + Bowel sounds x4 quadrants, no guarding, rebound or rigidity  MUSCULOSKELETAL:  +Edema in left forearm and hand, +Edema b/l LE, +erythema and TTP LLE  LYMPH: no cervical lymphadenopathy  SKIN: warm and dry, erythema posterior Left calf, erythema on dorsum of left hand   NEUROLOGIC: AA&O X3  Psych: Normal mood and affect, normal behavior T(C): 36.9 (09-26-23 @ 01:24), Max: 36.9 (09-26-23 @ 01:24)  HR: 97 (09-26-23 @ 01:24) (97 - 107)  BP: 124/58 (09-26-23 @ 01:24) (110/54 - 124/58)  RR: 17 (09-26-23 @ 01:24) (17 - 18)  SpO2: 99% (09-26-23 @ 01:24) (99% - 100%)  Wt(kg): --  Physical Exam:   GENERAL: NAD  HEENT: head NC/AT; EOM intact, PERRLA, conjunctiva & sclera clear; hearing aide used, moist mucous membranes  NECK: supple, no JVD  RESPIRATORY: b/l crackles   CARDIOVASCULAR: S1&S2, irregular rhythm, no murmurs or gallops  ABDOMEN: healing midline incision dry and intact, soft, urostomy site LLQ non TTP, non-tender, non-distended, + Bowel sounds x4 quadrants, no guarding, rebound or rigidity  MUSCULOSKELETAL:  +Edema in left forearm and hand, +Edema b/l LE, +erythema and TTP LLE  LYMPH: no cervical lymphadenopathy  SKIN: warm and dry, erythema posterior Left calf, erythema on dorsum of left hand   NEUROLOGIC: AA&O X3  Psych: Normal mood and affect, normal behavior

## 2023-09-26 NOTE — DISCHARGE NOTE PROVIDER - CARE PROVIDERS DIRECT ADDRESSES
[email or web address],DirectAddress_Unknown,DirectAddress_Unknown,DirectAddress_Unknown,DirectAddress_Unknown

## 2023-09-26 NOTE — H&P ADULT - PROBLEM SELECTOR PLAN 4
Patient has a hx of Afib on Coumadin 5mg daily   - EKbpm, afib, RBBB, QTc 482  - Digoxin serum level 0.7  - Continue home Digoxin 125mcg daily  - Continue home Metoprolol 25mg daily  - Hold Coumadin due to +FOBT  - Cardio Dr. Chu consulted, f/u recs

## 2023-09-26 NOTE — H&P ADULT - PROBLEM SELECTOR PLAN 5
Patient has a hx of CAD s/p CABG and Cardiac Stent x1  - Hold Plavix due to +FOBT Patient has a hx of CAD s/p CABG and recent Cardiac Stent x1 (07/2023)  - Continue Plavix due to recent stent placement Patient has a hx of CAD s/p CABG and recent Cardiac Stent x1 (07/2023)  - Continue Plavix due to recent stent placement  - cardio consulted

## 2023-09-26 NOTE — H&P ADULT - PROBLEM SELECTOR PLAN 12
- Hold DVT prophylaxis anticoagulation due to +FOBT    - Continue extensive bowel regimen    - Continue all vitamins and minerals

## 2023-09-26 NOTE — H&P ADULT - PROBLEM SELECTOR PLAN 2
Patient complaining of LLE pain, swelling and erythema  - Venous Duplex LLE: Ruptured hemorrhagic left Baker cyst. No evidence of left lower extremity deep venous thrombosis  - Continue Zosyn q8hrs   - Continue Tylenol PRN for pain   - Elevate LLE Patient complaining of LLE pain, swelling and erythema  - Based on chart review, baker cyst appears chronic   - Venous Duplex LLE: Ruptured hemorrhagic left Baker cyst. No evidence of left lower extremity deep venous thrombosis  - s/p Zosyn x1 in ED   - D/c Zosyn   - Continue Tylenol PRN for pain   - Elevate LLE  - Ortho consulted, f/u recs Patient complaining of LLE pain, swelling and erythema  - Based on chart review, ruptured baker cyst appears chronic (8/26)  - Venous Duplex LLE: Ruptured hemorrhagic left Baker cyst. No evidence of left lower extremity deep venous thrombosis  - s/p Zosyn x1 in ED   - D/c Zosyn   - Continue Tylenol PRN for pain   - Elevate LLE  - Ortho consulted, f/u recs

## 2023-09-26 NOTE — PROGRESS NOTE ADULT - SUBJECTIVE AND OBJECTIVE BOX
Patient is a 78y old  Male who presents with a chief complaint of GI Bleed (26 Sep 2023 01:36)      TELE:     INTERVAL HPI/OVERNIGHT EVENTS: Mild tachycardia at 101BPM overnight. Pt seen and examined at the bedside this AM. Patient says he is doing well.    MEDICATIONS  (STANDING):  albuterol    90 MICROgram(s) HFA Inhaler 2 Puff(s) Inhalation every 6 hours  atorvastatin 80 milliGRAM(s) Oral at bedtime  calcium carbonate   1250 mG (OsCal) 1 Tablet(s) Oral two times a day  clopidogrel Tablet 75 milliGRAM(s) Oral daily  digoxin     Tablet 125 MICROGram(s) Oral daily  furosemide    Tablet 40 milliGRAM(s) Oral daily  furosemide   Injectable 20 milliGRAM(s) IV Push once  influenza  Vaccine (HIGH DOSE) 0.7 milliLiter(s) IntraMuscular once  levothyroxine 137 MICROGram(s) Oral daily  melatonin 5 milliGRAM(s) Oral at bedtime  metoprolol tartrate 25 milliGRAM(s) Oral two times a day  multivitamin 1 Tablet(s) Oral daily  pantoprazole  Injectable 40 milliGRAM(s) IV Push two times a day  polyethylene glycol 3350 17 Gram(s) Oral daily  sacubitril 24 mG/valsartan 26 mG 1 Tablet(s) Oral two times a day  senna 1 Tablet(s) Oral at bedtime  tiotropium 2.5 MICROgram(s) Inhaler 2 Puff(s) Inhalation daily    MEDICATIONS  (PRN):  acetaminophen     Tablet .. 650 milliGRAM(s) Oral every 6 hours PRN Temp greater or equal to 38C (100.4F), Mild Pain (1 - 3)  saline laxative (FLEET) Rectal Enema 1 Enema Rectal daily PRN for constipation      Allergies    No Known Allergies    Intolerances        REVIEW OF SYSTEMS:  CONSTITUTIONAL: No fever or chills  HEENT:  No headache, no sore throat  RESPIRATORY: No cough, wheezing, or shortness of breath  CARDIOVASCULAR: No chest pain, palpitations  GASTROINTESTINAL: No abd pain, nausea, vomiting, or diarrhea  GENITOURINARY: No dysuria, frequency, or hematuria  NEUROLOGICAL: no focal weakness or dizziness  MUSCULOSKELETAL: no myalgias     Vital Signs Last 24 Hrs  T(C): 36.8 (26 Sep 2023 08:40), Max: 36.9 (26 Sep 2023 01:24)  T(F): 98.3 (26 Sep 2023 08:40), Max: 98.4 (26 Sep 2023 01:24)  HR: 78 (26 Sep 2023 08:40) (78 - 107)  BP: 104/64 (26 Sep 2023 08:40) (101/56 - 125/63)  BP(mean): --  RR: 18 (26 Sep 2023 08:40) (17 - 18)  SpO2: 97% (26 Sep 2023 08:40) (97% - 100%)    Parameters below as of 26 Sep 2023 08:40  Patient On (Oxygen Delivery Method): room air        PHYSICAL EXAM:  GENERAL: NAD  HEENT:  anicteric, moist mucous membranes  CHEST/LUNG: Diffuse wheezing noted in upper lung fields bilaterally  HEART:  irregular rhythm, S1, S2  ABDOMEN: soft, nontender, nondistended  EXTREMITIES: nonpitting edema noted in upper and lower extremities. LLE is discolored and calf tenderness is noted on palpation.  NERVOUS SYSTEM: answers questions and follows commands appropriately    LABS:                        7.6    7.70  )-----------( 284      ( 26 Sep 2023 06:34 )             25.0     CBC Full  -  ( 26 Sep 2023 06:34 )  WBC Count : 7.70 K/uL  Hemoglobin : 7.6 g/dL  Hematocrit : 25.0 %  Platelet Count - Automated : 284 K/uL  Mean Cell Volume : 91.2 fl  Mean Cell Hemoglobin : 27.7 pg  Mean Cell Hemoglobin Concentration : 30.4 gm/dL  Auto Neutrophil # : x  Auto Lymphocyte # : x  Auto Monocyte # : x  Auto Eosinophil # : x  Auto Basophil # : x  Auto Neutrophil % : x  Auto Lymphocyte % : x  Auto Monocyte % : x  Auto Eosinophil % : x  Auto Basophil % : x    26 Sep 2023 06:34    140    |  104    |  32     ----------------------------<  109    3.9     |  32     |  1.10     Ca    8.2        26 Sep 2023 06:34  Phos  2.5       26 Sep 2023 06:34  Mg     1.9       26 Sep 2023 06:34    TPro  6.3    /  Alb  1.9    /  TBili  0.5    /  DBili  x      /  AST  27     /  ALT  24     /  AlkPhos  90     26 Sep 2023 06:34    PT/INR - ( 26 Sep 2023 06:34 )   PT: 16.1 sec;   INR: 1.39 ratio         PTT - ( 25 Sep 2023 20:50 )  PTT:26.6 sec  Urinalysis Basic - ( 26 Sep 2023 06:34 )    Color: x / Appearance: x / SG: x / pH: x  Gluc: 109 mg/dL / Ketone: x  / Bili: x / Urobili: x   Blood: x / Protein: x / Nitrite: x   Leuk Esterase: x / RBC: x / WBC x   Sq Epi: x / Non Sq Epi: x / Bacteria: x      CAPILLARY BLOOD GLUCOSE              RADIOLOGY & ADDITIONAL TESTS:  Personally reviewed.     Consultant(s) Notes Reviewed:  [x] YES  [ ] NO Patient is a 78y old  Male who presents with a chief complaint of GI Bleed (26 Sep 2023 01:36)    Pt seen and examined at bedside. Main complaints of spelling in B/L extremities and LUE. States that his LUE edema has improved since his last hospital admission. Denies any F/C, N/V, CP, SOB, or abdominal pain.     TELE:     INTERVAL HPI/OVERNIGHT EVENTS: Mild tachycardia at 101BPM overnight. Pt seen and examined at the bedside this AM. Patient says he is doing well.    MEDICATIONS  (STANDING):  albuterol    90 MICROgram(s) HFA Inhaler 2 Puff(s) Inhalation every 6 hours  atorvastatin 80 milliGRAM(s) Oral at bedtime  calcium carbonate   1250 mG (OsCal) 1 Tablet(s) Oral two times a day  clopidogrel Tablet 75 milliGRAM(s) Oral daily  digoxin     Tablet 125 MICROGram(s) Oral daily  furosemide    Tablet 40 milliGRAM(s) Oral daily  furosemide   Injectable 20 milliGRAM(s) IV Push once  influenza  Vaccine (HIGH DOSE) 0.7 milliLiter(s) IntraMuscular once  levothyroxine 137 MICROGram(s) Oral daily  melatonin 5 milliGRAM(s) Oral at bedtime  metoprolol tartrate 25 milliGRAM(s) Oral two times a day  multivitamin 1 Tablet(s) Oral daily  pantoprazole  Injectable 40 milliGRAM(s) IV Push two times a day  polyethylene glycol 3350 17 Gram(s) Oral daily  sacubitril 24 mG/valsartan 26 mG 1 Tablet(s) Oral two times a day  senna 1 Tablet(s) Oral at bedtime  tiotropium 2.5 MICROgram(s) Inhaler 2 Puff(s) Inhalation daily    MEDICATIONS  (PRN):  acetaminophen     Tablet .. 650 milliGRAM(s) Oral every 6 hours PRN Temp greater or equal to 38C (100.4F), Mild Pain (1 - 3)  saline laxative (FLEET) Rectal Enema 1 Enema Rectal daily PRN for constipation      Allergies    No Known Allergies    Intolerances        REVIEW OF SYSTEMS:  CONSTITUTIONAL: No fever or chills  HEENT:  No headache, no sore throat  RESPIRATORY: No cough, wheezing, or shortness of breath  CARDIOVASCULAR: No chest pain, palpitations  GASTROINTESTINAL: No abd pain, nausea, vomiting, or diarrhea  GENITOURINARY: No dysuria, frequency, or hematuria  NEUROLOGICAL: no focal weakness or dizziness  MUSCULOSKELETAL: no myalgias     Vital Signs Last 24 Hrs  T(C): 36.8 (26 Sep 2023 08:40), Max: 36.9 (26 Sep 2023 01:24)  T(F): 98.3 (26 Sep 2023 08:40), Max: 98.4 (26 Sep 2023 01:24)  HR: 78 (26 Sep 2023 08:40) (78 - 107)  BP: 104/64 (26 Sep 2023 08:40) (101/56 - 125/63)  BP(mean): --  RR: 18 (26 Sep 2023 08:40) (17 - 18)  SpO2: 97% (26 Sep 2023 08:40) (97% - 100%)    Parameters below as of 26 Sep 2023 08:40  Patient On (Oxygen Delivery Method): room air        PHYSICAL EXAM:  GENERAL: NAD  HEENT:  anicteric, moist mucous membranes  CHEST/LUNG: Diffuse wheezing noted in upper lung fields bilaterally  HEART:  irregular rhythm, S1, S2  ABDOMEN: soft, nontender, nondistended  EXTREMITIES: nonpitting edema noted in upper and lower extremities. LLE is discolored and calf tenderness is noted on palpation.  NERVOUS SYSTEM: answers questions and follows commands appropriately    LABS:                        7.6    7.70  )-----------( 284      ( 26 Sep 2023 06:34 )             25.0     CBC Full  -  ( 26 Sep 2023 06:34 )  WBC Count : 7.70 K/uL  Hemoglobin : 7.6 g/dL  Hematocrit : 25.0 %  Platelet Count - Automated : 284 K/uL  Mean Cell Volume : 91.2 fl  Mean Cell Hemoglobin : 27.7 pg  Mean Cell Hemoglobin Concentration : 30.4 gm/dL  Auto Neutrophil # : x  Auto Lymphocyte # : x  Auto Monocyte # : x  Auto Eosinophil # : x  Auto Basophil # : x  Auto Neutrophil % : x  Auto Lymphocyte % : x  Auto Monocyte % : x  Auto Eosinophil % : x  Auto Basophil % : x    26 Sep 2023 06:34    140    |  104    |  32     ----------------------------<  109    3.9     |  32     |  1.10     Ca    8.2        26 Sep 2023 06:34  Phos  2.5       26 Sep 2023 06:34  Mg     1.9       26 Sep 2023 06:34    TPro  6.3    /  Alb  1.9    /  TBili  0.5    /  DBili  x      /  AST  27     /  ALT  24     /  AlkPhos  90     26 Sep 2023 06:34    PT/INR - ( 26 Sep 2023 06:34 )   PT: 16.1 sec;   INR: 1.39 ratio         PTT - ( 25 Sep 2023 20:50 )  PTT:26.6 sec  Urinalysis Basic - ( 26 Sep 2023 06:34 )    Color: x / Appearance: x / SG: x / pH: x  Gluc: 109 mg/dL / Ketone: x  / Bili: x / Urobili: x   Blood: x / Protein: x / Nitrite: x   Leuk Esterase: x / RBC: x / WBC x   Sq Epi: x / Non Sq Epi: x / Bacteria: x      CAPILLARY BLOOD GLUCOSE              RADIOLOGY & ADDITIONAL TESTS:  Personally reviewed.     Consultant(s) Notes Reviewed:  [x] YES  [ ] NO

## 2023-09-26 NOTE — H&P ADULT - ASSESSMENT
Patient is a 78-year-old male with a PMH of bladder CA with urostomy 14 years ago, A-fib on warfarin, HTN, HLD, CAD s/p CABG + 1 cardiac stent, CHF, Hypothyroidism, GERD, COPD, SBO s/p ex lap of adhesions 8/2023 admitted for GI bleed.

## 2023-09-26 NOTE — CONSULT NOTE ADULT - ASSESSMENT
recent rectal bleed, resolved  anemia    no overt gi bleeding   CT showing hematoma  advance to regular diet  cbc daily  monitor post transfusion cbc   no plan for endoscopic eval  d/w patient and wife
78-year-old male with a PMH  bladder CA with urostomy 14 years ago, A-fib on warfarin, HTN, HLD, CAD s/p CABG + 1 cardiac stent, HFrEF, Hypothyroidism, GERD, COPD, SBO s/p ex lap of adhesions 8/2023 admitted for GI bleed.    AFib on warfarin HTN/ CAD s/p CABG + 1 cardiac stent/ HFrEF    Arrhythmia- Hx of Afib / AICD (medtronic device) in place   - EKG shows Afib w/ PVCs RBBB    - No acute changes on EKG compared to previous.  - Continue to hold warfarin for GI bleed  - Monitor INR   - Continue metoprolol tartrate inpatient, patient is normally on Toprol XL 50mg daily per outpatient cardio records    Ischemia- CAD s/p  CABG + 1 cardiac stent/ HLD  - Continue Plavix, patient still within 3 month window since stent placement  - Will hold asa in setting of acute gi bleed  - Continue statin  - No clear evidence of acute ischemia, trops negative x 1. No need for repeat    Volume Status-  HFrEF  - continue lasix with caution for BP, will need to increase lasix once patient is stable for diuresis  - Continue metoprolol tartrate 25mg BID , entresto  w/ caution  - Previous TTE (9/2022) shows LVEF 40-45%  - TTE ordered, f/u result  - Strict I/Os, daily weights.    Hemodynamics- HTN  BP soft but stable, monitor routine hemodynamics.  -Continue metoprolol tartrate 25mg BID, entresto  w/ caution  - Monitor and replete lytes, keep K>4, Mg>2.    - Other cardiovascular workup will depend on clinical course.  - All other workup per primary team.  - Will continue to follow.

## 2023-09-26 NOTE — CONSULT NOTE ADULT - SUBJECTIVE AND OBJECTIVE BOX
Patient is a 78y old  Male who presents with a chief complaint of GI Bleed (26 Sep 2023 11:07)      HPI:  78-year-old male with a PMH of bladder CA with urostomy 14 years ago, A-fib on warfarin, HTN, HLD, CAD s/p CABG, 1 cardiac stent (2023), CHF, Hypothyroidism, GERD, COPD, SBO s/p ex lap of adhesions 2023 [facility]. Patient was recently discharged from [facility] s/p ex lap of adhesions for SBO, and a parastomal hernia repair with mesh. During that admission, his stay was complicated by altered mental status and he was intubated. He also was found to have a GI bleed, in which he was transfused and it resolved on its own. Patient was then transferred over to [facility] rehab center. Recently, patient also developed left lower extremity pain. The pain is making him unable to walk, causing him to be wheelchair bound. There is swelling and redness of the LLE, and the area is TTP. Today, the patient is complaining of SOB, fatigue, and LLE pain. Patient states his last BM was 2 days ago, and he did not notice any blood in his stool. He denies fever, chills, chest pain, palpitations, cough, abdominal pain, nausea, vomiting, diarrhea, hematochezia, melena, hematuria, headaches, changes in vision, dizziness, numbness, tingling. No other complaints at this time.    ED course:  Vitals: BP: 110/54 , HR:107 , Temp: 97.8, RR: 18, SpO2: 100% on RA  Labs significant for: Positive FOBT, H/H 6.7/22, red cell morphology abnormal, PT/INR 16.2/1.4, BUN 37, CO2 33, Lipase 109, BNP 2703, Dig Level 0.7  EKbpm, afib, RBBB, QTc 482  In ED given IV Protonix x1, IV Zosyn x1, Packed rbc x1unit, IV Lasix 20mg x1  Imaging  CT Ab/pelvis:No active GI bleeding is evident. No evidence of interval hemorrhage compared to 2023 to explain the patient's low hemoglobin. The hematoma along the medial inferior aspect of the urostomy in the right of midline anterior abdominal wall subcutaneous fat has organized and now has appearance of a chronic hematoma or seroma.   Venous Duplex LLE: No evidence of left lower extremity deep venous thrombosis. Ruptured hemorrhagic left Baker cyst (26 Sep 2023 01:36)      INTERVAL HPI: Patient was seen and evaluated. He denies chest pain, palpitations. He occasionally has trouble breathing.      PAST MEDICAL & SURGICAL HISTORY:  Atrial fibrillation      Hypothyroid      Hypertension      Bladder cancer      Bronchitis      Former smoker      CAD (coronary artery disease)      History of bladder surgery  on urostomy      S/P CABG x 3      History of automatic internal cardiac defibrillator (AICD)      H/O knee surgery      History of hernia repair      Small bowel obstruction                ECHO  FINDINGS:      MEDICATIONS  (STANDING):  albuterol    90 MICROgram(s) HFA Inhaler 2 Puff(s) Inhalation every 6 hours  atorvastatin 80 milliGRAM(s) Oral at bedtime  calcium carbonate   1250 mG (OsCal) 1 Tablet(s) Oral two times a day  clopidogrel Tablet 75 milliGRAM(s) Oral daily  digoxin     Tablet 125 MICROGram(s) Oral daily  furosemide    Tablet 40 milliGRAM(s) Oral daily  furosemide   Injectable 20 milliGRAM(s) IV Push once  influenza  Vaccine (HIGH DOSE) 0.7 milliLiter(s) IntraMuscular once  levothyroxine 137 MICROGram(s) Oral daily  melatonin 5 milliGRAM(s) Oral at bedtime  metoprolol tartrate 25 milliGRAM(s) Oral two times a day  multivitamin 1 Tablet(s) Oral daily  pantoprazole  Injectable 40 milliGRAM(s) IV Push two times a day  polyethylene glycol 3350 17 Gram(s) Oral daily  sacubitril 24 mG/valsartan 26 mG 1 Tablet(s) Oral two times a day  senna 1 Tablet(s) Oral at bedtime  tiotropium 2.5 MICROgram(s) Inhaler 2 Puff(s) Inhalation daily    MEDICATIONS  (PRN):  acetaminophen     Tablet .. 650 milliGRAM(s) Oral every 6 hours PRN Temp greater or equal to 38C (100.4F), Mild Pain (1 - 3)  saline laxative (FLEET) Rectal Enema 1 Enema Rectal daily PRN for constipation      FAMILY HISTORY:  Family history of heart attack (Father, Mother)      Denies Family history of CAD or early MI      Constitutional: denies fever, chills  HEENT: denies blurry vision, difficulty hearing  Respiratory: denies SOB, SMALL, cough  Cardiovascular: denies CP, palpitations, orthopnea, PND, LE edema  Gastrointestinal: denies nausea, vomiting, abdominal pain  Genitourinary: denies urinary changes  Skin: Denies rashes, itching  Neurologic: denies headache, weakness, dizziness  Hematology/Oncology: denies bleeding, easy bruising  ROS negative except as noted above      SOCIAL HISTORY:    No tobacco, Alcohol or Ddrug use    Vital Signs Last 24 Hrs  T(C): 36.5 (26 Sep 2023 12:23), Max: 36.9 (26 Sep 2023 01:24)  T(F): 97.7 (26 Sep 2023 12:), Max: 98.4 (26 Sep 2023 01:24)  HR: 88 (26 Sep 2023 12:) (78 - 107)  BP: 110/68 (26 Sep 2023 12:) (101/56 - 125/63)  BP(mean): --  RR: 20 (26 Sep 2023 12:) (17 - 20)  SpO2: 96% (26 Sep 2023 12:) (94% - 100%)    Parameters below as of 26 Sep 2023 12:23  Patient On (Oxygen Delivery Method): room air        Physical Exam:  General: Well developed, well nourished, NAD  HEENT: NCAT, PERRLA, EOMI bl, moist mucous membranes   Neck: Supple, nontender, no mass  Neurology: A&Ox3, nonfocal, sensation intact   Respiratory: CTA B/L, No W/R/R  CV: RRR, +S1/S2, no murmurs, rubs or gallops  Abdominal: Soft, NT, ND +BSx4, no palpable masses  Extremities: No C/C/E, + peripheral pulses  MSK: Normal ROM, no joint erythema or warmth, no joint swelling   Heme: No obvious ecchymosis or petechiae   Skin: warm, dry, normal color      ECG:    I&O's Detail      LABS:                        7.6    7.70  )-----------( 284      ( 26 Sep 2023 06:34 )             25.0     09-26    140  |  104  |  32<H>  ----------------------------<  109<H>  3.9   |  32<H>  |  1.10    Ca    8.2<L>      26 Sep 2023 06:34  Phos  2.5       Mg     1.9         TPro  6.3  /  Alb  1.9<L>  /  TBili  0.5  /  DBili  x   /  AST  27  /  ALT  24  /  AlkPhos  90      CARDIAC MARKERS ( 25 Sep 2023 20:50 )  x     / x     / x     / x     / <1.0 ng/mL      PT/INR - ( 26 Sep 2023 06:34 )   PT: 16.1 sec;   INR: 1.39 ratio         PTT - ( 25 Sep 2023 20:50 )  PTT:26.6 sec  Urinalysis Basic - ( 26 Sep 2023 06:34 )    Color: x / Appearance: x / SG: x / pH: x  Gluc: 109 mg/dL / Ketone: x  / Bili: x / Urobili: x   Blood: x / Protein: x / Nitrite: x   Leuk Esterase: x / RBC: x / WBC x   Sq Epi: x / Non Sq Epi: x / Bacteria: x      I&O's Summary    BNP  RADIOLOGY & ADDITIONAL STUDIES: Patient is a 78y old  Male who presents with a chief complaint of GI Bleed (26 Sep 2023 11:07)      HPI:  78-year-old male with a PMH of bladder CA with urostomy 14 years ago, A-fib on warfarin, HTN, HLD, CAD s/p CABG, 1 cardiac stent (2023), CHF, Hypothyroidism, GERD, COPD, SBO s/p ex lap of adhesions 2023 [facility]. Patient was recently discharged from [facility] s/p ex lap of adhesions for SBO, and a parastomal hernia repair with mesh. During that admission, his stay was complicated by altered mental status and he was intubated. He also was found to have a GI bleed, in which he was transfused and it resolved on its own. Patient was then transferred over to [facility] rehab center. Recently, patient also developed left lower extremity pain. The pain is making him unable to walk, causing him to be wheelchair bound. There is swelling and redness of the LLE, and the area is TTP. Today, the patient is complaining of SOB, fatigue, and LLE pain. Patient states his last BM was 2 days ago, and he did not notice any blood in his stool. He denies fever, chills, chest pain, palpitations, cough, abdominal pain, nausea, vomiting, diarrhea, hematochezia, melena, hematuria, headaches, changes in vision, dizziness, numbness, tingling. No other complaints at this time.    ED course:  Vitals: BP: 110/54 , HR:107 , Temp: 97.8, RR: 18, SpO2: 100% on RA  Labs significant for: Positive FOBT, H/H 6.7/22, red cell morphology abnormal, PT/INR 16.2/1.4, BUN 37, CO2 33, Lipase 109, BNP 2703, Dig Level 0.7  EKbpm, afib, RBBB, QTc 482  In ED given IV Protonix x1, IV Zosyn x1, Packed rbc x1unit, IV Lasix 20mg x1  Imaging  CT Ab/pelvis:No active GI bleeding is evident. No evidence of interval hemorrhage compared to 2023 to explain the patient's low hemoglobin. The hematoma along the medial inferior aspect of the urostomy in the right of midline anterior abdominal wall subcutaneous fat has organized and now has appearance of a chronic hematoma or seroma.   Venous Duplex LLE: No evidence of left lower extremity deep venous thrombosis. Ruptured hemorrhagic left Baker cyst (26 Sep 2023 01:36)      INTERVAL HPI: Patient was seen and examined at bedside. He follows with Dr. Chu, last seen in 2023. Has recent   He denies chest pain, palpitations. He occasionally has trouble breathing.      PAST MEDICAL & SURGICAL HISTORY:  Atrial fibrillation      Hypothyroid      Hypertension      Bladder cancer      Bronchitis      Former smoker      CAD (coronary artery disease)      History of bladder surgery  on urostomy      S/P CABG x 3      History of automatic internal cardiac defibrillator (AICD)      H/O knee surgery      History of hernia repair      Small bowel obstruction                ECHO  FINDINGS:      MEDICATIONS  (STANDING):  albuterol    90 MICROgram(s) HFA Inhaler 2 Puff(s) Inhalation every 6 hours  atorvastatin 80 milliGRAM(s) Oral at bedtime  calcium carbonate   1250 mG (OsCal) 1 Tablet(s) Oral two times a day  clopidogrel Tablet 75 milliGRAM(s) Oral daily  digoxin     Tablet 125 MICROGram(s) Oral daily  furosemide    Tablet 40 milliGRAM(s) Oral daily  furosemide   Injectable 20 milliGRAM(s) IV Push once  influenza  Vaccine (HIGH DOSE) 0.7 milliLiter(s) IntraMuscular once  levothyroxine 137 MICROGram(s) Oral daily  melatonin 5 milliGRAM(s) Oral at bedtime  metoprolol tartrate 25 milliGRAM(s) Oral two times a day  multivitamin 1 Tablet(s) Oral daily  pantoprazole  Injectable 40 milliGRAM(s) IV Push two times a day  polyethylene glycol 3350 17 Gram(s) Oral daily  sacubitril 24 mG/valsartan 26 mG 1 Tablet(s) Oral two times a day  senna 1 Tablet(s) Oral at bedtime  tiotropium 2.5 MICROgram(s) Inhaler 2 Puff(s) Inhalation daily    MEDICATIONS  (PRN):  acetaminophen     Tablet .. 650 milliGRAM(s) Oral every 6 hours PRN Temp greater or equal to 38C (100.4F), Mild Pain (1 - 3)  saline laxative (FLEET) Rectal Enema 1 Enema Rectal daily PRN for constipation      FAMILY HISTORY:  Family history of heart attack (Father, Mother)      Denies Family history of CAD or early MI      Constitutional: denies fever, chills  HEENT: denies blurry vision, difficulty hearing  Respiratory: denies SOB, SMALL, cough  Cardiovascular: denies CP, palpitations, orthopnea, PND, LE edema  Gastrointestinal: denies nausea, vomiting, abdominal pain  Genitourinary: denies urinary changes  Skin: Denies rashes, itching  Neurologic: denies headache, weakness, dizziness  Hematology/Oncology: denies bleeding, easy bruising  ROS negative except as noted above      SOCIAL HISTORY:    No tobacco, Alcohol or Ddrug use    Vital Signs Last 24 Hrs  T(C): 36.5 (26 Sep 2023 12:23), Max: 36.9 (26 Sep 2023 01:24)  T(F): 97.7 (26 Sep 2023 12:23), Max: 98.4 (26 Sep 2023 01:24)  HR: 88 (26 Sep 2023 12:23) (78 - 107)  BP: 110/68 (26 Sep 2023 12:23) (101/56 - 125/63)  BP(mean): --  RR: 20 (26 Sep 2023 12:23) (17 - 20)  SpO2: 96% (26 Sep 2023 12:23) (94% - 100%)    Parameters below as of 26 Sep 2023 12:23  Patient On (Oxygen Delivery Method): room air        Physical Exam:  General: Well developed, well nourished, NAD  HEENT: NCAT, PERRLA, EOMI bl, moist mucous membranes   Neck: Supple, nontender, no mass  Neurology: A&Ox3, nonfocal, sensation intact   Respiratory: CTA B/L, No W/R/R  CV: RRR, +S1/S2, no murmurs, rubs or gallops  Abdominal: Soft, NT, ND +BSx4, no palpable masses  Extremities: No C/C/E, + peripheral pulses  MSK: Normal ROM, no joint erythema or warmth, no joint swelling   Heme: No obvious ecchymosis or petechiae   Skin: warm, dry, normal color      ECG:    I&O's Detail      LABS:                        7.6    7.70  )-----------( 284      ( 26 Sep 2023 06:34 )             25.0     09-26    140  |  104  |  32<H>  ----------------------------<  109<H>  3.9   |  32<H>  |  1.10    Ca    8.2<L>      26 Sep 2023 06:34  Phos  2.5       Mg     1.9         TPro  6.3  /  Alb  1.9<L>  /  TBili  0.5  /  DBili  x   /  AST  27  /  ALT  24  /  AlkPhos  90      CARDIAC MARKERS ( 25 Sep 2023 20:50 )  x     / x     / x     / x     / <1.0 ng/mL      PT/INR - ( 26 Sep 2023 06:34 )   PT: 16.1 sec;   INR: 1.39 ratio         PTT - ( 25 Sep 2023 20:50 )  PTT:26.6 sec  Urinalysis Basic - ( 26 Sep 2023 06:34 )    Color: x / Appearance: x / SG: x / pH: x  Gluc: 109 mg/dL / Ketone: x  / Bili: x / Urobili: x   Blood: x / Protein: x / Nitrite: x   Leuk Esterase: x / RBC: x / WBC x   Sq Epi: x / Non Sq Epi: x / Bacteria: x      I&O's Summary    BNP  RADIOLOGY & ADDITIONAL STUDIES: Patient is a 78y old  Male who presents with a chief complaint of GI Bleed (26 Sep 2023 11:07)      HPI:  78-year-old male with a PMH of bladder CA with urostomy 14 years ago, A-fib on warfarin, HTN, HLD, CAD s/p CABG, 1 cardiac stent (2023), CHF, Hypothyroidism, GERD, COPD, SBO s/p ex lap of adhesions 2023 [facility]. Patient was recently discharged from [facility] s/p ex lap of adhesions for SBO, and a parastomal hernia repair with mesh. During that admission, his stay was complicated by altered mental status and he was intubated. He also was found to have a GI bleed, in which he was transfused and it resolved on its own. Patient was then transferred over to [facility] rehab center. Recently, patient also developed left lower extremity pain. The pain is making him unable to walk, causing him to be wheelchair bound. There is swelling and redness of the LLE, and the area is TTP. Today, the patient is complaining of SOB, fatigue, and LLE pain. Patient states his last BM was 2 days ago, and he did not notice any blood in his stool. He denies fever, chills, chest pain, palpitations, cough, abdominal pain, nausea, vomiting, diarrhea, hematochezia, melena, hematuria, headaches, changes in vision, dizziness, numbness, tingling. No other complaints at this time.    ED course:  Vitals: BP: 110/54 , HR:107 , Temp: 97.8, RR: 18, SpO2: 100% on RA  Labs significant for: Positive FOBT, H/H 6.7/22, red cell morphology abnormal, PT/INR 16.2/1.4, BUN 37, CO2 33, Lipase 109, BNP 2703, Dig Level 0.7  EKbpm, afib, RBBB, QTc 482  In ED given IV Protonix x1, IV Zosyn x1, Packed rbc x1unit, IV Lasix 20mg x1  Imaging  CT Ab/pelvis:No active GI bleeding is evident. No evidence of interval hemorrhage compared to 2023 to explain the patient's low hemoglobin. The hematoma along the medial inferior aspect of the urostomy in the right of midline anterior abdominal wall subcutaneous fat has organized and now has appearance of a chronic hematoma or seroma.   Venous Duplex LLE: No evidence of left lower extremity deep venous thrombosis. Ruptured hemorrhagic left Baker cyst (26 Sep 2023 01:36)      INTERVAL HPI: Patient was seen and examined at bedside. He follows with Dr. Chu, last seen in 2023. Recently had stent placed on 2023.   Had recent complicated hospital course requiring ICU treatment , transferred to [facility] s/p hospitalization.   Currently, he feels tired and has occasional trouble breathing even at rest. States he was on 3L O2 at rehab although O2 sats have been >97%.  He denies chest pain, palpitations, fevers, chills, n/v/d.      PAST MEDICAL & SURGICAL HISTORY:  Atrial fibrillation      Hypothyroid      Hypertension      Bladder cancer      Bronchitis      Former smoker      CAD (coronary artery disease)      History of bladder surgery  on urostomy      S/P CABG x 3      History of automatic internal cardiac defibrillator (AICD)      H/O knee surgery      History of hernia repair      Small bowel obstruction                ECHO  FINDINGS: 2022: EF of 40-45%       MEDICATIONS  (STANDING):  albuterol    90 MICROgram(s) HFA Inhaler 2 Puff(s) Inhalation every 6 hours  atorvastatin 80 milliGRAM(s) Oral at bedtime  calcium carbonate   1250 mG (OsCal) 1 Tablet(s) Oral two times a day  clopidogrel Tablet 75 milliGRAM(s) Oral daily  digoxin     Tablet 125 MICROGram(s) Oral daily  furosemide    Tablet 40 milliGRAM(s) Oral daily  furosemide   Injectable 20 milliGRAM(s) IV Push once  influenza  Vaccine (HIGH DOSE) 0.7 milliLiter(s) IntraMuscular once  levothyroxine 137 MICROGram(s) Oral daily  melatonin 5 milliGRAM(s) Oral at bedtime  metoprolol tartrate 25 milliGRAM(s) Oral two times a day  multivitamin 1 Tablet(s) Oral daily  pantoprazole  Injectable 40 milliGRAM(s) IV Push two times a day  polyethylene glycol 3350 17 Gram(s) Oral daily  sacubitril 24 mG/valsartan 26 mG 1 Tablet(s) Oral two times a day  senna 1 Tablet(s) Oral at bedtime  tiotropium 2.5 MICROgram(s) Inhaler 2 Puff(s) Inhalation daily    MEDICATIONS  (PRN):  acetaminophen     Tablet .. 650 milliGRAM(s) Oral every 6 hours PRN Temp greater or equal to 38C (100.4F), Mild Pain (1 - 3)  saline laxative (FLEET) Rectal Enema 1 Enema Rectal daily PRN for constipation      FAMILY HISTORY:  Family history of heart attack (Father, Mother)      Constitutional: denies fever, chills  HEENT: denies blurry vision, difficulty hearing  Respiratory: +SMALL, intermittent SOB at rest  Cardiovascular: +chronic b/l LE edema , orthopnea denies CP, palpitations,  Gastrointestinal: denies nausea, vomiting, abdominal pain  Genitourinary: denies urinary changes  Skin: Denies rashes, itching  Neurologic: denies headache, weakness, dizziness  Hematology/Oncology: denies bleeding, easy bruising  ROS negative except as noted above      SOCIAL HISTORY:  Former smoker, quit 1985  Social alcohol use   Denies drug use    Vital Signs Last 24 Hrs  T(C): 36.5 (26 Sep 2023 12:23), Max: 36.9 (26 Sep 2023 01:24)  T(F): 97.7 (26 Sep 2023 12:23), Max: 98.4 (26 Sep 2023 01:24)  HR: 88 (26 Sep 2023 12:23) (78 - 107)  BP: 110/68 (26 Sep 2023 12:23) (101/56 - 125/63)  BP(mean): --  RR: 20 (26 Sep 2023 12:23) (17 - 20)  SpO2: 96% (26 Sep 2023 12:23) (94% - 100%)    Parameters below as of 26 Sep 2023 12:23  Patient On (Oxygen Delivery Method): room air        Physical Exam:  General:, drowsy and pale appearing   HEENT: NCAT, EOMI bl, moist mucous membranes   Neck: Supple, nontender, no mass  Neurology: A&Ox3, nonfocal  Respiratory: +crackles bilaterally  CV: irregularly irregular  Abdominal: Soft, NT, ND +BSx4, no palpable masses  Extremities: +chronic LE edema L> R, LLE calf tenderness on palpation  MSK: +LLE pain, decreased ROM 2/2 pain  Skin: venous stasis changes b/l, dry,      ECG: Afib w/ PVCs RBBB     I&O's Detail      LABS:                        7.6    7.70  )-----------( 284      ( 26 Sep 2023 06:34 )             25.0     -    140  |  104  |  32<H>  ----------------------------<  109<H>  3.9   |  32<H>  |  1.10    Ca    8.2<L>      26 Sep 2023 06:34  Phos  2.5       Mg     1.9         TPro  6.3  /  Alb  1.9<L>  /  TBili  0.5  /  DBili  x   /  AST  27  /  ALT  24  /  AlkPhos  90      CARDIAC MARKERS ( 25 Sep 2023 20:50 )  x     / x     / x     / x     / <1.0 ng/mL      PT/INR - ( 26 Sep 2023 06:34 )   PT: 16.1 sec;   INR: 1.39 ratio         PTT - ( 25 Sep 2023 20:50 )  PTT:26.6 sec  Urinalysis Basic - ( 26 Sep 2023 06:34 )    Color: x / Appearance: x / SG: x / pH: x  Gluc: 109 mg/dL / Ketone: x  / Bili: x / Urobili: x   Blood: x / Protein: x / Nitrite: x   Leuk Esterase: x / RBC: x / WBC x   Sq Epi: x / Non Sq Epi: x / Bacteria: x      I&O's Summary    BNP 2703  RADIOLOGY & ADDITIONAL STUDIES: performed

## 2023-09-26 NOTE — DISCHARGE NOTE PROVIDER - HOSPITAL COURSE
FROM ADMISSION H+P:   HPI:  78-year-old male with a PMH of bladder CA with urostomy 14 years ago, A-fib on warfarin, HTN, HLD, CAD s/p CABG, 1 cardiac stent (2023), CHF, Hypothyroidism, GERD, COPD, SBO s/p ex lap of adhesions 2023 presents to the ED from [facility] with low hemoglobin. Patient was recently discharged from [facility] s/p ex lap of adhesions for SBO, and a parastomal hernia repair with mesh. During that admission, his stay was complicated by altered mental status and he was intubated. He also was found to have a GI bleed, in which he was transfused and it resolved on its own. [facility] Recently, patient also developed left lower extremity pain. The pain is making him unable to walk, causing him to be wheelchair bound. There is swelling and redness of the LLE, and the area is TTP. Today, the patient is complaining of SOB, fatigue, and LLE pain. Patient states his last BM was 2 days ago, and he did not notice any blood in his stool. He denies fever, chills, chest pain, palpitations, cough, abdominal pain, nausea, vomiting, diarrhea, hematochezia, melena, hematuria, headaches, changes in vision, dizziness, numbness, tingling. No other complaints at this time.    ED course:  Vitals: BP: 110/54 , HR:107 , Temp: 97.8, RR: 18, SpO2: 100% on RA  Labs significant for: Positive FOBT, H/H 6.7/22, red cell morphology abnormal, PT/INR 16.2/1.4, BUN 37, CO2 33, Lipase 109, BNP 2703, Dig Level 0.7  EKbpm, afib, RBBB, QTc 482  In ED given IV Protonix x1, IV Zosyn x1, Packed rbc x1unit, IV Lasix 20mg x1  Imaging  CT Ab/pelvis:No active GI bleeding is evident. No evidence of interval hemorrhage compared to 2023 to explain the patient's low hemoglobin. The hematoma along the medial inferior aspect of the urostomy in the right of midline anterior abdominal wall subcutaneous fat has organized and now has appearance of a chronic hematoma or seroma.   Venous Duplex LLE: No evidence of left lower extremity deep venous thrombosis. Ruptured hemorrhagic left Baker cyst (26 Sep 2023 01:36)      ---  HOSPITAL COURSE:   Patient sent in from [facility] with anemia (Hgb/Hct of 6.7/) and +FOBT and received 2 x 1U of pRBC since presenting to the ED. Home coumadin was held until underlying GI bleed ruled out. Given patient's most recent complaint of bilateral lower extremity edema, cardiology was on board to manage the patient's congestive heart failure. Clear diet was initially encouraged with strict I/Os Fluid management. A repeat TTE was attained and demonstrated an EF of ___**** (Last TTE  EF 20-25%). Digoxin and Metoprolol was resumed for patient's treatement of Afib. Plavix was resumed while inpatient for recent stent placement. All other co-morbidities were managed appropriately. ***********INCOMPLETE NOTE*******************    ---  CONSULTANTS:   Cardiology  General Surgery   ---  TIME SPENT:  I, the attending physician, was physically present for the key portions of the evaluation and management (E/M) service provided. The total amount of time spent reviewing the hospital notes, laboratory values, imaging findings, assessing/counseling the patient, discussing with consultant physicians, social work, nursing staff was -- minutes    ---  Primary care provider was made aware of plan for discharge:      [  ] NO     [  ] YES   FROM ADMISSION H+P:   HPI:  78-year-old male with a PMH of bladder CA with urostomy 14 years ago, A-fib on warfarin, HTN, HLD, CAD s/p CABG, 1 cardiac stent (2023), CHF, Hypothyroidism, GERD, COPD, SBO s/p ex lap of adhesions 2023 presents to the ED from [facility] with low hemoglobin. Patient was recently discharged from [facility] s/p ex lap of adhesions for SBO, and a parastomal hernia repair with mesh. During that admission, his stay was complicated by altered mental status and he was intubated. He also was found to have a GI bleed, in which he was transfused and it resolved on its own. [facility] Recently, patient also developed left lower extremity pain. The pain is making him unable to walk, causing him to be wheelchair bound. There is swelling and redness of the LLE, and the area is TTP. Today, the patient is complaining of SOB, fatigue, and LLE pain. Patient states his last BM was 2 days ago, and he did not notice any blood in his stool. He denies fever, chills, chest pain, palpitations, cough, abdominal pain, nausea, vomiting, diarrhea, hematochezia, melena, hematuria, headaches, changes in vision, dizziness, numbness, tingling. No other complaints at this time.    ED course:  Vitals: BP: 110/54 , HR:107 , Temp: 97.8, RR: 18, SpO2: 100% on RA  Labs significant for: Positive FOBT, H/H 6.7/22, red cell morphology abnormal, PT/INR 16.2/1.4, BUN 37, CO2 33, Lipase 109, BNP 2703, Dig Level 0.7  EKbpm, afib, RBBB, QTc 482  In ED given IV Protonix x1, IV Zosyn x1, Packed rbc x1unit, IV Lasix 20mg x1  Imaging  CT Ab/pelvis:No active GI bleeding is evident. No evidence of interval hemorrhage compared to 2023 to explain the patient's low hemoglobin. The hematoma along the medial inferior aspect of the urostomy in the right of midline anterior abdominal wall subcutaneous fat has organized and now has appearance of a chronic hematoma or seroma.   Venous Duplex LLE: No evidence of left lower extremity deep venous thrombosis. Ruptured hemorrhagic left Baker cyst (26 Sep 2023 01:36)      ---  HOSPITAL COURSE:   Patient sent in from [facility] with anemia (Hgb/Hct of 6.7/22) and +FOBT and received 2 x 1U of pRBC throughout hospital course. Home coumadin was held. The patient was initially on a clear liquid diet which was advanced to a regular DASH diet, which patient tolerated well. A repeat TTE was attained and demonstrated EF 40-45% (improved from previous TTE demonstrating EF 20-25%). The patient was continued on home lasix po initially with intermittently dosed lasix IV pending volume status. The patient was then started on lasix 40mg IV x 1. The patient's volume status improved with diuresis and his low hemoglobin improved s/p transfusions, remaining low stable. The patient's home plavix was continued, but aspirin and coumadin were held. Patient is to continue plavix on discharge and *** hold aspirin and coumadin until outpatient cardiology follow-up. The patient's digoxin and metoprolol will be continued for history of atrial fibrillation. The patient was seen and examined on the day of discharge. The patient is medically optimized for discharge home  ***.    ---  CONSULTANTS:   Cardiology - Dr Chu   Gastroenterology - Dr Coker  General Surgery - Dr Castillo     ---  TIME SPENT:  I, the attending physician, was physically present for the key portions of the evaluation and management (E/M) service provided. The total amount of time spent reviewing the hospital notes, laboratory values, imaging findings, assessing/counseling the patient, discussing with consultant physicians, social work, nursing staff was 48 minutes    ---  Primary care provider was made aware of plan for discharge:      [  ] NO     [ X ] YES   FROM ADMISSION H+P:   HPI:  78-year-old male with a PMH of bladder CA with urostomy 14 years ago, A-fib on warfarin, HTN, HLD, CAD s/p CABG, 1 cardiac stent (2023), CHF, Hypothyroidism, GERD, COPD, SBO s/p ex lap of adhesions 2023 presents to the ED from [facility] with low hemoglobin. Patient was recently discharged from [facility] s/p ex lap of adhesions for SBO, and a parastomal hernia repair with mesh. During that admission, his stay was complicated by altered mental status and he was intubated. He also was found to have a GI bleed, in which he was transfused and it resolved on its own. [facility] Recently, patient also developed left lower extremity pain. The pain is making him unable to walk, causing him to be wheelchair bound. There is swelling and redness of the LLE, and the area is TTP. Today, the patient is complaining of SOB, fatigue, and LLE pain. Patient states his last BM was 2 days ago, and he did not notice any blood in his stool. He denies fever, chills, chest pain, palpitations, cough, abdominal pain, nausea, vomiting, diarrhea, hematochezia, melena, hematuria, headaches, changes in vision, dizziness, numbness, tingling. No other complaints at this time.    ED course:  Vitals: BP: 110/54 , HR:107 , Temp: 97.8, RR: 18, SpO2: 100% on RA  Labs significant for: Positive FOBT, H/H 6.7/22, red cell morphology abnormal, PT/INR 16.2/1.4, BUN 37, CO2 33, Lipase 109, BNP 2703, Dig Level 0.7  EKbpm, afib, RBBB, QTc 482  In ED given IV Protonix x1, IV Zosyn x1, Packed rbc x1unit, IV Lasix 20mg x1  Imaging  CT Ab/pelvis:No active GI bleeding is evident. No evidence of interval hemorrhage compared to 2023 to explain the patient's low hemoglobin. The hematoma along the medial inferior aspect of the urostomy in the right of midline anterior abdominal wall subcutaneous fat has organized and now has appearance of a chronic hematoma or seroma.   Venous Duplex LLE: No evidence of left lower extremity deep venous thrombosis. Ruptured hemorrhagic left Baker cyst (26 Sep 2023 01:36)      ---  HOSPITAL COURSE:   Patient sent in from [facility] with anemia (Hgb/Hct of 6.7/22) and +FOBT and received 2 x 1U of pRBC throughout hospital course. Home coumadin was held. The patient was initially on a clear liquid diet which was advanced to a regular DASH diet, which patient tolerated well. A repeat TTE was attained and demonstrated EF 40-45% (improved from previous TTE demonstrating EF 20-25%). The patient was continued on home lasix po initially with intermittently dosed lasix IV pending volume status. The patient was then started on lasix 40mg IV qd with transition to lasix 40mg po qd. The patient's volume status improved with diuresis and his low hemoglobin improved s/p transfusions, remaining low stable. The patient's home plavix was continued, but aspirin and coumadin were held. Patient is to continue plavix on discharge and resume coumadin. Will hold aspirin for now and until outpatient cardiology follow-up. The patient's digoxin and metoprolol will be continued for history of atrial fibrillation. The patient was seen and examined on the day of discharge. The patient is medically optimized for discharge to [facility].    Physical Exam:  T(C): 36.8 (23 @ 11:48), Max: 36.8 (23 @ 11:48)  HR: 74 (23 @ 11:48) (74 - 83)  BP: 112/66 (23 @ 11:48) (104/53 - 131/64)  RR: 18 (23 @ 11:48) (18 - 18)  SpO2: 95% (23 @ 11:48) (95% - 98%)    gen: appears stated age, NAD  heent: mmm  resp: bilateral apices wheezing, no rales/rhonchi  cardiac: irregular rhythm, regular rate, +s1, s2  abd: soft, nt, nd, no rebound/guarding, +urostomy  mskt: bilateral pitting trace edema  skin: warm and dry  neuro: a&ox3, no appreciable focal deficits   psych: normal mood, affect. normal behavior    ---  CONSULTANTS:   Cardiology - Dr Chu   Gastroenterology - Dr Coker  General Surgery - Dr Castillo     ---  TIME SPENT:  I, the attending physician, was physically present for the key portions of the evaluation and management (E/M) service provided. The total amount of time spent reviewing the hospital notes, laboratory values, imaging findings, assessing/counseling the patient, discussing with consultant physicians, social work, nursing staff was 48 minutes    ---  Primary care provider was made aware of plan for discharge:      [  ] NO     [ X ] YES   FROM ADMISSION H+P:   HPI:  78-year-old male with a PMH of bladder CA with urostomy 14 years ago, A-fib on warfarin, HTN, HLD, CAD s/p CABG, 1 cardiac stent (2023), CHF, Hypothyroidism, GERD, COPD, SBO s/p ex lap of adhesions 2023 presents to the ED from [facility] with low hemoglobin. Patient was recently discharged from [facility] s/p ex lap of adhesions for SBO, and a parastomal hernia repair with mesh. During that admission, his stay was complicated by altered mental status and he was intubated. He also was found to have a GI bleed, in which he was transfused and it resolved on its own. [facility] Recently, patient also developed left lower extremity pain. The pain is making him unable to walk, causing him to be wheelchair bound. There is swelling and redness of the LLE, and the area is TTP. Today, the patient is complaining of SOB, fatigue, and LLE pain. Patient states his last BM was 2 days ago, and he did not notice any blood in his stool. He denies fever, chills, chest pain, palpitations, cough, abdominal pain, nausea, vomiting, diarrhea, hematochezia, melena, hematuria, headaches, changes in vision, dizziness, numbness, tingling. No other complaints at this time.    ED course:  Vitals: BP: 110/54 , HR:107 , Temp: 97.8, RR: 18, SpO2: 100% on RA  Labs significant for: Positive FOBT, H/H 6.7/22, red cell morphology abnormal, PT/INR 16.2/1.4, BUN 37, CO2 33, Lipase 109, BNP 2703, Dig Level 0.7  EKbpm, afib, RBBB, QTc 482  In ED given IV Protonix x1, IV Zosyn x1, Packed rbc x1unit, IV Lasix 20mg x1  Imaging  CT Ab/pelvis:No active GI bleeding is evident. No evidence of interval hemorrhage compared to 2023 to explain the patient's low hemoglobin. The hematoma along the medial inferior aspect of the urostomy in the right of midline anterior abdominal wall subcutaneous fat has organized and now has appearance of a chronic hematoma or seroma.   Venous Duplex LLE: No evidence of left lower extremity deep venous thrombosis. Ruptured hemorrhagic left Baker cyst (26 Sep 2023 01:36)      ---  HOSPITAL COURSE:   Patient sent in from [facility] with anemia (Hgb/Hct of 6.7/22) and +FOBT and received 2 x 1U of pRBC throughout hospital course. Home coumadin was held. The patient was initially on a clear liquid diet which was advanced to a regular DASH diet, which patient tolerated well. A repeat TTE was attained and demonstrated EF 40-45% (improved from previous TTE demonstrating EF 20-25%). The patient was continued on home lasix po initially with intermittently dosed lasix IV pending volume status. The patient was then started on lasix 40mg IV qd with transition to lasix 40mg po qd. The patient's volume status improved with diuresis and his low hemoglobin improved s/p transfusions, remaining low stable. The patient's home plavix was continued, but aspirin and coumadin were held. Patient is to continue plavix on discharge and resume coumadin. Will hold aspirin for now and until outpatient cardiology follow-up. The patient's digoxin and metoprolol will be continued for history of atrial fibrillation. The patient was seen and examined on the day of discharge. The patient is medically optimized for discharge to [facility].    Physical Exam:  T(C): 36.8 (23 @ 11:48), Max: 36.8 (23 @ 11:48)  HR: 74 (23 @ 11:48) (74 - 83)  BP: 112/66 (23 @ 11:48) (104/53 - 131/64)  RR: 18 (23 @ 11:48) (18 - 18)  SpO2: 95% (23 @ 11:48) (95% - 98%)    gen: appears stated age, NAD  heent: mmm  resp: occasional wheezing, no rales/rhonchi  cardiac: irregular rhythm, regular rate, +s1, s2  abd: soft, nt, nd, no rebound/guarding, +urostomy  mskt: bilateral pitting trace edema  skin: warm and dry  neuro: a&ox3, no appreciable focal deficits   psych: normal mood, affect. normal behavior    ---  CONSULTANTS:   Cardiology - Dr Chu   Gastroenterology - Dr Coker  General Surgery - Dr Castillo

## 2023-09-26 NOTE — DISCHARGE NOTE PROVIDER - NSDCFUSCHEDAPPT_GEN_ALL_CORE_FT
[facility] Physician [facility]  ELECTROPH [address]  Scheduled Appointment: 10/17/2023    Efraín Chu  [facility]  CARDIOLOGY [address]  Scheduled Appointment: 10/24/2023

## 2023-09-26 NOTE — H&P ADULT - NSHPREVIEWOFSYSTEMS_GEN_ALL_CORE
GENERAL: +Fatigue, No fever or chills  EYES: no change in vision   HEENT: no trouble swallowing or speaking   CARDIAC: no chest pain   PULMONARY: +SOB, no cough   GI:  +Constipation, No abdominal pain   SKIN: no rashes   NEURO: no headache   MSK: +LLE pain

## 2023-09-26 NOTE — PROGRESS NOTE ADULT - PROBLEM SELECTOR PLAN 2
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 No evidence of left lower extremity deep venous thrombosis  - Continue Tylenol PRN for pain   - Elevate LLE  - Finding of Brennan Cyst does not require in house ortho management or consultation. Can be followed up outpatient with orthopaedics (Dr. Clark) if persistent knee pain continues past current admission.

## 2023-09-26 NOTE — H&P ADULT - PROBLEM SELECTOR PLAN 9
- Continue home Pantoprazole 40mg daily - Continue home IV Pantoprazole 40mg BID - Continue IV Pantoprazole 40mg BID

## 2023-09-26 NOTE — PROGRESS NOTE ADULT - PROBLEM SELECTOR PLAN 3
Patients has a hx of CHF   - BNP 2703  - Continue home Lasix 40mg daily   -TTE 06/2021 showed EF 20-25%  - F/u repeat TTE  - Continue home Metoprolol 25mg daily  - Continue home Entresto BID   - F/u AM CMP, Mg, Phos, A1c   - Frequent reassessment of volume status while receiving blood products   - Cardiology Dr. Chu consulted, f/u recs Patients has a hx of CHF   - BNP 2703  - Continue home Lasix 40mg daily   - TTE 06/2021 showed EF 20-25%  - FU repeat TTE (Ordered)   - Continue home Metoprolol 25mg daily  - Continue home Entresto BID   - FU AM CMP, Mg, Phos, A1c   - Frequent reassessment of volume status while receiving blood products   - Cardiology Dr. Chu consulted, f/u recs  - Fluid Restriction recommended. Start Clear Diet

## 2023-09-26 NOTE — DISCHARGE NOTE PROVIDER - PROVIDER TOKENS
PROVIDER:[TOKEN:[[id number]]],PROVIDER:[TOKEN:[[id number]]] PROVIDER:[TOKEN:[[id number]],FOLLOWUP:[1-3 days]],PROVIDER:[TOKEN:[[id number]],SCHEDULEDAPPT:[10/24/2023]],PROVIDER:[TOKEN:[[id number]],FOLLOWUP:[2 weeks]],FREE:[LAST:[your surgeon],PHONE:[(   )    -],FAX:[(   )    -],FOLLOWUP:[Routine]],FREE:[LAST:[your pulmonologist],PHONE:[(   )    -],FAX:[(   )    -],FOLLOWUP:[Routine]]

## 2023-09-26 NOTE — H&P ADULT - NSICDXPASTSURGICALHX_GEN_ALL_CORE_FT
PAST SURGICAL HISTORY:  H/O knee surgery     History of automatic internal cardiac defibrillator (AICD)     History of bladder surgery on urostomy    History of hernia repair     S/P CABG x 3     Small bowel obstruction

## 2023-09-26 NOTE — DISCHARGE NOTE PROVIDER - NSDCCPCAREPLAN_GEN_ALL_CORE_FT
PRINCIPAL DISCHARGE DIAGNOSIS  Diagnosis: Anemia  Assessment and Plan of Treatment:       SECONDARY DISCHARGE DIAGNOSES  Diagnosis: Cellulitis of left lower extremity  Assessment and Plan of Treatment:      PRINCIPAL DISCHARGE DIAGNOSIS  Diagnosis: Anemia  Assessment and Plan of Treatment: You received two units of blood for low hemoglobin levels. Your aspirin and coumadin were held. Your hemoglobin has been low but stable by the time of discharge.   You may resume your Coumadin on 9/29/23 PM  You may continue plavix 75mg 1 tab by mouth daily.  STOP TAKING ASPIRIN.        SECONDARY DISCHARGE DIAGNOSES  Diagnosis: Afib  Assessment and Plan of Treatment: Continue digoxin and metoprolol   You may resume your Coumadin on 9/29/23 PM       Diagnosis: HTN (hypertension)  Assessment and Plan of Treatment: Stop metoprolol tartrate  Start metoprolol succinate 50mg 1 tab by mouth daily  Continue lasix 40mg 1 tab by mouth daily    Diagnosis: Congestive heart failure  Assessment and Plan of Treatment: Continue lasix 40mg 1 tab by mouth daily  A repeat echocardiogram was performed in the hospital and showed an improved ejection fraction from 20-25% previously seen to 40-45%.    Diagnosis: CAD (coronary artery disease)  Assessment and Plan of Treatment: Continue plavix 75mg 1 tab by mouth    Diagnosis: HLD (hyperlipidemia)  Assessment and Plan of Treatment: Continue rosuvastatin    Diagnosis: Ruptured Bakers cyst  Assessment and Plan of Treatment: You have a baker's cyst in your knee. Follow up with your PCP/orthopedic surgery following discharge.    Diagnosis: GERD (gastroesophageal reflux disease)  Assessment and Plan of Treatment: Continue pantoprazole 40mg 1 tab by mouth daily    Diagnosis: History of COPD  Assessment and Plan of Treatment: Continue albuterol, budesonide, ipratropium inhalers     PRINCIPAL DISCHARGE DIAGNOSIS  Diagnosis: Anemia  Assessment and Plan of Treatment: You received two units of blood for low hemoglobin levels. Your aspirin and coumadin were held. found to have hematoma along the medial inferior aspect of the urostomy in the right of midline anterior abdominal wall subcutaneous fat has organized and now has appearance of a chronic hematoma or seroma.  Your hemoglobin has been stable by the time of discharge.   you were seen BY GI/Surgery/carcdiology   You may resume your Coumadin on 9/29/23 PM without bridging due to bleeding risk, INR need to be monitored daily till theraputic   You may continue plavix 75mg 1 tab by mouth daily.  STOP TAKING ASPIRIN.        SECONDARY DISCHARGE DIAGNOSES  Diagnosis: Afib  Assessment and Plan of Treatment: Continue digoxin and metoprolol   You may resume your Coumadin on 9/29/23 PM without bridging due to bleeding risk, INR need to be monitored daily till theraputic       Diagnosis: HLD (hyperlipidemia)  Assessment and Plan of Treatment: Continue rosuvastatin    Diagnosis: HTN (hypertension)  Assessment and Plan of Treatment: Start metoprolol succinate 50mg 1 tab by mouth daily  Continue lasix 40mg 1 tab by mouth daily    Diagnosis: CAD (coronary artery disease)  Assessment and Plan of Treatment: Continue plavix 75mg 1 tab by mouth, metoprolol, entresto    Diagnosis: Ruptured Bakers cyst  Assessment and Plan of Treatment: You have a baker's cyst in your knee. you were seen by orthopedic.   Follow up with your PCP/orthopedic surgery following discharge.    Diagnosis: Congestive heart failure  Assessment and Plan of Treatment: Continue lasix 40mg 1 tab by mouth daily, entresto, betablocker   A repeat echocardiogram was performed in the hospital and showed an improved ejection fraction from 20-25% previously seen to 40-45%.    Diagnosis: GERD (gastroesophageal reflux disease)  Assessment and Plan of Treatment: Continue pantoprazole 40mg 1 tab by mouth daily    Diagnosis: History of COPD  Assessment and Plan of Treatment: Continue albuterol, budesonide, ipratropium inhalers

## 2023-09-26 NOTE — H&P ADULT - HISTORY OF PRESENT ILLNESS
Patient is a 78-year-old male with a PMH of bladder CA with urostomy 14 years ago, A-fib on warfarin, HTN, HLD, CAD s/p CABG + 1 cardiac stent, SBO s/p ex lap of adhesions 8/2023 presents to the ED from [facility] with low hemoglobin. [facility] went to the OR for ex lap of adhesions, obstruction was relieved, also had a parastomal hernia repair with mesh, patient stay at the hospital was complicated by altered mental status and he was intubated, GI bleed in which he was transfused and it resolved on its own, patient then transferred over to [facility] center, while the patient developed left lower extremity pain, was told it was a Baker's cyst, having increased pain swelling and redness to the area,    Denies fever, chills, chest pain, palpitations, SOB, cough, abdominal pain, nausea, vomiting, diarrhea, constipation, hematochezia, melena, urinary frequency, urgency, dysuria, hematuria, headaches, changes in vision, dizziness, numbness, tingling. No other complaints at this time.    Denies recent travel, recent antibiotic use, or sick contacts.    ED course:  Vitals: BP: , HR: , Temp: , RR: , SpO2: % on  Labs significant for:  ABG: pH: , PO2: , PCO2: , HCO3: , SpO2: %  UA:   EKG:  In ED given    Imaging  CXR:  CT head:  CT a/p:   Patient is a 78-year-old male with a PMH of bladder CA with urostomy 14 years ago, A-fib on warfarin, HTN, HLD, CAD s/p CABG + 1 cardiac stent, CHF, Hypothyroidism, GERD, COPD, SBO s/p ex lap of adhesions 2023 presents to the ED from [facility] with low hemoglobin. Patient was recently discharged from [facility] s/p ex lap of adhesions for SBO, and a parastomal hernia repair with mesh. During that admission, his stay was complicated by altered mental status and he was intubated. He also was found to have a GI bleed, in which he was transfused and it resolved on its own. Patient was then transferred [facility] to [facility] [place]. Recently, patient also developed left lower extremity pain. The pain is making him unable to walk, causing him to be wheelchair bound. There is swelling and redness of the LLE, and the area is TTP. Today, the patient is complaining of SOB, fatigue, and LLE pain. Patient states his last BM was 2 days ago, and he did not notice any blood in his stool. He denies fever, chills, chest pain, palpitations, cough, abdominal pain, nausea, vomiting, diarrhea, hematochezia, melena, hematuria, headaches, changes in vision, dizziness, numbness, tingling. No other complaints at this time.    ED course:  Vitals: BP: 110/54 , HR:107 , Temp: 97.8, RR: 18, SpO2: 100% on RA  Labs significant for: Positive FOBT, H/H 6.7/22, red cell morphology abnormal, PT/INR 16.2/1.4, BUN 37, CO2 33, Lipase 109, BNP 2703, Dig Level 0.7  EKbpm, afib, RBBB, QTc 482  In ED given IV Protonix x1, IV Zosyn x1, Packed rbc x1unit     Imaging  CT Ab/pelvis:No active GI bleeding is evident. No evidence of interval hemorrhage compared to 2023 to explain the patient's low hemoglobin. The hematoma along the medial inferior aspect of the urostomy in the right of midline anterior abdominal wall subcutaneous fat has organized and now has appearance of a chronic hematoma or seroma.   Venous Duplex LLE: No evidence of left lower extremity deep venous thrombosis. Ruptured hemorrhagic left Baker cyst Patient is a 78-year-old male with a PMH of bladder CA with urostomy 14 years ago, A-fib on warfarin, HTN, HLD, CAD s/p CABG, 1 cardiac stent (2023), CHF, Hypothyroidism, GERD, COPD, SBO s/p ex lap of adhesions 2023 presents to the ED from [facility] with low hemoglobin. Patient was recently discharged from [facility] s/p ex lap of adhesions for SBO, and a parastomal hernia repair with mesh. During that admission, his stay was complicated by altered mental status and he was intubated. He also was found to have a GI bleed, in which he was transfused and it resolved on its own. [facility] Recently, patient also developed left lower extremity pain. The pain is making him unable to walk, causing him to be wheelchair bound. There is swelling and redness of the LLE, and the area is TTP. Today, the patient is complaining of SOB, fatigue, and LLE pain. Patient states his last BM was 2 days ago, and he did not notice any blood in his stool. He denies fever, chills, chest pain, palpitations, cough, abdominal pain, nausea, vomiting, diarrhea, hematochezia, melena, hematuria, headaches, changes in vision, dizziness, numbness, tingling. No other complaints at this time.    ED course:  Vitals: BP: 110/54 , HR:107 , Temp: 97.8, RR: 18, SpO2: 100% on RA  Labs significant for: Positive FOBT, H/H 6.7/22, red cell morphology abnormal, PT/INR 16.2/1.4, BUN 37, CO2 33, Lipase 109, BNP 2703, Dig Level 0.7  EKbpm, afib, RBBB, QTc 482  In ED given IV Protonix x1, IV Zosyn x1, Packed rbc x1unit, IV Lasix 20mg x1    Imaging  CT Ab/pelvis:No active GI bleeding is evident. No evidence of interval hemorrhage compared to 2023 to explain the patient's low hemoglobin. The hematoma along the medial inferior aspect of the urostomy in the right of midline anterior abdominal wall subcutaneous fat has organized and now has appearance of a chronic hematoma or seroma.   Venous Duplex LLE: No evidence of left lower extremity deep venous thrombosis. Ruptured hemorrhagic left Baker cyst 78-year-old male with a PMH of bladder CA with urostomy 14 years ago, A-fib on warfarin, HTN, HLD, CAD s/p CABG, 1 cardiac stent (2023), CHF, Hypothyroidism, GERD, COPD, SBO s/p ex lap of adhesions 2023 presents to the ED from [facility] with low hemoglobin. Patient was recently discharged from [facility] s/p ex lap of adhesions for SBO, and a parastomal hernia repair with mesh. During that admission, his stay was complicated by altered mental status and he was intubated. He also was found to have a GI bleed, in which he was transfused and it resolved on its own. [facility] Recently, patient also developed left lower extremity pain. The pain is making him unable to walk, causing him to be wheelchair bound. There is swelling and redness of the LLE, and the area is TTP. Today, the patient is complaining of SOB, fatigue, and LLE pain. Patient states his last BM was 2 days ago, and he did not notice any blood in his stool. He denies fever, chills, chest pain, palpitations, cough, abdominal pain, nausea, vomiting, diarrhea, hematochezia, melena, hematuria, headaches, changes in vision, dizziness, numbness, tingling. No other complaints at this time.    ED course:  Vitals: BP: 110/54 , HR:107 , Temp: 97.8, RR: 18, SpO2: 100% on RA  Labs significant for: Positive FOBT, H/H 6.7/22, red cell morphology abnormal, PT/INR 16.2/1.4, BUN 37, CO2 33, Lipase 109, BNP 2703, Dig Level 0.7  EKbpm, afib, RBBB, QTc 482  In ED given IV Protonix x1, IV Zosyn x1, Packed rbc x1unit, IV Lasix 20mg x1  Imaging  CT Ab/pelvis:No active GI bleeding is evident. No evidence of interval hemorrhage compared to 2023 to explain the patient's low hemoglobin. The hematoma along the medial inferior aspect of the urostomy in the right of midline anterior abdominal wall subcutaneous fat has organized and now has appearance of a chronic hematoma or seroma.   Venous Duplex LLE: No evidence of left lower extremity deep venous thrombosis. Ruptured hemorrhagic left Baker cyst

## 2023-09-26 NOTE — PROGRESS NOTE ADULT - ATTENDING COMMENTS
Patient seen at bedside   currently on clear liquid     + urostomy     A/P:  Acute blood loss anemia    - d/w GI, no endoscopy at this time.     - transfuse another unit prbc for hgb > 8     - surgery following for hematoma. no acute intervention at this time     - coumadin held for now       ruptured bakers cyst     - supportive management for now    CHF  CAD s/p CABG/PCI   HTN    - recent stent within 3 months. continue plavix. asa was discontinued during the prior admission due to bleeding.     - cardiology following     CHF    - ECHO following    - cont BP meds     hypothyroidism     - consider increasing levothyroxine to 150mcg daily     off coumadin, INR in AM   wife at bedside.

## 2023-09-27 LAB
ALBUMIN SERPL ELPH-MCNC: 1.9 G/DL — LOW (ref 3.3–5)
ALP SERPL-CCNC: 88 U/L — SIGNIFICANT CHANGE UP (ref 40–120)
ALT FLD-CCNC: 18 U/L — SIGNIFICANT CHANGE UP (ref 12–78)
ANION GAP SERPL CALC-SCNC: 4 MMOL/L — LOW (ref 5–17)
APTT BLD: 25.9 SEC — SIGNIFICANT CHANGE UP (ref 24.5–35.6)
AST SERPL-CCNC: 23 U/L — SIGNIFICANT CHANGE UP (ref 15–37)
BILIRUB SERPL-MCNC: 0.5 MG/DL — SIGNIFICANT CHANGE UP (ref 0.2–1.2)
BUN SERPL-MCNC: 29 MG/DL — HIGH (ref 7–23)
CALCIUM SERPL-MCNC: 8.1 MG/DL — LOW (ref 8.5–10.1)
CHLORIDE SERPL-SCNC: 103 MMOL/L — SIGNIFICANT CHANGE UP (ref 96–108)
CO2 SERPL-SCNC: 36 MMOL/L — HIGH (ref 22–31)
CREAT SERPL-MCNC: 1.3 MG/DL — SIGNIFICANT CHANGE UP (ref 0.5–1.3)
EGFR: 56 ML/MIN/1.73M2 — LOW
GLUCOSE SERPL-MCNC: 114 MG/DL — HIGH (ref 70–99)
HCT VFR BLD CALC: 27.7 % — LOW (ref 39–50)
HGB BLD-MCNC: 8.6 G/DL — LOW (ref 13–17)
INR BLD: 1.24 RATIO — HIGH (ref 0.85–1.18)
MCHC RBC-ENTMCNC: 27.7 PG — SIGNIFICANT CHANGE UP (ref 27–34)
MCHC RBC-ENTMCNC: 31 GM/DL — LOW (ref 32–36)
MCV RBC AUTO: 89.4 FL — SIGNIFICANT CHANGE UP (ref 80–100)
NRBC # BLD: 0 /100 WBCS — SIGNIFICANT CHANGE UP (ref 0–0)
PLATELET # BLD AUTO: 259 K/UL — SIGNIFICANT CHANGE UP (ref 150–400)
POTASSIUM SERPL-MCNC: 3.9 MMOL/L — SIGNIFICANT CHANGE UP (ref 3.5–5.3)
POTASSIUM SERPL-SCNC: 3.9 MMOL/L — SIGNIFICANT CHANGE UP (ref 3.5–5.3)
PROT SERPL-MCNC: 6 G/DL — SIGNIFICANT CHANGE UP (ref 6–8.3)
PROTHROM AB SERPL-ACNC: 14.4 SEC — HIGH (ref 9.5–13)
RBC # BLD: 3.1 M/UL — LOW (ref 4.2–5.8)
RBC # FLD: 15.5 % — HIGH (ref 10.3–14.5)
SODIUM SERPL-SCNC: 143 MMOL/L — SIGNIFICANT CHANGE UP (ref 135–145)
T3 SERPL-MCNC: 60 NG/DL — LOW (ref 80–200)
T4 AB SER-ACNC: 5.5 UG/DL — SIGNIFICANT CHANGE UP (ref 4.6–12)
T4 FREE SERPL-MCNC: 1.2 NG/DL — SIGNIFICANT CHANGE UP (ref 0.9–1.8)
TSH SERPL-MCNC: 18.3 UIU/ML — HIGH (ref 0.36–3.74)
WBC # BLD: 7.74 K/UL — SIGNIFICANT CHANGE UP (ref 3.8–10.5)
WBC # FLD AUTO: 7.74 K/UL — SIGNIFICANT CHANGE UP (ref 3.8–10.5)

## 2023-09-27 PROCEDURE — 99233 SBSQ HOSP IP/OBS HIGH 50: CPT | Mod: GC

## 2023-09-27 PROCEDURE — 99233 SBSQ HOSP IP/OBS HIGH 50: CPT

## 2023-09-27 RX ORDER — BUDESONIDE, MICRONIZED 100 %
0.25 POWDER (GRAM) MISCELLANEOUS
Refills: 0 | Status: DISCONTINUED | OUTPATIENT
Start: 2023-09-27 | End: 2023-09-29

## 2023-09-27 RX ORDER — ASCORBIC ACID 60 MG
500 TABLET,CHEWABLE ORAL
Refills: 0 | Status: CANCELLED | OUTPATIENT
Start: 2023-09-27 | End: 2023-09-29

## 2023-09-27 RX ORDER — LEVOTHYROXINE SODIUM 125 MCG
150 TABLET ORAL DAILY
Refills: 0 | Status: DISCONTINUED | OUTPATIENT
Start: 2023-09-28 | End: 2023-09-29

## 2023-09-27 RX ORDER — METOPROLOL TARTRATE 50 MG
25 TABLET ORAL
Refills: 0 | Status: DISCONTINUED | OUTPATIENT
Start: 2023-09-27 | End: 2023-09-29

## 2023-09-27 RX ORDER — FUROSEMIDE 40 MG
20 TABLET ORAL ONCE
Refills: 0 | Status: COMPLETED | OUTPATIENT
Start: 2023-09-27 | End: 2023-09-27

## 2023-09-27 RX ORDER — LIDOCAINE 4 G/100G
1 CREAM TOPICAL DAILY
Refills: 0 | Status: DISCONTINUED | OUTPATIENT
Start: 2023-09-27 | End: 2023-09-29

## 2023-09-27 RX ADMIN — Medication 0.25 MILLIGRAM(S): at 19:13

## 2023-09-27 RX ADMIN — SACUBITRIL AND VALSARTAN 1 TABLET(S): 24; 26 TABLET, FILM COATED ORAL at 04:35

## 2023-09-27 RX ADMIN — ALBUTEROL 2 PUFF(S): 90 AEROSOL, METERED ORAL at 04:35

## 2023-09-27 RX ADMIN — Medication 1 TABLET(S): at 04:36

## 2023-09-27 RX ADMIN — Medication 25 MILLIGRAM(S): at 17:31

## 2023-09-27 RX ADMIN — PANTOPRAZOLE SODIUM 40 MILLIGRAM(S): 20 TABLET, DELAYED RELEASE ORAL at 04:35

## 2023-09-27 RX ADMIN — SACUBITRIL AND VALSARTAN 1 TABLET(S): 24; 26 TABLET, FILM COATED ORAL at 17:31

## 2023-09-27 RX ADMIN — TIOTROPIUM BROMIDE 2 PUFF(S): 18 CAPSULE ORAL; RESPIRATORY (INHALATION) at 04:35

## 2023-09-27 RX ADMIN — Medication 1 TABLET(S): at 17:31

## 2023-09-27 RX ADMIN — Medication 137 MICROGRAM(S): at 04:35

## 2023-09-27 RX ADMIN — CLOPIDOGREL BISULFATE 75 MILLIGRAM(S): 75 TABLET, FILM COATED ORAL at 12:34

## 2023-09-27 RX ADMIN — Medication 20 MILLIGRAM(S): at 12:34

## 2023-09-27 RX ADMIN — Medication 25 MILLIGRAM(S): at 04:36

## 2023-09-27 RX ADMIN — LIDOCAINE 1 PATCH: 4 CREAM TOPICAL at 12:33

## 2023-09-27 RX ADMIN — ATORVASTATIN CALCIUM 80 MILLIGRAM(S): 80 TABLET, FILM COATED ORAL at 21:05

## 2023-09-27 RX ADMIN — LIDOCAINE 1 PATCH: 4 CREAM TOPICAL at 21:58

## 2023-09-27 RX ADMIN — Medication 125 MICROGRAM(S): at 04:36

## 2023-09-27 RX ADMIN — Medication 1 TABLET(S): at 12:34

## 2023-09-27 RX ADMIN — Medication 40 MILLIGRAM(S): at 04:36

## 2023-09-27 RX ADMIN — Medication 1 ENEMA: at 05:28

## 2023-09-27 RX ADMIN — PANTOPRAZOLE SODIUM 40 MILLIGRAM(S): 20 TABLET, DELAYED RELEASE ORAL at 17:27

## 2023-09-27 RX ADMIN — SENNA PLUS 1 TABLET(S): 8.6 TABLET ORAL at 21:06

## 2023-09-27 RX ADMIN — ALBUTEROL 2 PUFF(S): 90 AEROSOL, METERED ORAL at 17:27

## 2023-09-27 RX ADMIN — Medication 5 MILLIGRAM(S): at 21:05

## 2023-09-27 NOTE — PROGRESS NOTE ADULT - SUBJECTIVE AND OBJECTIVE BOX
SUBJECTIVE:  Patient seen and examined at bedside. No overnight events. Patient reports no new complaints at this time. Denies BM since before admission. Urostomy w/ urine output.  s/p 2 u PRBC yesterday.  Patient denies any fever, chills, chest pain, shortness of breath, nausea, vomiting, or urinary complaints.    VITALS  Vital Signs Last 24 Hrs  T(C): 36.9 (27 Sep 2023 04:40), Max: 37.3 (26 Sep 2023 20:08)  T(F): 98.4 (27 Sep 2023 04:40), Max: 99.2 (26 Sep 2023 20:08)  HR: 95 (27 Sep 2023 04:40) (78 - 98)  BP: 133/72 (27 Sep 2023 04:40) (104/64 - 133/72)  BP(mean): --  RR: 19 (27 Sep 2023 04:40) (18 - 20)  SpO2: 97% (27 Sep 2023 04:40) (94% - 99%)    Parameters below as of 27 Sep 2023 04:40  Patient On (Oxygen Delivery Method): room air    PHYSICAL EXAM  General: No acute distress, appears comfortable, well-groomed, appears stated age, elderly male.  Head, Eyes, Ears, Nose, Throat: Normal cephalic/atraumatic, anicteric, conjunctiva-non injected and moist  Abdomen: Midline incision from recent procedure well-approximated w/ overlying steri-strips, soft and no shara-incisional ttp; Abdomen soft, nondistended and nontender; no guarding, no rebound ttp, no peritonitic features  Urostomy pink, straw-colored urine in collection bag. Medial to urostomy on deep palpation is a fist-sized firm collection that is nontender, without overlying skin changes.  Extremity: No open sores, no gross deformities, posterior to L knee is a swollen erythematous mass and that is tender and is limiting LLE ROM 2/2 pain.  Neuro: Alert and oriented x3, motor and sensory intact  Skin: Good color, turgor, texture with no gross lesions, no eruptions, no rashes, no subcutaneous nodules and normal temperature.     INTAKE & OUTPUT  I&O's Summary    26 Sep 2023 07:01  -  27 Sep 2023 06:19  --------------------------------------------------------  IN: 0 mL / OUT: 2600 mL / NET: -2600 mL    I&O's Detail    26 Sep 2023 07:01  -  27 Sep 2023 06:19  --------------------------------------------------------  IN:  Total IN: 0 mL    OUT:    Urostomy (mL): 2600 mL  Total OUT: 2600 mL    Total NET: -2600 mL    MEDICATIONS  MEDICATIONS  (STANDING):  albuterol    90 MICROgram(s) HFA Inhaler 2 Puff(s) Inhalation every 6 hours  atorvastatin 80 milliGRAM(s) Oral at bedtime  calcium carbonate   1250 mG (OsCal) 1 Tablet(s) Oral two times a day  clopidogrel Tablet 75 milliGRAM(s) Oral daily  digoxin     Tablet 125 MICROGram(s) Oral daily  furosemide    Tablet 40 milliGRAM(s) Oral daily  influenza  Vaccine (HIGH DOSE) 0.7 milliLiter(s) IntraMuscular once  levothyroxine 137 MICROGram(s) Oral daily  melatonin 5 milliGRAM(s) Oral at bedtime  metoprolol tartrate 25 milliGRAM(s) Oral two times a day  multivitamin 1 Tablet(s) Oral daily  pantoprazole  Injectable 40 milliGRAM(s) IV Push two times a day  polyethylene glycol 3350 17 Gram(s) Oral daily  sacubitril 24 mG/valsartan 26 mG 1 Tablet(s) Oral two times a day  senna 1 Tablet(s) Oral at bedtime  tiotropium 2.5 MICROgram(s) Inhaler 2 Puff(s) Inhalation daily    MEDICATIONS  (PRN):  acetaminophen     Tablet .. 650 milliGRAM(s) Oral every 6 hours PRN Temp greater or equal to 38C (100.4F), Mild Pain (1 - 3)  saline laxative (FLEET) Rectal Enema 1 Enema Rectal daily PRN for constipation    LABS:                      8.4    8.08  )-----------( 260      ( 26 Sep 2023 18:15 )             26.7     09-26    140  |  104  |  32<H>  ----------------------------<  109<H>  3.9   |  32<H>  |  1.10    Ca    8.2<L>      26 Sep 2023 06:34  Phos  2.5     09-26  Mg     1.9     09-26    TPro  6.3  /  Alb  1.9<L>  /  TBili  0.5  /  DBili  x   /  AST  27  /  ALT  24  /  AlkPhos  90  09-26    PT/INR - ( 26 Sep 2023 06:34 )   PT: 16.1 sec;   INR: 1.39 ratio    PTT - ( 25 Sep 2023 20:50 )  PTT:26.6 sec    Culture - Blood (collected 25 Sep 2023 21:00)  Source: .Blood Blood-Peripheral  Preliminary Report (27 Sep 2023 01:02):    No growth at 24 hours    Culture - Blood (collected 25 Sep 2023 20:50)  Source: .Blood Blood-Peripheral  Preliminary Report (27 Sep 2023 01:02):    No growth at 24 hours    ASSESSMENT & PLAN  77 y/o with history of bladder CA with urostomy 14 years ago, A-fib on warfarin, HTN, HLD, CAD with bypass two-vessel, and 1 cardiac stent presenting from [facility] rehab facility after blood work revealed anemia.  S/p 2 u pRBC yesterday. Is comfortable at present, denies abdominal pain.  AFVSS. Hgb 8.6, from 7.6 (6.7 on admission) s/p 2 u pRBC.     - Continue diet  - Transfuse prn; Trend H/H  - Hold AC at present  - No emergent surgical intervention at this time, will follow

## 2023-09-27 NOTE — PROGRESS NOTE ADULT - PROBLEM SELECTOR PLAN 1
Patient sent from [facility] with a H/H 6.7/22  - CT Ab/pelvis: No active GI bleeding is evident. No evidence of interval hemorrhage compared to 9/4/2023 to explain the patient's low hemoglobin. Chronic hematoma noted  - FOBT +  - Clear liquid diet  - Blood type A+  - Repeat Type and Screen every 72hrs   - Continue IV Protonix 40mg BID   - F/u AM CBC  - Transfuse if Hgb <8 given cardiac history  - Transfuse 1U pRBC on 9/26; Last transfusion 1U pRBC on 9/25   - FU Post Transfusion CBC at 6pm   - Hold home Coumadin   - Possible CTA A/P if persistently anemic   - GI Dr. Coker consulted, f/u recs  - Surgery Nabil Castillo consulted, f/u recs Patient sent from [facility] with a H/H 6.7/22  - CT Ab/pelvis: No active GI bleeding is evident. No evidence of interval hemorrhage compared to 9/4/2023 to explain the patient's low hemoglobin. Chronic hematoma noted  - FOBT +  - Clear liquid diet  - Blood type A+  - Repeat Type and Screen every 72hrs   - Continue IV Protonix 40mg BID   - F/u AM CBC  - Transfuse if Hgb <8 given cardiac history  - Transfused 1U pRBC on 9/25 and 9/26   - Hold home Coumadin   - Possible CTA A/P if persistently anemic   - GI Dr. Coker consulted, f/u recs  - Surgery Nabil Castillo consulted, f/u recs

## 2023-09-27 NOTE — PROGRESS NOTE ADULT - PROBLEM SELECTOR PLAN 10
Patient was a former smoker (Quit 1985)  - Continue home Ipratropium-Albuterol q6 for SOB Patient was a former smoker (Quit 1985)  - Continue home Ipratropium-Albuterol q6 for SOB  - Add budesonide Inh BID

## 2023-09-27 NOTE — PROGRESS NOTE ADULT - PROBLEM SELECTOR PLAN 8
[T: ___] : T[unfilled] [de-identified] : Please see dictated initial consult note scanned into AE[facility] [de-identified] : ER_ MT- Her 2 jeni - - Continue home Levothyroxine 137mcg  - F/u AM thyroid panel

## 2023-09-27 NOTE — PROGRESS NOTE ADULT - PROBLEM SELECTOR PLAN 3
Patients has a hx of CHF   - BNP 2703  - Continue home Lasix 40mg daily   - TTE 06/2021 showed EF 20-25%  - FU repeat TTE (Ordered)   - Continue home Metoprolol 25mg daily  - Continue home Entresto BID   - FU AM CMP, Mg, Phos, A1c   - Frequent reassessment of volume status while receiving blood products   - Cardiology Dr. Chu consulted, f/u recs  - Fluid Restriction recommended. Start Clear Diet Patients has a hx of CHF   - BNP 2703  - Continue home Lasix 40mg daily   - TTE 06/2021 showed EF 20-25%  - FU repeat TTE (Ordered)   - Continue home Metoprolol 25mg daily  - Continue home Entresto BID   - FU AM CMP, Mg, Phos, A1c   - Frequent reassessment of volume status while receiving blood products   - Cardiology Dr. Chu consulted, f/u recs  - Adv Diet as tolerated  - One time IV Furosemide 20mg

## 2023-09-27 NOTE — PROGRESS NOTE ADULT - PROBLEM SELECTOR PLAN 2
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 No evidence of left lower extremity deep venous thrombosis  - Continue Tylenol PRN for pain   - Elevate LLE  - Finding of Brennan Cyst does not require in house ortho management or consultation. Can be followed up outpatient with orthopaedics (Dr. Clark) if persistent knee pain continues past current admission.

## 2023-09-27 NOTE — PROGRESS NOTE ADULT - SUBJECTIVE AND OBJECTIVE BOX
[facility]  Jose Maria Singh PA-C  [address] [place] [address]  [phone]      INTERVAL HPI/OVERNIGHT EVENTS:    brown stool  tolerating diet    MEDICATIONS  (STANDING):  albuterol    90 MICROgram(s) HFA Inhaler 2 Puff(s) Inhalation every 6 hours  atorvastatin 80 milliGRAM(s) Oral at bedtime  buDESOnide    Inhalation Suspension 0.25 milliGRAM(s) Inhalation two times a day  calcium carbonate   1250 mG (OsCal) 1 Tablet(s) Oral two times a day  clopidogrel Tablet 75 milliGRAM(s) Oral daily  digoxin     Tablet 125 MICROGram(s) Oral daily  furosemide    Tablet 40 milliGRAM(s) Oral daily  influenza  Vaccine (HIGH DOSE) 0.7 milliLiter(s) IntraMuscular once  lidocaine   4% Patch 1 Patch Transdermal daily  melatonin 5 milliGRAM(s) Oral at bedtime  metoprolol tartrate 25 milliGRAM(s) Oral two times a day  multivitamin 1 Tablet(s) Oral daily  pantoprazole  Injectable 40 milliGRAM(s) IV Push two times a day  polyethylene glycol 3350 17 Gram(s) Oral daily  sacubitril 24 mG/valsartan 26 mG 1 Tablet(s) Oral two times a day  senna 1 Tablet(s) Oral at bedtime  tiotropium 2.5 MICROgram(s) Inhaler 2 Puff(s) Inhalation daily    MEDICATIONS  (PRN):  acetaminophen     Tablet .. 650 milliGRAM(s) Oral every 6 hours PRN Temp greater or equal to 38C (100.4F), Mild Pain (1 - 3)  saline laxative (FLEET) Rectal Enema 1 Enema Rectal daily PRN for constipation      Allergies    No Known Allergies    Intolerances        ROS:   General:  No  fevers, chills, night sweats, fatigue,   Eyes:  Good vision, no reported pain  ENT:  No sore throat, pain, runny nose, dysphagia  CV:  No pain, palpitations, hypo/hypertension  Resp:  No dyspnea, cough, tachypnea, wheezing  GI:  No pain, No nausea, No vomiting, No diarrhea, No constipation, No weight loss, No fever, No pruritis, No rectal bleeding, No tarry stools, No dysphagia,  :  No pain, bleeding, incontinence, nocturia  Muscle:  No pain, weakness  Neuro:  No weakness, tingling, memory problems  Psych:  No fatigue, insomnia, mood problems, depression  Endocrine:  No polyuria, polydipsia, cold/heat intolerance  Heme:  No petechiae, ecchymosis, easy bruisability  Skin:  No rash, tattoos, scars, edema      PHYSICAL EXAM:   Vital Signs:  Vital Signs Last 24 Hrs  T(C): 37.1 (27 Sep 2023 12:26), Max: 37.3 (26 Sep 2023 20:08)  T(F): 98.7 (27 Sep 2023 12:26), Max: 99.2 (26 Sep 2023 20:08)  HR: 84 (27 Sep 2023 12:26) (81 - 98)  BP: 124/66 (27 Sep 2023 12:26) (105/60 - 133/72)  BP(mean): --  RR: 18 (27 Sep 2023 12:26) (18 - 20)  SpO2: 97% (27 Sep 2023 12:26) (95% - 97%)    Parameters below as of 27 Sep 2023 12:26  Patient On (Oxygen Delivery Method): room air      Daily     Daily     GENERAL:  Appears stated age,   HEENT:  NC/AT,    CHEST:  Full & symmetric excursion,   HEART:  Regular rhythm,  ABDOMEN:  Soft, non-tender, non-distended,  EXTEREMITIES:  no cyanosis  SKIN:  No rash  NEURO:  Alert,       LABS:                        8.6    7.74  )-----------( 259      ( 27 Sep 2023 07:08 )             27.7     09-27    143  |  103  |  29<H>  ----------------------------<  114<H>  3.9   |  36<H>  |  1.30    Ca    8.1<L>      27 Sep 2023 07:08  Phos  2.5     09-26  Mg     1.9     09-26    TPro  6.0  /  Alb  1.9<L>  /  TBili  0.5  /  DBili  x   /  AST  23  /  ALT  18  /  AlkPhos  88  09-27    PT/INR - ( 27 Sep 2023 07:08 )   PT: 14.4 sec;   INR: 1.24 ratio         PTT - ( 27 Sep 2023 07:08 )  PTT:25.9 sec  Urinalysis Basic - ( 27 Sep 2023 07:08 )    Color: x / Appearance: x / SG: x / pH: x  Gluc: 114 mg/dL / Ketone: x  / Bili: x / Urobili: x   Blood: x / Protein: x / Nitrite: x   Leuk Esterase: x / RBC: x / WBC x   Sq Epi: x / Non Sq Epi: x / Bacteria: x        RADIOLOGY & ADDITIONAL TESTS:

## 2023-09-27 NOTE — DIETITIAN INITIAL EVALUATION ADULT - PROBLEM SELECTOR PLAN 3
Patients has a hx of CHF   - BNP 2703  - s/p IV Lasix 20mg x1 in ED   - Continue home Lasix 40mg daily   -TTE 06/2021 showed EF 20-25%  - F/u repeat TTE  - Continue home Metoprolol 25mg daily  - Continue home Entresto BID   - F/u AM CMP, Mg, Phos, A1c   - Frequent reassessment of volume status while receiving blood products   - Cardiology Dr. Chu consulted, f/u recs

## 2023-09-27 NOTE — PROGRESS NOTE ADULT - PROBLEM SELECTOR PLAN 1
Patient sent from [facility] with a H/H 6.7/22  - CT Ab/pelvis: No active GI bleeding is evident. No evidence of interval hemorrhage compared to 9/4/2023 to explain the patient's low hemoglobin. Chronic hematoma noted  - FOBT +  - Clear liquid diet  - Blood type A+  - Repeat Type and Screen every 72hrs   - Continue IV Protonix 40mg BID   - F/u AM CBC  - Transfuse if Hgb <8 given cardiac history  - Transfuse 1U pRBC on 9/26; Last transfusion 1U pRBC on 9/25   - FU Post Transfusion CBC at 6pm   - Hold home Coumadin   - Possible CTA A/P if persistently anemic   - GI Dr. Coker consulted, f/u recs  - Surgery Nabil Castillo consulted, f/u recs

## 2023-09-27 NOTE — DIETITIAN INITIAL EVALUATION ADULT - NSFNSPHYEXAMSKINFT_GEN_A_CORE
Pressure Injury 1: Right:, heel, Suspected deep tissue injury  Pressure Injury 2: Left:, heel, Suspected deep tissue injury  Pressure Injury 3: sacrum, Stage I  Pressure Injury 4: Left:, buttocks, Stage I

## 2023-09-27 NOTE — PROGRESS NOTE ADULT - SUBJECTIVE AND OBJECTIVE BOX
[facility] -- Jessica Ellis, Jerad Jarquin Savella, Constantine Sherwood: Office # [phone]    Follow Up: GI Bleed, on AC      Subjective/Observations: Patient seen and examined. Patient awake, alert, resting in bed. No complaints of chest pain, dyspnea, palpitations or dizziness. No signs of orthopnea or PND. Denies any bleeding Continues with LE edema Lt> Rt, Tolerating room air.     REVIEW OF SYSTEMS: All other review of systems are negative unless indicated above    PAST MEDICAL & SURGICAL HISTORY:  Atrial fibrillation      Hypothyroid      Hypertension      Hypertension      Bladder cancer      Bronchitis      Former smoker      CAD (coronary artery disease)      History of bladder surgery  on urostomy      S/P CABG x 3      History of automatic internal cardiac defibrillator (AICD)      H/O knee surgery      History of hernia repair      Small bowel obstruction    MEDICATIONS  (STANDING):  albuterol    90 MICROgram(s) HFA Inhaler 2 Puff(s) Inhalation every 6 hours  atorvastatin 80 milliGRAM(s) Oral at bedtime  buDESOnide    Inhalation Suspension 0.25 milliGRAM(s) Inhalation two times a day  calcium carbonate   1250 mG (OsCal) 1 Tablet(s) Oral two times a day  clopidogrel Tablet 75 milliGRAM(s) Oral daily  digoxin     Tablet 125 MICROGram(s) Oral daily  furosemide    Tablet 40 milliGRAM(s) Oral daily  furosemide   Injectable 20 milliGRAM(s) IV Push once  influenza  Vaccine (HIGH DOSE) 0.7 milliLiter(s) IntraMuscular once  lidocaine   4% Patch 1 Patch Transdermal daily  melatonin 5 milliGRAM(s) Oral at bedtime  metoprolol tartrate 25 milliGRAM(s) Oral two times a day  multivitamin 1 Tablet(s) Oral daily  pantoprazole  Injectable 40 milliGRAM(s) IV Push two times a day  polyethylene glycol 3350 17 Gram(s) Oral daily  sacubitril 24 mG/valsartan 26 mG 1 Tablet(s) Oral two times a day  senna 1 Tablet(s) Oral at bedtime  tiotropium 2.5 MICROgram(s) Inhaler 2 Puff(s) Inhalation daily    MEDICATIONS  (PRN):  acetaminophen     Tablet .. 650 milliGRAM(s) Oral every 6 hours PRN Temp greater or equal to 38C (100.4F), Mild Pain (1 - 3)  saline laxative (FLEET) Rectal Enema 1 Enema Rectal daily PRN for constipation    Allergies  No Known Allergies    Vital Signs Last 24 Hrs  T(C): 36.9 (27 Sep 2023 04:40), Max: 37.3 (26 Sep 2023 20:08)  T(F): 98.4 (27 Sep 2023 04:40), Max: 99.2 (26 Sep 2023 20:08)  HR: 95 (27 Sep 2023 04:40) (81 - 98)  BP: 133/72 (27 Sep 2023 04:40) (105/60 - 133/72)  BP(mean): --  RR: 19 (27 Sep 2023 04:40) (19 - 20)  SpO2: 97% (27 Sep 2023 04:40) (94% - 97%)    Parameters below as of 27 Sep 2023 04:40  Patient On (Oxygen Delivery Method): room air      I&O's Summary    26 Sep 2023 07:01  -  27 Sep 2023 07:00  --------------------------------------------------------  IN: 0 mL / OUT: 2600 mL / NET: -2600 mL    TELE: A fib 70-90s aberrant beats, short runs of NSVTs  PHYSICAL EXAM:  Constitutional: NAD, awake and alert, Obese   HEENT: Moist Mucous Membranes, Anicteric  Pulmonary: Non-labored, breath sounds are clear bilaterally, Diminished at bases No wheezing, rales or rhonchi  Cardiovascular: Regular, S1 and S2, No murmurs, No rubs, gallops or clicks  Gastrointestinal:  soft, nontender, nondistended   Lymph: +2 LLE edema > Rt LE edema No lymphadenopathy.   Skin: No visible rashes or ulcers.  Psych:  Mood & affect appropriate      LABS: All Labs Reviewed:                        8.6    7.74  )-----------( 259      ( 27 Sep 2023 07:08 )             27.7                         8.4    8.08  )-----------( 260      ( 26 Sep 2023 18:15 )             26.7                         7.6    7.70  )-----------( 284      ( 26 Sep 2023 06:34 )             25.0     27 Sep 2023 07:08    143    |  103    |  29     ----------------------------<  114    3.9     |  36     |  1.30   26 Sep 2023 06:34    140    |  104    |  32     ----------------------------<  109    3.9     |  32     |  1.10   25 Sep 2023 20:50    143    |  104    |  37     ----------------------------<  113    4.0     |  33     |  1.10     Ca    8.1        27 Sep 2023 07:08  Ca    8.2        26 Sep 2023 06:34  Ca    8.3        25 Sep 2023 20:50  Phos  2.5       26 Sep 2023 06:34  Mg     1.9       26 Sep 2023 06:34  Mg     1.9       25 Sep 2023 20:50    TPro  6.0    /  Alb  1.9    /  TBili  0.5    /  DBili  x      /  AST  23     /  ALT  18     /  AlkPhos  88     27 Sep 2023 07:08  TPro  6.3    /  Alb  1.9    /  TBili  0.5    /  DBili  x      /  AST  27     /  ALT  24     /  AlkPhos  90     26 Sep 2023 06:34  TPro  6.3    /  Alb  2.0    /  TBili  0.5    /  DBili  x      /  AST  29     /  ALT  24     /  AlkPhos  91     25 Sep 2023 20:50   LIVER FUNCTIONS - ( 27 Sep 2023 07:08 )  Alb: 1.9 g/dL / Pro: 6.0 g/dL / ALK PHOS: 88 U/L / ALT: 18 U/L / AST: 23 U/L / GGT: x           PT/INR - ( 27 Sep 2023 07:08 )   PT: 14.4 sec;   INR: 1.24 ratio         PTT - ( 27 Sep 2023 07:08 )  PTT:25.9 secTroponin I, High Sensitivity Result: 27.1 ng/L (09-25-23 @ 20:50)  Triiodothyronine, Total (T3 Total): 60 ng/dL (09-27-23 @ 07:08)  Triiodothyronine, Total (T3 Total): 51 ng/dL (09-26-23 @ 06:34)  Cholesterol: 107 mg/dL (09-26-23 @ 06:34)  HDL Cholesterol: 34 mg/dL (09-26-23 @ 06:34)  Triglycerides, Serum: 141 mg/dL (09-26-23 @ 06:34)  Lactate, Blood: 1.1 mmol/L (09-25-23 @ 20:50)    12 Lead ECG:   Ventricular Rate 100 BPM    Atrial Rate 120 BPM    QRS Duration 130 ms    Q-T Interval 374 ms    QTC Calculation(Bazett) 482 ms    R Axis 94 degrees    T Axis -26 degrees    Diagnosis Line Atrial fibrillation with premature ventricular or aberrantly conducted complexes  Right bundle branch block  T wave abnormality, consider inferior ischemia  Abnormal ECG  When compared with ECG of 22-AUG-2023 10:51,  T wave inversion no longer evident in Anterior leads  Nonspecific T wave abnormality, worse in Lateral leads  Confirmed by Justin Lopez MD ([id number]) on 9/26/2023 1:18:27 PM (09-25-23 @ 19:06)      Patient name: MARÍA ELENA BEAR  YOB: 1945   Age: 76 (M)   MR#: [id number]  Study Date: 6/16/2021  [facility]  Study quality: Technically difficult  Referring Physician: Brian Back MD  Blood Pressure: 113/74 mmHg  Height: 178 cm  Weight: 109 kg  BSA: 2.3 m2  Heart Rate: 72 mmHg  ------------------------------------------------------------------------  PROCEDURE: Transthoracic echocardiogram with 2-D, M-Mode  and complete spectral and color flow Doppler. Verbal  consent was obtained for injection of  Ultrasonic Enhancing  Agent following a discussion of risks and benefits.  Following intravenous injection of Ultrasonic Enhancing  Agent , harmonic imaging was performed.  INDICATION: Ventricular tachycardia (I47.2)  ------------------------------------------------------------------------  Dimensions:    Normal Values:  LA:     4.1    2.0 - 4.0 cm  Ao:     3.7    2.0 - 3.8 cm  SEPTUM: 1.2    0.6 - 1.2 cm  PWT:    1.0    0.6 - 1.1 cm  LVIDd:  5.5    3.0 - 5.6 cm  LVIDs:  4.4    1.8 - 4.0 cm  Derived variables:  LVMI: 114 g/m2  RWT: 0.38  Fractional short: 19 %  EF (Visual Estimate): 20-25 %  Doppler Peak Velocity (m/sec): AoV=1.2  ------------------------------------------------------------------------  Observations:  Mitral Valve: Mitral annular calcification, otherwise  normal mitral valve. Moderate mitral regurgitation.  PISA  radius = 0.8 cm. RVOL = 32 ml, EROA = 29 mm2.  Aortic Valve/Aorta: Aortic valve not well visualized;  appears calcified. Peak transaortic valve gradient equals 6  mm Hg, aortic valve velocity time integral equals 17 cm,  estimated aortic valve area equals 2.7 sqcm. Minimal aortic  regurgitation.  Peak left ventricular outflow tract  gradient equals 2 mm Hg, LVOT velocity time integral equals  11 cm.  Aortic Root: 3.7 cm.  LVOT diameter: 2.3 cm.  Left Atrium: Severely dilated left atrium.  LA volume index  = 54 cc/m2.  Left Ventricle: Severe global left ventricular systolic  dysfunction with regional variation. Endocardial  visualization enhanced with intravenous injection of  Ultrasonic Enhancing Agent (Definity).  No left ventricular  thrombus. Eccentric left ventricular hypertrophy (dilated  left ventricle with normal relative wall thickness). At  least Mild diastolic dysfunction (Stage I).  Right Heart: Normal right atrium. Normal right ventricular  size and function. Normal tricuspid valve. Mild tricuspid  regurgitation. Pulmonic valve not well visualized, probably  normal. Minimal pulmonic regurgitation.  Pericardium/Pleura: Normal pericardium with no pericardial  effusion.  Hemodynamic: Estimated right atrial pressure is 8 mm Hg.  Estimated right ventricular systolic pressure equals 33 mm  Hg, assuming right atrial pressure equals 8 mm Hg,  consistent with normal pulmonary pressures.  ------------------------------------------------------------------------  Conclusions:  1. Mitral annular calcification, otherwise normal mitral  valve. Moderate mitral regurgitation.  PISA radius = 0.8  cm. RVOL = 32 ml, EROA = 29 mm2.  2. Eccentric left ventricular hypertrophy (dilated left  ventricle with normal relative wall thickness).  3. Severe global left ventricular systolic dysfunction with  regional variation. Endocardial visualization enhanced with  intravenous injection of Ultrasonic Enhancing Agent  (Definity).  No left ventricular thrombus.  4. Normal right ventricular size and function.  5. Estimated right ventricular systolic pressure equals 33  mm Hg, assuming right atrial pressure equals 8 mm Hg,  consistent with normal pulmonary pressures.  *** No previous Echo exam.  ------------------------------------------------------------------------  Confirmed on  6/16/2021 - 22:13:53 by Angelo De Leon M.D.  ------------------------------------------------------------------------

## 2023-09-27 NOTE — DIETITIAN INITIAL EVALUATION ADULT - ORAL INTAKE PTA/DIET HISTORY
Pt admitted from [facility]. Reviewed transfer documents, no diet hx found. Pt reports eating well PTA. Pt believes he was on a low salt diet.

## 2023-09-27 NOTE — PROGRESS NOTE ADULT - PROBLEM SELECTOR PLAN 3
Patients has a hx of CHF   - BNP 2703  - Continue home Lasix 40mg daily   - TTE 06/2021 showed EF 20-25%  - FU repeat TTE (Ordered)   - Continue home Metoprolol 25mg daily  - Continue home Entresto BID   - FU AM CMP, Mg, Phos, A1c   - Frequent reassessment of volume status while receiving blood products   - Cardiology Dr. Chu consulted, f/u recs  - Fluid Restriction recommended. Start Clear Diet

## 2023-09-27 NOTE — DIETITIAN INITIAL EVALUATION ADULT - SIGNS/SYMPTOMS
as evidenced by hx of CAD, HTN, HLD, Congestive heart failure, on lasix. as evidenced by SDTI to BL heels, stage I to sacrum, and L buttocks.

## 2023-09-27 NOTE — DIETITIAN INITIAL EVALUATION ADULT - PROBLEM SELECTOR PLAN 2
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 No evidence of left lower extremity deep venous thrombosis  - s/p Zosyn x1 in ED   - D/c Zosyn   - Continue Tylenol PRN for pain   - Elevate LLE  - Ortho consulted, f/u recs

## 2023-09-27 NOTE — DIETITIAN INITIAL EVALUATION ADULT - PROBLEM SELECTOR PLAN 5
Patient has a hx of CAD s/p CABG and recent Cardiac Stent x1 (07/2023)  - Continue Plavix due to recent stent placement  - cardio consulted

## 2023-09-27 NOTE — DIETITIAN INITIAL EVALUATION ADULT - PERTINENT MEDS FT
MEDICATIONS  (STANDING):  albuterol    90 MICROgram(s) HFA Inhaler 2 Puff(s) Inhalation every 6 hours  atorvastatin 80 milliGRAM(s) Oral at bedtime  buDESOnide    Inhalation Suspension 0.25 milliGRAM(s) Inhalation two times a day  calcium carbonate   1250 mG (OsCal) 1 Tablet(s) Oral two times a day  clopidogrel Tablet 75 milliGRAM(s) Oral daily  digoxin     Tablet 125 MICROGram(s) Oral daily  furosemide    Tablet 40 milliGRAM(s) Oral daily  influenza  Vaccine (HIGH DOSE) 0.7 milliLiter(s) IntraMuscular once  lidocaine   4% Patch 1 Patch Transdermal daily  melatonin 5 milliGRAM(s) Oral at bedtime  metoprolol tartrate 25 milliGRAM(s) Oral two times a day  multivitamin 1 Tablet(s) Oral daily  pantoprazole  Injectable 40 milliGRAM(s) IV Push two times a day  polyethylene glycol 3350 17 Gram(s) Oral daily  sacubitril 24 mG/valsartan 26 mG 1 Tablet(s) Oral two times a day  senna 1 Tablet(s) Oral at bedtime  tiotropium 2.5 MICROgram(s) Inhaler 2 Puff(s) Inhalation daily    MEDICATIONS  (PRN):  acetaminophen     Tablet .. 650 milliGRAM(s) Oral every 6 hours PRN Temp greater or equal to 38C (100.4F), Mild Pain (1 - 3)  saline laxative (FLEET) Rectal Enema 1 Enema Rectal daily PRN for constipation

## 2023-09-27 NOTE — PROGRESS NOTE ADULT - SUBJECTIVE AND OBJECTIVE BOX
Patient is a 78y old  Male who presents with a chief complaint of GI Bleed (27 Sep 2023 07:29)    ******Pt seen and examined at bedside. Main complaints of spelling in B/L extremities and LUE. States that his LUE edema has improved since his last hospital admission. Denies any F/C, N/V, CP, SOB, or abdominal pain.     FROM ADMISSION H+P:   HPI:  78-year-old male with a PMH of bladder CA with urostomy 14 years ago, A-fib on warfarin, HTN, HLD, CAD s/p CABG, 1 cardiac stent (2023), CHF, Hypothyroidism, GERD, COPD, SBO s/p ex lap of adhesions 2023 presents to the ED from [facility] with low hemoglobin. Patient was recently discharged from [facility] s/p ex lap of adhesions for SBO, and a parastomal hernia repair with mesh. During that admission, his stay was complicated by altered mental status and he was intubated. He also was found to have a GI bleed, in which he was transfused and it resolved on its own. [facility]. Recently, patient also developed left lower extremity pain. The pain is making him unable to walk, causing him to be wheelchair bound. There is swelling and redness of the LLE, and the area is TTP. Today, the patient is complaining of SOB, fatigue, and LLE pain. Patient states his last BM was 2 days ago, and he did not notice any blood in his stool. He denies fever, chills, chest pain, palpitations, cough, abdominal pain, nausea, vomiting, diarrhea, hematochezia, melena, hematuria, headaches, changes in vision, dizziness, numbness, tingling. No other complaints at this time.    ED course:  Vitals: BP: 110/54 , HR:107 , Temp: 97.8, RR: 18, SpO2: 100% on RA  Labs significant for: Positive FOBT, H/H 6.7/22, red cell morphology abnormal, PT/INR 16.2/1.4, BUN 37, CO2 33, Lipase 109, BNP 2703, Dig Level 0.7  EKbpm, afib, RBBB, QTc 482  In ED given IV Protonix x1, IV Zosyn x1, Packed rbc x1unit, IV Lasix 20mg x1  Imaging  CT Ab/pelvis: No active GI bleeding is evident. No evidence of interval hemorrhage compared to 2023 to explain the patient's low hemoglobin. The hematoma along the medial inferior aspect of the urostomy in the right of midline anterior abdominal wall subcutaneous fat has organized and now has appearance of a chronic hematoma or seroma.   Venous Duplex LLE: No evidence of left lower extremity deep venous thrombosis. Ruptured hemorrhagic left Baker cyst (26 Sep 2023 01:36)      ----  INTERVAL HPI/OVERNIGHT EVENTS: Pt seen and evaluated at the bedside. No acute overnight events occurred. The patient denies fevers, chills, nausea, vomiting, chest pain, palpitations or shortness of breath.     ----  PAST MEDICAL & SURGICAL HISTORY:  Atrial fibrillation      Hypothyroid      Hypertension      Bladder cancer      Bronchitis      Former smoker      CAD (coronary artery disease)      History of bladder surgery  on urostomy      S/P CABG x 3      History of automatic internal cardiac defibrillator (AICD)      H/O knee surgery      History of hernia repair      Small bowel obstruction          FAMILY HISTORY:  Family history of heart attack (Father, Mother)        Home Medications:  acetaminophen 325 mg oral tablet: 3 tab(s) orally every 6 hours As needed Mild Pain (1 - 3) (26 Sep 2023 01:23)  digoxin 125 mcg (0.125 mg) oral tablet: 1 orally once a day (26 Sep 2023 01:23)  Entresto 24 mg-26 mg oral tablet: 1 tablet orally 2 times a day (26 Sep 2023 01:)  Fleet Enema 19 g-7 g rectal enema: 133 milliliter(s) rectally once a day as needed for  constipation (26 Sep 2023 01:29)  furosemide 40 mg oral tablet: 1 tab(s) orally once a day (26 Sep 2023 01:29)  ipratropium-albuterol 0.5 mg-2.5 mg/3 mL inhalation solution: 3 milliliter(s) inhaled every 6 hours As needed Wheezing (26 Sep 2023 01:23)  levothyroxine 137 mcg (0.137 mg) oral tablet: 1 tab(s) orally once a day (26 Sep 2023 01:)  melatonin 5 mg oral tablet: 1 tab(s) orally once a day (at bedtime) (26 Sep 2023 01:23)  metoprolol tartrate 25 mg oral tablet: 1 tab(s) orally 2 times a day (26 Sep 2023 01:23)  Multiple Vitamins oral tablet: 1 tab(s) orally once a day (26 Sep 2023 01:29)  Oyster Aldo 500 oral tablet: 1 tab(s) orally 2 times a day (26 Sep 2023 01:29)  pantoprazole 40 mg oral delayed release tablet: 1 tab(s) orally every 12 hours (26 Sep 2023 01:23)  polyethylene glycol 3350 oral powder for reconstitution: 17 gram(s) orally once a day (26 Sep 2023 01:23)  rosuvastatin 40 mg oral tablet: 1 orally once a day (26 Sep 2023 01:23)  senna leaf extract oral tablet: 1 tab(s) orally once a day (at bedtime) (26 Sep 2023 01:23)  warfarin 5 mg oral tablet: 1 tab(s) orally once a day (26 Sep 2023 01:30)      Allergies    No Known Allergies    Intolerances        ----  MEDICATIONS  (STANDING):  albuterol    90 MICROgram(s) HFA Inhaler 2 Puff(s) Inhalation every 6 hours  atorvastatin 80 milliGRAM(s) Oral at bedtime  calcium carbonate   1250 mG (OsCal) 1 Tablet(s) Oral two times a day  clopidogrel Tablet 75 milliGRAM(s) Oral daily  digoxin     Tablet 125 MICROGram(s) Oral daily  furosemide    Tablet 40 milliGRAM(s) Oral daily  influenza  Vaccine (HIGH DOSE) 0.7 milliLiter(s) IntraMuscular once  levothyroxine 137 MICROGram(s) Oral daily  melatonin 5 milliGRAM(s) Oral at bedtime  metoprolol tartrate 25 milliGRAM(s) Oral two times a day  multivitamin 1 Tablet(s) Oral daily  pantoprazole  Injectable 40 milliGRAM(s) IV Push two times a day  polyethylene glycol 3350 17 Gram(s) Oral daily  sacubitril 24 mG/valsartan 26 mG 1 Tablet(s) Oral two times a day  senna 1 Tablet(s) Oral at bedtime  tiotropium 2.5 MICROgram(s) Inhaler 2 Puff(s) Inhalation daily    MEDICATIONS  (PRN):  acetaminophen     Tablet .. 650 milliGRAM(s) Oral every 6 hours PRN Temp greater or equal to 38C (100.4F), Mild Pain (1 - 3)  saline laxative (FLEET) Rectal Enema 1 Enema Rectal daily PRN for constipation      ----  REVIEW OF SYSTEMS:  Constitutional: denies fever, chills, diaphoresis  HEENT: denies sore throat, runny nose, blurry vision, double vision    Respiratory: denies SOB, SMALL, cough, wheezing, hemoptysis  Cardiovascular: denies CP, palpitations, LE edema  Gastrointestinal:  denies nausea, vomiting, diarrhea, constipation, abdominal pain, melena, hematochezia   Genitourinary: denies dysuria, hematuria  Skin/Breast: denies rash, hives, itching   Musculoskeletal: denies myalgias, arthritis, joint swelling, muscle weakness  Neurologic: denies syncope, LOC, headache, weakness, dizziness       ----  T(C): 36.9 (23 @ 04:40), Max: 37.3 (23 @ 20:08)  HR: 95 (23 @ 04:40) (78 - 98)  BP: 133/72 (23 @ 04:40) (104/64 - 133/72)  RR: 19 (23 @ 04:40) (18 - 20)  SpO2: 97% (23 @ 04:40) (94% - 99%)  Wt(kg): --    Physical Exam:   GENERAL: well-groomed, well-developed, NAD  HEENT: head NC/AT; EOM intact, PERRLA, conjunctiva & sclera clear; hearing grossly intact, moist mucous membranes  NECK: supple, no JVD  RESPIRATORY: CTA B/L, no wheezing, rales, rhonchi or rubs  CARDIOVASCULAR: S1&S2, RRR, no murmurs or gallops  ABDOMEN: soft, non-tender, non-distended, + Bowel sounds x4 quadrants, no guarding, rebound or rigidity  MUSCULOSKELETAL:  no clubbing, cyanosis or edema of all 4 extremities  LYMPH: no cervical lymphadenopathy  VASCULAR: Radial pulses 2+ bilaterally, no varicose veins   SKIN: warm and dry, color normal  NEUROLOGIC: AA&O X3, CN2-12 intact w/ no focal deficits, no sensory loss, motor Strength 5/5 in UE & LE B/L  Psych: Normal mood and affect, normal behavior         ----  I&O's Summary    26 Sep 2023 07:01  -  27 Sep 2023 07:00  --------------------------------------------------------  IN: 0 mL / OUT: 2600 mL / NET: -2600 mL        LABS:                        8.4    8.08  )-----------( 260      ( 26 Sep 2023 18:15 )             26.7         140  |  104  |  32<H>  ----------------------------<  109<H>  3.9   |  32<H>  |  1.10    Ca    8.2<L>      26 Sep 2023 06:34  Phos  2.5       Mg     1.9         TPro  6.3  /  Alb  1.9<L>  /  TBili  0.5  /  DBili  x   /  AST  27  /  ALT  24  /  AlkPhos  90      PT/INR - ( 26 Sep 2023 06:34 )   PT: 16.1 sec;   INR: 1.39 ratio         PTT - ( 25 Sep 2023 20:50 )  PTT:26.6 sec  Urinalysis Basic - ( 26 Sep 2023 06:34 )    Color: x / Appearance: x / SG: x / pH: x  Gluc: 109 mg/dL / Ketone: x  / Bili: x / Urobili: x   Blood: x / Protein: x / Nitrite: x   Leuk Esterase: x / RBC: x / WBC x   Sq Epi: x / Non Sq Epi: x / Bacteria: x      CAPILLARY BLOOD GLUCOSE           @ 21:00   No growth at 24 hours  --  --   @ 20:50   No growth at 24 hours  --  --                 Patient is a 78y old  Male who presents with a chief complaint of GI Bleed (27 Sep 2023 07:29)    Pt seen and examined at bedside. No acute events overnight. Tolerating diet well with no issues. Denies any F/C, N/V, CP, SOB, or abdominal pain.     FROM ADMISSION H+P:   HPI:  78-year-old male with a PMH of bladder CA with urostomy 14 years ago, A-fib on warfarin, HTN, HLD, CAD s/p CABG, 1 cardiac stent (2023), CHF, Hypothyroidism, GERD, COPD, SBO s/p ex lap of adhesions 2023 [facility]. Patient was recently discharged from [facility] s/p ex lap of adhesions for SBO, and a parastomal hernia repair with mesh. During that admission, his stay was complicated by altered mental status and he was intubated. He also was found to have a GI bleed, in which he was transfused and it resolved on its own. [facility] Recently, patient also developed left lower extremity pain. The pain is making him unable to walk, causing him to be wheelchair bound. There is swelling and redness of the LLE, and the area is TTP. Today, the patient is complaining of SOB, fatigue, and LLE pain. Patient states his last BM was 2 days ago, and he did not notice any blood in his stool. He denies fever, chills, chest pain, palpitations, cough, abdominal pain, nausea, vomiting, diarrhea, hematochezia, melena, hematuria, headaches, changes in vision, dizziness, numbness, tingling. No other complaints at this time.    ED course:  Vitals: BP: 110/54 , HR:107 , Temp: 97.8, RR: 18, SpO2: 100% on RA  Labs significant for: Positive FOBT, H/H 6.7/22, red cell morphology abnormal, PT/INR 16.2/1.4, BUN 37, CO2 33, Lipase 109, BNP 2703, Dig Level 0.7  EKbpm, afib, RBBB, QTc 482  In ED given IV Protonix x1, IV Zosyn x1, Packed rbc x1unit, IV Lasix 20mg x1  Imaging  CT Ab/pelvis: No active GI bleeding is evident. No evidence of interval hemorrhage compared to 2023 to explain the patient's low hemoglobin. The hematoma along the medial inferior aspect of the urostomy in the right of midline anterior abdominal wall subcutaneous fat has organized and now has appearance of a chronic hematoma or seroma.   Venous Duplex LLE: No evidence of left lower extremity deep venous thrombosis. Ruptured hemorrhagic left Baker cyst (26 Sep 2023 01:36)      ----  INTERVAL HPI/OVERNIGHT EVENTS: Pt seen and evaluated at the bedside. No acute overnight events occurred. The patient denies fevers, chills, nausea, vomiting, chest pain, palpitations or shortness of breath.     ----  PAST MEDICAL & SURGICAL HISTORY:  Atrial fibrillation      Hypothyroid      Hypertension      Bladder cancer      Bronchitis      Former smoker      CAD (coronary artery disease)      History of bladder surgery  on urostomy      S/P CABG x 3      History of automatic internal cardiac defibrillator (AICD)      H/O knee surgery      History of hernia repair      Small bowel obstruction          FAMILY HISTORY:  Family history of heart attack (Father, Mother)        Home Medications:  acetaminophen 325 mg oral tablet: 3 tab(s) orally every 6 hours As needed Mild Pain (1 - 3) (26 Sep 2023 01:23)  digoxin 125 mcg (0.125 mg) oral tablet: 1 orally once a day (26 Sep 2023 01:)  Entresto 24 mg-26 mg oral tablet: 1 tablet orally 2 times a day (26 Sep 2023 01:)  Fleet Enema 19 g-7 g rectal enema: 133 milliliter(s) rectally once a day as needed for  constipation (26 Sep 2023 01:29)  furosemide 40 mg oral tablet: 1 tab(s) orally once a day (26 Sep 2023 01:29)  ipratropium-albuterol 0.5 mg-2.5 mg/3 mL inhalation solution: 3 milliliter(s) inhaled every 6 hours As needed Wheezing (26 Sep 2023 01:23)  levothyroxine 137 mcg (0.137 mg) oral tablet: 1 tab(s) orally once a day (26 Sep 2023 01:23)  melatonin 5 mg oral tablet: 1 tab(s) orally once a day (at bedtime) (26 Sep 2023 01:)  metoprolol tartrate 25 mg oral tablet: 1 tab(s) orally 2 times a day (26 Sep 2023 01:23)  Multiple Vitamins oral tablet: 1 tab(s) orally once a day (26 Sep 2023 01:)  Oyster Aldo 500 oral tablet: 1 tab(s) orally 2 times a day (26 Sep 2023 01:29)  pantoprazole 40 mg oral delayed release tablet: 1 tab(s) orally every 12 hours (26 Sep 2023 01:23)  polyethylene glycol 3350 oral powder for reconstitution: 17 gram(s) orally once a day (26 Sep 2023 01:23)  rosuvastatin 40 mg oral tablet: 1 orally once a day (26 Sep 2023 01:23)  senna leaf extract oral tablet: 1 tab(s) orally once a day (at bedtime) (26 Sep 2023 01:23)  warfarin 5 mg oral tablet: 1 tab(s) orally once a day (26 Sep 2023 01:30)      Allergies    No Known Allergies    Intolerances        ----  MEDICATIONS  (STANDING):  albuterol    90 MICROgram(s) HFA Inhaler 2 Puff(s) Inhalation every 6 hours  atorvastatin 80 milliGRAM(s) Oral at bedtime  calcium carbonate   1250 mG (OsCal) 1 Tablet(s) Oral two times a day  clopidogrel Tablet 75 milliGRAM(s) Oral daily  digoxin     Tablet 125 MICROGram(s) Oral daily  furosemide    Tablet 40 milliGRAM(s) Oral daily  influenza  Vaccine (HIGH DOSE) 0.7 milliLiter(s) IntraMuscular once  levothyroxine 137 MICROGram(s) Oral daily  melatonin 5 milliGRAM(s) Oral at bedtime  metoprolol tartrate 25 milliGRAM(s) Oral two times a day  multivitamin 1 Tablet(s) Oral daily  pantoprazole  Injectable 40 milliGRAM(s) IV Push two times a day  polyethylene glycol 3350 17 Gram(s) Oral daily  sacubitril 24 mG/valsartan 26 mG 1 Tablet(s) Oral two times a day  senna 1 Tablet(s) Oral at bedtime  tiotropium 2.5 MICROgram(s) Inhaler 2 Puff(s) Inhalation daily    MEDICATIONS  (PRN):  acetaminophen     Tablet .. 650 milliGRAM(s) Oral every 6 hours PRN Temp greater or equal to 38C (100.4F), Mild Pain (1 - 3)  saline laxative (FLEET) Rectal Enema 1 Enema Rectal daily PRN for constipation      ----  REVIEW OF SYSTEMS:  Constitutional: denies fever, chills, diaphoresis  HEENT: denies sore throat, runny nose, blurry vision, double vision    Respiratory: denies SOB, SMALL, cough, wheezing, hemoptysis  Cardiovascular: denies CP, palpitations, LE edema  Gastrointestinal:  denies nausea, vomiting, diarrhea, constipation, abdominal pain, melena, hematochezia   Genitourinary: denies dysuria, hematuria  Skin/Breast: denies rash, hives, itching   Musculoskeletal: denies myalgias, arthritis, joint swelling, muscle weakness  Neurologic: denies syncope, LOC, headache, weakness, dizziness       ----  T(C): 36.9 (23 @ 04:40), Max: 37.3 (23 @ 20:08)  HR: 95 (23 @ 04:40) (78 - 98)  BP: 133/72 (23 @ 04:40) (104/64 - 133/72)  RR: 19 (23 @ 04:40) (18 - 20)  SpO2: 97% (23 @ 04:40) (94% - 99%)  Wt(kg): --    Physical Exam:   GENERAL: well-groomed, well-developed, NAD  HEENT: head NC/AT; EOM intact, PERRLA, conjunctiva & sclera clear; hearing grossly intact, moist mucous membranes  NECK: supple, no JVD  RESPIRATORY: CTA B/L, no wheezing, rales, rhonchi or rubs  CARDIOVASCULAR: S1&S2, RRR, no murmurs or gallops  ABDOMEN: soft, non-tender, non-distended, + Bowel sounds x4 quadrants, no guarding, rebound or rigidity  MUSCULOSKELETAL:  no clubbing, cyanosis or edema of all 4 extremities  LYMPH: no cervical lymphadenopathy  VASCULAR: Radial pulses 2+ bilaterally, no varicose veins   SKIN: warm and dry, color normal  NEUROLOGIC: AA&O X3, CN2-12 intact w/ no focal deficits, no sensory loss, motor Strength 5/5 in UE & LE B/L  Psych: Normal mood and affect, normal behavior         ----  I&O's Summary    26 Sep 2023 07:01  -  27 Sep 2023 07:00  --------------------------------------------------------  IN: 0 mL / OUT: 2600 mL / NET: -2600 mL        LABS:                        8.4    8.08  )-----------( 260      ( 26 Sep 2023 18:15 )             26.7         140  |  104  |  32<H>  ----------------------------<  109<H>  3.9   |  32<H>  |  1.10    Ca    8.2<L>      26 Sep 2023 06:34  Phos  2.5       Mg     1.9         TPro  6.3  /  Alb  1.9<L>  /  TBili  0.5  /  DBili  x   /  AST  27  /  ALT  24  /  AlkPhos  90      PT/INR - ( 26 Sep 2023 06:34 )   PT: 16.1 sec;   INR: 1.39 ratio         PTT - ( 25 Sep 2023 20:50 )  PTT:26.6 sec  Urinalysis Basic - ( 26 Sep 2023 06:34 )    Color: x / Appearance: x / SG: x / pH: x  Gluc: 109 mg/dL / Ketone: x  / Bili: x / Urobili: x   Blood: x / Protein: x / Nitrite: x   Leuk Esterase: x / RBC: x / WBC x   Sq Epi: x / Non Sq Epi: x / Bacteria: x      CAPILLARY BLOOD GLUCOSE           @ 21:00   No growth at 24 hours  --  --   @ 20:50   No growth at 24 hours  --  --

## 2023-09-27 NOTE — PROGRESS NOTE ADULT - ATTENDING COMMENTS
patient seen and examined at bedside with residents and students, agree with resident assessment and plan except the following:     patient has wheezing on physical, appears fluid overloaded, will increase lasix if BP allow, took lasix 40mg in am, will give 20mg IV  additionally. h/o COPD: will add pulmicort and albuteral ATC.   H/H Stable, continue to hold coumadin   GI/Cardio/Surgery  follow up noted  case discussed with patient and wife at bedside

## 2023-09-27 NOTE — DIETITIAN INITIAL EVALUATION ADULT - OTHER INFO
Patient is a "78-year-old male with a PMH of bladder CA with urostomy 14 years ago, A-fib on warfarin, HTN, HLD, CAD s/p CABG + 1 cardiac stent, CHF, Hypothyroidism, GERD, COPD, SBO s/p ex lap of adhesions 8/2023 admitted for GI bleed."    Visited pt at bedside this afternoon. Pt reports fair appetite/intake. Reports consuming ~50% of lunch meal today. One po intake of % per nursing documentation. Pt tolerating diet well. Denies food allergies. Denies chewing/swallowing difficulties. Denies N/V. No BM x 4 days; s/p fleet enema. +BM today 9/27. No active GI bleeding is evident. Diet advanced from clear liquids. Pt denies abdominal pain. CBW on admission 264#. Pt reports UBW of 230#. 3+ BL leg/foot edema noted. Skin: SDTI to BL heel, stage I to sacrum, stage I to L buttocks. Pt currently on DASH/TLC diet. Discussed importance of adequate protein intake for wound healing. Pt declined oral nutritional supplements at this time. Honor food preferences as able to optimize po intake/tolerance.

## 2023-09-27 NOTE — PROGRESS NOTE ADULT - SUBJECTIVE AND OBJECTIVE BOX
Patient is a 78y old  Male who presents with a chief complaint of GI Bleed (27 Sep 2023 05:19)    ******Pt seen and examined at bedside. Main complaints of spelling in B/L extremities and LUE. States that his LUE edema has improved since his last hospital admission. Denies any F/C, N/V, CP, SOB, or abdominal pain.     FROM ADMISSION H+P:   HPI:  78-year-old male with a PMH of bladder CA with urostomy 14 years ago, A-fib on warfarin, HTN, HLD, CAD s/p CABG, 1 cardiac stent (2023), CHF, Hypothyroidism, GERD, COPD, SBO s/p ex lap of adhesions 2023 presents to the ED from [facility] with low hemoglobin. Patient was recently discharged from [facility] s/p ex lap of adhesions for SBO, and a parastomal hernia repair with mesh. During that admission, his stay was complicated by altered mental status and he was intubated. He also was found to have a GI bleed, in which he was transfused and it resolved on its own. Patient was then transferred [facility] to [facility] [place]. Recently, patient also developed left lower extremity pain. The pain is making him unable to walk, causing him to be wheelchair bound. There is swelling and redness of the LLE, and the area is TTP. Today, the patient is complaining of SOB, fatigue, and LLE pain. Patient states his last BM was 2 days ago, and he did not notice any blood in his stool. He denies fever, chills, chest pain, palpitations, cough, abdominal pain, nausea, vomiting, diarrhea, hematochezia, melena, hematuria, headaches, changes in vision, dizziness, numbness, tingling. No other complaints at this time.    ED course:  Vitals: BP: 110/54 , HR:107 , Temp: 97.8, RR: 18, SpO2: 100% on RA  Labs significant for: Positive FOBT, H/H 6.7/22, red cell morphology abnormal, PT/INR 16.2/1.4, BUN 37, CO2 33, Lipase 109, BNP 2703, Dig Level 0.7  EKbpm, afib, RBBB, QTc 482  In ED given IV Protonix x1, IV Zosyn x1, Packed rbc x1unit, IV Lasix 20mg x1  Imaging  CT Ab/pelvis:No active GI bleeding is evident. No evidence of interval hemorrhage compared to 2023 to explain the patient's low hemoglobin. The hematoma along the medial inferior aspect of the urostomy in the right of midline anterior abdominal wall subcutaneous fat has organized and now has appearance of a chronic hematoma or seroma.   Venous Duplex LLE: No evidence of left lower extremity deep venous thrombosis. Ruptured hemorrhagic left Baker cyst (26 Sep 2023 01:36)      ----  INTERVAL HPI/OVERNIGHT EVENTS: Pt seen and evaluated at the bedside. No acute overnight events occurred. The patient denies fevers, chills, nausea, vomiting, chest pain, palpitations or shortness of breath.     ----  PAST MEDICAL & SURGICAL HISTORY:  Atrial fibrillation      Hypothyroid      Hypertension      Bladder cancer      Bronchitis      Former smoker      CAD (coronary artery disease)      History of bladder surgery  on urostomy      S/P CABG x 3      History of automatic internal cardiac defibrillator (AICD)      H/O knee surgery      History of hernia repair      Small bowel obstruction          FAMILY HISTORY:  Family history of heart attack (Father, Mother)        Home Medications:  acetaminophen 325 mg oral tablet: 3 tab(s) orally every 6 hours As needed Mild Pain (1 - 3) (26 Sep 2023 01:23)  digoxin 125 mcg (0.125 mg) oral tablet: 1 orally once a day (26 Sep 2023 01:23)  Entresto 24 mg-26 mg oral tablet: 1 tablet orally 2 times a day (26 Sep 2023 01:)  Fleet Enema 19 g-7 g rectal enema: 133 milliliter(s) rectally once a day as needed for  constipation (26 Sep 2023 01:29)  furosemide 40 mg oral tablet: 1 tab(s) orally once a day (26 Sep 2023 01:29)  ipratropium-albuterol 0.5 mg-2.5 mg/3 mL inhalation solution: 3 milliliter(s) inhaled every 6 hours As needed Wheezing (26 Sep 2023 01:23)  levothyroxine 137 mcg (0.137 mg) oral tablet: 1 tab(s) orally once a day (26 Sep 2023 01:)  melatonin 5 mg oral tablet: 1 tab(s) orally once a day (at bedtime) (26 Sep 2023 01:23)  metoprolol tartrate 25 mg oral tablet: 1 tab(s) orally 2 times a day (26 Sep 2023 01:23)  Multiple Vitamins oral tablet: 1 tab(s) orally once a day (26 Sep 2023 01:29)  Oyster Aldo 500 oral tablet: 1 tab(s) orally 2 times a day (26 Sep 2023 01:29)  pantoprazole 40 mg oral delayed release tablet: 1 tab(s) orally every 12 hours (26 Sep 2023 01:23)  polyethylene glycol 3350 oral powder for reconstitution: 17 gram(s) orally once a day (26 Sep 2023 01:23)  rosuvastatin 40 mg oral tablet: 1 orally once a day (26 Sep 2023 01:23)  senna leaf extract oral tablet: 1 tab(s) orally once a day (at bedtime) (26 Sep 2023 01:23)  warfarin 5 mg oral tablet: 1 tab(s) orally once a day (26 Sep 2023 01:30)      Allergies    No Known Allergies    Intolerances        ----  MEDICATIONS  (STANDING):  albuterol    90 MICROgram(s) HFA Inhaler 2 Puff(s) Inhalation every 6 hours  atorvastatin 80 milliGRAM(s) Oral at bedtime  calcium carbonate   1250 mG (OsCal) 1 Tablet(s) Oral two times a day  clopidogrel Tablet 75 milliGRAM(s) Oral daily  digoxin     Tablet 125 MICROGram(s) Oral daily  furosemide    Tablet 40 milliGRAM(s) Oral daily  influenza  Vaccine (HIGH DOSE) 0.7 milliLiter(s) IntraMuscular once  levothyroxine 137 MICROGram(s) Oral daily  melatonin 5 milliGRAM(s) Oral at bedtime  metoprolol tartrate 25 milliGRAM(s) Oral two times a day  multivitamin 1 Tablet(s) Oral daily  pantoprazole  Injectable 40 milliGRAM(s) IV Push two times a day  polyethylene glycol 3350 17 Gram(s) Oral daily  sacubitril 24 mG/valsartan 26 mG 1 Tablet(s) Oral two times a day  senna 1 Tablet(s) Oral at bedtime  tiotropium 2.5 MICROgram(s) Inhaler 2 Puff(s) Inhalation daily    MEDICATIONS  (PRN):  acetaminophen     Tablet .. 650 milliGRAM(s) Oral every 6 hours PRN Temp greater or equal to 38C (100.4F), Mild Pain (1 - 3)  saline laxative (FLEET) Rectal Enema 1 Enema Rectal daily PRN for constipation      ----  REVIEW OF SYSTEMS:  Constitutional: denies fever, chills, diaphoresis  HEENT: denies sore throat, runny nose, blurry vision, double vision    Respiratory: denies SOB, SMALL, cough, wheezing, hemoptysis  Cardiovascular: denies CP, palpitations, LE edema  Gastrointestinal:  denies nausea, vomiting, diarrhea, constipation, abdominal pain, melena, hematochezia   Genitourinary: denies dysuria, hematuria  Skin/Breast: denies rash, hives, itching   Musculoskeletal: denies myalgias, arthritis, joint swelling, muscle weakness  Neurologic: denies syncope, LOC, headache, weakness, dizziness       ----  T(C): 36.9 (23 @ 04:40), Max: 37.3 (23 @ 20:08)  HR: 95 (23 @ 04:40) (78 - 98)  BP: 133/72 (23 @ 04:40) (104/64 - 133/72)  RR: 19 (23 @ 04:40) (18 - 20)  SpO2: 97% (23 @ 04:40) (94% - 99%)  Wt(kg): --    Physical Exam:   GENERAL: well-groomed, well-developed, NAD  HEENT: head NC/AT; EOM intact, PERRLA, conjunctiva & sclera clear; hearing grossly intact, moist mucous membranes  NECK: supple, no JVD  RESPIRATORY: CTA B/L, no wheezing, rales, rhonchi or rubs  CARDIOVASCULAR: S1&S2, RRR, no murmurs or gallops  ABDOMEN: soft, non-tender, non-distended, + Bowel sounds x4 quadrants, no guarding, rebound or rigidity  MUSCULOSKELETAL:  no clubbing, cyanosis or edema of all 4 extremities  LYMPH: no cervical lymphadenopathy  VASCULAR: Radial pulses 2+ bilaterally, no varicose veins   SKIN: warm and dry, color normal  NEUROLOGIC: AA&O X3, CN2-12 intact w/ no focal deficits, no sensory loss, motor Strength 5/5 in UE & LE B/L  Psych: Normal mood and affect, normal behavior         ----  I&O's Summary    26 Sep 2023 07:01  -  27 Sep 2023 07:00  --------------------------------------------------------  IN: 0 mL / OUT: 2600 mL / NET: -2600 mL        LABS:                        8.4    8.08  )-----------( 260      ( 26 Sep 2023 18:15 )             26.7         140  |  104  |  32<H>  ----------------------------<  109<H>  3.9   |  32<H>  |  1.10    Ca    8.2<L>      26 Sep 2023 06:34  Phos  2.5       Mg     1.9         TPro  6.3  /  Alb  1.9<L>  /  TBili  0.5  /  DBili  x   /  AST  27  /  ALT  24  /  AlkPhos  90      PT/INR - ( 26 Sep 2023 06:34 )   PT: 16.1 sec;   INR: 1.39 ratio         PTT - ( 25 Sep 2023 20:50 )  PTT:26.6 sec  Urinalysis Basic - ( 26 Sep 2023 06:34 )    Color: x / Appearance: x / SG: x / pH: x  Gluc: 109 mg/dL / Ketone: x  / Bili: x / Urobili: x   Blood: x / Protein: x / Nitrite: x   Leuk Esterase: x / RBC: x / WBC x   Sq Epi: x / Non Sq Epi: x / Bacteria: x      CAPILLARY BLOOD GLUCOSE           @ 21:00   No growth at 24 hours  --  --   @ 20:50   No growth at 24 hours  --  --

## 2023-09-27 NOTE — DIETITIAN INITIAL EVALUATION ADULT - PROBLEM SELECTOR PLAN 1
Patient sent from [facility] with a H/H 6.7/22  - CT Ab/pelvis:No active GI bleeding is evident. No evidence of interval hemorrhage compared to 9/4/2023 to explain the patient's low hemoglobin.    - FOBT +  - Keep NPO except meds   - S/p 1 unit packed rbc in ED  - Blood type A+  - Repeat Type and Screen every 72hrs   - S/p IV Protonix 40mg in ED   - Continue IV Protonix 40mg BID   - F/u AM CBC  - Transfuse if Hgb <8 given cardiac history   - Hold home Coumadin   - Possible CTA A/P if persistently anemic   - GI Dr. Coker consulted, f/u recs  - Surgery Nabil Castillo consulted, f/u recs

## 2023-09-27 NOTE — PHYSICAL THERAPY INITIAL EVALUATION ADULT - PERTINENT HX OF CURRENT PROBLEM, REHAB EVAL
78-year-old male with history of bladder CA with urostomy 14 years ago, A-fib on warfarin, HTN, HLD, CAD with bypass two-vessel, and 1 cardiac stent, admitted to [facility] 8/22/2023 in which she was diagnosed with a high-grade SBO near urostomy, was then transferred over to [facility] went to the OR for ex lap of adhesions, obstruction was relieved, also had a parastomal hernia repair with mesh, patient stay at the hospital was complicated by altered mental status and he was intubated, GI bleed in which he was transfused and it resolved on its own, patient then transferred over to [facility] [place], while the patient developed left lower extremity pain, was told it was a Baker's cyst, having increased pain swelling and redness to the area, patient was sent to the emergency department today regarding a hemoglobin of 6, patient dates he does feel fatigued and shortness of breath with exertion, denies any chest pain, denies any recent fever.

## 2023-09-27 NOTE — DIETITIAN INITIAL EVALUATION ADULT - ADD RECOMMEND
1) Continue current diet as ordered  2) Recommend MVI daily and Vit C 500mg BID  3) Monitor po intake, diet tolerance, weight trends, labs, GI function, skin integrity 1) Continue current diet as ordered  2) Continue MVI daily, recommend Vit C 500mg BID  3) Monitor po intake, diet tolerance, weight trends, labs, GI function, skin integrity

## 2023-09-27 NOTE — PROGRESS NOTE ADULT - ASSESSMENT
78-year-old male with a PMH  bladder CA with urostomy 14 years ago, A-fib on warfarin, HTN, HLD, CAD s/p CABG + 1 cardiac stent, HFrEF, Hypothyroidism, GERD, COPD, SBO s/p ex lap of adhesions 8/2023 admitted for GI bleed.    AFib on warfarin HTN/ CAD s/p CABG + 1 cardiac stent/ HFrEF  - Hx of Afib / AICD ([employer]tr[employer] device) in place   - Continue to hold warfarin for GI bleed  - INR: 1.24 (09-27-23 @ 07:08)  - Continue metoprolol tartrate inpatient, patient is normally on Toprol XL 50mg daily per outpatient cardio records  - Continue digoxin     - EKG shows Afib w/ PVCs RBBB    - No acute changes on EKG compared to previous.  - No evidence of any active ischemia   - Continue Plavix, patient still within 3 month window since stent placement  - Will hold asa in setting of acute GI bleed  - Continue statin    - Continue Lasix with caution for BP, will need to increase Lasix once patient is stable for diuresis  _ Received Lasix 20 mg IV x 1 on 9/25, 9/26   - Would change Lasix to 40 mg IV daily   - Continue metoprolol tartrate 25mg BID, change to Toprol when BP stabilizes   - Continue Entresto   - Previous TTE (9/2022) shows LVEF 40-45%  - TTE ordered, f/u result  - Strict I/Os, daily weights.    - BP stable and controlled with -130s     - Monitor and replete lytes, keep K>4, Mg>2.  - Will continue to follow.    Romy Liu, MS FNP, AGACNP  Nurse Practitioner- Cardiology   Please call on TEAMS 78-year-old male with a PMH  bladder CA with urostomy 14 years ago, A-fib on warfarin, HTN, HLD, CAD s/p CABG + 1 cardiac stent, HFrEF, Hypothyroidism, GERD, COPD, SBO s/p ex lap of adhesions 8/2023 admitted for GI bleed.    AFib on warfarin HTN/ CAD s/p CABG + 1 cardiac stent/ HFrEF  - Hx of Afib / AICD (Medtronic device) in place   - Continue to hold warfarin for GI bleed  - INR: 1.24 (09-27-23 @ 07:08)  - Continue metoprolol tartrate inpatient, patient is normally on Toprol XL 50mg daily per outpatient cardio records  - Continue digoxin     - EKG shows Afib w/ PVCs RBBB    - No acute changes on EKG compared to previous.  - No evidence of any active ischemia   - last stent 07/2023   - Continue Plavix, patient still within 3 month window since stent placement  - Will hold asa in setting of acute GI bleed  - Continue statin    - Continue Lasix with caution for BP, will need to increase Lasix once patient is stable for diuresis  _ Received Lasix 20 mg IV x 1 on 9/25, 9/26   - Would change Lasix to 40 mg IV daily   - Continue metoprolol tartrate 25mg BID, change to Toprol when BP stabilizes   - Continue Entresto   - Previous TTE (9/2022) shows LVEF 40-45%  - TTE ordered, f/u result  - Strict I/Os, daily weights.    - BP stable and controlled with -130s     - Monitor and replete lytes, keep K>4, Mg>2.  - Will continue to follow.    Romy Liu, MS FNP, AGACNP  Nurse Practitioner- Cardiology   Please call on TEAMS

## 2023-09-27 NOTE — PROGRESS NOTE ADULT - PROBLEM SELECTOR PLAN 8
- Continue home Levothyroxine 137mcg  - F/u AM thyroid panel - Increased Levothyroxine 150mcg to Start 9/28  - F/u AM thyroid panel

## 2023-09-27 NOTE — DIETITIAN INITIAL EVALUATION ADULT - PERTINENT LABORATORY DATA
09-27    143  |  103  |  29<H>  ----------------------------<  114<H>  3.9   |  36<H>  |  1.30    Ca    8.1<L>      27 Sep 2023 07:08  Phos  2.5     09-26  Mg     1.9     09-26    TPro  6.0  /  Alb  1.9<L>  /  TBili  0.5  /  DBili  x   /  AST  23  /  ALT  18  /  AlkPhos  88  09-27  A1C with Estimated Average Glucose Result: 5.6 % (09-26-23 @ 06:34)

## 2023-09-27 NOTE — PROGRESS NOTE ADULT - ASSESSMENT
recent rectal bleed, resolved  anemia    no overt gi bleeding   CT showing hematoma  regular diet  cbc daily  monitor post transfusion cbc   no plan for endoscopic eval  d/w patient and wife

## 2023-09-28 LAB
ANION GAP SERPL CALC-SCNC: 5 MMOL/L — SIGNIFICANT CHANGE UP (ref 5–17)
APPEARANCE UR: CLEAR — SIGNIFICANT CHANGE UP
APTT BLD: 26.9 SEC — SIGNIFICANT CHANGE UP (ref 24.5–35.6)
BACTERIA # UR AUTO: ABNORMAL /HPF
BASOPHILS # BLD AUTO: 0.03 K/UL — SIGNIFICANT CHANGE UP (ref 0–0.2)
BASOPHILS NFR BLD AUTO: 0.4 % — SIGNIFICANT CHANGE UP (ref 0–2)
BILIRUB UR-MCNC: NEGATIVE — SIGNIFICANT CHANGE UP
BUN SERPL-MCNC: 24 MG/DL — HIGH (ref 7–23)
CALCIUM SERPL-MCNC: 7.9 MG/DL — LOW (ref 8.5–10.1)
CHLORIDE SERPL-SCNC: 105 MMOL/L — SIGNIFICANT CHANGE UP (ref 96–108)
CO2 SERPL-SCNC: 35 MMOL/L — HIGH (ref 22–31)
COLOR SPEC: YELLOW — SIGNIFICANT CHANGE UP
CREAT SERPL-MCNC: 0.98 MG/DL — SIGNIFICANT CHANGE UP (ref 0.5–1.3)
DIFF PNL FLD: NEGATIVE — SIGNIFICANT CHANGE UP
EGFR: 79 ML/MIN/1.73M2 — SIGNIFICANT CHANGE UP
EOSINOPHIL # BLD AUTO: 0.24 K/UL — SIGNIFICANT CHANGE UP (ref 0–0.5)
EOSINOPHIL NFR BLD AUTO: 3.4 % — SIGNIFICANT CHANGE UP (ref 0–6)
EPI CELLS # UR: PRESENT
GLUCOSE SERPL-MCNC: 104 MG/DL — HIGH (ref 70–99)
GLUCOSE UR QL: NEGATIVE MG/DL — SIGNIFICANT CHANGE UP
HCT VFR BLD CALC: 27.3 % — LOW (ref 39–50)
HGB BLD-MCNC: 8.3 G/DL — LOW (ref 13–17)
IMM GRANULOCYTES NFR BLD AUTO: 1.5 % — HIGH (ref 0–0.9)
INR BLD: 1.13 RATIO — SIGNIFICANT CHANGE UP (ref 0.85–1.18)
KETONES UR-MCNC: NEGATIVE MG/DL — SIGNIFICANT CHANGE UP
LEUKOCYTE ESTERASE UR-ACNC: ABNORMAL
LYMPHOCYTES # BLD AUTO: 0.93 K/UL — LOW (ref 1–3.3)
LYMPHOCYTES # BLD AUTO: 13 % — SIGNIFICANT CHANGE UP (ref 13–44)
MCHC RBC-ENTMCNC: 27.4 PG — SIGNIFICANT CHANGE UP (ref 27–34)
MCHC RBC-ENTMCNC: 30.4 GM/DL — LOW (ref 32–36)
MCV RBC AUTO: 90.1 FL — SIGNIFICANT CHANGE UP (ref 80–100)
MONOCYTES # BLD AUTO: 0.99 K/UL — HIGH (ref 0–0.9)
MONOCYTES NFR BLD AUTO: 13.8 % — SIGNIFICANT CHANGE UP (ref 2–14)
NEUTROPHILS # BLD AUTO: 4.85 K/UL — SIGNIFICANT CHANGE UP (ref 1.8–7.4)
NEUTROPHILS NFR BLD AUTO: 67.9 % — SIGNIFICANT CHANGE UP (ref 43–77)
NITRITE UR-MCNC: NEGATIVE — SIGNIFICANT CHANGE UP
NRBC # BLD: 0 /100 WBCS — SIGNIFICANT CHANGE UP (ref 0–0)
PH UR: 7 — SIGNIFICANT CHANGE UP (ref 5–8)
PLATELET # BLD AUTO: 278 K/UL — SIGNIFICANT CHANGE UP (ref 150–400)
POTASSIUM SERPL-MCNC: 3.4 MMOL/L — LOW (ref 3.5–5.3)
POTASSIUM SERPL-SCNC: 3.4 MMOL/L — LOW (ref 3.5–5.3)
PROT UR-MCNC: SIGNIFICANT CHANGE UP MG/DL
PROTHROM AB SERPL-ACNC: 13.2 SEC — HIGH (ref 9.5–13)
RBC # BLD: 3.03 M/UL — LOW (ref 4.2–5.8)
RBC # FLD: 15.4 % — HIGH (ref 10.3–14.5)
RBC CASTS # UR COMP ASSIST: 1 /HPF — SIGNIFICANT CHANGE UP (ref 0–4)
SODIUM SERPL-SCNC: 145 MMOL/L — SIGNIFICANT CHANGE UP (ref 135–145)
SP GR SPEC: 1.01 — SIGNIFICANT CHANGE UP (ref 1–1.03)
UROBILINOGEN FLD QL: 4 MG/DL (ref 0.2–1)
WBC # BLD: 7.15 K/UL — SIGNIFICANT CHANGE UP (ref 3.8–10.5)
WBC # FLD AUTO: 7.15 K/UL — SIGNIFICANT CHANGE UP (ref 3.8–10.5)
WBC UR QL: 5 /HPF — SIGNIFICANT CHANGE UP (ref 0–5)

## 2023-09-28 PROCEDURE — 99233 SBSQ HOSP IP/OBS HIGH 50: CPT | Mod: GC

## 2023-09-28 PROCEDURE — 93306 TTE W/DOPPLER COMPLETE: CPT | Mod: 26

## 2023-09-28 PROCEDURE — 99233 SBSQ HOSP IP/OBS HIGH 50: CPT

## 2023-09-28 RX ORDER — FUROSEMIDE 40 MG
40 TABLET ORAL DAILY
Refills: 0 | Status: DISCONTINUED | OUTPATIENT
Start: 2023-09-29 | End: 2023-09-29

## 2023-09-28 RX ORDER — FUROSEMIDE 40 MG
20 TABLET ORAL ONCE
Refills: 0 | Status: COMPLETED | OUTPATIENT
Start: 2023-09-28 | End: 2023-09-28

## 2023-09-28 RX ORDER — POTASSIUM CHLORIDE 20 MEQ
40 PACKET (EA) ORAL ONCE
Refills: 0 | Status: COMPLETED | OUTPATIENT
Start: 2023-09-28 | End: 2023-09-28

## 2023-09-28 RX ADMIN — LIDOCAINE 1 PATCH: 4 CREAM TOPICAL at 19:35

## 2023-09-28 RX ADMIN — PANTOPRAZOLE SODIUM 40 MILLIGRAM(S): 20 TABLET, DELAYED RELEASE ORAL at 17:16

## 2023-09-28 RX ADMIN — ALBUTEROL 2 PUFF(S): 90 AEROSOL, METERED ORAL at 00:25

## 2023-09-28 RX ADMIN — Medication 25 MILLIGRAM(S): at 17:15

## 2023-09-28 RX ADMIN — Medication 1 TABLET(S): at 05:10

## 2023-09-28 RX ADMIN — Medication 5 MILLIGRAM(S): at 21:20

## 2023-09-28 RX ADMIN — PANTOPRAZOLE SODIUM 40 MILLIGRAM(S): 20 TABLET, DELAYED RELEASE ORAL at 05:09

## 2023-09-28 RX ADMIN — Medication 0.25 MILLIGRAM(S): at 07:45

## 2023-09-28 RX ADMIN — Medication 25 MILLIGRAM(S): at 05:09

## 2023-09-28 RX ADMIN — ALBUTEROL 2 PUFF(S): 90 AEROSOL, METERED ORAL at 10:49

## 2023-09-28 RX ADMIN — ALBUTEROL 2 PUFF(S): 90 AEROSOL, METERED ORAL at 21:20

## 2023-09-28 RX ADMIN — Medication 40 MILLIGRAM(S): at 05:10

## 2023-09-28 RX ADMIN — Medication 20 MILLIGRAM(S): at 11:30

## 2023-09-28 RX ADMIN — Medication 40 MILLIEQUIVALENT(S): at 17:16

## 2023-09-28 RX ADMIN — SACUBITRIL AND VALSARTAN 1 TABLET(S): 24; 26 TABLET, FILM COATED ORAL at 17:15

## 2023-09-28 RX ADMIN — LIDOCAINE 1 PATCH: 4 CREAM TOPICAL at 21:18

## 2023-09-28 RX ADMIN — SENNA PLUS 1 TABLET(S): 8.6 TABLET ORAL at 21:20

## 2023-09-28 RX ADMIN — Medication 150 MICROGRAM(S): at 05:10

## 2023-09-28 RX ADMIN — LIDOCAINE 1 PATCH: 4 CREAM TOPICAL at 00:11

## 2023-09-28 RX ADMIN — POLYETHYLENE GLYCOL 3350 17 GRAM(S): 17 POWDER, FOR SOLUTION ORAL at 10:48

## 2023-09-28 RX ADMIN — LIDOCAINE 1 PATCH: 4 CREAM TOPICAL at 10:49

## 2023-09-28 RX ADMIN — TIOTROPIUM BROMIDE 2 PUFF(S): 18 CAPSULE ORAL; RESPIRATORY (INHALATION) at 05:09

## 2023-09-28 RX ADMIN — SACUBITRIL AND VALSARTAN 1 TABLET(S): 24; 26 TABLET, FILM COATED ORAL at 05:09

## 2023-09-28 RX ADMIN — Medication 1 TABLET(S): at 17:16

## 2023-09-28 RX ADMIN — Medication 1 TABLET(S): at 10:48

## 2023-09-28 RX ADMIN — ALBUTEROL 2 PUFF(S): 90 AEROSOL, METERED ORAL at 05:10

## 2023-09-28 RX ADMIN — ATORVASTATIN CALCIUM 80 MILLIGRAM(S): 80 TABLET, FILM COATED ORAL at 21:20

## 2023-09-28 RX ADMIN — Medication 125 MICROGRAM(S): at 05:10

## 2023-09-28 RX ADMIN — Medication 0.25 MILLIGRAM(S): at 19:25

## 2023-09-28 RX ADMIN — CLOPIDOGREL BISULFATE 75 MILLIGRAM(S): 75 TABLET, FILM COATED ORAL at 10:48

## 2023-09-28 NOTE — PROGRESS NOTE ADULT - PROBLEM SELECTOR PLAN 1
Patient sent from [facility] with a H/H 6.7/22  - CT Ab/pelvis: No active GI bleeding is evident. No evidence of interval hemorrhage compared to 9/4/2023 to explain the patient's low hemoglobin. Chronic hematoma noted  - FOBT +  - Clear liquid diet  - Blood type A+  - Repeat Type and Screen every 72hrs   - Continue IV Protonix 40mg BID   - F/u AM CBC  - Transfuse if Hgb <8 given cardiac history  - Transfused 1U pRBC on 9/25 and 9/26   - Hold home Coumadin   - Possible CTA A/P if persistently anemic   - GI Dr. Coker consulted, f/u recs  - Surgery Nabil Castillo consulted, f/u recs Patient sent from [facility] with a H/H 6.7/22  - CT Ab/pelvis: No active GI bleeding is evident. No evidence of interval hemorrhage compared to 9/4/2023 to explain the patient's low hemoglobin. Chronic hematoma noted  - FOBT +  - Adv diet as tolerated   - Blood type A+  - Repeat Type and Screen every 72hrs   - Continue IV Protonix 40mg BID   - F/u AM CBC  - Transfuse if Hgb <8 given cardiac history  - Transfused 1U pRBC on 9/25 and 9/26   - Hold home Coumadin   - Possible CTA A/P if persistently anemic   - GI Dr. Coker consulted, f/u recs  - Surgery Nabil Castillo consulted, f/u recs

## 2023-09-28 NOTE — PROGRESS NOTE ADULT - SUBJECTIVE AND OBJECTIVE BOX
SUBJECTIVE:  Patient seen and examined at bedside this morning, no acute events overnight. Patient reports no abdominal pain, had a bowel movement without any blood though bedside nurse reports very dark blood.    MEDICATIONS  (STANDING):  albuterol    90 MICROgram(s) HFA Inhaler 2 Puff(s) Inhalation every 6 hours  atorvastatin 80 milliGRAM(s) Oral at bedtime  buDESOnide    Inhalation Suspension 0.25 milliGRAM(s) Inhalation two times a day  calcium carbonate   1250 mG (OsCal) 1 Tablet(s) Oral two times a day  clopidogrel Tablet 75 milliGRAM(s) Oral daily  digoxin     Tablet 125 MICROGram(s) Oral daily  furosemide    Tablet 40 milliGRAM(s) Oral daily  influenza  Vaccine (HIGH DOSE) 0.7 milliLiter(s) IntraMuscular once  levothyroxine 150 MICROGram(s) Oral daily  lidocaine   4% Patch 1 Patch Transdermal daily  melatonin 5 milliGRAM(s) Oral at bedtime  metoprolol tartrate 25 milliGRAM(s) Oral two times a day  multivitamin 1 Tablet(s) Oral daily  pantoprazole  Injectable 40 milliGRAM(s) IV Push two times a day  polyethylene glycol 3350 17 Gram(s) Oral daily  sacubitril 24 mG/valsartan 26 mG 1 Tablet(s) Oral two times a day  senna 1 Tablet(s) Oral at bedtime  tiotropium 2.5 MICROgram(s) Inhaler 2 Puff(s) Inhalation daily    MEDICATIONS  (PRN):  acetaminophen     Tablet .. 650 milliGRAM(s) Oral every 6 hours PRN Temp greater or equal to 38C (100.4F), Mild Pain (1 - 3)  saline laxative (FLEET) Rectal Enema 1 Enema Rectal daily PRN for constipation      Vital Signs Last 24 Hrs  T(C): 37 (28 Sep 2023 05:00), Max: 37.1 (27 Sep 2023 12:26)  T(F): 98.6 (28 Sep 2023 05:00), Max: 98.7 (27 Sep 2023 12:26)  HR: 86 (28 Sep 2023 07:45) (70 - 88)  BP: 114/67 (28 Sep 2023 05:00) (96/58 - 136/60)  BP(mean): --  RR: 18 (28 Sep 2023 05:00) (18 - 18)  SpO2: 98% (28 Sep 2023 07:45) (95% - 98%)    Parameters below as of 28 Sep 2023 07:45  Patient On (Oxygen Delivery Method): room air        PHYSICAL EXAM:  Constitutional: AAOx3, no acute distress  HEENT: NCAT, airway patent  Cardiovascular: RRR, pulses present bilaterally  Respiratory: nonlabored breathing  Gastrointestinal: abdomen soft, nontender, non distended, no rebound or guarding, urostomy in place  Neuro: no focal deficits    I&O's Detail    27 Sep 2023 07:01  -  28 Sep 2023 07:00  --------------------------------------------------------  IN:  Total IN: 0 mL    OUT:    Urostomy (mL): 700 mL  Total OUT: 700 mL    Total NET: -700 mL          LABS:  [pending]

## 2023-09-28 NOTE — PROGRESS NOTE ADULT - ASSESSMENT
recent rectal bleed, resolved  anemia    no overt gi bleeding   CT showing hematoma  regular diet  cbc daily  monitor post transfusion cbc   no plan for endoscopic eval  dc plans as per primary team  d/w patient and wife

## 2023-09-28 NOTE — PROGRESS NOTE ADULT - PROBLEM SELECTOR PLAN 3
Patients has a hx of CHF   - BNP 2703  - Continue home Lasix 40mg daily   - TTE 06/2021 showed EF 20-25%  - FU repeat TTE (Ordered)   - Continue home Metoprolol 25mg daily  - Continue home Entresto BID   - FU AM CMP, Mg, Phos, A1c   - Frequent reassessment of volume status while receiving blood products   - Cardiology Dr. Chu consulted, f/u recs  - Adv Diet as tolerated  - One time IV Furosemide 20mg Patients has a hx of CHF   - BNP 2703  - Continue home Lasix 40mg daily   - TTE 06/2021 showed EF 20-25%  - FU repeat TTE pending final read  - Continue home Metoprolol 25mg daily  - Continue home Entresto BID   - FU AM CMP, Mg, Phos, A1c   - Frequent reassessment of volume status while receiving blood products   - Cardiology Dr. Chu consulted, f/u recs  - IV Furosemide 40mg qD

## 2023-09-28 NOTE — PROGRESS NOTE ADULT - SUBJECTIVE AND OBJECTIVE BOX
[facility] -- Jessica Ellis, Milka, Kimberley Mars, , Presley Fitch  Office # [phone]    Follow Up:  Hx Afib, LE edema, HFmodRF    Subjective/Observations: Comfortable on RA.  Denies any form of respiratory or cardiac discomfort.  States, edema is chronic    REVIEW OF SYSTEMS: All other review of systems is negative unless indicated above  PAST MEDICAL & SURGICAL HISTORY:  Atrial fibrillation  Hypothyroid  Hypertension  Bladder cancer  Bronchitis  Former smoker  CAD (coronary artery disease)  History of bladder surgery  on urostomy  S/P CABG x 3  History of automatic internal cardiac defibrillator (AICD)  H/O knee surgery  History of hernia repair  Small bowel obstruction    MEDICATIONS  (STANDING):  albuterol    90 MICROgram(s) HFA Inhaler 2 Puff(s) Inhalation every 6 hours  atorvastatin 80 milliGRAM(s) Oral at bedtime  buDESOnide    Inhalation Suspension 0.25 milliGRAM(s) Inhalation two times a day  calcium carbonate   1250 mG (OsCal) 1 Tablet(s) Oral two times a day  clopidogrel Tablet 75 milliGRAM(s) Oral daily  digoxin     Tablet 125 MICROGram(s) Oral daily  furosemide    Tablet 40 milliGRAM(s) Oral daily  influenza  Vaccine (HIGH DOSE) 0.7 milliLiter(s) IntraMuscular once  levothyroxine 150 MICROGram(s) Oral daily  lidocaine   4% Patch 1 Patch Transdermal daily  melatonin 5 milliGRAM(s) Oral at bedtime  metoprolol tartrate 25 milliGRAM(s) Oral two times a day  multivitamin 1 Tablet(s) Oral daily  pantoprazole  Injectable 40 milliGRAM(s) IV Push two times a day  polyethylene glycol 3350 17 Gram(s) Oral daily  sacubitril 24 mG/valsartan 26 mG 1 Tablet(s) Oral two times a day  senna 1 Tablet(s) Oral at bedtime  tiotropium 2.5 MICROgram(s) Inhaler 2 Puff(s) Inhalation daily    MEDICATIONS  (PRN):  acetaminophen     Tablet .. 650 milliGRAM(s) Oral every 6 hours PRN Temp greater or equal to 38C (100.4F), Mild Pain (1 - 3)  saline laxative (FLEET) Rectal Enema 1 Enema Rectal daily PRN for constipation    Allergies    No Known Allergies    Intolerances    Vital Signs Last 24 Hrs  T(C): 37 (28 Sep 2023 05:00), Max: 37.1 (27 Sep 2023 12:26)  T(F): 98.6 (28 Sep 2023 05:00), Max: 98.7 (27 Sep 2023 12:26)  HR: 86 (28 Sep 2023 07:45) (70 - 88)  BP: 114/67 (28 Sep 2023 05:00) (96/58 - 136/60)  BP(mean): --  RR: 18 (28 Sep 2023 05:00) (18 - 18)  SpO2: 98% (28 Sep 2023 07:45) (95% - 98%)    Parameters below as of 28 Sep 2023 07:45  Patient On (Oxygen Delivery Method): room air    I&O's Summary    27 Sep 2023 07:01  -  28 Sep 2023 07:00  --------------------------------------------------------  IN: 0 mL / OUT: 700 mL / NET: -700 mL      PHYSICAL EXAM:  TELE: Afib with couplets/triplets  Constitutional: NAD, awake and alert, obese  HEENT: Moist Mucous Membranes, Anicteric  Pulmonary: Non-labored, breath sounds are clear but diminished bilaterally, No wheezing, rales or rhonchi  Cardiovascular: IRRR, S1 and S2, +murmurs, no rubs, gallops or clicks  Gastrointestinal: Bowel Sounds present, soft, nontender.   Lymph: No peripheral edema. No lymphadenopathy.  Skin: No visible rashes or ulcers.  Psych:  Mood & affect appropriate  LABS: All Labs Reviewed:                        8.3    7.15  )-----------( 278      ( 28 Sep 2023 07:38 )             27.3                         8.6    7.74  )-----------( 259      ( 27 Sep 2023 07:08 )             27.7                         8.4    8.08  )-----------( 260      ( 26 Sep 2023 18:15 )             26.7     28 Sep 2023 07:38    145    |  105    |  24     ----------------------------<  104    3.4     |  35     |  0.98   27 Sep 2023 07:08    143    |  103    |  29     ----------------------------<  114    3.9     |  36     |  1.30   26 Sep 2023 06:34    140    |  104    |  32     ----------------------------<  109    3.9     |  32     |  1.10     Ca    7.9        28 Sep 2023 07:38  Ca    8.1        27 Sep 2023 07:08  Ca    8.2        26 Sep 2023 06:34  Phos  2.5       26 Sep 2023 06:34  Mg     1.9       26 Sep 2023 06:34  Mg     1.9       25 Sep 2023 20:50    TPro  6.0    /  Alb  1.9    /  TBili  0.5    /  DBili  x      /  AST  23     /  ALT  18     /  AlkPhos  88     27 Sep 2023 07:08  TPro  6.3    /  Alb  1.9    /  TBili  0.5    /  DBili  x      /  AST  27     /  ALT  24     /  AlkPhos  90     26 Sep 2023 06:34  TPro  6.3    /  Alb  2.0    /  TBili  0.5    /  DBili  x      /  AST  29     /  ALT  24     /  AlkPhos  91     25 Sep 2023 20:50    PT/INR - ( 28 Sep 2023 07:38 )   PT: 13.2 sec;   INR: 1.13 ratio      PTT - ( 28 Sep 2023 07:38 )  PTT:26.9 sec  12 Lead ECG:   Ventricular Rate 100 BPM    Atrial Rate 120 BPM    QRS Duration 130 ms    Q-T Interval 374 ms    QTC Calculation(Bazett) 482 ms    R Axis 94 degrees    T Axis -26 degrees    Diagnosis Line Atrial fibrillation with premature ventricular or aberrantly conducted complexes  Right bundle branch block  T wave abnormality, consider inferior ischemia  Abnormal ECG  When compared with ECG of 22-AUG-2023 10:51,  T wave inversion no longer evident in Anterior leads  Nonspecific T wave abnormality, worse in Lateral leads  Confirmed by Justin Lopez MD ([id number]) on 9/26/2023 1:18:27 PM (09-25-23 @ 19:06)    Patient name: MARÍA ELENA BEAR  YOB: 1945   Age: 76 (M)   MR#: [id number]  Study Date: 6/16/2021  [facility]  Study quality: Technically difficult  Referring Physician: Brian Back MD  Blood Pressure: 113/74 mmHg  Height: 178 cm  Weight: 109 kg  BSA: 2.3 m2  Heart Rate: 72 mmHg  ------------------------------------------------------------------------  PROCEDURE: Transthoracic echocardiogram with 2-D, M-Mode  and complete spectral and color flow Doppler. Verbal  consent was obtained for injection of  Ultrasonic Enhancing  Agent following a discussion of risks and benefits.  Following intravenous injection of Ultrasonic Enhancing  Agent , harmonic imaging was performed.  INDICATION: Ventricular tachycardia (I47.2)  ------------------------------------------------------------------------  Dimensions:    Normal Values:  LA:     4.1    2.0 - 4.0 cm  Ao:     3.7    2.0 - 3.8 cm  SEPTUM: 1.2    0.6 - 1.2 cm  PWT:    1.0    0.6 - 1.1 cm  LVIDd:  5.5    3.0 - 5.6 cm  LVIDs:  4.4    1.8 - 4.0 cm  Derived variables:  LVMI: 114 g/m2  RWT: 0.38  Fractional short: 19 %  EF (Visual Estimate): 20-25 %  Doppler Peak Velocity (m/sec): AoV=1.2  ------------------------------------------------------------------------  Observations:  Mitral Valve: Mitral annular calcification, otherwise  normal mitral valve. Moderate mitral regurgitation.  PISA  radius = 0.8 cm. RVOL = 32 ml, EROA = 29 mm2.  Aortic Valve/Aorta: Aortic valve not well visualized;  appears calcified. Peak transaortic valve gradient equals 6  mm Hg, aortic valve velocity time integral equals 17 cm,  estimated aortic valve area equals 2.7 sqcm. Minimal aortic  regurgitation.  Peak left ventricular outflow tract  gradient equals 2 mm Hg, LVOT velocity time integral equals  11 cm.  Aortic Root: 3.7 cm.  LVOT diameter: 2.3 cm.  Left Atrium: Severely dilated left atrium.  LA volume index  = 54 cc/m2.  Left Ventricle: Severe global left ventricular systolic  dysfunction with regional variation. Endocardial  visualization enhanced with intravenous injection of  Ultrasonic Enhancing Agent (Definity).  No left ventricular  thrombus. Eccentric left ventricular hypertrophy (dilated  left ventricle with normal relative wall thickness). At  least Mild diastolic dysfunction (Stage I).  Right Heart: Normal right atrium. Normal right ventricular  size and function. Normal tricuspid valve. Mild tricuspid  regurgitation. Pulmonic valve not well visualized, probably  normal. Minimal pulmonic regurgitation.  Pericardium/Pleura: Normal pericardium with no pericardial  effusion.  Hemodynamic: Estimated right atrial pressure is 8 mm Hg.  Estimated right ventricular systolic pressure equals 33 mm  Hg, assuming right atrial pressure equals 8 mm Hg,  consistent with normal pulmonary pressures.  ------------------------------------------------------------------------  Conclusions:  1. Mitral annular calcification, otherwise normal mitral  valve. Moderate mitral regurgitation.  PISA radius = 0.8  cm. RVOL = 32 ml, EROA = 29 mm2.  2. Eccentric left ventricular hypertrophy (dilated left  ventricle with normal relative wall thickness).  3. Severe global left ventricular systolic dysfunction with  regional variation. Endocardial visualization enhanced with  intravenous injection of Ultrasonic Enhancing Agent  (Definity).  No left ventricular thrombus.  4. Normal right ventricular size and function.  5. Estimated right ventricular systolic pressure equals 33  mm Hg, assuming right atrial pressure equals 8 mm Hg,  consistent with normal pulmonary pressures.  *** No previous Echo exam.  ------------------------------------------------------------------------  Confirmed on  6/16/2021 - 22:13:53 by Angelo De Leon M.D.  ------------------------------------------------------------------------      [facility] -- Jessica Ellis, Milka, Kimberley Mars, , Presley Fitch  Office # [phone]    Follow Up:  Hx Afib, LE edema, HFmodRF    Subjective/Observations: Comfortable on RA.  Denies any form of respiratory or cardiac discomfort.  States, edema is chronic    REVIEW OF SYSTEMS: All other review of systems is negative unless indicated above  PAST MEDICAL & SURGICAL HISTORY:  Atrial fibrillation  Hypothyroid  Hypertension  Bladder cancer  Bronchitis  Former smoker  CAD (coronary artery disease)  History of bladder surgery  on urostomy  S/P CABG x 3  History of automatic internal cardiac defibrillator (AICD)  H/O knee surgery  History of hernia repair  Small bowel obstruction    MEDICATIONS  (STANDING):  albuterol    90 MICROgram(s) HFA Inhaler 2 Puff(s) Inhalation every 6 hours  atorvastatin 80 milliGRAM(s) Oral at bedtime  buDESOnide    Inhalation Suspension 0.25 milliGRAM(s) Inhalation two times a day  calcium carbonate   1250 mG (OsCal) 1 Tablet(s) Oral two times a day  clopidogrel Tablet 75 milliGRAM(s) Oral daily  digoxin     Tablet 125 MICROGram(s) Oral daily  furosemide    Tablet 40 milliGRAM(s) Oral daily  influenza  Vaccine (HIGH DOSE) 0.7 milliLiter(s) IntraMuscular once  levothyroxine 150 MICROGram(s) Oral daily  lidocaine   4% Patch 1 Patch Transdermal daily  melatonin 5 milliGRAM(s) Oral at bedtime  metoprolol tartrate 25 milliGRAM(s) Oral two times a day  multivitamin 1 Tablet(s) Oral daily  pantoprazole  Injectable 40 milliGRAM(s) IV Push two times a day  polyethylene glycol 3350 17 Gram(s) Oral daily  sacubitril 24 mG/valsartan 26 mG 1 Tablet(s) Oral two times a day  senna 1 Tablet(s) Oral at bedtime  tiotropium 2.5 MICROgram(s) Inhaler 2 Puff(s) Inhalation daily    MEDICATIONS  (PRN):  acetaminophen     Tablet .. 650 milliGRAM(s) Oral every 6 hours PRN Temp greater or equal to 38C (100.4F), Mild Pain (1 - 3)  saline laxative (FLEET) Rectal Enema 1 Enema Rectal daily PRN for constipation    Allergies    No Known Allergies    Intolerances    Vital Signs Last 24 Hrs  T(C): 37 (28 Sep 2023 05:00), Max: 37.1 (27 Sep 2023 12:26)  T(F): 98.6 (28 Sep 2023 05:00), Max: 98.7 (27 Sep 2023 12:26)  HR: 86 (28 Sep 2023 07:45) (70 - 88)  BP: 114/67 (28 Sep 2023 05:00) (96/58 - 136/60)  BP(mean): --  RR: 18 (28 Sep 2023 05:00) (18 - 18)  SpO2: 98% (28 Sep 2023 07:45) (95% - 98%)    Parameters below as of 28 Sep 2023 07:45  Patient On (Oxygen Delivery Method): room air    I&O's Summary    27 Sep 2023 07:01  -  28 Sep 2023 07:00  --------------------------------------------------------  IN: 0 mL / OUT: 700 mL / NET: -700 mL      PHYSICAL EXAM:  TELE: Afib with couplets/triplets  Constitutional: NAD, awake and alert, obese  HEENT: Moist Mucous Membranes, Anicteric  Pulmonary: Non-labored, breath sounds are clear but diminished bilaterally, + Wheeze  Cardiovascular: IRRR, S1 and S2, +murmurs, no rubs, gallops or clicks  Gastrointestinal: Bowel Sounds present, soft, nontender.   Lymph: + peripheral edema. No lymphadenopathy.  Skin: No visible rashes or ulcers.  Psych:  Mood & affect appropriate  LABS: All Labs Reviewed:                        8.3    7.15  )-----------( 278      ( 28 Sep 2023 07:38 )             27.3                         8.6    7.74  )-----------( 259      ( 27 Sep 2023 07:08 )             27.7                         8.4    8.08  )-----------( 260      ( 26 Sep 2023 18:15 )             26.7     28 Sep 2023 07:38    145    |  105    |  24     ----------------------------<  104    3.4     |  35     |  0.98   27 Sep 2023 07:08    143    |  103    |  29     ----------------------------<  114    3.9     |  36     |  1.30   26 Sep 2023 06:34    140    |  104    |  32     ----------------------------<  109    3.9     |  32     |  1.10     Ca    7.9        28 Sep 2023 07:38  Ca    8.1        27 Sep 2023 07:08  Ca    8.2        26 Sep 2023 06:34  Phos  2.5       26 Sep 2023 06:34  Mg     1.9       26 Sep 2023 06:34  Mg     1.9       25 Sep 2023 20:50    TPro  6.0    /  Alb  1.9    /  TBili  0.5    /  DBili  x      /  AST  23     /  ALT  18     /  AlkPhos  88     27 Sep 2023 07:08  TPro  6.3    /  Alb  1.9    /  TBili  0.5    /  DBili  x      /  AST  27     /  ALT  24     /  AlkPhos  90     26 Sep 2023 06:34  TPro  6.3    /  Alb  2.0    /  TBili  0.5    /  DBili  x      /  AST  29     /  ALT  24     /  AlkPhos  91     25 Sep 2023 20:50    PT/INR - ( 28 Sep 2023 07:38 )   PT: 13.2 sec;   INR: 1.13 ratio      PTT - ( 28 Sep 2023 07:38 )  PTT:26.9 sec  12 Lead ECG:   Ventricular Rate 100 BPM    Atrial Rate 120 BPM    QRS Duration 130 ms    Q-T Interval 374 ms    QTC Calculation(Bazett) 482 ms    R Axis 94 degrees    T Axis -26 degrees    Diagnosis Line Atrial fibrillation with premature ventricular or aberrantly conducted complexes  Right bundle branch block  T wave abnormality, consider inferior ischemia  Abnormal ECG  When compared with ECG of 22-AUG-2023 10:51,  T wave inversion no longer evident in Anterior leads  Nonspecific T wave abnormality, worse in Lateral leads  Confirmed by Justin Lopez MD ([id number]) on 9/26/2023 1:18:27 PM (09-25-23 @ 19:06)    Patient name: MARÍA ELENA BEAR  YOB: 1945   Age: 76 (M)   MR#: [id number]  Study Date: 6/16/2021  [facility]  Study quality: Technically difficult  Referring Physician: Brian Back MD  Blood Pressure: 113/74 mmHg  Height: 178 cm  Weight: 109 kg  BSA: 2.3 m2  Heart Rate: 72 mmHg  ------------------------------------------------------------------------  PROCEDURE: Transthoracic echocardiogram with 2-D, M-Mode  and complete spectral and color flow Doppler. Verbal  consent was obtained for injection of  Ultrasonic Enhancing  Agent following a discussion of risks and benefits.  Following intravenous injection of Ultrasonic Enhancing  Agent , harmonic imaging was performed.  INDICATION: Ventricular tachycardia (I47.2)  ------------------------------------------------------------------------  Dimensions:    Normal Values:  LA:     4.1    2.0 - 4.0 cm  Ao:     3.7    2.0 - 3.8 cm  SEPTUM: 1.2    0.6 - 1.2 cm  PWT:    1.0    0.6 - 1.1 cm  LVIDd:  5.5    3.0 - 5.6 cm  LVIDs:  4.4    1.8 - 4.0 cm  Derived variables:  LVMI: 114 g/m2  RWT: 0.38  Fractional short: 19 %  EF (Visual Estimate): 20-25 %  Doppler Peak Velocity (m/sec): AoV=1.2  ------------------------------------------------------------------------  Observations:  Mitral Valve: Mitral annular calcification, otherwise  normal mitral valve. Moderate mitral regurgitation.  PISA  radius = 0.8 cm. RVOL = 32 ml, EROA = 29 mm2.  Aortic Valve/Aorta: Aortic valve not well visualized;  appears calcified. Peak transaortic valve gradient equals 6  mm Hg, aortic valve velocity time integral equals 17 cm,  estimated aortic valve area equals 2.7 sqcm. Minimal aortic  regurgitation.  Peak left ventricular outflow tract  gradient equals 2 mm Hg, LVOT velocity time integral equals  11 cm.  Aortic Root: 3.7 cm.  LVOT diameter: 2.3 cm.  Left Atrium: Severely dilated left atrium.  LA volume index  = 54 cc/m2.  Left Ventricle: Severe global left ventricular systolic  dysfunction with regional variation. Endocardial  visualization enhanced with intravenous injection of  Ultrasonic Enhancing Agent (Definity).  No left ventricular  thrombus. Eccentric left ventricular hypertrophy (dilated  left ventricle with normal relative wall thickness). At  least Mild diastolic dysfunction (Stage I).  Right Heart: Normal right atrium. Normal right ventricular  size and function. Normal tricuspid valve. Mild tricuspid  regurgitation. Pulmonic valve not well visualized, probably  normal. Minimal pulmonic regurgitation.  Pericardium/Pleura: Normal pericardium with no pericardial  effusion.  Hemodynamic: Estimated right atrial pressure is 8 mm Hg.  Estimated right ventricular systolic pressure equals 33 mm  Hg, assuming right atrial pressure equals 8 mm Hg,  consistent with normal pulmonary pressures.  ------------------------------------------------------------------------  Conclusions:  1. Mitral annular calcification, otherwise normal mitral  valve. Moderate mitral regurgitation.  PISA radius = 0.8  cm. RVOL = 32 ml, EROA = 29 mm2.  2. Eccentric left ventricular hypertrophy (dilated left  ventricle with normal relative wall thickness).  3. Severe global left ventricular systolic dysfunction with  regional variation. Endocardial visualization enhanced with  intravenous injection of Ultrasonic Enhancing Agent  (Definity).  No left ventricular thrombus.  4. Normal right ventricular size and function.  5. Estimated right ventricular systolic pressure equals 33  mm Hg, assuming right atrial pressure equals 8 mm Hg,  consistent with normal pulmonary pressures.  *** No previous Echo exam.  ------------------------------------------------------------------------  Confirmed on  6/16/2021 - 22:13:53 by Angelo De Leon M.D.  ------------------------------------------------------------------------

## 2023-09-28 NOTE — PROGRESS NOTE ADULT - ATTENDING COMMENTS
patient seen and examined at bedside with residents and students, agree with resident assessment and plan except the following:     patient has no wheezing on physical, but reported to have SOB last night and helped with Albuterol inhaler, will increase lasix if BP allow, took lasix 40mg in am, will give 20mg IV  additionally. increase to lasix 40 IV daily.    h/o COPD: will add pulmicort and albuteral ATC.   H/H Stable, continue to hold coumadin   GI/Cardio/Surgery  follow up noted  case discussed with patient and wife at bedside

## 2023-09-28 NOTE — PROGRESS NOTE ADULT - ASSESSMENT
78-year-old male with a PMH  bladder CA with urostomy 14 years ago, A-fib on warfarin, HTN, HLD, CAD s/p CABG + 1 cardiac stent, HFrEF, Hypothyroidism, GERD, COPD, SBO s/p ex lap of adhesions 8/2023 admitted for GI bleed.    HFmodEF s/p ICD (Medtronic), CAD s/p CABG + 1 cardiac stent (7/2023)  - Appears compensated from HF standpoint apart from his edema  - Tolerating RA and non-orthopneic  - Would switch PO Lasix to IV daily to attempt to reduce edema.  Monitor renal function  - Continue Entersto  - Follow up TTE  - Strict I/O's.  Net neg 700cc    - Rate-controlled Afib on tele.  Can D/C tele  - GI following for acute anemia with +FOBT.  No intervention planned for now.  No evidence of over bleed.  Hgb remains stable  - Continue to hold Coumadin for GI bleed  - Switch BB to Toprol XL 50 mg daily  - Continue home digoxin.  Obtain serum level in am     - EKG shows Afib w/ PVCs RBBB    - No acute changes on EKG compared to previous.  - No evidence of any active ischemia   - Continue Plavix, patient still within 3 month window since stent placement  - Can continue to hold ASA.  Please, resume when cleared by GI  - Continue statin    - Monitor electrolytes, replete to keep K>4 and Mag>2  - Will continue to follow    Chio Burch DNP, NP-C, AGACNP-C  Cardiology   Call TEAMS        78-year-old male with a PMH  bladder CA with urostomy 14 years ago, A-fib on warfarin, HTN, HLD, CAD s/p CABG + 1 cardiac stent, HFrEF, Hypothyroidism, GERD, COPD, SBO s/p ex lap of adhesions 8/2023 admitted for GI bleed.    HFmodEF s/p ICD (Medtronic), CAD s/p CABG + 1 cardiac stent (7/2023)  - Tolerating RA and non-orthopneic. though with wheeze on exam.   - Would switch PO Lasix to IV daily to attempt to reduce edema.  Monitor renal function  - Continue Entersto  - Follow up TTE  - Strict I/O's.  Net neg 700cc    - Rate-controlled Afib on tele.  Can D/C tele  - GI following for acute anemia with +FOBT.  No intervention planned for now.  No evidence of over bleed.  Hgb remains stable  - Continue to hold Coumadin for GI bleed  - Switch BB to Toprol XL 50 mg daily  - Continue home digoxin.  Obtain serum level in am     - EKG shows Afib w/ PVCs RBBB    - No acute changes on EKG compared to previous.  - No evidence of any active ischemia   - Continue Plavix, patient still within 3 month window since stent placement  - Can continue to hold ASA.  Please, resume when cleared by GI  - Continue statin    - Monitor electrolytes, replete to keep K>4 and Mag>2  - Will continue to follow    Chio Burch DNP, NP-C, AGACNP-C  Cardiology   Call TEAMS

## 2023-09-28 NOTE — PROGRESS NOTE ADULT - ASSESSMENT
77 y/o with history of bladder CA with urostomy 14 years ago, A-fib on warfarin, HTN, HLD, CAD with bypass two-vessel, and [facility]. H/H stable     Plan:  - continue diet  - PRN transfusions  - hold AC present   - no emergent intervention

## 2023-09-28 NOTE — PROGRESS NOTE ADULT - PROBLEM SELECTOR PLAN 8
- Increased Levothyroxine 150mcg to Start 9/28  - F/u AM thyroid panel - Increased Levothyroxine 150mcg to Start 9/28  - Free Thyroxine: 1.2 (9/27)  - T3 Total 60 (9/27)  - T4 5.5 (9/27)  - TSH 18.30 (9/27)

## 2023-09-28 NOTE — PROGRESS NOTE ADULT - SUBJECTIVE AND OBJECTIVE BOX
Patient is a 78y old  Male who presents with a chief complaint of GI Bleed (27 Sep 2023 14:39)    Pt seen and examined at bedside. No acute events overnight. Tolerating diet well with no issues. Denies any F/C, N/V, CP, SOB, or abdominal pain.     FROM ADMISSION H+P:   HPI:  78-year-old male with a PMH of bladder CA with urostomy 14 years ago, A-fib on warfarin, HTN, HLD, CAD s/p CABG, 1 cardiac stent (2023), CHF, Hypothyroidism, GERD, COPD, SBO s/p ex lap of adhesions 2023 [facility]. Patient was recently discharged from [facility] s/p ex lap of adhesions for SBO, and a parastomal hernia repair with mesh. During that admission, his stay was complicated by altered mental status and he was intubated. He also was found to have a GI bleed, in which he was transfused and it resolved on its own. [facility] Recently, patient also developed left lower extremity pain. The pain is making him unable to walk, causing him to be wheelchair bound. There is swelling and redness of the LLE, and the area is TTP. Today, the patient is complaining of SOB, fatigue, and LLE pain. Patient states his last BM was 2 days ago, and he did not notice any blood in his stool. He denies fever, chills, chest pain, palpitations, cough, abdominal pain, nausea, vomiting, diarrhea, hematochezia, melena, hematuria, headaches, changes in vision, dizziness, numbness, tingling. No other complaints at this time.    ED course:  Vitals: BP: 110/54 , HR:107 , Temp: 97.8, RR: 18, SpO2: 100% on RA  Labs significant for: Positive FOBT, H/H 6.7/22, red cell morphology abnormal, PT/INR 16.2/1.4, BUN 37, CO2 33, Lipase 109, BNP 2703, Dig Level 0.7  EKbpm, afib, RBBB, QTc 482  In ED given IV Protonix x1, IV Zosyn x1, Packed rbc x1unit, IV Lasix 20mg x1  Imaging  CT Ab/pelvis:No active GI bleeding is evident. No evidence of interval hemorrhage compared to 2023 to explain the patient's low hemoglobin. The hematoma along the medial inferior aspect of the urostomy in the right of midline anterior abdominal wall subcutaneous fat has organized and now has appearance of a chronic hematoma or seroma.   Venous Duplex LLE: No evidence of left lower extremity deep venous thrombosis. Ruptured hemorrhagic left Baker cyst (26 Sep 2023 01:36)      ----  INTERVAL HPI/OVERNIGHT EVENTS: Pt seen and evaluated at the bedside. No acute overnight events occurred. The patient denies fevers, chills, nausea, vomiting, chest pain, palpitations or shortness of breath.     ----  PAST MEDICAL & SURGICAL HISTORY:  Atrial fibrillation      Hypothyroid      Hypertension      Bladder cancer      Bronchitis      Former smoker      CAD (coronary artery disease)      History of bladder surgery  on urostomy      S/P CABG x 3      History of automatic internal cardiac defibrillator (AICD)      H/O knee surgery      History of hernia repair      Small bowel obstruction          FAMILY HISTORY:  Family history of heart attack (Father, Mother)        Home Medications:  acetaminophen 325 mg oral tablet: 3 tab(s) orally every 6 hours As needed Mild Pain (1 - 3) (26 Sep 2023 01:23)  digoxin 125 mcg (0.125 mg) oral tablet: 1 orally once a day (26 Sep 2023 01:)  Entresto 24 mg-26 mg oral tablet: 1 tablet orally 2 times a day (26 Sep 2023 01:)  Fleet Enema 19 g-7 g rectal enema: 133 milliliter(s) rectally once a day as needed for  constipation (26 Sep 2023 01:29)  furosemide 40 mg oral tablet: 1 tab(s) orally once a day (26 Sep 2023 01:29)  ipratropium-albuterol 0.5 mg-2.5 mg/3 mL inhalation solution: 3 milliliter(s) inhaled every 6 hours As needed Wheezing (26 Sep 2023 01:23)  levothyroxine 137 mcg (0.137 mg) oral tablet: 1 tab(s) orally once a day (26 Sep 2023 01:23)  melatonin 5 mg oral tablet: 1 tab(s) orally once a day (at bedtime) (26 Sep 2023 01:)  metoprolol tartrate 25 mg oral tablet: 1 tab(s) orally 2 times a day (26 Sep 2023 01:23)  Multiple Vitamins oral tablet: 1 tab(s) orally once a day (26 Sep 2023 01:)  Oyster Aldo 500 oral tablet: 1 tab(s) orally 2 times a day (26 Sep 2023 01:29)  pantoprazole 40 mg oral delayed release tablet: 1 tab(s) orally every 12 hours (26 Sep 2023 01:23)  polyethylene glycol 3350 oral powder for reconstitution: 17 gram(s) orally once a day (26 Sep 2023 01:23)  rosuvastatin 40 mg oral tablet: 1 orally once a day (26 Sep 2023 01:23)  senna leaf extract oral tablet: 1 tab(s) orally once a day (at bedtime) (26 Sep 2023 01:23)  warfarin 5 mg oral tablet: 1 tab(s) orally once a day (26 Sep 2023 01:30)      Allergies    No Known Allergies    Intolerances        ----  MEDICATIONS  (STANDING):  albuterol    90 MICROgram(s) HFA Inhaler 2 Puff(s) Inhalation every 6 hours  atorvastatin 80 milliGRAM(s) Oral at bedtime  buDESOnide    Inhalation Suspension 0.25 milliGRAM(s) Inhalation two times a day  calcium carbonate   1250 mG (OsCal) 1 Tablet(s) Oral two times a day  clopidogrel Tablet 75 milliGRAM(s) Oral daily  digoxin     Tablet 125 MICROGram(s) Oral daily  furosemide    Tablet 40 milliGRAM(s) Oral daily  influenza  Vaccine (HIGH DOSE) 0.7 milliLiter(s) IntraMuscular once  levothyroxine 150 MICROGram(s) Oral daily  lidocaine   4% Patch 1 Patch Transdermal daily  melatonin 5 milliGRAM(s) Oral at bedtime  metoprolol tartrate 25 milliGRAM(s) Oral two times a day  multivitamin 1 Tablet(s) Oral daily  pantoprazole  Injectable 40 milliGRAM(s) IV Push two times a day  polyethylene glycol 3350 17 Gram(s) Oral daily  sacubitril 24 mG/valsartan 26 mG 1 Tablet(s) Oral two times a day  senna 1 Tablet(s) Oral at bedtime  tiotropium 2.5 MICROgram(s) Inhaler 2 Puff(s) Inhalation daily    MEDICATIONS  (PRN):  acetaminophen     Tablet .. 650 milliGRAM(s) Oral every 6 hours PRN Temp greater or equal to 38C (100.4F), Mild Pain (1 - 3)  saline laxative (FLEET) Rectal Enema 1 Enema Rectal daily PRN for constipation      ----  REVIEW OF SYSTEMS:  Constitutional: denies fever, chills, diaphoresis  HEENT: denies sore throat, runny nose, blurry vision, double vision    Respiratory: denies SOB, SMALL, cough, wheezing, hemoptysis  Cardiovascular: denies CP, palpitations, LE edema  Gastrointestinal:  denies nausea, vomiting, diarrhea, constipation, abdominal pain, melena, hematochezia   Genitourinary: denies dysuria, hematuria  Skin/Breast: denies rash, hives, itching   Musculoskeletal: denies myalgias, arthritis, joint swelling, muscle weakness  Neurologic: denies syncope, LOC, headache, weakness, dizziness       ----  T(C): 37 (23 @ 05:00), Max: 37.1 (23 @ 12:26)  HR: 70 (23 @ 05:00) (70 - 88)  BP: 114/67 (23 @ 05:00) (96/58 - 136/60)  RR: 18 (23 @ 05:00) (18 - 18)  SpO2: 95% (23 @ 05:00) (95% - 98%)  Wt(kg): --    Physical Exam:   GENERAL: well-groomed, well-developed, NAD  HEENT: head NC/AT; EOM intact, PERRLA, conjunctiva & sclera clear; hearing grossly intact, moist mucous membranes  NECK: supple, no JVD  RESPIRATORY: CTA B/L, no wheezing, rales, rhonchi or rubs  CARDIOVASCULAR: S1&S2, RRR, no murmurs or gallops  ABDOMEN: soft, non-tender, non-distended, + Bowel sounds x4 quadrants, no guarding, rebound or rigidity  MUSCULOSKELETAL:  no clubbing, cyanosis or edema of all 4 extremities  LYMPH: no cervical lymphadenopathy  VASCULAR: Radial pulses 2+ bilaterally, no varicose veins   SKIN: warm and dry, color normal  NEUROLOGIC: AA&O X3, CN2-12 intact w/ no focal deficits, no sensory loss, motor Strength 5/5 in UE & LE B/L  Psych: Normal mood and affect, normal behavior         ----  I&O's Summary    27 Sep 2023 07:01  -  28 Sep 2023 07:00  --------------------------------------------------------  IN: 0 mL / OUT: 700 mL / NET: -700 mL        LABS:                        8.6    7.74  )-----------( 259      ( 27 Sep 2023 07:08 )             .7         143  |  103  |  29<H>  ----------------------------<  114<H>  3.9   |  36<H>  |  1.30    Ca    8.1<L>      27 Sep 2023 07:08    TPro  6.0  /  Alb  1.9<L>  /  TBili  0.5  /  DBili  x   /  AST  23  /  ALT  18  /  AlkPhos  88      PT/INR - ( 27 Sep 2023 07:08 )   PT: 14.4 sec;   INR: 1.24 ratio         PTT - ( 27 Sep 2023 07:08 )  PTT:25.9 sec  Urinalysis Basic - ( 27 Sep 2023 07:08 )    Color: x / Appearance: x / SG: x / pH: x  Gluc: 114 mg/dL / Ketone: x  / Bili: x / Urobili: x   Blood: x / Protein: x / Nitrite: x   Leuk Esterase: x / RBC: x / WBC x   Sq Epi: x / Non Sq Epi: x / Bacteria: x      CAPILLARY BLOOD GLUCOSE           @ 21:00   No growth at 48 Hours  --  --   @ 20:50   No growth at 48 Hours  --  --                 Patient is a 78y old  Male who presents with a chief complaint of GI Bleed (27 Sep 2023 14:39)    Pt seen and examined at bedside. No acute events overnight. States that breathing slightly improved compared to yesterday. Tolerating diet well with no issues. Denies any F/C, N/V, CP, SOB, or abdominal pain.     FROM ADMISSION H+P:   HPI:  78-year-old male with a PMH of bladder CA with urostomy 14 years ago, A-fib on warfarin, HTN, HLD, CAD s/p CABG, 1 cardiac stent (2023), CHF, Hypothyroidism, GERD, COPD, SBO s/p ex lap of adhesions 2023 [facility]. Patient was recently discharged from [facility] s/p ex lap of adhesions for SBO, and a parastomal hernia repair with mesh. During that admission, his stay was complicated by altered mental status and he was intubated. He also was found to have a GI bleed, in which he was transfused and it resolved on its own. [facility] Recently, patient also developed left lower extremity pain. The pain is making him unable to walk, causing him to be wheelchair bound. There is swelling and redness of the LLE, and the area is TTP. Today, the patient is complaining of SOB, fatigue, and LLE pain. Patient states his last BM was 2 days ago, and he did not notice any blood in his stool. He denies fever, chills, chest pain, palpitations, cough, abdominal pain, nausea, vomiting, diarrhea, hematochezia, melena, hematuria, headaches, changes in vision, dizziness, numbness, tingling. No other complaints at this time.    ED course:  Vitals: BP: 110/54 , HR:107 , Temp: 97.8, RR: 18, SpO2: 100% on RA  Labs significant for: Positive FOBT, H/H 6.7/22, red cell morphology abnormal, PT/INR 16.2/1.4, BUN 37, CO2 33, Lipase 109, BNP 2703, Dig Level 0.7  EKbpm, afib, RBBB, QTc 482  In ED given IV Protonix x1, IV Zosyn x1, Packed rbc x1unit, IV Lasix 20mg x1  Imaging  CT Ab/pelvis:No active GI bleeding is evident. No evidence of interval hemorrhage compared to 2023 to explain the patient's low hemoglobin. The hematoma along the medial inferior aspect of the urostomy in the right of midline anterior abdominal wall subcutaneous fat has organized and now has appearance of a chronic hematoma or seroma.   Venous Duplex LLE: No evidence of left lower extremity deep venous thrombosis. Ruptured hemorrhagic left Baker cyst (26 Sep 2023 01:36)      ----  INTERVAL HPI/OVERNIGHT EVENTS: Pt seen and evaluated at the bedside. No acute overnight events occurred. The patient denies fevers, chills, nausea, vomiting, chest pain, palpitations or shortness of breath.     ----  PAST MEDICAL & SURGICAL HISTORY:  Atrial fibrillation      Hypothyroid      Hypertension      Bladder cancer      Bronchitis      Former smoker      CAD (coronary artery disease)      History of bladder surgery  on urostomy      S/P CABG x 3      History of automatic internal cardiac defibrillator (AICD)      H/O knee surgery      History of hernia repair      Small bowel obstruction          FAMILY HISTORY:  Family history of heart attack (Father, Mother)        Home Medications:  acetaminophen 325 mg oral tablet: 3 tab(s) orally every 6 hours As needed Mild Pain (1 - 3) (26 Sep 2023 01:23)  digoxin 125 mcg (0.125 mg) oral tablet: 1 orally once a day (26 Sep 2023 01:)  Entresto 24 mg-26 mg oral tablet: 1 tablet orally 2 times a day (26 Sep 2023 01:)  Fleet Enema 19 g-7 g rectal enema: 133 milliliter(s) rectally once a day as needed for  constipation (26 Sep 2023 01:29)  furosemide 40 mg oral tablet: 1 tab(s) orally once a day (26 Sep 2023 01:)  ipratropium-albuterol 0.5 mg-2.5 mg/3 mL inhalation solution: 3 milliliter(s) inhaled every 6 hours As needed Wheezing (26 Sep 2023 01:23)  levothyroxine 137 mcg (0.137 mg) oral tablet: 1 tab(s) orally once a day (26 Sep 2023 01:)  melatonin 5 mg oral tablet: 1 tab(s) orally once a day (at bedtime) (26 Sep 2023 01:23)  metoprolol tartrate 25 mg oral tablet: 1 tab(s) orally 2 times a day (26 Sep 2023 01:)  Multiple Vitamins oral tablet: 1 tab(s) orally once a day (26 Sep 2023 01:29)  Oyster Aldo 500 oral tablet: 1 tab(s) orally 2 times a day (26 Sep 2023 01:29)  pantoprazole 40 mg oral delayed release tablet: 1 tab(s) orally every 12 hours (26 Sep 2023 01:23)  polyethylene glycol 3350 oral powder for reconstitution: 17 gram(s) orally once a day (26 Sep 2023 01:23)  rosuvastatin 40 mg oral tablet: 1 orally once a day (26 Sep 2023 01:23)  senna leaf extract oral tablet: 1 tab(s) orally once a day (at bedtime) (26 Sep 2023 01:23)  warfarin 5 mg oral tablet: 1 tab(s) orally once a day (26 Sep 2023 01:30)      Allergies    No Known Allergies    Intolerances        ----  MEDICATIONS  (STANDING):  albuterol    90 MICROgram(s) HFA Inhaler 2 Puff(s) Inhalation every 6 hours  atorvastatin 80 milliGRAM(s) Oral at bedtime  buDESOnide    Inhalation Suspension 0.25 milliGRAM(s) Inhalation two times a day  calcium carbonate   1250 mG (OsCal) 1 Tablet(s) Oral two times a day  clopidogrel Tablet 75 milliGRAM(s) Oral daily  digoxin     Tablet 125 MICROGram(s) Oral daily  furosemide    Tablet 40 milliGRAM(s) Oral daily  influenza  Vaccine (HIGH DOSE) 0.7 milliLiter(s) IntraMuscular once  levothyroxine 150 MICROGram(s) Oral daily  lidocaine   4% Patch 1 Patch Transdermal daily  melatonin 5 milliGRAM(s) Oral at bedtime  metoprolol tartrate 25 milliGRAM(s) Oral two times a day  multivitamin 1 Tablet(s) Oral daily  pantoprazole  Injectable 40 milliGRAM(s) IV Push two times a day  polyethylene glycol 3350 17 Gram(s) Oral daily  sacubitril 24 mG/valsartan 26 mG 1 Tablet(s) Oral two times a day  senna 1 Tablet(s) Oral at bedtime  tiotropium 2.5 MICROgram(s) Inhaler 2 Puff(s) Inhalation daily    MEDICATIONS  (PRN):  acetaminophen     Tablet .. 650 milliGRAM(s) Oral every 6 hours PRN Temp greater or equal to 38C (100.4F), Mild Pain (1 - 3)  saline laxative (FLEET) Rectal Enema 1 Enema Rectal daily PRN for constipation      ----  REVIEW OF SYSTEMS:  Constitutional: denies fever, chills, diaphoresis  HEENT: denies sore throat, runny nose, blurry vision, double vision    Respiratory: denies SOB, SMALL, cough, wheezing, hemoptysis  Cardiovascular: denies CP, palpitations, LE edema  Gastrointestinal:  denies nausea, vomiting, diarrhea, constipation, abdominal pain, melena, hematochezia   Genitourinary: denies dysuria, hematuria  Skin/Breast: denies rash, hives, itching   Musculoskeletal: denies myalgias, arthritis, joint swelling, muscle weakness  Neurologic: denies syncope, LOC, headache, weakness, dizziness       ----  T(C): 37 (23 @ 05:00), Max: 37.1 (23 @ 12:26)  HR: 70 (23 @ 05:00) (70 - 88)  BP: 114/67 (23 @ 05:00) (96/58 - 136/60)  RR: 18 (23 @ 05:00) (18 - 18)  SpO2: 95% (23 @ 05:00) (95% - 98%)  Wt(kg): --    Physical Exam:   GENERAL: well-groomed, well-developed, NAD  HEENT: head NC/AT; EOM intact, PERRLA, conjunctiva & sclera clear; hearing grossly intact, moist mucous membranes  NECK: supple, no JVD  RESPIRATORY: CTA B/L, no wheezing, rales, rhonchi or rubs  CARDIOVASCULAR: S1&S2, RRR, no murmurs or gallops  ABDOMEN: soft, non-tender, non-distended, + Bowel sounds x4 quadrants, no guarding, rebound or rigidity  MUSCULOSKELETAL:  no clubbing, cyanosis or edema of all 4 extremities  LYMPH: no cervical lymphadenopathy  VASCULAR: Radial pulses 2+ bilaterally, no varicose veins   SKIN: warm and dry, color normal  NEUROLOGIC: AA&O X3, CN2-12 intact w/ no focal deficits, no sensory loss, motor Strength 5/5 in UE & LE B/L  Psych: Normal mood and affect, normal behavior         ----  I&O's Summary    27 Sep 2023 07:01  -  28 Sep 2023 07:00  --------------------------------------------------------  IN: 0 mL / OUT: 700 mL / NET: -700 mL        LABS:                        8.6    7.74  )-----------( 259      ( 27 Sep 2023 07:08 )             27.7         143  |  103  |  29<H>  ----------------------------<  114<H>  3.9   |  36<H>  |  1.30    Ca    8.1<L>      27 Sep 2023 07:08    TPro  6.0  /  Alb  1.9<L>  /  TBili  0.5  /  DBili  x   /  AST  23  /  ALT  18  /  AlkPhos  88      PT/INR - ( 27 Sep 2023 07:08 )   PT: 14.4 sec;   INR: 1.24 ratio         PTT - ( 27 Sep 2023 07:08 )  PTT:25.9 sec  Urinalysis Basic - ( 27 Sep 2023 07:08 )    Color: x / Appearance: x / SG: x / pH: x  Gluc: 114 mg/dL / Ketone: x  / Bili: x / Urobili: x   Blood: x / Protein: x / Nitrite: x   Leuk Esterase: x / RBC: x / WBC x   Sq Epi: x / Non Sq Epi: x / Bacteria: x      CAPILLARY BLOOD GLUCOSE           @ 21:00   No growth at 48 Hours  --  --   @ 20:50   No growth at 48 Hours  --  --

## 2023-09-28 NOTE — PROGRESS NOTE ADULT - SUBJECTIVE AND OBJECTIVE BOX
INTERVAL HPI/OVERNIGHT EVENTS:    small nonbloody bm this am per patient   no c/o abd pain   no n/v    MEDICATIONS  (STANDING):  albuterol    90 MICROgram(s) HFA Inhaler 2 Puff(s) Inhalation every 6 hours  atorvastatin 80 milliGRAM(s) Oral at bedtime  buDESOnide    Inhalation Suspension 0.25 milliGRAM(s) Inhalation two times a day  calcium carbonate   1250 mG (OsCal) 1 Tablet(s) Oral two times a day  clopidogrel Tablet 75 milliGRAM(s) Oral daily  digoxin     Tablet 125 MICROGram(s) Oral daily  influenza  Vaccine (HIGH DOSE) 0.7 milliLiter(s) IntraMuscular once  levothyroxine 150 MICROGram(s) Oral daily  lidocaine   4% Patch 1 Patch Transdermal daily  melatonin 5 milliGRAM(s) Oral at bedtime  metoprolol tartrate 25 milliGRAM(s) Oral two times a day  multivitamin 1 Tablet(s) Oral daily  pantoprazole  Injectable 40 milliGRAM(s) IV Push two times a day  polyethylene glycol 3350 17 Gram(s) Oral daily  sacubitril 24 mG/valsartan 26 mG 1 Tablet(s) Oral two times a day  senna 1 Tablet(s) Oral at bedtime  tiotropium 2.5 MICROgram(s) Inhaler 2 Puff(s) Inhalation daily    MEDICATIONS  (PRN):  acetaminophen     Tablet .. 650 milliGRAM(s) Oral every 6 hours PRN Temp greater or equal to 38C (100.4F), Mild Pain (1 - 3)  saline laxative (FLEET) Rectal Enema 1 Enema Rectal daily PRN for constipation      Allergies    No Known Allergies    Intolerances        Review of Systems:    General:  No wt loss, fevers, chills, night sweats, fatigue   Eyes:  Good vision, no reported pain  ENT:  No sore throat, pain, runny nose, dysphagia  CV:  No pain, palpitations, hypo/hypertension  Resp:  No dyspnea, cough, tachypnea, wheezing  GI:  No pain, No nausea, No vomiting, No diarrhea, No constipation, No weight loss, No fever, No pruritis, No rectal bleeding, No melena, No dysphagia  :  No pain, bleeding, incontinence, nocturia  Muscle:  No pain, weakness  Neuro:  No weakness, tingling, memory problems  Psych:  No fatigue, insomnia, mood problems, depression  Endocrine:  No polyuria, polydypsia, cold/heat intolerance  Heme:  No petechiae, ecchymosis, easy bruisability  Skin:  No rash, tattoos, scars, edema      Vital Signs Last 24 Hrs  T(C): 36.8 (28 Sep 2023 11:59), Max: 37.1 (27 Sep 2023 12:26)  T(F): 98.3 (28 Sep 2023 11:59), Max: 98.7 (27 Sep 2023 12:26)  HR: 84 (28 Sep 2023 11:59) (70 - 88)  BP: 137/67 (28 Sep 2023 11:59) (96/58 - 137/67)  BP(mean): --  RR: 18 (28 Sep 2023 11:59) (18 - 18)  SpO2: 96% (28 Sep 2023 11:59) (95% - 98%)    Parameters below as of 28 Sep 2023 11:59  Patient On (Oxygen Delivery Method): room air        PHYSICAL EXAM:    Constitutional: NAD  HEENT: EOMI, throat clear  Neck: No LAD, supple  Respiratory: CTA and P  Cardiovascular: S1 and S2, RRR, no M  Gastrointestinal: BS+, soft, NT/ND, neg HSM,  Extremities: No peripheral edema, neg clubbing, cyanosis  Vascular: 2+ peripheral pulses  Neurological: A/O x 3, no focal deficits  Psychiatric: Normal mood, normal affect  Skin: No rashes      LABS:                        8.3    7.15  )-----------( 278      ( 28 Sep 2023 07:38 )             27.3     09-28    145  |  105  |  24<H>  ----------------------------<  104<H>  3.4<L>   |  35<H>  |  0.98    Ca    7.9<L>      28 Sep 2023 07:38    TPro  6.0  /  Alb  1.9<L>  /  TBili  0.5  /  DBili  x   /  AST  23  /  ALT  18  /  AlkPhos  88  09-27    PT/INR - ( 28 Sep 2023 07:38 )   PT: 13.2 sec;   INR: 1.13 ratio         PTT - ( 28 Sep 2023 07:38 )  PTT:26.9 sec  Urinalysis Basic - ( 28 Sep 2023 07:38 )    Color: x / Appearance: x / SG: x / pH: x  Gluc: 104 mg/dL / Ketone: x  / Bili: x / Urobili: x   Blood: x / Protein: x / Nitrite: x   Leuk Esterase: x / RBC: x / WBC x   Sq Epi: x / Non Sq Epi: x / Bacteria: x        RADIOLOGY & ADDITIONAL TESTS:

## 2023-09-28 NOTE — PROGRESS NOTE ADULT - PROBLEM SELECTOR PLAN 10
Patient was a former smoker (Quit 1985)  - Continue home Ipratropium-Albuterol q6 for SOB  - Add pulmicort Inh BID

## 2023-09-28 NOTE — PROGRESS NOTE ADULT - NSPROGADDITIONALINFOA_GEN_ALL_CORE
agree with above unless contradicts below  GI bleed resolved  cleared for d/c from gen surg standpoint

## 2023-09-29 ENCOUNTER — TRANSCRIPTION ENCOUNTER (OUTPATIENT)
Age: 78
End: 2023-09-29

## 2023-09-29 VITALS
TEMPERATURE: 98 F | DIASTOLIC BLOOD PRESSURE: 64 MMHG | SYSTOLIC BLOOD PRESSURE: 107 MMHG | HEART RATE: 88 BPM | RESPIRATION RATE: 18 BRPM | OXYGEN SATURATION: 97 %

## 2023-09-29 LAB
ANION GAP SERPL CALC-SCNC: 5 MMOL/L — SIGNIFICANT CHANGE UP (ref 5–17)
APTT BLD: 26.1 SEC — SIGNIFICANT CHANGE UP (ref 24.5–35.6)
BASOPHILS # BLD AUTO: 0.04 K/UL — SIGNIFICANT CHANGE UP (ref 0–0.2)
BASOPHILS NFR BLD AUTO: 0.5 % — SIGNIFICANT CHANGE UP (ref 0–2)
BUN SERPL-MCNC: 27 MG/DL — HIGH (ref 7–23)
CALCIUM SERPL-MCNC: 8 MG/DL — LOW (ref 8.5–10.1)
CHLORIDE SERPL-SCNC: 103 MMOL/L — SIGNIFICANT CHANGE UP (ref 96–108)
CO2 SERPL-SCNC: 37 MMOL/L — HIGH (ref 22–31)
CREAT SERPL-MCNC: 1.1 MG/DL — SIGNIFICANT CHANGE UP (ref 0.5–1.3)
EGFR: 69 ML/MIN/1.73M2 — SIGNIFICANT CHANGE UP
EOSINOPHIL # BLD AUTO: 0.5 K/UL — SIGNIFICANT CHANGE UP (ref 0–0.5)
EOSINOPHIL NFR BLD AUTO: 6 % — SIGNIFICANT CHANGE UP (ref 0–6)
GLUCOSE SERPL-MCNC: 113 MG/DL — HIGH (ref 70–99)
HCT VFR BLD CALC: 28.4 % — LOW (ref 39–50)
HGB BLD-MCNC: 8.4 G/DL — LOW (ref 13–17)
IMM GRANULOCYTES NFR BLD AUTO: 1.3 % — HIGH (ref 0–0.9)
INR BLD: 1.1 RATIO — SIGNIFICANT CHANGE UP (ref 0.85–1.18)
LYMPHOCYTES # BLD AUTO: 1.22 K/UL — SIGNIFICANT CHANGE UP (ref 1–3.3)
LYMPHOCYTES # BLD AUTO: 14.6 % — SIGNIFICANT CHANGE UP (ref 13–44)
MCHC RBC-ENTMCNC: 27.2 PG — SIGNIFICANT CHANGE UP (ref 27–34)
MCHC RBC-ENTMCNC: 29.6 GM/DL — LOW (ref 32–36)
MCV RBC AUTO: 91.9 FL — SIGNIFICANT CHANGE UP (ref 80–100)
MONOCYTES # BLD AUTO: 0.93 K/UL — HIGH (ref 0–0.9)
MONOCYTES NFR BLD AUTO: 11.1 % — SIGNIFICANT CHANGE UP (ref 2–14)
NEUTROPHILS # BLD AUTO: 5.58 K/UL — SIGNIFICANT CHANGE UP (ref 1.8–7.4)
NEUTROPHILS NFR BLD AUTO: 66.5 % — SIGNIFICANT CHANGE UP (ref 43–77)
NRBC # BLD: 0 /100 WBCS — SIGNIFICANT CHANGE UP (ref 0–0)
PLATELET # BLD AUTO: 286 K/UL — SIGNIFICANT CHANGE UP (ref 150–400)
POTASSIUM SERPL-MCNC: 3.6 MMOL/L — SIGNIFICANT CHANGE UP (ref 3.5–5.3)
POTASSIUM SERPL-SCNC: 3.6 MMOL/L — SIGNIFICANT CHANGE UP (ref 3.5–5.3)
PROTHROM AB SERPL-ACNC: 12.8 SEC — SIGNIFICANT CHANGE UP (ref 9.5–13)
RBC # BLD: 3.09 M/UL — LOW (ref 4.2–5.8)
RBC # FLD: 15.2 % — HIGH (ref 10.3–14.5)
SODIUM SERPL-SCNC: 145 MMOL/L — SIGNIFICANT CHANGE UP (ref 135–145)
WBC # BLD: 8.38 K/UL — SIGNIFICANT CHANGE UP (ref 3.8–10.5)
WBC # FLD AUTO: 8.38 K/UL — SIGNIFICANT CHANGE UP (ref 3.8–10.5)

## 2023-09-29 PROCEDURE — 99232 SBSQ HOSP IP/OBS MODERATE 35: CPT

## 2023-09-29 PROCEDURE — 99239 HOSP IP/OBS DSCHRG MGMT >30: CPT

## 2023-09-29 RX ORDER — WARFARIN SODIUM 2.5 MG/1
1 TABLET ORAL
Refills: 0 | DISCHARGE

## 2023-09-29 RX ORDER — POTASSIUM CHLORIDE 20 MEQ
40 PACKET (EA) ORAL ONCE
Refills: 0 | Status: COMPLETED | OUTPATIENT
Start: 2023-09-29 | End: 2023-09-29

## 2023-09-29 RX ORDER — ALBUTEROL 90 UG/1
2 AEROSOL, METERED ORAL
Qty: 0 | Refills: 0 | DISCHARGE
Start: 2023-09-29

## 2023-09-29 RX ORDER — FUROSEMIDE 40 MG
40 TABLET ORAL DAILY
Refills: 0 | Status: DISCONTINUED | OUTPATIENT
Start: 2023-09-30 | End: 2023-09-29

## 2023-09-29 RX ORDER — LIDOCAINE 4 G/100G
1 CREAM TOPICAL
Qty: 0 | Refills: 0 | DISCHARGE
Start: 2023-09-29

## 2023-09-29 RX ORDER — METOPROLOL TARTRATE 50 MG
1 TABLET ORAL
Qty: 0 | Refills: 0 | DISCHARGE
Start: 2023-09-29

## 2023-09-29 RX ORDER — METOPROLOL TARTRATE 50 MG
50 TABLET ORAL DAILY
Refills: 0 | Status: DISCONTINUED | OUTPATIENT
Start: 2023-09-30 | End: 2023-09-29

## 2023-09-29 RX ORDER — ASCORBIC ACID 60 MG
1 TABLET,CHEWABLE ORAL
Qty: 0 | Refills: 0 | DISCHARGE
Start: 2023-09-29

## 2023-09-29 RX ORDER — WARFARIN SODIUM 2.5 MG/1
1 TABLET ORAL
Qty: 30 | Refills: 0
Start: 2023-09-29 | End: 2023-10-28

## 2023-09-29 RX ORDER — BUDESONIDE, MICRONIZED 100 %
2 POWDER (GRAM) MISCELLANEOUS
Qty: 60 | Refills: 0
Start: 2023-09-29 | End: 2023-10-28

## 2023-09-29 RX ORDER — SACUBITRIL AND VALSARTAN 24; 26 MG/1; MG/1
1 TABLET, FILM COATED ORAL
Qty: 0 | Refills: 0 | DISCHARGE
Start: 2023-09-29

## 2023-09-29 RX ORDER — METOPROLOL TARTRATE 50 MG
25 TABLET ORAL ONCE
Refills: 0 | Status: COMPLETED | OUTPATIENT
Start: 2023-09-29 | End: 2023-09-29

## 2023-09-29 RX ORDER — BUDESONIDE, MICRONIZED 100 %
1 POWDER (GRAM) MISCELLANEOUS
Qty: 0 | Refills: 0 | DISCHARGE
Start: 2023-09-29

## 2023-09-29 RX ADMIN — Medication 125 MICROGRAM(S): at 05:08

## 2023-09-29 RX ADMIN — ALBUTEROL 2 PUFF(S): 90 AEROSOL, METERED ORAL at 14:56

## 2023-09-29 RX ADMIN — ALBUTEROL 2 PUFF(S): 90 AEROSOL, METERED ORAL at 19:35

## 2023-09-29 RX ADMIN — Medication 25 MILLIGRAM(S): at 05:08

## 2023-09-29 RX ADMIN — Medication 25 MILLIGRAM(S): at 17:26

## 2023-09-29 RX ADMIN — Medication 40 MILLIEQUIVALENT(S): at 11:48

## 2023-09-29 RX ADMIN — Medication 40 MILLIGRAM(S): at 05:08

## 2023-09-29 RX ADMIN — Medication 1 TABLET(S): at 17:26

## 2023-09-29 RX ADMIN — Medication 1 ENEMA: at 04:03

## 2023-09-29 RX ADMIN — Medication 0.25 MILLIGRAM(S): at 07:19

## 2023-09-29 RX ADMIN — Medication 1 TABLET(S): at 05:15

## 2023-09-29 RX ADMIN — Medication 1 TABLET(S): at 11:48

## 2023-09-29 RX ADMIN — PANTOPRAZOLE SODIUM 40 MILLIGRAM(S): 20 TABLET, DELAYED RELEASE ORAL at 05:08

## 2023-09-29 RX ADMIN — TIOTROPIUM BROMIDE 2 PUFF(S): 18 CAPSULE ORAL; RESPIRATORY (INHALATION) at 05:07

## 2023-09-29 RX ADMIN — Medication 150 MICROGRAM(S): at 05:08

## 2023-09-29 RX ADMIN — LIDOCAINE 1 PATCH: 4 CREAM TOPICAL at 11:48

## 2023-09-29 RX ADMIN — CLOPIDOGREL BISULFATE 75 MILLIGRAM(S): 75 TABLET, FILM COATED ORAL at 11:48

## 2023-09-29 NOTE — PROGRESS NOTE ADULT - PROBLEM SELECTOR PROBLEM 3
Congestive heart failure

## 2023-09-29 NOTE — PROGRESS NOTE ADULT - PROBLEM SELECTOR PLAN 9
- Continue IV Pantoprazole 40mg BID

## 2023-09-29 NOTE — PROGRESS NOTE ADULT - ASSESSMENT
78-year-old male with a PMH  bladder CA with urostomy 14 years ago, A-fib on warfarin, HTN, HLD, CAD s/p CABG + 1 cardiac stent, HFrEF, Hypothyroidism, GERD, COPD, SBO s/p ex lap of adhesions 8/2023 admitted for GI bleed.    HFmodEF s/p ICD (Medtronic), CAD s/p CABG + 1 cardiac stent (7/2023)  - Tolerating RA and non-orthopneic.  Appears euvolemic on exam  - Switch IV Lasix to PO 40 mg daily.  Monitor renal function  - Continue Entresto  Continue but switch to long-acting  - TTE showed EF 40-45%, no significant valvular pathology    - Rate-controlled Afib on tele.  Can D/C tele  - GI following for acute anemia with +FOBT.  No intervention planned for now.  No evidence of over bleed.  Hgb remains stable  - Continue to hold Coumadin for GI bleed  - Switch BB to Toprol XL 50 mg daily  - Continue home digoxin.  Obtain serum level in am     - EKG shows Afib w/ PVCs RBBB    - No acute changes on EKG compared to previous.  - No evidence of any active ischemia   - Continue Plavix, patient still within 3 month window since stent placement  - Can continue to hold ASA.  Please, resume when cleared by GI  - Continue statin    - Monitor electrolytes, replete to keep K>4 and Mag>2  - Will continue to follow    Chio Burch DNP, NP-C, AGACNP-C  Cardiology   Call TEAMS     78-year-old male with a PMH  bladder CA with urostomy 14 years ago, A-fib on warfarin, HTN, HLD, CAD s/p CABG + 1 cardiac stent, HFrEF, Hypothyroidism, GERD, COPD, SBO s/p ex lap of adhesions 8/2023 admitted for GI bleed.    HFmodEF s/p ICD (Medtronic), CAD s/p CABG + 1 cardiac stent (7/2023)  - Tolerating RA and non-orthopneic.  Appears euvolemic on exam  - Switch IV Lasix to PO 40 mg daily.  Monitor renal function  - Continue Entresto.  Continue but BB but switch to long-acting today, 9/29  - TTE showed EF 40-45%, no significant valvular pathology    - Rate-controlled Afib while on tele.   - GI following for acute anemia with +FOBT.  No intervention planned for now.  No evidence of overt bleed.  Hgb remains stable  - Continue to hold Coumadin for GI bleed, pending GI recs  - Start Toprol XL 50 mg in lieu of short-acting BB  - Continue home digoxin.  Obtain serum level, please    - EKG shows Afib w/ PVCs RBBB    - No acute changes on EKG compared to previous.  - No evidence of any active ischemia   - Continue Plavix, patient still within 3 month window since stent placement  - Can continue to hold ASA.  If Coumadin resumed, would stop ASA to avoid triple therapy  - Continue statin    - Monitor electrolytes, replete to keep K>4 and Mag>2  - Will continue to follow    Chio Burch DNP, NP-C, AGACNP-C  Cardiology   Call TEAMS

## 2023-09-29 NOTE — PROGRESS NOTE ADULT - SUBJECTIVE AND OBJECTIVE BOX
INTERVAL HPI/OVERNIGHT EVENTS:    hgb stable    MEDICATIONS  (STANDING):  albuterol    90 MICROgram(s) HFA Inhaler 2 Puff(s) Inhalation every 6 hours  atorvastatin 80 milliGRAM(s) Oral at bedtime  buDESOnide    Inhalation Suspension 0.25 milliGRAM(s) Inhalation two times a day  calcium carbonate   1250 mG (OsCal) 1 Tablet(s) Oral two times a day  clopidogrel Tablet 75 milliGRAM(s) Oral daily  digoxin     Tablet 125 MICROGram(s) Oral daily  influenza  Vaccine (HIGH DOSE) 0.7 milliLiter(s) IntraMuscular once  levothyroxine 150 MICROGram(s) Oral daily  lidocaine   4% Patch 1 Patch Transdermal daily  melatonin 5 milliGRAM(s) Oral at bedtime  metoprolol tartrate 25 milliGRAM(s) Oral two times a day  multivitamin 1 Tablet(s) Oral daily  pantoprazole  Injectable 40 milliGRAM(s) IV Push two times a day  polyethylene glycol 3350 17 Gram(s) Oral daily  sacubitril 24 mG/valsartan 26 mG 1 Tablet(s) Oral two times a day  senna 1 Tablet(s) Oral at bedtime  tiotropium 2.5 MICROgram(s) Inhaler 2 Puff(s) Inhalation daily    MEDICATIONS  (PRN):  acetaminophen     Tablet .. 650 milliGRAM(s) Oral every 6 hours PRN Temp greater or equal to 38C (100.4F), Mild Pain (1 - 3)  saline laxative (FLEET) Rectal Enema 1 Enema Rectal daily PRN for constipation      Allergies    No Known Allergies    Intolerances        Review of Systems:    General:  No wt loss, fevers, chills, night sweats, fatigue   Eyes:  Good vision, no reported pain  ENT:  No sore throat, pain, runny nose, dysphagia  CV:  No pain, palpitations, hypo/hypertension  Resp:  No dyspnea, cough, tachypnea, wheezing  GI:  No pain, No nausea, No vomiting, No diarrhea, No constipation, No weight loss, No fever, No pruritis, No rectal bleeding, No melena, No dysphagia  :  No pain, bleeding, incontinence, nocturia  Muscle:  No pain, weakness  Neuro:  No weakness, tingling, memory problems  Psych:  No fatigue, insomnia, mood problems, depression  Endocrine:  No polyuria, polydypsia, cold/heat intolerance  Heme:  No petechiae, ecchymosis, easy bruisability  Skin:  No rash, tattoos, scars, edema      Vital Signs Last 24 Hrs  T(C): 36.8 (28 Sep 2023 11:59), Max: 37.1 (27 Sep 2023 12:26)  T(F): 98.3 (28 Sep 2023 11:59), Max: 98.7 (27 Sep 2023 12:26)  HR: 84 (28 Sep 2023 11:59) (70 - 88)  BP: 137/67 (28 Sep 2023 11:59) (96/58 - 137/67)  BP(mean): --  RR: 18 (28 Sep 2023 11:59) (18 - 18)  SpO2: 96% (28 Sep 2023 11:59) (95% - 98%)    Parameters below as of 28 Sep 2023 11:59  Patient On (Oxygen Delivery Method): room air        PHYSICAL EXAM:    Constitutional: NAD  HEENT: EOMI, throat clear  Neck: No LAD, supple  Respiratory: CTA and P  Cardiovascular: S1 and S2, RRR, no M  Gastrointestinal: BS+, soft, NT/ND, neg HSM,  Extremities: No peripheral edema, neg clubbing, cyanosis  Vascular: 2+ peripheral pulses  Neurological: A/O x 3, no focal deficits  Psychiatric: Normal mood, normal affect  Skin: No rashes      LABS:                        8.3    7.15  )-----------( 278      ( 28 Sep 2023 07:38 )             27.3     09-28    145  |  105  |  24<H>  ----------------------------<  104<H>  3.4<L>   |  35<H>  |  0.98    Ca    7.9<L>      28 Sep 2023 07:38    TPro  6.0  /  Alb  1.9<L>  /  TBili  0.5  /  DBili  x   /  AST  23  /  ALT  18  /  AlkPhos  88  09-27    PT/INR - ( 28 Sep 2023 07:38 )   PT: 13.2 sec;   INR: 1.13 ratio         PTT - ( 28 Sep 2023 07:38 )  PTT:26.9 sec  Urinalysis Basic - ( 28 Sep 2023 07:38 )    Color: x / Appearance: x / SG: x / pH: x  Gluc: 104 mg/dL / Ketone: x  / Bili: x / Urobili: x   Blood: x / Protein: x / Nitrite: x   Leuk Esterase: x / RBC: x / WBC x   Sq Epi: x / Non Sq Epi: x / Bacteria: x        RADIOLOGY & ADDITIONAL TESTS:

## 2023-09-29 NOTE — PROGRESS NOTE ADULT - PROBLEM SELECTOR PLAN 2
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 No evidence of left lower extremity deep venous thrombosis  - Continue Tylenol PRN for pain   - Elevate LLE  - Continue lidocaine pain patch  - Finding of Brennan Cyst does not require in house ortho management or consultation. Can be followed up outpatient with orthopaedics (Dr. Clark) if persistent knee pain continues past current admission.

## 2023-09-29 NOTE — PROGRESS NOTE ADULT - SUBJECTIVE AND OBJECTIVE BOX
Patient is a 78y old  Male who presents with a chief complaint of GI Bleed (29 Sep 2023 10:34)      TELE:     INTERVAL HPI/OVERNIGHT EVENTS: Overnight vitals were stable (HR 82, /53, 98%). Patient reports having a nonbloody bowel movement overnight. Patient was placed on nasal canula for comfort. Pt seen and examined at the bedside this AM. Patient reports that he is doing about the same as yesterday.    MEDICATIONS  (STANDING):  albuterol    90 MICROgram(s) HFA Inhaler 2 Puff(s) Inhalation every 6 hours  atorvastatin 80 milliGRAM(s) Oral at bedtime  buDESOnide    Inhalation Suspension 0.25 milliGRAM(s) Inhalation two times a day  calcium carbonate   1250 mG (OsCal) 1 Tablet(s) Oral two times a day  clopidogrel Tablet 75 milliGRAM(s) Oral daily  digoxin     Tablet 125 MICROGram(s) Oral daily  influenza  Vaccine (HIGH DOSE) 0.7 milliLiter(s) IntraMuscular once  levothyroxine 150 MICROGram(s) Oral daily  lidocaine   4% Patch 1 Patch Transdermal daily  melatonin 5 milliGRAM(s) Oral at bedtime  metoprolol tartrate 25 milliGRAM(s) Oral once  multivitamin 1 Tablet(s) Oral daily  pantoprazole  Injectable 40 milliGRAM(s) IV Push two times a day  polyethylene glycol 3350 17 Gram(s) Oral daily  sacubitril 24 mG/valsartan 26 mG 1 Tablet(s) Oral two times a day  senna 1 Tablet(s) Oral at bedtime  tiotropium 2.5 MICROgram(s) Inhaler 2 Puff(s) Inhalation daily    MEDICATIONS  (PRN):  acetaminophen     Tablet .. 650 milliGRAM(s) Oral every 6 hours PRN Temp greater or equal to 38C (100.4F), Mild Pain (1 - 3)  saline laxative (FLEET) Rectal Enema 1 Enema Rectal daily PRN for constipation      Allergies    No Known Allergies    Intolerances        REVIEW OF SYSTEMS:  CONSTITUTIONAL: No fever or chills  HEENT:  No headache, no sore throat  RESPIRATORY: presence of cough, denies shortness of breath  CARDIOVASCULAR: No chest pain, palpitations  GASTROINTESTINAL: No abd pain, nausea, vomiting, or diarrhea  GENITOURINARY: No dysuria, frequency, or hematuria  NEUROLOGICAL: no focal weakness or dizziness  MUSCULOSKELETAL: LLE pain from ruptured bakers cyst    Vital Signs Last 24 Hrs  T(C): 36.7 (29 Sep 2023 05:00), Max: 36.7 (28 Sep 2023 20:01)  T(F): 98.1 (29 Sep 2023 05:00), Max: 98.1 (29 Sep 2023 05:00)  HR: 82 (29 Sep 2023 07:19) (78 - 83)  BP: 104/53 (29 Sep 2023 05:00) (104/53 - 131/64)  BP(mean): --  RR: 18 (29 Sep 2023 05:00) (18 - 18)  SpO2: 96% (29 Sep 2023 07:19) (95% - 98%)    Parameters below as of 29 Sep 2023 07:19  Patient On (Oxygen Delivery Method): room air        PHYSICAL EXAM:  GENERAL: NAD  HEENT:  anicteric, moist mucous membranes  CHEST/LUNG:  wheezes present in the upper lung fields bilaterally, no rales or ronchi  HEART:  IRRR, S1, S2  ABDOMEN:  soft, nontender, nondistended  EXTREMITIES: presence of edema in LLE and LUE, no cyanosis  NERVOUS SYSTEM: answers questions and follows commands appropriately    LABS:                        8.4    8.38  )-----------( 286      ( 29 Sep 2023 06:33 )             28.4     CBC Full  -  ( 29 Sep 2023 06:33 )  WBC Count : 8.38 K/uL  Hemoglobin : 8.4 g/dL  Hematocrit : 28.4 %  Platelet Count - Automated : 286 K/uL  Mean Cell Volume : 91.9 fl  Mean Cell Hemoglobin : 27.2 pg  Mean Cell Hemoglobin Concentration : 29.6 gm/dL  Auto Neutrophil # : 5.58 K/uL  Auto Lymphocyte # : 1.22 K/uL  Auto Monocyte # : 0.93 K/uL  Auto Eosinophil # : 0.50 K/uL  Auto Basophil # : 0.04 K/uL  Auto Neutrophil % : 66.5 %  Auto Lymphocyte % : 14.6 %  Auto Monocyte % : 11.1 %  Auto Eosinophil % : 6.0 %  Auto Basophil % : 0.5 %    29 Sep 2023 06:33    145    |  103    |  27     ----------------------------<  113    3.6     |  37     |  1.10     Ca    8.0        29 Sep 2023 06:33      PT/INR - ( 29 Sep 2023 06:33 )   PT: 12.8 sec;   INR: 1.10 ratio         PTT - ( 29 Sep 2023 06:33 )  PTT:26.1 sec  Urinalysis Basic - ( 29 Sep 2023 06:33 )    Color: x / Appearance: x / SG: x / pH: x  Gluc: 113 mg/dL / Ketone: x  / Bili: x / Urobili: x   Blood: x / Protein: x / Nitrite: x   Leuk Esterase: x / RBC: x / WBC x   Sq Epi: x / Non Sq Epi: x / Bacteria: x      CAPILLARY BLOOD GLUCOSE            Culture - Blood (collected 09-25-23 @ 21:00)  Source: .Blood Blood-Peripheral  Preliminary Report (09-29-23 @ 01:02):    No growth at 72 Hours    Culture - Blood (collected 09-25-23 @ 20:50)  Source: .Blood Blood-Peripheral  Preliminary Report (09-29-23 @ 01:02):    No growth at 72 Hours        RADIOLOGY & ADDITIONAL TESTS:  Personally reviewed.     Consultant(s) Notes Reviewed:  [x] YES  [ ] NO

## 2023-09-29 NOTE — DISCHARGE NOTE NURSING/CASE MANAGEMENT/SOCIAL WORK - PATIENT PORTAL LINK FT
You can access the FollowMyHealth Patient Portal offered by [facility] by registering at the following website: [email or web address]. By joining [facility]’s FollowMyHealth portal, you will also be able to view your health information using other applications (apps) compatible with our system.

## 2023-09-29 NOTE — PROGRESS NOTE ADULT - PROBLEM SELECTOR PLAN 1
Patient sent from [facility] with a H/H 6.7/22  - CT Ab/pelvis: No active GI bleeding is evident. No evidence of interval hemorrhage compared to 9/4/2023 to explain the patient's low hemoglobin. Chronic hematoma noted  - FOBT +  - Adv diet as tolerated  - Blood type A+  - Repeat Type and Screen every 72hrs   - Continue IV Protonix 40mg BID   - Transfuse if Hgb <8 given cardiac history  - F/u AM CBC  - Transfuse 1U pRBC on 9/26; Last transfusion 1U pRBC on 9/25   - Hold home Coumadin   - GI Dr. Coker consulted, f/u recs  - Surgery Nabil Castillo consulted, f/u recs

## 2023-09-29 NOTE — PROGRESS NOTE ADULT - PROBLEM SELECTOR PLAN 11
CT Ab/pelvis: hematoma along the medial inferior aspect of the urostomy in the right of midline anterior abdominal wall subcutaneous fat has organized and now has appearance of a chronic hematoma or seroma.   - Surgery Dr. Castillo consulted, recs as followed   - No emergent surgical intervention at this time, will follow

## 2023-09-29 NOTE — PROGRESS NOTE ADULT - NS ATTEND AMEND GEN_ALL_CORE FT
78-year-old male with a PMH  bladder CA with urostomy 14 years ago, A-fib on warfarin, HTN, HLD, CAD s/p CABG + 1 cardiac stent, HFrEF, Hypothyroidism, GERD, COPD, SBO s/p ex lap of adhesions 8/2023 admitted for GI bleed.    no acute ischemia  known cad s/p remote cabg and more recent pci of occluded lcx, 7/2023  has been on plavix and ac, on Coumadin for AF  recent very complicated hospitalization with sbo, multiple surgeries and gib, not comprehensively evaluated at that time  known mild lv dysfxn, and is edematous on exam, likely in part from fluids and blood given during his last hospital stay  adm with severe anemia, getting blood  appears vol ol. lasix 40mg IV   Please continue to maintain strict I/Os, monitor daily weights, Cr, and K.   can continue HF meds as tolerated, will need Lopressor switched to XL  cont plavix  ideally would be on DAPT for his PCI done in July, but will hold agents beyond plavix severe anemia/GIB, though H+H is slowly stabilizing.
78-year-old male with a PMH  bladder CA with urostomy 14 years ago, A-fib on warfarin, HTN, HLD, CAD s/p CABG + 1 cardiac stent, HFrEF, Hypothyroidism, GERD, COPD, SBO s/p ex lap of adhesions 8/2023 admitted for GI bleed.    no acute ischemia  known cad s/p remote cabg and more recent pci of occluded lcx, 7/2023  has been on plavix and ac, on Coumadin for AF  recent very complicated hospitalization with sbo, multiple surgeries and gib, not comprehensively evaluated at that time  known mild lv dysfxn, and is edematous on exam, likely in part from fluids and blood given during his last hospital stay  adm with severe anemia, getting blood  BP stable, would diurese with 40mg IV Lasix  can continue HF meds as tolerated, will need Lopressor switched to XL  cont plavix  ideally would be on DAPT for his PCI done in July, but will hold agents beyond plavix severe anemia/GIB, though H+H is slowly stabilizing.
78-year-old male with a PMH  bladder CA with urostomy 14 years ago, A-fib on warfarin, HTN, HLD, CAD s/p CABG + 1 cardiac stent, HFrEF, Hypothyroidism, GERD, COPD, SBO s/p ex lap of adhesions 8/2023 admitted for GI bleed.    no acute ischemia  known cad s/p remote cabg and more recent pci of occluded lcx, 7/2023  has been on plavix and ac, on Coumadin for AF  recent very complicated hospitalization with sbo, multiple surgeries and gib  known mild lv dysfxn, and is edematous on exam, likely in part from fluids and blood given during his last hospital stay  adm with severe anemia, getting blood  can change iv lasix to po  Please continue to maintain strict I/Os, monitor daily weights, Cr, and K.   can continue HF meds as tolerated, will need Lopressor switched to XL  cont plavix  ideally would be on DAPT for his PCI done in July, but will hold agents beyond plavix severe anemia/GIB, though H+H is slowly stabilizing.

## 2023-09-29 NOTE — PROGRESS NOTE ADULT - PROVIDER SPECIALTY LIST ADULT
Gastroenterology
Surgery
Cardiology
Cardiology
Gastroenterology
Surgery
Surgery
Cardiology
Gastroenterology
Hospitalist
Orthopedics
Hospitalist

## 2023-09-29 NOTE — PROGRESS NOTE ADULT - PROBLEM SELECTOR PLAN 3
Patients has a hx of CHF   - BNP 2703  - Continue home Lasix 40mg daily   - TTE 06/2021 showed EF 20-25%  - TTE 09/2023 showed EF 40-45%  - Lopressor changed to Toprol XL   - Continue home Entresto BID   - FU AM CMP, Mg, Phos, A1c   - Frequent reassessment of volume status while receiving blood products   - Cardiology Dr. Chu consulted, f/u recs  - Fluid Restriction recommended. Start Clear Diet

## 2023-09-29 NOTE — PROGRESS NOTE ADULT - PROBLEM SELECTOR PLAN 4
Patient has a hx of Afib on Coumadin 5mg daily   - EKbpm, afib, RBBB, QTc 482  - Digoxin serum level 0.7  - Continue home Digoxin 125mcg daily  - Lopressor changed to Toprol XL  - Hold Coumadin due to +FOBT  - Cardio Dr. Chu consulted, f/u recs
Patient has a hx of Afib on Coumadin 5mg daily   - EKbpm, afib, RBBB, QTc 482  - Digoxin serum level 0.7  - Continue home Digoxin 125mcg daily  - Continue home Metoprolol 25mg daily  - Hold Coumadin due to +FOBT  - Cardio Dr. Chu consulted, f/u recs

## 2023-09-29 NOTE — DISCHARGE NOTE NURSING/CASE MANAGEMENT/SOCIAL WORK - NSDCPEFALRISK_GEN_ALL_CORE
For information on Fall & Injury Prevention, visit: [email or web address] OR  [email or web address] OR  https://www.cdc.gov/steadi/patient.html

## 2023-09-29 NOTE — PROGRESS NOTE ADULT - REASON FOR ADMISSION
GI Bleed

## 2023-09-29 NOTE — PROGRESS NOTE ADULT - PROBLEM SELECTOR PLAN 6
BP on admission was 110/54  - Lopressor changed to Toprol XL  - Continue home Entresto BID  - Continue home Lasix 40mg daily  - Monitor BP
BP on admission was 110/54  - Continue home Metoprolol 25mg daily  - Continue home Entresto BID  - Continue home Lasix 40mg daily  - Monitor BP

## 2023-09-29 NOTE — SOCIAL WORK PROGRESS NOTE - NSSWPROGRESSNOTE_GEN_ALL_CORE
Zaida was faxed to [facility] rehab today. Facility has medically accepted pt and a bed is available for today. Sw has arranged for ambulanz for 4:00 pm . Pt and family are aware and agreeable to plan. Sw will remain available. All paperwork completed and will be sent with pt today including screen. Sw will remain available.

## 2023-09-29 NOTE — PROGRESS NOTE ADULT - PROBLEM SELECTOR PROBLEM 11
Abnormal finding on imaging

## 2023-09-29 NOTE — CASE MANAGEMENT PROGRESS NOTE - NSCMPROGRESSNOTE_GEN_ALL_CORE
Patient is identified as a CMS STAR patient. SW following for transition planning to [facility] today and will provide TCM yellow contact card and advise the facility of the need for a follow up appointment w/in 7 days.

## 2023-09-29 NOTE — PROGRESS NOTE ADULT - PROBLEM SELECTOR PLAN 7
- Continue Atorvastatin 80mg daily  - F/u AM lipid panel

## 2023-09-29 NOTE — PROGRESS NOTE ADULT - SUBJECTIVE AND OBJECTIVE BOX
[facility] -- Jessica Ellis, Milka, Kimberley Mars, , Presley Fitch  Office # [phone]    Follow Up:   Hx Afib, LE edema, HFmodRF    Subjective/Observations: Awake and alert, non-orthopneic on RA.  Denies any form of respiratory or cardiac discomfort.  No overnight events    REVIEW OF SYSTEMS: All other review of systems is negative unless indicated above  PAST MEDICAL & SURGICAL HISTORY:  Atrial fibrillation  Hypothyroid  Hypertension  Bladder cancer  Bronchitis  Former smoker  CAD (coronary artery disease)  History of bladder surgery  on urostomy  S/P CABG x 3  History of automatic internal cardiac defibrillator (AICD)  H/O knee surgery  History of hernia repair  Small bowel obstruction    MEDICATIONS  (STANDING):  albuterol    90 MICROgram(s) HFA Inhaler 2 Puff(s) Inhalation every 6 hours  atorvastatin 80 milliGRAM(s) Oral at bedtime  buDESOnide    Inhalation Suspension 0.25 milliGRAM(s) Inhalation two times a day  calcium carbonate   1250 mG (OsCal) 1 Tablet(s) Oral two times a day  clopidogrel Tablet 75 milliGRAM(s) Oral daily  digoxin     Tablet 125 MICROGram(s) Oral daily  furosemide   Injectable 40 milliGRAM(s) IV Push daily  influenza  Vaccine (HIGH DOSE) 0.7 milliLiter(s) IntraMuscular once  levothyroxine 150 MICROGram(s) Oral daily  lidocaine   4% Patch 1 Patch Transdermal daily  melatonin 5 milliGRAM(s) Oral at bedtime  metoprolol tartrate 25 milliGRAM(s) Oral two times a day  multivitamin 1 Tablet(s) Oral daily  pantoprazole  Injectable 40 milliGRAM(s) IV Push two times a day  polyethylene glycol 3350 17 Gram(s) Oral daily  sacubitril 24 mG/valsartan 26 mG 1 Tablet(s) Oral two times a day  senna 1 Tablet(s) Oral at bedtime  tiotropium 2.5 MICROgram(s) Inhaler 2 Puff(s) Inhalation daily    MEDICATIONS  (PRN):  acetaminophen     Tablet .. 650 milliGRAM(s) Oral every 6 hours PRN Temp greater or equal to 38C (100.4F), Mild Pain (1 - 3)  saline laxative (FLEET) Rectal Enema 1 Enema Rectal daily PRN for constipation    Allergies    No Known Allergies    Intolerances    Vital Signs Last 24 Hrs  T(C): 36.7 (29 Sep 2023 05:00), Max: 36.8 (28 Sep 2023 11:59)  T(F): 98.1 (29 Sep 2023 05:00), Max: 98.3 (28 Sep 2023 11:59)  HR: 82 (29 Sep 2023 07:19) (78 - 84)  BP: 104/53 (29 Sep 2023 05:00) (104/53 - 137/67)  BP(mean): --  RR: 18 (29 Sep 2023 05:00) (18 - 18)  SpO2: 96% (29 Sep 2023 07:19) (95% - 98%)    Parameters below as of 29 Sep 2023 07:19  Patient On (Oxygen Delivery Method): room air    I&O's Summary    28 Sep 2023 07:01  -  29 Sep 2023 07:00  --------------------------------------------------------  IN: 0 mL / OUT: 2475 mL / NET: -2475 mL      PHYSICAL EXAM:  TELE: Afib  Constitutional: NAD, awake and alert, obese  HEENT: Moist Mucous Membranes, Anicteric  Pulmonary: Non-labored, breath sounds are clear bilaterally, No wheezing, rales or rhonchi  Cardiovascular: IRRR, S1 and S2, No murmurs, rubs, gallops or clicks  Gastrointestinal: Bowel Sounds present, soft, nontender.   Lymph: Trace BLE edema. No lymphadenopathy.  Skin: No visible rashes or ulcers.  Psych:  Mood & affect appropriate  LABS: All Labs Reviewed:                        8.4    8.38  )-----------( 286      ( 29 Sep 2023 06:33 )             28.4                         8.3    7.15  )-----------( 278      ( 28 Sep 2023 07:38 )             27.3                         8.6    7.74  )-----------( 259      ( 27 Sep 2023 07:08 )             27.7     29 Sep 2023 06:33    145    |  103    |  27     ----------------------------<  113    3.6     |  37     |  1.10   28 Sep 2023 07:38    145    |  105    |  24     ----------------------------<  104    3.4     |  35     |  0.98   27 Sep 2023 07:08    143    |  103    |  29     ----------------------------<  114    3.9     |  36     |  1.30     Ca    8.0        29 Sep 2023 06:33  Ca    7.9        28 Sep 2023 07:38  Ca    8.1        27 Sep 2023 07:08    TPro  6.0    /  Alb  1.9    /  TBili  0.5    /  DBili  x      /  AST  23     /  ALT  18     /  AlkPhos  88     27 Sep 2023 07:08    PT/INR - ( 29 Sep 2023 06:33 )   PT: 12.8 sec;   INR: 1.10 ratio         PTT - ( 29 Sep 2023 06:33 )  PTT:26.1 sec    12 Lead ECG:   Ventricular Rate 100 BPM    Atrial Rate 120 BPM    QRS Duration 130 ms    Q-T Interval 374 ms    QTC Calculation(Bazett) 482 ms    R Axis 94 degrees    T Axis -26 degrees    Diagnosis Line Atrial fibrillation with premature ventricular or aberrantly conducted complexes  Right bundle branch block  T wave abnormality, consider inferior ischemia  Abnormal ECG  When compared with ECG of 22-AUG-2023 10:51,  T wave inversion no longer evident in Anterior leads  Nonspecific T wave abnormality, worse in Lateral leads  Confirmed by Justin Lopez MD ([id number]) on 9/26/2023 1:18:27 PM (09-25-23 @ 19:06)    TRANSTHORACIC ECHOCARDIOGRAM REPORT  ________________________________________________________________________________                                      _______  Pt. Name:       MARÍA ELENA BEAR Study Date:    9/28/2023  MRN:            [id number]            YOB: 1945  Accession #:    [id number]           Age:           78 years  Account#:       [id number]          Gender:        M  Heart Rate:                         Height:        70.08 in (178.00 cm)  Rhythm:                 Weight:        264.55 lb (120.00 kg)  Blood Pressure: 114/67 mmHg         BSA/BMI:       2.35 m² / 37.87 kg/m²  ________________________________________________________________________________________  Referring Physician:    [id number] Shira Regan  Interpreting Physician: Lilo Sherwood MD  Primary Sonographer:    Lyric Vásquez RDCS    CPT:               ECHO TTE WO CON COMP W DOPP - 00468.m  Indication(s):     Heart failure, unspecified - I50.9  Procedure:         Transthoracic echocardiogram with 2-D, M-mode and complete                     spectral and color flow Doppler.  Ordering Location: [facility]  Study Information: Image quality for this study is technically difficult.______________________________________________________________________________________     CONCLUSIONS:      1. Technically difficult image quality.   2. Left ventricular cavity is normally sized. Left ventricular systolic function is mildly decreased with an ejection fraction visually estimated at 40 to 45 %.   3. [place] is abnormal.   4. Normal right ventricular cavity size and probably normal systolic function.   5. Device lead is visualized in the right heart.   6. The left atrium is mildly dilated in size.   7. The right atrium is normal in size.   8. The aortic valve is not well visualized but appears calcified.   9. There is mild calcification of the mitral valve annulus.  10. Mild mitral regurgitation.  11. Tricuspid valve was not well visualized.  12. Structurally normal pulmonic valve with normal leaflet excursion.  _______________________________________________________________________________________  FINDINGS:     Left Ventricle:  The left ventricular cavity is normally sized. Left ventricular systolic function is mildly decreased with an ejection fraction visually estimated at 40 to 45%.  LV Wall Scoring: The apex is hypokinetic.      Right Ventricle:  The right ventricular cavity is normal in size and probably normal systolic function. A device lead is visualized in the right heart.     Left Atrium:  The left atrium is mildly dilated in size.     Right Atrium:  The right atrium is normal in size.     Aortic Valve:  The aortic valve is not well visualized but appears calcified.     Mitral Valve:  Thereis mild calcification of the mitral valve annulus. There is mild mitral regurgitation.     Tricuspid Valve:  The tricuspid valve was not well visualized.     Pulmonic Valve:  Structurally normal pulmonic valve with normal leaflet excursion.     Pericardium:  No pericardial effusion seen.  ____________________________________________________________________  Quantitative Data:  Left Ventricle Measurements: (Indexed to BSA)     IVSd (2D):   1.1 cm  LVPWd (2D):  1.2 cm  LVIDd (2D):  4.9 cm  LVIDs (2D):  3.3 cm  LV Mass:     219 g  93.2 g/m²  Visualized LV EF%: 40 to 45%     MV E Vmax:   1.02 m/s  e' medial:   11.10 cm/s  E/e' medial: 9.19  MV DT:       204 msec    Aorta Measurements: (normal range) (Indexed to BSA)     Sinuses of Valsalva: 2.90 cm (3.1 - 3.7 cm)    Left Atrium Measurements: (Indexed to BSA)  LA Diam 2D: 4.10 cm    LVOT / RVOT/ Qp/Qs Data: (Indexed to BSA)  LVOT Vmax: 0.88 m/s  LVOT VTI:  15.80 cm    Aortic Valve Measurements:  AV Vmax:          2.0 m/s  AV Peak Gradient: 16.0 mmHg  AV Mean Gradient: 8.0 mmHg  AV VTI:           33.9 cm  AV VTI Ratio:     0.47    Mitral Valve Measurements:     MV E Vmax: 1.0 m/s     Tricuspid Valve Measurements:     TR Vmax:          2.1 m/s  TR Peak Gradient: 17.6 mmHg    ________________________________________________________________________________________  Electronically signed on 9/29/2023 at 7:53:25 AM by Lilo Sherwood MD    *** Final ***

## 2023-09-29 NOTE — PROGRESS NOTE ADULT - PROBLEM SELECTOR PROBLEM 2
Ruptured Bakers cyst

## 2023-10-05 ENCOUNTER — TRANSCRIPTION ENCOUNTER (OUTPATIENT)
Age: 78
End: 2023-10-05

## 2023-10-17 ENCOUNTER — NON-APPOINTMENT (OUTPATIENT)
Age: 78
End: 2023-10-17

## 2023-10-17 ENCOUNTER — APPOINTMENT (OUTPATIENT)
Dept: ELECTROPHYSIOLOGY | Facility: CLINIC | Age: 78
End: 2023-10-17
Payer: MEDICARE

## 2023-10-17 PROCEDURE — 93296 REM INTERROG EVL PM/IDS: CPT

## 2023-10-17 PROCEDURE — 93295 DEV INTERROG REMOTE 1/2/MLT: CPT

## 2023-10-24 ENCOUNTER — APPOINTMENT (OUTPATIENT)
Dept: CARDIOLOGY | Facility: CLINIC | Age: 78
End: 2023-10-24

## 2023-10-25 ENCOUNTER — NON-APPOINTMENT (OUTPATIENT)
Age: 78
End: 2023-10-25

## 2023-11-13 PROCEDURE — 85379 FIBRIN DEGRADATION QUANT: CPT

## 2023-11-13 PROCEDURE — 86923 COMPATIBILITY TEST ELECTRIC: CPT

## 2023-11-13 PROCEDURE — 99291 CRITICAL CARE FIRST HOUR: CPT | Mod: 25

## 2023-11-13 PROCEDURE — 83605 ASSAY OF LACTIC ACID: CPT

## 2023-11-13 PROCEDURE — 80162 ASSAY OF DIGOXIN TOTAL: CPT

## 2023-11-13 PROCEDURE — 85025 COMPLETE CBC W/AUTO DIFF WBC: CPT

## 2023-11-13 PROCEDURE — 80061 LIPID PANEL: CPT

## 2023-11-13 PROCEDURE — 97112 NEUROMUSCULAR REEDUCATION: CPT

## 2023-11-13 PROCEDURE — 93005 ELECTROCARDIOGRAM TRACING: CPT

## 2023-11-13 PROCEDURE — 85610 PROTHROMBIN TIME: CPT

## 2023-11-13 PROCEDURE — 87637 SARSCOV2&INF A&B&RSV AMP PRB: CPT

## 2023-11-13 PROCEDURE — 83735 ASSAY OF MAGNESIUM: CPT

## 2023-11-13 PROCEDURE — 71045 X-RAY EXAM CHEST 1 VIEW: CPT

## 2023-11-13 PROCEDURE — 96375 TX/PRO/DX INJ NEW DRUG ADDON: CPT

## 2023-11-13 PROCEDURE — P9016: CPT

## 2023-11-13 PROCEDURE — 96365 THER/PROPH/DIAG IV INF INIT: CPT

## 2023-11-13 PROCEDURE — 85027 COMPLETE CBC AUTOMATED: CPT

## 2023-11-13 PROCEDURE — 84436 ASSAY OF TOTAL THYROXINE: CPT

## 2023-11-13 PROCEDURE — 36415 COLL VENOUS BLD VENIPUNCTURE: CPT

## 2023-11-13 PROCEDURE — 80053 COMPREHEN METABOLIC PANEL: CPT

## 2023-11-13 PROCEDURE — 94640 AIRWAY INHALATION TREATMENT: CPT

## 2023-11-13 PROCEDURE — 80048 BASIC METABOLIC PNL TOTAL CA: CPT

## 2023-11-13 PROCEDURE — 85730 THROMBOPLASTIN TIME PARTIAL: CPT

## 2023-11-13 PROCEDURE — 83036 HEMOGLOBIN GLYCOSYLATED A1C: CPT

## 2023-11-13 PROCEDURE — 84443 ASSAY THYROID STIM HORMONE: CPT

## 2023-11-13 PROCEDURE — 84484 ASSAY OF TROPONIN QUANT: CPT

## 2023-11-13 PROCEDURE — 93971 EXTREMITY STUDY: CPT

## 2023-11-13 PROCEDURE — 83880 ASSAY OF NATRIURETIC PEPTIDE: CPT

## 2023-11-13 PROCEDURE — 87040 BLOOD CULTURE FOR BACTERIA: CPT

## 2023-11-13 PROCEDURE — 86900 BLOOD TYPING SEROLOGIC ABO: CPT

## 2023-11-13 PROCEDURE — 82803 BLOOD GASES ANY COMBINATION: CPT

## 2023-11-13 PROCEDURE — 93306 TTE W/DOPPLER COMPLETE: CPT

## 2023-11-13 PROCEDURE — 86901 BLOOD TYPING SEROLOGIC RH(D): CPT

## 2023-11-13 PROCEDURE — 71250 CT THORAX DX C-: CPT | Mod: MA

## 2023-11-13 PROCEDURE — 97162 PT EVAL MOD COMPLEX 30 MIN: CPT

## 2023-11-13 PROCEDURE — 81001 URINALYSIS AUTO W/SCOPE: CPT

## 2023-11-13 PROCEDURE — 97110 THERAPEUTIC EXERCISES: CPT

## 2023-11-13 PROCEDURE — 74176 CT ABD & PELVIS W/O CONTRAST: CPT | Mod: MA

## 2023-11-13 PROCEDURE — 36430 TRANSFUSION BLD/BLD COMPNT: CPT

## 2023-11-13 PROCEDURE — 84439 ASSAY OF FREE THYROXINE: CPT

## 2023-11-13 PROCEDURE — 99285 EMERGENCY DEPT VISIT HI MDM: CPT | Mod: 25

## 2023-11-13 PROCEDURE — 82272 OCCULT BLD FECES 1-3 TESTS: CPT

## 2023-11-13 PROCEDURE — 82553 CREATINE MB FRACTION: CPT

## 2023-11-13 PROCEDURE — 83690 ASSAY OF LIPASE: CPT

## 2023-11-13 PROCEDURE — 74177 CT ABD & PELVIS W/CONTRAST: CPT | Mod: MA

## 2023-11-13 PROCEDURE — 84480 ASSAY TRIIODOTHYRONINE (T3): CPT

## 2023-11-13 PROCEDURE — 97116 GAIT TRAINING THERAPY: CPT

## 2023-11-13 PROCEDURE — 84100 ASSAY OF PHOSPHORUS: CPT

## 2023-11-13 PROCEDURE — 86850 RBC ANTIBODY SCREEN: CPT

## 2023-11-13 PROCEDURE — 94760 N-INVAS EAR/PLS OXIMETRY 1: CPT

## 2023-11-15 ENCOUNTER — NON-APPOINTMENT (OUTPATIENT)
Age: 78
End: 2023-11-15

## 2023-11-16 LAB — INR PPP: 2

## 2023-11-30 LAB — INR PPP: 1.5

## 2023-12-08 ENCOUNTER — NON-APPOINTMENT (OUTPATIENT)
Age: 78
End: 2023-12-08

## 2023-12-08 ENCOUNTER — APPOINTMENT (OUTPATIENT)
Dept: CARDIOLOGY | Facility: CLINIC | Age: 78
End: 2023-12-08
Payer: MEDICARE

## 2023-12-08 VITALS
SYSTOLIC BLOOD PRESSURE: 110 MMHG | DIASTOLIC BLOOD PRESSURE: 72 MMHG | BODY MASS INDEX: 30.06 KG/M2 | WEIGHT: 210 LBS | OXYGEN SATURATION: 97 % | HEIGHT: 70 IN | HEART RATE: 105 BPM

## 2023-12-08 DIAGNOSIS — I50.20 UNSPECIFIED SYSTOLIC (CONGESTIVE) HEART FAILURE: ICD-10-CM

## 2023-12-08 DIAGNOSIS — I42.9 CARDIOMYOPATHY, UNSPECIFIED: ICD-10-CM

## 2023-12-08 DIAGNOSIS — I48.20 CHRONIC ATRIAL FIBRILLATION, UNSP: ICD-10-CM

## 2023-12-08 DIAGNOSIS — I25.10 ATHEROSCLEROTIC HEART DISEASE OF NATIVE CORONARY ARTERY W/OUT ANGINA PECTORIS: ICD-10-CM

## 2023-12-08 DIAGNOSIS — E78.00 PURE HYPERCHOLESTEROLEMIA, UNSPECIFIED: ICD-10-CM

## 2023-12-08 PROCEDURE — 93000 ELECTROCARDIOGRAM COMPLETE: CPT

## 2023-12-08 PROCEDURE — 99214 OFFICE O/P EST MOD 30 MIN: CPT

## 2023-12-18 RX ORDER — APIXABAN 5 MG/1
5 TABLET, FILM COATED ORAL
Qty: 180 | Refills: 0 | Status: ACTIVE | COMMUNITY
Start: 2023-12-18

## 2024-01-05 ENCOUNTER — APPOINTMENT (OUTPATIENT)
Dept: CARDIOLOGY | Facility: CLINIC | Age: 79
End: 2024-01-05
Payer: MEDICARE

## 2024-01-05 VITALS
WEIGHT: 210 LBS | BODY MASS INDEX: 30.06 KG/M2 | OXYGEN SATURATION: 97 % | HEART RATE: 110 BPM | HEIGHT: 70 IN | DIASTOLIC BLOOD PRESSURE: 66 MMHG | SYSTOLIC BLOOD PRESSURE: 137 MMHG

## 2024-01-05 DIAGNOSIS — Z87.891 PERSONAL HISTORY OF NICOTINE DEPENDENCE: ICD-10-CM

## 2024-01-05 DIAGNOSIS — S91.309A UNSPECIFIED OPEN WOUND, UNSPECIFIED FOOT, INITIAL ENCOUNTER: ICD-10-CM

## 2024-01-05 PROCEDURE — 99214 OFFICE O/P EST MOD 30 MIN: CPT

## 2024-01-05 NOTE — PHYSICAL EXAM
[Heart Rate And Rhythm] : heart rate and rhythm were normal [Heart Sounds] : normal S1 and S2 [] : no respiratory distress [Respiration, Rhythm And Depth] : normal respiratory rhythm and effort [Auscultation Breath Sounds / Voice Sounds] : lungs were clear to auscultation bilaterally [Bowel Sounds] : normal bowel sounds [Abdomen Soft] : soft [Abdomen Tenderness] : non-tender [Oriented To Time, Place, And Person] : oriented to person, place, and time [FreeTextEntry1] : see above

## 2024-01-05 NOTE — ASSESSMENT
[FreeTextEntry1] : Assessment: 1. 1.5 cm x 1.5 circular L lateral malleolus ulcer non-healing since September 2023 2. LE edema 3. History of Afib 4. HFrEF 5. CAD s/p CABG / PCI  Plan: 1. Keep leg wrapped and continue local compression. 2. Loud audible DP and PT bilaterally. 3. Obtain RACHEAL and LE arterial duplex. 4. If no notable PAD and wound is healing than plan for medical management. 5. If notable PAD and wound remains non-healing, then will discuss regarding protentional peripheral angiogram.  6. Continue excellent care with Dr. Mead.

## 2024-01-05 NOTE — REASON FOR VISIT
[FreeTextEntry1] : 1/5/2024  77 y/o M with PMHX Afib previously on Coumadin, now on Eliquis, HTN, HLD, Bladder CA s/p urostomy, HTN, HLD, CAD s/p CABG, s/p PCI, HFrEF s/p ICD, COPD, Hypothyroidism, SBO s/p ex-lap of adhesions, history of GI bleeding, who has a 1.5 cm x 1.5 circular L lateral malleolus ulcer non-healing since September 2023 and also reports LE edema.

## 2024-01-08 ENCOUNTER — APPOINTMENT (OUTPATIENT)
Dept: ULTRASOUND IMAGING | Facility: CLINIC | Age: 79
End: 2024-01-08
Payer: MEDICARE

## 2024-01-08 PROCEDURE — 93925 LOWER EXTREMITY STUDY: CPT

## 2024-01-11 NOTE — DISCHARGE NOTE PROVIDER - REASON FOR ADMISSION
Writer messaged Dr. Borges about switching pt vimpat to IVPB since she is not awake enough to take pills. Okay to make IVPB. Writer also messaged Elisabet Gutierrez NP about other PO medications. Pt oral meds can be held for now until awake.    sob

## 2024-01-19 ENCOUNTER — NON-APPOINTMENT (OUTPATIENT)
Age: 79
End: 2024-01-19

## 2024-01-19 ENCOUNTER — APPOINTMENT (OUTPATIENT)
Dept: ELECTROPHYSIOLOGY | Facility: CLINIC | Age: 79
End: 2024-01-19
Payer: MEDICARE

## 2024-01-19 PROCEDURE — 93295 DEV INTERROG REMOTE 1/2/MLT: CPT

## 2024-01-19 PROCEDURE — 93296 REM INTERROG EVL PM/IDS: CPT

## 2024-01-24 ENCOUNTER — NON-APPOINTMENT (OUTPATIENT)
Age: 79
End: 2024-01-24

## 2024-01-25 NOTE — DISCHARGE NOTE NURSING/CASE MANAGEMENT/SOCIAL WORK - NSDCPEPTCOWADR_GEN_ALL_CORE
Warfarin/Coumadin increases your risk for bleeding. Notify your doctor if you see any bleeding or any of the side effects listed in the Warfarin/Coumadin Booklet. Diet and medications can affect the PT/INR blood level. When Warfarin/Coumadin is taken with other medicines it can change the way other medicines work. Other medicines can also change the way Warfarin/Coumadin works. It is very important to tell your health care provider about all other medicines, including over-the-counter medications, herbs, diet supplements, or products containing vitamin K. Call your doctor before starting, stopping, or changing the dose of any prescription or over-the-counter medications. Any product containing aspirin lessens the blood's ability to form clots and adds to the effect of Warfarin/Coumadin. Never take aspirin without speaking with your health care provider.
0 = swallows foods/liquids without difficulty

## 2024-03-14 LAB
ALBUMIN SERPL ELPH-MCNC: 4.1 G/DL
ALP BLD-CCNC: 95 U/L
ALT SERPL-CCNC: 11 U/L
ANION GAP SERPL CALC-SCNC: 13 MMOL/L
AST SERPL-CCNC: 14 U/L
BILIRUB SERPL-MCNC: 0.4 MG/DL
BUN SERPL-MCNC: 45 MG/DL
CALCIUM SERPL-MCNC: 9.6 MG/DL
CHLORIDE SERPL-SCNC: 106 MMOL/L
CHOLEST SERPL-MCNC: 174 MG/DL
CO2 SERPL-SCNC: 25 MMOL/L
CREAT SERPL-MCNC: 1.72 MG/DL
EGFR: 40 ML/MIN/1.73M2
ESTIMATED AVERAGE GLUCOSE: 128 MG/DL
GLUCOSE SERPL-MCNC: 117 MG/DL
HBA1C MFR BLD HPLC: 6.1 %
HCT VFR BLD CALC: 44.7 %
HDLC SERPL-MCNC: 32 MG/DL
HGB BLD-MCNC: 14.3 G/DL
LDLC SERPL CALC-MCNC: 85 MG/DL
LDLC SERPL DIRECT ASSAY-MCNC: 79 MG/DL
MCHC RBC-ENTMCNC: 27 PG
MCHC RBC-ENTMCNC: 32 GM/DL
MCV RBC AUTO: 84.3 FL
NONHDLC SERPL-MCNC: 142 MG/DL
PLATELET # BLD AUTO: 205 K/UL
POTASSIUM SERPL-SCNC: 4.5 MMOL/L
PROT SERPL-MCNC: 7.3 G/DL
RBC # BLD: 5.3 M/UL
RBC # FLD: 17.5 %
SODIUM SERPL-SCNC: 143 MMOL/L
TRIGL SERPL-MCNC: 345 MG/DL
WBC # FLD AUTO: 9.19 K/UL

## 2024-03-18 ENCOUNTER — NON-APPOINTMENT (OUTPATIENT)
Age: 79
End: 2024-03-18

## 2024-03-18 ENCOUNTER — APPOINTMENT (OUTPATIENT)
Dept: CARDIOLOGY | Facility: CLINIC | Age: 79
End: 2024-03-18
Payer: MEDICARE

## 2024-03-18 VITALS
HEIGHT: 70 IN | DIASTOLIC BLOOD PRESSURE: 75 MMHG | OXYGEN SATURATION: 96 % | SYSTOLIC BLOOD PRESSURE: 154 MMHG | HEART RATE: 81 BPM | WEIGHT: 216 LBS | BODY MASS INDEX: 30.92 KG/M2

## 2024-03-18 DIAGNOSIS — I48.20 CHRONIC ATRIAL FIBRILLATION, UNSP: ICD-10-CM

## 2024-03-18 DIAGNOSIS — I10 ESSENTIAL (PRIMARY) HYPERTENSION: ICD-10-CM

## 2024-03-18 DIAGNOSIS — R06.00 DYSPNEA, UNSPECIFIED: ICD-10-CM

## 2024-03-18 PROCEDURE — G2211 COMPLEX E/M VISIT ADD ON: CPT

## 2024-03-18 PROCEDURE — 99214 OFFICE O/P EST MOD 30 MIN: CPT

## 2024-03-18 PROCEDURE — 93000 ELECTROCARDIOGRAM COMPLETE: CPT

## 2024-03-18 RX ORDER — SPIRONOLACTONE 25 MG/1
25 TABLET ORAL
Qty: 90 | Refills: 3 | Status: DISCONTINUED | COMMUNITY
Start: 2022-08-01 | End: 2024-03-18

## 2024-03-18 NOTE — HISTORY OF PRESENT ILLNESS
[FreeTextEntry1] : Fletcher Mendosa presented to the office today for a cardiovascular follow up.  He has been under the care of Dr. Ochoa. I last saw him in 12/23.  He is now 78 years old, with a history of hypertension, hypercholesterolemia, hypothyroidism, bladder cancer and paroxysmal atrial fibrillation. His atrial fibrillation was diagnosed several years ago, and he has been on warfarin. He did require cardioversion in the past. He was admitted to the hospital 11/19 in atrial fibrillation with rapid ventricular rate. At that time repeat echocardiogram showed ejection fraction of 45% with mild MR. He was given beta blocker and Cardizem with slowing of the ventricular rate.   12/18/20 he was at [place] and underwent electrical cardioversion. This was tried twice and both times his rhythm quickly reverted back to atrial fibrillation and the procedure was aborted. I spoke with Dr. Wu and he felt that the best approach would be to leave the patient in chronic atrial fibrillation with rate control.   In 6/21, he presented with palpitations. The patient had paroxysmal ventricular tachycardia and was transferred to [place].  There was minimal elevation in troponin with normal CK.  ECG showed no acute ischemia.  He underwent cardiac catheterization which showed a 40% left main lesion, 70% distal LAD, 75% circumflex, 90% proximal RCA, and 1% mid RCA.  Echocardiogram showed ejection fraction of 20 to 25% with mild TR.  No significant pulmonary hypertension.  6/23/2021 he underwent a LIMA graft to the LAD and vein graft to obtuse marginal.  Had placement of a Medtronic ICD.  This year he has been quite short of breath. Echo with improvement in LV function to 40-45% and mild MR. Pharm stress with inferior infarct/inferior wall hypokinesis. His K and Creatinine were up and his spironolactone was stopped. His creatinine is now 1.61. Because of dyspnea, I sent him for cath which showed patent SVG-OM1 and mid LAD. The LIMA to D1 was atretic. The RCA had severe disease and was small sized. The mid Cx  was noted and supplied a sizeable myocardium. He is now s/p PCI to the  of his LCx in 7/2023.   He unfortunately was then readmitted in August 2023 and Again in September 2023 Admitted to [place] with high-grade SBO near his urostomy.  Patient was sent to [facility] for advanced surgical services with recurrent bleeding, s/p ex lap of adhesions-parastomal hernia repair with mesh.  [facility]- LLE pain and burst bakers cyst. A repeat hospital TTE was attained and demonstrated EF 40-45%   I last saw him 12/23. Overall, he is doing well. He is walking with a cane, just for balance. The wound on his left ankle is finally heeling. He is doing home PT without issue. Recent BW with creatinine 1.72, , LDL 85  Erythromycin Pregnancy And Lactation Text: This medication is Pregnancy Category B and is considered safe during pregnancy. It is also excreted in breast milk.

## 2024-03-18 NOTE — REASON FOR VISIT
[Cardiac Failure] : cardiac failure [Symptom and Test Evaluation] : symptom and test evaluation [CV Risk Factors and Non-Cardiac Disease] : CV risk factors and non-cardiac disease [Arrhythmia/ECG Abnorrmalities] : arrhythmia/ECG abnormalities [Hypertension] : hypertension [Hyperlipidemia] : hyperlipidemia [Coronary Artery Disease] : coronary artery disease

## 2024-03-18 NOTE — PHYSICAL EXAM
[Well Developed] : well developed [Well Nourished] : well nourished [No Acute Distress] : no acute distress [Normal Conjunctiva] : normal conjunctiva [Normal Venous Pressure] : normal venous pressure [No Carotid Bruit] : no carotid bruit [Normal S1, S2] : normal S1, S2 [No Murmur] : no murmur [No Rub] : no rub [Clear Lung Fields] : clear lung fields [Good Air Entry] : good air entry [No Gallop] : no gallop [Soft] : abdomen soft [No Respiratory Distress] : no respiratory distress  [Non Tender] : non-tender [No Masses/organomegaly] : no masses/organomegaly [Normal Bowel Sounds] : normal bowel sounds [Normal Gait] : normal gait [No Edema] : no edema [No Clubbing] : no clubbing [No Cyanosis] : no cyanosis [No Varicosities] : no varicosities [No Skin Lesions] : no skin lesions [No Rash] : no rash [Moves all extremities] : moves all extremities [No Focal Deficits] : no focal deficits [Normal memory] : normal memory [Alert and Oriented] : alert and oriented [Normal Speech] : normal speech [de-identified] : irregularly irregular

## 2024-03-18 NOTE — CARDIOLOGY SUMMARY
[de-identified] : afib  [de-identified] : 10/2023 AF burden 1005 Vpaced 5.7% elevated optival since september  [de-identified] : 7/2023 LIMA> d1 atretic PCI >  Lcx residual severe RCA dZ - small sized

## 2024-03-18 NOTE — DISCUSSION/SUMMARY
[EKG obtained to assist in diagnosis and management of assessed problem(s)] : EKG obtained to assist in diagnosis and management of assessed problem(s) [FreeTextEntry1] : Fletcher is doing well, and is breathing better. In July 2023 cath showed a patent SVG-OM1 and mid LAD though the LIMA to D1 was atretic. The RCA had severe disease and was small sized. A mid Cx  was noted and supplied a sizeable myocardium. He is now s/p PCI to his LCx. His echocardiogram did show an improvement in LV function, to an EF of 40-45%, in 9/22.   He arrives in no distress. He appears compensated from a volume perspective. ECG illustrates rate /AF. Fletcher has HFmodEF, ICM NYHA II/. I will not make any changes to his medical regimen at this time. He will remain on Lasix 40 for volume management and off of spironolactone in the setting of hyperkalemia.  He will stay on Entresto 24/26 BID, Toprol 50 BID  He has chronic Afib, He will continue digoxin for rate control. He is now on Eliquis instead of Coumadin. He will continue plavix monotherapy.  His LDL improved from 150 to 85, and he will remain on Crestor. His TG were high, and he promises to fine tune is diet. If no issues, I will see him again in 3-6 months.

## 2024-04-18 ENCOUNTER — NON-APPOINTMENT (OUTPATIENT)
Age: 79
End: 2024-04-18

## 2024-04-19 ENCOUNTER — APPOINTMENT (OUTPATIENT)
Dept: ELECTROPHYSIOLOGY | Facility: CLINIC | Age: 79
End: 2024-04-19
Payer: MEDICARE

## 2024-04-19 PROCEDURE — 93295 DEV INTERROG REMOTE 1/2/MLT: CPT

## 2024-04-19 PROCEDURE — 93296 REM INTERROG EVL PM/IDS: CPT

## 2024-04-24 ENCOUNTER — NON-APPOINTMENT (OUTPATIENT)
Age: 79
End: 2024-04-24

## 2024-04-24 ENCOUNTER — APPOINTMENT (OUTPATIENT)
Dept: ELECTROPHYSIOLOGY | Facility: CLINIC | Age: 79
End: 2024-04-24
Payer: MEDICARE

## 2024-04-24 VITALS
SYSTOLIC BLOOD PRESSURE: 133 MMHG | DIASTOLIC BLOOD PRESSURE: 78 MMHG | OXYGEN SATURATION: 98 % | BODY MASS INDEX: 30.78 KG/M2 | HEART RATE: 80 BPM | WEIGHT: 215 LBS | HEIGHT: 70 IN

## 2024-04-24 PROCEDURE — 93283 PRGRMG EVAL IMPLANTABLE DFB: CPT

## 2024-04-24 PROCEDURE — 93000 ELECTROCARDIOGRAM COMPLETE: CPT | Mod: 59

## 2024-04-24 RX ORDER — WARFARIN 5 MG/1
5 TABLET ORAL
Qty: 90 | Refills: 3 | Status: DISCONTINUED | COMMUNITY
Start: 2019-12-10 | End: 2024-04-24

## 2024-05-15 RX ORDER — FUROSEMIDE 20 MG/1
20 TABLET ORAL
Qty: 90 | Refills: 3 | Status: ACTIVE | COMMUNITY
Start: 2021-08-06

## 2024-06-03 RX ORDER — METOPROLOL SUCCINATE 50 MG/1
50 TABLET, EXTENDED RELEASE ORAL
Qty: 180 | Refills: 2 | Status: ACTIVE | COMMUNITY
Start: 2022-07-20

## 2024-06-26 ENCOUNTER — RX RENEWAL (OUTPATIENT)
Age: 79
End: 2024-06-26

## 2024-06-26 RX ORDER — ROSUVASTATIN CALCIUM 40 MG/1
40 TABLET, FILM COATED ORAL
Qty: 90 | Refills: 0 | Status: ACTIVE | COMMUNITY
Start: 1900-01-01 | End: 1900-01-01

## 2024-07-24 ENCOUNTER — NON-APPOINTMENT (OUTPATIENT)
Age: 79
End: 2024-07-24

## 2024-07-24 ENCOUNTER — APPOINTMENT (OUTPATIENT)
Dept: ELECTROPHYSIOLOGY | Facility: CLINIC | Age: 79
End: 2024-07-24
Payer: MEDICARE

## 2024-07-24 PROCEDURE — 93296 REM INTERROG EVL PM/IDS: CPT

## 2024-07-24 PROCEDURE — 93295 DEV INTERROG REMOTE 1/2/MLT: CPT

## 2024-08-07 NOTE — PROCEDURE NOTE - PROCEDURE DATE TIME, MLM
28-Aug-2023
28-Aug-2023 06:11
Detail Level: Generalized
Detail Level: Zone
27-Aug-2023 10:19
28-Aug-2023 18:39
28-Aug-2023 15:11
04-Sep-2023 05:43
04-Sep-2023 03:31
04-Sep-2023 01:28

## 2024-08-27 ENCOUNTER — APPOINTMENT (OUTPATIENT)
Dept: CARDIOLOGY | Facility: CLINIC | Age: 79
End: 2024-08-27
Payer: MEDICARE

## 2024-08-27 ENCOUNTER — NON-APPOINTMENT (OUTPATIENT)
Age: 79
End: 2024-08-27

## 2024-08-27 VITALS
WEIGHT: 224 LBS | OXYGEN SATURATION: 98 % | DIASTOLIC BLOOD PRESSURE: 74 MMHG | BODY MASS INDEX: 32.07 KG/M2 | HEART RATE: 94 BPM | HEIGHT: 70 IN | SYSTOLIC BLOOD PRESSURE: 134 MMHG

## 2024-08-27 DIAGNOSIS — R06.00 DYSPNEA, UNSPECIFIED: ICD-10-CM

## 2024-08-27 DIAGNOSIS — I48.20 CHRONIC ATRIAL FIBRILLATION, UNSP: ICD-10-CM

## 2024-08-27 DIAGNOSIS — I25.10 ATHEROSCLEROTIC HEART DISEASE OF NATIVE CORONARY ARTERY W/OUT ANGINA PECTORIS: ICD-10-CM

## 2024-08-27 PROCEDURE — 93000 ELECTROCARDIOGRAM COMPLETE: CPT

## 2024-08-27 PROCEDURE — 99214 OFFICE O/P EST MOD 30 MIN: CPT

## 2024-08-27 NOTE — HISTORY OF PRESENT ILLNESS
[FreeTextEntry1] : Fletcher Mendosa presented to the office today for a cardiovascular follow up.  He has been under the care of Dr. Ochoa. I last saw him in 12/23.  He is now 78 years old, with a history of hypertension, hypercholesterolemia, hypothyroidism, bladder cancer and paroxysmal atrial fibrillation. His atrial fibrillation was diagnosed several years ago, and he has been on warfarin. He did require cardioversion in the past. He was admitted to the hospital 11/19 in atrial fibrillation with rapid ventricular rate. At that time repeat echocardiogram showed ejection fraction of 45% with mild MR. He was given beta blocker and Cardizem with slowing of the ventricular rate.   12/18/20 he was at [place] and underwent electrical cardioversion. This was tried twice and both times his rhythm quickly reverted back to atrial fibrillation and the procedure was aborted. I spoke with Dr. Wu and he felt that the best approach would be to leave the patient in chronic atrial fibrillation with rate control.   In 6/21, he presented with palpitations. The patient had paroxysmal ventricular tachycardia and was transferred to [place].  There was minimal elevation in troponin with normal CK.  ECG showed no acute ischemia.  He underwent cardiac catheterization which showed a 40% left main lesion, 70% distal LAD, 75% circumflex, 90% proximal RCA, and 1% mid RCA.  Echocardiogram showed ejection fraction of 20 to 25% with mild TR.  No significant pulmonary hypertension.  6/23/2021 he underwent a LIMA graft to the LAD and vein graft to obtuse marginal.  Had placement of a Medtronic ICD.  This year he has been quite short of breath. Echo with improvement in LV function to 40-45% and mild MR. Pharm stress with inferior infarct/inferior wall hypokinesis. His K and Creatinine were up and his spironolactone was stopped. His creatinine is now 1.61. Because of dyspnea, I sent him for cath which showed patent SVG-OM1 and mid LAD. The LIMA to D1 was atretic. The RCA had severe disease and was small sized. The mid Cx  was noted and supplied a sizeable myocardium. He is now s/p PCI to the  of his LCx in 7/2023.   He unfortunately was then readmitted in August 2023 and Again in September 2023 Admitted to [place] with high-grade SBO near his urostomy.  Patient was sent to [facility] for advanced surgical services with recurrent bleeding, s/p ex lap of adhesions-parastomal hernia repair with mesh.  8/2023 presents to the ED from [facility] with low hemoglobin- LLE pain and burst bakers cyst. A repeat hospital TTE was attained and demonstrated EF 40-45%   I last saw him 3/24. Overall, he is doing well. He is walking with a cane, just for balance. The wound on his left ankle is finally heeling. He completed home PT. BW in 3/24 with creatinine 1.72, , LDL 85. His breathing is good

## 2024-08-27 NOTE — DISCUSSION/SUMMARY
[FreeTextEntry1] : Fletcher is doing well, and is breathing better. In July 2023 cath showed a patent SVG-OM1 and mid LAD though the LIMA to D1 was atretic. The RCA had severe disease and was small sized. A mid Cx  was noted and supplied a sizeable myocardium. He is now s/p PCI to his LCx. His echocardiogram did show an improvement in LV function, to an EF of 40-45%, in 9/22.   He arrives in no distress. He appears compensated from a volume perspective. ECG illustrates rate /AF. Fletcher has HFmodEF, ICM NYHA II/. I will not make any changes to his medical regimen at this time. He will remain on Lasix 40 for volume management and off of spironolactone in the setting of hyperkalemia.  He will stay on Entresto 24/26 BID, Toprol 50 BID  He has chronic Afib, He will continue digoxin for rate control. He is now on Eliquis instead of Coumadin. He will continue plavix monotherapy.  His LDL improved from 150 to 85, and he will remain on Crestor. His TG were high, and he promises to fine tune is diet. We will repeat BW this year, along with an echocardiogram before next visit. If no issues, I will see him again in 6 months. [EKG obtained to assist in diagnosis and management of assessed problem(s)] : EKG obtained to assist in diagnosis and management of assessed problem(s)

## 2024-08-27 NOTE — REASON FOR VISIT
[Symptom and Test Evaluation] : symptom and test evaluation [Cardiac Failure] : cardiac failure [CV Risk Factors and Non-Cardiac Disease] : CV risk factors and non-cardiac disease [Arrhythmia/ECG Abnorrmalities] : arrhythmia/ECG abnormalities [Hyperlipidemia] : hyperlipidemia [Hypertension] : hypertension [Coronary Artery Disease] : coronary artery disease

## 2024-08-27 NOTE — PHYSICAL EXAM

## 2024-08-27 NOTE — CARDIOLOGY SUMMARY
[de-identified] : afib  [de-identified] : 10/2023 AF burden 1005 Vpaced 5.7% elevated optival since september  [de-identified] : 7/2023 LIMA> d1 atretic PCI >  Lcx residual severe RCA dZ - small sized

## 2024-09-04 NOTE — ED ADULT NURSE NOTE - NSICDXFAMILYHX_GEN_ALL_CORE_FT
Chief Complaint   Patient presents with   • Office Visit   • Derm Problem       Krista Taylor presents with:    History of Present Illness:    Complaint: Patient presents with acne on the face  Symptoms: none  Previous treatments: otc face wash and moisturizer     Past Dermatologic History:    Acne - spironolactone, topicals , OCP     Family History:   She does  have a family history of skin cancer. Mom- melanoma     Medications, the past medical and surgical history were reviewed in the EMR (electronic medical record) and updated as necessary.     Exam:    General: well developed, well nourished, in no acute distress.   SKIN:  Inspection and palpation of the area(s) of concern was performed, revealing:  Acneiform papules, without pustules, with comedones, with hyperpigmentation, with mild scarring on the face       Assessment and Plan:    Diagnoses and all orders for this visit:  Acne vulgaris  -     SGOT; Standing  -     SGPT; Standing  -     Triglyceride; Standing  -     Pregnancy Test, Urine; Standing     Patient with moderate severity acne, though with prominent hyperpigmentation and mild early scarring.  Previously had improvement with OCP and minimal improvement to spironolactone, but not interested in restarting either of these due to side effects.  Discussed bicalutamide as alternative antiandrogen versus isotretinoin.  After discussion of risk/benefits as below, patient elects for isotretinoin    ------------------  iPledge Number: [phone]  Patient weight in kilograms: 57 kg  Cumulative dose to date: 0 mg/kg    The patient signed and completed the iPLEDGE contract and was given the iPLEDGE informational booklet.    The patient was informed that isotretinoin is a teratogenic medication which is associated with high risk of birth defects if one were to become pregnant while taking the medication.  The patient will undergo monthly pregnancy tests while on isotretinoin.      Side effects including dry skin,  dry eyes, joint/muscle pain, depression were discussed.  Instructed patient not to donate blood or share medicine. Patient instructed to inform other physicians and pharmacist of being on isotretinoin whenever receiving new prescriptions to avoid undesired medication interactions.    First form of contraception:  Hormonal IUD  Second form of contraception:  Male condoms  ------------------      Return in about 5 weeks (around 10/9/2024). / PRN    On 9/4/2024, IJanae CMA scribed the services personally performed by Kirit Laura MD    ---    The documentation recorded by the scribe accurately and completely reflects the service(s) I personally performed and the decisions made by me.     Kirit Laura MD  Dermatology     FAMILY HISTORY:  Father  Still living? Unknown  Family history of heart attack, Age at diagnosis: Age Unknown    Mother  Still living? No  Family history of heart attack, Age at diagnosis: Age Unknown

## 2024-09-18 NOTE — DISCHARGE NOTE NURSING/CASE MANAGEMENT/SOCIAL WORK - NSTOBACCONEVERSMOKERY/N_GEN_A
[facility] Progress Note    MAR, chart notes, fingersticks and labs reviewed    Subjective:    Objective  Vital Signs  T(C): 36.8 (09-18-24 @ 04:35), Max: 36.8 (09-18-24 @ 04:35)  HR: 79 (09-18-24 @ 04:35) (77 - 86)  BP: 139/80 (09-18-24 @ 04:35) (139/80 - 154/89)  RR: 18 (09-18-24 @ 04:35) (18 - 18)  SpO2: 96% (09-18-24 @ 04:35) (96% - 98%)    Physical Exam  Gen: NAD, alert, awake  HEENT: NC/AT, EOMI  Neck: supple  Chest: breathing comfortably  Heart: +s1 +s2    Medications  acetaminophen     Tablet .. 650 milliGRAM(s) Oral every 6 hours PRN  albuterol    90 MICROgram(s) HFA Inhaler 2 Puff(s) Inhalation every 2 hours PRN  albuterol/ipratropium for Nebulization 3 milliLiter(s) Nebulizer every 4 hours  aluminum hydroxide/magnesium hydroxide/simethicone Suspension 30 milliLiter(s) Oral every 4 hours PRN  amLODIPine   Tablet 5 milliGRAM(s) Oral daily  atorvastatin 20 milliGRAM(s) Oral at bedtime  dextrose 5%. 1000 milliLiter(s) IV Continuous <Continuous>  dextrose 5%. 1000 milliLiter(s) IV Continuous <Continuous>  dextrose 50% Injectable 12.5 Gram(s) IV Push once  dextrose 50% Injectable 25 Gram(s) IV Push once  dextrose 50% Injectable 25 Gram(s) IV Push once  dextrose Oral Gel 15 Gram(s) Oral once PRN  fluticasone propionate 50 MICROgram(s)/spray Nasal Spray 1 Spray(s) Both Nostrils two times a day  fluticasone propionate/ salmeterol 250-50 MICROgram(s) Diskus 1 Dose(s) Inhalation two times a day  glucagon  Injectable 1 milliGRAM(s) IntraMuscular once  insulin glargine Injectable (LANTUS) 30 Unit(s) SubCutaneous at bedtime  insulin lispro (ADMELOG) corrective regimen sliding scale   SubCutaneous three times a day before meals  insulin lispro (ADMELOG) corrective regimen sliding scale   SubCutaneous at bedtime  insulin lispro Injectable (ADMELOG) 10 Unit(s) SubCutaneous before breakfast  insulin lispro Injectable (ADMELOG) 15 Unit(s) SubCutaneous before lunch  insulin lispro Injectable (ADMELOG) 25 Unit(s) SubCutaneous before dinner  losartan 100 milliGRAM(s) Oral daily  melatonin 3 milliGRAM(s) Oral at bedtime PRN  methylPREDNISolone sodium succinate Injectable 40 milliGRAM(s) IV Push every 8 hours  montelukast 10 milliGRAM(s) Oral daily  ondansetron Injectable 4 milliGRAM(s) IV Push every 8 hours PRN  sodium chloride 0.9%. 1000 milliLiter(s) IV Continuous <Continuous>  thiamine 100 milliGRAM(s) Oral daily    Pertinent labs/Imaging  POCT Blood Glucose.: 151 mg/dL (09-18-24 @ 07:45)  POCT Blood Glucose.: 89 mg/dL (09-17-24 @ 21:50)  POCT Blood Glucose.: 297 mg/dL (09-17-24 @ 16:38)  POCT Blood Glucose.: 174 mg/dL (09-17-24 @ 12:21)    eGFR: 91 mL/min/1.73m2 (09-17-24 @ 09:13)       Yes [facility] Progress Note    MAR, chart notes, fingersticks and labs reviewed    Subjective: didn't feel well last night and required oxygen, had a doppler US done to rule out DVT    Objective  Vital Signs  T(C): 36.8 (09-18-24 @ 04:35), Max: 36.8 (09-18-24 @ 04:35)  HR: 79 (09-18-24 @ 04:35) (77 - 86)  BP: 139/80 (09-18-24 @ 04:35) (139/80 - 154/89)  RR: 18 (09-18-24 @ 04:35) (18 - 18)  SpO2: 96% (09-18-24 @ 04:35) (96% - 98%)    Physical Exam  Gen: NAD, alert, awake  HEENT: NC/AT, EOMI  Neck: supple  Chest: breathing comfortably  Heart: +s1 +s2    Medications  acetaminophen     Tablet .. 650 milliGRAM(s) Oral every 6 hours PRN  albuterol    90 MICROgram(s) HFA Inhaler 2 Puff(s) Inhalation every 2 hours PRN  albuterol/ipratropium for Nebulization 3 milliLiter(s) Nebulizer every 4 hours  aluminum hydroxide/magnesium hydroxide/simethicone Suspension 30 milliLiter(s) Oral every 4 hours PRN  amLODIPine   Tablet 5 milliGRAM(s) Oral daily  atorvastatin 20 milliGRAM(s) Oral at bedtime  dextrose 5%. 1000 milliLiter(s) IV Continuous <Continuous>  dextrose 5%. 1000 milliLiter(s) IV Continuous <Continuous>  dextrose 50% Injectable 12.5 Gram(s) IV Push once  dextrose 50% Injectable 25 Gram(s) IV Push once  dextrose 50% Injectable 25 Gram(s) IV Push once  dextrose Oral Gel 15 Gram(s) Oral once PRN  fluticasone propionate 50 MICROgram(s)/spray Nasal Spray 1 Spray(s) Both Nostrils two times a day  fluticasone propionate/ salmeterol 250-50 MICROgram(s) Diskus 1 Dose(s) Inhalation two times a day  glucagon  Injectable 1 milliGRAM(s) IntraMuscular once  insulin glargine Injectable (LANTUS) 30 Unit(s) SubCutaneous at bedtime  insulin lispro (ADMELOG) corrective regimen sliding scale   SubCutaneous three times a day before meals  insulin lispro (ADMELOG) corrective regimen sliding scale   SubCutaneous at bedtime  insulin lispro Injectable (ADMELOG) 10 Unit(s) SubCutaneous before breakfast  insulin lispro Injectable (ADMELOG) 15 Unit(s) SubCutaneous before lunch  insulin lispro Injectable (ADMELOG) 25 Unit(s) SubCutaneous before dinner  losartan 100 milliGRAM(s) Oral daily  melatonin 3 milliGRAM(s) Oral at bedtime PRN  methylPREDNISolone sodium succinate Injectable 40 milliGRAM(s) IV Push every 8 hours  montelukast 10 milliGRAM(s) Oral daily  ondansetron Injectable 4 milliGRAM(s) IV Push every 8 hours PRN  sodium chloride 0.9%. 1000 milliLiter(s) IV Continuous <Continuous>  thiamine 100 milliGRAM(s) Oral daily    Pertinent labs/Imaging  POCT Blood Glucose.: 151 mg/dL (09-18-24 @ 07:45)  POCT Blood Glucose.: 89 mg/dL (09-17-24 @ 21:50)  POCT Blood Glucose.: 297 mg/dL (09-17-24 @ 16:38)  POCT Blood Glucose.: 174 mg/dL (09-17-24 @ 12:21)    eGFR: 91 mL/min/1.73m2 (09-17-24 @ 09:13)

## 2024-10-04 ENCOUNTER — LABORATORY RESULT (OUTPATIENT)
Age: 79
End: 2024-10-04

## 2024-10-08 RX ORDER — ICOSAPENT ETHYL 1000 MG/1
1 CAPSULE ORAL
Qty: 180 | Refills: 3 | Status: ACTIVE | COMMUNITY
Start: 2024-10-08

## 2024-10-21 ENCOUNTER — NON-APPOINTMENT (OUTPATIENT)
Age: 79
End: 2024-10-21

## 2024-10-23 ENCOUNTER — NON-APPOINTMENT (OUTPATIENT)
Age: 79
End: 2024-10-23

## 2024-10-23 ENCOUNTER — APPOINTMENT (OUTPATIENT)
Dept: ELECTROPHYSIOLOGY | Facility: CLINIC | Age: 79
End: 2024-10-23
Payer: MEDICARE

## 2024-10-23 PROCEDURE — 93295 DEV INTERROG REMOTE 1/2/MLT: CPT

## 2024-10-23 PROCEDURE — 93296 REM INTERROG EVL PM/IDS: CPT

## 2024-10-24 RX ORDER — CLOPIDOGREL BISULFATE 75 MG/1
75 TABLET, FILM COATED ORAL
Qty: 90 | Refills: 0 | Status: ACTIVE | COMMUNITY
Start: 2024-10-24 | End: 1900-01-01

## 2024-12-09 ENCOUNTER — RX RENEWAL (OUTPATIENT)
Age: 79
End: 2024-12-09

## 2024-12-23 ENCOUNTER — RX RENEWAL (OUTPATIENT)
Age: 79
End: 2024-12-23

## 2025-01-22 ENCOUNTER — NON-APPOINTMENT (OUTPATIENT)
Age: 80
End: 2025-01-22

## 2025-01-22 ENCOUNTER — APPOINTMENT (OUTPATIENT)
Dept: ELECTROPHYSIOLOGY | Facility: CLINIC | Age: 80
End: 2025-01-22
Payer: MEDICARE

## 2025-01-22 PROCEDURE — 93296 REM INTERROG EVL PM/IDS: CPT

## 2025-01-22 PROCEDURE — 93295 DEV INTERROG REMOTE 1/2/MLT: CPT

## 2025-01-31 ENCOUNTER — EMERGENCY (EMERGENCY)
Facility: HOSPITAL | Age: 80
LOS: 1 days | Discharge: HOME | End: 2025-01-31
Attending: INTERNAL MEDICINE | Admitting: INTERNAL MEDICINE
Payer: MEDICARE

## 2025-01-31 VITALS
HEIGHT: 70 IN | TEMPERATURE: 99 F | RESPIRATION RATE: 18 BRPM | HEART RATE: 116 BPM | SYSTOLIC BLOOD PRESSURE: 127 MMHG | DIASTOLIC BLOOD PRESSURE: 92 MMHG | OXYGEN SATURATION: 95 % | WEIGHT: 229.94 LBS

## 2025-01-31 VITALS
RESPIRATION RATE: 16 BRPM | DIASTOLIC BLOOD PRESSURE: 79 MMHG | OXYGEN SATURATION: 97 % | TEMPERATURE: 98 F | HEART RATE: 85 BPM | SYSTOLIC BLOOD PRESSURE: 109 MMHG

## 2025-01-31 DIAGNOSIS — Z95.1 PRESENCE OF AORTOCORONARY BYPASS GRAFT: Chronic | ICD-10-CM

## 2025-01-31 DIAGNOSIS — Z98.890 OTHER SPECIFIED POSTPROCEDURAL STATES: Chronic | ICD-10-CM

## 2025-01-31 DIAGNOSIS — K56.609 UNSPECIFIED INTESTINAL OBSTRUCTION, UNSPECIFIED AS TO PARTIAL VERSUS COMPLETE OBSTRUCTION: Chronic | ICD-10-CM

## 2025-01-31 DIAGNOSIS — Z95.810 PRESENCE OF AUTOMATIC (IMPLANTABLE) CARDIAC DEFIBRILLATOR: Chronic | ICD-10-CM

## 2025-01-31 PROCEDURE — 96374 THER/PROPH/DIAG INJ IV PUSH: CPT

## 2025-01-31 PROCEDURE — 94640 AIRWAY INHALATION TREATMENT: CPT

## 2025-01-31 PROCEDURE — 99284 EMERGENCY DEPT VISIT MOD MDM: CPT

## 2025-01-31 PROCEDURE — 99284 EMERGENCY DEPT VISIT MOD MDM: CPT | Mod: 25

## 2025-01-31 RX ORDER — METHYLPREDNISOLONE ACETATE 40 MG/ML
125 VIAL (ML) INJECTION ONCE
Refills: 0 | Status: COMPLETED | OUTPATIENT
Start: 2025-01-31 | End: 2025-01-31

## 2025-01-31 RX ORDER — LEVOFLOXACIN 500 MG/1
1 TABLET, FILM COATED ORAL
Qty: 6 | Refills: 0
Start: 2025-01-31 | End: 2025-02-05

## 2025-01-31 RX ORDER — PREDNISONE 5 MG/1
1 TABLET ORAL
Qty: 14 | Refills: 0
Start: 2025-01-31 | End: 2025-02-06

## 2025-01-31 RX ORDER — LEVOFLOXACIN 500 MG/1
500 TABLET, FILM COATED ORAL ONCE
Refills: 0 | Status: DISCONTINUED | OUTPATIENT
Start: 2025-01-31 | End: 2025-01-31

## 2025-01-31 RX ORDER — IPRATROPIUM BROMIDE AND ALBUTEROL SULFATE .5; 2.5 MG/3ML; MG/3ML
3 SOLUTION RESPIRATORY (INHALATION) ONCE
Refills: 0 | Status: COMPLETED | OUTPATIENT
Start: 2025-01-31 | End: 2025-01-31

## 2025-01-31 RX ADMIN — Medication 125 MILLIGRAM(S): at 22:02

## 2025-01-31 RX ADMIN — IPRATROPIUM BROMIDE AND ALBUTEROL SULFATE 3 MILLILITER(S): .5; 2.5 SOLUTION RESPIRATORY (INHALATION) at 22:02

## 2025-01-31 NOTE — ED PROVIDER NOTE - CARE PROVIDERS DIRECT ADDRESSES
[email or web address]

## 2025-01-31 NOTE — ED PROVIDER NOTE - CARE PROVIDER_API CALL
Yariel Rider  Pulmonary Disease  [address] [place] [id number]  Phone: [phone]  Fax: [phone]  Follow Up Time: 1-3 Days    Deon Valentine  Internal Medicine  [address], [address]  [place] [address]  Phone: [phone]  Fax: [phone]  Follow Up Time: 1-3 Days

## 2025-01-31 NOTE — ED ADULT TRIAGE NOTE - CHIEF COMPLAINT QUOTE
Pt says he had the Flu 2 weeks ago and hasn't been good since, c/o cough with mucus and congestion, unable to expectorate mucus, today c/o worsening SOB.

## 2025-01-31 NOTE — ED PROVIDER NOTE - PATIENT PORTAL LINK FT
You can access the FollowMyHealth Patient Portal offered by [facility] by registering at the following website: [email or web address]. By joining [employer]’s FollowMyHealth portal, you will also be able to view your health information using other applications (apps) compatible with our system.

## 2025-01-31 NOTE — ED ADULT TRIAGE NOTE - HEIGHT IN INCHES
Patient's first and last name, , procedure, and correct site confirmed prior to the start of procedure. 10

## 2025-01-31 NOTE — ED PROVIDER NOTE - OBJECTIVE STATEMENT
Pt says he had the Flu 2 weeks ago and hasn't been good since, c/o cough with mucus and congestion, unable to expectorate mucus, today c/o worsening SOB.  cough, shortness of breath Pt says he had the Flu 2 weeks ago and hasn't been good since, c/o cough with mucus and congestion, unable to expectorate mucus, today c/o worsening SOB.  cough, shortness of breath h/o COPD, bladder cancer, CXR today from [facility] ,  Recent influenza and Chest CT 2 weeks ago.  he is using inhaler more frequently 80 y/o male,  h/o COPD, bladder cancer, influenza Pt says he had the Flu 2 weeks ago and hasn't been well since, c/o cough with mucus and congestion, today c/o worsening SOB, cough and wheeze,. He shows discolored thick mjucous, tinge of blood on tissue paper.   He had routine  CXR today a  [facility] Chest CT 2 weeks ago.  He is using inhaler more frequently. no headache, no fever no rash no toxemia, no chest pain, , no palpitations, no abdominal pain, no n/v/d, no urinary symptoms, no bleeding. no neuro changes.

## 2025-01-31 NOTE — ED PROVIDER NOTE - PROVIDER TOKENS
PROVIDER:[TOKEN:[[id number]],FOLLOWUP:[1-3 Days]],PROVIDER:[TOKEN:[[id number]],FOLLOWUP:[1-3 Days]]

## 2025-01-31 NOTE — ED ADULT NURSE NOTE - DISCHARGE DATE/TIME
I reviewed hospital records including labs, discharge summaries etc.  Would continue current medications and make lifestyle modifications toward weight loss, improved aerobic activity etc.  I have asked patient and his girlfriend to continue to monitor blo
31-Jan-2025 23:52

## 2025-01-31 NOTE — ED ADULT NURSE NOTE - CAS TRG GENERAL NORM CIRC DET
Inpatient Psychiatric Evaluation    Patient:  Richi Redding 34 year old  MRN#:  [id number]  Date of Service:  11/24/2024  Primary Provider:  Belle Perez APNP      Chief Complaint: Depression and suicidal ideation with a plan to get a gun and shoot him    Informants:  The patient, who is considered a fair historian, as well as the patient's medical record.    Reason for Admission: Chronic depression lately thoughts of killing himself by shooting himself with a gun-hallucinations, thoughts of harming himself and harming his friend-patient reports these hallucinations happened in the past and this is the second time it happened patient has come because he is concerned he might lose his mind-       Richi Redding  Duration of symptoms: Few weeks  Description of recent events: Hallucinations telling him to kill himself and hurt his friends although he has no plan to do that-chronic suicidal thoughts and polysubstance dependence/abuse-patient was afraid he might end up doing something brought him to the hospital  Key psychosocial factors contributing to admission: Hallucinations-chronic suicidal thoughts-thoughts of hurting the friend although he has no plan to do that-polysubstance abuse-cannabis abuse on UDS    Legal Status: Volunteer       Psychiatric Review of Symptoms: Please refer to Hospitalist H&P    VITALS:  Visit Vitals  /78 (BP Location: RUE - Right upper extremity, Patient Position: Sitting)   Pulse 86   Temp 98 °F (36.7 °C) (Oral)   Resp 16   Ht 6' (1.829 m)   Wt 118.8 kg (261 lb 12.8 oz)   SpO2 100%   BMI 35.51 kg/m²       Past Psychiatric History:  Patient reports she has been struggling with anxiety and depression for a few years-suicidal thoughts for a few years-reports 4 suicidal attempts in the past by putting a loaded pistol to his head and jumping off the bridge-last suicide attempt was 2 years ago as per patient-patient has been hospitalized inpatient more than 15 times-patient has issues with  benzodiazepine and stimulant dependence-patient also is on Suboxone-patient has been repeatedly hospitalized in different hospitals-recently patient had a CCR at [facility]-patient admitted with the same kind of presentation, this time he came up with hallucinations although he is not in any apparent distress at this time-patient was given Vyvanse by outpatient provider for 2 weeks on October 31 and Suboxone also on October 31 and later up to 18 November-patient reports he was in mental health PHP few days ago-    Family History;  Patient reports his mom and dad had depression-patient denies any history of suicidal attempt in the family-no history of psychosis    Past Medical History:  Past Medical History:   Diagnosis Date    ADHD     Anxiety     Attention deficit disorder     Depression     Fractured hand 2001    right hand, 3rd metacarpal bone    Pancreatitis (CMD) 07/2015    Pancreatitis (CMD)     PTSD (post-traumatic stress disorder)        RISK Factors:  Risk of violence to self  Attempts of suicide in past 6 months:No    History of interpersonal violence prior to 6 months:  History of arrests for serious violent crime (robbery, sexual assault, assault/battery, weapons charge, murder) in the past six months:No    Psychological trauma screening  Lifetime history of physical, sexual, emotional or verbal abuse:  Have you ever been verbally, emotionally, physically or sexually abused:No  At what age: Not applicable    Lifetime drug use:  Reported pattern of substance abuse within the last 12 months:Yes    Lifetime alcohol use:  Reported pattern of alcohol use within the last 12 months:Yes    Type, amount and frequency of use and any problems due to past use.  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-she has not been using drugs for last few years-patient has been on Suboxone 8-2 mg 3 times a day-patient also has issues with dependence on benzodiazepines-patient is a drug-seeking  behavior including Vyvanse, Suboxone, benzodiazepines-patient has a UDS positive for cannabis at this time although he denied any alcohol or drug use-patient reports he used to drink heavy in the past but not drinking for the last 5 months-    Inventory of Assets / Strengths (minimum of two): Internally motivated to seek treatment, Verbalizes optimism that change can occur, Willing to take medication(s) for mental health/substance use conditions, Willing to obtain outpatient follow-up treatment after discharge from current level of care, Understands impact of patient's recent alcohol or drug use, Knowledge about his medications, and Willing to obtain outpatient follow-up treatment after discharge from current level of care    Personal and social history: Patient is single-lives with a friend-has 3 siblings-patient has worked as a  in the past-patient is on disability-patient has been hospitalized several times- he has not follow up with the plan of going to sober living or finding a placement-patient does not have any relationship at this point    Social History     Tobacco Use    Smoking status: Every Day     Current packs/day: 1.00     Average packs/day: 1 pack/day for 0.5 years (0.5 ttl pk-yrs)     Types: Cigarettes    Smokeless tobacco: Never    Tobacco comments:     10/2/23 reports smoking 1 PPD   Vaping Use    Vaping status: Every Day    Substances: Nicotine, Flavoring   Substance Use Topics    Alcohol use: Not Currently    Drug use: Yes     Types: Marijuana, Cocaine     Comment: THC 1 time/week, denies current cocaine use       Allergies:  ALLERGIES:   Allergen Reactions    Amoxicillin SWELLING     And face gets hot.    Ketorolac Tromethamine Angioedema    Penicillins SWELLING       Medications:  Current Facility-Administered Medications   Medication    cyclobenzaprine (FLEXERIL) tablet 10 mg    hydroCHLOROthiazide tablet 12.5 mg    pantoprazole (PROTONIX) EC tablet 40 mg    nicotine (NICODERM) 21 MG/24HR  patch 1 patch    folic acid (FOLATE) tablet 1 mg    thiamine (VITAMIN B1) tablet 100 mg    traZODone (DESYREL) tablet 50 mg    benztropine mesylate (COGENTIN) 1 MG/ML injection 1 mg    Or    benztropine (COGENTIN) tablet 1 mg    LORazepam (ATIVAN) tablet 2 mg    Or    LORazepam (ATIVAN) injection 2 mg    hydrOXYzine (ATARAX) tablet 50 mg    OLANZapine (ZyPREXA ZYDIS) disintegrating tablet 5 mg    Or    OLANZapine (ZyPREXA ZYDIS) disintegrating tablet 5 mg    LORazepam (ATIVAN) tablet 2 mg    Or    LORazepam (ATIVAN) injection 2 mg    LORazepam (ATIVAN) tablet 1 mg    Or    LORazepam (ATIVAN) injection 1 mg    haloperidol (HALDOL) tablet 5 mg    Or    haloperidol lactate (HALDOL) 5 MG/ML short-acting injection 5 mg    acetaminophen (TYLENOL) tablet 650 mg    Or    acetaminophen (TYLENOL) tablet 500 mg    Or    acetaminophen (TYLENOL) tablet 650 mg    Or    acetaminophen (TYLENOL) tablet 1,000 mg    polyethylene glycol (MIRALAX) packet 17 g    docusate sodium-sennosides (SENOKOT S) 50-8.6 MG 2 tablet    bisacodyl (DULCOLAX) suppository 10 mg    magnesium hydroxide (MILK OF MAGNESIA) 400 MG/5ML suspension 30 mL    aluminum-magnesium hydroxide-simethicone (MAALOX) 200-200-20 MG/5ML suspension 30 mL    ondansetron (ZOFRAN ODT) disintegrating tablet 4 mg    loperamide (IMODIUM) capsule 2 mg    nicotine polacrilex (NICORETTE) gum 2 mg    gabapentin (NEURONTIN) capsule 300 mg    cloNIDine (CATAPRES) tablet 0.1 mg       Mental Status Exam     Behavioral Observations: Interactive-animated  Appearance: normal  Separation from escort: Normal  Manner of relating to examiner: Normal  Psychomotor activity: Normal  Gait/station/psychomotor: No abnormal movements, psychomotor agitation or retardation noted. Steady and well-paced  Speech: Normal  Receptive Language: Normal  Expressive Language: Normal  Gross motor coordination: Normal  Fine motor coordination: Normal  Mood: Anxious and depressed  Affective expression:  Appropriate  Thought form: Normal/no flight of ideas or loosening of association  Preoccupations: none  Delusions: absent  Hallucinations: Reports hallucination telling him to kill himself  Suicidal/Homicidal ideation: Suicidal thoughts crossing his mind-homicidal thoughts but no plan to kill anybody-patient was not in any kind of distress at this point  Orientation: Normal  Attention: Normal  Estimate of Intellectual Functioning (Fund of Knowledge): average  Memory: Normal  Impulse Control: Normal  Memory:  no apparent impairments in short term memory and no apparent impairments in long term memory   Insight: fair, as evidenced by patient's insight into his own illness  Judgment: fair, as evidenced by engagement in treatment  General knowledge-fair    Biopsychosocial Assessment:  Richi Redding 34 year old male who has been admitted in hospital with depression, hallucination, suicidal and homicidal ideation-patient has been homeless living with a friend-there is a history of polysubstance abuse and drug-seeking behavior-patient has a lot of behaviors irritability-had a recent CCR while in the hospital at the beginning of November-several inpatient hospitalization-living with a friend, on disability    Diagnoses:  Unspecified mood disorder-  Polysubstance abuse in remission as per patient-  Cannabis abuse-  Borderline personality disorder    Treatment Plan and Recommendations:  Discussed with the patient and very told him we are not going to start him on Vyvanse as he was only prescribed Vyvanse for 2 weeks beginning of November-does not have active prescription-patient was prescribed Suboxone 8-2 mg 3 times a day by outpatient provider, he was being provided with a short prescriptions-it seems patient has been going to different providers in different hospitals-patient was also taking a benzodiazepine on outpatient basis which has been discontinued-patient reports hallucination, we would start the patient on Seroquel  as he is already on Seroquel 100 mg at night-we would start him on 25 mg twice a day and 50 mg at night-we would continue patient on gabapentin 300 mg 3 times a day-we would start him with Prozac 10 mg a day and adjust the dose-patient was on prazosin 15 mg at night and he has been very tired and lethargic-she reported it was prescribed for PTSD which is very high dose-would decrease it down to 10 mg at night and further decrease it down to 6 mg as recommended dose-  Continue supportive psychotherapy-  After stabilization discharge the patient back to community to follow-up with outpatient providers including PCP, psychiatrist, therapist-patient has been in the system, has been to several hospitals, has been to several programs, patient has got enough benefit of therapy and inpatient treatment as described in the previous CCR-patient needs to follow up with the DBT or outpatient therapy after discharge for continuity of care-      Rani Gastelum MD  Psychiatry      Strong peripheral pulses/Capillary refill less/equal to 2 seconds

## 2025-01-31 NOTE — ED PROVIDER NOTE - SIGNIFICANT NEGATIVE FINDINGS
. no headache, no fever no rash no toxemia, no chest pain, , no palpitations, no abdominal pain, no n/v/d, no urinary symptoms, no bleeding. no neuro changes.

## 2025-01-31 NOTE — ED ADULT NURSE NOTE - OBJECTIVE STATEMENT
pt a/o x 4 with a calm affect c/o cough and difficulty expectorating greenish colored phlegm x 2 weeks.  patient tolerating room air well.

## 2025-01-31 NOTE — ED PROVIDER NOTE - CLINICAL SUMMARY MEDICAL DECISION MAKING FREE TEXT BOX
80 y/o male,  h/o COPD, bladder cancer, influenza Pt says he had the Flu 2 weeks ago and hasn't been well since, c/o cough with mucus and congestion, today c/o worsening SOB, cough and wheeze,. He shows discolored thick mjucous, tinge of blood on tissue paper.   He had routine  CXR today a  [facility] Chest CT 2 weeks ago.  He is using inhaler more frequently. no headache, no fever no rash no toxemia, no chest pain, , no palpitations, no abdominal pain, no n/v/d, no urinary symptoms, no bleeding. no neuro changes. VSS Exam stable , improved with DUO, solumedrol and AB, discharged in stable condition to OP care

## 2025-02-04 ENCOUNTER — NON-APPOINTMENT (OUTPATIENT)
Age: 80
End: 2025-02-04

## 2025-02-04 ENCOUNTER — APPOINTMENT (OUTPATIENT)
Dept: CARDIOLOGY | Facility: CLINIC | Age: 80
End: 2025-02-04
Payer: MEDICARE

## 2025-02-04 VITALS
DIASTOLIC BLOOD PRESSURE: 88 MMHG | OXYGEN SATURATION: 93 % | WEIGHT: 232 LBS | BODY MASS INDEX: 33.21 KG/M2 | HEART RATE: 118 BPM | HEIGHT: 70 IN | SYSTOLIC BLOOD PRESSURE: 141 MMHG

## 2025-02-04 DIAGNOSIS — I10 ESSENTIAL (PRIMARY) HYPERTENSION: ICD-10-CM

## 2025-02-04 DIAGNOSIS — I50.20 UNSPECIFIED SYSTOLIC (CONGESTIVE) HEART FAILURE: ICD-10-CM

## 2025-02-04 DIAGNOSIS — R06.00 DYSPNEA, UNSPECIFIED: ICD-10-CM

## 2025-02-04 PROCEDURE — 99214 OFFICE O/P EST MOD 30 MIN: CPT

## 2025-02-04 PROCEDURE — 93000 ELECTROCARDIOGRAM COMPLETE: CPT

## 2025-02-13 ENCOUNTER — APPOINTMENT (OUTPATIENT)
Dept: CARDIOLOGY | Facility: CLINIC | Age: 80
End: 2025-02-13
Payer: MEDICARE

## 2025-02-13 ENCOUNTER — LABORATORY RESULT (OUTPATIENT)
Age: 80
End: 2025-02-13

## 2025-02-13 ENCOUNTER — MED ADMIN CHARGE (OUTPATIENT)
Age: 80
End: 2025-02-13

## 2025-02-13 LAB
25(OH)D3 SERPL-MCNC: 13.3 NG/ML
ALBUMIN SERPL ELPH-MCNC: 3.4 G/DL
ALP BLD-CCNC: 82 U/L
ALT SERPL-CCNC: 18 U/L
ANION GAP SERPL CALC-SCNC: 13 MMOL/L
AST SERPL-CCNC: 16 U/L
BILIRUB SERPL-MCNC: 0.4 MG/DL
BUN SERPL-MCNC: 48 MG/DL
CALCIUM SERPL-MCNC: 8.8 MG/DL
CHLORIDE SERPL-SCNC: 100 MMOL/L
CHOLEST SERPL-MCNC: 232 MG/DL
CO2 SERPL-SCNC: 25 MMOL/L
CREAT SERPL-MCNC: 2.07 MG/DL
EGFR: 32 ML/MIN/1.73M2
ESTIMATED AVERAGE GLUCOSE: 240 MG/DL
GLUCOSE SERPL-MCNC: 262 MG/DL
HBA1C MFR BLD HPLC: 10 %
HDLC SERPL-MCNC: 28 MG/DL
LDLC SERPL CALC-MCNC: NORMAL MG/DL
NONHDLC SERPL-MCNC: 204 MG/DL
POTASSIUM SERPL-SCNC: 4.1 MMOL/L
PROT SERPL-MCNC: 6 G/DL
SODIUM SERPL-SCNC: 138 MMOL/L
TRIGL SERPL-MCNC: 808 MG/DL
TSH SERPL-ACNC: 7.22 UIU/ML

## 2025-02-13 PROCEDURE — 93306 TTE W/DOPPLER COMPLETE: CPT

## 2025-02-13 RX ORDER — PERFLUTREN 6.52 MG/ML
6.52 INJECTION, SUSPENSION INTRAVENOUS
Qty: 2 | Refills: 0 | Status: COMPLETED | OUTPATIENT
Start: 2025-02-13

## 2025-02-13 RX ADMIN — PERFLUTREN MG/ML: 6.52 INJECTION, SUSPENSION INTRAVENOUS at 00:00

## 2025-02-14 LAB
BASOPHILS # BLD AUTO: 0.11 K/UL
BASOPHILS NFR BLD AUTO: 0.9 %
EOSINOPHIL # BLD AUTO: 0.23 K/UL
EOSINOPHIL NFR BLD AUTO: 1.8 %
HCT VFR BLD CALC: 48.8 %
HGB BLD-MCNC: 15.5 G/DL
LYMPHOCYTES # BLD AUTO: 1.21 K/UL
LYMPHOCYTES NFR BLD AUTO: 9.7 %
MAN DIFF?: NORMAL
MCHC RBC-ENTMCNC: 27.8 PG
MCHC RBC-ENTMCNC: 31.8 G/DL
MCV RBC AUTO: 87.6 FL
MONOCYTES # BLD AUTO: 1.33 K/UL
MONOCYTES NFR BLD AUTO: 10.6 %
NEUTROPHILS # BLD AUTO: 9.29 K/UL
NEUTROPHILS NFR BLD AUTO: 74.3 %
PLATELET # BLD AUTO: 146 K/UL
RBC # BLD: 5.57 M/UL
RBC # FLD: 14.8 %
WBC # FLD AUTO: 12.51 K/UL

## 2025-02-19 RX ORDER — FENOFIBRATE 145 MG/1
145 TABLET, COATED ORAL
Qty: 90 | Refills: 3 | Status: ACTIVE | COMMUNITY
Start: 2025-02-19 | End: 1900-01-01

## 2025-03-19 ENCOUNTER — APPOINTMENT (OUTPATIENT)
Facility: CLINIC | Age: 80
End: 2025-03-19
Payer: MEDICARE

## 2025-03-19 VITALS
SYSTOLIC BLOOD PRESSURE: 152 MMHG | HEIGHT: 70 IN | BODY MASS INDEX: 33.07 KG/M2 | HEART RATE: 86 BPM | RESPIRATION RATE: 17 BRPM | DIASTOLIC BLOOD PRESSURE: 90 MMHG | WEIGHT: 231 LBS | TEMPERATURE: 98.6 F | OXYGEN SATURATION: 97 %

## 2025-03-19 DIAGNOSIS — I50.20 UNSPECIFIED SYSTOLIC (CONGESTIVE) HEART FAILURE: ICD-10-CM

## 2025-03-19 DIAGNOSIS — I48.20 CHRONIC ATRIAL FIBRILLATION, UNSP: ICD-10-CM

## 2025-03-19 DIAGNOSIS — N18.32 CHRONIC KIDNEY DISEASE, STAGE 3B: ICD-10-CM

## 2025-03-19 DIAGNOSIS — I10 ESSENTIAL (PRIMARY) HYPERTENSION: ICD-10-CM

## 2025-03-19 DIAGNOSIS — I42.9 CARDIOMYOPATHY, UNSPECIFIED: ICD-10-CM

## 2025-03-19 DIAGNOSIS — C67.9 MALIGNANT NEOPLASM OF BLADDER, UNSPECIFIED: ICD-10-CM

## 2025-03-19 DIAGNOSIS — J84.9 INTERSTITIAL PULMONARY DISEASE, UNSPECIFIED: ICD-10-CM

## 2025-03-19 DIAGNOSIS — Z87.891 PERSONAL HISTORY OF NICOTINE DEPENDENCE: ICD-10-CM

## 2025-03-19 DIAGNOSIS — J44.9 CHRONIC OBSTRUCTIVE PULMONARY DISEASE, UNSPECIFIED: ICD-10-CM

## 2025-03-19 DIAGNOSIS — E78.00 PURE HYPERCHOLESTEROLEMIA, UNSPECIFIED: ICD-10-CM

## 2025-03-19 DIAGNOSIS — I47.20 VENTRICULAR TACHYCARDIA, UNSPECIFIED: ICD-10-CM

## 2025-03-19 DIAGNOSIS — I25.10 ATHEROSCLEROTIC HEART DISEASE OF NATIVE CORONARY ARTERY W/OUT ANGINA PECTORIS: ICD-10-CM

## 2025-03-19 PROCEDURE — 94060 EVALUATION OF WHEEZING: CPT

## 2025-03-19 PROCEDURE — 99204 OFFICE O/P NEW MOD 45 MIN: CPT | Mod: 25

## 2025-03-19 PROCEDURE — 94729 DIFFUSING CAPACITY: CPT

## 2025-03-19 PROCEDURE — 94727 GAS DIL/WSHOT DETER LNG VOL: CPT

## 2025-03-19 RX ORDER — BUDESONIDE, GLYCOPYRROLATE, AND FORMOTEROL FUMARATE 160; 9; 4.8 UG/1; UG/1; UG/1
160-9-4.8 AEROSOL, METERED RESPIRATORY (INHALATION) TWICE DAILY
Qty: 1 | Refills: 5 | Status: ACTIVE | COMMUNITY
Start: 2025-03-19 | End: 1900-01-01

## 2025-03-19 RX ORDER — SACUBITRIL AND VALSARTAN 24; 26 MG/1; MG/1
24-26 TABLET, FILM COATED ORAL
Refills: 0 | Status: ACTIVE | COMMUNITY

## 2025-03-19 RX ORDER — PREDNISONE 20 MG/1
20 TABLET ORAL DAILY
Qty: 14 | Refills: 2 | Status: ACTIVE | COMMUNITY
Start: 2025-03-19 | End: 1900-01-01

## 2025-03-19 RX ORDER — ICOSAPENT ETHYL 1 G/1
1 CAPSULE ORAL
Refills: 0 | Status: ACTIVE | COMMUNITY

## 2025-04-15 NOTE — ED PROVIDER NOTE - IV ALTEPLASE ADMIN OUTSIDE HIDDEN
-- DO NOT REPLY / DO NOT REPLY ALL --  -- This inbox is not monitored. If this was sent to the wrong provider or department, reroute message to P ECO Reroute pool. --  -- Message is from Engagement Center Operations (ECO) --    General Patient Message: Patient is calling stating she needs provider to send an order so kandace can come and  her oxygen tank. Stated she no longer needs it. Please give a call back   Caller Information       Contact Date/Time Type Contact Phone/Fax    04/14/2025 03:05 PM CDT Phone (Incoming) nehemias [phone]            Alternative phone number: [phone]     Can a detailed message be left? Yes - Voicemail   Patient has been advised the message will be addressed within 2-3 business days.                      Copied from CRM #[id number]. Topic: MW Messaging - MW Patient Request  >> Apr 14, 2025  3:08 PM Cookie BRADLEY wrote:  nehemias called requesting to send a general message to clinician.   Verified issue is NOT regarding a symptom(s) requiring routine or emergent triage. Verified another message template type and CRM does not apply.    Selected 'Wrap Up CRM' and created new Telephone Encounter after clicking 'Convert to Clinical Call'. Selected appropriate Reason for Call.  Sent Pt message template and routed as routine priority per Clinician KB page to appropriate clinician pool. Readback full message.  
Walk test done 4/22/24:  Impression   Patient does not qualify for home oxygen based on above test, however clinical correlation is recommended for the above findings.     Imelda Frazier MD       Last office visit with Dr. Frazier on 11/26/24:  The patient's home oxygen evaluation shows that the patient does not need oxygen. The patient is not using oxygen in any case.       Reviewed with Dr. Frazier, per verbal order:  - Discontinue oxygen       Call made to patient, she has not used oxygen since last office visit. She feels she is doing well, no new symptoms or concerns.   Verified her oxygen is with [facility].   Advised to call in 2-3 days if she does not hear from [facility] to  equipment. She verbalized understanding and is agreeable. No further questions at this time.   
show

## 2025-04-29 ENCOUNTER — APPOINTMENT (OUTPATIENT)
Dept: ELECTROPHYSIOLOGY | Facility: CLINIC | Age: 80
End: 2025-04-29

## 2025-04-29 ENCOUNTER — RESULT CHARGE (OUTPATIENT)
Age: 80
End: 2025-04-29

## 2025-04-29 ENCOUNTER — NON-APPOINTMENT (OUTPATIENT)
Age: 80
End: 2025-04-29

## 2025-04-29 VITALS
BODY MASS INDEX: 32.93 KG/M2 | DIASTOLIC BLOOD PRESSURE: 76 MMHG | HEART RATE: 81 BPM | HEIGHT: 70 IN | OXYGEN SATURATION: 97 % | WEIGHT: 230 LBS | SYSTOLIC BLOOD PRESSURE: 115 MMHG

## 2025-04-29 PROCEDURE — 93283 PRGRMG EVAL IMPLANTABLE DFB: CPT

## 2025-04-29 PROCEDURE — 93000 ELECTROCARDIOGRAM COMPLETE: CPT | Mod: 59

## 2025-04-30 ENCOUNTER — APPOINTMENT (OUTPATIENT)
Facility: CLINIC | Age: 80
End: 2025-04-30
Payer: MEDICARE

## 2025-04-30 VITALS
HEART RATE: 83 BPM | DIASTOLIC BLOOD PRESSURE: 57 MMHG | WEIGHT: 223 LBS | TEMPERATURE: 97.6 F | BODY MASS INDEX: 35 KG/M2 | SYSTOLIC BLOOD PRESSURE: 130 MMHG | OXYGEN SATURATION: 97 % | HEIGHT: 67 IN | RESPIRATION RATE: 17 BRPM

## 2025-04-30 DIAGNOSIS — C67.9 MALIGNANT NEOPLASM OF BLADDER, UNSPECIFIED: ICD-10-CM

## 2025-04-30 DIAGNOSIS — E03.9 HYPOTHYROIDISM, UNSPECIFIED: ICD-10-CM

## 2025-04-30 DIAGNOSIS — J44.9 CHRONIC OBSTRUCTIVE PULMONARY DISEASE, UNSPECIFIED: ICD-10-CM

## 2025-04-30 DIAGNOSIS — Z87.891 PERSONAL HISTORY OF NICOTINE DEPENDENCE: ICD-10-CM

## 2025-04-30 DIAGNOSIS — N18.32 CHRONIC KIDNEY DISEASE, STAGE 3B: ICD-10-CM

## 2025-04-30 DIAGNOSIS — I25.10 ATHEROSCLEROTIC HEART DISEASE OF NATIVE CORONARY ARTERY W/OUT ANGINA PECTORIS: ICD-10-CM

## 2025-04-30 DIAGNOSIS — I48.20 CHRONIC ATRIAL FIBRILLATION, UNSP: ICD-10-CM

## 2025-04-30 DIAGNOSIS — I25.5 ISCHEMIC CARDIOMYOPATHY: ICD-10-CM

## 2025-04-30 DIAGNOSIS — I50.20 UNSPECIFIED SYSTOLIC (CONGESTIVE) HEART FAILURE: ICD-10-CM

## 2025-04-30 DIAGNOSIS — R06.00 DYSPNEA, UNSPECIFIED: ICD-10-CM

## 2025-04-30 DIAGNOSIS — J84.9 INTERSTITIAL PULMONARY DISEASE, UNSPECIFIED: ICD-10-CM

## 2025-04-30 DIAGNOSIS — I10 ESSENTIAL (PRIMARY) HYPERTENSION: ICD-10-CM

## 2025-04-30 PROCEDURE — ZZZZZ: CPT

## 2025-04-30 PROCEDURE — 94060 EVALUATION OF WHEEZING: CPT

## 2025-04-30 PROCEDURE — 94729 DIFFUSING CAPACITY: CPT

## 2025-04-30 PROCEDURE — 99214 OFFICE O/P EST MOD 30 MIN: CPT | Mod: 25

## 2025-04-30 PROCEDURE — 94727 GAS DIL/WSHOT DETER LNG VOL: CPT

## 2025-04-30 RX ORDER — BUDESONIDE AND FORMOTEROL FUMARATE 160; 4.5 UG/1; UG/1
160-4.5 AEROSOL, METERED RESPIRATORY (INHALATION)
Qty: 1 | Refills: 5 | Status: ACTIVE | COMMUNITY
Start: 2025-04-30 | End: 1900-01-01

## 2025-05-14 NOTE — REASON FOR VISIT
Patient Transferred to: [facility]  Handoff Report Given to: rn [Symptom and Test Evaluation] : symptom and test evaluation

## 2025-05-22 ENCOUNTER — RX RENEWAL (OUTPATIENT)
Age: 80
End: 2025-05-22

## 2025-06-04 LAB
25(OH)D3 SERPL-MCNC: 19.9 NG/ML
ALBUMIN SERPL ELPH-MCNC: 4 G/DL
ALP BLD-CCNC: 63 U/L
ALT SERPL-CCNC: 20 U/L
ANION GAP SERPL CALC-SCNC: 19 MMOL/L
AST SERPL-CCNC: 21 U/L
BILIRUB SERPL-MCNC: 0.4 MG/DL
BUN SERPL-MCNC: 56 MG/DL
CALCIUM SERPL-MCNC: 9.2 MG/DL
CHLORIDE SERPL-SCNC: 103 MMOL/L
CHOLEST SERPL-MCNC: 273 MG/DL
CO2 SERPL-SCNC: 20 MMOL/L
CREAT SERPL-MCNC: 2.17 MG/DL
EGFRCR SERPLBLD CKD-EPI 2021: 30 ML/MIN/1.73M2
GLUCOSE SERPL-MCNC: 191 MG/DL
HCT VFR BLD CALC: 46 %
HDLC SERPL-MCNC: 39 MG/DL
HGB BLD-MCNC: 14.1 G/DL
LDLC SERPL-MCNC: 148 MG/DL
MCHC RBC-ENTMCNC: 28.5 PG
MCHC RBC-ENTMCNC: 30.7 G/DL
MCV RBC AUTO: 93.1 FL
NONHDLC SERPL-MCNC: 234 MG/DL
NT-PROBNP SERPL-MCNC: 1669 PG/ML
PLATELET # BLD AUTO: 290 K/UL
POTASSIUM SERPL-SCNC: 4.5 MMOL/L
PROT SERPL-MCNC: 6.7 G/DL
RBC # BLD: 4.94 M/UL
RBC # FLD: 14.9 %
SODIUM SERPL-SCNC: 142 MMOL/L
TRIGL SERPL-MCNC: 456 MG/DL
TSH SERPL-ACNC: 8.37 UIU/ML
WBC # FLD AUTO: 10.37 K/UL

## 2025-06-05 LAB
ESTIMATED AVERAGE GLUCOSE: 292 MG/DL
HBA1C MFR BLD HPLC: 11.8 %

## 2025-06-06 ENCOUNTER — NON-APPOINTMENT (OUTPATIENT)
Age: 80
End: 2025-06-06

## 2025-06-06 ENCOUNTER — APPOINTMENT (OUTPATIENT)
Dept: CARDIOLOGY | Facility: CLINIC | Age: 80
End: 2025-06-06
Payer: MEDICARE

## 2025-06-06 VITALS
BODY MASS INDEX: 35.31 KG/M2 | WEIGHT: 225 LBS | HEART RATE: 81 BPM | HEIGHT: 67 IN | SYSTOLIC BLOOD PRESSURE: 135 MMHG | OXYGEN SATURATION: 99 % | DIASTOLIC BLOOD PRESSURE: 88 MMHG

## 2025-06-06 PROCEDURE — 93000 ELECTROCARDIOGRAM COMPLETE: CPT

## 2025-06-06 PROCEDURE — 99214 OFFICE O/P EST MOD 30 MIN: CPT

## 2025-06-24 NOTE — PHYSICAL THERAPY INITIAL EVALUATION ADULT - IMPAIRMENTS CONTRIBUTING IMPAIRED BED MOBILITY, REHAB EVAL
Discharge Planning Assessment  [facility]     Patient Name: Bob Escamilla  MRN: [id number]  Today's Date: 6/24/2025    Admit Date: 6/23/2025        Discharge Needs Assessment       Row Name 06/24/25 1129       Living Environment    People in Home spouse    Name(s) of People in Home Akiko Escamilla- spouse    Current Living Arrangements home    Potentially Unsafe Housing Conditions none    In the past 12 months has the electric, gas, oil, or water company threatened to shut off services in your home? No    Primary Care Provided by self    Provides Primary Care For spouse    Family Caregiver if Needed none    Quality of Family Relationships helpful;supportive    Able to Return to Prior Arrangements yes       Resource/Environmental Concerns    Resource/Environmental Concerns none    Transportation Concerns none       Transportation Needs    In the past 12 months, has lack of transportation kept you from medical appointments or from getting medications? no    In the past 12 months, has lack of transportation kept you from meetings, work, or from getting things needed for daily living? No       Food Insecurity    Within the past 12 months, you worried that your food would run out before you got the money to buy more. Never true    Within the past 12 months, the food you bought just didn't last and you didn't have money to get more. Never true       Transition Planning    Patient/Family Anticipates Transition to home with family    Patient/Family Anticipated Services at Transition     Transportation Anticipated family or friend will provide       Discharge Needs Assessment    Equipment Currently Used at Home cpap;glucometer    Concerns to be Addressed discharge planning;decision-making    Do you want help finding or keeping work or a job? Patient declined    Do you want help with school or training? For example, starting or completing job training or getting a high school diploma, GED or equivalent Patient  "declined    Anticipated Changes Related to Illness none    Equipment Needed After Discharge none    Outpatient/Agency/Support Group Needs outpatient therapy    Discharge Facility/Level of Care Needs outpatient therapy    Provided Post Acute Provider List? N/A    Patient's Choice of Community Agency(s) \"[facility]\"    Current Discharge Risk chronically ill;physical impairment                   Discharge Plan       Row Name 06/24/25 1122       Plan    Plan Comments Entered room, introduced self and explained role to patient; verified information on facesheet; patient lives in a single level home w/2 MERYL in front and ramp in the back; pt lives w/spouse, is independent w/ADL's, still works full time; ; uses a glucose and CPAP machine at home; Verified PCP listed on facesheet; RX are filled at [facility]; Pt has attended outpatient therapy at [facility] and plans on returning there for any further therapy needs; Pt denies any difficulty affording or obtaining food, bills, meds;  Plans to return home w/spouse to drive upon d/c- pt will follow up with outpatient PT- PT provided home exercises for patient as well                       Demographic Summary       Row Name 06/24/25 1125       General Information    Admission Type observation    Arrived From home    Required Notices Provided Observation Status Notice    Referral Source physician    Reason for Consult decision-making;discharge planning    Preferred Language English       Contact Information    Permission Granted to Share Info With                    Functional Status       Row Name 06/24/25 1123       Functional Status    Usual Activity Tolerance good    Current Activity Tolerance moderate       Assessment of Health Literacy    How often do you have someone help you read hospital materials? Never    How often do you have problems learning about your medical condition because of difficulty understanding written " information? Occasionally    How often do you have a problem understanding what is told to you about your medical condition? Occasionally    How confident are you filling out medical forms by yourself? Extremely    Health Literacy Good       Functional Status, IADL    Medications independent    Meal Preparation independent    Housekeeping independent    Laundry independent    Shopping independent    If for any reason you need help with day-to-day activities such as bathing, preparing meals, shopping, managing finances, etc., do you get the help you need? I don't need any help       Mental Status    General Appearance WDL WDL       Mental Status Summary    Recent Changes in Mental Status/Cognitive Functioning no changes       Employment/    Employment Status employed full-time                   Psychosocial    No documentation.                  Abuse/Neglect    No documentation.                  Legal    No documentation.                  Substance Abuse    No documentation.                  Patient Forms    No documentation.                     Elizabeth Dillard RN     impaired balance/decreased strength

## 2025-07-02 ENCOUNTER — RX RENEWAL (OUTPATIENT)
Age: 80
End: 2025-07-02

## 2025-07-24 ENCOUNTER — APPOINTMENT (OUTPATIENT)
Dept: ENDOCRINOLOGY | Facility: CLINIC | Age: 80
End: 2025-07-24
Payer: MEDICARE

## 2025-07-24 VITALS
WEIGHT: 212 LBS | OXYGEN SATURATION: 96 % | BODY MASS INDEX: 33.27 KG/M2 | HEART RATE: 94 BPM | DIASTOLIC BLOOD PRESSURE: 70 MMHG | HEIGHT: 67 IN | SYSTOLIC BLOOD PRESSURE: 134 MMHG

## 2025-07-24 DIAGNOSIS — E78.00 PURE HYPERCHOLESTEROLEMIA, UNSPECIFIED: ICD-10-CM

## 2025-07-24 DIAGNOSIS — E03.9 HYPOTHYROIDISM, UNSPECIFIED: ICD-10-CM

## 2025-07-24 LAB
GLUCOSE BLDC GLUCOMTR-MCNC: 164
HBA1C MFR BLD HPLC: 8.5

## 2025-07-24 PROCEDURE — 82962 GLUCOSE BLOOD TEST: CPT

## 2025-07-24 PROCEDURE — G2211 COMPLEX E/M VISIT ADD ON: CPT

## 2025-07-24 PROCEDURE — 83036 HEMOGLOBIN GLYCOSYLATED A1C: CPT | Mod: QW

## 2025-07-24 PROCEDURE — 99205 OFFICE O/P NEW HI 60 MIN: CPT

## 2025-07-24 RX ORDER — BLOOD SUGAR DIAGNOSTIC
STRIP MISCELLANEOUS DAILY
Qty: 1 | Refills: 1 | Status: ACTIVE | COMMUNITY
Start: 2025-07-24 | End: 1900-01-01

## 2025-07-24 RX ORDER — BLOOD-GLUCOSE METER
W/DEVICE EACH MISCELLANEOUS
Qty: 1 | Refills: 0 | Status: ACTIVE | COMMUNITY
Start: 2025-07-24 | End: 1900-01-01

## 2025-07-24 RX ORDER — LANCETS 33 GAUGE
EACH MISCELLANEOUS
Qty: 1 | Refills: 0 | Status: ACTIVE | COMMUNITY
Start: 2025-07-24 | End: 1900-01-01

## 2025-07-29 ENCOUNTER — NON-APPOINTMENT (OUTPATIENT)
Age: 80
End: 2025-07-29

## 2025-07-29 ENCOUNTER — APPOINTMENT (OUTPATIENT)
Dept: ELECTROPHYSIOLOGY | Facility: CLINIC | Age: 80
End: 2025-07-29
Payer: MEDICARE

## 2025-07-29 PROCEDURE — 93295 DEV INTERROG REMOTE 1/2/MLT: CPT

## 2025-07-29 PROCEDURE — 93296 REM INTERROG EVL PM/IDS: CPT

## 2025-08-04 ENCOUNTER — APPOINTMENT (OUTPATIENT)
Dept: ENDOCRINOLOGY | Facility: CLINIC | Age: 80
End: 2025-08-04
Payer: MEDICARE

## 2025-08-04 DIAGNOSIS — E11.65 TYPE 2 DIABETES MELLITUS WITH HYPERGLYCEMIA: ICD-10-CM

## 2025-08-04 PROCEDURE — G0108 DIAB MANAGE TRN  PER INDIV: CPT

## 2025-08-23 ENCOUNTER — APPOINTMENT (OUTPATIENT)
Dept: CT IMAGING | Facility: CLINIC | Age: 80
End: 2025-08-23

## 2025-08-23 ENCOUNTER — OUTPATIENT (OUTPATIENT)
Dept: OUTPATIENT SERVICES | Facility: HOSPITAL | Age: 80
LOS: 1 days | End: 2025-08-23
Payer: MEDICARE

## 2025-08-23 DIAGNOSIS — Z95.1 PRESENCE OF AORTOCORONARY BYPASS GRAFT: Chronic | ICD-10-CM

## 2025-08-23 DIAGNOSIS — J84.9 INTERSTITIAL PULMONARY DISEASE, UNSPECIFIED: ICD-10-CM

## 2025-08-23 DIAGNOSIS — Z98.890 OTHER SPECIFIED POSTPROCEDURAL STATES: Chronic | ICD-10-CM

## 2025-08-23 DIAGNOSIS — K56.609 UNSPECIFIED INTESTINAL OBSTRUCTION, UNSPECIFIED AS TO PARTIAL VERSUS COMPLETE OBSTRUCTION: Chronic | ICD-10-CM

## 2025-08-23 DIAGNOSIS — Z95.810 PRESENCE OF AUTOMATIC (IMPLANTABLE) CARDIAC DEFIBRILLATOR: Chronic | ICD-10-CM

## 2025-08-23 PROCEDURE — 71250 CT THORAX DX C-: CPT | Mod: 26

## 2025-08-23 PROCEDURE — 71250 CT THORAX DX C-: CPT

## 2025-08-28 ENCOUNTER — APPOINTMENT (OUTPATIENT)
Facility: CLINIC | Age: 80
End: 2025-08-28
Payer: MEDICARE

## 2025-08-28 VITALS
WEIGHT: 220 LBS | DIASTOLIC BLOOD PRESSURE: 75 MMHG | HEIGHT: 67 IN | TEMPERATURE: 98.2 F | RESPIRATION RATE: 17 BRPM | BODY MASS INDEX: 34.53 KG/M2 | SYSTOLIC BLOOD PRESSURE: 145 MMHG | OXYGEN SATURATION: 95 % | HEART RATE: 88 BPM

## 2025-08-28 DIAGNOSIS — J44.9 CHRONIC OBSTRUCTIVE PULMONARY DISEASE, UNSPECIFIED: ICD-10-CM

## 2025-08-28 DIAGNOSIS — J84.9 INTERSTITIAL PULMONARY DISEASE, UNSPECIFIED: ICD-10-CM

## 2025-08-28 DIAGNOSIS — J44.1 CHRONIC OBSTRUCTIVE PULMONARY DISEASE WITH (ACUTE) EXACERBATION: ICD-10-CM

## 2025-08-28 PROCEDURE — 99214 OFFICE O/P EST MOD 30 MIN: CPT

## 2025-08-28 RX ORDER — AZITHROMYCIN 250 MG/1
250 TABLET, FILM COATED ORAL
Qty: 1 | Refills: 0 | Status: ACTIVE | COMMUNITY
Start: 2025-08-28 | End: 1900-01-01

## 2025-09-08 LAB
25(OH)D3 SERPL-MCNC: 23 NG/ML
ALBUMIN SERPL ELPH-MCNC: 4.3 G/DL
ALP BLD-CCNC: 45 U/L
ALT SERPL-CCNC: 8 U/L
ANION GAP SERPL CALC-SCNC: 18 MMOL/L
AST SERPL-CCNC: 19 U/L
BILIRUB SERPL-MCNC: 0.3 MG/DL
BUN SERPL-MCNC: 81 MG/DL
CALCIUM SERPL-MCNC: 10.2 MG/DL
CHLORIDE SERPL-SCNC: 105 MMOL/L
CHOLEST SERPL-MCNC: 198 MG/DL
CO2 SERPL-SCNC: 20 MMOL/L
CREAT SERPL-MCNC: 2.38 MG/DL
EGFRCR SERPLBLD CKD-EPI 2021: 27 ML/MIN/1.73M2
GLUCOSE SERPL-MCNC: 154 MG/DL
HCT VFR BLD CALC: 46.9 %
HDLC SERPL-MCNC: 35 MG/DL
HGB BLD-MCNC: 14.5 G/DL
LDLC SERPL-MCNC: 96 MG/DL
MCHC RBC-ENTMCNC: 27.1 PG
MCHC RBC-ENTMCNC: 30.9 G/DL
MCV RBC AUTO: 87.7 FL
NONHDLC SERPL-MCNC: 163 MG/DL
NT-PROBNP SERPL-MCNC: 1464 PG/ML
PLATELET # BLD AUTO: 316 K/UL
POTASSIUM SERPL-SCNC: 4.8 MMOL/L
PROT SERPL-MCNC: 7.3 G/DL
RBC # BLD: 5.35 M/UL
RBC # FLD: 15.1 %
SODIUM SERPL-SCNC: 143 MMOL/L
TRIGL SERPL-MCNC: 401 MG/DL
TSH SERPL-ACNC: 6.37 UIU/ML
WBC # FLD AUTO: 12.16 K/UL

## 2025-09-09 LAB
ESTIMATED AVERAGE GLUCOSE: 174 MG/DL
HBA1C MFR BLD HPLC: 7.7 %

## 2025-09-10 ENCOUNTER — APPOINTMENT (OUTPATIENT)
Dept: CARDIOLOGY | Facility: CLINIC | Age: 80
End: 2025-09-10
Payer: MEDICARE

## 2025-09-10 VITALS
HEIGHT: 67 IN | SYSTOLIC BLOOD PRESSURE: 144 MMHG | WEIGHT: 218 LBS | HEART RATE: 93 BPM | OXYGEN SATURATION: 97 % | DIASTOLIC BLOOD PRESSURE: 79 MMHG | BODY MASS INDEX: 34.21 KG/M2

## 2025-09-10 DIAGNOSIS — R06.00 DYSPNEA, UNSPECIFIED: ICD-10-CM

## 2025-09-10 DIAGNOSIS — I10 ESSENTIAL (PRIMARY) HYPERTENSION: ICD-10-CM

## 2025-09-10 PROCEDURE — 93000 ELECTROCARDIOGRAM COMPLETE: CPT

## 2025-09-10 PROCEDURE — 99214 OFFICE O/P EST MOD 30 MIN: CPT

## 2025-09-12 ENCOUNTER — LABORATORY RESULT (OUTPATIENT)
Age: 80
End: 2025-09-12

## 2025-09-15 LAB
ALBUMIN SERPL ELPH-MCNC: 3.8 G/DL
ALP BLD-CCNC: 45 U/L
ALT SERPL-CCNC: 12 U/L
ANION GAP SERPL CALC-SCNC: 15 MMOL/L
AST SERPL-CCNC: 22 U/L
BASOPHILS # BLD AUTO: 0 K/UL
BASOPHILS NFR BLD AUTO: 0 %
BILIRUB SERPL-MCNC: 0.2 MG/DL
BUN SERPL-MCNC: 64 MG/DL
CALCIUM SERPL-MCNC: 9 MG/DL
CHLORIDE SERPL-SCNC: 107 MMOL/L
CO2 SERPL-SCNC: 19 MMOL/L
CREAT SERPL-MCNC: 2.25 MG/DL
EGFRCR SERPLBLD CKD-EPI 2021: 29 ML/MIN/1.73M2
EOSINOPHIL # BLD AUTO: 0.09 K/UL
EOSINOPHIL NFR BLD AUTO: 0.9 %
FERRITIN SERPL-MCNC: 72 NG/ML
GLUCOSE SERPL-MCNC: 195 MG/DL
HCT VFR BLD CALC: 41.9 %
HGB BLD-MCNC: 13 G/DL
IRON SATN MFR SERPL: 5 %
IRON SERPL-MCNC: 19 UG/DL
LYMPHOCYTES # BLD AUTO: 1.17 K/UL
LYMPHOCYTES NFR BLD AUTO: 11.5 %
MAN DIFF?: NORMAL
MCHC RBC-ENTMCNC: 27.3 PG
MCHC RBC-ENTMCNC: 31 G/DL
MCV RBC AUTO: 88 FL
MONOCYTES # BLD AUTO: 0.82 K/UL
MONOCYTES NFR BLD AUTO: 8 %
NEUTROPHILS # BLD AUTO: 8.03 K/UL
NEUTROPHILS NFR BLD AUTO: 78.7 %
PLATELET # BLD AUTO: 217 K/UL
POTASSIUM SERPL-SCNC: 4.5 MMOL/L
PROT SERPL-MCNC: 6.6 G/DL
RBC # BLD: 4.76 M/UL
RBC # FLD: 15.2 %
SODIUM SERPL-SCNC: 141 MMOL/L
TIBC SERPL-MCNC: 388 UG/DL
UIBC SERPL-MCNC: 368 UG/DL
WBC # FLD AUTO: 10.2 K/UL

## (undated) DEVICE — Device

## (undated) DEVICE — SUT PDS II 1 48" TP-1

## (undated) DEVICE — WARMING BLANKET UPPER ADULT

## (undated) DEVICE — SUT VICRYL 3-0 27" SH UNDYED

## (undated) DEVICE — SUT VICRYL 0 36" CT-1 UNDYED

## (undated) DEVICE — VENODYNE/SCD SLEEVE CALF MEDIUM

## (undated) DEVICE — DRSG MEPILEX 10 X 20CM (4 X 8") POST OP AG SILVER

## (undated) DEVICE — SUT PERMAHAND 3-0 REEL

## (undated) DEVICE — SUT VICRYL 2-0 54" REEL UNDYED

## (undated) DEVICE — RETAINER VICERA FISH LG

## (undated) DEVICE — DRSG VAC ABTHERS

## (undated) DEVICE — PACK MINOR WITH LAP

## (undated) DEVICE — SUT MONOCRYL 4-0 27" PS-2 UNDYED

## (undated) DEVICE — SYR CONTROL LUER LOK 10CC

## (undated) DEVICE — BLADE SURGICAL #10 STAINLESS

## (undated) DEVICE — ELCTR BOVIE BLADE 3/4" EXTENDED LENGTH 6"

## (undated) DEVICE — BLADE SURGICAL #15 CARBON

## (undated) DEVICE — LIGASURE IMPACT

## (undated) DEVICE — PACK GENERAL LAPAROSCOPY

## (undated) DEVICE — ELCTR GROUNDING PAD ADULT COVIDIEN

## (undated) DEVICE — CANISTER KCI 500ML GEL SENSA TRAC

## (undated) DEVICE — GLV 6.5 PROTEXIS W HYDROGEL

## (undated) DEVICE — DRAPE 1/2 SHEET 40X57"

## (undated) DEVICE — GLV 7.5 PROTEXIS (WHITE)

## (undated) DEVICE — PACK MAJOR ABDOMINAL WITH LAP

## (undated) DEVICE — DRAPE TOWEL BLUE STICKY

## (undated) DEVICE — SSH-ESU BOVIE MACHINE T7E14835DX: Type: DURABLE MEDICAL EQUIPMENT

## (undated) DEVICE — SUT PDS II 2-0 27" CT-1

## (undated) DEVICE — SUT VICRYL 2-0 27" CT-1 UNDYED

## (undated) DEVICE — FOLEY TRAY 16FR LF URINE METER SURESTEP

## (undated) DEVICE — GLV 7 PROTEXIS (WHITE)